# Patient Record
Sex: FEMALE | Race: WHITE | NOT HISPANIC OR LATINO | Employment: OTHER | URBAN - METROPOLITAN AREA
[De-identification: names, ages, dates, MRNs, and addresses within clinical notes are randomized per-mention and may not be internally consistent; named-entity substitution may affect disease eponyms.]

---

## 2017-03-18 ENCOUNTER — OFFICE VISIT (OUTPATIENT)
Dept: URGENT CARE | Facility: PHYSICIAN GROUP | Age: 72
End: 2017-03-18
Payer: MEDICARE

## 2017-03-18 VITALS
DIASTOLIC BLOOD PRESSURE: 84 MMHG | TEMPERATURE: 97.7 F | RESPIRATION RATE: 16 BRPM | HEART RATE: 96 BPM | OXYGEN SATURATION: 94 % | BODY MASS INDEX: 22.89 KG/M2 | SYSTOLIC BLOOD PRESSURE: 122 MMHG | HEIGHT: 72 IN | WEIGHT: 169 LBS

## 2017-03-18 DIAGNOSIS — J01.00 ACUTE MAXILLARY SINUSITIS, RECURRENCE NOT SPECIFIED: ICD-10-CM

## 2017-03-18 PROCEDURE — G8420 CALC BMI NORM PARAMETERS: HCPCS | Performed by: NURSE PRACTITIONER

## 2017-03-18 PROCEDURE — G8432 DEP SCR NOT DOC, RNG: HCPCS | Performed by: NURSE PRACTITIONER

## 2017-03-18 PROCEDURE — 3017F COLORECTAL CA SCREEN DOC REV: CPT | Mod: 8P | Performed by: NURSE PRACTITIONER

## 2017-03-18 PROCEDURE — 1036F TOBACCO NON-USER: CPT | Performed by: NURSE PRACTITIONER

## 2017-03-18 PROCEDURE — 4040F PNEUMOC VAC/ADMIN/RCVD: CPT | Performed by: NURSE PRACTITIONER

## 2017-03-18 PROCEDURE — 3014F SCREEN MAMMO DOC REV: CPT | Performed by: NURSE PRACTITIONER

## 2017-03-18 PROCEDURE — 99203 OFFICE O/P NEW LOW 30 MIN: CPT | Performed by: NURSE PRACTITIONER

## 2017-03-18 PROCEDURE — G8482 FLU IMMUNIZE ORDER/ADMIN: HCPCS | Performed by: NURSE PRACTITIONER

## 2017-03-18 PROCEDURE — 1101F PT FALLS ASSESS-DOCD LE1/YR: CPT | Mod: 8P | Performed by: NURSE PRACTITIONER

## 2017-03-18 RX ORDER — AMOXICILLIN AND CLAVULANATE POTASSIUM 875; 125 MG/1; MG/1
1 TABLET, FILM COATED ORAL 2 TIMES DAILY
Qty: 10 TAB | Refills: 0 | Status: SHIPPED | OUTPATIENT
Start: 2017-03-18 | End: 2017-03-23

## 2017-03-18 NOTE — MR AVS SNAPSHOT
Nohelia Dickerson   3/18/2017 12:45 PM   Office Visit   MRN: [id number]    Department:  [facility]   Dept Phone:  [phone]    Description:  Female : 1945   Provider:  RAFFI Beyer           Reason for Visit     Sinus Problem pain and pressure, chest congestion and tighness X 2 weeks       Allergies as of 3/18/2017     Allergen Noted Reactions    Lactose 2011       Lactose intolerence    Morphine 2011       Hallucinations, altered mentations      You were diagnosed with     Acute maxillary sinusitis, recurrence not specified   [[id number]]         Vital Signs     Blood Pressure Pulse Temperature Respirations Height Weight    122/84 mmHg 96 36.5 °C (97.7 °F) 16 1.829 m (6') 76.658 kg (169 lb)    Body Mass Index Oxygen Saturation                22.92 kg/m2 94%          Basic Information     Date Of Birth Sex Race Ethnicity Preferred Language    1945 Female White Unknown English      Health Maintenance        Date Due Completion Dates    IMM DTaP/Tdap/Td Vaccine (1 - Tdap) 3/11/1965 ---    PAP SMEAR 3/11/1967 ---    COLONOSCOPY 3/11/1996 ---    IMM ZOSTER VACCINE 3/11/2006 ---    BONE DENSITY 3/11/2011 ---    MAMMOGRAM 2017    IMM PNEUMOCOCCAL 65+ (ADULT) LOW/MEDIUM RISK SERIES (2 of 2 - PPSV23) 2017            Current Immunizations     13-VALENT PCV PREVNAR 2016  9:24 AM    Influenza Vaccine Adult HD 2016  9:23 AM      Below and/or attached are the medications your provider expects you to take. Review all of your home medications and newly ordered medications with your provider and/or pharmacist. Follow medication instructions as directed by your provider and/or pharmacist. Please keep your medication list with you and share with your provider. Update the information when medications are discontinued, doses are changed, or new medications (including over-the-counter products) are added; and carry medication information  at all times in the event of emergency situations     Allergies:  LACTOSE - (reactions not documented)     MORPHINE - (reactions not documented)               Medications  Valid as of: March 18, 2017 -  1:52 PM    Generic Name Brand Name Tablet Size Instructions for use    Amoxicillin-Pot Clavulanate (Tab) AUGMENTIN 875-125 MG Take 1 Tab by mouth 2 times a day for 5 days.        B Complex Vitamins   Take  by mouth.        Cholecalciferol (Cap) VITAMIN D 400 UNIT Take 400 Units by mouth every day.        Multiple Vitamins-Minerals   Take  by mouth every day.        Omega-3 Fatty Acids   Take  by mouth.        .                 Medicines prescribed today were sent to:     [id number] - [place] - [address] [place] [address]    Phone: [phone] Fax: [phone]    Open 24 Hours?: No      Medication refill instructions:       If your prescription bottle indicates you have medication refills left, it is not necessary to call your provider’s office. Please contact your pharmacy and they will refill your medication.    If your prescription bottle indicates you do not have any refills left, you may request refills at any time through one of the following ways: The online [facility] system (except Urgent Care), by calling your provider’s office, or by asking your pharmacy to contact your provider’s office with a refill request. Medication refills are processed only during regular business hours and may not be available until the next business day. Your provider may request additional information or to have a follow-up visit with you prior to refilling your medication.   *Please Note: Medication refills are assigned a new Rx number when refilled electronically. Your pharmacy may indicate that no refills were authorized even though a new prescription for the same medication is available at the pharmacy. Please request the medicine by name with the pharmacy before contacting your provider for a  refill.           [employer] Access Code: [email or web address]  Expires: 4/17/2017  1:52 PM    Your email address is not on file at [facility].  Email Addresses are required for you to sign up for [employer], please contact [phone] to verify your personal information and to provide your email address prior to attempting to register for [employer].    Nohelia Dickerson  [address] [place] [address], [place] [address]    Eye[employer]Care  A secure, online tool to manage your health information     [facility]’s EyeTechCare® is a secure, online tool that connects you to your personalized health information from the privacy of your home -- day or night - making it very easy for you to manage your healthcare. Once the activation process is completed, you can even access your medical information using the EyeTechCare manju, which is available for free in the Apple Manju store or Google Play store.     To learn more about EyeTechCare, visit [email or web address]    There are two levels of access available (as shown below):   My Chart Features  [facility] Primary Care Doctor [facility]  Specialists [facility]  Urgent  Care Non-[facility] Primary Care Doctor   Email your healthcare team securely and privately 24/7 X X X    Manage appointments: schedule your next appointment; view details of past/upcoming appointments X      Request prescription refills. X      View recent personal medical records, including lab and immunizations X X X X   View health record, including health history, allergies, medications X X X X   Read reports about your outpatient visits, procedures, consult and ER notes X X X X   See your discharge summary, which is a recap of your hospital and/or ER visit that includes your diagnosis, lab results, and care plan X X  X     How to register for [employer]:  Once your e-mail address has been verified, follow the following steps to sign up for [employer].     1. Go to  [email or web address]  2. Click on the Sign Up Now box, which takes you to the New Member  Sign Up page. You will need to provide the following information:  a. Enter your [employer] Access Code exactly as it appears at the top of this page. (You will not need to use this code after you’ve completed the sign-up process. If you do not sign up before the expiration date, you must request a new code.)   b. Enter your date of birth.   c. Enter your home email address.   d. Click Submit, and follow the next screen’s instructions.  3. Create a [employer]oxid ID. This will be your [employer] login ID and cannot be changed, so think of one that is secure and easy to remember.  4. Create a [employer] password. You can change your password at any time.  5. Enter your Password Reset Question and Answer. This can be used at a later time if you forget your password.   6. Enter your e-mail address. This allows you to receive e-mail notifications when new information is available in [employer].  7. Click Sign Up. You can now view your health information.    For assistance activating your [employer] account, call [phone]

## 2017-03-22 ASSESSMENT — ENCOUNTER SYMPTOMS
DIAPHORESIS: 0
HEADACHES: 1
CHILLS: 0
SORE THROAT: 0
WEAKNESS: 0
FEVER: 0

## 2017-03-22 NOTE — PROGRESS NOTES
Subjective:      Nohelia Dickerson is a 72 y.o. female who presents with Sinus Problem            Sinus Problem  Associated symptoms include congestion and headaches. Pertinent negatives include no chills, diaphoresis, ear pain or sore throat.   Patient comes in today with a 2 week history of sinus pressure and nasal congestion.  She has allergic rhinitis and has been taking oral decongestants and antihistamines with less effectiveness than usual.  Denies any fever, chills, or cough.      Review of Systems   Constitutional: Negative for fever, chills, malaise/fatigue and diaphoresis.   HENT: Positive for congestion. Negative for ear pain and sore throat.    Skin: Negative for rash.   Neurological: Positive for headaches. Negative for weakness.     Medications, Allergies, and current problem list reviewed today in Epic     Objective:     /84 mmHg  Pulse 96  Temp(Src) 36.5 °C (97.7 °F)  Resp 16  Ht 1.829 m (6')  Wt 76.658 kg (169 lb)  BMI 22.92 kg/m2  SpO2 94%     Physical Exam   Constitutional: She is oriented to person, place, and time. She appears well-developed and well-nourished. No distress.   HENT:   Head: Normocephalic.   Right Ear: External ear normal.   Left Ear: External ear normal.   Mouth/Throat: Oropharynx is clear and moist. No oropharyngeal exudate.   Nares patent.  Diffuse maxillary sinus pain, left worse than right.     Eyes: Conjunctivae are normal. Pupils are equal, round, and reactive to light. Right eye exhibits no discharge. Left eye exhibits no discharge. No scleral icterus.   Neck: Neck supple. No JVD present. No tracheal deviation present. No thyromegaly present.   Cardiovascular: Normal rate, regular rhythm and normal heart sounds.  Exam reveals no gallop and no friction rub.    No murmur heard.  Pulmonary/Chest: Effort normal and breath sounds normal. No stridor. No respiratory distress. She has no wheezes. She has no rales. She exhibits no tenderness.   Lymphadenopathy:      She has no cervical adenopathy.   Neurological: She is alert and oriented to person, place, and time.   Skin: Skin is warm and dry. No rash noted. She is not diaphoretic. No erythema.   Vitals reviewed.              Assessment/Plan:     1. Acute maxillary sinusitis, recurrence not specified  - amoxicillin-clavulanate (AUGMENTIN) 875-125 MG Tab; Take 1 Tab by mouth 2 times a day for 5 days.  Dispense: 10 Tab; Refill: 0    Take full course of antibiotics.  Use a cool mist humidifier.  OTC Sudafed for 2-5 days prn symptom management.  Maintain adequate po hydration.  RTC in 5-7 days if symptoms persist, sooner if worse.  Patient verbalized understanding of and agreed with plan of care.

## 2017-08-19 ENCOUNTER — HOSPITAL ENCOUNTER (OUTPATIENT)
Facility: MEDICAL CENTER | Age: 72
End: 2017-08-19
Payer: MEDICARE

## 2017-08-19 LAB
ALBUMIN SERPL BCP-MCNC: 4.2 G/DL (ref 3.2–4.9)
ALBUMIN/GLOB SERPL: 1.5 G/DL
ALP SERPL-CCNC: 68 U/L (ref 30–99)
ALT SERPL-CCNC: 14 U/L (ref 2–50)
ANION GAP SERPL CALC-SCNC: 9 MMOL/L (ref 0–11.9)
AST SERPL-CCNC: 17 U/L (ref 12–45)
BILIRUB SERPL-MCNC: 1.3 MG/DL (ref 0.1–1.5)
BUN SERPL-MCNC: 16 MG/DL (ref 8–22)
CALCIUM SERPL-MCNC: 9.6 MG/DL (ref 8.5–10.5)
CHLORIDE SERPL-SCNC: 106 MMOL/L (ref 96–112)
CHOLEST SERPL-MCNC: 188 MG/DL (ref 100–199)
CO2 SERPL-SCNC: 25 MMOL/L (ref 20–33)
CREAT SERPL-MCNC: 0.8 MG/DL (ref 0.5–1.4)
GFR SERPL CREATININE-BSD FRML MDRD: >60 ML/MIN/1.73 M 2
GLOBULIN SER CALC-MCNC: 2.8 G/DL (ref 1.9–3.5)
GLUCOSE SERPL-MCNC: 92 MG/DL (ref 65–99)
HDLC SERPL-MCNC: 68 MG/DL
LDLC SERPL CALC-MCNC: 103 MG/DL
POTASSIUM SERPL-SCNC: 3.8 MMOL/L (ref 3.6–5.5)
PROT SERPL-MCNC: 7 G/DL (ref 6–8.2)
SODIUM SERPL-SCNC: 140 MMOL/L (ref 135–145)
TRIGL SERPL-MCNC: 86 MG/DL (ref 0–149)

## 2017-08-19 PROCEDURE — 80061 LIPID PANEL: CPT

## 2017-08-19 PROCEDURE — 80053 COMPREHEN METABOLIC PANEL: CPT

## 2018-04-02 ENCOUNTER — OFFICE VISIT (OUTPATIENT)
Dept: URGENT CARE | Facility: PHYSICIAN GROUP | Age: 73
End: 2018-04-02
Payer: MEDICARE

## 2018-04-02 VITALS
OXYGEN SATURATION: 92 % | DIASTOLIC BLOOD PRESSURE: 92 MMHG | HEIGHT: 72 IN | TEMPERATURE: 98.8 F | HEART RATE: 95 BPM | SYSTOLIC BLOOD PRESSURE: 160 MMHG | BODY MASS INDEX: 21.81 KG/M2 | WEIGHT: 161 LBS

## 2018-04-02 DIAGNOSIS — R49.0 HOARSENESS: ICD-10-CM

## 2018-04-02 DIAGNOSIS — J02.9 SORE THROAT: ICD-10-CM

## 2018-04-02 DIAGNOSIS — I10 ESSENTIAL HYPERTENSION: ICD-10-CM

## 2018-04-02 PROCEDURE — 99214 OFFICE O/P EST MOD 30 MIN: CPT | Performed by: NURSE PRACTITIONER

## 2018-04-02 RX ORDER — LISINOPRIL 5 MG/1
5 TABLET ORAL DAILY
Qty: 30 TAB | Refills: 0 | Status: SHIPPED | OUTPATIENT
Start: 2018-04-02 | End: 2018-05-22

## 2018-04-02 ASSESSMENT — ENCOUNTER SYMPTOMS
SHORTNESS OF BREATH: 0
TROUBLE SWALLOWING: 0
PALPITATIONS: 0
SORE THROAT: 0
CHILLS: 0
SWOLLEN GLANDS: 0
FEVER: 0
COUGH: 0

## 2018-04-03 NOTE — PROGRESS NOTES
Subjective:      Nohelia Dcikerson is a 73 y.o. female who presents with Laryngitis (Cough, post nasal drip, runny nose x 3 wks )    Past Medical History:   Diagnosis Date   • Cancer ([facility]) 1978    thyroid   • COPD    • Hernia of unspecified site of abdominal cavity without mention of obstruction or gangrene 2011   • Inguinal hernia    • Kidney stones      Social History     Social History   • Marital status:      Spouse name: N/A   • Number of children: N/A   • Years of education: N/A     Occupational History   • Not on file.     Social History Main Topics   • Smoking status: Former Smoker     Packs/day: 0.50     Types: Cigarettes   • Smokeless tobacco: Never Used      Comment: quit 1 year ago, smoked 1ppd for 50 years   • Alcohol use No   • Drug use: No   • Sexual activity: No     Other Topics Concern   • Not on file     Social History Narrative   • No narrative on file     Family History   Problem Relation Age of Onset   • Cancer Mother    • Heart Disease Father    • Stroke Father        Allergies: Lactose and Morphine    Patient is a 73-year-old female who presents today with complaint of sore throat and cold symptoms about 3 weeks ago. Those symptoms resolved and now patient just has a residual hoarseness lasting more than 14 days that will not resolve. She is currently a smoker.                Pharyngitis    This is a new problem. The current episode started in the past 7 days. Neither side of throat is experiencing more pain than the other. There has been no fever. Pertinent negatives include no congestion, coughing, shortness of breath, swollen glands or trouble swallowing. Associated symptoms comments: Hoarseness. She has tried nothing for the symptoms. The treatment provided no relief.       Review of Systems   Constitutional: Negative for chills, fever and malaise/fatigue.   HENT: Negative for congestion, sore throat and trouble swallowing.         Loss of voice   Respiratory: Negative for  cough and shortness of breath.    Cardiovascular: Negative for chest pain and palpitations.   Skin: Negative.    All other systems reviewed and are negative.         Objective:     /92   Pulse 95   Temp 37.1 °C (98.8 °F)   Ht 1.829 m (6')   Wt 73 kg (161 lb)   SpO2 92%   BMI 21.84 kg/m²      Physical Exam   Constitutional: She is oriented to person, place, and time. She appears well-developed and well-nourished.   HENT:   Head: Normocephalic.   Right Ear: External ear normal.   Left Ear: External ear normal.   Nose: Nose normal.   Mouth/Throat: Oropharynx is clear and moist.   Voice is noted to be raspy and hoarse   Eyes: Conjunctivae and EOM are normal. Pupils are equal, round, and reactive to light.   Neck: Normal range of motion. Neck supple.   Cardiovascular: Normal rate and regular rhythm.    Pulmonary/Chest: Effort normal and breath sounds normal.   Musculoskeletal: Normal range of motion.   Neurological: She is alert and oriented to person, place, and time.   Skin: Skin is warm and dry. Capillary refill takes less than 2 seconds.   Psychiatric: She has a normal mood and affect. Her behavior is normal. Judgment and thought content normal.   Vitals reviewed.    Recheck of patient's blood pressure by me to the left upper extremity: 170/100    Patient admits to a prior history of hypertension. She states she used to take atenolol but stopped taking it because it made her very fatigued. She currently does not have a primary care physician and although Dr. Estrada is listed, she states she has not actually seen him.    I discussed with patient that she has no other upper respiratory symptoms I'm concerned for chronic hoarseness and I do strongly recommend that she see an ear nose and throat specialist. As her blood pressure is elevated, I will also start her on a low-dose of lisinopril with the understanding that I will only give her one month and that she will make an appointment to follow up with  primary care here at [facility]. Patient agreed to this.          Assessment/Plan:     1. Laryngitis, chronic    -Referral given to ENT for follow up    2. Hypertension    -Lisinopril 5 mg daily  -appointment scheduled to establish and follow up with primary care.

## 2018-05-21 ENCOUNTER — TELEPHONE (OUTPATIENT)
Dept: MEDICAL GROUP | Facility: PHYSICIAN GROUP | Age: 73
End: 2018-05-21

## 2018-05-21 NOTE — TELEPHONE ENCOUNTER
Future Appointments       Provider Department Center    5/22/2018 11:20 AM Idalia Cortez M.D. [facility]        NEW PATIENT VISIT PRE-VISIT PLANNING    1.  [facility] Patient is checked in Patient Demographics? YES    2.  Immunizations were updated in [facility] using WebIZ?: Yes       •  Web Iz Recommendations: FLU, PNEUMOVAX (PPSV23), TDAP and ZOSTAVAX (Shingles)    3.  Is this appointment scheduled as a Hospital Follow-Up? No    4.  Patient is due for the following Health Maintenance Topics:   Health Maintenance Due   Topic Date Due   • Annual Wellness Visit  1945   • IMM DTaP/Tdap/Td Vaccine (1 - Tdap) 03/11/1965   • PAP SMEAR  03/11/1967   • COLONOSCOPY  03/11/1996   • BONE DENSITY  03/11/2011   • MAMMOGRAM  12/06/2017   • IMM PNEUMOCOCCAL 65+ (ADULT) LOW/MEDIUM RISK SERIES (2 of 2 - PPSV23) 12/06/2017       5.  Reviewed/Updated the following with patient:   •   Preferred Pharmacy? NO       •   Preferred Lab? NO       •   Preferred Communication? NO       •   Allergies? NO       •   Medications? NO       •   Social History? NO       •   Family History (document living status of immediate family members and if + hx of cancer, diabetes, hypertension, hyperlipidemia, heart attack, stroke) NO    6.  Updated Care Team?       •   DME Company (gait device, O2, CPAP, etc.) NO       •   Other Specialists (eye doctor, derm, GYN, cardiology, endo, etc): NO    7.  MDX printed for Provider? YES    8.  Patient was informed to arrive 15 min prior to their   scheduled appointment and bring in their medication bottles. [facility] was unable to get in contact with patient

## 2018-05-22 ENCOUNTER — OFFICE VISIT (OUTPATIENT)
Dept: MEDICAL GROUP | Facility: PHYSICIAN GROUP | Age: 73
End: 2018-05-22
Payer: MEDICARE

## 2018-05-22 VITALS
SYSTOLIC BLOOD PRESSURE: 142 MMHG | OXYGEN SATURATION: 91 % | BODY MASS INDEX: 21.09 KG/M2 | HEART RATE: 102 BPM | DIASTOLIC BLOOD PRESSURE: 90 MMHG | TEMPERATURE: 99 F | HEIGHT: 72 IN | WEIGHT: 155.7 LBS

## 2018-05-22 DIAGNOSIS — M85.80 AGE-RELATED BONE LOSS: ICD-10-CM

## 2018-05-22 DIAGNOSIS — Z72.0 TOBACCO ABUSE: ICD-10-CM

## 2018-05-22 DIAGNOSIS — Z00.00 HEALTHCARE MAINTENANCE: ICD-10-CM

## 2018-05-22 DIAGNOSIS — Z13.228 ENCOUNTER FOR SCREENING FOR METABOLIC DISORDER: ICD-10-CM

## 2018-05-22 DIAGNOSIS — E78.5 DYSLIPIDEMIA: ICD-10-CM

## 2018-05-22 DIAGNOSIS — Z12.11 COLON CANCER SCREENING: ICD-10-CM

## 2018-05-22 DIAGNOSIS — Z12.2 ENCOUNTER FOR SCREENING FOR LUNG CANCER: ICD-10-CM

## 2018-05-22 DIAGNOSIS — Z71.6 ENCOUNTER FOR SMOKING CESSATION COUNSELING: ICD-10-CM

## 2018-05-22 DIAGNOSIS — Z12.31 ENCOUNTER FOR SCREENING MAMMOGRAM FOR BREAST CANCER: ICD-10-CM

## 2018-05-22 DIAGNOSIS — I10 ESSENTIAL HYPERTENSION: Primary | ICD-10-CM

## 2018-05-22 DIAGNOSIS — Z87.891 PERSONAL HISTORY OF NICOTINE DEPENDENCE: ICD-10-CM

## 2018-05-22 PROCEDURE — 99204 OFFICE O/P NEW MOD 45 MIN: CPT | Performed by: INTERNAL MEDICINE

## 2018-05-22 RX ORDER — LISINOPRIL 10 MG/1
10 TABLET ORAL DAILY
Qty: 90 TAB | Refills: 2 | Status: SHIPPED | OUTPATIENT
Start: 2018-05-22 | End: 2019-02-02 | Stop reason: SDUPTHER

## 2018-05-22 ASSESSMENT — PATIENT HEALTH QUESTIONNAIRE - PHQ9: CLINICAL INTERPRETATION OF PHQ2 SCORE: 0

## 2018-05-22 NOTE — ASSESSMENT & PLAN NOTE
This is a chronic health problem that is uncontrolled with current medications and lifestyle measures. Currently on 0.5PPD , but previously 1PPD since 17 yrs age. Quit on and Off 6 yrs back.

## 2018-05-22 NOTE — ASSESSMENT & PLAN NOTE
This is a chronic health problem that is uncontrolled with current medications and lifestyle measures. Was diagnosed 7 yrs back at [facility] when she got hospitalized for Inguinal hernia strangulation and repair - but patient was noncompliant with the medications and stopped it after 3-4 months. CUrrently in UC visit started on Lisinopril 5 mg but pt not started taking.     Today BP is 142/90 in Office , Denies any Sx of Headache, blurry vision or focal weakness.Not on any medications.

## 2018-05-22 NOTE — ASSESSMENT & PLAN NOTE
Patient is due for colonoscopy, mammogram, DEXA scan and pneumococcal shots which she prefers to have it at [facility].
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
## 2018-05-30 ENCOUNTER — PATIENT OUTREACH (OUTPATIENT)
Dept: HEALTH INFORMATION MANAGEMENT | Facility: OTHER | Age: 73
End: 2018-05-30

## 2018-05-30 NOTE — PROGRESS NOTES
Attempt #:1    WebIZ Checked & Epic Updated: no  HealthConnect Verified: yes  Verify PCP: yes    Communication Preference Obtained: unable to-pt had to hang up     Review Care Team: no    Annual Wellness Visit Scheduling  1. Scheduling Status:Not Scheduled. Patient states they are not interested        Care Gap Scheduling (Attempt to Schedule EACH Overdue Care Gap!)  Health Maintenance Due   Topic Date Due   • Annual Wellness Visit  1945   • IMM DTaP/Tdap/Td Vaccine (1 - Tdap) 03/11/1965   • PAP SMEAR  03/11/1967   • COLONOSCOPY  03/11/1996   • BONE DENSITY  03/11/2011   • MAMMOGRAM  12/06/2017   • IMM PNEUMOCOCCAL 65+ (ADULT) LOW/MEDIUM RISK SERIES (2 of 2 - PPSV23) 12/06/2017       Pt stated she will call back to schedule care gaps    MyChart Activation: declined-unable to offer- pt had to get off the phone  MyChart Manju: no  Virtual Visits: no  Opt In to Text Messages: no

## 2018-06-05 ENCOUNTER — TELEPHONE (OUTPATIENT)
Dept: HEMATOLOGY ONCOLOGY | Facility: MEDICAL CENTER | Age: 73
End: 2018-06-05

## 2018-06-05 NOTE — TELEPHONE ENCOUNTER
Received referral to lung cancer screening program.  Chart review to assess for lung cancer screening program eligibility.   1. Age 55-77 yrs of age? Yes 73 y.o.  2. 30 pack year hx of smoking, or greater? Yes 1 [id number] + on/off for last 6 years= approximately 53 pkyr hx  3. Current smoker or if quit, has pt quit within last 15 yrs?Yes  Current 1/2 pack   4. Any signs or symptoms of lung cancer? None noted/ COPD  5. Previous history of lung cancer? None noted/ Previous thyroid cancer  6. Chest CT within past 12 mos.? None noted  Patient does meet eligibility criteria. [facility] scheduling notified to schedule the shared decision making visit.

## 2018-06-19 ENCOUNTER — APPOINTMENT (OUTPATIENT)
Dept: HEMATOLOGY ONCOLOGY | Facility: MEDICAL CENTER | Age: 73
End: 2018-06-19
Payer: MEDICARE

## 2018-06-22 ENCOUNTER — OFFICE VISIT (OUTPATIENT)
Dept: HEMATOLOGY ONCOLOGY | Facility: MEDICAL CENTER | Age: 73
End: 2018-06-22
Payer: MEDICARE

## 2018-06-22 VITALS
HEIGHT: 72 IN | HEART RATE: 97 BPM | BODY MASS INDEX: 20.71 KG/M2 | OXYGEN SATURATION: 92 % | TEMPERATURE: 98.1 F | SYSTOLIC BLOOD PRESSURE: 142 MMHG | WEIGHT: 152.89 LBS | RESPIRATION RATE: 16 BRPM | DIASTOLIC BLOOD PRESSURE: 92 MMHG

## 2018-06-22 DIAGNOSIS — F17.210 CIGARETTE SMOKER: ICD-10-CM

## 2018-06-22 PROCEDURE — G0296 VISIT TO DETERM LDCT ELIG: HCPCS | Performed by: NURSE PRACTITIONER

## 2018-06-22 ASSESSMENT — ENCOUNTER SYMPTOMS
WEIGHT LOSS: 1
SHORTNESS OF BREATH: 1
SPUTUM PRODUCTION: 1
WHEEZING: 0
HEMOPTYSIS: 0
COUGH: 1

## 2018-06-22 ASSESSMENT — PAIN SCALES - GENERAL: PAINLEVEL: NO PAIN

## 2018-06-22 NOTE — PROGRESS NOTES
"Subjective:      Nohelia Dickerson is a 73 y.o. female who presents for Lung Cancer Screening Program Prescreen (Dx: Nicotine Dependence) for lung cancer screening shared decision making visit.     HPI   Patient seen today for initial lung cancer screening visit. Patient referred by PCP, Dr. Idalia Cortez.     The patient meets eligibility criteria including age, smoking history (30+ pack years), if former smoker, quit in the last 15 years, and absence of signs or symptoms of lung cancer.    - Age - 73  - Smoking history - Patient has smoked for 54 years at an average of 1 ppd = 54 pack year smoking history.  - Current smoking status - current smoker, 0.5ppd  - No symptoms of lung cancer and no previous history of lung cancer      Allergies   Allergen Reactions   • Lactose      Lactose intolerence   • Morphine      Hallucinations, altered mentations       Current Outpatient Prescriptions on File Prior to Visit   Medication Sig Dispense Refill   • CALCIUM PO Take  by mouth.     • MAGNESIUM PO Take  by mouth.     • lisinopril (PRINIVIL) 10 MG Tab Take 1 Tab by mouth every day. 90 Tab 2   • GARLIC PO Take  by mouth.     • Omega-3 Fatty Acids (FISH OIL PO) Take  by mouth.     • B Complex Vitamins (B COMPLEX PO) Take  by mouth.     • Multiple Vitamin (MULTIVITAMIN PO) Take  by mouth every day.     • cholecalciferol (VITAMIN D) 400 UNIT CAPS Take 400 Units by mouth every day.       No current facility-administered medications on file prior to visit.            Review of Systems   Constitutional: Positive for weight loss (recently due to stress). Negative for malaise/fatigue.   Respiratory: Positive for cough (in the morning), sputum production (clear/white) and shortness of breath (with increased mucus - better after cleared). Negative for hemoptysis and wheezing.         Objective:     /92   Pulse 97   Temp 36.7 °C (98.1 °F)   Resp 16   Ht 1.83 m (6' 0.05\")   Wt 69.3 kg (152 lb 14.2 oz)   SpO2 92%   " Breastfeeding? No   BMI 20.71 kg/m²      Physical Exam   Constitutional: She is oriented to person, place, and time. She appears well-developed and well-nourished. No distress.   Cardiovascular: Normal rate, regular rhythm and normal heart sounds.  Exam reveals no gallop and no friction rub.    No murmur heard.  Pulmonary/Chest: Effort normal and breath sounds normal. No respiratory distress. She has no wheezes.   Musculoskeletal: Normal range of motion.   Neurological: She is alert and oriented to person, place, and time.   Skin: Skin is warm and dry. She is not diaphoretic.   Vitals reviewed.       Assessment/Plan:     1. Cigarette smoker  CT-LUNG CANCER-SCREENING       We conducted a shared decision-making process using a decision aid. We reviewed benefits and harms of screening, including false positives and potential need for additional diagnostic testing, the possibility of over diagnosis, and total radiation exposure.    We discussed the importance of adhering to annual LDCT screening. We also discussed the impact of comorbities on the patient's the ability or willingness to undergo diagnostic procedure(s) and treatment.    Counseling on the importance of maintaining cigarette smoking abstinence if former smoker; or the importance of smoking cessation if current smoker and, if appropriate, furnishing of information about tobacco cessation interventions. I provided patient with smoking cessation materials and resources within [facility] and the community. Patient appreciative of the resources.     Based on our discussion, we have decided to begin annual lung cancer screening starting now.

## 2018-08-05 ENCOUNTER — HOSPITAL ENCOUNTER (INPATIENT)
Facility: MEDICAL CENTER | Age: 73
LOS: 1 days | DRG: 694 | End: 2018-08-08
Attending: EMERGENCY MEDICINE | Admitting: HOSPITALIST
Payer: MEDICARE

## 2018-08-05 DIAGNOSIS — K56.609 LARGE BOWEL OBSTRUCTION (HCC): ICD-10-CM

## 2018-08-05 DIAGNOSIS — R11.2 NAUSEA AND VOMITING, INTRACTABILITY OF VOMITING NOT SPECIFIED, UNSPECIFIED VOMITING TYPE: ICD-10-CM

## 2018-08-05 DIAGNOSIS — R10.32 ABDOMINAL PAIN, ACUTE, LEFT LOWER QUADRANT: ICD-10-CM

## 2018-08-05 DIAGNOSIS — J41.0 SIMPLE CHRONIC BRONCHITIS (HCC): ICD-10-CM

## 2018-08-05 DIAGNOSIS — J96.11 CHRONIC RESPIRATORY FAILURE WITH HYPOXIA (HCC): ICD-10-CM

## 2018-08-05 LAB
BASOPHILS # BLD AUTO: 0.6 % (ref 0–1.8)
BASOPHILS # BLD: 0.06 K/UL (ref 0–0.12)
EOSINOPHIL # BLD AUTO: 0.12 K/UL (ref 0–0.51)
EOSINOPHIL NFR BLD: 1.1 % (ref 0–6.9)
ERYTHROCYTE [DISTWIDTH] IN BLOOD BY AUTOMATED COUNT: 44.6 FL (ref 35.9–50)
HCT VFR BLD AUTO: 41.9 % (ref 37–47)
HGB BLD-MCNC: 14.5 G/DL (ref 12–16)
IMM GRANULOCYTES # BLD AUTO: 0.03 K/UL (ref 0–0.11)
IMM GRANULOCYTES NFR BLD AUTO: 0.3 % (ref 0–0.9)
LYMPHOCYTES # BLD AUTO: 1.85 K/UL (ref 1–4.8)
LYMPHOCYTES NFR BLD: 17.1 % (ref 22–41)
MCH RBC QN AUTO: 30.1 PG (ref 27–33)
MCHC RBC AUTO-ENTMCNC: 34.6 G/DL (ref 33.6–35)
MCV RBC AUTO: 86.9 FL (ref 81.4–97.8)
MONOCYTES # BLD AUTO: 0.44 K/UL (ref 0–0.85)
MONOCYTES NFR BLD AUTO: 4.1 % (ref 0–13.4)
NEUTROPHILS # BLD AUTO: 8.29 K/UL (ref 2–7.15)
NEUTROPHILS NFR BLD: 76.8 % (ref 44–72)
NRBC # BLD AUTO: 0 K/UL
NRBC BLD-RTO: 0 /100 WBC
PLATELET # BLD AUTO: 157 K/UL (ref 164–446)
PMV BLD AUTO: 10.1 FL (ref 9–12.9)
RBC # BLD AUTO: 4.82 M/UL (ref 4.2–5.4)
WBC # BLD AUTO: 10.8 K/UL (ref 4.8–10.8)

## 2018-08-05 PROCEDURE — 83690 ASSAY OF LIPASE: CPT

## 2018-08-05 PROCEDURE — 36415 COLL VENOUS BLD VENIPUNCTURE: CPT

## 2018-08-05 PROCEDURE — 96375 TX/PRO/DX INJ NEW DRUG ADDON: CPT

## 2018-08-05 PROCEDURE — 96374 THER/PROPH/DIAG INJ IV PUSH: CPT

## 2018-08-05 PROCEDURE — 700111 HCHG RX REV CODE 636 W/ 250 OVERRIDE (IP): Performed by: EMERGENCY MEDICINE

## 2018-08-05 PROCEDURE — 99285 EMERGENCY DEPT VISIT HI MDM: CPT

## 2018-08-05 PROCEDURE — 85025 COMPLETE CBC W/AUTO DIFF WBC: CPT

## 2018-08-05 PROCEDURE — 80053 COMPREHEN METABOLIC PANEL: CPT

## 2018-08-05 RX ORDER — ONDANSETRON 2 MG/ML
4 INJECTION INTRAMUSCULAR; INTRAVENOUS ONCE
Status: DISCONTINUED | OUTPATIENT
Start: 2018-08-06 | End: 2018-08-06

## 2018-08-05 RX ORDER — ONDANSETRON 2 MG/ML
4 INJECTION INTRAMUSCULAR; INTRAVENOUS ONCE
Status: COMPLETED | OUTPATIENT
Start: 2018-08-06 | End: 2018-08-05

## 2018-08-05 RX ORDER — SODIUM CHLORIDE 9 MG/ML
INJECTION, SOLUTION INTRAVENOUS CONTINUOUS
Status: DISCONTINUED | OUTPATIENT
Start: 2018-08-06 | End: 2018-08-06

## 2018-08-05 RX ADMIN — ONDANSETRON 4 MG: 2 INJECTION INTRAMUSCULAR; INTRAVENOUS at 23:58

## 2018-08-05 RX ADMIN — HYDROMORPHONE HYDROCHLORIDE 0.5 MG: 1 INJECTION, SOLUTION INTRAMUSCULAR; INTRAVENOUS; SUBCUTANEOUS at 23:58

## 2018-08-05 ASSESSMENT — PAIN SCALES - GENERAL: PAINLEVEL_OUTOF10: 10

## 2018-08-06 ENCOUNTER — HOSPITAL ENCOUNTER (OUTPATIENT)
Dept: RADIOLOGY | Facility: MEDICAL CENTER | Age: 73
End: 2018-08-06
Attending: EMERGENCY MEDICINE

## 2018-08-06 ENCOUNTER — APPOINTMENT (OUTPATIENT)
Dept: RADIOLOGY | Facility: MEDICAL CENTER | Age: 73
DRG: 694 | End: 2018-08-06
Attending: SURGERY
Payer: MEDICARE

## 2018-08-06 PROBLEM — K56.7 ILEUS (HCC): Status: ACTIVE | Noted: 2018-08-06

## 2018-08-06 PROBLEM — D69.6 THROMBOCYTOPENIA (HCC): Status: ACTIVE | Noted: 2018-08-06

## 2018-08-06 LAB
ALBUMIN SERPL BCP-MCNC: 3.8 G/DL (ref 3.2–4.9)
ALBUMIN/GLOB SERPL: 1.5 G/DL
ALP SERPL-CCNC: 65 U/L (ref 30–99)
ALT SERPL-CCNC: 20 U/L (ref 2–50)
ANION GAP SERPL CALC-SCNC: 10 MMOL/L (ref 0–11.9)
APPEARANCE UR: CLEAR
AST SERPL-CCNC: 24 U/L (ref 12–45)
BACTERIA #/AREA URNS HPF: ABNORMAL /HPF
BILIRUB SERPL-MCNC: 0.5 MG/DL (ref 0.1–1.5)
BILIRUB UR QL STRIP.AUTO: NEGATIVE
BUN SERPL-MCNC: 27 MG/DL (ref 8–22)
CALCIUM SERPL-MCNC: 9.4 MG/DL (ref 8.4–10.2)
CHLORIDE SERPL-SCNC: 107 MMOL/L (ref 96–112)
CO2 SERPL-SCNC: 22 MMOL/L (ref 20–33)
COLOR UR: YELLOW
CREAT SERPL-MCNC: 1.08 MG/DL (ref 0.5–1.4)
EPI CELLS #/AREA URNS HPF: ABNORMAL /HPF
EST. AVERAGE GLUCOSE BLD GHB EST-MCNC: 123 MG/DL
GLOBULIN SER CALC-MCNC: 2.5 G/DL (ref 1.9–3.5)
GLUCOSE SERPL-MCNC: 176 MG/DL (ref 65–99)
GLUCOSE UR STRIP.AUTO-MCNC: NEGATIVE MG/DL
HBA1C MFR BLD: 5.9 % (ref 0–5.6)
KETONES UR STRIP.AUTO-MCNC: 15 MG/DL
LEUKOCYTE ESTERASE UR QL STRIP.AUTO: NEGATIVE
LIPASE SERPL-CCNC: 35 U/L (ref 7–58)
MICRO URNS: ABNORMAL
MUCOUS THREADS #/AREA URNS HPF: ABNORMAL /HPF
NITRITE UR QL STRIP.AUTO: NEGATIVE
PH UR STRIP.AUTO: 6.5 [PH]
POTASSIUM SERPL-SCNC: 3.6 MMOL/L (ref 3.6–5.5)
PROT SERPL-MCNC: 6.3 G/DL (ref 6–8.2)
PROT UR QL STRIP: NEGATIVE MG/DL
RBC # URNS HPF: ABNORMAL /HPF
RBC UR QL AUTO: ABNORMAL
SODIUM SERPL-SCNC: 139 MMOL/L (ref 135–145)
SP GR UR STRIP.AUTO: 1.01
WBC #/AREA URNS HPF: ABNORMAL /HPF

## 2018-08-06 PROCEDURE — 96361 HYDRATE IV INFUSION ADD-ON: CPT

## 2018-08-06 PROCEDURE — 700117 HCHG RX CONTRAST REV CODE 255: Performed by: EMERGENCY MEDICINE

## 2018-08-06 PROCEDURE — 94760 N-INVAS EAR/PLS OXIMETRY 1: CPT

## 2018-08-06 PROCEDURE — 700105 HCHG RX REV CODE 258: Performed by: HOSPITALIST

## 2018-08-06 PROCEDURE — 99219 PR INITIAL OBSERVATION CARE,LEVL II: CPT | Performed by: HOSPITALIST

## 2018-08-06 PROCEDURE — 700105 HCHG RX REV CODE 258: Performed by: EMERGENCY MEDICINE

## 2018-08-06 PROCEDURE — A9270 NON-COVERED ITEM OR SERVICE: HCPCS | Performed by: HOSPITALIST

## 2018-08-06 PROCEDURE — 96376 TX/PRO/DX INJ SAME DRUG ADON: CPT

## 2018-08-06 PROCEDURE — 81001 URINALYSIS AUTO W/SCOPE: CPT

## 2018-08-06 PROCEDURE — 700111 HCHG RX REV CODE 636 W/ 250 OVERRIDE (IP): Performed by: EMERGENCY MEDICINE

## 2018-08-06 PROCEDURE — G0378 HOSPITAL OBSERVATION PER HR: HCPCS

## 2018-08-06 PROCEDURE — 700102 HCHG RX REV CODE 250 W/ 637 OVERRIDE(OP): Performed by: HOSPITALIST

## 2018-08-06 PROCEDURE — 74250 X-RAY XM SM INT 1CNTRST STD: CPT

## 2018-08-06 PROCEDURE — 74177 CT ABD & PELVIS W/CONTRAST: CPT

## 2018-08-06 PROCEDURE — 83036 HEMOGLOBIN GLYCOSYLATED A1C: CPT

## 2018-08-06 RX ORDER — HYDRALAZINE HYDROCHLORIDE 20 MG/ML
10 INJECTION INTRAMUSCULAR; INTRAVENOUS EVERY 6 HOURS PRN
Status: DISCONTINUED | OUTPATIENT
Start: 2018-08-06 | End: 2018-08-08 | Stop reason: HOSPADM

## 2018-08-06 RX ORDER — ONDANSETRON 2 MG/ML
4 INJECTION INTRAMUSCULAR; INTRAVENOUS EVERY 4 HOURS PRN
Status: DISCONTINUED | OUTPATIENT
Start: 2018-08-06 | End: 2018-08-08 | Stop reason: HOSPADM

## 2018-08-06 RX ORDER — BISACODYL 10 MG
10 SUPPOSITORY, RECTAL RECTAL
Status: DISCONTINUED | OUTPATIENT
Start: 2018-08-06 | End: 2018-08-08 | Stop reason: HOSPADM

## 2018-08-06 RX ORDER — SODIUM CHLORIDE 9 MG/ML
INJECTION, SOLUTION INTRAVENOUS CONTINUOUS
Status: DISCONTINUED | OUTPATIENT
Start: 2018-08-06 | End: 2018-08-08 | Stop reason: HOSPADM

## 2018-08-06 RX ORDER — LISINOPRIL 5 MG/1
10 TABLET ORAL DAILY
Status: DISCONTINUED | OUTPATIENT
Start: 2018-08-06 | End: 2018-08-08 | Stop reason: HOSPADM

## 2018-08-06 RX ORDER — POLYETHYLENE GLYCOL 3350 17 G/17G
1 POWDER, FOR SOLUTION ORAL
Status: DISCONTINUED | OUTPATIENT
Start: 2018-08-06 | End: 2018-08-08 | Stop reason: HOSPADM

## 2018-08-06 RX ORDER — ACETAMINOPHEN 325 MG/1
650 TABLET ORAL EVERY 6 HOURS PRN
Status: DISCONTINUED | OUTPATIENT
Start: 2018-08-06 | End: 2018-08-08 | Stop reason: HOSPADM

## 2018-08-06 RX ORDER — HYDROMORPHONE HYDROCHLORIDE 2 MG/ML
0.5 INJECTION, SOLUTION INTRAMUSCULAR; INTRAVENOUS; SUBCUTANEOUS EVERY 4 HOURS PRN
Status: DISCONTINUED | OUTPATIENT
Start: 2018-08-06 | End: 2018-08-08 | Stop reason: HOSPADM

## 2018-08-06 RX ORDER — AMOXICILLIN 250 MG
2 CAPSULE ORAL 2 TIMES DAILY
Status: DISCONTINUED | OUTPATIENT
Start: 2018-08-06 | End: 2018-08-08 | Stop reason: HOSPADM

## 2018-08-06 RX ORDER — ONDANSETRON 4 MG/1
4 TABLET, ORALLY DISINTEGRATING ORAL EVERY 4 HOURS PRN
Status: DISCONTINUED | OUTPATIENT
Start: 2018-08-06 | End: 2018-08-08 | Stop reason: HOSPADM

## 2018-08-06 RX ADMIN — SENNOSIDES AND DOCUSATE SODIUM 2 TABLET: 8.6; 5 TABLET ORAL at 04:35

## 2018-08-06 RX ADMIN — HYDROMORPHONE HYDROCHLORIDE 0.5 MG: 1 INJECTION, SOLUTION INTRAMUSCULAR; INTRAVENOUS; SUBCUTANEOUS at 02:54

## 2018-08-06 RX ADMIN — SODIUM CHLORIDE: 9 INJECTION, SOLUTION INTRAVENOUS at 07:29

## 2018-08-06 RX ADMIN — SODIUM CHLORIDE: 9 INJECTION, SOLUTION INTRAVENOUS at 02:41

## 2018-08-06 RX ADMIN — ACETAMINOPHEN 650 MG: 325 TABLET, FILM COATED ORAL at 11:09

## 2018-08-06 RX ADMIN — IOHEXOL 100 ML: 350 INJECTION, SOLUTION INTRAVENOUS at 00:36

## 2018-08-06 RX ADMIN — SODIUM CHLORIDE: 9 INJECTION, SOLUTION INTRAVENOUS at 18:27

## 2018-08-06 RX ADMIN — LISINOPRIL 10 MG: 5 TABLET ORAL at 04:35

## 2018-08-06 ASSESSMENT — ENCOUNTER SYMPTOMS
EYES NEGATIVE: 1
CONSTITUTIONAL NEGATIVE: 1
NEUROLOGICAL NEGATIVE: 1
PSYCHIATRIC NEGATIVE: 1
ABDOMINAL PAIN: 1
RESPIRATORY NEGATIVE: 1
VOMITING: 1
NAUSEA: 1
CARDIOVASCULAR NEGATIVE: 1
MUSCULOSKELETAL NEGATIVE: 1

## 2018-08-06 ASSESSMENT — COPD QUESTIONNAIRES
COPD SCREENING SCORE: 9
DO YOU EVER COUGH UP ANY MUCUS OR PHLEGM?: YES, EVERY DAY
DURING THE PAST 4 WEEKS HOW MUCH DID YOU FEEL SHORT OF BREATH: SOME OF THE TIME
IN THE PAST 12 MONTHS DO YOU DO LESS THAN YOU USED TO BECAUSE OF YOUR BREATHING PROBLEMS: STRONGLY AGREE
HAVE YOU SMOKED AT LEAST 100 CIGARETTES IN YOUR ENTIRE LIFE: YES

## 2018-08-06 ASSESSMENT — PATIENT HEALTH QUESTIONNAIRE - PHQ9
SUM OF ALL RESPONSES TO PHQ9 QUESTIONS 1 AND 2: 0
2. FEELING DOWN, DEPRESSED, IRRITABLE, OR HOPELESS: NOT AT ALL
1. LITTLE INTEREST OR PLEASURE IN DOING THINGS: NOT AT ALL

## 2018-08-06 ASSESSMENT — COGNITIVE AND FUNCTIONAL STATUS - GENERAL
MOVING TO AND FROM BED TO CHAIR: A LOT
DRESSING REGULAR LOWER BODY CLOTHING: A LOT
MOBILITY SCORE: 14
SUGGESTED CMS G CODE MODIFIER MOBILITY: CL
TOILETING: A LOT
STANDING UP FROM CHAIR USING ARMS: A LOT
DRESSING REGULAR UPPER BODY CLOTHING: A LOT
WALKING IN HOSPITAL ROOM: A LOT
CLIMB 3 TO 5 STEPS WITH RAILING: A LOT
HELP NEEDED FOR BATHING: A LOT
MOVING FROM LYING ON BACK TO SITTING ON SIDE OF FLAT BED: A LOT
DAILY ACTIVITIY SCORE: 15
PERSONAL GROOMING: A LITTLE
SUGGESTED CMS G CODE MODIFIER DAILY ACTIVITY: CK

## 2018-08-06 ASSESSMENT — PAIN SCALES - GENERAL
PAINLEVEL_OUTOF10: 0
PAINLEVEL_OUTOF10: 0
PAINLEVEL_OUTOF10: 4
PAINLEVEL_OUTOF10: 3
PAINLEVEL_OUTOF10: 0

## 2018-08-06 ASSESSMENT — LIFESTYLE VARIABLES: ALCOHOL_USE: NO

## 2018-08-06 NOTE — PROGRESS NOTES
Spoke to radiology, they are requesting a verification from Dr. Campbell for upper GI test.     Called [facility] requesting to page Dr. Campbell. Extension given, waiting on call back for further orders.

## 2018-08-06 NOTE — ASSESSMENT & PLAN NOTE
No evidence for this in further imaging. Possible partial SBO that is transient though. Discussed with surgery today. No intervention. I obtained and reviewed her colo report to see if there indeed was extrinsic compression. The scope was advanced to 20cm and then was not able to be passed after this 2/2 an angulated colon. She does have a hx of partial colectomy in the past 2/2 a hernia she says. The report from dr rodriguez says fixed adhesions are suspected. The patient is currently stooling and has bowel sounds.

## 2018-08-06 NOTE — ED NOTES
Rounded on pt. Pt denies needs or questions at this time. Updated on POC. Awaiting admit. Call light within reach. Daughter at bedside.

## 2018-08-06 NOTE — PROGRESS NOTES
Spoke to Dr. Campbell to clear upper GI and small bowel follow up order. Dr. Campbell stated that he just ordered a small bowel follow up.    Called radiology and spoke to Jayleen. Confirmed they will come up for patient at 1100.

## 2018-08-06 NOTE — PROGRESS NOTES
"0700 Received bedside report from JOSE Ariza. Patient resting in bed. Safety precautions in place.     0730 Patient resting in bed. New NS bag was hung, see MAR. Assessment completed. Patient stated she has mild left flank pain but denies any medication at this time. \"I would like to rest for now I've been awake since 0430.\" Bed is in lowest position, call light within reach.   "

## 2018-08-06 NOTE — H&P
Hospital Medicine History & Physical Note    Date of Service  8/6/2018    Primary Care Physician  Idalia Cortze M.D.    Consultants  General surgery Dr. Campbell    Code Status  Full code     Chief Complaint  Abdominal pain    History of Presenting Illness  73 y.o. female with history of essential hypertension on medication regimen, prior incarcerated hernia status post mesh repair, and distant thyroid carcinoma status post resection and stable was in her usual state of health until the day prior to admission, when shortly after eating a dish of ice cream, she developed several sharp abdominal pain in the left lower quadrant as well as left flank pain.  She reports associated nausea and multiple episodes of vomiting prior to her arrival.  She reports no exacerbating or alleviating factors.  No other complaints.      Review of Systems  Review of Systems   Constitutional: Negative.    HENT: Negative.    Eyes: Negative.    Respiratory: Negative.    Cardiovascular: Negative.    Gastrointestinal: Positive for abdominal pain, nausea and vomiting.   Genitourinary: Negative.    Musculoskeletal: Negative.    Skin: Negative.    Neurological: Negative.    Endo/Heme/Allergies: Negative.    Psychiatric/Behavioral: Negative.        Past Medical History   has a past medical history of Cancer (HCC) (1978); COPD; Hernia of unspecified site of abdominal cavity without mention of obstruction or gangrene (2011); Inguinal hernia; and Kidney stones.    Surgical History   has a past surgical history that includes hernia repair; thyroidectomy; other (1983); and inguinal hernia repair (3/28/2011).     Family History  family history includes Cancer in her mother; Heart Attack in her father; Heart Disease in her father; Stroke in her father.     Social History   reports that she has been smoking Cigarettes.  She has been smoking about 0.50 packs per day. She has never used smokeless tobacco. She reports that she does not drink alcohol or  use drugs.    Allergies  Allergies   Allergen Reactions   • Lactose      Lactose intolerence   • Morphine      Hallucinations, altered mentations       Medications  Prior to Admission Medications   Prescriptions Last Dose Informant Patient Reported? Taking?   B Complex Vitamins (B COMPLEX PO) 6/22/2018  Yes No   Sig: Take  by mouth.   CALCIUM PO 6/22/2018  Yes No   Sig: Take  by mouth.   GARLIC PO 6/22/2018  Yes No   Sig: Take  by mouth.   MAGNESIUM PO 6/22/2018  Yes No   Sig: Take  by mouth.   Multiple Vitamin (MULTIVITAMIN PO) 6/22/2018  Yes No   Sig: Take  by mouth every day.   Omega-3 Fatty Acids (FISH OIL PO) 6/22/2018  Yes No   Sig: Take  by mouth.   cholecalciferol (VITAMIN D) 400 UNIT CAPS 6/22/2018  Yes No   Sig: Take 400 Units by mouth every day.   lisinopril (PRINIVIL) 10 MG Tab 6/22/2018  No No   Sig: Take 1 Tab by mouth every day.      Facility-Administered Medications: None       Physical Exam  Blood Pressure : (!) 171/89   Temperature: 36.4 °C (97.5 °F)   Pulse: 87   Respiration: 18   Pulse Oximetry: 94 %     Physical Exam   Constitutional: She is oriented to person, place, and time. She appears well-developed and well-nourished. No distress.   HENT:   Head: Normocephalic and atraumatic.   Eyes: Pupils are equal, round, and reactive to light. Conjunctivae are normal.   Neck: Normal range of motion. Neck supple. No tracheal deviation present. No thyromegaly present.   Cardiovascular: Normal rate, regular rhythm and normal heart sounds.  Exam reveals no gallop and no friction rub.    No murmur heard.  Pulmonary/Chest: Effort normal and breath sounds normal. No respiratory distress. She has no wheezes. She has no rales.   Abdominal: Soft. Bowel sounds are normal. She exhibits no distension. There is tenderness. There is no rebound.   Musculoskeletal: Normal range of motion. She exhibits no edema.   Lymphadenopathy:     She has no cervical adenopathy.   Neurological: She is alert and oriented to person,  place, and time. No cranial nerve deficit.   Skin: Skin is warm and dry. She is not diaphoretic.   Psychiatric: She has a normal mood and affect.   Nursing note and vitals reviewed.      Laboratory:  Recent Labs      08/05/18   2350   WBC  10.8   RBC  4.82   HEMOGLOBIN  14.5   HEMATOCRIT  41.9   MCV  86.9   MCH  30.1   MCHC  34.6   RDW  44.6   PLATELETCT  157*   MPV  10.1     Recent Labs      08/05/18   2350   SODIUM  139   POTASSIUM  3.6   CHLORIDE  107   CO2  22   GLUCOSE  176*   BUN  27*   CREATININE  1.08   CALCIUM  9.4     Recent Labs      08/05/18   2350   ALTSGPT  20   ASTSGOT  24   ALKPHOSPHAT  65   TBILIRUBIN  0.5   LIPASE  35   GLUCOSE  176*                 No results found for: TROPONINI    Urinalysis:    Recent Labs      08/06/18   0109   SPECGRAVITY  1.010   GLUCOSEUR  Negative   KETONES  15*   NITRITE  Negative   LEUKESTERAS  Negative   WBCURINE  Rare   RBCURINE  20-50*   BACTERIA  Rare*   EPITHELCELL  Rare        Imaging:  CT-ABDOMEN-PELVIS WITH   Final Result      1.  Focal ileal closed loop obstruction.   2.  Moderate left hydronephrosis. 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   3.  Atherosclerotic calcification in the aorta.   4.  Sigmoid diverticula            Assessment/Plan:  I anticipate this patient is appropriate for observation status at this time.    * Ileus (HCC)   Assessment & Plan    Apparently closed loop, partial small bowel obstruction, without significant nausea or vomiting currently.  Plan for IV hydration, pain management as needed, and surgical consultation with Dr. Campbell.         Thrombocytopenia ([facility])   Assessment & Plan    Without current evidence of bleed.  Monitor.         Essential hypertension- (present on admission)   Assessment & Plan    Controlled with current medication regimen save due to pain.         Tobacco abuse- (present on admission)   Assessment & Plan    Ongoing.  Patient currently attempting cessation             VTE  prophylaxis: SCD

## 2018-08-06 NOTE — PROGRESS NOTES
Patient to bed 312-2, a little drowsy, oriented x 4, able to answer questions and respond appropriately, oriented to room, discussed plan to keep NPO and reviewed need for bed alarm, encouraged to call with needs
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
## 2018-08-06 NOTE — PROGRESS NOTES
Spoke to Dr. Freddy Campbell, informed him patient has had 3 loose bowel movements with no c/o nausea or vomiting and asked him if patient's diet can be changed. MD looked at patient's small bowel series and agreed to advance patient to regular diet. RN asked MD if patient should start with clear liquid and he stated patient can start on a regular diet. Patient was informed and given some ice water.     Diet ordered changed. Kitchen called and they will bring some food up for her.

## 2018-08-06 NOTE — PROGRESS NOTES
1100 Family at bedside, discussed plan of care and any further questions. Transport came and took her down for small bowel series.    1300 Assisted patient to the bathroom, patient stated she has had 2 loose bowel movements. Maya to inform MD during rounds.

## 2018-08-06 NOTE — CARE PLAN
Problem: Safety  Goal: Will remain free from falls  Outcome: PROGRESSING AS EXPECTED  Patient safety precautions are in place; bed alarm is on, bed is in lowest position, educated patient on use of call light for assistance, call light within reach.     Problem: Knowledge Deficit  Goal: Knowledge of disease process/condition, treatment plan, diagnostic tests, and medications will improve  Outcome: PROGRESSING AS EXPECTED  Discussed plan of care with patient and with family including small bowel series, answered all further questions. Safety precautions are in place.

## 2018-08-06 NOTE — PROGRESS NOTES
Discussed w/ Dr Campbell    73-year-old female presented with sudden onset of nausea vomiting and left flank pain.  CT scan shows fluid-filled cluster of small bowel loops.  Pain location is atypical.  Patient seen in the morning and was tender with decreased bowel, later in the day she had bowel movements with improved symptoms.  Diet per surgery

## 2018-08-07 ENCOUNTER — PATIENT OUTREACH (OUTPATIENT)
Dept: HEALTH INFORMATION MANAGEMENT | Facility: OTHER | Age: 73
End: 2018-08-07

## 2018-08-07 ENCOUNTER — APPOINTMENT (OUTPATIENT)
Dept: RADIOLOGY | Facility: MEDICAL CENTER | Age: 73
DRG: 694 | End: 2018-08-07
Attending: HOSPITALIST
Payer: MEDICARE

## 2018-08-07 PROBLEM — N13.30 HYDRONEPHROSIS: Status: ACTIVE | Noted: 2018-08-07

## 2018-08-07 PROBLEM — J96.10 CHRONIC RESPIRATORY FAILURE (HCC): Status: ACTIVE | Noted: 2018-08-07

## 2018-08-07 LAB
ANION GAP SERPL CALC-SCNC: 4 MMOL/L (ref 0–11.9)
BASOPHILS # BLD AUTO: 0.7 % (ref 0–1.8)
BASOPHILS # BLD: 0.05 K/UL (ref 0–0.12)
BUN SERPL-MCNC: 14 MG/DL (ref 8–22)
CALCIUM SERPL-MCNC: 8.2 MG/DL (ref 8.4–10.2)
CANCER AG125 SERPL-ACNC: 11.9 U/ML (ref 0–35)
CEA SERPL-MCNC: 6.1 NG/ML (ref 0–3)
CHLORIDE SERPL-SCNC: 111 MMOL/L (ref 96–112)
CO2 SERPL-SCNC: 23 MMOL/L (ref 20–33)
CREAT SERPL-MCNC: 0.74 MG/DL (ref 0.5–1.4)
EOSINOPHIL # BLD AUTO: 0.17 K/UL (ref 0–0.51)
EOSINOPHIL NFR BLD: 2.2 % (ref 0–6.9)
ERYTHROCYTE [DISTWIDTH] IN BLOOD BY AUTOMATED COUNT: 45.6 FL (ref 35.9–50)
GLUCOSE SERPL-MCNC: 118 MG/DL (ref 65–99)
HCT VFR BLD AUTO: 39.8 % (ref 37–47)
HGB BLD-MCNC: 13.3 G/DL (ref 12–16)
IMM GRANULOCYTES # BLD AUTO: 0.02 K/UL (ref 0–0.11)
IMM GRANULOCYTES NFR BLD AUTO: 0.3 % (ref 0–0.9)
LYMPHOCYTES # BLD AUTO: 1.56 K/UL (ref 1–4.8)
LYMPHOCYTES NFR BLD: 20.4 % (ref 22–41)
MCH RBC QN AUTO: 29.9 PG (ref 27–33)
MCHC RBC AUTO-ENTMCNC: 33.4 G/DL (ref 33.6–35)
MCV RBC AUTO: 89.4 FL (ref 81.4–97.8)
MONOCYTES # BLD AUTO: 0.5 K/UL (ref 0–0.85)
MONOCYTES NFR BLD AUTO: 6.5 % (ref 0–13.4)
NEUTROPHILS # BLD AUTO: 5.36 K/UL (ref 2–7.15)
NEUTROPHILS NFR BLD: 69.9 % (ref 44–72)
NRBC # BLD AUTO: 0 K/UL
NRBC BLD-RTO: 0 /100 WBC
PLATELET # BLD AUTO: 123 K/UL (ref 164–446)
PMV BLD AUTO: 10.2 FL (ref 9–12.9)
POTASSIUM SERPL-SCNC: 3.3 MMOL/L (ref 3.6–5.5)
RBC # BLD AUTO: 4.45 M/UL (ref 4.2–5.4)
SODIUM SERPL-SCNC: 138 MMOL/L (ref 135–145)
WBC # BLD AUTO: 7.7 K/UL (ref 4.8–10.8)

## 2018-08-07 PROCEDURE — 700111 HCHG RX REV CODE 636 W/ 250 OVERRIDE (IP): Performed by: HOSPITALIST

## 2018-08-07 PROCEDURE — 770006 HCHG ROOM/CARE - MED/SURG/GYN SEMI*

## 2018-08-07 PROCEDURE — A9270 NON-COVERED ITEM OR SERVICE: HCPCS | Performed by: HOSPITALIST

## 2018-08-07 PROCEDURE — 700105 HCHG RX REV CODE 258: Performed by: HOSPITALIST

## 2018-08-07 PROCEDURE — 85025 COMPLETE CBC W/AUTO DIFF WBC: CPT

## 2018-08-07 PROCEDURE — 700102 HCHG RX REV CODE 250 W/ 637 OVERRIDE(OP): Performed by: HOSPITALIST

## 2018-08-07 PROCEDURE — 99233 SBSQ HOSP IP/OBS HIGH 50: CPT | Performed by: HOSPITALIST

## 2018-08-07 PROCEDURE — 86304 IMMUNOASSAY TUMOR CA 125: CPT

## 2018-08-07 PROCEDURE — 700111 HCHG RX REV CODE 636 W/ 250 OVERRIDE (IP): Performed by: INTERNAL MEDICINE

## 2018-08-07 PROCEDURE — 82378 CARCINOEMBRYONIC ANTIGEN: CPT

## 2018-08-07 PROCEDURE — 80048 BASIC METABOLIC PNL TOTAL CA: CPT

## 2018-08-07 RX ORDER — BUDESONIDE AND FORMOTEROL FUMARATE DIHYDRATE 80; 4.5 UG/1; UG/1
2 AEROSOL RESPIRATORY (INHALATION) 2 TIMES DAILY
Status: DISCONTINUED | OUTPATIENT
Start: 2018-08-07 | End: 2018-08-08 | Stop reason: HOSPADM

## 2018-08-07 RX ORDER — ALBUTEROL SULFATE 90 UG/1
2 AEROSOL, METERED RESPIRATORY (INHALATION)
Status: DISCONTINUED | OUTPATIENT
Start: 2018-08-07 | End: 2018-08-07

## 2018-08-07 RX ORDER — TAMSULOSIN HYDROCHLORIDE 0.4 MG/1
0.4 CAPSULE ORAL
Status: DISCONTINUED | OUTPATIENT
Start: 2018-08-07 | End: 2018-08-08 | Stop reason: HOSPADM

## 2018-08-07 RX ORDER — KETOROLAC TROMETHAMINE 30 MG/ML
30 INJECTION, SOLUTION INTRAMUSCULAR; INTRAVENOUS EVERY 6 HOURS PRN
Status: DISCONTINUED | OUTPATIENT
Start: 2018-08-07 | End: 2018-08-08 | Stop reason: HOSPADM

## 2018-08-07 RX ORDER — ALBUTEROL SULFATE 90 UG/1
2 AEROSOL, METERED RESPIRATORY (INHALATION) EVERY 4 HOURS PRN
Status: DISCONTINUED | OUTPATIENT
Start: 2018-08-07 | End: 2018-08-08 | Stop reason: HOSPADM

## 2018-08-07 RX ORDER — BUDESONIDE AND FORMOTEROL FUMARATE DIHYDRATE 80; 4.5 UG/1; UG/1
2 AEROSOL RESPIRATORY (INHALATION)
Status: DISCONTINUED | OUTPATIENT
Start: 2018-08-07 | End: 2018-08-07

## 2018-08-07 RX ORDER — POTASSIUM CHLORIDE 20 MEQ/1
40 TABLET, EXTENDED RELEASE ORAL ONCE
Status: COMPLETED | OUTPATIENT
Start: 2018-08-07 | End: 2018-08-07

## 2018-08-07 RX ADMIN — KETOROLAC TROMETHAMINE 30 MG: 30 INJECTION, SOLUTION INTRAMUSCULAR at 15:37

## 2018-08-07 RX ADMIN — ACETAMINOPHEN 650 MG: 325 TABLET, FILM COATED ORAL at 14:03

## 2018-08-07 RX ADMIN — SODIUM CHLORIDE: 9 INJECTION, SOLUTION INTRAVENOUS at 05:11

## 2018-08-07 RX ADMIN — LISINOPRIL 10 MG: 5 TABLET ORAL at 15:43

## 2018-08-07 RX ADMIN — ACETAMINOPHEN 650 MG: 325 TABLET, FILM COATED ORAL at 01:51

## 2018-08-07 RX ADMIN — HYDROMORPHONE HYDROCHLORIDE 0.5 MG: 2 INJECTION INTRAMUSCULAR; INTRAVENOUS; SUBCUTANEOUS at 05:10

## 2018-08-07 RX ADMIN — SODIUM CHLORIDE: 9 INJECTION, SOLUTION INTRAVENOUS at 17:14

## 2018-08-07 RX ADMIN — ONDANSETRON 4 MG: 2 INJECTION, SOLUTION INTRAMUSCULAR; INTRAVENOUS at 02:12

## 2018-08-07 RX ADMIN — TAMSULOSIN HYDROCHLORIDE 0.4 MG: 0.4 CAPSULE ORAL at 15:37

## 2018-08-07 RX ADMIN — HYDROMORPHONE HYDROCHLORIDE 0.5 MG: 2 INJECTION INTRAMUSCULAR; INTRAVENOUS; SUBCUTANEOUS at 17:13

## 2018-08-07 RX ADMIN — POTASSIUM CHLORIDE 40 MEQ: 1500 TABLET, EXTENDED RELEASE ORAL at 09:13

## 2018-08-07 ASSESSMENT — PAIN SCALES - GENERAL
PAINLEVEL_OUTOF10: 8
PAINLEVEL_OUTOF10: 8
PAINLEVEL_OUTOF10: 1
PAINLEVEL_OUTOF10: 0
PAINLEVEL_OUTOF10: 0
PAINLEVEL_OUTOF10: 7
PAINLEVEL_OUTOF10: 1
PAINLEVEL_OUTOF10: 3

## 2018-08-07 ASSESSMENT — ENCOUNTER SYMPTOMS
DEPRESSION: 0
EYE PAIN: 0
FEVER: 0
COUGH: 0
SHORTNESS OF BREATH: 0
VOMITING: 0
NECK PAIN: 0
HEADACHES: 0
INSOMNIA: 0
BLURRED VISION: 0
FLANK PAIN: 1
ABDOMINAL PAIN: 1
CHILLS: 0
SORE THROAT: 0
TINGLING: 0
NAUSEA: 1
DIZZINESS: 0
BACK PAIN: 0
PALPITATIONS: 0

## 2018-08-07 ASSESSMENT — PATIENT HEALTH QUESTIONNAIRE - PHQ9
2. FEELING DOWN, DEPRESSED, IRRITABLE, OR HOPELESS: NOT AT ALL
1. LITTLE INTEREST OR PLEASURE IN DOING THINGS: NOT AT ALL
SUM OF ALL RESPONSES TO PHQ9 QUESTIONS 1 AND 2: 0

## 2018-08-07 NOTE — PROGRESS NOTES
AOx4,  Afebrile. Denies pain at current time, States had pain last night after eating dinner.  Waiting to see how she tolerates breakfast.     Patient on 4L O2, weaned to 2L since patient does not use O2 at home.  Patient up to bathroom without O2 and desaturated to 76.  Back up to 91 on 2L. Patient will remain on 2L for now.    Patient is a smoker has been smoking approx 55 years, currently smokes half pack per day.  States, she would like to quit.    Pt is cooperative. Assessment per doc flowsheet. Due medications administered, no aspiration observed PIV intact & patent. IVF infusing.     States understanding of POC.  No complaints at this time.  Pt educated to call for assist. Non-skid socks. Bed locked & in low position. Bed alarm on.

## 2018-08-07 NOTE — CARE PLAN
Problem: Discharge Barriers/Planning  Goal: Patient's continuum of care needs will be met    Intervention: Assess potential discharge barriers on admission and throughout hospital stay  Pain came back this afternoon and discharge cancelled.  Doing further workup for her kidneys/flank pain.       Problem: Respiratory:  Goal: Respiratory status will improve    Intervention: Administer and titrate oxygen therapy  o2 sats low after walking to the bathroom, home oxygen ordered.

## 2018-08-07 NOTE — RESPIRATORY CARE
COPD EDUCATION by COPD CLINICAL EDUCATOR  8/7/2018 at 8:13 AM by Mayra Schwartz     Patient reviewed by COPD education team. Patient does not qualify for COPD program.

## 2018-08-07 NOTE — PROGRESS NOTES
Received report from day shift RN Cely/Maira. Plan of care discussed at bedside. Patient resting comfortably in bed at this time with no complaints. Safety precautions and hourly rounding in place.
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
## 2018-08-07 NOTE — ASSESSMENT & PLAN NOTE
? Reason for sx rather than intentinal. Not clear. Concerning hx of weight loss and recurrent vomiting. No stone seen on ct. Consult urology for opinion on cystoscopy and will recheck with renal ultrasound.   Check cea and ca125

## 2018-08-07 NOTE — PROGRESS NOTES
Patient c/o pain 8 of 10 in left flank.  Administered 650mg Tylenol.  Dr. Nunez would like to keep her for a little while longer to observe before d/c. Patient is resting quietly, family at bedside.

## 2018-08-07 NOTE — PROGRESS NOTES
Patient is resting in bed with daughter at bedside. Patient refused scheduled stool softness at this time. Patient stated she feels better overall and has no complains of pain at this time. Call light within reach.

## 2018-08-07 NOTE — DISCHARGE PLANNING
Pt is staying another night.  Per RN Vin, she is requesting that the O2 tank not be delivered today.     SHEA updated [facility] Krystin.

## 2018-08-07 NOTE — DISCHARGE PLANNING
Order placed for O2.  Pt has [facility].  Pt is okay with [facility].  SHEA faxed choice to [facility] Krystin.

## 2018-08-07 NOTE — CARE PLAN
Problem: Communication  Goal: The ability to communicate needs accurately and effectively will improve  Outcome: PROGRESSING AS EXPECTED    Intervention: [place] patient and significant other/support system to call light to alert staff of needs  Patient oriented to call light system and all relevant hospital policies. Call light within reach and used appropriately when in need of assistance.        Problem: Infection  Goal: Will remain free from infection  Outcome: PROGRESSING AS EXPECTED    Intervention: Implement standard precautions and perform hand washing before and after patient contact  Standard precautions and hand hygiene practices implemented before and after patient room entry. Gloves worn during times of patient contact.

## 2018-08-07 NOTE — PROGRESS NOTES
Patient has sudden onset of nausea again, PO medications held due to dry heaving. Patient sitting at edge of bed, complaining of left flank pain that has increased in intensity, 8/10, PRN dilaudid given through IV.

## 2018-08-07 NOTE — FACE TO FACE
Face to Face Note  -  Durable Medical Equipment    Manoj Nunez M.D. - NPI: [id number]  I certify that this patient is under my care and that they had a durable medical equipment(DME)face to face encounter by myself that meets the physician DME face-to-face encounter requirements with this patient on:    Date of encounter:   Patient:                    MRN:                       YOB: 2017  Nohelia Dickerson  [id number]  1945     The encounter with the patient was in whole, or in part, for the following medical condition, which is the primary reason for durable medical equipment:  COPD    I certify that, based on my findings, the following durable medical equipment is medically necessary:  Oxygen.    HOME O2 Saturation Measurements:(Values must be present for Home Oxygen orders)  Room air sat at rest: 87  Room air sat with amb: 76  With liters of O2: 2, O2 sat at rest with O2: 92  With Liters of O2: 2, O2 sat with amb with O2 : 91  Is the patient mobile?: Yes    My Clinical findings support the need for the above equipment due to:  Hypoxia, copd    Supporting Symptoms: dyspnea

## 2018-08-07 NOTE — PROGRESS NOTES
Patient ambulated to that bathroom, tolerating well with standby assist. Patient requested BP to be taken; BP of 143/76 with heart rate of 81. Patient denied any other requests at this time. Safety precautions in place.

## 2018-08-07 NOTE — PROGRESS NOTES
PRN tylenol administered for left flank pain.    0210 Patient now nauseous and having small clear liquid emesis, patient to receive IV zofran from RN.

## 2018-08-07 NOTE — DISCHARGE PLANNING
Agency/Facility Name: [facility]  Spoke To: Viviane  Outcome: Order on hold until patient discharges. [facility] will follow up with [facility] once patient is ready to DC.

## 2018-08-07 NOTE — DISCHARGE PLANNING
Received Choice form at 1400  Agency/Facility Name: [facility]  Referral sent per Choice form at 1405.  Choice obtained by WE[facility]

## 2018-08-07 NOTE — ASSESSMENT & PLAN NOTE
Previously undiagnosed. Suspect undiagnosed copd.   Titrate o2 as needed  Ct chest  Add copd inhalers  Arranged for home o2.

## 2018-08-07 NOTE — PROGRESS NOTES
[facility] Hospitalist Progress Note    Date of Service: 2018    73 y.o. female admitted 2018 with LLQ abdominal pain and left sided flank pain.   She initially was thought to have a partial small bowel obstruction but has had a nrgative workup. On further review of her ct scan she has evidence of left sided hydronephrosis. She has been losing weight (15#) and intermittently vomiting for the past 3 months.      Interval Problem Update  Did well this am but then had a return of pain this pm. Left flank. She denies dysuria/hematuria.  She has had no vomiting here but admits to this being intermittent recently. We discussed her recent history. She tried to have a colonoscopy a few days ago for screening but this was unable to be completed 2/ ?extrinsic compression.     Consultants/Specialty  Urology  surgery    Disposition  hospital        Review of Systems   Constitutional: Negative for chills and fever.   HENT: Negative for sore throat.    Eyes: Negative for blurred vision and pain.   Respiratory: Negative for cough and shortness of breath.    Cardiovascular: Negative for chest pain and palpitations.   Gastrointestinal: Positive for abdominal pain and nausea. Negative for vomiting.   Genitourinary: Positive for flank pain. Negative for dysuria, frequency, hematuria and urgency.   Musculoskeletal: Negative for back pain and neck pain.   Skin: Negative for itching and rash.   Neurological: Negative for dizziness, tingling and headaches.   Psychiatric/Behavioral: Negative for depression. The patient does not have insomnia.    All other systems reviewed and are negative.     Physical Exam  Laboratory/Imaging   Hemodynamics  Temp (24hrs), Av.5 °C (97.7 °F), Min:36.4 °C (97.5 °F), Max:36.7 °C (98 °F)   Temperature: 36.4 °C (97.5 °F)  Pulse  Av.4  Min: 73  Max: 102    Blood Pressure : 138/99      Respiratory      Respiration: 18, Pulse Oximetry: 89 %        RUL Breath Sounds: Clear, RML Breath Sounds: Clear,  RLL Breath Sounds: Diminished, NASIR Breath Sounds: Clear, LLL Breath Sounds: Diminished    Fluids    Intake/Output Summary (Last 24 hours) at 08/07/18 0834  Last data filed at 08/07/18 0600   Gross per 24 hour   Intake             2200 ml   Output                0 ml   Net             2200 ml       Nutrition  Orders Placed This Encounter   Procedures   • Diet Order Regular     Standing Status:   Standing     Number of Occurrences:   1     Order Specific Question:   Diet:     Answer:   Regular [1]     Physical Exam   Constitutional: She is oriented to person, place, and time. She appears well-developed and well-nourished. No distress.   Patient seen and examined  Discussed plan with RN   NIRAJT:   Right Ear: External ear normal.   Left Ear: External ear normal.   Nose: Nose normal.   Eyes: Conjunctivae are normal. Right eye exhibits no discharge. Left eye exhibits no discharge.   Neck: No JVD present.   Cardiovascular: Regular rhythm and normal heart sounds.    No murmur heard.  Cap refill 2sec  Pulses 2+ throughout     Pulmonary/Chest: Effort normal and breath sounds normal. No stridor. No respiratory distress. She has no wheezes. She has no rales.   Abdominal: Soft. Bowel sounds are normal. She exhibits no distension. There is no tenderness.   Musculoskeletal: She exhibits no edema or tenderness.   Neurological: She is alert and oriented to person, place, and time.   Skin: Skin is warm and dry. She is not diaphoretic. No erythema.   Normal skin  Color.    Psychiatric: She has a normal mood and affect. Her behavior is normal.   Nursing note and vitals reviewed.      Recent Labs      08/05/18 2350 08/07/18   0324   WBC  10.8  7.7   RBC  4.82  4.45   HEMOGLOBIN  14.5  13.3   HEMATOCRIT  41.9  39.8   MCV  86.9  89.4   MCH  30.1  29.9   MCHC  34.6  33.4*   RDW  44.6  45.6   PLATELETCT  157*  123*   MPV  10.1  10.2     Recent Labs      08/05/18   2350  08/07/18   0324   SODIUM  139  138   POTASSIUM  3.6  3.3*   CHLORIDE   107  111   CO2  22  23   GLUCOSE  176*  118*   BUN  27*  14   CREATININE  1.08  0.74   CALCIUM  9.4  8.2*                      Assessment/Plan     * Ileus (HCC)   Assessment & Plan    No evidence for this in further imaging. Possible partial SBO that is transient though. Discussed with surgery today. No intervention. I obtained and reviewed her colo report to see if there indeed was extrinsic compression. The scope was advanced to 20cm and then was not able to be passed after this 2/2 an angulated colon. She does have a hx of partial colectomy in the past 2/2 a hernia she says. The report from dr rodriguez says fixed adhesions are suspected. The patient is currently stooling and has bowel sounds.         Chronic respiratory failure (HCC)   Assessment & Plan    Previously undiagnosed. Suspect undiagnosed copd.   Titrate o2 as needed  Ct chest  Add copd inhalers  Arranged for home o2.         COPD   Assessment & Plan    Undiagnosed. Add inhalers. Rt protocol. No acute flare.         Hydronephrosis   Assessment & Plan    ? Reason for sx rather than intentinal. Not clear. Concerning hx of weight loss and recurrent vomiting. No stone seen on ct. Consult urology for opinion on cystoscopy and will recheck with renal ultrasound.   Check cea and ca125        Thrombocytopenia (HCC)   Assessment & Plan    Worse. Still mild. Will trend. Unclear etiology        Essential hypertension- (present on admission)   Assessment & Plan    Controlled with current medication regimen         Tobacco abuse- (present on admission)   Assessment & Plan    Ongoing.  Patient currently attempting cessation           Quality-Core Measures   Reviewed items::  EKG reviewed, Labs reviewed, Medications reviewed and Radiology images reviewed  Torrez catheter::  No Torrez  DVT prophylaxis pharmacological::  Enoxaparin (Lovenox)  DVT prophylaxis - mechanical:  SCDs  Ulcer Prophylaxis::  Not indicated  Assessed for rehabilitation services:  Patient was assess  for and/or received rehabilitation services during this hospitalization

## 2018-08-08 ENCOUNTER — APPOINTMENT (OUTPATIENT)
Dept: RADIOLOGY | Facility: MEDICAL CENTER | Age: 73
DRG: 694 | End: 2018-08-08
Attending: HOSPITALIST
Payer: MEDICARE

## 2018-08-08 VITALS
HEART RATE: 74 BPM | SYSTOLIC BLOOD PRESSURE: 157 MMHG | BODY MASS INDEX: 20.93 KG/M2 | WEIGHT: 154.54 LBS | DIASTOLIC BLOOD PRESSURE: 90 MMHG | TEMPERATURE: 98 F | RESPIRATION RATE: 16 BRPM | OXYGEN SATURATION: 95 % | HEIGHT: 72 IN

## 2018-08-08 PROBLEM — J44.9 CHRONIC OBSTRUCTIVE PULMONARY DISEASE (HCC): Status: ACTIVE | Noted: 2018-08-07

## 2018-08-08 LAB
ALBUMIN SERPL BCP-MCNC: 2.8 G/DL (ref 3.2–4.9)
ALBUMIN/GLOB SERPL: 1.3 G/DL
ALP SERPL-CCNC: 49 U/L (ref 30–99)
ALT SERPL-CCNC: 14 U/L (ref 2–50)
ANION GAP SERPL CALC-SCNC: 6 MMOL/L (ref 0–11.9)
AST SERPL-CCNC: 17 U/L (ref 12–45)
BASOPHILS # BLD AUTO: 0.6 % (ref 0–1.8)
BASOPHILS # BLD: 0.04 K/UL (ref 0–0.12)
BILIRUB SERPL-MCNC: 1.1 MG/DL (ref 0.1–1.5)
BUN SERPL-MCNC: 11 MG/DL (ref 8–22)
CALCIUM SERPL-MCNC: 8.3 MG/DL (ref 8.4–10.2)
CHLORIDE SERPL-SCNC: 109 MMOL/L (ref 96–112)
CO2 SERPL-SCNC: 24 MMOL/L (ref 20–33)
CREAT SERPL-MCNC: 0.69 MG/DL (ref 0.5–1.4)
EOSINOPHIL # BLD AUTO: 0.18 K/UL (ref 0–0.51)
EOSINOPHIL NFR BLD: 2.6 % (ref 0–6.9)
ERYTHROCYTE [DISTWIDTH] IN BLOOD BY AUTOMATED COUNT: 44.5 FL (ref 35.9–50)
GLOBULIN SER CALC-MCNC: 2.2 G/DL (ref 1.9–3.5)
GLUCOSE SERPL-MCNC: 86 MG/DL (ref 65–99)
HCT VFR BLD AUTO: 38.2 % (ref 37–47)
HGB BLD-MCNC: 12.8 G/DL (ref 12–16)
IMM GRANULOCYTES # BLD AUTO: 0.01 K/UL (ref 0–0.11)
IMM GRANULOCYTES NFR BLD AUTO: 0.1 % (ref 0–0.9)
LYMPHOCYTES # BLD AUTO: 1.87 K/UL (ref 1–4.8)
LYMPHOCYTES NFR BLD: 27.5 % (ref 22–41)
MCH RBC QN AUTO: 29.9 PG (ref 27–33)
MCHC RBC AUTO-ENTMCNC: 33.5 G/DL (ref 33.6–35)
MCV RBC AUTO: 89.3 FL (ref 81.4–97.8)
MONOCYTES # BLD AUTO: 0.49 K/UL (ref 0–0.85)
MONOCYTES NFR BLD AUTO: 7.2 % (ref 0–13.4)
NEUTROPHILS # BLD AUTO: 4.22 K/UL (ref 2–7.15)
NEUTROPHILS NFR BLD: 62 % (ref 44–72)
NRBC # BLD AUTO: 0 K/UL
NRBC BLD-RTO: 0 /100 WBC
PLATELET # BLD AUTO: 123 K/UL (ref 164–446)
PMV BLD AUTO: 10.2 FL (ref 9–12.9)
POTASSIUM SERPL-SCNC: 3.7 MMOL/L (ref 3.6–5.5)
PROT SERPL-MCNC: 5 G/DL (ref 6–8.2)
RBC # BLD AUTO: 4.28 M/UL (ref 4.2–5.4)
SODIUM SERPL-SCNC: 139 MMOL/L (ref 135–145)
WBC # BLD AUTO: 6.8 K/UL (ref 4.8–10.8)

## 2018-08-08 PROCEDURE — 99239 HOSP IP/OBS DSCHRG MGMT >30: CPT | Performed by: HOSPITALIST

## 2018-08-08 PROCEDURE — A9270 NON-COVERED ITEM OR SERVICE: HCPCS | Performed by: HOSPITALIST

## 2018-08-08 PROCEDURE — 80053 COMPREHEN METABOLIC PANEL: CPT

## 2018-08-08 PROCEDURE — 76775 US EXAM ABDO BACK WALL LIM: CPT

## 2018-08-08 PROCEDURE — 700111 HCHG RX REV CODE 636 W/ 250 OVERRIDE (IP): Performed by: HOSPITALIST

## 2018-08-08 PROCEDURE — 700102 HCHG RX REV CODE 250 W/ 637 OVERRIDE(OP): Performed by: HOSPITALIST

## 2018-08-08 PROCEDURE — 700105 HCHG RX REV CODE 258: Performed by: HOSPITALIST

## 2018-08-08 PROCEDURE — 74176 CT ABD & PELVIS W/O CONTRAST: CPT

## 2018-08-08 PROCEDURE — 85025 COMPLETE CBC W/AUTO DIFF WBC: CPT

## 2018-08-08 RX ORDER — BUDESONIDE AND FORMOTEROL FUMARATE DIHYDRATE 80; 4.5 UG/1; UG/1
2 AEROSOL RESPIRATORY (INHALATION) 2 TIMES DAILY
Qty: 1 INHALER | Refills: 1 | Status: SHIPPED | OUTPATIENT
Start: 2018-08-08 | End: 2019-04-18 | Stop reason: SDUPTHER

## 2018-08-08 RX ORDER — TAMSULOSIN HYDROCHLORIDE 0.4 MG/1
0.4 CAPSULE ORAL
Qty: 15 CAP | Refills: 1 | Status: SHIPPED | OUTPATIENT
Start: 2018-08-08 | End: 2019-09-03

## 2018-08-08 RX ADMIN — BUDESONIDE AND FORMOTEROL FUMARATE DIHYDRATE 2 PUFF: 80; 4.5 AEROSOL RESPIRATORY (INHALATION) at 07:24

## 2018-08-08 RX ADMIN — TAMSULOSIN HYDROCHLORIDE 0.4 MG: 0.4 CAPSULE ORAL at 09:35

## 2018-08-08 RX ADMIN — SODIUM CHLORIDE: 9 INJECTION, SOLUTION INTRAVENOUS at 04:38

## 2018-08-08 RX ADMIN — LISINOPRIL 10 MG: 5 TABLET ORAL at 07:23

## 2018-08-08 RX ADMIN — ACETAMINOPHEN 650 MG: 325 TABLET, FILM COATED ORAL at 11:55

## 2018-08-08 RX ADMIN — HYDRALAZINE HYDROCHLORIDE 10 MG: 20 INJECTION INTRAMUSCULAR; INTRAVENOUS at 08:37

## 2018-08-08 ASSESSMENT — PAIN SCALES - GENERAL
PAINLEVEL_OUTOF10: 0
PAINLEVEL_OUTOF10: 3

## 2018-08-08 ASSESSMENT — PATIENT HEALTH QUESTIONNAIRE - PHQ9
SUM OF ALL RESPONSES TO PHQ9 QUESTIONS 1 AND 2: 0
1. LITTLE INTEREST OR PLEASURE IN DOING THINGS: NOT AT ALL
2. FEELING DOWN, DEPRESSED, IRRITABLE, OR HOPELESS: NOT AT ALL

## 2018-08-08 NOTE — PROGRESS NOTES
Patient c/o extreme pain in left flank, 8 of 10. Tylenol and Toradol not relieving pain.  0.5 mg Dilaudid administered. Patient BP elevated, waiting to see if BP comes down when pain decreases. Will continue to monitor, daughter at bedside.

## 2018-08-08 NOTE — DISCHARGE SUMMARY
"Discharge Summary    CHIEF COMPLAINT ON ADMISSION  Chief Complaint   Patient presents with   • Flank Pain     L side flank pain since 8pm, colonoscopy friday, pain began after \"eating ice cream\"   • N/V       Reason for Admission  Flank Pain     Admission Date  8/5/2018    CODE STATUS  Full Code    HPI & HOSPITAL COURSE  This is a 73 y.o. female here with flank pain. She was initially thought to have a bowel obstruction  On ct (see results below) but was later noted to also have a normal SBFT and moderate hydronephrosis on CT as well. An US and a follow up noncontrast showed a 3mm stone at the left UP junction. She was seen by urology and follow up has been scheduled. She will strain her urine, take nsaids and flomax for her symptoms. In addition she was noted to be hypoxic here without evidence for an acute issue. She is a lifelong smoker and likely has undiagnosed copd. She was set up with a PCP and will need PFTs in the future. She was set up with home o2 and an inhaler.        Therefore, she is discharged in good and stable condition to home with close outpatient follow-up.    The patient met 2-midnight criteria for an inpatient stay at the time of discharge.    Discharge Date  8/8/18    FOLLOW UP ITEMS POST DISCHARGE  Pcp and urology    DISCHARGE DIAGNOSES  Principal Problem:    Ileus (HCC) POA: Unknown  Active Problems:    Tobacco abuse POA: Yes    Essential hypertension POA: Yes    Thrombocytopenia (HCC) POA: Unknown    Hydronephrosis POA: Unknown    COPD POA: Unknown      Overview: IMO Update    Chronic respiratory failure (HCC) POA: Unknown  Resolved Problems:    * No resolved hospital problems. *      FOLLOW UP  Future Appointments  Date Time Provider Department Center   8/24/2018 3:20 PM Idalia Cortez M.D. [facility] AMANDA Otero M.D.  [id number] [id number] [phone]      Call today to schedule a follow-up appointment      MEDICATIONS ON DISCHARGE     Medication " List      START taking these medications      Instructions   budesonide-formoterol 80-4.5 MCG/ACT Aero  Commonly known as:  SYMBICORT   Inhale 2 Puffs by mouth 2 Times a Day.  Dose:  2 Puff     tamsulosin 0.4 MG capsule  Commonly known as:  FLOMAX   Doctor's comments:  When having flank pain  Take 1 Cap by mouth 1 time daily as needed.  Dose:  0.4 mg        CONTINUE taking these medications      Instructions   aspirin EC 81 MG Tbec  Commonly known as:  ECOTRIN   Take 81 mg by mouth every day.  Dose:  81 mg     B COMPLEX PO   Take 1 Tab by mouth every day.  Dose:  1 Tab     CALCIUM PO   Take 1 Tab by mouth every day.  Dose:  1 Tab     FISH OIL PO   Take 1 Cap by mouth every day.  Dose:  1 Cap     GARLIC PO   Take 1 Tab by mouth every day.  Dose:  1 Tab     lisinopril 10 MG Tabs  Commonly known as:  PRINIVIL   Take 1 Tab by mouth every day.  Dose:  10 mg     MAGNESIUM PO   Take 1 Cap by mouth every day.  Dose:  1 Cap     MULTIVITAMIN PO   Take 1 Tab by mouth every day.  Dose:  1 Tab     VITAMIN D PO   Take 1 Cap by mouth every day.  Dose:  1 Cap            Allergies  Allergies   Allergen Reactions   • Lactose Diarrhea     Lactose intolerence   • Morphine      Hallucinations, altered mentations       DIET  Orders Placed This Encounter   Procedures   • DISCONTINUE DIET TRAY     Standing Status:   Standing     Number of Occurrences:   1   • Diet Order Regular     Standing Status:   Standing     Number of Occurrences:   1     Order Specific Question:   Diet:     Answer:   Regular [1]       ACTIVITY  As tolerated.  Weight bearing as tolerated    CONSULTATIONS  urology    PROCEDURES  none    Ct abd with    1.  Focal ileal closed loop obstruction.  2.  Moderate left hydronephrosis. 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  3.  Atherosclerotic calcification in the aorta.  4.  Sigmoid diverticula     Ct abd without:  1.  Possible 3 mm LEFT ureterovesical junction or urinary bladder base  stone without hydronephrosis. Alternately, this could be a phlebolith. The previously described LEFT perinephric fluid has resolved.  2.  New small BILATERAL pleural effusions  3.  Cholelithiasis  4.  Atherosclerosis  5.  Colonic diverticulosis        LABORATORY  Lab Results   Component Value Date    SODIUM 139 08/08/2018    POTASSIUM 3.7 08/08/2018    CHLORIDE 109 08/08/2018    CO2 24 08/08/2018    GLUCOSE 86 08/08/2018    BUN 11 08/08/2018    CREATININE 0.69 08/08/2018        Lab Results   Component Value Date    WBC 6.8 08/08/2018    HEMOGLOBIN 12.8 08/08/2018    HEMATOCRIT 38.2 08/08/2018    PLATELETCT 123 (L) 08/08/2018        Total time of the discharge process exceeds 33 minutes.

## 2018-08-08 NOTE — CARE PLAN
Problem: Knowledge Deficit  Goal: Knowledge of disease process/condition, treatment plan, diagnostic tests, and medications will improve  Outcome: PROGRESSING AS EXPECTED  Educated pt on current plan of care and upcoming procedures, allowed time for questions, all questions answered.     Problem: Pain Management  Goal: Pain level will decrease to patient's comfort goal  Active orders in place to controls pt's pain levels

## 2018-08-08 NOTE — PROGRESS NOTES
O2 arrived in pt's room, education given, pt accepts, son at bedside for ride home, all belongings accounted for, pt pt declined wheelchair, desires to leave by foot, steady on feet, left floor on foot, not on o2 nc, with son handling o2 tank.

## 2018-08-08 NOTE — PROGRESS NOTES
Received bedside report from nightshift JOSE Luna. Plan of care discussed. Safety measures in place. Pt resting in bed, no complaints as of this time.

## 2018-08-08 NOTE — CARE PLAN
Problem: Safety  Goal: Will remain free from injury  Outcome: PROGRESSING AS EXPECTED  Remind patient to use call light and provide assistance. Bed in low position, bed locked, and appropriate alarms set. Patient wearing non-slip socks. Call light and personal belongings are within reach.     Problem: Knowledge Deficit  Goal: Knowledge of the prescribed therapeutic regimen will improve  Outcome: PROGRESSING AS EXPECTED      Problem: Pain Management  Goal: Pain level will decrease to patient's comfort goal  Outcome: PROGRESSING AS EXPECTED  Collaborate with patient and interdisciplinary team to manage pain and keep near comfortable level goal. Educate on and incorporate non-pharmacologic interventions to reduce pain.

## 2018-08-08 NOTE — PROGRESS NOTES
Gave bedside report to JOSE Cervantes. Plan of care discussed. Safety precautions in place. Personal belongings and call light are with in reach. Patient has no additional needs at this time.

## 2018-08-08 NOTE — PROGRESS NOTES
Completed assessment. Bed in low position, locked, and appropriate alarms set. Personal belongings and call light are within reach. Patient has no additional needs at this time.

## 2018-08-08 NOTE — PROGRESS NOTES
Initial assessments done, A&O x 4, no complaints made, Pt's BP elevated at 183/94, PRN meds given, see MARs, education given for up coming procedure, consent for IV contrast done, placed in pt's chart, in bed resting.

## 2018-08-08 NOTE — PROGRESS NOTES
Received bedside report from JOSE Banuelos. Plan of care discussed. Safety precautions in place. Call light and personal belongings within reach. Patient has no needs at this time.

## 2018-08-08 NOTE — PROGRESS NOTES
Patient states pain almost gone, 1 of 10.  BP down to 154/82. Patient resting quietly, family at bedside, safety precautions in place.  Will continue to monitor.

## 2018-08-08 NOTE — CONSULTS
DATE OF CONSULTATION: 8/8/2018    REQUESTING PHYSICIAN:  Manoj Nunez M.D.    CONSULTING PHYSICIAN: Urology Laura Guerrier PA-C and Jordin Otero M.D.    REASON FOR CONSULTATION: Left hydronephrosis with left flank pain      HISTORY OF PRESENT ILLNESS: 73 y.o. Female presented with severe left flank pain with nausea and vomiting.  She has history of prior abdominal surgery and was initially thought to have a SBO, however she underwent a small bowel follow through which demonstrated no evidence of obstruction.  CT scan with contrast does show moderate left hydronephrosis and f/u MILEY shows a possible left UVJ stone.  She denies history of stones or pyelonephritis, however her past medical history in Epic indicates there is history of stones.  She denies hematuria or dysuria.  She states her pain is intermittent and severe when it presents.       PAST MEDICAL HISTORY:   • Cancer (HCC) 1978     thyroid   • COPD     • Hernia of unspecified site of abdominal cavity without mention of obstruction or gangrene 2011   • Inguinal hernia     • Kidney stones          MEDICATIONS:      Prior to Admission Medications   Prescriptions Last Dose Informant Patient Reported? Taking?   B Complex Vitamins (B COMPLEX PO) 6/22/2018   Yes No   Sig: Take  by mouth.   CALCIUM PO 6/22/2018   Yes No   Sig: Take  by mouth.   GARLIC PO 6/22/2018   Yes No   Sig: Take  by mouth.   MAGNESIUM PO 6/22/2018   Yes No   Sig: Take  by mouth.   Multiple Vitamin (MULTIVITAMIN PO) 6/22/2018   Yes No   Sig: Take  by mouth every day.   Omega-3 Fatty Acids (FISH OIL PO) 6/22/2018   Yes No   Sig: Take  by mouth.   cholecalciferol (VITAMIN D) 400 UNIT CAPS 6/22/2018   Yes No   Sig: Take 400 Units by mouth every day.   lisinopril (PRINIVIL) 10 MG Tab 6/22/2018   No No   Sig: Take 1 Tab by mouth every day.            ALLERGIES:   Allergen Reactions   • Lactose         Lactose intolerence   • Morphine         Hallucinations, altered mentations        FAMILY HISTORY: non contributory    SOCIAL HISTORY:   Social History Main Topics   • Smoking status: Current Every Day Smoker       Packs/day: 0.50       Types: Cigarettes   • Smokeless tobacco: Never Used         Comment: [id number]   • Alcohol use No   • Drug use: No       REVIEW OF SYSTEMS:   All other review of systems were negative except under history of present illness.     PHYSICAL EXAMINATION:   GENERAL:  Healthy appearing female in no distress  HEAD: Normocephalic, atraumatic   NECK: Supple  LUNGS: Good air entry, non labored breathing  ABDOMEN: Soft, non tender  : No CVAT  SKIN: Warm and Dry without rash  NEUROLOGIC: A&O x3    LABORATORY DATA: Recent labs were reviewed.     [id number] 6:14 AM    HISTORY/REASON FOR EXAM:  Left-sided flank pain.      TECHNIQUE/EXAM DESCRIPTION:  Renal ultrasound.    COMPARISON:  None    FINDINGS:  The right kidney measures 12.43 cm.  The left kidney measures 12.63 cm.    There is moderate left hydronephrosis.  There is a calcification likely located at the left ureterovesical junction.    The bladder demonstrates no focal wall abnormality.     Impression       Calcification likely located at the left ureterovesical junction with moderate hydronephrosis seen above that level on the left.   Reading Provider Reading Date   Wilmer Ly M.D. Aug 8, 2018         IMPRESSION:   1.  Left flank pain with hydronephrosis  2.  Question of distal left ureteral stone on MILEY.      PLAN:  We will get a non contrast CT scan to r/o distal ureteral stone.    Strain urine

## 2018-08-08 NOTE — DISCHARGE INSTRUCTIONS
Discharge Instructions    Discharged to home by car with relative. Discharged via wheelchair, hospital escort: Yes.  Special equipment needed: Not Applicable    Be sure to schedule a follow-up appointment with your primary care doctor or any specialists as instructed.     Discharge Plan:   Diet Plan: Discussed  Activity Level: Discussed  Confirmed Follow up Appointment: Patient to Call and Schedule Appointment  Confirmed Symptoms Management: Discussed  Medication Reconciliation Updated: Yes    I understand that a diet low in cholesterol, fat, and sodium is recommended for good health. Unless I have been given specific instructions below for another diet, I accept this instruction as my diet prescription.   Other diet: Stay hydrated    Special Instructions: None    · Is patient discharged on Warfarin / Coumadin?   No       Ileus  Introduction  Ileus is a condition in which the intestines, also called the bowels, stop working and moving correctly. If the intestines stop working, food cannot pass through to get digested. The intestines are hollow organs that digest food after the food leaves the stomach. These organs are long, muscular tubes that connect the stomach to the rectum. When ileus occurs, the muscular contractions that cause food to move through the intestines stop happening as they normally would.  Ileus can occur for various reasons. This condition is a serious problem that usually requires hospitalization. It can cause symptoms such as nausea, abdominal pain, and bloating. Ileus can last from a few hours to a few days. If the intestines stop working because of a blockage, that is a different condition that is called a bowel obstruction.  What are the causes?  This condition may be caused by:  · Surgery on the abdomen.  · An infection or inflammation in the abdomen. This includes inflammation of the lining of the abdomen (peritonitis).  · Infection or inflammation in other parts of the body, such as  "pneumonia or pancreatitis.  · Passage of gallstones or kidney stones.  · Damage to the nerves or blood vessels that go to the intestines.  · A collection of blood within the abdominal cavity.  · Imbalance in the salts in the blood (electrolytes).  · Injury to the brain or spinal cord.  · Medicines. Many medicines, including strong pain medicines, can cause ileus or make it worse.  What are the signs or symptoms?  Symptoms of this condition include:  · Bloating of the abdomen.  · Pain or discomfort in the abdomen.  · Poor appetite.  · Nausea and vomiting.  · Lack of normal bowel sounds, such as “growling\" in the stomach.  How is this diagnosed?  This condition may be diagnosed with:  · A physical exam and medical history.  · X-rays or a CT scan of the abdomen.  You may also have other tests to help find the cause of the condition.  How is this treated?  Treatment for this condition may include:  · Resting the intestines until they start to work again. This is often done by:  ¨ Stopping oral intake of food and drink. You will be given fluid through an IV tube to prevent dehydration.  ¨ Placing a small tube (nasogastric tube or NG tube) that is passed through your nose and into your stomach. The tube is attached to a suction device and keeps the stomach emptied out. This allows the bowels to rest and also helps to reduce nausea and vomiting.  · Correcting any electrolyte imbalance by giving supplements in the IV fluid.  · Stopping any medicines that might make ileus worse.  · Treating any condition that may have caused ileus.  Follow these instructions at home:  · Follow instructions from your health care provider about diet and fluid intake. Usually, you will be told to:  ¨ Drink plenty of clear fluids.  ¨ Avoid alcohol.  ¨ Avoid caffeine.  ¨ Eat a bland diet.  · Get plenty of rest. Return to your normal activities as told by your health care provider.  · Take over-the-counter and prescription medicines only as told " by your health care provider.  · Keep all follow-up visits as told by your health care provider. This is important.  Contact a health care provider if:  · You have nausea, vomiting, or abdominal discomfort.  · You have a fever.  Get help right away if:  · You have severe abdominal pain or bloating.  · You cannot eat or drink without vomiting.  This information is not intended to replace advice given to you by your health care provider. Make sure you discuss any questions you have with your health care provider.  Document Released: 12/20/2004 Document Revised: 05/25/2017 Document Reviewed: 02/11/2016  © 2017 Elsevier    Kidney Stones  Kidney stones (urolithiasis) are rock-like masses that form inside of the kidneys. Kidneys are organs that make pee (urine). A kidney stone can cause very bad pain and can block the flow of pee. The stone usually leaves your body (passes) through your pee. You may need to have a doctor take out the stone.  Follow these instructions at home:  Eating and drinking  · Drink enough fluid to keep your pee clear or pale yellow. This will help you pass the stone.  · If told by your doctor, change the foods you eat (your diet). This may include:  ¨ Limiting how much salt (sodium) you eat.  ¨ Eating more fruits and vegetables.  ¨ Limiting how much meat, poultry, fish, and eggs you eat.  · Follow instructions from your doctor about eating or drinking restrictions.  General instructions  · Collect pee samples as told by your doctor. You may need to collect a pee sample:  ¨ 24 hours after a stone comes out.  ¨ 8-12 weeks after a stone comes out, and every 6-12 months after that.  · Strain your pee every time you pee (urinate), for as long as told. Use the strainer that your doctor recommends.  · Do not throw out the stone. Keep it so that it can be tested by your doctor.  · Take over-the-counter and prescription medicines only as told by your doctor.  · Keep all follow-up visits as told by your  doctor. This is important. You may need follow-up tests.  Preventing kidney stones  To prevent another kidney stone:  · Drink enough fluid to keep your pee clear or pale yellow. This is the best way to prevent kidney stones.  · Eat healthy foods.  · Avoid certain foods as told by your doctor. You may be told to eat less protein.  · Stay at a healthy weight.  Contact a doctor if:  · You have pain that gets worse or does not get better with medicine.  Get help right away if:  · You have a fever or chills.  · You get very bad pain.  · You get new pain in your belly (abdomen).  · You pass out (faint).  · You cannot pee.  This information is not intended to replace advice given to you by your health care provider. Make sure you discuss any questions you have with your health care provider.  Document Released: 06/05/2009 Document Revised: 09/05/2017 Document Reviewed: 09/05/2017  [employer] Interactive Patient Education © 2017 [employer] Inc.      Tamsulosin capsules  What should I tell my health care provider before I take this medicine?  They need to know if you have any of the following conditions:  -advanced kidney disease  -advanced liver disease  -low blood pressure  -an unusual or allergic reaction to tamsulosin, sulfa drugs, other medicines, foods, dyes, or preservatives  -pregnant or trying to get pregnant  -breast-feeding  How should I use this medicine?  Take this medicine by mouth about 30 minutes after the same meal every day. Follow the directions on the prescription label. Swallow the capsules whole with a glass of water. Do not crush, chew, or open capsules. Do not take your medicine more often than directed. Do not stop taking your medicine unless your doctor tells you to.  Talk to your pediatrician regarding the use of this medicine in children. Special care may be needed.  Overdosage: If you think you have taken too much of this medicine contact a poison control center or emergency room at once.  NOTE: This  medicine is only for you. Do not share this medicine with others.  What if I miss a dose?  If you miss a dose, take it as soon as you can. If it is almost time for your next dose, take only that dose. Do not take double or extra doses. If you stop taking your medicine for several days or more, ask your doctor or health care professional what dose you should start back on.  What may interact with this medicine?  -cimetidine  -fluoxetine  -ketoconazole  -medicines for erectile disfunction like sildenafil, tadalafil, vardenafil  -medicines for high blood pressure  -other alpha-blockers like alfuzosin, doxazosin, phentolamine, phenoxybenzamine, prazosin, terazosin  -warfarin  This list may not describe all possible interactions. Give your health care provider a list of all the medicines, herbs, non-prescription drugs, or dietary supplements you use. Also tell them if you smoke, drink alcohol, or use illegal drugs. Some items may interact with your medicine.  What should I watch for while using this medicine?  Visit your doctor or health care professional for regular check ups. You will need lab work done before you start this medicine and regularly while you are taking it. Check your blood pressure as directed. Ask your health care professional what your blood pressure should be, and when you should contact him or her.  This medicine may make you feel dizzy or lightheaded. This is more likely to happen after the first dose, after an increase in dose, or during hot weather or exercise. Drinking alcohol and taking some medicines can make this worse. Do not drive, use machinery, or do anything that needs mental alertness until you know how this medicine affects you. Do not sit or stand up quickly. If you begin to feel dizzy, sit down until you feel better. These effects can decrease once your body adjusts to the medicine.  Contact your doctor or health care professional right away if you have an erection that lasts longer  than 4 hours or if it becomes painful. This may be a sign of a serious problem and must be treated right away to prevent permanent damage.  If you are thinking of having cataract surgery, tell your eye surgeon that you have taken this medicine.  What side effects may I notice from receiving this medicine?  Side effects that you should report to your doctor or health care professional as soon as possible:  -allergic reactions like skin rash or itching, hives, swelling of the lips, mouth, tongue, or throat  -breathing problems  -change in vision  -feeling faint or lightheaded  -irregular heartbeat  -prolonged or painful erection  -weakness  Side effects that usually do not require medical attention (report to your doctor or health care professional if they continue or are bothersome):  -back pain  -change in sex drive or performance  -constipation, nausea or vomiting  -cough  -drowsy  -runny or stuffy nose  -trouble sleeping  This list may not describe all possible side effects. Call your doctor for medical advice about side effects. You may report side effects to FDA at 5-916-FDA-5606.  Where should I keep my medicine?  Keep out of the reach of children.  Store at room temperature between 15 and 30 degrees C (59 and 86 degrees F). Throw away any unused medicine after the expiration date.  NOTE: This sheet is a summary. It may not cover all possible information. If you have questions about this medicine, talk to your doctor, pharmacist, or health care provider.  © 2018 Elsevier/Gold Standard (2013-12-18 14:11:34)    Oxygen Use at Home  Oxygen can be prescribed for home use. The prescription will show the flow rate. This is how much oxygen is to be used per minute. This will be listed in liters per minute (LPM or L/M). A liter is a metric measurement of volume.  You will use oxygen therapy as directed. It can be used while exercising, sleeping, or at rest. You may need oxygen continuously. Your health care provider may  order a blood oxygen test (arterial blood gas or pulse oximetry test) that will show what your oxygen level is. Your health care provider will use these measurements to learn about your needs and follow your progress.  Home oxygen therapy is commonly used on patients with various lung (pulmonary) related conditions. Some of these conditions include:  · Asthma.  · Lung cancer.  · Pneumonia.  · Emphysema.  · Chronic bronchitis.  · Cystic fibrosis.  · Other lung diseases.  · Pulmonary fibrosis.  · Occupational lung disease.  · Heart failure.  · Chronic obstructive pulmonary disease (COPD).  3 COMMON WAYS OF PROVIDING OXYGEN THERAPY  · Gas: The gas form of oxygen is put into variously sized cylinders or tanks. The cylinders or oxygen tanks contain compressed oxygen. The cylinder is equipped with a regulator that controls the flow rate. Because the flow of oxygen out of the cylinder is constant, an oxygen conserving device may be attached to the system to avoid waste. This device releases the gas only when you inhale and cuts it off when you exhale. Oxygen can be provided in a small cylinder that can be carried with you. Large tanks are heavy and are only for stationary use. After use, empty tanks must be exchanged for full tanks.  · Liquid: The liquid form of oxygen is put into a container similar to a thermos. When released, the liquid converts to a gas and you breathe it in just like the compressed gas. This storage method takes up less space than the compressed gas cylinder, and you can transfer the liquid to a small, portable vessel at home. Liquid oxygen is more expensive than the compressed gas, and the vessel vents when not in use. An oxygen conserving device may be built into the vessel to conserve the oxygen. Liquid oxygen is very cold, around 297° below zero.  · Oxygen concentrator: This medical device filters oxygen from room air and gives almost 100% oxygen to the patient. Oxygen concentrators are powered by  "electricity. Benefits of this system are:  ¨ It does not need to be resupplied.  ¨ It is not as costly as liquid oxygen.  ¨ Extra tubing permits the user to move around easier.  There are several types of small, portable oxygen systems available which can help you remain active and mobile. You must have a cylinder of oxygen as a backup in the event of a power failure. Advise your electric power company that you are on oxygen therapy in order to get priority service when there is a power failure.  OXYGEN DELIVERY DEVICES  There are 3 common ways to deliver oxygen to your body.  · Nasal cannula. This is a 2-pronged device inserted in the nostrils that is connected to tubing carrying the oxygen. The tubing can rest on the ears or be attached to the frame of eyeglasses.  · Mask. People who need a high flow of oxygen generally use a mask.  · Transtracheal catheter. Transtracheal oxygen therapy requires the insertion of a small, flexible tube (catheter) in the windpipe (trachea). This catheter is held in place by a necklace. Since transtracheal oxygen bypasses the mouth, nose, and throat, a humidifier is absolutely required at flow rates of 1 LPM or greater.  OXYGEN USE SAFETY TIPS  · Never smoke while using oxygen. Oxygen does not burn or explode, but flammable materials will burn faster in the presence of oxygen.  · Keep a fire extinguisher close by. Let your fire department know that you have oxygen in your home.  · Warn visitors not to smoke near you when you are using oxygen. Put up \"no smoking\" signs in your home where you most often use the oxygen.  · When you go to a restaurant with your portable oxygen source, ask to be seated in the nonsmoking section.  · Stay at least 5 feet away from gas stoves, candles, lighted fireplaces, or other heat sources.  · Do not use materials that burn easily (flammable) while using your oxygen.  · If you use an oxygen cylinder, make sure it is secured to some fixed object or in a " stand. If you use liquid oxygen, make sure the vessel is kept upright to keep the oxygen from pouring out. Liquid oxygen is so cold it can hurt your skin.  · If you use an oxygen concentrator, call your electric company so you will be given priority service if your power goes out. Avoid using extension cords, if possible.  · Regularly test your smoke detectors at home to make sure they work. If you receive care in your home from a nurse or other health care provider, he or she may also check to make sure your smoke detectors work.  GUIDELINES FOR CLEANING YOUR EQUIPMENT  · Wash the nasal prongs with a liquid soap. Thoroughly rinse them once or twice a week.  · Replace the prongs every 2 to 4 weeks. If you have an infection (cold, pneumonia) change them when you are well.  · Your health care provider will give you instructions on how to clean your transtracheal catheter.  · The humidifier bottle should be washed with soap and warm water and rinsed thoroughly between each refill. Air-dry the bottle before filling it with sterile or distilled water. The bottle and its top should be disinfected after they are cleaned.  · If you use an oxygen concentrator, unplug the unit. Then wipe down the cabinet with a damp cloth and dry it daily. The air filter should be cleaned at least twice a week.  · Follow your home medical equipment and service company's directions for cleaning the compressor filter.  HOME CARE INSTRUCTIONS   · Do not change the flow of oxygen unless directed by your health care provider.  · Do not use alcohol or other sedating drugs unless instructed. They slow your breathing rate.  · Do not use materials that burn easily (flammable) while using your oxygen.  · Always keep a spare tank of oxygen. Plan ahead for holidays when you may not be able to get a prescription filled.  · Use water-based lubricants on your lips or nostrils. Do not use an oil-based product like petroleum jelly.  · To prevent your cheeks  or the skin behind your ears from becoming irritated, tuck some gauze under the tubing.  · If you have persistent redness under your nose, call your health care provider.  · When you no longer need oxygen, your doctor will have the oxygen discontinued. Oxygen is not addicting or habit forming.  · Use the oxygen as instructed. Too much oxygen can be harmful and too little will not give you the benefit you need.  · Shortness of breath is not always from a lack of oxygen. If your oxygen level is not the cause of your shortness of breath, taking oxygen will not help.  SEEK MEDICAL CARE IF:   · You have frequent headaches.  · You have shortness of breath or a lasting cough.  · You have anxiety.  · You are confused.  · You are drowsy or sleepy all the time.  · You develop an illness which aggravates your breathing.  · You cannot exercise.  · You are restless.  · You have blue lips or fingernails.  · You have difficult or irregular breathing and it is getting worse.  · You have a fever.     This information is not intended to replace advice given to you by your health care provider. Make sure you discuss any questions you have with your health care provider.     Document Released: 03/09/2005 Document Revised: 01/08/2016 Document Reviewed: 07/30/2014  [employer] Interactive Patient Education ©2016 [employer]    Depression / Suicide Risk    As you are discharged from this [facility] facility, it is important to learn how to keep safe from harming yourself.    Recognize the warning signs:  · Abrupt changes in personality, positive or negative- including increase in energy   · Giving away possessions  · Change in eating patterns- significant weight changes-  positive or negative  · Change in sleeping patterns- unable to sleep or sleeping all the time   · Unwillingness or inability to communicate  · Depression  · Unusual sadness, discouragement and loneliness  · Talk of wanting to die  · Neglect of personal  appearance   · Rebelliousness- reckless behavior  · Withdrawal from people/activities they love  · Confusion- inability to concentrate     If you or a loved one observes any of these behaviors or has concerns about self-harm, here's what you can do:  · Talk about it- your feelings and reasons for harming yourself  · Remove any means that you might use to hurt yourself (examples: pills, rope, extension cords, firearm)  · Get professional help from the community (Mental Health, Substance Abuse, psychological counseling)  · Do not be alone:Call your Safe Contact- someone whom you trust who will be there for you.  · Call your local CRISIS HOTLINE 823-6820 or 652-414-8784  · Call your local Children's Mobile Crisis Response Team [place] (254) 193-9225 or www.[employer]  · Call the toll free National Suicide Prevention Hotlines   · National Suicide Prevention Lifeline 001-689-XHJB (3068)  · National Hope Line Network 800-SUICIDE (504-0026)

## 2018-08-08 NOTE — PROGRESS NOTES
Discharge orders, pt A&O x 4, discharge instructions given, pt verbalized understanding, all questions answered, steady on feet, all belongings returned to pt, awaiting relative for ride, IV taken out,

## 2018-08-09 ENCOUNTER — PATIENT OUTREACH (OUTPATIENT)
Dept: HEALTH INFORMATION MANAGEMENT | Facility: OTHER | Age: 73
End: 2018-08-09

## 2018-08-10 ENCOUNTER — PATIENT OUTREACH (OUTPATIENT)
Dept: HEALTH INFORMATION MANAGEMENT | Facility: OTHER | Age: 73
End: 2018-08-10

## 2018-08-23 ENCOUNTER — TELEPHONE (OUTPATIENT)
Dept: MEDICAL GROUP | Facility: PHYSICIAN GROUP | Age: 73
End: 2018-08-23

## 2018-08-23 NOTE — TELEPHONE ENCOUNTER
Future Appointments       Provider Department Center    8/24/2018 3:20 PM Idalia Cortez M.D. [facility]        ESTABLISHED PATIENT PRE-VISIT PLANNING     Note: Patient will not be contacted if there is no indication to call.     1.  Reviewed notes from the last few office visits within the medical group: Yes    2.  If any orders were placed at last visit or intended to be done for this visit (i.e. 6 mos follow-up), do we have Results/Consult Notes?        •  Labs - Labs ordered, completed on 08/05-08/18 and results are in chart.       •  Imaging - Imaging ordered, NOT completed. Patient advised to complete prior to next appointment.       •  Referrals - No referrals were ordered at last office visit.    3. Is this appointment scheduled as a Hospital Follow-Up? Yes, visit was at [facility].     4.  Immunizations were updated in [employer] using Web[employer]?: Yes       •  Web [employer] Recommendations: FLU, PREVNAR (PCV13) , TDAP and ZOSTAVAX (Shingles)    5.  Patient is due for the following Health Maintenance Topics:   Health Maintenance Due   Topic Date Due   • Annual Wellness Visit  1945   • PFT SCREENING 03/11/1964   • IMM DTaP/Tdap/Td Vaccine (1 - Tdap) 03/11/1965   • PAP SMEAR  03/11/1967   • IMM ZOSTER VACCINES (1 of 2) 03/11/1996   • BONE DENSITY  03/11/2011   • MAMMOGRAM  12/06/2017   • IMM PNEUMOCOCCAL (2 of 2 - PPSV23) 12/06/2017   • IMM INFLUENZA (1) 09/01/2018       6.  MDX printed for Provider? NO Completed and compliant on 05/22/18    7.  Patient was informed to arrive 15 min prior to their scheduled appointment and bring in their medication bottles. Confirmed through automated call

## 2018-08-24 ENCOUNTER — OFFICE VISIT (OUTPATIENT)
Dept: MEDICAL GROUP | Facility: PHYSICIAN GROUP | Age: 73
End: 2018-08-24
Payer: MEDICARE

## 2018-08-24 VITALS
DIASTOLIC BLOOD PRESSURE: 82 MMHG | WEIGHT: 149 LBS | SYSTOLIC BLOOD PRESSURE: 128 MMHG | TEMPERATURE: 98.8 F | HEART RATE: 96 BPM | OXYGEN SATURATION: 94 % | BODY MASS INDEX: 20.18 KG/M2 | HEIGHT: 72 IN

## 2018-08-24 DIAGNOSIS — J96.11 CHRONIC RESPIRATORY FAILURE WITH HYPOXIA (HCC): ICD-10-CM

## 2018-08-24 DIAGNOSIS — I10 ESSENTIAL HYPERTENSION: ICD-10-CM

## 2018-08-24 DIAGNOSIS — Z72.0 TOBACCO ABUSE: ICD-10-CM

## 2018-08-24 DIAGNOSIS — Z87.442 HISTORY OF NEPHROLITHIASIS: ICD-10-CM

## 2018-08-24 DIAGNOSIS — J44.9 CHRONIC OBSTRUCTIVE PULMONARY DISEASE, UNSPECIFIED COPD TYPE (HCC): ICD-10-CM

## 2018-08-24 DIAGNOSIS — Z71.6 ENCOUNTER FOR SMOKING CESSATION COUNSELING: ICD-10-CM

## 2018-08-24 PROCEDURE — 99214 OFFICE O/P EST MOD 30 MIN: CPT | Performed by: INTERNAL MEDICINE

## 2018-08-24 RX ORDER — ALBUTEROL SULFATE 90 UG/1
2 AEROSOL, METERED RESPIRATORY (INHALATION) EVERY 6 HOURS PRN
Qty: 8.5 G | Refills: 2 | Status: ON HOLD | OUTPATIENT
Start: 2018-08-24 | End: 2019-09-04 | Stop reason: SDUPTHER

## 2018-08-24 NOTE — ASSESSMENT & PLAN NOTE
Recent hospitalization for flank pain and was treated with Tamsulosin. Denies any symptoms of flank pain, dysuria, hematuria at this time.

## 2018-08-24 NOTE — PROGRESS NOTES
CC: Follow-up visit, hypertension.    HISTORY OF PRESENT ILLNESS: Patient is a 73 y.o. female established patient who presents today to discuss her medical conditions as mentioned in history of present illness below.    Health Maintenance: Completed    Tobacco abuse  This is a chronic health problem that is uncontrolled with current medications and lifestyle measures. Currently 5 cig/day.    History of nephrolithiasis  Recent hospitalization for flank pain and was treated with Tamsulosin. Denies any symptoms of flank pain, dysuria, hematuria at this time.    Essential hypertension  This is a chronic health problem that is well controlled with current medications and lifestyle measures.Blood pressure in office 128/82. On Lisinopril 10 mg daily.    Chronic respiratory failure (HCC)  This is a chronic health problem that is well controlled with current medications and lifestyle measures.Currently on 1.5L NC only at night.    COPD  This is a chronic health problem that is well controlled with current medications and lifestyle measures.Not using Symbicort as she cannot afford.      PHQ score 0, BMI within normal limits, chronic active tobacco, no fall injuries.    Patient Active Problem List    Diagnosis Date Noted   • History of nephrolithiasis 08/24/2018   • Hydronephrosis 08/07/2018   • COPD 08/07/2018   • Chronic respiratory failure (HCC) 08/07/2018   • Thrombocytopenia (HCC) 08/06/2018   • Healthcare maintenance 05/22/2018   • Tobacco abuse 05/22/2018   • Essential hypertension 05/22/2018      Allergies:Lactose and Morphine    Current Outpatient Prescriptions   Medication Sig Dispense Refill   • albuterol 108 (90 Base) MCG/ACT Aero Soln inhalation aerosol Inhale 2 Puffs by mouth every 6 hours as needed for Shortness of Breath. 8.5 g 2   • tamsulosin (FLOMAX) 0.4 MG capsule Take 1 Cap by mouth 1 time daily as needed. 15 Cap 1   • aspirin EC (ECOTRIN) 81 MG Tablet Delayed Response Take 81 mg by mouth every day.      • Cholecalciferol (VITAMIN D PO) Take 1 Cap by mouth every day.     • CALCIUM PO Take 1 Tab by mouth every day.     • MAGNESIUM PO Take 1 Cap by mouth every day.     • lisinopril (PRINIVIL) 10 MG Tab Take 1 Tab by mouth every day. 90 Tab 2   • GARLIC PO Take 1 Tab by mouth every day.     • Omega-3 Fatty Acids (FISH OIL PO) Take 1 Cap by mouth every day.     • B Complex Vitamins (B COMPLEX PO) Take 1 Tab by mouth every day.     • Multiple Vitamin (MULTIVITAMIN PO) Take 1 Tab by mouth every day.     • budesonide-formoterol (SYMBICORT) 80-4.5 MCG/ACT Aerosol Inhale 2 Puffs by mouth 2 Times a Day. 1 Inhaler 1     No current facility-administered medications for this visit.        Social History   Substance Use Topics   • Smoking status: Current Every Day Smoker     Packs/day: 0.50     Types: Cigarettes   • Smokeless tobacco: Never Used      Comment: [id number]   • Alcohol use No     Social History     Social History Narrative   • No narrative on file       Family History   Problem Relation Age of Onset   • Cancer Mother    • Heart Disease Father    • Stroke Father    • Heart Attack Father         ROS:     - Constitutional:  Negative for fever, chills, unexpected weight change, and fatigue/generalized weakness.    - HEENT:  Negative for headaches, vision changes, hearing changes, ear pain, ear discharge, rhinorrhea, sinus congestion, sore throat, and neck pain.      - Respiratory: Negative for cough, sputum production, chest congestion, dyspnea, wheezing, and crackles.      - Cardiovascular: Negative for chest pain, palpitations, orthopnea, and bilateral lower extremity edema.     - Gastrointestinal: Negative for heartburn, nausea, vomiting, abdominal pain, hematochezia, melena, diarrhea, constipation, and greasy/foul-smelling stools.     - Genitourinary: Negative for dysuria, polyuria, hematuria, pyuria, urinary urgency, and urinary incontinence.     - Musculoskeletal: Negative for myalgias, back pain, and joint pain.      - Skin: Negative for rash, itching, cyanotic skin color change.     - Neurological: Negative for dizziness, tingling, tremors, focal sensory deficit, focal weakness and headaches.     - Endo/Heme/Allergies: Does not bruise/bleed easily.     - Psychiatric/Behavioral: Negative for depression, suicidal/homicidal ideation and memory loss.          Exam:    Blood pressure 128/82, pulse 96, temperature 37.1 °C (98.8 °F), height 1.829 m (6'), weight 67.6 kg (149 lb), SpO2 94 %. Body mass index is 20.21 kg/m².    General:  Well nourished, well developed female in NAD  Head is grossly normal.  Neck: Supple without JVD or bruit. Thyroid is not enlarged.  Pulmonary: Clear to ausculation and percussion. Decreased breath sounds bilaterally No rales, ronchi, or wheezing.  Cardiovascular: Regular rate and rhythm without murmur. Carotid and radial pulses are intact and equal bilaterally.  Extremities: no clubbing, cyanosis, or edema.      Please note that this dictation was created using voice recognition software. I have made every reasonable attempt to correct obvious errors, but I expect that there are errors of grammar and possibly content that I did not discover before finalizing the note.    Assessment/Plan:  1. Chronic obstructive pulmonary disease, unspecified COPD type (HCC)  Stable, patient cannot afford for Symbicort. Emphasized on the need of at least a rescue inhaler when necessary which she understood and agreed.  - albuterol 108 (90 Base) MCG/ACT Aero Soln inhalation aerosol; Inhale 2 Puffs by mouth every 6 hours as needed for Shortness of Breath.  Dispense: 8.5 g; Refill: 2    2. Tobacco abuse  3. Encounter for smoking cessation counseling  Patient was counseled on smoking cessation, we discussed the benefits of quitting including decrease risk of MI by 50% after one year of cessation, decreased risk of lung cancer after 12 years, one cigarette is enough to paralyze cilia for 24 hours leading to increase risk of  pulmonary infection, etc.... .Also encouraged to set a stop date.  - Patient refusing further nicotine patches offered to her.    4. History of nephrolithiasis  Resolved, a symptomatic at this time. Patient could not catch the stone.    5. Essential hypertension  Stable, continue current lisinopril 10 mg daily.    6. Chronic respiratory failure with hypoxia (HCC)  Stable, continue current nocturnal oxygen 1.5 L nasal cannula.

## 2018-08-24 NOTE — ASSESSMENT & PLAN NOTE
This is a chronic health problem that is well controlled with current medications and lifestyle measures.Not using Symbicort as she cannot afford.

## 2018-08-24 NOTE — ASSESSMENT & PLAN NOTE
This is a chronic health problem that is uncontrolled with current medications and lifestyle measures. Currently 5 cig/day.

## 2018-08-24 NOTE — ASSESSMENT & PLAN NOTE
This is a chronic health problem that is well controlled with current medications and lifestyle measures.Blood pressure in office 128/82. On Lisinopril 10 mg daily.

## 2018-08-24 NOTE — ASSESSMENT & PLAN NOTE
This is a chronic health problem that is well controlled with current medications and lifestyle measures.Currently on 1.5L NC only at night.

## 2018-09-07 ENCOUNTER — PATIENT OUTREACH (OUTPATIENT)
Dept: HEALTH INFORMATION MANAGEMENT | Facility: OTHER | Age: 73
End: 2018-09-07

## 2018-09-13 NOTE — PROGRESS NOTES
Nohelia Dickerson was admitted on 8/5/18 for lleus (inability to take a bowel movement), once treated she was discharged home on 8/8/18. [facility] patient advocate assisted with multiple discharge needs including 2 appointments, 1 Primary Care Physician appointment, 1 Urology appointment with Dr. Jordin Zafar @ [facility]. Of the 2 appointments the patient kept 1 and cancelled her Urology appointment on 8/28 due to a scheduling conflict from the patient. Patient advocate also assisted by coordinating DME O2 delivery for the patient. PPS Screening was conducted per [facility] notes and conversation with the patient, patient scored at a 90%.

## 2018-10-25 ENCOUNTER — TELEPHONE (OUTPATIENT)
Dept: HEMATOLOGY ONCOLOGY | Facility: MEDICAL CENTER | Age: 73
End: 2018-10-25

## 2018-10-25 NOTE — TELEPHONE ENCOUNTER
Patient had a lung cancer screening appointment with MAGNO Davis on 6/22/2018. She stated was told that she would be receiving a call back to have a Ct lung cancer screening scan and has not yet received a call, but did mention her phone has not been working as well. She prefers a call back to [phone] until her phone is working better. Please advise.

## 2018-11-01 NOTE — TELEPHONE ENCOUNTER
Called and left voicemail for patient to call our office back regarding scheduling a lung cancer screening ct. When patient calls back please give her [phone] or can also transfer her to the imaging department.

## 2018-11-09 ENCOUNTER — HOSPITAL ENCOUNTER (OUTPATIENT)
Dept: RADIOLOGY | Facility: MEDICAL CENTER | Age: 73
End: 2018-11-09
Attending: INTERNAL MEDICINE
Payer: MEDICARE

## 2018-11-09 DIAGNOSIS — M85.80 AGE-RELATED BONE LOSS: ICD-10-CM

## 2018-11-09 DIAGNOSIS — Z12.31 ENCOUNTER FOR SCREENING MAMMOGRAM FOR BREAST CANCER: ICD-10-CM

## 2018-11-09 PROCEDURE — 77067 SCR MAMMO BI INCL CAD: CPT

## 2018-11-09 PROCEDURE — 77080 DXA BONE DENSITY AXIAL: CPT

## 2018-11-12 ENCOUNTER — TELEPHONE (OUTPATIENT)
Dept: MEDICAL GROUP | Facility: PHYSICIAN GROUP | Age: 73
End: 2018-11-12

## 2018-11-12 NOTE — TELEPHONE ENCOUNTER
1. Caller Name: Nohelia                                         Call Back Number: [phone] (home)       Patient approves a detailed voicemail message: yes    Informed pt of message below. She would like to know if lisinopril (PRINIVIL) 10 MG Tab is a diuretic. She states its giving her muscle cramps and weight loss. If so, she mentioned if you can prescribe something else. Please Advise.

## 2018-11-12 NOTE — TELEPHONE ENCOUNTER
----- Message from Idalia Cortez M.D. sent at 11/9/2018  5:59 PM PST -----  Your bone density scan confirms decreased bone density. You have osteopenia - which means your bone density less than normal but not to the degree of osteoporosis. I recommend that we repeat your DEXA test in 2 years. The following strategies will help you maintain your bone density: exercise regularly, keep a healthy body weight, and consume 1000 IU vitamin D3 daily (or as directed if you have Vitamin D deficiency.)  Idalia Cortez M.D.

## 2018-11-12 NOTE — TELEPHONE ENCOUNTER
Phone Number Called: [phone] (home)     Message: Left message for patient to call back @ [phone]    Left Message for patient to call back: no

## 2018-11-13 NOTE — TELEPHONE ENCOUNTER
Lisinopril is not a diuretic and it is uncommon for the side effects mentioned by the patient for Lisinopril.   Idalia Cortez M.D.

## 2018-11-13 NOTE — TELEPHONE ENCOUNTER
Phone Number Called: [phone] (home)     Message: Left vm of message below    Left Message for patient to call back: yes

## 2019-02-02 DIAGNOSIS — I10 ESSENTIAL HYPERTENSION: ICD-10-CM

## 2019-02-04 RX ORDER — LISINOPRIL 10 MG/1
TABLET ORAL
Qty: 90 TAB | Refills: 1 | Status: SHIPPED | OUTPATIENT
Start: 2019-02-04 | End: 2019-08-06 | Stop reason: SDUPTHER

## 2019-02-04 NOTE — TELEPHONE ENCOUNTER
Was the patient seen in the last year in this department? Yes    Does patient have an active prescription for medications requested? No     Received Request Via: Pharmacy      Pt met protocol?: Yes    OV 8/18     BP Readings from Last 1 Encounters:   08/24/18 128/82

## 2019-02-04 NOTE — TELEPHONE ENCOUNTER
Refill X 6 months, sent to pharmacy.Pt. Seen in the last 6 months per protocol.   Lab Results   Component Value Date/Time    SODIUM 139 08/08/2018 04:24 AM    POTASSIUM 3.7 08/08/2018 04:24 AM    CHLORIDE 109 08/08/2018 04:24 AM    CO2 24 08/08/2018 04:24 AM    GLUCOSE 86 08/08/2018 04:24 AM    BUN 11 08/08/2018 04:24 AM    CREATININE 0.69 08/08/2018 04:24 AM

## 2019-02-06 DIAGNOSIS — I10 ESSENTIAL HYPERTENSION: ICD-10-CM

## 2019-02-06 RX ORDER — LISINOPRIL 10 MG/1
TABLET ORAL
Refills: 2 | OUTPATIENT
Start: 2019-02-06

## 2019-02-07 ENCOUNTER — TELEPHONE (OUTPATIENT)
Dept: MEDICAL GROUP | Facility: PHYSICIAN GROUP | Age: 74
End: 2019-02-07

## 2019-02-07 NOTE — TELEPHONE ENCOUNTER
Future Appointments       Provider Department Center    2/8/2019 10:10 AM Linh Issa D.O. [facility]        ESTABLISHED PATIENT PRE-VISIT PLANNING     Patient was NOT contacted to complete PVP.     Note: Patient will not be contacted if there is no indication to call.     1.  Reviewed notes from the last few office visits within the medical group: Yes    2.  If any orders were placed at last visit or intended to be done for this visit (i.e. 6 mos follow-up), do we have Results/Consult Notes?        •  Labs - Labs were not ordered at last office visit.       •  Imaging - Imaging ordered, completed and results are in chart.       •  Referrals - No referrals were ordered at last office visit.    3. Is this appointment scheduled as a Hospital Follow-Up? No    4.  Immunizations were updated in [employer] using Web[employer]?: Yes       •  Web [employer] Recommendations: FLU, PREVNAR (PCV13) , TDAP and SHINGRIX (Shingles)    5.  Patient is due for the following Health Maintenance Topics:   Health Maintenance Due   Topic Date Due   • Annual Wellness Visit  1945   • PFT SCREENING 03/11/1964   • IMM DTaP/Tdap/Td Vaccine (1 - Tdap) 03/11/1965   • PAP SMEAR  03/11/1967   • IMM ZOSTER VACCINES (1 of 2) 03/11/1996   • IMM PNEUMOCOCCAL(2 of 2 - PPSV23) 12/06/2017   • IMM INFLUENZA (1) 09/01/2018       6. Orders for overdue Health Maintenance topics pended in Pre-Charting? YES    7.  AHA (MDX) form printed for Provider? YES    8.  Patient was informed to arrive 15 min prior to their scheduled appointment and bring in their medication bottles.

## 2019-02-08 ENCOUNTER — OFFICE VISIT (OUTPATIENT)
Dept: MEDICAL GROUP | Facility: PHYSICIAN GROUP | Age: 74
End: 2019-02-08
Payer: MEDICARE

## 2019-02-08 VITALS
DIASTOLIC BLOOD PRESSURE: 98 MMHG | BODY MASS INDEX: 21.4 KG/M2 | OXYGEN SATURATION: 95 % | HEART RATE: 86 BPM | HEIGHT: 72 IN | TEMPERATURE: 98.4 F | WEIGHT: 158 LBS | SYSTOLIC BLOOD PRESSURE: 174 MMHG

## 2019-02-08 DIAGNOSIS — I10 ESSENTIAL HYPERTENSION: ICD-10-CM

## 2019-02-08 PROCEDURE — 99213 OFFICE O/P EST LOW 20 MIN: CPT | Performed by: FAMILY MEDICINE

## 2019-02-08 ASSESSMENT — PATIENT HEALTH QUESTIONNAIRE - PHQ9: CLINICAL INTERPRETATION OF PHQ2 SCORE: 0
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
## 2019-04-18 ENCOUNTER — OFFICE VISIT (OUTPATIENT)
Dept: MEDICAL GROUP | Facility: PHYSICIAN GROUP | Age: 74
End: 2019-04-18
Payer: MEDICARE

## 2019-04-18 ENCOUNTER — APPOINTMENT (OUTPATIENT)
Dept: RADIOLOGY | Facility: IMAGING CENTER | Age: 74
End: 2019-04-18
Attending: INTERNAL MEDICINE
Payer: MEDICARE

## 2019-04-18 VITALS
OXYGEN SATURATION: 92 % | SYSTOLIC BLOOD PRESSURE: 122 MMHG | WEIGHT: 155 LBS | DIASTOLIC BLOOD PRESSURE: 64 MMHG | BODY MASS INDEX: 20.99 KG/M2 | HEIGHT: 72 IN | TEMPERATURE: 96.7 F | HEART RATE: 120 BPM

## 2019-04-18 DIAGNOSIS — J06.9 VIRAL URI WITH COUGH: ICD-10-CM

## 2019-04-18 DIAGNOSIS — I10 ESSENTIAL HYPERTENSION: ICD-10-CM

## 2019-04-18 DIAGNOSIS — J96.11 CHRONIC RESPIRATORY FAILURE WITH HYPOXIA (HCC): ICD-10-CM

## 2019-04-18 DIAGNOSIS — Z72.0 TOBACCO ABUSE: ICD-10-CM

## 2019-04-18 DIAGNOSIS — J44.9 CHRONIC OBSTRUCTIVE PULMONARY DISEASE, UNSPECIFIED COPD TYPE (HCC): ICD-10-CM

## 2019-04-18 DIAGNOSIS — Z71.6 ENCOUNTER FOR SMOKING CESSATION COUNSELING: ICD-10-CM

## 2019-04-18 PROCEDURE — 99406 BEHAV CHNG SMOKING 3-10 MIN: CPT | Performed by: INTERNAL MEDICINE

## 2019-04-18 PROCEDURE — 99214 OFFICE O/P EST MOD 30 MIN: CPT | Mod: 25 | Performed by: INTERNAL MEDICINE

## 2019-04-18 PROCEDURE — 71046 X-RAY EXAM CHEST 2 VIEWS: CPT | Mod: 26 | Performed by: INTERNAL MEDICINE

## 2019-04-18 RX ORDER — DOXYCYCLINE HYCLATE 100 MG
100 TABLET ORAL 2 TIMES DAILY
Qty: 14 TAB | Refills: 0 | Status: SHIPPED | OUTPATIENT
Start: 2019-04-18 | End: 2019-09-03

## 2019-04-18 RX ORDER — BUDESONIDE AND FORMOTEROL FUMARATE DIHYDRATE 80; 4.5 UG/1; UG/1
2 AEROSOL RESPIRATORY (INHALATION) 2 TIMES DAILY
Qty: 1 INHALER | Refills: 1 | Status: SHIPPED | OUTPATIENT
Start: 2019-04-18 | End: 2019-09-03

## 2019-04-18 RX ORDER — PREDNISONE 50 MG/1
50 TABLET ORAL DAILY
Qty: 7 TAB | Refills: 0 | Status: SHIPPED | OUTPATIENT
Start: 2019-04-18 | End: 2019-09-03

## 2019-04-18 NOTE — ASSESSMENT & PLAN NOTE
This is a chronic health problem that is well controlled with current medications and lifestyle measures.  Blood pressure in office today is 122/64, currently on medications lisinopril 10 mg daily.

## 2019-04-18 NOTE — PROGRESS NOTES
CC: Follow-up visit, cough.    HISTORY OF PRESENT ILLNESS: Patient is a 74 y.o. female established patient who presents today to discuss on medical conditions as mentioned in HPI below.    Health Maintenance: Due for pneumonia vaccine and tetanus vaccines which she will be getting in the next office visit given her acute condition at this time.    Essential hypertension  This is a chronic health problem that is well controlled with current medications and lifestyle measures.  Blood pressure in office today is 122/64, currently on medications lisinopril 10 mg daily.    Tobacco abuse  This is a chronic health problem that is uncontrolled with current medications and lifestyle measures.  Currently on 10 cigarettes/day.    COPD  This is a chronic health problem that is well controlled with current medications and lifestyle measures.  Supposed to be on Symbicort inhaler which she is not taking due to unavailability, only on as needed albuterol and oxygen.    Chronic respiratory failure (HCC)  This is a chronic health problem that is well controlled with current medications and lifestyle measures.  Currently on home oxygen only in the nights 1.5 L nasal cannula    Viral URI with cough  This is a new problem started 1 week back. Started as generalized weakness, productive Cough with white clear sputum. Pt was travelling from [place] 2 weeks back with sick contacts around her. Denies SOB. Does have fever with breaking it off with night sweats.  Has been using Albuterol PRN twice a day. Does take Mucinex once a day.      PHQ score 0, BMI within normal limits, chronic active tobacco, no fall injuries.    Patient Active Problem List    Diagnosis Date Noted   • Viral URI with cough 04/18/2019   • History of nephrolithiasis 08/24/2018   • Hydronephrosis 08/07/2018   • COPD 08/07/2018   • Chronic respiratory failure (HCC) 08/07/2018   • Thrombocytopenia (HCC) 08/06/2018   • Healthcare maintenance 05/22/2018   • Tobacco abuse  05/22/2018   • Essential hypertension 05/22/2018      Allergies:Lactose and Morphine    Current Outpatient Prescriptions   Medication Sig Dispense Refill   • doxycycline (VIBRAMYCIN) 100 MG Tab Take 1 Tab by mouth 2 times a day. 14 Tab 0   • predniSONE (DELTASONE) 50 MG Tab Take 1 Tab by mouth every day. 7 Tab 0   • lisinopril (PRINIVIL) 10 MG Tab TAKE 1 TABLET BY MOUTH EVERY DAY 90 Tab 1   • tamsulosin (FLOMAX) 0.4 MG capsule Take 1 Cap by mouth 1 time daily as needed. 15 Cap 1   • aspirin EC (ECOTRIN) 81 MG Tablet Delayed Response Take 81 mg by mouth every day.     • Cholecalciferol (VITAMIN D PO) Take 1 Cap by mouth every day.     • MAGNESIUM PO Take 1 Cap by mouth every day.     • GARLIC PO Take 1 Tab by mouth every day.     • Omega-3 Fatty Acids (FISH OIL PO) Take 1 Cap by mouth every day.     • B Complex Vitamins (B COMPLEX PO) Take 1 Tab by mouth every day.     • Multiple Vitamin (MULTIVITAMIN PO) Take 1 Tab by mouth every day.     • albuterol 108 (90 Base) MCG/ACT Aero Soln inhalation aerosol Inhale 2 Puffs by mouth every 6 hours as needed for Shortness of Breath. 8.5 g 2   • budesonide-formoterol (SYMBICORT) 80-4.5 MCG/ACT Aerosol Inhale 2 Puffs by mouth 2 Times a Day. (Patient not taking: Reported on 4/18/2019) 1 Inhaler 1     No current facility-administered medications for this visit.        Social History   Substance Use Topics   • Smoking status: Current Every Day Smoker     Packs/day: 0.50     Types: Cigarettes   • Smokeless tobacco: Never Used      Comment: [id number]   • Alcohol use No     Social History     Social History Narrative   • No narrative on file       Family History   Problem Relation Age of Onset   • Cancer Mother    • Heart Disease Father    • Stroke Father    • Heart Attack Father         ROS:     - Constitutional: Negative for fever, chills, unexpected weight change, and fatigue/generalized weakness.    - HEENT: As mentioned in history above negative for headaches, vision changes,  hearing changes, ear pain, ear discharge, rhinorrhea, sinus congestion, sore throat, and neck pain.      - Respiratory:As mentioned in history above  Negative for wheezing, and crackles.      - Cardiovascular: Negative for chest pain, palpitations, orthopnea, and bilateral lower extremity edema.     - Gastrointestinal: Negative for heartburn, nausea, vomiting, abdominal pain, hematochezia, melena, diarrhea, constipation, and greasy/foul-smelling stools.     - Genitourinary: Negative for dysuria, polyuria, hematuria, pyuria, urinary urgency, and urinary incontinence.     - Musculoskeletal: Negative for myalgias, back pain, and joint pain.     - Skin: Negative for rash, itching, cyanotic skin color change.     - Neurological: Negative for dizziness, tingling, tremors, focal sensory deficit, focal weakness and headaches.     - Endo/Heme/Allergies: Does not bruise/bleed easily.     - Psychiatric/Behavioral: Negative for depression, suicidal/homicidal ideation and memory loss.            Exam:    /64 (BP Location: Right arm, Patient Position: Sitting, BP Cuff Size: Adult)   Pulse (!) 120   Temp 35.9 °C (96.7 °F) (Temporal)   Ht 1.829 m (6')   Wt 70.3 kg (155 lb)   SpO2 92%  Body mass index is 21.02 kg/m².    General:  Well nourished, well developed female in NAD  Head is grossly normal.  Neck: Supple without JVD or bruit. Thyroid is not enlarged.  Pulmonary: Positive for decreased breath sounds bilaterally.  No rales, ronchi, or wheezing.  Cardiovascular: Regular rate and rhythm without murmur. Carotid and radial pulses are intact and equal bilaterally.  Extremities: no clubbing, cyanosis, or edema.    Please note that this dictation was created using voice recognition software. I have made every reasonable attempt to correct obvious errors, but I expect that there are errors of grammar and possibly content that I did not discover before finalizing the note.    Assessment/Plan:  1. Viral URI with cough  New  problem, given the risk factors of COPD we would like to prevent the COPD exacerbation and cover her with antibiotic and steroid course at this time.  To continue her current over-the-counter Mucinex which she already has at home.  - DX-CHEST-2 VIEWS; Future  - doxycycline (VIBRAMYCIN) 100 MG Tab; Take 1 Tab by mouth 2 times a day.  Dispense: 14 Tab; Refill: 0  - predniSONE (DELTASONE) 50 MG Tab; Take 1 Tab by mouth every day.  Dispense: 7 Tab; Refill: 0    2. Chronic obstructive pulmonary disease, unspecified COPD type (HCC)  3. Chronic respiratory failure with hypoxia (HCC)  Uncontrolled , with mild exacerbation symptoms of ongoing cough but no symptoms of desaturation or increased oxygen requirements at this time.  Will do her regular PFTs which she is due at this time, given her doxycycline and prednisone coverage for the next 7 days.  Last chest x-ray done more than 9 years back, will recheck at this time.  - PULMONARY FUNCTION TESTS -Test requested: Complete Pulmonary Function Test; Future  - DX-CHEST-2 VIEWS; Future  - doxycycline (VIBRAMYCIN) 100 MG Tab; Take 1 Tab by mouth 2 times a day.  Dispense: 14 Tab; Refill: 0  - predniSONE (DELTASONE) 50 MG Tab; Take 1 Tab by mouth every day.  Dispense: 7 Tab; Refill: 0    4. Essential hypertension  Well-controlled, continue current lisinopril 10 mg daily.    5. Tobacco abuse  6. Encounter for smoking cessation counseling  Patient was counseled on smoking cessation, we discussed the benefits of quitting including decrease risk of MI by 50% after one year of cessation, decreased risk of lung cancer after 12 years, one cigarette is enough to paralyze cilia for 24 hours leading to increase risk of pulmonary infection, etc.... .Also encouraged to set a stop date. This took around 3 minutes duration.

## 2019-04-18 NOTE — ASSESSMENT & PLAN NOTE
This is a chronic health problem that is uncontrolled with current medications and lifestyle measures.  Currently on 10 cigarettes/day.

## 2019-04-18 NOTE — ASSESSMENT & PLAN NOTE
This is a chronic health problem that is well controlled with current medications and lifestyle measures.  Supposed to be on Symbicort inhaler which she is not taking due to unavailability, only on as needed albuterol and oxygen.

## 2019-04-18 NOTE — ASSESSMENT & PLAN NOTE
This is a chronic health problem that is well controlled with current medications and lifestyle measures.  Currently on home oxygen only in the nights 1.5 L nasal cannula

## 2019-04-18 NOTE — ASSESSMENT & PLAN NOTE
This is a new problem started 1 week back. Started as generalized weakness, productive Cough with white clear sputum. Pt was travelling from [place] 2 weeks back with sick contacts around her. Denies SOB. Does have fever with breaking it off with night sweats.  Has been using Albuterol PRN twice a day. Does take Mucinex once a day.

## 2019-04-19 ENCOUNTER — TELEPHONE (OUTPATIENT)
Dept: MEDICAL GROUP | Facility: PHYSICIAN GROUP | Age: 74
End: 2019-04-19

## 2019-04-19 NOTE — TELEPHONE ENCOUNTER
----- Message from Idalia Cortez M.D. sent at 4/18/2019  8:19 PM PDT -----  Your X ray was normal.   Idalia Cortez M.D.

## 2019-06-25 ENCOUNTER — APPOINTMENT (OUTPATIENT)
Dept: PULMONOLOGY | Facility: MEDICAL CENTER | Age: 74
End: 2019-06-25
Attending: INTERNAL MEDICINE
Payer: MEDICARE

## 2019-06-26 ENCOUNTER — TELEPHONE (OUTPATIENT)
Dept: MEDICAL GROUP | Facility: PHYSICIAN GROUP | Age: 74
End: 2019-06-26

## 2019-06-26 NOTE — TELEPHONE ENCOUNTER
Future Appointments       Provider Department Center    7/2/2019 11:20 AM Idalia Cortez M.D. [facility] [place]    7/12/2019 9:00 AM PULMONARY FUNCTION LAB PULMONARY REHAB [facility]         ESTABLISHED PATIENT PRE-VISIT PLANNING     Patient was NOT contacted to complete PVP.     Note: Patient will not be contacted if there is no indication to call.     1.  Reviewed notes from the last few office visits within the medical group: Yes    2.  If any orders were placed at last visit or intended to be done for this visit (i.e. 6 mos follow-up), do we have Results/Consult Notes?        •  Labs - Labs were not ordered at last office visit.   Note: If patient appointment is for lab review and patient did not complete labs, check with provider if OK to reschedule patient until labs completed.       •  Imaging - Imaging ordered, completed and results are in chart.       •  Referrals - No referrals were ordered at last office visit.    3. Is this appointment scheduled as a Hospital Follow-Up? No    4.  Immunizations were updated in [employer] using WebIZ?: Yes       •  Web [employer] Recommendations: FLU, PNEUMOVAX (PPSV23), TDAP, TRUMENBA (Men B) and VARICELLA (Chicken Pox)     5.  Patient is due for the following Health Maintenance Topics:   Health Maintenance Due   Topic Date Due   • Annual Wellness Visit  1945   • HEPATITIS C SCREENING  1945   • PFT SCREENING-FEV1 AND FEV/FVC RATIO / SPIROMETRY SHOULD BE PERFORMED ANNUALLY  03/11/1964   • IMM DTaP/Tdap/Td Vaccine (1 - Tdap) 03/11/1965   • PAP SMEAR  03/11/1967   • IMM ZOSTER VACCINES (1 of 2) 03/11/1996   • IMM PNEUMOCOCCAL(2 of 2 - PPSV23) 12/06/2017       6. Orders for overdue Health Maintenance topics pended in Pre-Charting? YES    7.  AHA (MDX) form printed for Provider? YES    8.  Patient was NOT informed to arrive 15 min prior to their scheduled appointment and bring in their medication bottles.

## 2019-07-12 ENCOUNTER — HOSPITAL ENCOUNTER (OUTPATIENT)
Dept: PULMONOLOGY | Facility: MEDICAL CENTER | Age: 74
End: 2019-07-12
Attending: INTERNAL MEDICINE
Payer: MEDICARE

## 2019-07-12 DIAGNOSIS — J44.9 CHRONIC OBSTRUCTIVE PULMONARY DISEASE, UNSPECIFIED COPD TYPE (HCC): ICD-10-CM

## 2019-07-12 PROCEDURE — 94060 EVALUATION OF WHEEZING: CPT

## 2019-07-12 PROCEDURE — 94726 PLETHYSMOGRAPHY LUNG VOLUMES: CPT

## 2019-07-12 PROCEDURE — 94729 DIFFUSING CAPACITY: CPT

## 2019-07-12 ASSESSMENT — PULMONARY FUNCTION TESTS
FVC_PREDICTED: 3.58
FEV1/FVC_PERCENT_CHANGE: -18
FEV1/FVC_PERCENT_LLN: 64
FEV1: 1.34
FEV1/FVC_PERCENT_PREDICTED: 82
FEV1_LLN: 2.26
FVC_LLN: 2.99
FEV1_PERCENT_PREDICTED: 49
FVC: 2.15
FEV1/FVC_PERCENT_CHANGE: 12
FEV1/FVC_PERCENT_PREDICTED: 67
FEV1/FVC_PERCENT_PREDICTED: 76
FEV1/FVC_PERCENT_PREDICTED: 80
FEV1/FVC: 51
FEV1/FVC: 62
FEV1: 1.38
FEV1/FVC_PERCENT_LLN: 64
FEV1/FVC_PREDICTED: 77
FEV1_PERCENT_PREDICTED: 51
FEV1_PERCENT_CHANGE: 26
FVC_LLN: 2.99
FEV1_PREDICTED: 2.71
FEV1_PERCENT_CHANGE: 3
FVC: 2.73
FEV1/FVC: 62
FVC_PERCENT_PREDICTED: 76
FEV1/FVC_PERCENT_PREDICTED: 65
FEV1/FVC: 50.55
FEV1_LLN: 2.26
FVC_PERCENT_PREDICTED: 60

## 2019-07-13 PROCEDURE — 94060 EVALUATION OF WHEEZING: CPT | Mod: 26 | Performed by: INTERNAL MEDICINE

## 2019-07-13 PROCEDURE — 94729 DIFFUSING CAPACITY: CPT | Mod: 26 | Performed by: INTERNAL MEDICINE

## 2019-07-13 PROCEDURE — 94726 PLETHYSMOGRAPHY LUNG VOLUMES: CPT | Mod: 26 | Performed by: INTERNAL MEDICINE

## 2019-07-13 NOTE — PROCEDURES
PULMONARY FUNCTION TEST INTERPRETATION    DATE OF STUDY:  07/12/2019    SPIROMETRY AND FLOW VOLUME LOOPS:  Ratio is reduced at 62, FEV1 1.34-49% with   no response to bronchodilators, FVC 2.15-60%.    LUNG VOLUMES:  SVC 66, IC 53, ERV 97, , , .    DIFFUSION:  DLCO 53, VA 70.    CONCLUSION:  1.  Moderate mixed obstructive and restrictive airway disease with no   significant response to bronchodilators, most consistent with moderate degree   of chronic bronchitis.  2.  Lung volumes demonstrate extrathoracic restrictive lung disease.  3.  Diffusion shows moderate reduction in VA and DLCO most consistent with   moderate degree of emphysema given chronic obstructive pulmonary disease   findings on spirometry.  4.  Correlate clinically.       ____________________________________     MD MIKAELA Wilks / LIBERTAD    DD:  07/13/2019 09:52:13  DT:  07/13/2019 11:24:45    D#:  [id number]  Job#:  [id number]  
30.9

## 2019-08-06 DIAGNOSIS — I10 ESSENTIAL HYPERTENSION: ICD-10-CM

## 2019-08-07 RX ORDER — LISINOPRIL 10 MG/1
TABLET ORAL
Qty: 100 TAB | Refills: 1 | Status: SHIPPED | OUTPATIENT
Start: 2019-08-07 | End: 2019-09-03

## 2019-08-07 NOTE — TELEPHONE ENCOUNTER
Was the patient seen in the last year in this department? Yes    Does patient have an active prescription for medications requested? No     Received Request Via: Pharmacy      Pt met protocol?: Yes   Pt last ov 4/19   BP Readings from Last 1 Encounters:   04/18/19 122/64

## 2019-09-03 ENCOUNTER — APPOINTMENT (OUTPATIENT)
Dept: RADIOLOGY | Facility: MEDICAL CENTER | Age: 74
DRG: 065 | End: 2019-09-03
Attending: INTERNAL MEDICINE
Payer: MEDICARE

## 2019-09-03 ENCOUNTER — APPOINTMENT (OUTPATIENT)
Dept: CARDIOLOGY | Facility: MEDICAL CENTER | Age: 74
DRG: 065 | End: 2019-09-03
Attending: INTERNAL MEDICINE
Payer: MEDICARE

## 2019-09-03 ENCOUNTER — APPOINTMENT (OUTPATIENT)
Dept: RADIOLOGY | Facility: MEDICAL CENTER | Age: 74
DRG: 065 | End: 2019-09-03
Attending: EMERGENCY MEDICINE
Payer: MEDICARE

## 2019-09-03 ENCOUNTER — HOSPITAL ENCOUNTER (INPATIENT)
Facility: MEDICAL CENTER | Age: 74
LOS: 3 days | DRG: 065 | End: 2019-09-06
Attending: EMERGENCY MEDICINE | Admitting: INTERNAL MEDICINE
Payer: MEDICARE

## 2019-09-03 DIAGNOSIS — I63.10 CEREBROVASCULAR ACCIDENT (CVA) DUE TO EMBOLISM OF PRECEREBRAL ARTERY (HCC): ICD-10-CM

## 2019-09-03 PROBLEM — I70.209 ARTERIAL OCCLUSION, LOWER EXTREMITY (HCC): Status: ACTIVE | Noted: 2019-09-03

## 2019-09-03 PROBLEM — R20.0 NUMBNESS OF LEFT FOOT: Status: ACTIVE | Noted: 2019-09-03

## 2019-09-03 LAB
ALBUMIN SERPL BCP-MCNC: 4 G/DL (ref 3.2–4.9)
ALBUMIN/GLOB SERPL: 1.6 G/DL
ALP SERPL-CCNC: 76 U/L (ref 30–99)
ALT SERPL-CCNC: 25 U/L (ref 2–50)
ANION GAP SERPL CALC-SCNC: 13 MMOL/L (ref 0–11.9)
APTT PPP: >240 SEC (ref 24.7–36)
AST SERPL-CCNC: 23 U/L (ref 12–45)
BASOPHILS # BLD AUTO: 0.8 % (ref 0–1.8)
BASOPHILS # BLD: 0.05 K/UL (ref 0–0.12)
BILIRUB SERPL-MCNC: 0.7 MG/DL (ref 0.1–1.5)
BUN SERPL-MCNC: 15 MG/DL (ref 8–22)
CALCIUM SERPL-MCNC: 9 MG/DL (ref 8.4–10.2)
CHLORIDE SERPL-SCNC: 104 MMOL/L (ref 96–112)
CO2 SERPL-SCNC: 24 MMOL/L (ref 20–33)
CREAT SERPL-MCNC: 0.78 MG/DL (ref 0.5–1.4)
EKG IMPRESSION: NORMAL
EOSINOPHIL # BLD AUTO: 0.15 K/UL (ref 0–0.51)
EOSINOPHIL NFR BLD: 2.3 % (ref 0–6.9)
ERYTHROCYTE [DISTWIDTH] IN BLOOD BY AUTOMATED COUNT: 43.7 FL (ref 35.9–50)
GLOBULIN SER CALC-MCNC: 2.5 G/DL (ref 1.9–3.5)
GLUCOSE SERPL-MCNC: 163 MG/DL (ref 65–99)
HCT VFR BLD AUTO: 46 % (ref 37–47)
HGB BLD-MCNC: 15.5 G/DL (ref 12–16)
IMM GRANULOCYTES # BLD AUTO: 0.02 K/UL (ref 0–0.11)
IMM GRANULOCYTES NFR BLD AUTO: 0.3 % (ref 0–0.9)
INR PPP: 1.09 (ref 0.87–1.13)
LV EJECT FRACT  99904: 60
LV EJECT FRACT MOD 2C 99903: 63.48
LV EJECT FRACT MOD 4C 99902: 61.41
LV EJECT FRACT MOD BP 99901: 61.19
LYMPHOCYTES # BLD AUTO: 2.03 K/UL (ref 1–4.8)
LYMPHOCYTES NFR BLD: 31.2 % (ref 22–41)
MCH RBC QN AUTO: 29.2 PG (ref 27–33)
MCHC RBC AUTO-ENTMCNC: 33.7 G/DL (ref 33.6–35)
MCV RBC AUTO: 86.6 FL (ref 81.4–97.8)
MONOCYTES # BLD AUTO: 0.32 K/UL (ref 0–0.85)
MONOCYTES NFR BLD AUTO: 4.9 % (ref 0–13.4)
NEUTROPHILS # BLD AUTO: 3.94 K/UL (ref 2–7.15)
NEUTROPHILS NFR BLD: 60.5 % (ref 44–72)
NRBC # BLD AUTO: 0 K/UL
NRBC BLD-RTO: 0 /100 WBC
PLATELET # BLD AUTO: 163 K/UL (ref 164–446)
PMV BLD AUTO: 9.8 FL (ref 9–12.9)
POTASSIUM SERPL-SCNC: 4.1 MMOL/L (ref 3.6–5.5)
PROT SERPL-MCNC: 6.5 G/DL (ref 6–8.2)
PROTHROMBIN TIME: 14.3 SEC (ref 12–14.6)
RBC # BLD AUTO: 5.31 M/UL (ref 4.2–5.4)
SODIUM SERPL-SCNC: 141 MMOL/L (ref 135–145)
TSH SERPL DL<=0.005 MIU/L-ACNC: 1.31 UIU/ML (ref 0.38–5.33)
WBC # BLD AUTO: 6.5 K/UL (ref 4.8–10.8)

## 2019-09-03 PROCEDURE — 94760 N-INVAS EAR/PLS OXIMETRY 1: CPT

## 2019-09-03 PROCEDURE — 770020 HCHG ROOM/CARE - TELE (206)

## 2019-09-03 PROCEDURE — 99407 BEHAV CHNG SMOKING > 10 MIN: CPT | Performed by: INTERNAL MEDICINE

## 2019-09-03 PROCEDURE — 80053 COMPREHEN METABOLIC PANEL: CPT

## 2019-09-03 PROCEDURE — 83036 HEMOGLOBIN GLYCOSYLATED A1C: CPT

## 2019-09-03 PROCEDURE — 70450 CT HEAD/BRAIN W/O DYE: CPT

## 2019-09-03 PROCEDURE — 700102 HCHG RX REV CODE 250 W/ 637 OVERRIDE(OP): Performed by: INTERNAL MEDICINE

## 2019-09-03 PROCEDURE — 99285 EMERGENCY DEPT VISIT HI MDM: CPT

## 2019-09-03 PROCEDURE — 99223 1ST HOSP IP/OBS HIGH 75: CPT | Mod: 25,AI | Performed by: INTERNAL MEDICINE

## 2019-09-03 PROCEDURE — 85610 PROTHROMBIN TIME: CPT

## 2019-09-03 PROCEDURE — 93926 LOWER EXTREMITY STUDY: CPT | Mod: LT

## 2019-09-03 PROCEDURE — 93880 EXTRACRANIAL BILAT STUDY: CPT | Mod: 26 | Performed by: INTERNAL MEDICINE

## 2019-09-03 PROCEDURE — 93306 TTE W/DOPPLER COMPLETE: CPT

## 2019-09-03 PROCEDURE — 700111 HCHG RX REV CODE 636 W/ 250 OVERRIDE (IP): Performed by: INTERNAL MEDICINE

## 2019-09-03 PROCEDURE — 96372 THER/PROPH/DIAG INJ SC/IM: CPT

## 2019-09-03 PROCEDURE — 93880 EXTRACRANIAL BILAT STUDY: CPT

## 2019-09-03 PROCEDURE — A9270 NON-COVERED ITEM OR SERVICE: HCPCS | Performed by: INTERNAL MEDICINE

## 2019-09-03 PROCEDURE — 93926 LOWER EXTREMITY STUDY: CPT | Mod: 26 | Performed by: INTERNAL MEDICINE

## 2019-09-03 PROCEDURE — 93922 UPR/L XTREMITY ART 2 LEVELS: CPT | Mod: 52,LT

## 2019-09-03 PROCEDURE — 85025 COMPLETE CBC W/AUTO DIFF WBC: CPT

## 2019-09-03 PROCEDURE — 36415 COLL VENOUS BLD VENIPUNCTURE: CPT

## 2019-09-03 PROCEDURE — 85730 THROMBOPLASTIN TIME PARTIAL: CPT

## 2019-09-03 PROCEDURE — 93306 TTE W/DOPPLER COMPLETE: CPT | Mod: 26 | Performed by: INTERNAL MEDICINE

## 2019-09-03 PROCEDURE — 84443 ASSAY THYROID STIM HORMONE: CPT

## 2019-09-03 PROCEDURE — 93922 UPR/L XTREMITY ART 2 LEVELS: CPT | Mod: 26 | Performed by: INTERNAL MEDICINE

## 2019-09-03 PROCEDURE — 93005 ELECTROCARDIOGRAM TRACING: CPT | Performed by: EMERGENCY MEDICINE

## 2019-09-03 RX ORDER — ONDANSETRON 2 MG/ML
4 INJECTION INTRAMUSCULAR; INTRAVENOUS EVERY 4 HOURS PRN
Status: DISCONTINUED | OUTPATIENT
Start: 2019-09-03 | End: 2019-09-06 | Stop reason: HOSPADM

## 2019-09-03 RX ORDER — NICOTINE 21 MG/24HR
21 PATCH, TRANSDERMAL 24 HOURS TRANSDERMAL
Status: CANCELLED | OUTPATIENT
Start: 2019-09-04

## 2019-09-03 RX ORDER — BISACODYL 10 MG
10 SUPPOSITORY, RECTAL RECTAL
Status: CANCELLED | OUTPATIENT
Start: 2019-09-03

## 2019-09-03 RX ORDER — POLYETHYLENE GLYCOL 3350 17 G/17G
1 POWDER, FOR SOLUTION ORAL
Status: CANCELLED | OUTPATIENT
Start: 2019-09-03

## 2019-09-03 RX ORDER — ONDANSETRON 4 MG/1
4 TABLET, ORALLY DISINTEGRATING ORAL EVERY 4 HOURS PRN
Status: CANCELLED | OUTPATIENT
Start: 2019-09-03

## 2019-09-03 RX ORDER — ALBUTEROL SULFATE 90 UG/1
2 AEROSOL, METERED RESPIRATORY (INHALATION) EVERY 6 HOURS PRN
Status: DISCONTINUED | OUTPATIENT
Start: 2019-09-03 | End: 2019-09-06 | Stop reason: HOSPADM

## 2019-09-03 RX ORDER — HEPARIN SODIUM 5000 [USP'U]/100ML
INJECTION, SOLUTION INTRAVENOUS CONTINUOUS
Status: DISCONTINUED | OUTPATIENT
Start: 2019-09-03 | End: 2019-09-05

## 2019-09-03 RX ORDER — ALBUTEROL SULFATE 90 UG/1
2 AEROSOL, METERED RESPIRATORY (INHALATION) EVERY 6 HOURS PRN
Status: CANCELLED | OUTPATIENT
Start: 2019-09-03

## 2019-09-03 RX ORDER — ONDANSETRON 4 MG/1
4 TABLET, ORALLY DISINTEGRATING ORAL EVERY 4 HOURS PRN
Status: DISCONTINUED | OUTPATIENT
Start: 2019-09-03 | End: 2019-09-06 | Stop reason: HOSPADM

## 2019-09-03 RX ORDER — ASPIRIN 325 MG
325 TABLET ORAL DAILY
Status: CANCELLED | OUTPATIENT
Start: 2019-09-04

## 2019-09-03 RX ORDER — ACETAMINOPHEN 325 MG/1
650 TABLET ORAL EVERY 6 HOURS PRN
Status: DISCONTINUED | OUTPATIENT
Start: 2019-09-03 | End: 2019-09-06 | Stop reason: HOSPADM

## 2019-09-03 RX ORDER — BISACODYL 10 MG
10 SUPPOSITORY, RECTAL RECTAL
Status: DISCONTINUED | OUTPATIENT
Start: 2019-09-03 | End: 2019-09-06 | Stop reason: HOSPADM

## 2019-09-03 RX ORDER — CHLORAL HYDRATE 500 MG
1000 CAPSULE ORAL DAILY
Status: CANCELLED | OUTPATIENT
Start: 2019-09-04

## 2019-09-03 RX ORDER — POLYETHYLENE GLYCOL 3350 17 G/17G
1 POWDER, FOR SOLUTION ORAL
Status: DISCONTINUED | OUTPATIENT
Start: 2019-09-03 | End: 2019-09-06 | Stop reason: HOSPADM

## 2019-09-03 RX ORDER — ONDANSETRON 2 MG/ML
4 INJECTION INTRAMUSCULAR; INTRAVENOUS EVERY 4 HOURS PRN
Status: CANCELLED | OUTPATIENT
Start: 2019-09-03

## 2019-09-03 RX ORDER — LISINOPRIL 10 MG/1
10 TABLET ORAL DAILY
Status: ON HOLD | COMMUNITY
End: 2019-09-25

## 2019-09-03 RX ORDER — NICOTINE 21 MG/24HR
21 PATCH, TRANSDERMAL 24 HOURS TRANSDERMAL
Status: DISCONTINUED | OUTPATIENT
Start: 2019-09-03 | End: 2019-09-06 | Stop reason: HOSPADM

## 2019-09-03 RX ORDER — LISINOPRIL 5 MG/1
10 TABLET ORAL DAILY
Status: DISCONTINUED | OUTPATIENT
Start: 2019-09-03 | End: 2019-09-05

## 2019-09-03 RX ORDER — ACETAMINOPHEN 325 MG/1
650 TABLET ORAL EVERY 6 HOURS PRN
Status: CANCELLED | OUTPATIENT
Start: 2019-09-03

## 2019-09-03 RX ORDER — AMOXICILLIN 250 MG
2 CAPSULE ORAL 2 TIMES DAILY
Status: CANCELLED | OUTPATIENT
Start: 2019-09-04

## 2019-09-03 RX ORDER — ASPIRIN 325 MG
325 TABLET ORAL DAILY
Status: DISCONTINUED | OUTPATIENT
Start: 2019-09-03 | End: 2019-09-04

## 2019-09-03 RX ORDER — CHLORAL HYDRATE 500 MG
1000 CAPSULE ORAL DAILY
Status: DISCONTINUED | OUTPATIENT
Start: 2019-09-03 | End: 2019-09-04

## 2019-09-03 RX ORDER — ASPIRIN 325 MG
650 TABLET ORAL EVERY 6 HOURS PRN
Status: ON HOLD | COMMUNITY
End: 2019-09-04

## 2019-09-03 RX ORDER — LISINOPRIL 5 MG/1
10 TABLET ORAL DAILY
Status: CANCELLED | OUTPATIENT
Start: 2019-09-04

## 2019-09-03 RX ORDER — HEPARIN SODIUM 1000 [USP'U]/ML
2600 INJECTION, SOLUTION INTRAVENOUS; SUBCUTANEOUS PRN
Status: DISCONTINUED | OUTPATIENT
Start: 2019-09-03 | End: 2019-09-05

## 2019-09-03 RX ORDER — AMOXICILLIN 250 MG
2 CAPSULE ORAL 2 TIMES DAILY
Status: DISCONTINUED | OUTPATIENT
Start: 2019-09-03 | End: 2019-09-06 | Stop reason: HOSPADM

## 2019-09-03 RX ORDER — HEPARIN SODIUM 1000 [USP'U]/ML
5000 INJECTION, SOLUTION INTRAVENOUS; SUBCUTANEOUS ONCE
Status: COMPLETED | OUTPATIENT
Start: 2019-09-03 | End: 2019-09-03

## 2019-09-03 RX ADMIN — OMEGA-3 FATTY ACIDS CAP 1000 MG 1000 MG: 1000 CAP at 14:00

## 2019-09-03 RX ADMIN — HEPARIN SODIUM 1050 UNITS/HR: 5000 INJECTION, SOLUTION INTRAVENOUS at 18:42

## 2019-09-03 RX ADMIN — ENOXAPARIN SODIUM 40 MG: 100 INJECTION SUBCUTANEOUS at 14:00

## 2019-09-03 RX ADMIN — ASPIRIN 325 MG ORAL TABLET 325 MG: 325 PILL ORAL at 14:00

## 2019-09-03 RX ADMIN — HEPARIN SODIUM 5000 UNITS: 1000 INJECTION INTRAVENOUS; SUBCUTANEOUS at 18:40

## 2019-09-03 RX ADMIN — SENNOSIDES AND DOCUSATE SODIUM 2 TABLET: 8.6; 5 TABLET ORAL at 17:17

## 2019-09-03 ASSESSMENT — ENCOUNTER SYMPTOMS
DIARRHEA: 0
VOMITING: 0
SPEECH CHANGE: 0
HEADACHES: 0
WEAKNESS: 0
SINUS PAIN: 0
FOCAL WEAKNESS: 1
FEVER: 0
TREMORS: 0
STRIDOR: 0
COUGH: 0
DEPRESSION: 0
WHEEZING: 0
SHORTNESS OF BREATH: 0
PALPITATIONS: 0
TINGLING: 1
ABDOMINAL PAIN: 0
NERVOUS/ANXIOUS: 0
HEMOPTYSIS: 0
MYALGIAS: 0
INSOMNIA: 0
CHILLS: 0
LOSS OF CONSCIOUSNESS: 0
DIAPHORESIS: 0
CONSTIPATION: 0
ORTHOPNEA: 0
NAUSEA: 0
DIZZINESS: 0
SENSORY CHANGE: 1

## 2019-09-03 ASSESSMENT — COGNITIVE AND FUNCTIONAL STATUS - GENERAL
MOBILITY SCORE: 23
SUGGESTED CMS G CODE MODIFIER MOBILITY: CI
DAILY ACTIVITIY SCORE: 24
MOVING FROM LYING ON BACK TO SITTING ON SIDE OF FLAT BED: A LITTLE
SUGGESTED CMS G CODE MODIFIER DAILY ACTIVITY: CH

## 2019-09-03 ASSESSMENT — LIFESTYLE VARIABLES
ALCOHOL_USE: NO
DOES PATIENT WANT TO STOP DRINKING: NO
TOTAL SCORE: 0
TOTAL SCORE: 0
HAVE PEOPLE ANNOYED YOU BY CRITICIZING YOUR DRINKING: NO
EVER HAD A DRINK FIRST THING IN THE MORNING TO STEADY YOUR NERVES TO GET RID OF A HANGOVER: NO
HAVE YOU EVER FELT YOU SHOULD CUT DOWN ON YOUR DRINKING: NO
ON A TYPICAL DAY WHEN YOU DRINK ALCOHOL HOW MANY DRINKS DO YOU HAVE: 0
EVER FELT BAD OR GUILTY ABOUT YOUR DRINKING: NO
EVER_SMOKED: YES
CONSUMPTION TOTAL: NEGATIVE
HOW MANY TIMES IN THE PAST YEAR HAVE YOU HAD 5 OR MORE DRINKS IN A DAY: 0
AVERAGE NUMBER OF DAYS PER WEEK YOU HAVE A DRINK CONTAINING ALCOHOL: 0
TOTAL SCORE: 0

## 2019-09-03 ASSESSMENT — COPD QUESTIONNAIRES
DURING THE PAST 4 WEEKS HOW MUCH DID YOU FEEL SHORT OF BREATH: SOME OF THE TIME
DO YOU EVER COUGH UP ANY MUCUS OR PHLEGM?: NO/ONLY WITH OCCASIONAL COLDS OR INFECTIONS
IN THE PAST 12 MONTHS DO YOU DO LESS THAN YOU USED TO BECAUSE OF YOUR BREATHING PROBLEMS: DISAGREE/UNSURE
COPD SCREENING SCORE: 5
HAVE YOU SMOKED AT LEAST 100 CIGARETTES IN YOUR ENTIRE LIFE: YES

## 2019-09-03 NOTE — ASSESSMENT & PLAN NOTE
Appears consistent with acute on chroinc vascular disease. Normal echo. cta of aorta is consistent with this. Vascular surgery will see her as an outpatient in follow up. No emergent ischemia noted on exam. Continue anticoagulation

## 2019-09-03 NOTE — ASSESSMENT & PLAN NOTE
Poor control. increase lisinopril, no need for permissive hypertension as symptoms are present for 4 days

## 2019-09-03 NOTE — CARE PLAN
Problem: Communication  Goal: The ability to communicate needs accurately and effectively will improve  Outcome: PROGRESSING AS EXPECTED  Note:   A&Ox4, able to make needs known, clear speech.      Problem: Safety  Goal: Will remain free from injury  Outcome: PROGRESSING AS EXPECTED  Note:   LLE numbness and tingling, able to walk, uses cane at base line. No recent falls per pt. Alarms on for safety, call light with in reach.      Problem: Venous Thromboembolism (VTW)/Deep Vein Thrombosis (DVT) Prevention:  Goal: Patient will participate in Venous Thrombosis (VTE)/Deep Vein Thrombosis (DVT)Prevention Measures  Outcome: PROGRESSING AS EXPECTED  Note:   Lovenox and ASA given.      Problem: Bowel/Gastric:  Goal: Normal bowel function is maintained or improved  Outcome: PROGRESSING AS EXPECTED  Note:   LBM: 9/3, good appetite. Passed dysphagia screening.      Problem: Knowledge Deficit  Goal: Knowledge of disease process/condition, treatment plan, diagnostic tests, and medications will improve  Outcome: PROGRESSING AS EXPECTED  Note:   Pt educated on POC, awaiting ECHO, MRI of head, Carotid Us.

## 2019-09-03 NOTE — ED NOTES
Med rec updated and complete  Allergies reviewed  Interviewed pt with granddaughter at bedside with permission from pt.  Pt reports that she took an antibiotic (Thinks it was AMOXICILLIN) from [place] about 2 weeks ago, but only took it for 4 days, 1 tablet once a day.

## 2019-09-03 NOTE — PROGRESS NOTES
Telemetry Shift Summary     Rhythm NSR  HR Range 80's  Ectopy n/a  Measurements 0.16/0.08/0.38           Normal Values  Rhythm SR  HR Range    Measurements 0.12-0.20 / 0.06-0.10  / 0.30-0.52

## 2019-09-03 NOTE — PROGRESS NOTES
Pt admitted to 305. Supportive granddaughter at bedside. C/o LLE numbness and tingling, LLE cool to touch, denies numbness or tingling in RLE, RLE warm, able to walk into room from stretcher with cane (from home) and SBA. No pain noted. VSS, room air. Good appetite, pt passed dysphagia screening, lunch provided. Admission completed, pt A&Ox4 clear speech. Alarms on for safety, call light with in reach.       2 RN skin check complete with Krystin.  Devices in place PIV in LAC.  Skin assessed under devices WNL.  No pressure injuries found.
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
## 2019-09-03 NOTE — H&P
Hospital Medicine History & Physical Note    Date of Service  9/3/2019    Primary Care Physician  Idalia Cortez M.D.    Consultants  none    Code Status  full    Chief Complaint  Left foot numbness and tingling for 4 days    History of Presenting Illness  74 y.o. female who presented 9/3/2019 with known tobacco dependence who presents with left foot tingling and numbness for 4 days. She did not come to the hospital or seek medical attention due to fear of having to stay at the hospital. She has been ambulating with a cane for the last 4 days due to left leg weakness with walking as well. Last night she had some left arm numbness that resolved after a few minutes and her left arm felt heavy. Her daughter also noted some left mouth drooping that also resolved last night after a few minutes. She denies any headache, cough, rash, diarrhea, speech difficulty or pain in her extremities. She does smoke 10 cigarettes daily and has smoked for over 50 years.    Review of Systems  Review of Systems   Constitutional: Negative for chills, diaphoresis, fever and malaise/fatigue.   HENT: Negative for congestion, hearing loss and sinus pain.    Respiratory: Negative for cough, hemoptysis, shortness of breath, wheezing and stridor.    Cardiovascular: Negative for chest pain, palpitations, orthopnea and leg swelling.   Gastrointestinal: Negative for abdominal pain, constipation, diarrhea, nausea and vomiting.   Genitourinary: Negative for dysuria, frequency, hematuria and urgency.   Musculoskeletal: Negative for myalgias.   Skin: Negative for itching and rash.   Neurological: Positive for tingling, sensory change and focal weakness. Negative for dizziness, tremors, speech change, loss of consciousness, weakness and headaches.   Endo/Heme/Allergies: Negative for environmental allergies.   Psychiatric/Behavioral: Negative for depression. The patient is not nervous/anxious and does not have insomnia.        Past Medical History    has a past medical history of Cancer (HCC) (1978), COPD, Hernia of unspecified site of abdominal cavity without mention of obstruction or gangrene (2011), Inguinal hernia, and Kidney stones.    Surgical History   has a past surgical history that includes hernia repair; thyroidectomy; other (1983); and inguinal hernia repair (3/28/2011).     Family History  family history includes Cancer in her mother; Heart Attack in her father; Heart Disease in her father; Stroke in her father.     Social History   reports that she has been smoking cigarettes. She has been smoking about 0.50 packs per day. She has never used smokeless tobacco. She reports that she does not drink alcohol or use drugs.    Allergies  Allergies   Allergen Reactions   • Lactose Diarrhea     Lactose intolerence   • Morphine Itching     Hallucinations, altered mentations       Medications  Prior to Admission Medications   Prescriptions Last Dose Informant Patient Reported? Taking?   AMOXICILLIN PO 8/23/2019 at [facility] Patient Yes Yes   Sig: Take 1 Tab by mouth every day. Pt received in [place], pt started on 8/20/2019 for 4 days.   B Complex Vitamins (B COMPLEX PO) 9/2/2019 at 30 Patient Yes No   Sig: Take 1 Tab by mouth every day.   Multiple Vitamin (MULTIVITAMIN PO) 9/2/2019 at 30 Patient Yes No   Sig: Take 1 Tab by mouth every day.   Omega-3 Fatty Acids (FISH OIL PO) 9/2/2019 at 30 Patient Yes No   Sig: Take 1 Cap by mouth every day.   albuterol 108 (90 Base) MCG/ACT Aero Soln inhalation aerosol 9/3/2019 at 0730 Patient No No   Sig: Inhale 2 Puffs by mouth every 6 hours as needed for Shortness of Breath.   aspirin (ASA) 325 MG Tab 9/3/2019 at 0730 Patient Yes Yes   Sig: Take 650 mg by mouth every 6 hours as needed for Mild Pain.   aspirin EC (ECOTRIN) 81 MG Tablet Delayed Response 9/2/2019 at 0730 Patient Yes No   Sig: Take 81 mg by mouth every day.   lisinopril (PRINIVIL) 10 MG Tab 9/3/2019 at 0830 Patient Yes Yes   Sig: Take 10 mg by mouth every  day.      Facility-Administered Medications: None       Physical Exam  Temp:  [37 °C (98.6 °F)] 37 °C (98.6 °F)  Pulse:  [88-94] 88  Resp:  [18] 18  BP: (134-152)/(80-98) 134/80  SpO2:  [92 %] 92 %    Physical Exam   Constitutional: She is oriented to person, place, and time. No distress.   HENT:   Mouth/Throat: Oropharynx is clear and moist.   Eyes: Conjunctivae are normal. No scleral icterus.   Neck: Neck supple. No tracheal deviation present.   Cardiovascular: Normal rate and regular rhythm. PMI is displaced.   No murmur heard.  Pulses:       Dorsalis pedis pulses are 2+ on the right side, and 0 on the left side.   Pulmonary/Chest: Effort normal and breath sounds normal. No stridor. No respiratory distress.   Musculoskeletal: She exhibits no edema.   Neurological: She is alert and oriented to person, place, and time. Coordination normal.   Patient ambulates steadily with a cane  Strength is 5/5 in both upper and lower extremities, left foot sensation is diminished   Skin: Skin is warm and dry. No rash noted. She is not diaphoretic. No erythema.   Psychiatric: Her behavior is normal.   Nursing note and vitals reviewed.      Laboratory:  Recent Labs     09/03/19  1044   WBC 6.5   RBC 5.31   HEMOGLOBIN 15.5   HEMATOCRIT 46.0   MCV 86.6   MCH 29.2   MCHC 33.7   RDW 43.7   PLATELETCT 163*   MPV 9.8     Recent Labs     09/03/19  1044   SODIUM 141   POTASSIUM 4.1   CHLORIDE 104   CO2 24   GLUCOSE 163*   BUN 15   CREATININE 0.78   CALCIUM 9.0     Recent Labs     09/03/19  1044   ALTSGPT 25   ASTSGOT 23   ALKPHOSPHAT 76   TBILIRUBIN 0.7   GLUCOSE 163*         No results for input(s): NTPROBNP in the last 72 hours.      No results for input(s): TROPONINT in the last 72 hours.    Urinalysis:    No results found     Imaging:  CT-HEAD W/O   Final Result         1.  No acute intracranial findings. No evidence of acute hemorrhage or mass lesion.      2.  Prominent ventricle size compared to parenchymal volume loss. Correlate  for normal pressure hydrocephalus.      3.  White matter lucencies most consistent with chronic small vessel ischemic change.               EC-ECHOCARDIOGRAM COMPLETE W/O CONT    (Results Pending)   US-CAROTID DOPPLER BILAT    (Results Pending)   MR-BRAIN-W/O    (Results Pending)   US-EXTREMITY ARTERY LOWER UNILAT W/INDERJIT (COMBO) LEFT    (Results Pending)         Assessment/Plan:  I anticipate this patient will require at least two midnights for appropriate medical management, necessitating inpatient admission.    Numbness of left foot  Assessment & Plan  Patient has diminished left leg pulses compared to the right, will check INDERJIT on the left to look for stenosis especially given her tobacco history  Will have PT/OT see the patient as she is currently needing a cane to ambulate  Will check MRI to rule out stroke    COPD- (present on admission)  Assessment & Plan  Respiratory therapy with oxygen as needed    Essential hypertension- (present on admission)  Assessment & Plan  Monitor blood pressure and continue lisinopril, no need for permissive hypertension as symptoms are present for 4 days    Tobacco abuse- (present on admission)  Assessment & Plan  I spent 13 minutes educating  The patient on the importance of tobacco cessation and risk of stroke, heart disease and peripheral vascular disease worse with smoking. I offered a nicoderm patch but she had nightmares previously on this and declines at this time.    Addendum: Dr. Babin called with arterial study results and the patient has an occluded left leg artery, I called Dr. Wren from vascular surgery, will start heparin drip and he will see the patient here  The patient will need close monitoring of her low platelets and aPTT while on heparin drip  She is at increased risk for bleed on heparin due to her low platelets.  She will need more vascular studies as mentioned above with ct angiogram but only after MRI of the brain has been completed and only if stroke is  ruled out.    VTE prophylaxis: lovenox

## 2019-09-04 ENCOUNTER — APPOINTMENT (OUTPATIENT)
Dept: RADIOLOGY | Facility: MEDICAL CENTER | Age: 74
DRG: 065 | End: 2019-09-04
Attending: INTERNAL MEDICINE
Payer: MEDICARE

## 2019-09-04 ENCOUNTER — APPOINTMENT (OUTPATIENT)
Dept: RADIOLOGY | Facility: MEDICAL CENTER | Age: 74
DRG: 065 | End: 2019-09-04
Attending: SURGERY
Payer: MEDICARE

## 2019-09-04 DIAGNOSIS — J44.9 CHRONIC OBSTRUCTIVE PULMONARY DISEASE, UNSPECIFIED COPD TYPE (HCC): ICD-10-CM

## 2019-09-04 PROBLEM — I63.9 CVA (CEREBRAL VASCULAR ACCIDENT) (HCC): Status: ACTIVE | Noted: 2019-09-04

## 2019-09-04 PROBLEM — I73.9 PVD (PERIPHERAL VASCULAR DISEASE) (HCC): Status: ACTIVE | Noted: 2019-09-03

## 2019-09-04 LAB
APTT PPP: 106.4 SEC (ref 24.7–36)
APTT PPP: 55.5 SEC (ref 24.7–36)
APTT PPP: 83.9 SEC (ref 24.7–36)
CHOLEST SERPL-MCNC: 175 MG/DL (ref 100–199)
EST. AVERAGE GLUCOSE BLD GHB EST-MCNC: 126 MG/DL
HBA1C MFR BLD: 6 % (ref 0–5.6)
HDLC SERPL-MCNC: 74 MG/DL
LDLC SERPL CALC-MCNC: 90 MG/DL
TRIGL SERPL-MCNC: 55 MG/DL (ref 0–149)

## 2019-09-04 PROCEDURE — 75635 CT ANGIO ABDOMINAL ARTERIES: CPT

## 2019-09-04 PROCEDURE — 80061 LIPID PANEL: CPT

## 2019-09-04 PROCEDURE — A9270 NON-COVERED ITEM OR SERVICE: HCPCS | Performed by: INTERNAL MEDICINE

## 2019-09-04 PROCEDURE — 97162 PT EVAL MOD COMPLEX 30 MIN: CPT

## 2019-09-04 PROCEDURE — 770020 HCHG ROOM/CARE - TELE (206)

## 2019-09-04 PROCEDURE — 700102 HCHG RX REV CODE 250 W/ 637 OVERRIDE(OP): Performed by: INTERNAL MEDICINE

## 2019-09-04 PROCEDURE — A9270 NON-COVERED ITEM OR SERVICE: HCPCS | Performed by: HOSPITALIST

## 2019-09-04 PROCEDURE — 85730 THROMBOPLASTIN TIME PARTIAL: CPT

## 2019-09-04 PROCEDURE — 97535 SELF CARE MNGMENT TRAINING: CPT

## 2019-09-04 PROCEDURE — 97166 OT EVAL MOD COMPLEX 45 MIN: CPT

## 2019-09-04 PROCEDURE — 700117 HCHG RX CONTRAST REV CODE 255: Performed by: HOSPITALIST

## 2019-09-04 PROCEDURE — 700102 HCHG RX REV CODE 250 W/ 637 OVERRIDE(OP): Performed by: HOSPITALIST

## 2019-09-04 PROCEDURE — 97161 PT EVAL LOW COMPLEX 20 MIN: CPT

## 2019-09-04 PROCEDURE — 99233 SBSQ HOSP IP/OBS HIGH 50: CPT | Performed by: HOSPITALIST

## 2019-09-04 PROCEDURE — 70551 MRI BRAIN STEM W/O DYE: CPT

## 2019-09-04 RX ORDER — ATORVASTATIN CALCIUM 40 MG/1
80 TABLET, FILM COATED ORAL EVERY EVENING
Status: DISCONTINUED | OUTPATIENT
Start: 2019-09-04 | End: 2019-09-06 | Stop reason: HOSPADM

## 2019-09-04 RX ORDER — ATORVASTATIN CALCIUM 10 MG/1
10 TABLET, FILM COATED ORAL EVERY EVENING
Status: DISCONTINUED | OUTPATIENT
Start: 2019-09-04 | End: 2019-09-04

## 2019-09-04 RX ORDER — ATORVASTATIN CALCIUM 80 MG/1
80 TABLET, FILM COATED ORAL EVERY EVENING
Qty: 30 TAB | Refills: 3 | Status: SHIPPED | OUTPATIENT
Start: 2019-09-04 | End: 2019-09-06

## 2019-09-04 RX ADMIN — SENNOSIDES AND DOCUSATE SODIUM 2 TABLET: 8.6; 5 TABLET ORAL at 05:09

## 2019-09-04 RX ADMIN — IOHEXOL 25 ML: 350 INJECTION, SOLUTION INTRAVENOUS at 13:30

## 2019-09-04 RX ADMIN — OMEGA-3 FATTY ACIDS CAP 1000 MG 1000 MG: 1000 CAP at 05:09

## 2019-09-04 RX ADMIN — IOHEXOL 100 ML: 350 INJECTION, SOLUTION INTRAVENOUS at 13:24

## 2019-09-04 RX ADMIN — ASPIRIN 325 MG ORAL TABLET 325 MG: 325 PILL ORAL at 05:09

## 2019-09-04 RX ADMIN — ACETAMINOPHEN 650 MG: 325 TABLET, FILM COATED ORAL at 23:30

## 2019-09-04 RX ADMIN — LISINOPRIL 10 MG: 5 TABLET ORAL at 05:09

## 2019-09-04 RX ADMIN — ATORVASTATIN CALCIUM 80 MG: 40 TABLET, FILM COATED ORAL at 17:49

## 2019-09-04 ASSESSMENT — ENCOUNTER SYMPTOMS
EYE PAIN: 0
HEADACHES: 0
VOMITING: 0
NAUSEA: 0
DIZZINESS: 0
PALPITATIONS: 0
DEPRESSION: 0
SORE THROAT: 0
CHILLS: 0
NECK PAIN: 0
SHORTNESS OF BREATH: 0
FEVER: 0
COUGH: 0
ABDOMINAL PAIN: 0
BACK PAIN: 0
BLURRED VISION: 0
TINGLING: 0
INSOMNIA: 0

## 2019-09-04 ASSESSMENT — COGNITIVE AND FUNCTIONAL STATUS - GENERAL
TOILETING: A LITTLE
DAILY ACTIVITIY SCORE: 16
MOBILITY SCORE: 17
SUGGESTED CMS G CODE MODIFIER MOBILITY: CK
STANDING UP FROM CHAIR USING ARMS: A LITTLE
EATING MEALS: A LITTLE
TURNING FROM BACK TO SIDE WHILE IN FLAT BAD: A LITTLE
SUGGESTED CMS G CODE MODIFIER DAILY ACTIVITY: CK
DRESSING REGULAR LOWER BODY CLOTHING: A LOT
HELP NEEDED FOR BATHING: A LOT
MOVING TO AND FROM BED TO CHAIR: A LITTLE
CLIMB 3 TO 5 STEPS WITH RAILING: A LOT
WALKING IN HOSPITAL ROOM: A LITTLE
PERSONAL GROOMING: A LITTLE
DRESSING REGULAR UPPER BODY CLOTHING: A LITTLE
MOVING FROM LYING ON BACK TO SITTING ON SIDE OF FLAT BED: A LITTLE

## 2019-09-04 ASSESSMENT — GAIT ASSESSMENTS
GAIT LEVEL OF ASSIST: MINIMAL ASSIST
DISTANCE (FEET): 45
ASSISTIVE DEVICE: FRONT WHEEL WALKER

## 2019-09-04 ASSESSMENT — ACTIVITIES OF DAILY LIVING (ADL): TOILETING: INDEPENDENT

## 2019-09-04 NOTE — ASSESSMENT & PLAN NOTE
She evolved some deficits yesterday. Now stable left foot deficits on exam. Continue statin. Continue anticoagulation as Appears embolic. Therapy ongoing. If she develops more symptoms or is worsening i have instructed RN to get a stat CT. Low risk for hemorrhagic transformation though.  She has been referred to inpatient rehab for therapy.   - NOTE: I do not see any evidence for NPH symptoms in this patient. No incontinence. Her gait difficulty is from foot drop. No dementia. At this time I do not see a reason for a workup.

## 2019-09-04 NOTE — PROGRESS NOTES
MD Ceja notified of heparin drip being started before baseline aPTT and PT were drawn. Order received to draw labs now. Will continue to monitor.

## 2019-09-04 NOTE — PROGRESS NOTES
MD Parker notified of high BP. Order received to administer patient's morning scheduled BP medication. Will continue to monitor.

## 2019-09-04 NOTE — PROGRESS NOTES
Vascular Surgery     MRI demonstrated an acute stroke.     CTA - appears to be chronic disease     Recommend -     Defer management of stroke to hospitalist     Will follow-up my office 2-4 weeks to follow PAD     Milad Wren MD

## 2019-09-04 NOTE — PROGRESS NOTES
Critical PTT lab result was 106.4 at 0428. This value is within the defined limits of the Heparin Weight Based Protocol ordered by the physician for this patient. Heparin Weight Based Protocol will be followed for any adjustments, physician was not notified per protocol instructions.

## 2019-09-04 NOTE — PROGRESS NOTES
Hospital Medicine Daily Progress Note    Date of Service  9/4/2019    Chief Complaint  74 y.o. female admitted 9/3/2019 with left lower leg numbness.     Hospital Course    She was found to have evidence for a likely acute on chronic arterial occlusion of the superficial femoral and popliteal arteries. She was seen be vascular surgery and anticoagulated. She was not noted to have cyanosis or resting pain in her leg. A further workup revealed what appear to be embolic right frontal CVAs.        Interval Problem Update  Hypertensive over night  She remains on a heparin gtt.   MRI of the brain is positive for a new CVA. I started her on statin therapy and given she is on a heparin gtt i have stopped asa and fish oil. A CTA of the aorta is still pending.   I reviewed her other imaging.      Vascular Laboratory   CONCLUSIONS   Mild bilateral internal carotid artery stenosis (<50%).     Echocardiography Laboratory    CONCLUSIONS  Normal right and left ventricular size and function.   Mild concentric left ventricular hypertrophy.  Enlarged right atrium.  Mild to moderate tricuspid regurgitation.  Right heart pressures are normal.     No prior study is available for comparison.      Arterial Duplex Report     Vascular Laboratory   CONCLUSIONS   Outflow disease in left lower extremity as depicted below:   1) The left femoral artery is occluded at the mid-distal level with    collateral channels identified.    2) The left popliteal artery is also occluded. Reconstitution of flow    visualized within the proximal tibial arteries.   3) No flow identified within the distal posterior tibial artery, suggesting    more proximal occlusion.     Vascular Laboratory   Conclusions   Ankle-brachial index of left lower extremity is severely reduced.    suggestive of outflow disease.   Left arterial duplex scan was performed in accordance with lower extremity    arterial evaluation protocol - see separate  report.  Consultants/Specialty  Vascular surgery- I discussed with them today.     Code Status  full    Disposition  Home on anticoagulation. Trying to arrange this but nowacs too expensive. Will need lovenox/coumadin.     Review of Systems  Review of Systems   Constitutional: Negative for chills and fever.   HENT: Negative for sore throat.    Eyes: Negative for blurred vision and pain.   Respiratory: Negative for cough and shortness of breath.    Cardiovascular: Negative for chest pain and palpitations.   Gastrointestinal: Negative for abdominal pain, nausea and vomiting.   Genitourinary: Negative for dysuria and urgency.   Musculoskeletal: Negative for back pain and neck pain.   Skin: Negative for itching and rash.   Neurological: Negative for dizziness, tingling and headaches.   Psychiatric/Behavioral: Negative for depression. The patient does not have insomnia.    All other systems reviewed and are negative.       Physical Exam  Temp:  [36.5 °C (97.7 °F)-37 °C (98.6 °F)] 36.5 °C (97.7 °F)  Pulse:  [65-94] 74  Resp:  [14-20] 18  BP: (134-184)/(79-98) 165/88  SpO2:  [90 %-93 %] 90 %    Physical Exam   Constitutional: She is oriented to person, place, and time. She appears well-developed and well-nourished. No distress.   Patient seen and examined  Discussed plan with RN   NIRAJT:   Right Ear: External ear normal.   Left Ear: External ear normal.   Nose: Nose normal.   Eyes: Conjunctivae are normal. Right eye exhibits no discharge. Left eye exhibits no discharge.   Neck: No JVD present.   Cardiovascular: Regular rhythm and normal heart sounds.   No murmur heard.  Cap refill 2sec  Pulses 2+ throughout     Pulmonary/Chest: Effort normal and breath sounds normal. No stridor. No respiratory distress. She has no wheezes. She has no rales.   Abdominal: Soft. Bowel sounds are normal. She exhibits no distension. There is no tenderness.   Musculoskeletal: She exhibits no edema or tenderness.   Neurological: She is alert and  oriented to person, place, and time.   Skin: Skin is warm and dry. She is not diaphoretic. No erythema.   Normal skin  Color.    Psychiatric: She has a normal mood and affect. Her behavior is normal.   Nursing note and vitals reviewed.      Fluids    Intake/Output Summary (Last 24 hours) at 9/4/2019 0951  Last data filed at 9/4/2019 0700  Gross per 24 hour   Intake 1306.98 ml   Output --   Net 1306.98 ml       Laboratory  Recent Labs     09/03/19  1044   WBC 6.5   RBC 5.31   HEMOGLOBIN 15.5   HEMATOCRIT 46.0   MCV 86.6   MCH 29.2   MCHC 33.7   RDW 43.7   PLATELETCT 163*   MPV 9.8     Recent Labs     09/03/19  1044   SODIUM 141   POTASSIUM 4.1   CHLORIDE 104   CO2 24   GLUCOSE 163*   BUN 15   CREATININE 0.78   CALCIUM 9.0     Recent Labs     09/03/19  1940 09/04/19  0333   APTT >240.0* 106.4*   INR 1.09  --          Recent Labs     09/04/19  0333   TRIGLYCERIDE 55   HDL 74   LDL 90       Imaging  MR-BRAIN-W/O   Final Result      1.  Scattered areas of acute ischemia in the high RIGHT frontal and high RIGHT parietal cortex   2.  No hemorrhage   3.  Moderate atrophy with disproportionate enlargement of the third and lateral ventricles relative to the other CSF containing spaces in a pattern which could indicate normal pressure hydrocephalus in the appropriate clinical setting   4.  Advanced white matter changes      EC-ECHOCARDIOGRAM COMPLETE W/O CONT   Final Result      US-CAROTID DOPPLER BILAT   Final Result      US-INDERJIT SINGLE LEVEL UNILAT LEFT   Final Result      US-EXTREMITY ARTERY LOWER UNILAT LEFT   Final Result      CT-HEAD W/O   Final Result         1.  No acute intracranial findings. No evidence of acute hemorrhage or mass lesion.      2.  Prominent ventricle size compared to parenchymal volume loss. Correlate for normal pressure hydrocephalus.      3.  White matter lucencies most consistent with chronic small vessel ischemic change.               CT-CTA AORTA-RO WITH & W/O-POST PROCESS    (Results Pending)         Assessment/Plan  CVA (cerebral vascular accident) ([facility])  Assessment & Plan  No obvious deficits on exam. Add statin. Continue heparin gtt. Appears embolic. cta pending. Therapy pending.     PVD (peripheral vascular disease) ([facility])  Assessment & Plan  Appears consistent with acute on chroinc vascular disease. Normal echo. cta of aorta is pending. Vascular surgery following. Continue heparin gtt.     COPD- (present on admission)  Assessment & Plan  Respiratory therapy with oxygen as needed    Essential hypertension- (present on admission)  Assessment & Plan  Monitor blood pressure and continue lisinopril, no need for permissive hypertension as symptoms are present for 4 days    Tobacco abuse- (present on admission)  Assessment & Plan  I spent 13 minutes educating  The patient on the importance of tobacco cessation and risk of stroke, heart disease and peripheral vascular disease worse with smoking. I offered a nicoderm patch but she had nightmares previously on this and declines at this time.       VTE prophylaxis: heparin

## 2019-09-04 NOTE — PROGRESS NOTES
Telemetry Shift Summary     Rhythm SR/ST  HR Range 60-80  Ectopy n/a  Measurements 0.20/0.08/0.42           Normal Values  Rhythm SR  HR Range    Measurements 0.12-0.20 / 0.06-0.10  / 0.30-0.52

## 2019-09-04 NOTE — THERAPY
"Physical Therapy Evaluation completed.   Bed Mobility:  Supine to Sit: (NT, pt sitting EOB upon entry)  Transfers: Sit to Stand: Minimal Assist  Gait: Level Of Assist: Minimal Assist with Front-Wheel Walker x 45 ft. + L foot drag and inconsistent step length, mild instability.      Plan of Care: Will benefit from Physical Therapy 7 times per week  Discharge Recommendations: Equipment: Will Continue to Assess for Equipment Needs. Patient can tolerate post-acute therapies at a 5x/week frequency and should be able to tolerate 3 hrs therapy a day.     Pt is a 73 yo female with diagnosis of R frontal and parietal CVA presenting with impaired strength and coordination L LE, more prominent strength deficits distally although L trunk musculature weak with scapular winging noted with increaed lean to the left, L UE for the most part is intact otherwise. Pt also with impaired gait with L foot drag and instability noted, requiring FWW and cueing to ambulate. Pt is not at baseline of independence and is a high fall risk, recommend Acute Rehab for further aggressive therapy to maximize function prior to DC home.     See \"Rehab Therapy-Acute\" Patient Summary Report for complete documentation.     "

## 2019-09-04 NOTE — PROGRESS NOTES
Telemetry Shift Summary    Rhythm SR  HR Range 69-90  Ectopy RPVC  Measurements 0.20/0.08/0.36        Normal Values  Rhythm SR  HR Range    Measurements 0.12-0.20 / 0.06-0.10  / 0.30-0.52

## 2019-09-04 NOTE — DISCHARGE PLANNING
LSW placed call to pts pharmacy. Co-pay for 15mg Xarelto is $633. Pt may have not met her out of pocket expenses. LSW informed pt of this and suggested that between hospital stay and initial co-pay she may meet her deductible. Pt stated she would like to look into additional options. LSW attempted to call insurance member services, was on hold for 10 mins.  WEST updated MD      LSW placed call to pts pharmacy to f/u on Eliquis Rx. Pts co-pay is $450.50. Per pt she can not afford co-pay. WEST updated MD. Pt will stay at hospital to start coumadin

## 2019-09-04 NOTE — CARE PLAN
Problem: Communication  Goal: The ability to communicate needs accurately and effectively will improve  Outcome: PROGRESSING AS EXPECTED  Note:   A&Ox4, able to make needs known, clear speech. Neuro check Q4.     Problem: Safety  Goal: Will remain free from injury  Outcome: PROGRESSING AS EXPECTED  Note:   LLE numbness and tingling, able to walk, uses cane at base line. No recent falls per pt. Alarms on for safety, call light with in reach.      Problem: Venous Thromboembolism (VTW)/Deep Vein Thrombosis (DVT) Prevention:  Goal: Patient will participate in Venous Thrombosis (VTE)/Deep Vein Thrombosis (DVT)Prevention Measures  Outcome: PROGRESSING AS EXPECTED  Note:   Pt is on Heparin gtt 750 u/hr, next ptt at 1630.      Problem: Bowel/Gastric:  Goal: Normal bowel function is maintained or improved  Outcome: PROGRESSING AS EXPECTED  Note:   LBM: 9/3, good appetite.      Problem: Knowledge Deficit  Goal: Knowledge of disease process/condition, treatment plan, diagnostic tests, and medications will improve  Outcome: PROGRESSING AS EXPECTED  Note:   Pt educated on POC, Awaiting MRI head and CTA aorta.

## 2019-09-04 NOTE — CARE PLAN
Problem: Safety  Goal: Will remain free from injury  Outcome: PROGRESSING AS EXPECTED  Note:   Keep call light within reach. Ensure environment is clutter free. Have patient wear treaded socks.      Problem: Knowledge Deficit  Goal: Knowledge of disease process/condition, treatment plan, diagnostic tests, and medications will improve  Outcome: PROGRESSING AS EXPECTED  Note:   Allow time for patient to ask questions. Answer questions to best of ability. Update patient on plan of care.

## 2019-09-04 NOTE — THERAPY
"Occupational Therapy Evaluation completed.   Functional Status:  73 yo female admit with LLE weakness x 4 days, LUE transient weakness and possible eye droop per family member.  Pt normally indep with all ADl's, drives, helps care for grandkids per nursing report.  Pt presents seated EOB.  LLE testing reveals decreased strength and coordination.  Overall pt appears with weakened trunk muscles on L side, with winging of L scapula noted when OT testing UE strength. Pt also leaning/slowly falling posteriorly and to the L when arms not supporting self in sitting. Slightly impaired coordination in L hand compared to R, strength in B arms distal to shoulder appears grossly symmetrical.  Pt required modA for sit to stand from bed, and Phani with FWW to walk to BR.  Toileting with Phani.  Grooming at sink Phani for standing balance.  When walking out of room with FWW, Phani required as pt with increasing weakness in LLE with fatigue, L foot drop becoming more pronounced the further she walked.  Pt is a HIGH fall risk, and not safe to perform ADl's on her own at this time.  She presents with poor insight to her limitations, and the consequences of not having them addressed appropriately.  Pt wanting to go home with family, but would require 24 hour continuous care to prevent falls, and her rehab opportunities would be limited.  Pt strongly encouraged to consider further inpt post acute therapy which is what she needs.  Pt would be able to tolerate 3 hours of therapy 5 days a week and is an excellent Rehab candidate.  OT will follow while in house.   Plan of Care: Will benefit from Occupational Therapy 3 times per week  Discharge Recommendations:  Equipment: Front-Wheel Walker and Will Continue to Assess for Equipment Needs. Post-acute therapy Discharge to a transitional care facility for continued skilled therapy services.    See \"Rehab Therapy-Acute\" Patient Summary Report for complete documentation.    "

## 2019-09-04 NOTE — PROGRESS NOTES
Critical PTT lab result was greather than 240 at 2030. This value is within the defined limits of the Heparin Weight Based Protocol ordered by the physician for this patient. Heparin Weight Based Protocol will be followed for any adjustments, physician was not notified per protocol instructions.

## 2019-09-04 NOTE — CONSULTS
DATE OF CONSULTATION: 9/3/2019     REFERRING PHYSICIAN: MD Brenna.     CONSULTING PHYSICIAN: Milad Wren M.D.      REASON FOR CONSULTATION: PAD      HISTORY OF PRESENT ILLNESS: The patient is a 74-year-old white female who presents to the emergency room with a 4-day history of left lower leg numbness.  Patient reports that she was in her usual state of health until she developed acute onset of numbness in the left leg from essentially the knee down to the foot.  The patient denies any pain associated with that numbness.  She reports that over the past 4 days she has been having difficulty walking due to the numbness in her left foot.  Patient has a history of significant tobacco use and has a history of COPD with multiple cardiovascular risk factors.  Noninvasive arterial studies demonstrated ankle-brachial index on the left leg of 0.5.  There is the suggestion of distal superficial femoral and popliteal artery occlusion.  Collateral flow is noted in the region to suggest perhaps chronic disease.  The patient denies any previous history of claudication although she does not ambulate long distances.  Patient denies chest pain shortness of breath.  Patient denies any other focal neurologic deficits other than the weakness and numbness of her left foot.    PAST MEDICAL HISTORY:  has a past medical history of Cancer (HCC) (1978), COPD, Hernia of unspecified site of abdominal cavity without mention of obstruction or gangrene (2011), Inguinal hernia, and Kidney stones.     PAST SURGICAL HISTORY:  has a past surgical history that includes hernia repair; thyroidectomy; other (1983); and inguinal hernia repair (3/28/2011).     ALLERGIES:   Allergies   Allergen Reactions   • Lactose Diarrhea     Lactose intolerence   • Morphine Itching     Hallucinations, altered mentations        CURRENT MEDICATIONS:   Home Medications     Reviewed by Rose Wasserman R.N. (Registered Nurse) on 09/03/19 at 1351  Med List Status:  Complete   Medication Last Dose Status   albuterol 108 (90 Base) MCG/ACT Aero Soln inhalation aerosol 9/3/2019 Active   AMOXICILLIN PO 8/23/2019 Active   aspirin (ASA) 325 MG Tab 9/3/2019 Active   aspirin EC (ECOTRIN) 81 MG Tablet Delayed Response 9/2/2019 Active   B Complex Vitamins (B COMPLEX PO) 9/2/2019 Active   lisinopril (PRINIVIL) 10 MG Tab 9/3/2019 Active   Multiple Vitamin (MULTIVITAMIN PO) 9/2/2019 Active   Omega-3 Fatty Acids (FISH OIL PO) 9/2/2019 Active                FAMILY HISTORY:   Family History   Problem Relation Age of Onset   • Cancer Mother    • Heart Disease Father    • Stroke Father    • Heart Attack Father         SOCIAL HISTORY:   Social History     Tobacco Use   • Smoking status: Current Every Day Smoker     Packs/day: 0.50     Types: Cigarettes   • Smokeless tobacco: Never Used   • Tobacco comment: [id number]   Substance and Sexual Activity   • Alcohol use: No   • Drug use: No   • Sexual activity: Never       Review of Systems:      Constitutional: Denies fevers, Denies weight changes  Eyes: Denies changes in vision, no eye pain  Ears/Nose/Throat/Mouth: Denies nasal congestion or sore throat   Cardiovascular: Denies chest pain or palpitations.  Respiratory: Denies shortness of breath, cough, and wheezing.  Gastrointestinal/Hepatic: Denies abdominal pain, nausea, vomiting, diarrhea, constipation or GI bleeding   Genitourinary: Denies dysuria or frequency  Musculoskeletal/Rheum: Weakness left leg skin: Denies rash  Neurological: Numbness left foot   psychiatric: denies mood disorder   Endocrine: Celeste thyroid problems  Heme/Oncology/Lymph Nodes: Denies enlarged lymph nodes, denies brusing or known bleeding disorder  All other systems were reviewed and are negative (AMA/CMS criteria)                  PHYSICAL EXAMINATION:       Constitutional:   Well developed, Well nourished, No acute distress  HENMT:  Normocephalic, Atraumatic, Oropharynx moist mucous membranes, No oral exudates, Nose  normal.  No thyromegaly.  Eyes:  EOMI, Conjunctiva normal, No discharge.  Neck:  Normal range of motion, No cervical tenderness,  no JVD.  Cardiovascular:  Normal heart rate, Normal rhythm, No murmurs, No rubs, No gallops.     Extremitites -1+ right pedal pulse, absent left pedal pulse , 2+ femoral pulses , bilateral feet are warm , no mottling , no ulceration   lungs:  Normal breath sounds, breath sounds clear to auscultation bilaterally,  no crackles, no wheezing.   Abdomen: Bowel sounds normal, Soft, No tenderness, No guarding, No rebound, No masses, No hepatosplenomegaly.  Skin: Warm, Dry, No erythema, No rash, no induration.  Neurologic: Alert & oriented x 3, No focal deficits noted, cranial nerves II through X are grossly intact.  Psychiatric: Affect normal, Judgment normal, Mood normal.        LABORATORY VALUES:   Recent Labs     09/03/19  1044   WBC 6.5   RBC 5.31   HEMOGLOBIN 15.5   HEMATOCRIT 46.0   MCV 86.6   MCH 29.2   MCHC 33.7   RDW 43.7   PLATELETCT 163*   MPV 9.8     Recent Labs     09/03/19  1044   SODIUM 141   POTASSIUM 4.1   CHLORIDE 104   CO2 24   GLUCOSE 163*   BUN 15   CREATININE 0.78   CALCIUM 9.0     Recent Labs     09/03/19  1044   ASTSGOT 23   ALTSGPT 25   TBILIRUBIN 0.7   ALKPHOSPHAT 76   GLOBULIN 2.5            IMAGING:   EC-ECHOCARDIOGRAM COMPLETE W/O CONT   Final Result      US-CAROTID DOPPLER BILAT   Final Result      US-INDERJIT SINGLE LEVEL UNILAT LEFT   Final Result      US-EXTREMITY ARTERY LOWER UNILAT LEFT   Final Result      CT-HEAD W/O   Final Result         1.  No acute intracranial findings. No evidence of acute hemorrhage or mass lesion.      2.  Prominent ventricle size compared to parenchymal volume loss. Correlate for normal pressure hydrocephalus.      3.  White matter lucencies most consistent with chronic small vessel ischemic change.               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  In the absence of rest pain, mottling, and with the foot warm and apparently well-perfused along with an ankle-brachial index of 0.5 it would be unlikely that arterial insufficiency is responsible for the numbness she is describing.     Recommendations- Discussing the case with Dr. Ceja, we have elected to proceed with an MRI of the brain to evaluate for possible stroke.  The patient's carotid duplex looked good with minimal disease.  Other etiologies to be considered is lumbar radiculopathy.  I will also obtain a CTA of her aorta with runoff to better characterize the patient's distribution of arterial insufficiency.  We have elected to place her on a heparin drip until further imaging can be performed and we can make a diagnosis.  Follow-up MRI and CTA results.      ____________________________________   Milad Wren M.D.          DD: 9/3/2019   DT: 6:30 PM

## 2019-09-05 ENCOUNTER — PATIENT OUTREACH (OUTPATIENT)
Dept: HEALTH INFORMATION MANAGEMENT | Facility: OTHER | Age: 74
End: 2019-09-05

## 2019-09-05 LAB — APTT PPP: 64.6 SEC (ref 24.7–36)

## 2019-09-05 PROCEDURE — 700111 HCHG RX REV CODE 636 W/ 250 OVERRIDE (IP): Performed by: INTERNAL MEDICINE

## 2019-09-05 PROCEDURE — A9270 NON-COVERED ITEM OR SERVICE: HCPCS | Performed by: INTERNAL MEDICINE

## 2019-09-05 PROCEDURE — 97116 GAIT TRAINING THERAPY: CPT

## 2019-09-05 PROCEDURE — 97535 SELF CARE MNGMENT TRAINING: CPT

## 2019-09-05 PROCEDURE — 770020 HCHG ROOM/CARE - TELE (206)

## 2019-09-05 PROCEDURE — 99233 SBSQ HOSP IP/OBS HIGH 50: CPT | Performed by: HOSPITALIST

## 2019-09-05 PROCEDURE — 97110 THERAPEUTIC EXERCISES: CPT

## 2019-09-05 PROCEDURE — 700102 HCHG RX REV CODE 250 W/ 637 OVERRIDE(OP): Performed by: INTERNAL MEDICINE

## 2019-09-05 PROCEDURE — 700102 HCHG RX REV CODE 250 W/ 637 OVERRIDE(OP): Performed by: HOSPITALIST

## 2019-09-05 PROCEDURE — 97530 THERAPEUTIC ACTIVITIES: CPT

## 2019-09-05 PROCEDURE — 85730 THROMBOPLASTIN TIME PARTIAL: CPT

## 2019-09-05 PROCEDURE — A9270 NON-COVERED ITEM OR SERVICE: HCPCS | Performed by: HOSPITALIST

## 2019-09-05 RX ORDER — WARFARIN SODIUM 5 MG/1
5 TABLET ORAL
Status: DISCONTINUED | OUTPATIENT
Start: 2019-09-05 | End: 2019-09-05

## 2019-09-05 RX ORDER — OXYCODONE HYDROCHLORIDE 5 MG/1
5 TABLET ORAL EVERY 4 HOURS PRN
Status: DISCONTINUED | OUTPATIENT
Start: 2019-09-05 | End: 2019-09-06 | Stop reason: HOSPADM

## 2019-09-05 RX ORDER — LISINOPRIL 20 MG/1
20 TABLET ORAL DAILY
Status: DISCONTINUED | OUTPATIENT
Start: 2019-09-05 | End: 2019-09-06 | Stop reason: HOSPADM

## 2019-09-05 RX ORDER — ATORVASTATIN CALCIUM 40 MG/1
80 TABLET, FILM COATED ORAL EVERY EVENING
Status: CANCELLED | OUTPATIENT
Start: 2019-09-05

## 2019-09-05 RX ORDER — LISINOPRIL 20 MG/1
20 TABLET ORAL DAILY
Status: CANCELLED | OUTPATIENT
Start: 2019-09-06

## 2019-09-05 RX ADMIN — OXYCODONE HYDROCHLORIDE 5 MG: 5 TABLET ORAL at 04:13

## 2019-09-05 RX ADMIN — OXYCODONE HYDROCHLORIDE 5 MG: 5 TABLET ORAL at 11:45

## 2019-09-05 RX ADMIN — HEPARIN SODIUM 750 UNITS/HR: 5000 INJECTION, SOLUTION INTRAVENOUS at 04:14

## 2019-09-05 RX ADMIN — ATORVASTATIN CALCIUM 80 MG: 40 TABLET, FILM COATED ORAL at 17:14

## 2019-09-05 RX ADMIN — OXYCODONE HYDROCHLORIDE 5 MG: 5 TABLET ORAL at 17:14

## 2019-09-05 RX ADMIN — APIXABAN 10 MG: 5 TABLET, FILM COATED ORAL at 17:14

## 2019-09-05 RX ADMIN — APIXABAN 10 MG: 5 TABLET, FILM COATED ORAL at 10:15

## 2019-09-05 RX ADMIN — LISINOPRIL 10 MG: 5 TABLET ORAL at 05:28

## 2019-09-05 RX ADMIN — LISINOPRIL 20 MG: 20 TABLET ORAL at 10:15

## 2019-09-05 ASSESSMENT — COGNITIVE AND FUNCTIONAL STATUS - GENERAL
MOBILITY SCORE: 17
DRESSING REGULAR UPPER BODY CLOTHING: A LITTLE
SUGGESTED CMS G CODE MODIFIER MOBILITY: CK
DAILY ACTIVITIY SCORE: 15
MOVING TO AND FROM BED TO CHAIR: A LITTLE
SUGGESTED CMS G CODE MODIFIER DAILY ACTIVITY: CK
PERSONAL GROOMING: A LITTLE
CLIMB 3 TO 5 STEPS WITH RAILING: A LOT
TURNING FROM BACK TO SIDE WHILE IN FLAT BAD: A LITTLE
WALKING IN HOSPITAL ROOM: A LITTLE
MOVING FROM LYING ON BACK TO SITTING ON SIDE OF FLAT BED: A LITTLE
HELP NEEDED FOR BATHING: A LOT
TOILETING: A LOT
STANDING UP FROM CHAIR USING ARMS: A LITTLE
DRESSING REGULAR LOWER BODY CLOTHING: A LOT
EATING MEALS: A LITTLE

## 2019-09-05 ASSESSMENT — ENCOUNTER SYMPTOMS
DIZZINESS: 0
NAUSEA: 0
FEVER: 0
SORE THROAT: 0
BACK PAIN: 0
DEPRESSION: 0
SHORTNESS OF BREATH: 0
CHILLS: 0
COUGH: 0
FOCAL WEAKNESS: 1
NECK PAIN: 0
EYE PAIN: 0
SENSORY CHANGE: 1
ABDOMINAL PAIN: 0
BLURRED VISION: 0
PALPITATIONS: 0
INSOMNIA: 0
TINGLING: 0

## 2019-09-05 ASSESSMENT — GAIT ASSESSMENTS
GAIT LEVEL OF ASSIST: MINIMAL ASSIST
DISTANCE (FEET): 25
ASSISTIVE DEVICE: FRONT WHEEL WALKER

## 2019-09-05 NOTE — DISCHARGE PLANNING
Physiatry Dr. Bundy recommending pt appropriate for IRF level care. Call out to [facility] liaison requesting consideration for IRF. VM update to [facility] [phone]. Will follow for auth determination.

## 2019-09-05 NOTE — DISCHARGE PLANNING
Aware of PMR referral from Dr. Nunez. Left leg numbness and tingling. MRI clay - scattered areas of acute ischemia in the high RIGHT frontal and high RIGHT parietal cortex. Anticipate post acute therapy needs to assist with successful transition to family home and community. Physiatry Dr. Francisco to consult per protocol. Thank you for the referral.

## 2019-09-05 NOTE — DISCHARGE PLANNING
Pt lives in 1 story home with son-in-law, dtr, and two grandchildren. Pt uses a cane for ambulation. Pt discharged with [facility] during previous admission. Pt uses [facility] in [place]. Pt plan is home after bridging to coumadin.     Care Transition Team Assessment    Information Source  Orientation : Oriented x 4  Information Given By: Patient  Who is responsible for making decisions for patient? : Patient    Readmission Evaluation  Is this a readmission?: No    Elopement Risk  Legal Hold: No  Ambulatory or Self Mobile in Wheelchair: No-Not an Elopement Risk  Elopement Risk: Not at Risk for Elopement    Interdisciplinary Discharge Planning  Does Admitting Nurse Feel This Could be a Complex Discharge?: No  Lives with - Patient's Self Care Capacity: Adult Children  Patient or legal guardian wants to designate a caregiver (see row info): No  Support Systems: Children  Housing / Facility: [address] [facility]""  Do You Take your Prescribed Medications Regularly: Yes  Able to Return to Previous ADL's: Yes  Mobility Issues: Yes  Prior Services: None  Assistance Needed: Unknown at this Time  Durable Medical Equipment: Other - Specify(Cane )    Discharge Preparedness  What is your plan after discharge?: Home with help  What are your discharge supports?: Child(son-in-law, grandchildren)  Prior Functional Level: Ambulatory, Independent with Activities of Daily Living, Independent with Medication Management, Uses Cane    Functional Assesment  Prior Functional Level: Ambulatory, Independent with Activities of Daily Living, Independent with Medication Management, Uses Cane    Finances  Financial Barriers to Discharge: No  Prescription Coverage: Yes    Vision / Hearing Impairment  Vision Impairment : Yes  Right Eye Vision: Impaired, Wears Glasses  Left Eye Vision: Impaired, Wears Glasses  Hearing Impairment : No         Advance Directive  Advance Directive?: None    Domestic Abuse  Have you ever been the victim of abuse or violence?:  No  Physical Abuse or Sexual Abuse: No  Verbal Abuse or Emotional Abuse: No  Possible Abuse Reported to:: Not Applicable    Psychological Assessment  History of Substance Abuse: None  History of Psychiatric Problems: No  Non-compliant with Treatment: No    Discharge Risks or Barriers  Discharge risks or barriers?: No  Patient risk factors: Vulnerable adult    Anticipated Discharge Information  Anticipated discharge disposition: Home  Discharge Address: ([address] [place] [id number])  Discharge Contact Phone Number: ([phone])

## 2019-09-05 NOTE — THERAPY
"Physical Therapy Treatment completed.   Bed Mobility:  Supine to Sit: Supervised  Transfers: Sit to Stand: Minimal Assist  Gait: Level Of Assist: Minimal Assist with Front-Wheel Walker 2 x 25 feet      Plan of Care: Will benefit from Physical Therapy 7 times per week  Discharge Recommendations: Equipment: Will Continue to Assess for Equipment Needs. Post-acute therapy Discharge to a transitional care facility for continued skilled therapy services.   Improved standing and dynamic balance today,main problem is advancing L LE when ambulating  See \"Rehab Therapy-Acute\" Patient Summary Report for complete documentation.       "

## 2019-09-05 NOTE — PROGRESS NOTES
Daughter at bedside, she called her brother in law (pts son in law), He is willing to pay for pts Xarelto or Eliquis.     Pt is also dragging L foot this afternoon, +4 strength but pt states she is tired and having trouble picking it up off the floor. 1 assist and walker to bathroom previously, commode brought to bedside for the night.

## 2019-09-05 NOTE — CONSULTS
Consulted by Dr. Nunez to review case and MRI of patient at [facility] with new right sided CVA who was not a candidate for Alteplase or IR because left leg tingling began 4 days prior to her coming to the ER. I reviewed MRI and agree with the plan of AC, atorvastatin, BP control and treatment for PAD. MRI was concerning for potential NPH so evaluation of her gait, mental stauts and urinary incontinence may help determine if further work up is indicated. Pt can follow up in [facility] as an outpatient at [phone].

## 2019-09-05 NOTE — PROGRESS NOTES
Left leg cramping has resolved. Patient now reports chronic back pain and requesting pain medication stronger than tylenol. MD Parker notified, order received for Oxy 5 q 4 hr PRN. Will continue to monitor.

## 2019-09-05 NOTE — CARE PLAN
Problem: Communication  Goal: The ability to communicate needs accurately and effectively will improve  Outcome: PROGRESSING AS EXPECTED  Note:   A&Ox4, clear speech, Neuro checks Q4, numbness and tingling in LLE.      Problem: Safety  Goal: Will remain free from injury  Outcome: PROGRESSING AS EXPECTED  Note:   Alarms on for safety, pt is assist x1 with walker in room, having difficulty lifting LLE off floor when walking. Pivot to commode, purwik in place. PT/OT working with patient. BP >160 systolic this morning, MD aware, 20 mg lisinopril given at 1015.      Problem: Venous Thromboembolism (VTW)/Deep Vein Thrombosis (DVT) Prevention:  Goal: Patient will participate in Venous Thrombosis (VTE)/Deep Vein Thrombosis (DVT)Prevention Measures  Outcome: PROGRESSING AS EXPECTED  Note:   Heparin gtt stopped, Eliquis ordered and given (son in law agreeable to paying for AC).      Problem: Bowel/Gastric:  Goal: Normal bowel function is maintained or improved  Outcome: PROGRESSING AS EXPECTED  Note:   LBM: 9/4, feeding self, no n/v.      Problem: Discharge Barriers/Planning  Goal: Patient's continuum of care needs will be met  Outcome: PROGRESSING AS EXPECTED  Note:   Awaiting placement.      Problem: Pain Management  Goal: Pain level will decrease to patient's comfort goal  Outcome: PROGRESSING AS EXPECTED  Note:   No c/o pain this morning.

## 2019-09-05 NOTE — PROGRESS NOTES
Hospital Medicine Daily Progress Note    Date of Service  9/5/2019    Chief Complaint  74 y.o. female admitted 9/3/2019 with left lower leg numbness.     Hospital Course    She was found to have evidence for a likely acute on chronic arterial occlusion of the superficial femoral and popliteal arteries. She was seen be vascular surgery and anticoagulated. She was not noted to have cyanosis or resting pain in her leg. A further workup revealed what appear to be embolic right frontal CVAs.        Interval Problem Update  9/4- Hypertensive over night  She remains on a heparin gtt.   MRI of the brain is positive for a new CVA. I started her on statin therapy and given she is on a heparin gtt i have stopped asa and fish oil. A CTA of the aorta is still pending.   I reviewed her other imaging.     9/5- I referred her to rehab today after reviewing PT notes. She does have foot drop that was not apparent. I increased her bp meds as permissive htn is not needed now. I changed her to coumadin and lovenox. I contacted neurology for further recommendations and discussed the case with them today.     cta aorta:  There is occlusion of the distal left superficial femoral artery at the adductor canal by thrombus. Opacified collateral vessels and reconstitution of flow within the trifurcation arteries are identified.  There is delayed flow through the right popliteal artery and trifurcation arteries with no abrupt occlusion or focal stenosis identified.  Atherosclerotic changes.  No hydronephrosis.  No evidence of bowel obstruction.  Colon diverticula. No free fluid.  Findings were discussed with LUCI RAMSAY on 9/4/2019 3:49 PM.         Vascular Laboratory   CONCLUSIONS   Mild bilateral internal carotid artery stenosis (<50%).     Echocardiography Laboratory    CONCLUSIONS  Normal right and left ventricular size and function.   Mild concentric left ventricular hypertrophy.  Enlarged right atrium.  Mild to moderate tricuspid  regurgitation.  Right heart pressures are normal.     No prior study is available for comparison.      Arterial Duplex Report     Vascular Laboratory   CONCLUSIONS   Outflow disease in left lower extremity as depicted below:   1) The left femoral artery is occluded at the mid-distal level with    collateral channels identified.    2) The left popliteal artery is also occluded. Reconstitution of flow    visualized within the proximal tibial arteries.   3) No flow identified within the distal posterior tibial artery, suggesting    more proximal occlusion.     Vascular Laboratory   Conclusions   Ankle-brachial index of left lower extremity is severely reduced.    suggestive of outflow disease.   Left arterial duplex scan was performed in accordance with lower extremity    arterial evaluation protocol - see separate report.  Consultants/Specialty  Vascular surgery- I discussed with them today.     Code Status  full    Disposition  Needs rehab eval and placement if possible.     Review of Systems  Review of Systems   Constitutional: Negative for chills and fever.   HENT: Negative for sore throat.    Eyes: Negative for blurred vision and pain.   Respiratory: Negative for cough and shortness of breath.    Cardiovascular: Negative for chest pain and palpitations.   Gastrointestinal: Negative for abdominal pain and nausea.   Genitourinary: Negative for dysuria and urgency.   Musculoskeletal: Negative for back pain and neck pain.   Skin: Negative for itching and rash.   Neurological: Positive for sensory change and focal weakness. Negative for dizziness and tingling.   Psychiatric/Behavioral: Negative for depression. The patient does not have insomnia.    All other systems reviewed and are negative.       Physical Exam  Temp:  [36.3 °C (97.3 °F)-36.6 °C (97.8 °F)] 36.5 °C (97.7 °F)  Pulse:  [66-94] 81  Resp:  [20] 20  BP: (128-185)/() 164/94  SpO2:  [90 %-93 %] 90 %    Physical Exam   Constitutional: She is oriented to  person, place, and time. She appears well-developed and well-nourished. No distress.   Patient seen and examined  Discussed plan with RN   NIRAJT:   Right Ear: External ear normal.   Left Ear: External ear normal.   Mouth/Throat: Oropharynx is clear and moist.   Eyes: Conjunctivae are normal. Right eye exhibits no discharge. Left eye exhibits no discharge.   Neck: No JVD present.   Cardiovascular: Regular rhythm and normal heart sounds.   No murmur heard.       Pulmonary/Chest: Effort normal and breath sounds normal. No stridor. No respiratory distress. She has no rales.   Abdominal: Soft. Bowel sounds are normal. She exhibits no distension. There is no tenderness.   Musculoskeletal: She exhibits no edema or tenderness.   Neurological: She is alert and oriented to person, place, and time.   Left foot drop with abbulation   Skin: Skin is warm and dry. She is not diaphoretic. No erythema.   Normal skin  Color.    Psychiatric: She has a normal mood and affect. Her behavior is normal.   Nursing note and vitals reviewed.      Fluids    Intake/Output Summary (Last 24 hours) at 9/5/2019 0927  Last data filed at 9/5/2019 0832  Gross per 24 hour   Intake 120 ml   Output --   Net 120 ml       Laboratory  Recent Labs     09/03/19  1044   WBC 6.5   RBC 5.31   HEMOGLOBIN 15.5   HEMATOCRIT 46.0   MCV 86.6   MCH 29.2   MCHC 33.7   RDW 43.7   PLATELETCT 163*   MPV 9.8     Recent Labs     09/03/19  1044   SODIUM 141   POTASSIUM 4.1   CHLORIDE 104   CO2 24   GLUCOSE 163*   BUN 15   CREATININE 0.78   CALCIUM 9.0     Recent Labs     09/03/19  1940  09/04/19  0957 09/04/19  1601 09/05/19  0302   APTT >240.0*   < > 83.9* 55.5* 64.6*   INR 1.09  --   --   --   --     < > = values in this interval not displayed.         Recent Labs     09/04/19  0333   TRIGLYCERIDE 55   HDL 74   LDL 90       Imaging  CT-CTA AORTA-RO WITH & W/O-POST PROCESS   Final Result      There is occlusion of the distal left superficial femoral artery at the adductor  canal by thrombus. Opacified collateral vessels and reconstitution of flow within the trifurcation arteries are identified.   There is delayed flow through the right popliteal artery and trifurcation arteries with no abrupt occlusion or focal stenosis identified.   Atherosclerotic changes.   No hydronephrosis.   No evidence of bowel obstruction.   Colon diverticula. No free fluid.   Findings were discussed with LUCI RAMSAY on 9/4/2019 3:49 PM.         MR-BRAIN-W/O   Final Result      1.  Scattered areas of acute ischemia in the high RIGHT frontal and high RIGHT parietal cortex   2.  No hemorrhage   3.  Moderate atrophy with disproportionate enlargement of the third and lateral ventricles relative to the other CSF containing spaces in a pattern which could indicate normal pressure hydrocephalus in the appropriate clinical setting   4.  Advanced white matter changes      EC-ECHOCARDIOGRAM COMPLETE W/O CONT   Final Result      US-CAROTID DOPPLER BILAT   Final Result      US-INDERJIT SINGLE LEVEL UNILAT LEFT   Final Result      US-EXTREMITY ARTERY LOWER UNILAT LEFT   Final Result      CT-HEAD W/O   Final Result         1.  No acute intracranial findings. No evidence of acute hemorrhage or mass lesion.      2.  Prominent ventricle size compared to parenchymal volume loss. Correlate for normal pressure hydrocephalus.      3.  White matter lucencies most consistent with chronic small vessel ischemic change.                    Assessment/Plan  CVA (cerebral vascular accident) (HCC)  Assessment & Plan  She evolved some deficits yesterday. Now stable left foot deficits on exam. Continue statin. Continue anticoagulation as Appears embolic. Therapy ongoing. If she develops more symptoms or is worsening i have instructed RN to get a stat CT. Low risk for hemorrhagic transformation though.  She has been referred to inpatient rehab for therapy.   - NOTE: I do not see any evidence for NPH symptoms in this patient. No incontinence. Her  gait difficulty is from foot drop. No dementia. At this time I do not see a reason for a workup.     Arterial occlusion, lower extremity (HCC)  Assessment & Plan  As per PVD    PVD (peripheral vascular disease) (HCC)  Assessment & Plan  Appears consistent with acute on chroinc vascular disease. Normal echo. cta of aorta is consistent with this. Vascular surgery will see her as an outpatient in follow up. No emergent ischemia noted on exam. Continue anticoagulation    COPD- (present on admission)  Assessment & Plan  Respiratory therapy with oxygen as needed    Essential hypertension- (present on admission)  Assessment & Plan  Poor control. increase lisinopril, no need for permissive hypertension as symptoms are present for 4 days    Tobacco abuse- (present on admission)  Assessment & Plan  Has had education.        VTE prophylaxis: heparin

## 2019-09-05 NOTE — THERAPY
Occupational Therapy Treatment completed with focus on ADLs, ADL transfers, patient education and upper extremity function.  Functional Status:  Pt just finished with PT but agreeable to work with OT.  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  (this info was not provided to OT on initial eval).  Pt motivated to get better and return to work.  Pt able to get up to EOB with supervision with HOB elevated and use of rail.  Heavy Phani for sit to stand.  Min/ModA to walk to sink (10') with FWW- left leg significantly dragging today. Pt stood at sink 5 min to wash face and arms with Phani for balance and therapist blocking L knee.  Pt fatigued, so she sat in chair at sink to complete sponge bath seated with setup.  OT helped with back.  Pt stood again with Phani to wash jin area.  Pt was noted to be hesitant with LUE, but did use it about 60% of time during bathing activity.  LB dressing with modA (undies and PJ pants).  Pt required modA sit to stand at end, and when attempting to walk back to bed, pt's LLE would not advance at all- She required modA to walk with maxA to advance and reverse LLE when backing up to bed.  Phani to return to supine.  Pt attempted to scoot self up in bed, LUE not assisting as much as RUE.  Pt was extremely fatigued at end of session, eyes heavy, noted to have more pronounced droop on L side of face.  Pt appears she may have some mild neglect of LUE although strength remains intact.  Pt overall with more pronounced L sided deficits today, perhaps due to fatigue.  RN aware and will monitor closely.  Pt agreed she cannot go home at this time and is motivated for inpt Rehab.  OT will follow while in house.    Plan of Care: Will benefit from Occupational Therapy 3 times per week  Discharge Recommendations:  Equipment Will Continue to Assess for Equipment Needs. Post-acute therapy Discharge to a transitional care facility for continued skilled therapy  "services.    See \"Rehab Therapy-Acute\" Patient Summary Report for complete documentation.   "

## 2019-09-05 NOTE — CONSULTS
Physical Medicine and Rehabilitation   Chart Review Consult     Date: 9/5/2019  LOS: 2 Day(s)      Chart review completed. Patient is currently at [facility] after having come in for 4 days of left foot tingling and numbess. Patient was assessed and found to have peripheral artery disease (PAD) in the affected limb, however during her stay she also developed left arm paresthesias followed by heaviness in the left arm and some left face drooping that resolved after a few minutes. MRI brain the next day showed an acute infarcts in the right frontal and right parietal lobes with enlarged lateral ventricles, possibly related to NPH. At this time the stroke is taking priority and she will  follow up with Dr. Milad Wren in 2-4 weeks for her PAD    She was seen by PT and OT and does apparently continue to have functional impairment with mobility and activities of daily living. This patient is a good candidate for acute inpatient rehabilitation, both reasonable and necessary, including 24 hr Physician supervision and rehab nursing care, evaluation and treatment by physical therapy and occupational therapy. She appears to be able to tolerate therapy 3 hours per day 5 days per week or a minimum of 15 hours per week.  Her precautions include: Fall/Safety precautions.     Current goals include improvement in mobility, ADL's, cognition, swallowing, balance, strength and conditioning, pain management, and safety with independent living.     Estimated length of stay is approximately 10-14 days with good rehabilitation potential. Her disposition is to discharge to premorbid independent living setting with the supportive care of her family and community resources.     We will proceed with transfer to acute inpatient rehabilitation when appropriate as per her attending physician.    Thank for allowing us to participate in her care.    Fernando Bundy, DO   Physical Medicine and Rehabilitation   9/5/2019

## 2019-09-05 NOTE — DISCHARGE PLANNING
LSW updated Radha with Acute Rehab that pt is agreeable to go. Radha stated they are working on auth and should be able to get pt transferred tomorrow

## 2019-09-05 NOTE — PROGRESS NOTES
Patient's home medications of aspirin, Aleve and ibuprofen found on bedside table. Patient states she has been taking these for her back pain since she has been here. Patient educated that home medications are to be taken away and stored safely in hospital's pharmacy and that the hospital's own supply of these medications are to be used for patient safety. Patient verbalized understanding. Patient's medications were collected and stored in medication collection bag in front of patient and given to JOSIANE Ramos.

## 2019-09-05 NOTE — CARE PLAN
Problem: Communication  Goal: The ability to communicate needs accurately and effectively will improve  Outcome: PROGRESSING AS EXPECTED  Note:   Involve family in plan of care per patient's wishes to provide support. Assess need for emotional support. Update patient and family on plan of care.      Problem: Safety  Goal: Will remain free from falls  Outcome: PROGRESSING AS EXPECTED  Note:   Assist patient on their weaker side. Provide appropriate assistive device. Utilize bed alarm.

## 2019-09-05 NOTE — PROGRESS NOTES
MD Parker notified of high 's systolically, MD okay with BP being under 180s. Will continue to monitor.     Patient reporting cramping in left leg, leg is not swollen or red, heat pack applied, medication given per MAR. MD Parker notified. No new orders received. Will continue to monitor.
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
## 2019-09-05 NOTE — DISCHARGE PLANNING
Dr. Francisco will accept Nohelia to inpatient rehab. Transport is scheduled 11:30a. Nursing to call report to [phone]. VM update to d/c planner [phone]. TCC will follow to assist as needed with transition to [facility].

## 2019-09-05 NOTE — PROGRESS NOTES
Telemetry Shift Summary    Rhythm SR  HR Range 67-85  Ectopy OPVC, OPAC, blocked PAC  Measurements 0.16/0.10/0.40        Normal Values  Rhythm SR  HR Range    Measurements 0.12-0.20 / 0.06-0.10  / 0.30-0.52

## 2019-09-06 ENCOUNTER — HOSPITAL ENCOUNTER (INPATIENT)
Facility: REHABILITATION | Age: 74
LOS: 19 days | DRG: 057 | End: 2019-09-25
Attending: PHYSICAL MEDICINE & REHABILITATION | Admitting: PHYSICAL MEDICINE & REHABILITATION
Payer: MEDICARE

## 2019-09-06 ENCOUNTER — TELEPHONE (OUTPATIENT)
Dept: MEDICAL GROUP | Facility: PHYSICIAN GROUP | Age: 74
End: 2019-09-06

## 2019-09-06 VITALS
SYSTOLIC BLOOD PRESSURE: 121 MMHG | OXYGEN SATURATION: 89 % | HEIGHT: 72 IN | BODY MASS INDEX: 19.32 KG/M2 | HEART RATE: 71 BPM | WEIGHT: 142.64 LBS | TEMPERATURE: 97.4 F | RESPIRATION RATE: 18 BRPM | DIASTOLIC BLOOD PRESSURE: 75 MMHG

## 2019-09-06 PROBLEM — R00.0 TACHYCARDIA: Status: ACTIVE | Noted: 2019-09-06

## 2019-09-06 LAB
EKG IMPRESSION: NORMAL
MAGNESIUM SERPL-MCNC: 1.9 MG/DL (ref 1.5–2.5)

## 2019-09-06 PROCEDURE — 700102 HCHG RX REV CODE 250 W/ 637 OVERRIDE(OP): Performed by: INTERNAL MEDICINE

## 2019-09-06 PROCEDURE — 99406 BEHAV CHNG SMOKING 3-10 MIN: CPT | Mod: 25 | Performed by: PHYSICAL MEDICINE & REHABILITATION

## 2019-09-06 PROCEDURE — 83735 ASSAY OF MAGNESIUM: CPT

## 2019-09-06 PROCEDURE — 93010 ELECTROCARDIOGRAM REPORT: CPT | Performed by: INTERNAL MEDICINE

## 2019-09-06 PROCEDURE — A9270 NON-COVERED ITEM OR SERVICE: HCPCS | Performed by: INTERNAL MEDICINE

## 2019-09-06 PROCEDURE — 700102 HCHG RX REV CODE 250 W/ 637 OVERRIDE(OP): Performed by: PHYSICAL MEDICINE & REHABILITATION

## 2019-09-06 PROCEDURE — 99239 HOSP IP/OBS DSCHRG MGMT >30: CPT | Performed by: HOSPITALIST

## 2019-09-06 PROCEDURE — 770010 HCHG ROOM/CARE - REHAB SEMI PRIVAT*

## 2019-09-06 PROCEDURE — A9270 NON-COVERED ITEM OR SERVICE: HCPCS | Performed by: HOSPITALIST

## 2019-09-06 PROCEDURE — 700102 HCHG RX REV CODE 250 W/ 637 OVERRIDE(OP): Performed by: HOSPITALIST

## 2019-09-06 PROCEDURE — 94760 N-INVAS EAR/PLS OXIMETRY 1: CPT

## 2019-09-06 PROCEDURE — A9270 NON-COVERED ITEM OR SERVICE: HCPCS | Performed by: PHYSICAL MEDICINE & REHABILITATION

## 2019-09-06 PROCEDURE — 99223 1ST HOSP IP/OBS HIGH 75: CPT | Mod: AI,25 | Performed by: PHYSICAL MEDICINE & REHABILITATION

## 2019-09-06 PROCEDURE — 93005 ELECTROCARDIOGRAM TRACING: CPT | Performed by: PHYSICAL MEDICINE & REHABILITATION

## 2019-09-06 RX ORDER — ACETAMINOPHEN 325 MG/1
650 TABLET ORAL EVERY 6 HOURS PRN
Qty: 30 TAB | Refills: 0 | Status: SHIPPED | OUTPATIENT
Start: 2019-09-06 | End: 2019-09-27

## 2019-09-06 RX ORDER — LANOLIN ALCOHOL/MO/W.PET/CERES
3 CREAM (GRAM) TOPICAL NIGHTLY PRN
Status: DISCONTINUED | OUTPATIENT
Start: 2019-09-06 | End: 2019-09-25 | Stop reason: HOSPADM

## 2019-09-06 RX ORDER — ALUMINA, MAGNESIA, AND SIMETHICONE 2400; 2400; 240 MG/30ML; MG/30ML; MG/30ML
20 SUSPENSION ORAL
Status: DISCONTINUED | OUTPATIENT
Start: 2019-09-06 | End: 2019-09-25 | Stop reason: HOSPADM

## 2019-09-06 RX ORDER — ALBUTEROL SULFATE 90 UG/1
2 AEROSOL, METERED RESPIRATORY (INHALATION) EVERY 6 HOURS PRN
Status: DISCONTINUED | OUTPATIENT
Start: 2019-09-06 | End: 2019-09-06

## 2019-09-06 RX ORDER — ONDANSETRON 2 MG/ML
4 INJECTION INTRAMUSCULAR; INTRAVENOUS EVERY 4 HOURS PRN
Status: DISCONTINUED | OUTPATIENT
Start: 2019-09-06 | End: 2019-09-06

## 2019-09-06 RX ORDER — POLYETHYLENE GLYCOL 3350 17 G/17G
1 POWDER, FOR SOLUTION ORAL
Status: DISCONTINUED | OUTPATIENT
Start: 2019-09-06 | End: 2019-09-25 | Stop reason: HOSPADM

## 2019-09-06 RX ORDER — HYDRALAZINE HYDROCHLORIDE 25 MG/1
25 TABLET, FILM COATED ORAL EVERY 8 HOURS PRN
Status: DISCONTINUED | OUTPATIENT
Start: 2019-09-06 | End: 2019-09-25 | Stop reason: HOSPADM

## 2019-09-06 RX ORDER — ECHINACEA PURPUREA EXTRACT 125 MG
2 TABLET ORAL PRN
Status: DISCONTINUED | OUTPATIENT
Start: 2019-09-06 | End: 2019-09-25 | Stop reason: HOSPADM

## 2019-09-06 RX ORDER — ONDANSETRON 4 MG/1
4 TABLET, ORALLY DISINTEGRATING ORAL EVERY 4 HOURS PRN
Status: DISCONTINUED | OUTPATIENT
Start: 2019-09-06 | End: 2019-09-06

## 2019-09-06 RX ORDER — NICOTINE 21 MG/24HR
1 PATCH, TRANSDERMAL 24 HOURS TRANSDERMAL EVERY 24 HOURS
Qty: 30 PATCH | Status: ON HOLD
Start: 2019-09-06 | End: 2019-09-25

## 2019-09-06 RX ORDER — ACETAMINOPHEN 325 MG/1
650 TABLET ORAL EVERY 6 HOURS PRN
Status: DISCONTINUED | OUTPATIENT
Start: 2019-09-06 | End: 2019-09-06

## 2019-09-06 RX ORDER — ATORVASTATIN CALCIUM 40 MG/1
80 TABLET, FILM COATED ORAL EVERY EVENING
Status: DISCONTINUED | OUTPATIENT
Start: 2019-09-06 | End: 2019-09-25 | Stop reason: HOSPADM

## 2019-09-06 RX ORDER — LISINOPRIL 20 MG/1
20 TABLET ORAL DAILY
Status: DISCONTINUED | OUTPATIENT
Start: 2019-09-07 | End: 2019-09-09

## 2019-09-06 RX ORDER — ONDANSETRON 2 MG/ML
4 INJECTION INTRAMUSCULAR; INTRAVENOUS 4 TIMES DAILY PRN
Status: DISCONTINUED | OUTPATIENT
Start: 2019-09-06 | End: 2019-09-25 | Stop reason: HOSPADM

## 2019-09-06 RX ORDER — ACETAMINOPHEN 325 MG/1
650 TABLET ORAL EVERY 4 HOURS PRN
Status: DISCONTINUED | OUTPATIENT
Start: 2019-09-06 | End: 2019-09-25 | Stop reason: HOSPADM

## 2019-09-06 RX ORDER — ALBUTEROL SULFATE 90 UG/1
2 AEROSOL, METERED RESPIRATORY (INHALATION) EVERY 6 HOURS PRN
Qty: 8.5 INHALER | Refills: 1 | Status: SHIPPED | OUTPATIENT
Start: 2019-09-06 | End: 2019-09-06

## 2019-09-06 RX ORDER — POLYVINYL ALCOHOL 14 MG/ML
1 SOLUTION/ DROPS OPHTHALMIC PRN
Status: DISCONTINUED | OUTPATIENT
Start: 2019-09-06 | End: 2019-09-25 | Stop reason: HOSPADM

## 2019-09-06 RX ORDER — BISACODYL 10 MG
10 SUPPOSITORY, RECTAL RECTAL
Status: DISCONTINUED | OUTPATIENT
Start: 2019-09-06 | End: 2019-09-06

## 2019-09-06 RX ORDER — POLYETHYLENE GLYCOL 3350 17 G/17G
1 POWDER, FOR SOLUTION ORAL
Status: DISCONTINUED | OUTPATIENT
Start: 2019-09-06 | End: 2019-09-06

## 2019-09-06 RX ORDER — AMOXICILLIN 250 MG
2 CAPSULE ORAL 2 TIMES DAILY
Status: DISCONTINUED | OUTPATIENT
Start: 2019-09-06 | End: 2019-09-06

## 2019-09-06 RX ORDER — BISACODYL 10 MG
10 SUPPOSITORY, RECTAL RECTAL
Status: DISCONTINUED | OUTPATIENT
Start: 2019-09-06 | End: 2019-09-25 | Stop reason: HOSPADM

## 2019-09-06 RX ORDER — ATORVASTATIN CALCIUM 80 MG/1
80 TABLET, FILM COATED ORAL EVERY EVENING
Qty: 30 TAB | Refills: 3 | Status: ON HOLD
Start: 2019-09-06 | End: 2019-09-25 | Stop reason: SDUPTHER

## 2019-09-06 RX ORDER — AMOXICILLIN 250 MG
2 CAPSULE ORAL 2 TIMES DAILY
Status: DISCONTINUED | OUTPATIENT
Start: 2019-09-06 | End: 2019-09-25 | Stop reason: HOSPADM

## 2019-09-06 RX ORDER — ONDANSETRON 4 MG/1
4 TABLET, ORALLY DISINTEGRATING ORAL 4 TIMES DAILY PRN
Status: DISCONTINUED | OUTPATIENT
Start: 2019-09-06 | End: 2019-09-25 | Stop reason: HOSPADM

## 2019-09-06 RX ORDER — TRAZODONE HYDROCHLORIDE 50 MG/1
50 TABLET ORAL
Status: DISCONTINUED | OUTPATIENT
Start: 2019-09-06 | End: 2019-09-25 | Stop reason: HOSPADM

## 2019-09-06 RX ORDER — LISINOPRIL 20 MG/1
20 TABLET ORAL DAILY
Qty: 30 TAB | Status: ON HOLD
Start: 2019-09-07 | End: 2019-09-25

## 2019-09-06 RX ORDER — ALBUTEROL SULFATE 90 UG/1
2 AEROSOL, METERED RESPIRATORY (INHALATION) EVERY 4 HOURS PRN
Status: DISCONTINUED | OUTPATIENT
Start: 2019-09-06 | End: 2019-09-25 | Stop reason: HOSPADM

## 2019-09-06 RX ADMIN — ACETAMINOPHEN 650 MG: 325 TABLET, FILM COATED ORAL at 02:45

## 2019-09-06 RX ADMIN — APIXABAN 10 MG: 5 TABLET, FILM COATED ORAL at 20:28

## 2019-09-06 RX ADMIN — OXYCODONE HYDROCHLORIDE 5 MG: 5 TABLET ORAL at 10:33

## 2019-09-06 RX ADMIN — OXYCODONE HYDROCHLORIDE 5 MG: 5 TABLET ORAL at 02:45

## 2019-09-06 RX ADMIN — LISINOPRIL 20 MG: 20 TABLET ORAL at 05:37

## 2019-09-06 RX ADMIN — ATORVASTATIN CALCIUM 80 MG: 40 TABLET, FILM COATED ORAL at 20:28

## 2019-09-06 RX ADMIN — APIXABAN 10 MG: 5 TABLET, FILM COATED ORAL at 05:37

## 2019-09-06 RX ADMIN — ACETAMINOPHEN 650 MG: 325 TABLET, FILM COATED ORAL at 20:28

## 2019-09-06 RX ADMIN — SENNOSIDES,DOCUSATE SODIUM 2 TABLET: 8.6; 5 TABLET, FILM COATED ORAL at 20:27

## 2019-09-06 ASSESSMENT — PATIENT HEALTH QUESTIONNAIRE - PHQ9
1. LITTLE INTEREST OR PLEASURE IN DOING THINGS: SEVERAL DAYS
4. FEELING TIRED OR HAVING LITTLE ENERGY: NOT AT ALL
9. THOUGHTS THAT YOU WOULD BE BETTER OFF DEAD, OR OF HURTING YOURSELF: NOT AT ALL
7. TROUBLE CONCENTRATING ON THINGS, SUCH AS READING THE NEWSPAPER OR WATCHING TELEVISION: NOT AT ALL
2. FEELING DOWN, DEPRESSED, IRRITABLE, OR HOPELESS: SEVERAL DAYS
3. TROUBLE FALLING OR STAYING ASLEEP OR SLEEPING TOO MUCH: NOT AT ALL
SUM OF ALL RESPONSES TO PHQ9 QUESTIONS 1 AND 2: 2
SUM OF ALL RESPONSES TO PHQ QUESTIONS 1-9: 5
6. FEELING BAD ABOUT YOURSELF - OR THAT YOU ARE A FAILURE OR HAVE LET YOURSELF OR YOUR FAMILY DOWN: NOT AL ALL
8. MOVING OR SPEAKING SO SLOWLY THAT OTHER PEOPLE COULD HAVE NOTICED. OR THE OPPOSITE, BEING SO FIGETY OR RESTLESS THAT YOU HAVE BEEN MOVING AROUND A LOT MORE THAN USUAL: SEVERAL DAYS
5. POOR APPETITE OR OVEREATING: MORE THAN HALF THE DAYS

## 2019-09-06 ASSESSMENT — COPD QUESTIONNAIRES
DO YOU EVER COUGH UP ANY MUCUS OR PHLEGM?: YES, A FEW DAYS A WEEK OR MONTH
HAVE YOU SMOKED AT LEAST 100 CIGARETTES IN YOUR ENTIRE LIFE: YES
COPD SCREENING SCORE: 5
DURING THE PAST 4 WEEKS HOW MUCH DID YOU FEEL SHORT OF BREATH: NONE/LITTLE OF THE TIME

## 2019-09-06 ASSESSMENT — LIFESTYLE VARIABLES
TOTAL SCORE: 0
CONSUMPTION TOTAL: NEGATIVE
AVERAGE NUMBER OF DAYS PER WEEK YOU HAVE A DRINK CONTAINING ALCOHOL: 0
EVER FELT BAD OR GUILTY ABOUT YOUR DRINKING: NO
EVER HAD A DRINK FIRST THING IN THE MORNING TO STEADY YOUR NERVES TO GET RID OF A HANGOVER: NO
HAVE PEOPLE ANNOYED YOU BY CRITICIZING YOUR DRINKING: NO
TOTAL SCORE: 0
HAVE YOU EVER FELT YOU SHOULD CUT DOWN ON YOUR DRINKING: NO
ALCOHOL_USE: NO
TOTAL SCORE: 0
DOES PATIENT WANT TO STOP DRINKING: NO
HOW MANY TIMES IN THE PAST YEAR HAVE YOU HAD 5 OR MORE DRINKS IN A DAY: 0
ON A TYPICAL DAY WHEN YOU DRINK ALCOHOL HOW MANY DRINKS DO YOU HAVE: 0
EVER_SMOKED: YES

## 2019-09-06 NOTE — PROGRESS NOTES
Telemetry Shift Summary    Rhythm SR  HR Range 64-82  Ectopy Rare PACs  Measurements 0.20/0.08/0.44        Normal Values  Rhythm SR  HR Range    Measurements 0.12-0.20 / 0.06-0.10  / 0.30-0.52

## 2019-09-06 NOTE — PROGRESS NOTES
Received report from Rose GARCIA. Pt assessed, awake, alert, and oriented x4 with NAD. Left shoulder pain is 2/10 now.Offered assist. Offered pt ice. IVs flushed. Both patent and intact. Neurocheck and NIH score complete. Bed locked and in lowest position. Bed alarm on and functioning. Placed call light and belongings within reach. WCTM.

## 2019-09-06 NOTE — PROGRESS NOTES
Telemetry Shift Summary     Rhythm SR  HR Range 60-81  Ectopy Rare-Occasional PAC  Measurements 0.18/0.10/0.40      8-beat run of MD belia aware     Normal Values  Rhythm SR  HR Range    Measurements 0.12-0.20 / 0.06-0.10  / 0.30-0.52

## 2019-09-06 NOTE — PROGRESS NOTES
Pt discharged to [facility]. Discharge instructions provided to pt. Pt verbalizes understanding. Pt states all questions have been answered. Signed copy in chart. Prescriptions sent to [facility]. Pt states that all personal belongings are in possession. Pt off unit via wheelchair, escorted by transport.  Report given to Alicia at rehab facility.

## 2019-09-06 NOTE — FLOWSHEET NOTE
09/06/19 1423   Events/Summary/Plan   Events/Summary/Plan EKG   EKG Group   EKG Completed Yes

## 2019-09-06 NOTE — PROGRESS NOTES
Received report from Gregg JOHNSON Pt. Resting comfortably in bed, no requests at this time. Safety precautions implemented.

## 2019-09-06 NOTE — DISCHARGE INSTRUCTIONS
Discharge Instructions per Manoj Nunez M.D.    Please go to the er immediately with any weakness, numbness that is new.     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     Return to ER if symptoms return.     Discharge Instructions    Discharged to other by medical transportation with escort. Discharged via wheelchair, hospital escort: Yes.  Special equipment needed: Not Applicable    Be sure to schedule a follow-up appointment with your primary care doctor or any specialists as instructed.     Discharge Plan:   Diet Plan: Discussed  Activity Level: Discussed  Smoking Cessation Offered: Patient Refused  Confirmed Follow up Appointment: Appointment Scheduled  Confirmed Symptoms Management: Discussed  Medication Reconciliation Updated: Yes  Influenza Vaccine Indication: Patient Refuses    I understand that a diet low in cholesterol, fat, and sodium is recommended for good health. Unless I have been given specific instructions below for another diet, I accept this instruction as my diet prescription.   Other diet: Regular    Special Instructions: None    · Is patient discharged on Warfarin / Coumadin?   No     Discharge Instructions    Discharged to other by medical transportation with escort. Discharged via wheelchair, hospital escort: Refused.  Special equipment needed: Not Applicable    Be sure to schedule a follow-up appointment with your primary care doctor or any specialists as instructed.     Discharge Plan:   Diet Plan: Discussed  Activity Level: Discussed  Smoking Cessation Offered: Patient Refused  Confirmed Follow up Appointment: Appointment Scheduled  Confirmed Symptoms Management: Discussed  Medication Reconciliation Updated: Yes  Influenza Vaccine Indication: Patient Refuses    I understand that a diet low in cholesterol, fat, and sodium is recommended for good health. Unless I have been given specific instructions below for  another diet, I accept this instruction as my diet prescription.   Other diet: Regular    Special Instructions:     Stroke/CVA/TIA/Hemorrhagic Ischemia Discharge Instructions  You have had a stroke. Your risk factors have been identified as follows:  Age - Over 55  It is important that you reduce your risk factors to avoid another stroke in the future. Here are some general guidelines to follow:  · Eat healthy - avoid food high in fat.  · Get regular exercise.  · Maintain a healthy weight.  · Avoid smoking.  · Avoid alcohol and illegal drug use.  · Take your medications as directed.  For more information regarding risk factors, refer to pages 17-19 in your Stroke Patient Education Guide. Stroke Education Guide was given to patient.    Warning signs of a stroke include (which can also be found on page 3 of your Stroke Patient Education Guide):  · Sudden numbness of weakness of the face, arm or leg (especially on one side of the body).  · Sudden confusion, trouble speaking or understanding.  · Sudden trouble seeing in one or both eyes.  · Sudden trouble walking, dizziness, loss of balance or coordination.  · Sudden severe headache with no known cause.  It is very important to get treatment quickly when a stroke occurs. If you experience any of the above warning signs, call [phone] immediately.     Some patients who have had a stroke will be going home on a blood thinner medication called Warfarin (Coumadin).  This medication requires very close monitoring and follow up.  This follow up can be provided by either your Primary Care Physician or by [facility]'s Outpatient Anticoagulation Service.  The Outpatient Anticoagulation Service is located at the [facility] at [facility]  If you do not know when your follow up appointment is scheduled, call [phone] to verify your appointment time.      · Is patient discharged on Warfarin / Coumadin?   No      Depression / Suicide Risk    As you are discharged from this [facility] facility, it is important to learn how to keep safe from harming yourself.    Recognize the warning signs:  · Abrupt changes in personality, positive or negative- including increase in energy   · Giving away possessions  · Change in eating patterns- significant weight changes-  positive or negative  · Change in sleeping patterns- unable to sleep or sleeping all the time   · Unwillingness or inability to communicate  · Depression  · Unusual sadness, discouragement and loneliness  · Talk of wanting to die  · Neglect of personal appearance   · Rebelliousness- reckless behavior  · Withdrawal from people/activities they love  · Confusion- inability to concentrate     If you or a loved one observes any of these behaviors or has concerns about self-harm, here's what you can do:  · Talk about it- your feelings and reasons for harming yourself  · Remove any means that you might use to hurt yourself (examples: pills, rope, extension cords, firearm)  · Get professional help from the community (Mental Health, Substance Abuse, psychological counseling)  · Do not be alone:Call your Safe Contact- someone whom you trust who will be there for you.  · Call your local CRISIS HOTLINE 869-6162 or 749-951-3475  · Call your local Children's Mobile Crisis Response Team [place] (183) 671-6057 or www.Kites  · Call the toll free National Suicide Prevention Hotlines   · National Suicide Prevention Lifeline 394-858-JYWK (2929)  · National Hope Line Network 800-SUICIDE (107-9062)        Ischemic Stroke  An ischemic stroke is the sudden death of brain tissue. Blood carries oxygen to all areas of the body. This type of stroke happens when your blood does not flow to your brain like normal. Your brain cannot get the oxygen it needs. This is an emergency. It must be treated right away.  Symptoms of a stroke usually happen all of a sudden. You may notice  them when you wake up. They can include:  · Weakness or loss of feeling in your face, arm, or leg. This often happens on one side of the body.  · Trouble walking.  · Trouble moving your arms or legs.  · Loss of balance or coordination.  · Feeling confused.  · Trouble talking or understanding what people are saying.  · Slurred speech.  · Trouble seeing.  · Seeing two of one object (double vision).  · Feeling dizzy.  · Feeling sick to your stomach (nauseous) and throwing up (vomiting).  · A very bad headache for no reason.  Get help as soon as any of these problems start. This is important. Some treatments work better if they are given right away. These include:  · Aspirin.  · Medicines to control blood pressure.  · A shot (injection) of medicine to break up the blood clot.  · Treatments given in the blood vessel (artery) to take out the clot or break it up.  Other treatments may include:  · Oxygen.  · Fluids given through an IV tube.  · Medicines to thin out your blood.  · Procedures to help your blood flow better.  What increases the risk?  Certain things may make you more likely to have a stroke. Some of these are things that you can change, such as:  · Being very overweight (obesity).  · Smoking.  · Taking birth control pills.  · Not being active.  · Drinking too much alcohol.  · Using drugs.  Other risk factors include:  · High blood pressure.  · High cholesterol.  · Diabetes.  · Heart disease.  · Being , , , or .  · Being over age 60.  · Family history of stroke.  · Having had blood clots, stroke, or warning stroke (transient ischemic attack, TIA) in the past.  · Sickle cell disease.  · Being a woman with a history of high blood pressure in pregnancy (preeclampsia).  · Migraine headache.  · Sleep apnea.  · Having an irregular heartbeat (atrial fibrillation).  · Long-term (chronic) diseases that cause soreness and swelling (inflammation).  · Disorders that  affect how your blood clots.  Follow these instructions at home:  Medicines  · Take over-the-counter and prescription medicines only as told by your doctor.  · If you were told to take aspirin or another medicine to thin your blood, take it exactly as told by your doctor.  ¨ Taking too much of the medicine can cause bleeding.  ¨ If you do not take enough, it may not work as well.  · Know the side effects of your medicines. If you are taking a blood thinner, make sure you:  ¨ Hold pressure over any cuts for longer than usual.  ¨ Tell your dentist and other doctors that you take this medicine.  ¨ Avoid activities that may cause damage or injury to your body.  Eating and drinking  · Follow instructions from your doctor about what you cannot eat or drink.  · Eat healthy foods.  · If you have trouble with swallowing, do these things to avoid choking:  ¨ Take small bites when eating.  ¨ Eat foods that are soft or pureed.  Safety  · Follow instructions from your health care team about physical activity.  · Use a walker or cane as told by your doctor.  · Keep your home safe so you do not fall. This may include:  ¨ Having experts look at your home to make sure it is safe.  ¨ Putting grab bars in the bedroom and bathroom.  ¨ Using raised toilets.  ¨ Putting a seat in the shower.  General instructions  · Do not use any tobacco products.  ¨ Examples of these are cigarettes, chewing tobacco, and e-cigarettes.  ¨ If you need help quitting, ask your doctor.  · Limit how much alcohol you drink. This means no more than 1 drink a day for nonpregnant women and 2 drinks a day for men. One drink equals 12 oz of beer, 5 oz of wine, or 1½ oz of hard liquor.  · If you need help to stop using drugs or alcohol, ask your doctor to refer you to a program or specialist.  · Stay active. Exercise as told by your doctor.  · Keep all follow-up visits as told by your doctor. This is important.  Get help right away if:  · You suddenly:  ¨ Have  weakness or loss of feeling in your face, arm, or leg.  ¨ Feel confused.  ¨ Have trouble talking or understanding what people are saying.  ¨ Have trouble seeing.  ¨ Have trouble walking.  ¨ Have trouble moving your arms or legs.  ¨ Feel dizzy.  ¨ Lose your balance or coordination.  ¨ Have a very bad headache and you do not know why.  · You pass out (lose consciousness) or almost pass out.  · You have jerky movements that you cannot control (seizure).  These symptoms may be an emergency. Do not wait to see if the symptoms will go away. Get medical help right away. Call your local emergency services (911 in the [place] Do not drive yourself to the hospital.   This information is not intended to replace advice given to you by your health care provider. Make sure you discuss any questions you have with your health care provider.  Document Released: 12/06/2012 Document Revised: 05/30/2017 Document Reviewed: 03/15/2017  [employer]vier Interactive Patient Education © 2017 [employer]
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
## 2019-09-06 NOTE — TELEPHONE ENCOUNTER
Future Appointments       Provider Department Center    9/10/2019 2:20 PM Idalia Cortez M.D. [facility]        ESTABLISHED PATIENT PRE-VISIT PLANNING     Patient was NOT contacted to complete PVP.     Note: Patient will not be contacted if there is no indication to call.     1.  Reviewed notes from the last few office visits within the medical group: Yes    2.  If any orders were placed at last visit or intended to be done for this visit (i.e. 6 mos follow-up), do we have Results/Consult Notes?        •  Labs - Labs ordered, completed on 09/03-05/2019 and results are in chart.   Note: If patient appointment is for lab review and patient did not complete labs, check with provider if OK to reschedule patient until labs completed.       •  Imaging - Imaging ordered, completed and results are in chart.       •  Referrals - No referrals were ordered at last office visit.    3. Is this appointment scheduled as a Hospital Follow-Up? Yes, visit was at [facility].     4.  Immunizations were updated in [employer] using WebIZ?: Yes       •  Web [employer] Recommendations: FLU, PNEUMOVAX (PPSV23), TDAP, VARICELLA (Chicken Pox)  and SHINGRIX (Shingles)    5.  Patient is due for the following Health Maintenance Topics:   Health Maintenance Due   Topic Date Due   • Annual Wellness Visit  1945   • HEPATITIS C SCREENING  1945   • PFT SCREENING-FEV1 AND FEV/FVC RATIO / SPIROMETRY SHOULD BE PERFORMED ANNUALLY  03/11/1964   • IMM DTaP/Tdap/Td Vaccine (1 - Tdap) 03/11/1965   • PAP SMEAR  03/11/1967   • IMM ZOSTER VACCINES (1 of 2) 03/11/1996   • IMM PNEUMOCOCCAL VACCINE: 65+ Years (2 of 2 - PPSV23) 12/06/2017   • IMM INFLUENZA (1) 09/01/2019       6. Orders for overdue Health Maintenance topics pended in Pre-Charting? YES    7.  AHA (MDX) form printed for Provider? YES    8.  Patient was informed to arrive 15 min prior to their scheduled appointment and bring in their medication bottles.

## 2019-09-06 NOTE — FLOWSHEET NOTE
09/06/19 1339   Type of Assessment   Assessment Yes   Patient History   Pulmonary Diagnosis COPD   Surgical Procedures None   Home O2 Yes   Oxygen liter flow 1.5   Oxygen at night only Yes   Nocturnal CPAP No   Home Treatments/Frequency Yes   MDI 1/Frequency Albuterol MDI/PRN   COPD Risk Screening   Do you have a history of COPD? Yes   Do you have a Pulmonologist? No   COPD Population Screener   During the past 4 weeks, how much did you feel short of breath? 0   Do you ever cough up any mucus or phlegm? 1   In the past 12 months, you do less than you used to because of your breathing problems 0   Have you smoked at least 100 cigarettes in your entire life? 2   How old are you? 3   COPD Screening Score 5   Smoking History   Have you ever smoked Yes   Have you smoked in the last 12 months Yes   Have you quit smoking No   Smoking Cessation Offered Patient Counseled   Level Of Consciousness   Level of Consciousness Alert   Chest Exam   Work Of Breathing / Effort Mild   Respiration 16   Pulse (!) 115   Breath Sounds   Pre/Post Intervention Pre Intervention Assessment   RUL Breath Sounds Clear   RML Breath Sounds Clear   RLL Breath Sounds Coarse Crackles;Diminished   NASIR Breath Sounds Clear   LLL Breath Sounds Coarse Crackles;Diminished   Secretions   Cough Moist   Oximetry   #Pulse Oximetry (Single Determination) Yes   Oxygen   Home O2 Use Prior To Admission? Yes   Home O2 LPM Flow 1.5 LPM   Home O2 Delivery Method Nasal Cannula   Home O2 Frequency of Use At Sleep   Pulse Oximetry 98 %   O2 Daily Delivery Respiratory  Room Air with O2 Available   Protocol Pathways   Protocol Pathways Yes   O2 Protocol   O2 Protocol Indications Acute Care situation where Hypoxemia is suspected or possible   PRN oxygen initiated for: PRN Oxygen at Home   O2 Protocol Goals/Outcome Return to home Oxygen therapy baseline   Smoking History   Do you have any pipes/cigarettes/lighter/matches/etc in your possesion No

## 2019-09-06 NOTE — DISCHARGE SUMMARY
Discharge Summary    CHIEF COMPLAINT ON ADMISSION  Chief Complaint   Patient presents with   • Numbness     onset 4 days ago left leg       Reason for Admission  Leg Pain, Foot Pain     CODE STATUS  Full Code    HPI & HOSPITAL COURSE  This is a 74 y.o. female here with 4-5 days of left leg numbness and weakness. She was found to have evidence for multiple acute embolic CVAs to the right frontal lobe and chronic appearing arterial occlusion of the superficial femoral and popliteal arteries. She has left foot drop as a result of the stroke. She was seen be vascular surgery and anticoagulated. She was not noted to have cyanosis or resting pain in her leg. She has been optimized on statin therapy and anticoagulation. A CTA of the aorta and echo are detailed below.  She will be transferred to acute inpatient rehab for ongoing therapy. Her lisinopril has been increased on this admission. She may need further titration of her BP meds in the near future.     Therefore, she is discharged in good and stable condition to an inpatient rehabilitation hospital.    The patient met 2-midnight criteria for an inpatient stay at the time of discharge.      FOLLOW UP ITEMS POST DISCHARGE  Pcp and stroke clinic and vascular surgery.     DISCHARGE DIAGNOSES  Active Problems:    Tobacco abuse POA: Yes    Essential hypertension POA: Yes    COPD POA: Yes      Overview: IMO Update    PVD (peripheral vascular disease) (HCC) POA: Unknown    Arterial occlusion, lower extremity (HCC) POA: Unknown    CVA (cerebral vascular accident) (HCC) POA: Unknown  Resolved Problems:    * No resolved hospital problems. *      FOLLOW UP  Future Appointments   Date Time Provider Department Center   9/10/2019  2:20 PM Idalia Cortez M.D. [facility]     Idalia Cortez M.D.  [address]  [place] [address]  [phone]          Milad Wren M.D.  [address]  [place] [address] [phone]            MEDICATIONS ON  DISCHARGE     Medication List      START taking these medications      Instructions   acetaminophen 325 MG Tabs  Commonly known as:  TYLENOL   Take 2 Tabs by mouth every 6 hours as needed (Mild Pain; (Pain scale 1-3); Temp greater than 100.5 F).  Dose:  650 mg     * apixaban 5mg Tabs  Commonly known as:  ELIQUIS   Take 2 Tabs by mouth 2 Times a Day for 7 days.  Dose:  10 mg     * apixaban 5mg Tabs  Commonly known as:  ELIQUIS   Take 2 Tabs by mouth 2 Times a Day.  Dose:  10 mg     * apixaban 5mg Tabs  Start taking on:  9/12/2019  Commonly known as:  ELIQUIS   Take 1 Tab by mouth 2 Times a Day.  Dose:  5 mg     * apixaban 5mg Tabs  Start taking on:  9/12/2019  Commonly known as:  ELIQUIS   Take 1 Tab by mouth 2 Times a Day.  Dose:  5 mg     atorvastatin 80 MG tablet  Commonly known as:  LIPITOR   Take 1 Tab by mouth every evening.  Dose:  80 mg     nicotine 21 MG/24HR Pt24  Commonly known as:  NICODERM   Apply 1 Patch to skin as directed every 24 hours.  Dose:  1 Patch         * This list has 4 medication(s) that are the same as other medications prescribed for you. Read the directions carefully, and ask your doctor or other care provider to review them with you.            CHANGE how you take these medications      Instructions   * lisinopril 10 MG Tabs  What changed:  Another medication with the same name was added. Make sure you understand how and when to take each.  Commonly known as:  PRINIVIL   Take 10 mg by mouth every day.  Dose:  10 mg     * lisinopril 20 MG Tabs  Start taking on:  9/7/2019  What changed:  You were already taking a medication with the same name, and this prescription was added. Make sure you understand how and when to take each.  Commonly known as:  PRINIVIL   Take 1 Tab by mouth every day.  Dose:  20 mg         * This list has 2 medication(s) that are the same as other medications prescribed for you. Read the directions carefully, and ask your doctor or other care provider to review them  with you.            CONTINUE taking these medications      Instructions   albuterol 108 (90 Base) MCG/ACT Aers inhalation aerosol   Inhale 2 Puffs by mouth every 6 hours as needed for Shortness of Breath.  Dose:  2 Puff     B COMPLEX PO   Take 1 Tab by mouth every day.  Dose:  1 Tab     MULTIVITAMIN PO   Take 1 Tab by mouth every day.  Dose:  1 Tab        STOP taking these medications    AMOXICILLIN PO     aspirin 325 MG Tabs  Commonly known as:  ASA     aspirin EC 81 MG Tbec  Commonly known as:  ECOTRIN     FISH OIL PO            Allergies  Allergies   Allergen Reactions   • Lactose Diarrhea     Lactose intolerence   • Morphine Itching     Hallucinations, altered mentations       DIET  Orders Placed This Encounter   Procedures   • Diet Order Regular     Standing Status:   Standing     Number of Occurrences:   1     Order Specific Question:   Diet:     Answer:   Regular [1]       ACTIVITY  As tolerated.  Weight bearing as tolerated    LINES, DRAINS, AND WOUNDS  This is an automated list. Peripheral IVs will be removed prior to discharge.  Peripheral IV 09/03/19 20 G Left Antecubital (Active)   Site Assessment Clean;Dry;Intact 9/5/2019  7:15 PM   Dressing Type Transparent;Occlusive 9/5/2019  7:15 PM   Line Status Scrubbed the hub prior to access;Saline locked;Flushed;Blood return noted 9/5/2019  7:15 PM   Dressing Status Clean;Dry;Intact 9/5/2019  7:15 PM   Dressing Intervention N/A 9/4/2019  7:00 PM   Date Primary Tubing Changed 09/03/19 9/5/2019  8:53 AM   NEXT Primary Tubing Change  09/07/19 9/4/2019  7:00 PM   Infiltration Grading ([facility]) 0 9/5/2019  7:15 PM   Phlebitis Scale ([facility] Only) 0 9/5/2019  7:15 PM       Peripheral IV 09/04/19 20 G Right Wrist (Active)   Site Assessment Clean;Dry;Intact 9/5/2019  7:15 PM   Dressing Type Transparent;Occlusive 9/5/2019  7:15 PM   Line Status Scrubbed the hub prior to access;Saline locked;Flushed;Blood return noted 9/5/2019  7:15 PM   Dressing Status  Clean;Dry;Intact 9/5/2019  7:15 PM   Dressing Intervention N/A 9/4/2019  7:00 PM   Infiltration Grading ([facility], [facility]) 0 9/5/2019  7:15 PM   Phlebitis Scale ([facility] Only) 0 9/5/2019  7:15 PM          Peripheral IV 09/03/19 20 G Left Antecubital (Active)   Site Assessment Clean;Dry;Intact 9/5/2019  7:15 PM   Dressing Type Transparent;Occlusive 9/5/2019  7:15 PM   Line Status Scrubbed the hub prior to access;Saline locked;Flushed;Blood return noted 9/5/2019  7:15 PM   Dressing Status Clean;Dry;Intact 9/5/2019  7:15 PM   Dressing Intervention N/A 9/4/2019  7:00 PM   Date Primary Tubing Changed 09/03/19 9/5/2019  8:53 AM   NEXT Primary Tubing Change  09/07/19 9/4/2019  7:00 PM   Infiltration Grading ([facility], [facility]) 0 9/5/2019  7:15 PM   Phlebitis Scale ([facility] Only) 0 9/5/2019  7:15 PM       Peripheral IV 09/04/19 20 G Right Wrist (Active)   Site Assessment Clean;Dry;Intact 9/5/2019  7:15 PM   Dressing Type Transparent;Occlusive 9/5/2019  7:15 PM   Line Status Scrubbed the hub prior to access;Saline locked;Flushed;Blood return noted 9/5/2019  7:15 PM   Dressing Status Clean;Dry;Intact 9/5/2019  7:15 PM   Dressing Intervention N/A 9/4/2019  7:00 PM   Infiltration Grading ([facility]) 0 9/5/2019  7:15 PM   Phlebitis Scale ([facility] Only) 0 9/5/2019  7:15 PM               MENTAL STATUS ON TRANSFER  Level of Consciousness: Alert  Orientation : Oriented x 4  Speech: Speech Clear    CONSULTATIONS  neurology- Block  vascular- Wren     PROCEDURES  None    MRI Brain:  1.  Scattered areas of acute ischemia in the high RIGHT frontal and high RIGHT parietal cortex  2.  No hemorrhage  3.  Moderate atrophy with disproportionate enlargement of the third and lateral ventricles relative to the other CSF containing spaces in a pattern which could indicate normal pressure hydrocephalus in the appropriate clinical setting  4.  Advanced white matter changes    CTA aorta with runoff  There is occlusion of the distal left superficial femoral  artery at the adductor canal by thrombus. Opacified collateral vessels and reconstitution of flow within the trifurcation arteries are identified.  There is delayed flow through the right popliteal artery and trifurcation arteries with no abrupt occlusion or focal stenosis identified.  Atherosclerotic changes.  No hydronephrosis.  No evidence of bowel obstruction.  Colon diverticula. No free fluid.      Echocardiography:  Normal right and left ventricular size and function.   Mild concentric left ventricular hypertrophy.  Enlarged right atrium.  Mild to moderate tricuspid regurgitation.  Right heart pressures are normal.       Carotid Duplex   Report     Vascular Laboratory   CONCLUSIONS   Mild bilateral internal carotid artery stenosis (<50%).     Lower Extremity   Arterial Duplex Report     Vascular Laboratory   CONCLUSIONS   Outflow disease in left lower extremity as depicted below:   1) The left femoral artery is occluded at the mid-distal level with    collateral channels identified.    2) The left popliteal artery is also occluded. Reconstitution of flow    visualized within the proximal tibial arteries.   3) No flow identified within the distal posterior tibial artery, suggesting    more proximal occlusion.       Vascular Laboratory   Conclusions   Ankle-brachial index of left lower extremity is severely reduced.    suggestive of outflow disease.   Left arterial duplex scan was performed in accordance with lower extremity    arterial evaluation protocol - see separate report.  LABORATORY  Lab Results   Component Value Date    SODIUM 141 09/03/2019    POTASSIUM 4.1 09/03/2019    CHLORIDE 104 09/03/2019    CO2 24 09/03/2019    GLUCOSE 163 (H) 09/03/2019    BUN 15 09/03/2019    CREATININE 0.78 09/03/2019        Lab Results   Component Value Date    WBC 6.5 09/03/2019    HEMOGLOBIN 15.5 09/03/2019    HEMATOCRIT 46.0 09/03/2019    PLATELETCT 163 (L) 09/03/2019        Total time of the discharge process exceeds 34  minutes.

## 2019-09-06 NOTE — PROGRESS NOTES
Telemetry Shift Summary     Rhythm NSR  HR Range 80-90  Ectopy n/a  Measurements 0.20/0.10/0.40           Normal Values  Rhythm SR  HR Range    Measurements 0.12-0.20 / 0.06-0.10  / 0.30-0.52

## 2019-09-06 NOTE — PROGRESS NOTES
Patient admitted to facility at 1220 via WC; accompanied by hospital transport.  Patient assisted to room and positioned in bed for comfort and safety; call light within reach.  Patient assisted with stowing belongings and oriented to room and facility. Pt with elevated HR at 115 and feeling light headed. Dr. Francisco at bedside. HR did reduce and pt states she's feeling better. Admission assessment performed and documented in computer.  Admission paperwork completed; signed copies placed in chart.

## 2019-09-06 NOTE — CARE PLAN
Problem: Knowledge Deficit  Goal: Knowledge of the prescribed therapeutic regimen will improve  Outcome: PROGRESSING AS EXPECTED  Note:   Explained POC with pt. Pt scheduled to go to rehab today. Pt verbalized understanding.

## 2019-09-06 NOTE — DISCHARGE PLANNING
GURMEETW received message that [facility] can get pt today at 11:30. Report can be called to ext: [phone]

## 2019-09-07 PROBLEM — R79.89 AZOTEMIA: Status: ACTIVE | Noted: 2019-09-07

## 2019-09-07 LAB
25(OH)D3 SERPL-MCNC: 44 NG/ML (ref 30–100)
ALBUMIN SERPL BCP-MCNC: 3.7 G/DL (ref 3.2–4.9)
ALBUMIN/GLOB SERPL: 1.5 G/DL
ALP SERPL-CCNC: 60 U/L (ref 30–99)
ALT SERPL-CCNC: 18 U/L (ref 2–50)
ANION GAP SERPL CALC-SCNC: 8 MMOL/L (ref 0–11.9)
AST SERPL-CCNC: 23 U/L (ref 12–45)
BASOPHILS # BLD AUTO: 0.8 % (ref 0–1.8)
BASOPHILS # BLD: 0.05 K/UL (ref 0–0.12)
BILIRUB SERPL-MCNC: 0.9 MG/DL (ref 0.1–1.5)
BUN SERPL-MCNC: 22 MG/DL (ref 8–22)
CALCIUM SERPL-MCNC: 9 MG/DL (ref 8.5–10.5)
CHLORIDE SERPL-SCNC: 106 MMOL/L (ref 96–112)
CO2 SERPL-SCNC: 25 MMOL/L (ref 20–33)
CREAT SERPL-MCNC: 0.82 MG/DL (ref 0.5–1.4)
EOSINOPHIL # BLD AUTO: 0.28 K/UL (ref 0–0.51)
EOSINOPHIL NFR BLD: 4.8 % (ref 0–6.9)
ERYTHROCYTE [DISTWIDTH] IN BLOOD BY AUTOMATED COUNT: 46.1 FL (ref 35.9–50)
GLOBULIN SER CALC-MCNC: 2.4 G/DL (ref 1.9–3.5)
GLUCOSE SERPL-MCNC: 88 MG/DL (ref 65–99)
HCT VFR BLD AUTO: 48 % (ref 37–47)
HGB BLD-MCNC: 15.2 G/DL (ref 12–16)
IMM GRANULOCYTES # BLD AUTO: 0.01 K/UL (ref 0–0.11)
IMM GRANULOCYTES NFR BLD AUTO: 0.2 % (ref 0–0.9)
LYMPHOCYTES # BLD AUTO: 1.83 K/UL (ref 1–4.8)
LYMPHOCYTES NFR BLD: 31.1 % (ref 22–41)
MAGNESIUM SERPL-MCNC: 1.8 MG/DL (ref 1.5–2.5)
MCH RBC QN AUTO: 28.7 PG (ref 27–33)
MCHC RBC AUTO-ENTMCNC: 31.7 G/DL (ref 33.6–35)
MCV RBC AUTO: 90.6 FL (ref 81.4–97.8)
MONOCYTES # BLD AUTO: 0.46 K/UL (ref 0–0.85)
MONOCYTES NFR BLD AUTO: 7.8 % (ref 0–13.4)
NEUTROPHILS # BLD AUTO: 3.26 K/UL (ref 2–7.15)
NEUTROPHILS NFR BLD: 55.3 % (ref 44–72)
NRBC # BLD AUTO: 0 K/UL
NRBC BLD-RTO: 0 /100 WBC
PLATELET # BLD AUTO: 152 K/UL (ref 164–446)
PMV BLD AUTO: 10.4 FL (ref 9–12.9)
POTASSIUM SERPL-SCNC: 4.2 MMOL/L (ref 3.6–5.5)
PROT SERPL-MCNC: 6.1 G/DL (ref 6–8.2)
RBC # BLD AUTO: 5.3 M/UL (ref 4.2–5.4)
SODIUM SERPL-SCNC: 139 MMOL/L (ref 135–145)
WBC # BLD AUTO: 5.9 K/UL (ref 4.8–10.8)

## 2019-09-07 PROCEDURE — 82306 VITAMIN D 25 HYDROXY: CPT

## 2019-09-07 PROCEDURE — 85025 COMPLETE CBC W/AUTO DIFF WBC: CPT

## 2019-09-07 PROCEDURE — 97162 PT EVAL MOD COMPLEX 30 MIN: CPT

## 2019-09-07 PROCEDURE — A9270 NON-COVERED ITEM OR SERVICE: HCPCS | Performed by: PHYSICAL MEDICINE & REHABILITATION

## 2019-09-07 PROCEDURE — 97167 OT EVAL HIGH COMPLEX 60 MIN: CPT

## 2019-09-07 PROCEDURE — 99222 1ST HOSP IP/OBS MODERATE 55: CPT | Performed by: HOSPITALIST

## 2019-09-07 PROCEDURE — 92523 SPEECH SOUND LANG COMPREHEN: CPT

## 2019-09-07 PROCEDURE — 36415 COLL VENOUS BLD VENIPUNCTURE: CPT

## 2019-09-07 PROCEDURE — 94760 N-INVAS EAR/PLS OXIMETRY 1: CPT

## 2019-09-07 PROCEDURE — 83735 ASSAY OF MAGNESIUM: CPT

## 2019-09-07 PROCEDURE — 97110 THERAPEUTIC EXERCISES: CPT

## 2019-09-07 PROCEDURE — 700102 HCHG RX REV CODE 250 W/ 637 OVERRIDE(OP): Performed by: PHYSICAL MEDICINE & REHABILITATION

## 2019-09-07 PROCEDURE — 770010 HCHG ROOM/CARE - REHAB SEMI PRIVAT*

## 2019-09-07 PROCEDURE — 99231 SBSQ HOSP IP/OBS SF/LOW 25: CPT | Performed by: PHYSICAL MEDICINE & REHABILITATION

## 2019-09-07 PROCEDURE — 80053 COMPREHEN METABOLIC PANEL: CPT

## 2019-09-07 RX ADMIN — ATORVASTATIN CALCIUM 80 MG: 40 TABLET, FILM COATED ORAL at 20:23

## 2019-09-07 RX ADMIN — ACETAMINOPHEN 650 MG: 325 TABLET, FILM COATED ORAL at 00:28

## 2019-09-07 RX ADMIN — SENNOSIDES,DOCUSATE SODIUM 2 TABLET: 8.6; 5 TABLET, FILM COATED ORAL at 20:24

## 2019-09-07 RX ADMIN — SENNOSIDES,DOCUSATE SODIUM 2 TABLET: 8.6; 5 TABLET, FILM COATED ORAL at 08:35

## 2019-09-07 RX ADMIN — APIXABAN 10 MG: 5 TABLET, FILM COATED ORAL at 08:35

## 2019-09-07 RX ADMIN — LISINOPRIL 20 MG: 20 TABLET ORAL at 08:35

## 2019-09-07 RX ADMIN — APIXABAN 10 MG: 5 TABLET, FILM COATED ORAL at 20:24

## 2019-09-07 ASSESSMENT — ENCOUNTER SYMPTOMS
ABDOMINAL PAIN: 0
PALPITATIONS: 0
VOMITING: 0
EYES NEGATIVE: 1
CHILLS: 0
NAUSEA: 0
FOCAL WEAKNESS: 1
FEVER: 0
SHORTNESS OF BREATH: 0
COUGH: 0

## 2019-09-07 ASSESSMENT — BRIEF INTERVIEW FOR MENTAL STATUS (BIMS)
WHAT YEAR IS IT: CORRECT
ASKED TO RECALL BLUE: YES, NO CUE REQUIRED
BIMS SUMMARY SCORE: 15
ASKED TO RECALL SOCK: YES, AFTER CUEING (SOMETHING TO WEAR")"
ASKED TO RECALL SOCK: YES, NO CUE REQUIRED
BIMS SUMMARY SCORE: 12
ASKED TO RECALL BED: NO, COULD NOT RECALL
INITIAL REPETITION OF BED BLUE SOCK - FIRST ATTEMPT: 3
WHAT DAY OF THE WEEK IS IT: CORRECT
WHAT MONTH IS IT: ACCURATE WITHIN 5 DAYS
WHAT YEAR IS IT: CORRECT
WHAT DAY OF THE WEEK IS IT: CORRECT
ASKED TO RECALL BED: YES, NO CUE REQUIRED
ASKED TO RECALL BLUE: YES, NO CUE REQUIRED
INITIAL REPETITION OF BED BLUE SOCK - FIRST ATTEMPT: 3
WHAT MONTH IS IT: ACCURATE WITHIN 5 DAYS

## 2019-09-07 ASSESSMENT — ACTIVITIES OF DAILY LIVING (ADL): TOILETING: INDEPENDENT

## 2019-09-07 NOTE — ASSESSMENT & PLAN NOTE
BP better recently (9/25)  BP recently rising up in the early am  Off Lisinopril (2nd to ? causing prior dizziness)  On Norvasc: 5 mg daily (9/23)  COnt to monitor

## 2019-09-07 NOTE — CARE PLAN
Problem: Safety  Goal: Will remain free from injury  Outcome: PROGRESSING AS EXPECTED  Note:   Call light with in reach. Redirection to use call light for assistance. Non skid socks. Upper siderails up x2 while in bed.      Problem: Pain Management  Goal: Pain level will decrease to patient's comfort goal  Outcome: PROGRESSING AS EXPECTED  Note:   Educate patient of non-pharmacological comfort measures: repositioning, relaxation/breathing technique, cold compress and activities. Complains of back pain. Repositioned self in bed and as needed medication given with relief. Able to make needs known. Assisted as needed. O2 n/c in place at 2 lpm. Will continue to monitor.

## 2019-09-07 NOTE — FLOWSHEET NOTE
09/07/19 0946   Events/Summary/Plan   Events/Summary/Plan O2 spot check   Education   Education Yes - Pt. / Family has been Instructed in use of Respiratory Equipment   Chest Exam   Respiration 16   Pulse 92   Oximetry   #Pulse Oximetry (Single Determination) Yes   Oxygen   Home O2 Use Prior To Admission? Yes   Home O2 LPM Flow 2 LPM   Home O2 Delivery Method Nasal Cannula   Home O2 Frequency of Use At Sleep   Pulse Oximetry 92 %   O2 (LPM) 2   O2 Daily Delivery Respiratory  Silicone Nasal Cannula   Room Air Challenge Fail  (Room air SpO2=86% While awake and sitting in chair.)
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
## 2019-09-07 NOTE — THERAPY
Occupational Therapy   Initial Evaluation     Patient Name: Nohelia Dickerson  Age:  74 y.o., Sex:  female  Medical Record #: [id number]  Today's Date: 9/7/2019     Subjective    Pt seated in w/c when undersigned therapist arrived.Pleasant and agreeable to participate in OT initial eval. Became emotional when mentioned her daughter recently passed away (June 2019).     Objective       09/07/19 1001   Prior Living Situation   Prior Services None   Housing / Facility 1 Story House  (In-law unit)   Steps Into Home 0   Steps In Home 0   Rail None   Elevator No   Bathroom Set up Bathtub / Shower Combination;Shower Curtain   Equipment Owned Single Point Cane  (recently received in hospital)   Lives with - Patient's Self Care Capacity Adult Children  (daughter recently moved in with pt)   Comments Pt's son-in-law and granddaughters live in main house, she lives in 1-story in-law unit her son built    Prior Level of ADL Function   Self Feeding Independent   Grooming / Hygiene Independent   Bathing Independent   Dressing Independent   Toileting Independent   Prior Level of IADL Function   Medication Management Independent   Laundry Independent   Kitchen Mobility Independent   Finances Independent   Home Management Independent   Shopping Independent   Prior Level Of Mobility Independent Without Device in Community   Driving / Transportation Driving Independent   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)   Leisure Interests Other (Comments)  (Enjoys spending time with family)   IRF-LINDA:  Prior Functioning: Everyday Activities   Self Care Independent   Indoor Mobility (Ambulation) Independent   Stairs Independent   Functional Cognition Independent   Prior Device Use None of the given options   Vitals   Pulse 100   Patient BP Position Sitting   Blood Pressure  (!) 91/56  (RN notified)   Pulse Oximetry 93 %   O2 (LPM) 3   Pain 0 - 10 Group   Location Back   Location Orientation Mid   Pain  "Rating Scale (NPRS) 6  (Pt reports pain is tolerable)   Description Burning   Cognition    Cognition / Consciousness X   Speech/ Communication Delayed Responses   Orientation Level Oriented x 4   Level of Consciousness Alert   IRF-LINDA:  Cognitive Pattern Assessment   Cognitive Pattern Assessment Used BIMS   IRF-LINDA:  Brief Interview for Mental Status (BIMS)   Repetition of Three Words (First Attempt) 3   Temporal Orientation: Able to Report the Correct Year Correct   Temporal Orientation: Able to Report the Correct Month Accurate within 5 days   Temporal Orientation: Able to Report the Correct Day of the Week Correct   Able to Recall \"Sock\" Yes, after cueing (\"something to wear\")   Able to Recall \"Blue\" Yes, no cue required   Able to Recall \"Bed\" No, could not recall   BIMS Summary Score 12   Vision Screen   Vision Tested   Tracking Decreased smoothness of horizontal tracking;Decreased smoothness of vertical tracking   Passive ROM Upper Body   Passive ROM Upper Body WDL   Active ROM Upper Body   Active ROM Upper Body  WDL   Dominant Hand Right   Strength Upper Body   Upper Body Strength  WDL  (Appears grossly WFL based on function)   Sensation Upper Body   Upper Extremity Sensation  WDL  (no significant sensation impairments noted)   Upper Body Muscle Tone   Upper Body Muscle Tone  WDL   Balance Assessment   Sitting Balance (Static) Fair   Sitting Balance (Dynamic) Fair -   Standing Balance (Static) Trace +   Standing Balance (Dynamic) Trace +   Weight Shift Sitting Fair   Weight Shift Standing Poor   Coordination Upper Body   Coordination WDL   IRF-LINDA:  Eating   Assistance Needed Set-up / clean-up   CARE Score 5   Discharge Goal:  Assistance Needed Independent   Discharge Goal:  Score 6   IRF-LINDA:  Oral Hygiene   Assistance Needed Set-up / clean-up   CARE Score 5   Discharge Goal:  Assistance Needed Independent   Discharge Goal:  Score 6   IRF-LINDA:  Shower/Bathe Self   Assistance Needed Physical assistance "   Physical Assistance Level Less than 25%   CARE Score 3   Discharge Goal:  Assistance Needed Supervision   Discharge Goal Score 4   IRF-LINDA:  Upper Body Dressing   Assistance Needed Set-up / clean-up   CARE Score 5   Discharge Goal:  Assistance Needed Independent   Dischage Goal:  Score 6   IRF-LINDA:  Lower Body Dressing   Assistance Needed Physical assistance   Physical Assistance Level 50%-74%   CARE Score 2   Discharge Goal:  Assistance Needed Supervision   Discharge Goal:  Score 4   IRF LINDA:  Putting On/Taking Off Footwear   Assistance Needed Physical assistance   Physical Assistance Level 25%-49%   CARE Score 3   Discharge Goal:  Assistance Needed Independent   Discharge Goal:  Score 6   IRF-LINDA:  Toileting Hygiene   Assistance Needed Physical assistance   Physical Assistance Level 50%-74%   CARE Score 2   Discharge Goal:  Assistance Needed Supervision   Discharge Goal:  Score 4   IRF-LINDA:  Toilet Transfer   Assistance Needed Physical assistance   Physical Assistance Level 25%-49%   CARE Score 3   Discharge Goal:  Assistance Needed Supervision   Discahrge Goal:  Score 4   IRF-LINDA:  Hearing, Speech, and Vision   Expression of Ideas and Wants 3   Understanding Verbal and Non-Verbal Content 4   Problem List   Problem List Decreased Functional Mobility;Decreased Activity Tolerance;Safety Awareness Deficits / Cognition;Impaired Cognitive Function;Impaired Vision;Impaired Postural Control / Balance   Precautions   Precautions Fall Risk   Current Discharge Plan   Current Discharge Plan Return to Prior Living Situation   Benefit    Therapy Benefit Patient Would Benefit from Inpatient Rehab Occupational Therapy to Maximize [place] with ADLs, IADLs and Functional Mobility.   Interdisciplinary Plan of Care Collaboration   IDT Collaboration with  Physical Therapist   Patient Position at End of Therapy Seated;Self Releasing Lap Belt Applied;Call Light within Reach   Collaboration Comments CLOF   Equipment Needs    Assistive Device / DME Wheelchair;Shower Chair;Grab Bars In Shower / Tub;Grab Bars By Toilet   Adaptive Equipment Sock Aide   OT Total Time Spent   OT Individual Total Time Spent (Mins) 60   OT Charge Group   OT Evaluation OT Evaluation High       FIM Eating Score:  5 - Standby Prompting/Supervision or Set-up  Eating Description:  Increased time, Set-up of equipment or meal/tube feeding    FIM Grooming Score:  5 - Standby Prompting/Supervision or Set-up  Grooming Description:  Increased time, Set-up of equipment, Supervision for safety    FIM Bathing Score:  4 - Minimal Assistance  Bathing Description:  Grab bar, Tub bench, Hand held shower, Increased time, Supervision for safety, Verbal cueing, Set-up of equipment(Patient washed, rinsed and dried all body parts while seated on bath bench with min assist (for sitting balance and safety).  )    FIM Upper Body Dressin - Standby Prompting/Supervision or Set-up  Upper Body Dressing Description:  Set-up of equipment, Verbal cueing(Patient donned t-shirt with set-up while seated)    FIM Lower Body Dressing Score:  2 - Max Assistance  Lower Body Dressing Description:  Increased time, Supervision for safety, Verbal cueing, Set-up of equipment, Assist device/equipment(Patient donned elastic waist pants with max assist for LLE, standing balance and pants over hips. Used grab bar for safety and balance. )    FIM Toileting Body Dressin - Max Assistance  Toileting Description:  Grab bar, Increased time, Supervision for safety, Verbal cueing, Set-up of equipment(Patient performs toileting tasks with max assist (for balance and clothing management before and after voiding,)    FIM Bed/Chair/Wheelchair Transfers Score:    Bed/Chair/Wheelchair Transfers Description:       FIM Toilet Transfer Score:  3 - Moderate Assistance  Toilet Transfer Description:  Supervision for safety, Verbal cueing, Set-up of equipment, Assist with one limb, Increased time, Grab bar(Patient  transferred wc<>toilet with mod assist for standing balance, lowering assist  and managment of LLE. )    FIM Tub/Shower Transfers Score:  3 - Moderate Assistance  Tub/Shower Transfers Description:  Grab bar, Increased time, Supervision for safety, Verbal cueing, Set-up of equipment, Assist with one limb((Patient transferred wc<>shower bench with mod assist for standing balance, lowering assist  and managment of LLE. ) Used grab bar for balance and safety. )    Assessment  Patient is 74 y.o. female with a diagnosis of CVA.  Additional factors influencing patient status / progress (ie: cognitive factors, co-morbidities, social support, etc): Per H&P, pt found to have peripheral artery disease (PAD) in the affected limb, however during her stay she also developed left arm paresthesias followed by heaviness in the left arm and some left face drooping that resolved after a few minutes. MRI brain showed an acute infarcts in the right frontal and right parietal lobes with enlarged lateral ventricles.    Patient presents motivated to participate in therapy and improve overall function and independence with ADLs/IADLs. Resides in single-story in-home unit her son-in-law built. Daughter recently moved in with patient to assist, as her other daughter passed away in June of this year. Patient previously independent with all ADLs and IADLs, owned beauty shop/worked full-time.     Patient required increased time to perform ADL tasks due to slowed processing/motor planning. Additional limiting factors include decreased LLE motor control, decreased ADL/ IADL function, decreased balance, decreased vision (impaired tracking to L noted), and decreased functional mobility.     Plan  Recommend Occupational Therapy  minutes per day 5-7 days per week for 2 weeks for the following treatments:  OT Group Therapy, OT Self Care/ADL, OT Cognitive Skill Dev, OT Community Reintegration, OT Manual Ther Technique, OT Neuro Re-Ed/Balance, OT  Sensory Int Techniques, OT Therapeutic Activity, OT Evaluation and OT Therapeutic Exercise.    Goals:  Long term and short term goals have been discussed with patient and they are in agreement.    Occupational Therapy Goals     Problem: Dressing     Dates: Start: 09/07/19       Description:     Goal: STG-Within one week, patient will dress LB     Dates: Start: 09/07/19       Description: 1) Individualized Goal:  Mod assist   2) Interventions:  OT Group Therapy, OT Self Care/ADL, OT Cognitive Skill Dev, OT Community Reintegration, OT Manual Ther Technique, OT Neuro Re-Ed/Balance, OT Sensory Int Techniques, OT Therapeutic Activity, OT Evaluation and OT Therapeutic Exercise                   Problem: Functional Transfers     Dates: Start: 09/07/19       Description:     Goal: STG-Within one week, patient will transfer to toilet     Dates: Start: 09/07/19       Description: 1) Individualized Goal:  Min assist   2) Interventions:  OT Group Therapy, OT Self Care/ADL, OT Cognitive Skill Dev, OT Community Reintegration, OT Manual Ther Technique, OT Neuro Re-Ed/Balance, OT Sensory Int Techniques, OT Therapeutic Activity, OT Evaluation and OT Therapeutic Exercise                   Problem: OT Long Term Goals     Dates: Start: 09/07/19       Description:     Goal: LTG-By discharge, patient will complete basic self care tasks     Dates: Start: 09/07/19       Description: 1) Individualized Goal:  Mod I for UB ADLs, Set-up and SBA for LB ADLs  2) Interventions:  OT Group Therapy, OT Self Care/ADL, OT Cognitive Skill Dev, OT Community Reintegration, OT Manual Ther Technique, OT Neuro Re-Ed/Balance, OT Sensory Int Techniques, OT Therapeutic Activity, OT Evaluation and OT Therapeutic Exercise             Goal: LTG-By discharge, patient will perform bathroom transfers     Dates: Start: 09/07/19       Description: 1) Individualized Goal:  SBA  2) Interventions:  OT Group Therapy, OT Self Care/ADL, OT Cognitive Skill Dev, OT Community  Reintegration, OT Manual Ther Technique, OT Neuro Re-Ed/Balance, OT Sensory Int Techniques, OT Therapeutic Activity, OT Evaluation and OT Therapeutic Exercise             Goal: LTG-By discharge, patient will complete basic home management     Dates: Start: 09/07/19       Description: 1) Individualized Goal: Supervision  2) Interventions:  OT Group Therapy, OT Self Care/ADL, OT Cognitive Skill Dev, OT Community Reintegration, OT Manual Ther Technique, OT Neuro Re-Ed/Balance, OT Sensory Int Techniques, OT Therapeutic Activity, OT Evaluation and OT Therapeutic Exercise                   Problem: Toileting     Dates: Start: 09/07/19       Description:     Goal: STG-Within one week, patient will complete toileting tasks     Dates: Start: 09/07/19       Description: 1) Individualized Goal:  Mod assist   2) Interventions:  OT Group Therapy, OT Self Care/ADL, OT Cognitive Skill Dev, OT Community Reintegration, OT Manual Ther Technique, OT Neuro Re-Ed/Balance, OT Sensory Int Techniques, OT Therapeutic Activity, OT Evaluation and OT Therapeutic Exercise

## 2019-09-07 NOTE — THERAPY
Physical Therapy   Initial Evaluation     Patient Name: Nohelia Dickerson  Age:  74 y.o., Sex:  female  Medical Record #: [id number]  Today's Date: 9/7/2019     Subjective    Pt resting in bed, willing to participate.      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 / [employer] and independent without device prior to onset of symptoms   IRF-LINDA:  Prior Functioning: Everyday Activities   Self Care Independent   Indoor Mobility (Ambulation) Independent   Stairs Independent   Functional Cognition Independent   Prior Device Use None of the given options   Cognition    Cognition / Consciousness X   Speech/ Communication Delayed Responses   Level of Consciousness Alert   Attention Impaired   Passive ROM Lower Body   Passive ROM Lower Body WDL   Active ROM Lower Body    Active ROM Lower Body  X   Comments LLE limited by signifincant weakness - see MMT   Strength Lower Body   Lower Body Strength  X   Lt Hip Flexion Strength 2 (P)   Lt Hip Abduction Strength 2 (P)   Lt Hip Adduction Strength 2 (P)   Lt Knee Flexion Strength 1 (T)   Lt Knee Extension Strength 1 (T)   Lt Ankle Dorsiflexion Strength 0 (Zero)   Lt Ankle Plantar Flexion Strength 0 (Zero)   Comments RLE grossly 4/5   Sensation Lower Body   Lower Extremity Sensation   WDL   Lower Body Muscle Tone   Lower Body Muscle Tone  WDL   Balance Assessment   Sitting Balance (Static) Fair   Sitting Balance (Dynamic) Fair -   Standing Balance (Static) Trace +   Standing Balance (Dynamic) Trace +   Weight Shift  Sitting Fair   Weight Shift Standing Poor   Bed Mobility    Supine to Sit Minimal Assist   Sit to Supine Minimal Assist   Sit to Stand Maximal Assist   Scooting Minimal Assist   Neurological Concerns   Neurological Concerns Yes   Sitting Posture During ADL's Lateral Lean Left   Standing Posture During ADL's Lateral Lean Left  (impaired terminal hip/ knee ext LLE)   Footdrop Present   Comments L rafael-weakness   Coordination Lower Body    Coordination Lower Body  Not Tested   IRF-LINDA:  Roll Left and Right   Assistance Needed Physical assistance   Physical Assistance Level 25%-49%   CARE Score 3   Discharge Goal:  Assistance Needed Independent;Adaptive equipment   Discharge Goal:  Score 6   IRF-LINDA:  Sit to Lying   Assistance Needed Physical assistance   Physical Assistance Level Less than 25%   CARE Score 3   Discharge Goal:  Assistance Needed Independent;Adaptive equipment   Discharge Goal:  Score 6   IRF-LINDA:  Lying to Sitting on Side of Bed   Assistance Needed Physical assistance   Physical Assistance Level Less than 25%   CARE Score 3   Discharge Goal:  Assistance Needed Independent;Adaptive equipment   Discharge Goal:  Score 6   IRF-LINDA:  Sit to Stand   Assistance Needed Adaptive equipment;Physical assistance   Physical Assistance Level 50%-74%   CARE Score 2   Discharge Goal:  Assistance Needed Independent;Adaptive equipment   Discahrge Goal:  Score 6   IRF-LINDA:  Chair/Bed-to-Chair Transfer   Assistance Needed Adaptive equipment;Physical assistance   Physical Assistance Level 50%-74%   CARE Score 2   Discharge Goal:  Assistance Needed Independent;Adaptive equipment   Discharge Goal:  Score 6   IRF-LINDA:  Toilet Transfer   Assistance Needed Adaptive equipment;Physical assistance   Physical Assistance Level 50%-74%   CARE Score 2   IRF-LINDA:  Car Transfer   Reason if not Attempted Safety concerns   CARE Score 88   Discharge Goal:  Assistance Needed Adaptive equipment;Supervision   Discharge Goal:  Score 4   IRF LINDA:   Walking   Does the Patient Walk? Yes   IRF LINDA:  Walk 10 Feet   Assistance Needed Adaptive equipment;Physical assistance   Physical Assistance Level Total assistance   CARE Score 1   Discharge Goal:  Assistance Needed Independent;Adaptive equipment   Discharge Goal:  Score 6   IRF-LINDA:  Walk 50 Feet with Two Turns   Reason if not Attempted Safety concerns   CARE Score 88   Discharge Goal:  Assistance Needed Independent;Adaptive equipment   Discharge Goal:  Score 6   IRF-LINDA:  Walk 150 Feet   Reason if not Attempted Safety concerns   CARE Score 88   Discharge Goal:  Assistance Needed Adaptive equipment;Incidental touching   Discharge Goal:  Score 4   IRF LINDA:  Walking 10 Feet on Uneven Surfaces   Reason if not Attempted Safety concerns   CARE Score 88   Discharge Goal:  Assistance Needed Adaptive equipment;Incidental touching   Discharge Goal:  Score 4   IRF LINDA:  1 Step (Curb)   Reason if not Attempted Safety concerns   CARE Score 88   Discharge Goal:  Assistance Needed Adaptive equipment;Incidental touching   Discharge Goal:  Score 4   IRF-LINDA:  4 Steps   Reason if not Attempted Safety concerns   CARE Score 88   Discharge Goal:  Assistance Needed Adaptive equipment;Incidental touching   Discharge Goal:  Score 4   IRF LINDA:  12 Steps   Reason if not Attempted Safety concerns   CARE Score 88   Discharge Goal:  Assistance Needed Adaptive equipment;Incidental touching   Discharge Goal:  Score 4   IRF LINDA:  Picking Up Object   Reason if not Attempted Safety concerns   CARE Score 88   Discharge Goal:  Assistance Needed Adaptive equipment;Incidental touching   Discharge Goal:  Score 4   IRF-LINDA:  Wheel 50 Feet with Two Turns   Indicate the Type of Wheelchair or Scooter Used Manual   Assistance Needed Physical assistance   Physical Assistance Level Total assistance   CARE Score 1   Discharge Goal:  Assistance Needed Supervision   Discharge Goal:  Score 4   IRF-LINDA:  Wheel 150 Feet   Indicate the Type of Wheelchair or  Danielleter Used Manual   Assistance Needed Physical assistance   Physical Assistance Level Total assistance   CARE Score 1   Discharge Goal:  Assistance Needed Supervision   Discharge Goal:  Score 4   Problem List    Problems Impaired Bed Mobility;Impaired Transfers;Impaired Ambulation;Functional ROM Deficit;Functional Strength Deficit;Impaired Balance;Impaired Coordination;Decreased Activity Tolerance;Motor Planning / Sequencing   Precautions   Precautions Fall Risk   Current Discharge Plan   Current Discharge Plan Return to Prior Living Situation   Interdisciplinary Plan of Care Collaboration   IDT Collaboration with  Occupational Therapist   Patient Position at End of Therapy In Bed;Bed Alarm On   Collaboration Comments current level of function   Benefit   Therapy Benefit Patient Would Benefit from Inpatient Rehabilitation Physical Therapy to Maximize Functional Cotton with ADLs, IADLs and Mobility.   PT Total Time Spent   PT Individual Total Time Spent (Mins) 60   PT Charge Group   Charges Yes   PT Therapeutic Exercise 1   PT Evaluation PT Evaluation Mod       FIM Bed/Chair/Wheelchair Transfers Score: 2 - Max Assistance  Bed/Chair/Wheelchair Transfers Description:  (pt requires lifting AND lowering assist for sit<>stand / mod-max A for LLE positioning and management with stand-pivot transfer. Min A sit<>supine for LLE )    FIM Walking Score:  1 - Total Assistance  Walking Description:  (mod A for LLE advancement and WB support with df A ace wrap and // bars for BUE support 10 ft x 3. Wc follow for safety)    FIM Wheelchair Score:  1 - Total Assistance  Wheelchair Description:  (min A x 10 ft / pt struggles with LUE  / coordination)    FIM Stairs Score:  0 - Not tested,unsafe activity  Stairs Description:       Pt completed Nustep endurance / reciprocal patterning exercise x 4 extrmities with LLE ankle and thigh positioning strap x 10 minutes level 1    Assessment  Patient is 74 y.o. female with a  diagnosis of R frontal/ parietal CVA.  Additional factors influencing patient status / progress (ie: cognitive factors, co-morbidities, social support, etc): include PAD, COPD, HTN, and delayed motor planning.      Plan  Recommend Physical Therapy 30-60 minutes per day 5-6 days per week for 3 weeks for the following treatments:  PT Group Therapy, PT E Stim Attended, PT Orthotics Training, PT Gait Training, PT Self Care/Home Eval, PT Therapeutic Exercises, PT Neuro Re-Ed/Balance, PT Aquatic Therapy, PT Therapeutic Activity, PT Manual Therapy and PT Evaluation.    Goals:  Long term and short term goals have been discussed with patient and they are in agreement.    Physical Therapy Problems     Problem: Balance     Dates: Start: 09/07/19       Description:     Goal: STG-Within one week, patient will maintain static standing     Dates: Start: 09/07/19       Description: 1) Individualized goal:  Min A for L terminal hip/ knee ext x 5 minutes with BUE support  2) Interventions: PT Group Therapy, PT E Stim Attended, PT Orthotics Training, PT Gait Training, PT Self Care/Home Eval, PT Therapeutic Exercises, PT Neuro Re-Ed/Balance, PT Aquatic Therapy, PT Therapeutic Activity, PT Manual Therapy and PT Evaluation                   Problem: Mobility     Dates: Start: 09/07/19       Description:     Goal: STG-Within one week, patient will propel wheelchair household distances     Dates: Start: 09/07/19       Description: 1) Individualized goal:  Min A with BUE support 50 ft x 2  2) Interventions: PT Group Therapy, PT E Stim Attended, PT Orthotics Training, PT Gait Training, PT Self Care/Home Eval, PT Therapeutic Exercises, PT Neuro Re-Ed/Balance, PT Aquatic Therapy, PT Therapeutic Activity, PT Manual Therapy and PT Evaluation             Goal: STG-Within one week, patient will ambulate household distance     Dates: Start: 09/07/19       Description: 1) Individualized goal:  Min A with FWW and L AFO as needed 25 ft x 2  2)  Interventions: PT Group Therapy, PT E Stim Attended, PT Orthotics Training, PT Gait Training, PT Self Care/Home Eval, PT Therapeutic Exercises, PT Neuro Re-Ed/Balance, PT Aquatic Therapy, PT Therapeutic Activity, PT Manual Therapy and PT Evaluation                   Problem: Mobility Transfers     Dates: Start: 09/07/19       Description:     Goal: STG-Within one week, patient will perform bed mobility     Dates: Start: 09/07/19       Description: 1) Individualized goal:  SPV with bed rail  2) Interventions: PT Group Therapy, PT E Stim Attended, PT Orthotics Training, PT Gait Training, PT Self Care/Home Eval, PT Therapeutic Exercises, PT Neuro Re-Ed/Balance, PT Aquatic Therapy, PT Therapeutic Activity, PT Manual Therapy and PT Evaluation             Goal: STG-Within one week, patient will sit to stand     Dates: Start: 09/07/19       Description: 1) Individualized goal:  Min A with FWW  2) Interventions: PT Group Therapy, PT E Stim Attended, PT Orthotics Training, PT Gait Training, PT Self Care/Home Eval, PT Therapeutic Exercises, PT Neuro Re-Ed/Balance, PT Aquatic Therapy, PT Therapeutic Activity, PT Manual Therapy and PT Evaluation             Goal: STG-Within one week, patient will transfer bed to chair     Dates: Start: 09/07/19       Description: 1) Individualized goal:  Mod A with FWW  2) Interventions: PT Group Therapy, PT E Stim Attended, PT Orthotics Training, PT Gait Training, PT Self Care/Home Eval, PT Therapeutic Exercises, PT Neuro Re-Ed/Balance, PT Aquatic Therapy, PT Therapeutic Activity, PT Manual Therapy and PT Evaluation                   Problem: PT-Long Term Goals     Dates: Start: 09/07/19       Description:     Goal: LTG-By discharge, patient will tolerate standing     Dates: Start: 09/07/19       Description: 1) Individualized goal:  With BUE and SPV support 5 minutes x 2 for LE strength / balance training  2) Interventions: PT Group Therapy, PT E Stim Attended, PT Orthotics Training, PT Gait  Training, PT Self Care/Home Eval, PT Therapeutic Exercises, PT Neuro Re-Ed/Balance, PT Aquatic Therapy, PT Therapeutic Activity, PT Manual Therapy and PT Evaluation             Goal: LTG-By discharge, patient will propel wheelchair     Dates: Start: 09/07/19       Description: 1) Individualized goal:  SPV with BUE support x 150 ft to/from therapies/ meals  2) Interventions: PT Group Therapy, PT E Stim Attended, PT Orthotics Training, PT Gait Training, PT Self Care/Home Eval, PT Therapeutic Exercises, PT Neuro Re-Ed/Balance, PT Aquatic Therapy, PT Therapeutic Activity, PT Manual Therapy and PT Evaluation             Goal: LTG-By discharge, patient will ambulate     Dates: Start: 09/07/19       Description: 1) Individualized goal:  SBA with FWW x 150 ft  2) Interventions: PT Group Therapy, PT E Stim Attended, PT Orthotics Training, PT Gait Training, PT Self Care/Home Eval, PT Therapeutic Exercises, PT Neuro Re-Ed/Balance, PT Aquatic Therapy, PT Therapeutic Activity, PT Manual Therapy and PT Evaluation             Goal: LTG-By discharge, patient will transfer one surface to another     Dates: Start: 09/07/19       Description: 1) Individualized goal:  Modified independent with FWW  2) Interventions: PT Group Therapy, PT E Stim Attended, PT Orthotics Training, PT Gait Training, PT Self Care/Home Eval, PT Therapeutic Exercises, PT Neuro Re-Ed/Balance, PT Aquatic Therapy, PT Therapeutic Activity, PT Manual Therapy and PT Evaluation             Goal: LTG-By discharge, patient will ambulate up/down 4-6 stairs     Dates: Start: 09/07/19       Description: 1) Individualized goal:  CGA with hand rails  2) Interventions:  PT Group Therapy, PT E Stim Attended, PT Orthotics Training, PT Gait Training, PT Self Care/Home Eval, PT Therapeutic Exercises, PT Neuro Re-Ed/Balance, PT Aquatic Therapy, PT Therapeutic Activity, PT Manual Therapy and PT Evaluation             Goal: LTG-By discharge, patient will transfer in/out of a car      Dates: Start: 09/07/19       Description: 1) Individualized goal:  SBA with FWW  2) Interventions:  PT Group Therapy, PT E Stim Attended, PT Orthotics Training, PT Gait Training, PT Self Care/Home Eval, PT Therapeutic Exercises, PT Neuro Re-Ed/Balance, PT Aquatic Therapy, PT Therapeutic Activity, PT Manual Therapy and PT Evaluation

## 2019-09-07 NOTE — THERAPY
"Speech Language Pathology   Initial Assessment     Patient Name: Nohelia Dickerson  AGE:  74 y.o., SEX:  female  Medical Record #: [id number]  Today's Date: 9/7/2019     Subjective    Pt pleasant and cooperative during evaluation.      Objective       09/07/19 0901   Prior Living Situation   Prior Services Home-Independent   Housing / Facility 1 Story House   Lives with - Patient's Self Care Capacity Adult Children   Prior Level Of Function   Communication Within Functional Limits   Swallow Within Functional Limits   Dentition Edentulous   Dentures Uppers;Lowers   Hearing Within Functional Limits for Evaluation   Hearing Aid None   Vision Wears Corrective Lenses;Distance   Patient's Primary Language English   Education Some College or Trade School   Occupation (Pre-Hospital Vocational) Employed Full Time   Comments Owns a beauty shop    Receptive Language / Auditory Comprehension   Receptive Language / Auditory Comprehension WDL   Expressive Language   Expressive Language (WDL) WDL   Reading Comprehension    Reading Sentences Within Functional Limits (6-7)   Reading Short Paragraphs  Minimal (4)   Written Language Expression   Written Language Expression (WDL) WDL   Cognition   Moderate Attention Minimal (4)   Visual Scanning / Cancellation Skills Within Functional Limits (6-7)   Verbal Short Term Memory 5 Minutes   Visual Short Term Memory Supervision (5)   Written Sequencing Minimal (4)   Clock Drawing Within Functional Limits   IRF-LINDA:  Cognitive Pattern Assessment   Cognitive Pattern Assessment Used BIMS   IRF-LINDA:  Brief Interview for Mental Status (BIMS)   Repetition of Three Words (First Attempt) 3   Temporal Orientation: Able to Report the Correct Year Correct   Temporal Orientation: Able to Report the Correct Month Accurate within 5 days   Temporal Orientation: Able to Report the Correct Day of the Week Correct   Able to Recall \"Sock\" Yes, no cue required   Able to Recall \"Blue\" Yes, no cue required " "  Able to Recall \"Bed\" Yes, no cue required   BIMS Summary Score 15   Social / Pragmatic Communication   Social / Pragmatic Communication WDL   Tracheostomy   Tracheostomy No   Speech Mechanisms / Voice Production   Speech Mechanisms / Voice Production (WDL) WDL   Labial Function   Labial Function (WDL) WDL   Lingual Function   Lingual Function (WDL) WDL   Outcome Measures   Outcome Measures Utilized SCCAN   SCCAN (Scales of Cognitive and Communicative Ability for Neurorehabilitation)   Oral Expression - Raw Score 19   Oral Expression - Scale Performance Score 100   Orientation - Raw Score 12   Orientation - Scale Performance Score 100   Memory - Raw Score 18   Memory - Scale Performance Score 95   Speech Comprehension - Raw Score 12   Speech Comprehension - Scale Performance Score 92   Reading Comprehension - Raw Score 11   Reading Comprehension - Scale Performance Score 92   Writing - Raw Score 7   Writing - Scale Performance Score 100   Attention - Raw Score 14   Attention - Scale Performance Score 88   Problem Solving - Raw Score 20   Problem Solving - Scale Performance Score 87   SCCAN Total Raw Score 88   SCCAN Degree of Severity Typical Functioning   Problem List   Problem List Cognitive-Linguistic Deficits   Current Discharge Plan   Current Discharge Plan Return to Prior Living Situation   Benefit   Therapy Benefit Patient would benefit from Inpatient Rehab Speech-Language Pathology to address above identified deficits.   Interdisciplinary Plan of Care Collaboration   IDT Collaboration with  Certified Nursing Assistant;Occupational Therapist   Collaboration Comments regarding CLOF and transfer status   Speech Language Pathologist Assigned   Assigned SLP / Pager # [phone]/MP [phone], cog   SLP Total Time Spent   SLP Individual Total Time Spent (Mins) 60   SLP Charge Group   SLP Speech Language Evaluation Speech Sound Language Comprehension       FIM Eating Score:     Eating Description:       FIM Comprehension Score:  " 6 - Modified Independent  Comprehension Description:  Glasses(for distance)    FIM Expression Score:  6 - Modified Independent  Expression Description:  Verbal cueing(soft voice/baseline per patient)    FIM Social Interaction Score:  7 - Independent  Social Interaction Description:       FIM Problem Solving Score:  5 - Standby Prompting/Supervision or Set-up  Problem Solving Description:  Verbal cueing, Therapy schedule, Increased time    FIM Memory Score:  5 - Standby Prompting/Supervision or Set-up  Memory Description:  Verbal cueing, Therapy schedule    Assessment    Patient is 74 y.o. female with a diagnosis of CVA; acute infarct in R frontal and R parietal lobes.  Additional factors influencing patient status/progress (ie: cognitive factors, co-morbidities, social support, etc): Minimal cognitive deficits in attn to detail, and executive function.  Recommend skilled speech therapy to target high level executive function, and reasoning skills necessary to return to prior level of function.      Plan  Recommend Speech Therapy 30-60 minutes per day 5-6 days per week for 4 weeks for the following treatments:  SLP Aphasia Evaluation, SLP Cognitive Skill Development and SLP Group Treatment.    Goals:  Long term and short term goals have been discussed with patient and they are in agreement.    Speech Therapy Problems     Problem: Problem Solving STGs     Dates: Start: 09/07/19       Description:     Goal: STG-Within one week, patient will     Dates: Start: 09/07/19       Description: 1) Individualized goal:  Complete medication, and financial management tasks with 90% accuracy given min verbal cues.    2) Interventions:  SLP Cognitive Skill Development and SLP Group Treatment             Goal: STG-Within one week, patient will     Dates: Start: 09/07/19       Description: 1) Individualized goal:  Complete executive function tasks related to scheduling and reasoning with 90% accuracy given min verbal cues.   2)  Interventions:  SLP Cognitive Skill Development and SLP Group Treatment                   Problem: Speech/Swallowing LTGs     Dates: Start: 09/07/19       Description:     Goal: LTG-By discharge, patient will solve complex problem     Dates: Start: 09/07/19       Description: 1) Individualized goal:  Mod I for safe discharge home.   2) Interventions:  SLP Aphasia Evaluation, SLP Cognitive Skill Development and SLP Group Treatment

## 2019-09-07 NOTE — CARE PLAN
Problem: Pain Management  Goal: Pain level will decrease to patient's comfort goal  Note:   Patient able to verbalize needs.  Denies pain or discomfort this shift and no s/s same noted.  Will continue to monitor.      Problem: Safety  Goal: Will remain free from injury  Note:   Patient uses call light consistently and appropriately this shift.  Waits for assistance when needed and does not attempt self transfer.  Able to verbalize needs.  Will continue to monitor.

## 2019-09-08 PROCEDURE — A9270 NON-COVERED ITEM OR SERVICE: HCPCS | Performed by: PHYSICAL MEDICINE & REHABILITATION

## 2019-09-08 PROCEDURE — 700102 HCHG RX REV CODE 250 W/ 637 OVERRIDE(OP): Performed by: PHYSICAL MEDICINE & REHABILITATION

## 2019-09-08 PROCEDURE — 94760 N-INVAS EAR/PLS OXIMETRY 1: CPT

## 2019-09-08 PROCEDURE — G0515 COGNITIVE SKILLS DEVELOPMENT: HCPCS

## 2019-09-08 PROCEDURE — 770010 HCHG ROOM/CARE - REHAB SEMI PRIVAT*

## 2019-09-08 PROCEDURE — 99232 SBSQ HOSP IP/OBS MODERATE 35: CPT | Performed by: HOSPITALIST

## 2019-09-08 RX ORDER — OXYCODONE HYDROCHLORIDE 5 MG/1
5 TABLET ORAL EVERY 4 HOURS PRN
Status: DISCONTINUED | OUTPATIENT
Start: 2019-09-08 | End: 2019-09-25 | Stop reason: HOSPADM

## 2019-09-08 RX ADMIN — APIXABAN 10 MG: 5 TABLET, FILM COATED ORAL at 08:11

## 2019-09-08 RX ADMIN — OXYCODONE HYDROCHLORIDE 5 MG: 5 TABLET ORAL at 16:15

## 2019-09-08 RX ADMIN — APIXABAN 10 MG: 5 TABLET, FILM COATED ORAL at 19:52

## 2019-09-08 RX ADMIN — ATORVASTATIN CALCIUM 80 MG: 40 TABLET, FILM COATED ORAL at 19:52

## 2019-09-08 RX ADMIN — LISINOPRIL 20 MG: 20 TABLET ORAL at 08:11

## 2019-09-08 RX ADMIN — SENNOSIDES,DOCUSATE SODIUM 2 TABLET: 8.6; 5 TABLET, FILM COATED ORAL at 08:11

## 2019-09-08 RX ADMIN — SENNOSIDES,DOCUSATE SODIUM 2 TABLET: 8.6; 5 TABLET, FILM COATED ORAL at 19:52

## 2019-09-08 ASSESSMENT — PATIENT HEALTH QUESTIONNAIRE - PHQ9
8. MOVING OR SPEAKING SO SLOWLY THAT OTHER PEOPLE COULD HAVE NOTICED. OR THE OPPOSITE, BEING SO FIGETY OR RESTLESS THAT YOU HAVE BEEN MOVING AROUND A LOT MORE THAN USUAL: SEVERAL DAYS
3. TROUBLE FALLING OR STAYING ASLEEP OR SLEEPING TOO MUCH: NOT AT ALL
2. FEELING DOWN, DEPRESSED, IRRITABLE, OR HOPELESS: SEVERAL DAYS
5. POOR APPETITE OR OVEREATING: MORE THAN HALF THE DAYS
SUM OF ALL RESPONSES TO PHQ9 QUESTIONS 1 AND 2: 2
7. TROUBLE CONCENTRATING ON THINGS, SUCH AS READING THE NEWSPAPER OR WATCHING TELEVISION: NOT AT ALL
9. THOUGHTS THAT YOU WOULD BE BETTER OFF DEAD, OR OF HURTING YOURSELF: NOT AT ALL
1. LITTLE INTEREST OR PLEASURE IN DOING THINGS: SEVERAL DAYS
1. LITTLE INTEREST OR PLEASURE IN DOING THINGS: SEVERAL DAYS
7. TROUBLE CONCENTRATING ON THINGS, SUCH AS READING THE NEWSPAPER OR WATCHING TELEVISION: NOT AT ALL
9. THOUGHTS THAT YOU WOULD BE BETTER OFF DEAD, OR OF HURTING YOURSELF: NOT AT ALL
3. TROUBLE FALLING OR STAYING ASLEEP OR SLEEPING TOO MUCH: NOT AT ALL
6. FEELING BAD ABOUT YOURSELF - OR THAT YOU ARE A FAILURE OR HAVE LET YOURSELF OR YOUR FAMILY DOWN: NOT AL ALL
SUM OF ALL RESPONSES TO PHQ QUESTIONS 1-9: 5
4. FEELING TIRED OR HAVING LITTLE ENERGY: NOT AT ALL
2. FEELING DOWN, DEPRESSED, IRRITABLE, OR HOPELESS: SEVERAL DAYS
6. FEELING BAD ABOUT YOURSELF - OR THAT YOU ARE A FAILURE OR HAVE LET YOURSELF OR YOUR FAMILY DOWN: NOT AL ALL
SUM OF ALL RESPONSES TO PHQ9 QUESTIONS 1 AND 2: 2
4. FEELING TIRED OR HAVING LITTLE ENERGY: NOT AT ALL
5. POOR APPETITE OR OVEREATING: MORE THAN HALF THE DAYS
8. MOVING OR SPEAKING SO SLOWLY THAT OTHER PEOPLE COULD HAVE NOTICED. OR THE OPPOSITE, BEING SO FIGETY OR RESTLESS THAT YOU HAVE BEEN MOVING AROUND A LOT MORE THAN USUAL: SEVERAL DAYS
SUM OF ALL RESPONSES TO PHQ QUESTIONS 1-9: 5

## 2019-09-08 ASSESSMENT — ENCOUNTER SYMPTOMS
CHILLS: 0
ABDOMINAL PAIN: 0
DIARRHEA: 0
NERVOUS/ANXIOUS: 0
SHORTNESS OF BREATH: 0
FEVER: 0
NAUSEA: 0
VOMITING: 0

## 2019-09-08 NOTE — CARE PLAN
Problem: Safety  Goal: Will remain free from injury  Outcome: PROGRESSING AS EXPECTED  Note:   Pt uses call light consistently and appropriately. Waits for assistance does not attempt self transfer this shift. Able to verbalize needs.      Problem: Bowel/Gastric:  Goal: Normal bowel function is maintained or improved  Outcome: PROGRESSING SLOWER THAN EXPECTED  Note:   Last BM 9/5. PRN laxative to be administered in morning.      Problem: Pain Management  Goal: Pain level will decrease to patient's comfort goal  Outcome: PROGRESSING SLOWER THAN EXPECTED  Note:   Pt has back pain unrelieved with tylenol and heat pack.

## 2019-09-08 NOTE — PROGRESS NOTES
Shift received, patient alert and oriented in no acute distress, stated a pain level of 4 at this time. Will continue to monitor.

## 2019-09-08 NOTE — FLOWSHEET NOTE
09/08/19 1457   Events/Summary/Plan   Events/Summary/Plan 02 spot check, pt currently on RA   Education   Education Yes - Pt. / Family has been Instructed in use of Respiratory Equipment   Respiratory WDL   Respiratory (WDL) X   Chest Exam   Respiration 18   Pulse 78   Oximetry   #Pulse Oximetry (Single Determination) Yes   Oxygen   Home O2 Use Prior To Admission? Yes   Home O2 LPM Flow 2 LPM   Home O2 Delivery Method Nasal Cannula   Home O2 Frequency of Use At Sleep   Pulse Oximetry 97 %   O2 Daily Delivery Respiratory  Room Air with O2 Available   Room Air Challenge Pass

## 2019-09-08 NOTE — PROGRESS NOTES
[facility] Medicine Daily Progress Note    Date of Service  9/8/2019    Chief Complaint:  Hypertension  PVD    Interval History:  No significant events or changes since last visit    Review of Systems  Review of Systems   Constitutional: Negative for chills and fever.   Respiratory: Negative for shortness of breath.    Cardiovascular: Negative for chest pain.   Gastrointestinal: Negative for abdominal pain, diarrhea, nausea and vomiting.   Psychiatric/Behavioral: The patient is not nervous/anxious.         Physical Exam  Temp:  [36.6 °C (97.9 °F)-37 °C (98.6 °F)] 36.6 °C (97.9 °F)  Pulse:  [] 76  Resp:  [16-18] 16  BP: ()/(56-89) 141/89  SpO2:  [92 %-94 %] 94 %    Physical Exam   Constitutional: She is oriented to person, place, and time. She appears well-nourished.   HENT:   Head: Atraumatic.   Eyes: Pupils are equal, round, and reactive to light. Conjunctivae are normal.   Neck: Normal range of motion. Neck supple.   Cardiovascular: Normal rate, regular rhythm, S1 normal and S2 normal.   No murmur heard.  Pulmonary/Chest: Effort normal. She has no wheezes. She has no rales.   Abdominal: Soft. She exhibits no distension. There is no tenderness.   Musculoskeletal: She exhibits no edema.   Neurological: She is alert and oriented to person, place, and time. No sensory deficit.   Left leg weakness > left arm weakness    Skin: Skin is warm and dry. No rash noted. No cyanosis.   Psychiatric: She has a normal mood and affect. Her behavior is normal.   Nursing note and vitals reviewed.      Fluids    Intake/Output Summary (Last 24 hours) at 9/8/2019 0856  Last data filed at 9/7/2019 2024  Gross per 24 hour   Intake 120 ml   Output --   Net 120 ml       Laboratory  Recent Labs     09/07/19  0520   WBC 5.9   RBC 5.30   HEMOGLOBIN 15.2   HEMATOCRIT 48.0*   MCV 90.6   MCH 28.7   MCHC 31.7*   RDW 46.1   PLATELETCT 152*   MPV 10.4     Recent Labs     09/07/19  0520   SODIUM 139   POTASSIUM 4.2   CHLORIDE 106   CO2 25    GLUCOSE 88   BUN 22   CREATININE 0.82   CALCIUM 9.0                   Imaging    Assessment/Plan  * CVA (cerebral vascular accident) (HCC)- (present on admission)  Assessment & Plan  Has left sided weakness (LLE > LUE)  On Eliquis  On Lipitor  MRI concerning for NPH, needs F/U    Tachycardia- (present on admission)  Assessment & Plan  Currently resolved (since adm to Rehab)  Monitor    PVD (peripheral vascular disease) ([facility])- (present on admission)  Assessment & Plan  On Eliquis  On Lipitor  Note: for out patient f/u with Dr. Wren    Essential hypertension- (present on admission)  Assessment & Plan  BP a little labile  BP seems to rise up a little in the early am  On Lisinopril: 20 mg daily  Cont to monitor

## 2019-09-08 NOTE — CARE PLAN
Problem: Safety  Goal: Will remain free from injury  Outcome: PROGRESSING AS EXPECTED   Pt uses call light consistently and appropriately. Waits for assistance does not attempt self transfer this shift. Able to verbalize needs.   Problem: Pain Management  Goal: Pain level will decrease to patient's comfort goal  Outcome: PROGRESSING AS EXPECTED   Patient able to verbalize pain level and verbalize an acceptable level of pain.   Problem: Skin Integrity  Goal: Risk for impaired skin integrity will decrease  Outcome: PROGRESSING AS EXPECTED   Patient's skin remains intact and free from new or accidental injury this shift.  Will continue to monitor.

## 2019-09-09 PROBLEM — R42 DIZZINESS: Status: ACTIVE | Noted: 2019-09-09

## 2019-09-09 PROCEDURE — 770010 HCHG ROOM/CARE - REHAB SEMI PRIVAT*

## 2019-09-09 PROCEDURE — G0515 COGNITIVE SKILLS DEVELOPMENT: HCPCS

## 2019-09-09 PROCEDURE — A9270 NON-COVERED ITEM OR SERVICE: HCPCS | Performed by: PHYSICAL MEDICINE & REHABILITATION

## 2019-09-09 PROCEDURE — 99232 SBSQ HOSP IP/OBS MODERATE 35: CPT | Performed by: PHYSICAL MEDICINE & REHABILITATION

## 2019-09-09 PROCEDURE — 700102 HCHG RX REV CODE 250 W/ 637 OVERRIDE(OP): Performed by: PHYSICAL MEDICINE & REHABILITATION

## 2019-09-09 PROCEDURE — 97116 GAIT TRAINING THERAPY: CPT

## 2019-09-09 PROCEDURE — 99232 SBSQ HOSP IP/OBS MODERATE 35: CPT | Performed by: HOSPITALIST

## 2019-09-09 PROCEDURE — 94667 MNPJ CHEST WALL 1ST: CPT

## 2019-09-09 PROCEDURE — 94760 N-INVAS EAR/PLS OXIMETRY 1: CPT

## 2019-09-09 RX ADMIN — SENNOSIDES,DOCUSATE SODIUM 2 TABLET: 8.6; 5 TABLET, FILM COATED ORAL at 08:19

## 2019-09-09 RX ADMIN — APIXABAN 10 MG: 5 TABLET, FILM COATED ORAL at 08:19

## 2019-09-09 RX ADMIN — OXYCODONE HYDROCHLORIDE 5 MG: 5 TABLET ORAL at 10:11

## 2019-09-09 RX ADMIN — LISINOPRIL 20 MG: 20 TABLET ORAL at 08:19

## 2019-09-09 RX ADMIN — APIXABAN 10 MG: 5 TABLET, FILM COATED ORAL at 20:53

## 2019-09-09 RX ADMIN — SENNOSIDES,DOCUSATE SODIUM 2 TABLET: 8.6; 5 TABLET, FILM COATED ORAL at 20:52

## 2019-09-09 RX ADMIN — OXYCODONE HYDROCHLORIDE 5 MG: 5 TABLET ORAL at 14:37

## 2019-09-09 RX ADMIN — ATORVASTATIN CALCIUM 80 MG: 40 TABLET, FILM COATED ORAL at 20:52

## 2019-09-09 RX ADMIN — OXYCODONE HYDROCHLORIDE 5 MG: 5 TABLET ORAL at 20:55

## 2019-09-09 RX ADMIN — OXYCODONE HYDROCHLORIDE 5 MG: 5 TABLET ORAL at 01:05

## 2019-09-09 ASSESSMENT — PATIENT HEALTH QUESTIONNAIRE - PHQ9
6. FEELING BAD ABOUT YOURSELF - OR THAT YOU ARE A FAILURE OR HAVE LET YOURSELF OR YOUR FAMILY DOWN: NOT AL ALL
1. LITTLE INTEREST OR PLEASURE IN DOING THINGS: SEVERAL DAYS
7. TROUBLE CONCENTRATING ON THINGS, SUCH AS READING THE NEWSPAPER OR WATCHING TELEVISION: NOT AT ALL
SUM OF ALL RESPONSES TO PHQ QUESTIONS 1-9: 5
SUM OF ALL RESPONSES TO PHQ9 QUESTIONS 1 AND 2: 2
9. THOUGHTS THAT YOU WOULD BE BETTER OFF DEAD, OR OF HURTING YOURSELF: NOT AT ALL
8. MOVING OR SPEAKING SO SLOWLY THAT OTHER PEOPLE COULD HAVE NOTICED. OR THE OPPOSITE, BEING SO FIGETY OR RESTLESS THAT YOU HAVE BEEN MOVING AROUND A LOT MORE THAN USUAL: SEVERAL DAYS
4. FEELING TIRED OR HAVING LITTLE ENERGY: NOT AT ALL
2. FEELING DOWN, DEPRESSED, IRRITABLE, OR HOPELESS: SEVERAL DAYS
3. TROUBLE FALLING OR STAYING ASLEEP OR SLEEPING TOO MUCH: NOT AT ALL
5. POOR APPETITE OR OVEREATING: MORE THAN HALF THE DAYS

## 2019-09-09 ASSESSMENT — ENCOUNTER SYMPTOMS
DIZZINESS: 0
COUGH: 0
BLURRED VISION: 0
NERVOUS/ANXIOUS: 0
FEVER: 0
DIARRHEA: 0

## 2019-09-09 NOTE — PROGRESS NOTES
Rehab Progress Note     Encounter Date: 2019    CC: Left sided weakness, R CVA    Interval Events (Subjective)  Patient reports she is doing well. She had some low BP but she was told to drink more fluid and discussed orthostatic hypotension.  Hospitalist has been following. She reports she continues to make improvement in her left sided weakness. Mostly now just at the ankle. Patient with questions about doing exercises in bed. Reviewed the muscle groups which control her ankle. Denies NVD.     Objective:  VITAL SIGNS: BP (!) 90/62   Pulse 88   Temp 37 °C (98.6 °F) (Temporal)   Resp 18   Ht 1.829 m (6')   Wt 63 kg (139 lb)   SpO2 90%   BMI 18.85 kg/m²   Gen: NAD  Psych: Mood and affect appropriate  CV: RRR, no edema  Resp: CTAB, no upper airway sounds  Abd: NTND  Neuro: AOx4, LUE 4+/5, LLE 4+/5 proximally down to APF, 1/5 ADF, 0/5 EHL    Recent Results (from the past 72 hour(s))   EKG    Collection Time: 19  2:23 PM   Result Value Ref Range    Report       [facility]    Test Date:  2019  Pt Name:    CARIDAD PARK            Department: [facility]  MRN:        [id number]                      Room:       [facility]  Gender:     Female                       Technician: WT  :        1945                   Requested By:EMILIE FERRELL  Order #:    [id number]                    Reading MD: Rashad Winkler MD    Measurements  Intervals                                Axis  Rate:       78                           P:          55  ID:         160                          QRS:        12  QRSD:       94                           T:          -63  QT:         400  QTc:        456    Interpretive Statements  SINUS RHYTHM  NONSPECIFIC ST-T WAVE ABNORMALITIES, INFERIOR LATERAL LEADS  Compared to ECG 2019 10:54:44  No significant changes    Electronically Signed On 2019 15:36:54 PDT by Rashad Winkler MD     CBC with Differential    Collection Time: 19  5:20 AM   Result Value Ref  Range    WBC 5.9 4.8 - 10.8 K/uL    RBC 5.30 4.20 - 5.40 M/uL    Hemoglobin 15.2 12.0 - 16.0 g/dL    Hematocrit 48.0 (H) 37.0 - 47.0 %    MCV 90.6 81.4 - 97.8 fL    MCH 28.7 27.0 - 33.0 pg    MCHC 31.7 (L) 33.6 - 35.0 g/dL    RDW 46.1 35.9 - 50.0 fL    Platelet Count 152 (L) 164 - 446 K/uL    MPV 10.4 9.0 - 12.9 fL    Neutrophils-Polys 55.30 44.00 - 72.00 %    Lymphocytes 31.10 22.00 - 41.00 %    Monocytes 7.80 0.00 - 13.40 %    Eosinophils 4.80 0.00 - 6.90 %    Basophils 0.80 0.00 - 1.80 %    Immature Granulocytes 0.20 0.00 - 0.90 %    Nucleated RBC 0.00 /100 WBC    Neutrophils (Absolute) 3.26 2.00 - 7.15 K/uL    Lymphs (Absolute) 1.83 1.00 - 4.80 K/uL    Monos (Absolute) 0.46 0.00 - 0.85 K/uL    Eos (Absolute) 0.28 0.00 - 0.51 K/uL    Baso (Absolute) 0.05 0.00 - 0.12 K/uL    Immature Granulocytes (abs) 0.01 0.00 - 0.11 K/uL    NRBC (Absolute) 0.00 K/uL   Comp Metabolic Panel (CMP)    Collection Time: 09/07/19  5:20 AM   Result Value Ref Range    Sodium 139 135 - 145 mmol/L    Potassium 4.2 3.6 - 5.5 mmol/L    Chloride 106 96 - 112 mmol/L    Co2 25 20 - 33 mmol/L    Anion Gap 8.0 0.0 - 11.9    Glucose 88 65 - 99 mg/dL    Bun 22 8 - 22 mg/dL    Creatinine 0.82 0.50 - 1.40 mg/dL    Calcium 9.0 8.5 - 10.5 mg/dL    AST(SGOT) 23 12 - 45 U/L    ALT(SGPT) 18 2 - 50 U/L    Alkaline Phosphatase 60 30 - 99 U/L    Total Bilirubin 0.9 0.1 - 1.5 mg/dL    Albumin 3.7 3.2 - 4.9 g/dL    Total Protein 6.1 6.0 - 8.2 g/dL    Globulin 2.4 1.9 - 3.5 g/dL    A-G Ratio 1.5 g/dL   Magnesium    Collection Time: 09/07/19  5:20 AM   Result Value Ref Range    Magnesium 1.8 1.5 - 2.5 mg/dL   Vitamin D, 25-hydroxy (blood)    Collection Time: 09/07/19  5:20 AM   Result Value Ref Range    25-Hydroxy   Vitamin D 25 44 30 - 100 ng/mL   ESTIMATED GFR    Collection Time: 09/07/19  5:20 AM   Result Value Ref Range    GFR If African American >60 >60 mL/min/1.73 m 2    GFR If Non African American >60 >60 mL/min/1.73 m 2       Current  Facility-Administered Medications   Medication Frequency   • oxyCODONE immediate-release (ROXICODONE) tablet 5 mg Q4HRS PRN   • Respiratory Care per Protocol Continuous RT   • Pharmacy Consult Request ...Pain Management Review 1 Each PHARMACY TO DOSE   • acetaminophen (TYLENOL) tablet 650 mg Q4HRS PRN   • hydrALAZINE (APRESOLINE) tablet 25 mg Q8HRS PRN   • senna-docusate (PERICOLACE or SENOKOT S) 8.6-50 MG per tablet 2 Tab BID    And   • polyethylene glycol/lytes (MIRALAX) PACKET 1 Packet QDAY PRN    And   • magnesium hydroxide (MILK OF MAGNESIA) suspension 30 mL QDAY PRN    And   • bisacodyl (DULCOLAX) suppository 10 mg QDAY PRN   • artificial tears ophthalmic solution 1 Drop PRN   • benzocaine-menthol (CEPACOL) lozenge 1 Lozenge Q2HRS PRN   • mag hydrox-al hydrox-simeth (MAALOX PLUS ES or MYLANTA DS) suspension 20 mL Q2HRS PRN   • ondansetron (ZOFRAN ODT) dispertab 4 mg 4X/DAY PRN    Or   • ondansetron (ZOFRAN) syringe/vial injection 4 mg 4X/DAY PRN   • traZODone (DESYREL) tablet 50 mg QHS PRN   • sodium chloride (OCEAN) 0.65 % nasal spray 2 Spray PRN   • melatonin tablet 3 mg HS PRN   • apixaban (ELIQUIS) tablet 10 mg BID   • atorvastatin (LIPITOR) tablet 80 mg Q EVENING   • lisinopril (PRINIVIL) tablet 20 mg DAILY   • [START ON 9/13/2019] apixaban (ELIQUIS) tablet 5 mg BID   • albuterol inhaler 2 Puff Q4HRS PRN       Orders Placed This Encounter   Procedures   • Diet Order Regular     Standing Status:   Standing     Number of Occurrences:   1     Order Specific Question:   Diet:     Answer:   Regular [1]       Assessment:  Active Hospital Problems    Diagnosis   • *CVA (cerebral vascular accident) (HCC)   • Azotemia   • Tachycardia   • PVD (peripheral vascular disease) (HCC)   • Arterial occlusion, lower extremity (HCC)   • COPD   • Chronic respiratory failure (HCC)   • Thrombocytopenia (HCC)   • Tobacco abuse   • Essential hypertension       Medical Decision Making and Plan:  Right KIM and MCA ischemic  stroke  Likely cardio-embolic  Continue full rehab program  PT/OT/SLP, 1 hr each discipline, 5 days per week     Pain/Neuropathic pain  Patient had been taking oxycodone on acute care.  Trial prn use, monitor need.  Consider low dose gabapentin at bedtime.     Continue Eliquis and statin for secondary stroke prophylaxis  Follow up with stroke bridge clinic, needs monitor  Medications unchanged     Possible NPH  Follow up stroke bridge clinic     Appreciate assistance of hospitalist with her medical co-morbidities:     Hypertension  Tachycardia, check EKG  On lisinopril 20 mg     Orthostatic Hypotension? on 9/9/19  Previously on 9/7/19 as well. Discussed with hospitalist, unlikely if sitting up for 30 minutes  Continue to monitor  Encourage PO fluid intake      Peripheral vascular disease  Outpatient follow-up with Eliquis and lipitor     Thrombocytopenia  Appreciate hospitalist input     Tobacco use  COPD  Will need smoking cessation prior to discharge    DVT Ppx  On Apixaban     Total time:  25 minutes.  I spent greater than 50% of the time for patient care and coordination on this date, including unit/floor time, and face-to-face time with the patient as per assessment and plan above.  Patient with orthostatic hypotension this AM, discussed fluid intake, discussed with hospitalist and continue to monitor.     Kristi Aguilar M.D.

## 2019-09-09 NOTE — ASSESSMENT & PLAN NOTE
(9/9): had dizziness while sitting in the chair and was noticed that the BP dropped lower around that time  (9/12) had dizziness again today when sitting up  No dizziness since 9/12  Orthostatics (9/9): SBP dropped 18 points from supine to sitting  Orthostatics (9/11): positive  Off Lisinopril (last dose 9/9)  On Midodrine: 2.5 mg bid (9/12)  Monitor

## 2019-09-09 NOTE — CARE PLAN
Problem: Safety  Goal: Will remain free from injury  Outcome: PROGRESSING AS EXPECTED   Pt uses call light consistently and appropriately. Waits for assistance does not attempt self transfer this shift. Able to verbalize needs.   Problem: Infection  Goal: Will remain free from infection  Outcome: PROGRESSING AS EXPECTED   Patient remains free of infection as evidenced by normal vital signs and breath sounds. Will continue monitoring.   Problem: Pain Management  Goal: Pain level will decrease to patient's comfort goal  Outcome: PROGRESSING AS EXPECTED   Patient reports satisfactory pain control and decrease intensity after pharmacological pain management.

## 2019-09-09 NOTE — THERAPY
"Speech Language Pathology  Daily Treatment     Patient Name: Nohelia Dickerson  Age:  74 y.o., Sex:  female  Medical Record #: [id number]  Today's Date: 9/9/2019     Subjective    Pt willing to participate in this ST session; however was very fatigued throughout requiring multiple cues to remain awake.  Pt stated \"I'm sorry, I've been so tired all day\". (Pt had only completed 30 minutes of ST before her second ST session, fatigue was not from physical activity).       Objective       09/09/19 1301   SLP Total Time Spent   SLP Individual Total Time Spent (Mins) 60   Charge Group   SLP Cognitive Skill Development 4       FIM Comprehension Score:  4 - Minimal Assistance  Comprehension Description:  Verbal cues    FIM Expression Score:  5 - Stand-by Prompting/Supervision or Set-up  Expression Description:  Verbal cueing    FIM Social Interaction Score:  5 - Stand-by Prompting/Supervision or Set-up  Social Interaction Description:  Increased time, Verbal cues    FIM Problem Solving Score:  4 - Minimal Assistance  Problem Solving Description:  Verbal cueing, Therapy schedule, Increased time    FIM Memory Score:  5 - Standby Prompting/Supervision or Set-up  Memory Description:  Verbal cueing, Therapy schedule      Assessment    Pt continued a checkbook management task.  Pt was able to write information into the register correctly; however required mod-max A to organize the numbers in the the register after she was done calculating them and required min A to accurately calculate the balance.  Pt required directions repeated multiple times and fell asleep x3 this session with cues required to wake up and continue focusing.  Pt reported that she has felt tired all day, pt also reported pain in her right back.      Plan    Continue targeting financial management tasks     "

## 2019-09-09 NOTE — PROGRESS NOTES
[facility] Medicine Daily Progress Note    Date of Service  9/9/2019    Chief Complaint:  Hypertension  PVD    Interval History:  No significant events or changes since last visit    Review of Systems  Review of Systems   Constitutional: Negative for fever.   Eyes: Negative for blurred vision.   Respiratory: Negative for cough.    Cardiovascular: Negative for chest pain.   Gastrointestinal: Negative for diarrhea.   Musculoskeletal: Negative for joint pain.   Neurological: Negative for dizziness.   Psychiatric/Behavioral: The patient is not nervous/anxious.         Physical Exam  Temp:  [37 °C (98.6 °F)-37.2 °C (98.9 °F)] 37 °C (98.6 °F)  Pulse:  [] 84  Resp:  [18] 18  BP: (126-129)/(77-85) 128/85  SpO2:  [91 %-97 %] 97 %    Physical Exam   Constitutional: She is oriented to person, place, and time. No distress.   HENT:   Mouth/Throat: No oropharyngeal exudate.   Eyes: EOM are normal. No scleral icterus.   Neck: No JVD present. No tracheal deviation present.   Cardiovascular: Normal rate, regular rhythm, S1 normal and S2 normal.   No murmur heard.  Pulmonary/Chest: Effort normal and breath sounds normal.   Abdominal: Soft. Bowel sounds are normal.   Musculoskeletal: She exhibits no edema.   Neurological: She is alert and oriented to person, place, and time. No sensory deficit.   Left leg weakness > left arm weakness    Skin: Skin is warm and dry. No rash noted. No cyanosis.   Psychiatric: She has a normal mood and affect. Her behavior is normal.   Nursing note and vitals reviewed.      Fluids    Intake/Output Summary (Last 24 hours) at 9/9/2019 0841  Last data filed at 9/9/2019 0500  Gross per 24 hour   Intake 580 ml   Output 1 ml   Net 579 ml       Laboratory  Recent Labs     09/07/19  0520   WBC 5.9   RBC 5.30   HEMOGLOBIN 15.2   HEMATOCRIT 48.0*   MCV 90.6   MCH 28.7   MCHC 31.7*   RDW 46.1   PLATELETCT 152*   MPV 10.4     Recent Labs     09/07/19  0520   SODIUM 139   POTASSIUM 4.2   CHLORIDE 106   CO2 25    GLUCOSE 88   BUN 22   CREATININE 0.82   CALCIUM 9.0                   Imaging    Assessment/Plan  * CVA (cerebral vascular accident) ([facility])- (present on admission)  Assessment & Plan  On Eliquis  On Lipitor  MRI concerning for NPH, needs F/U    Dizziness  Assessment & Plan  Pt became dizzy today x 2  Occurred while sitting in chair  BP dropped lower x 2 (at the time around the dizziness)  Will check orthostatics  Will d/c Lisinopril  Monitor    Tachycardia- (present on admission)  Assessment & Plan  Currently resolved  Monitor    PVD (peripheral vascular disease) ([facility])- (present on admission)  Assessment & Plan  On Eliquis  On Lipitor  Note: for out patient f/u with Dr. Wren    Essential hypertension- (present on admission)  Assessment & Plan  BP has been ok but dropped lower today x 2 to SBP 90 & 88  On Lisinopril: 20 mg daily --> will d/c and observe (last dose 9/8)  Cont to monitor

## 2019-09-09 NOTE — FLOWSHEET NOTE
09/09/19 1014   Events/Summary/Plan   Events/Summary/Plan 02 spot check.  Pt has only stopped smoking since she went into the hospital.  She has noticed she has more congestion which I explained is normal.  She is open to education as she has had PFTs and a formal diagnosis of COPD.  Aerobika given for use with assistance in clearing secretions.   Education   Education Yes - Pt. / Family has been Instructed in use of Respiratory Equipment   PEP/CPT Group   PEP/CPT/Airway Clearance Therapy Yes   #PEP/CPT (Manual) Initial Initial   PEP/CPT Method Flutter Valve   Date Disposable Equipment Last Changed 09/09/19   Date Disposable Equipment Next Change Due (Q 30 Days) 10/09/19   Respiratory WDL   Respiratory (WDL) X   Chest Exam   Respiration 18   Pulse 88   Secretions   Cough Congested;Non Productive   Oximetry   #Pulse Oximetry (Single Determination) Yes   Oxygen   Home O2 Use Prior To Admission? Yes   Home O2 LPM Flow 2 LPM   Home O2 Delivery Method Nasal Cannula   Home O2 Frequency of Use At Sleep   Pulse Oximetry 90 %   O2 Daily Delivery Respiratory  Room Air with O2 Available

## 2019-09-09 NOTE — THERAPY
Physical Therapy   Daily Treatment     Patient Name: Nohelia Dickerson  Age:  74 y.o., Sex:  female  Medical Record #: [id number]  Today's Date: 9/9/2019     Precautions  Precautions: Fall Risk  Comments: posterior lean, L rafael    Subjective    Patient received from nursing after toileting; agreeable to PT.     Objective       09/09/19 1601   Precautions   Precautions Fall Risk   Comments posterior lean, L rafael   Vitals   Pulse 93   Patient BP Position Sitting   Blood Pressure  112/70   O2 (LPM) 0   O2 Delivery None (Room Air)   Vitals Comments 2L at NOC   Bed Mobility    Sit to Stand Minimal Assist  (// bars, gait belt, setup, cueing, increased time)   PT Total Time Spent   PT Individual Total Time Spent (Mins) 30   PT Charge Group   PT Gait Training 2     Discussed rehab expectations.    FIM Walking Score:  1 - Total Assistance  Walking Description:  (parallel bars, 2x 8-9 feet, Mod A, second person for w/c vollow, 1-step cues for sequencing, posterior lean present, Max-Total A at LLE, very bradykinetic.)    FIM Wheelchair Score:  1 - Total Assistance  Wheelchair Description:         Assessment    Patient has improving BP response today; good participation; posterior lean present along with mild anxiety; LLE is more impaired than LUE facilitation.    Plan    Monitor BP prn, Standing balance to decrease posterior lean, LLE and LUE neuro re-ed, Increase ambulation, Seated ther ex.

## 2019-09-09 NOTE — THERAPY
Missed Therapy     Patient Name: Nohelia Dickerson  Age:  74 y.o., Sex:  female  Medical Record #: [id number]  Today's Date: 9/9/2019    Discussed missed therapy with Dr. Aguilar, Dr Madison, speech therapy, physical therapy, RN, CNA.    Pt with low BP, c/o dizziness and fatigue.        09/09/19 1401   Precautions   Precautions Fall Risk   Vitals   Pulse 94   Patient BP Position Sitting   Blood Pressure  (!) 88/60   Vitals Comments RN notified; manual BP taken   Therapy Missed   Missed Therapy (Minutes) 60   Reason For Missed Therapy Medical - Patient on Hold from Therapy;Medical - Other (Please Comment)  (pt with low BP, dizziness, difficulty to stay awake)

## 2019-09-09 NOTE — THERAPY
Speech Language Pathology  Daily Treatment     Patient Name: Nohelia Dickerson  Age:  74 y.o., Sex:  female  Medical Record #: [id number]  Today's Date: 2019     Subjective    Pt pleasant and cooperative during tx.      Objective       19 1331   Cognition   Moderate Attention Minimal (4)   Complex Reasoning  / Problem Solving Moderate (3)   SLP Total Time Spent   SLP Individual Total Time Spent (Mins) 60   Charge Group   SLP Cognitive Skill Development 4       FIM Comprehension Score:  6 - Modified Independent  Comprehension Description:  Glasses    FIM Expression Score:  6 - Modified Independent  Expression Description:  Verbal cueing    FIM Social Interaction Score:  7 - Independent  Social Interaction Description:       FIM Problem Solving Score:  5 - Standby Prompting/Supervision or Set-up  Problem Solving Description:  Therapy schedule, Increased time, Verbal cueing    FIM Memory Score:  5 - Standby Prompting/Supervision or Set-up  Memory Description:  Therapy schedule, Verbal cueing      Assessment    Deductive reasoning (3x4): 95% independent; 100% given min verbal cues.  Logical schedulin% independent; 100% given mod verbal cues.      Plan    Target financial management, med management, and high level reasoning.

## 2019-09-09 NOTE — THERAPY
Speech Language Pathology  Daily Treatment     Patient Name: Nohelia Dickerson  Age:  74 y.o., Sex:  female  Medical Record #: [id number]  Today's Date: 9/9/2019     Subjective    Pt was pleasant and cooperative during this ST session; however 30 minutes into this session began complaining of dizziness.  Pt was brought back to her room and found to have low blood pressure.  Pt was put back in bed, 30 minutes of therapy to be made up this afternoon.       Objective       09/09/19 0831   Therapy Missed   Missed Therapy (Minutes) 30   Reason For Missed Therapy Medical - Other (Please Comment)  (Low blood pressure)   SLP Total Time Spent   SLP Individual Total Time Spent (Mins) 30   Charge Group   SLP Cognitive Skill Development 2         Assessment    Pt initiated a checkbook management task; however d/t dizziness and low blood pressure pt was unable to complete this task and was transferred back into bed.      Plan    30 minutes of missed ST to be made up this afternoon, continue checkbook management task

## 2019-09-09 NOTE — CARE PLAN
Problem: Communication  Goal: The ability to communicate needs accurately and effectively will improve  Outcome: PROGRESSING AS EXPECTED  Note:   Patient able to verbalize needs.  Will continue to monitor.      Problem: Safety  Goal: Will remain free from injury  Outcome: PROGRESSING AS EXPECTED  Note:   Pt uses call light consistently and appropriately. Waits for assistance does not attempt self transfer this shift. Able to verbalize needs.      Problem: Bowel/Gastric:  Goal: Normal bowel function is maintained or improved  Outcome: PROGRESSING AS EXPECTED  Note:    Will continue to monitor Patient having regular bowel movements; last BM 9/8/2019.  Denies s/s constipation; bowel meds available if needed.  Will continue to monitor.      Problem: Pain Management  Goal: Pain level will decrease to patient's comfort goal  Outcome: PROGRESSING AS EXPECTED  Note:   Patient able to verbalize pain level and verbalize an acceptable level of pain. Change in pain medication effective.

## 2019-09-09 NOTE — PROGRESS NOTES
Shift received, patient alert and oriented in no acute distress, stated a pain level of 3 at this time. Will continue to monitor.

## 2019-09-10 PROCEDURE — 99232 SBSQ HOSP IP/OBS MODERATE 35: CPT | Mod: 25 | Performed by: PHYSICAL MEDICINE & REHABILITATION

## 2019-09-10 PROCEDURE — G0515 COGNITIVE SKILLS DEVELOPMENT: HCPCS

## 2019-09-10 PROCEDURE — 97112 NEUROMUSCULAR REEDUCATION: CPT

## 2019-09-10 PROCEDURE — 700102 HCHG RX REV CODE 250 W/ 637 OVERRIDE(OP): Performed by: PHYSICAL MEDICINE & REHABILITATION

## 2019-09-10 PROCEDURE — 99232 SBSQ HOSP IP/OBS MODERATE 35: CPT | Performed by: HOSPITALIST

## 2019-09-10 PROCEDURE — 99406 BEHAV CHNG SMOKING 3-10 MIN: CPT | Performed by: PHYSICAL MEDICINE & REHABILITATION

## 2019-09-10 PROCEDURE — A9270 NON-COVERED ITEM OR SERVICE: HCPCS | Performed by: PHYSICAL MEDICINE & REHABILITATION

## 2019-09-10 PROCEDURE — 94760 N-INVAS EAR/PLS OXIMETRY 1: CPT

## 2019-09-10 PROCEDURE — 97535 SELF CARE MNGMENT TRAINING: CPT

## 2019-09-10 PROCEDURE — 97530 THERAPEUTIC ACTIVITIES: CPT

## 2019-09-10 PROCEDURE — 770010 HCHG ROOM/CARE - REHAB SEMI PRIVAT*

## 2019-09-10 RX ADMIN — SENNOSIDES,DOCUSATE SODIUM 2 TABLET: 8.6; 5 TABLET, FILM COATED ORAL at 08:09

## 2019-09-10 RX ADMIN — OXYCODONE HYDROCHLORIDE 5 MG: 5 TABLET ORAL at 19:22

## 2019-09-10 RX ADMIN — APIXABAN 10 MG: 5 TABLET, FILM COATED ORAL at 19:18

## 2019-09-10 RX ADMIN — ATORVASTATIN CALCIUM 80 MG: 40 TABLET, FILM COATED ORAL at 19:18

## 2019-09-10 RX ADMIN — APIXABAN 10 MG: 5 TABLET, FILM COATED ORAL at 08:09

## 2019-09-10 ASSESSMENT — PATIENT HEALTH QUESTIONNAIRE - PHQ9
8. MOVING OR SPEAKING SO SLOWLY THAT OTHER PEOPLE COULD HAVE NOTICED. OR THE OPPOSITE, BEING SO FIGETY OR RESTLESS THAT YOU HAVE BEEN MOVING AROUND A LOT MORE THAN USUAL: SEVERAL DAYS
9. THOUGHTS THAT YOU WOULD BE BETTER OFF DEAD, OR OF HURTING YOURSELF: NOT AT ALL
SUM OF ALL RESPONSES TO PHQ QUESTIONS 1-9: 5
2. FEELING DOWN, DEPRESSED, IRRITABLE, OR HOPELESS: SEVERAL DAYS
4. FEELING TIRED OR HAVING LITTLE ENERGY: NOT AT ALL
7. TROUBLE CONCENTRATING ON THINGS, SUCH AS READING THE NEWSPAPER OR WATCHING TELEVISION: NOT AT ALL
SUM OF ALL RESPONSES TO PHQ9 QUESTIONS 1 AND 2: 2
3. TROUBLE FALLING OR STAYING ASLEEP OR SLEEPING TOO MUCH: NOT AT ALL
1. LITTLE INTEREST OR PLEASURE IN DOING THINGS: SEVERAL DAYS
5. POOR APPETITE OR OVEREATING: MORE THAN HALF THE DAYS
6. FEELING BAD ABOUT YOURSELF - OR THAT YOU ARE A FAILURE OR HAVE LET YOURSELF OR YOUR FAMILY DOWN: NOT AL ALL

## 2019-09-10 ASSESSMENT — ENCOUNTER SYMPTOMS
VOMITING: 0
ABDOMINAL PAIN: 0
FEVER: 0
DIARRHEA: 0
NERVOUS/ANXIOUS: 0
NAUSEA: 0
CHILLS: 0
SHORTNESS OF BREATH: 0

## 2019-09-10 NOTE — REHAB-PHARMACY IDT TEAM NOTE
Pharmacy   Pharmacy  Antibiotics/Antifungals/Antivirals:  Medication:      Active Orders (From admission, onward)    None        Route:         n/a  Stop Date:  n/a  Reason:   Antihypertensives/Cardiac:  Medication:    Orders (72h ago, onward)     Start     Ordered    09/07/19 0900  lisinopril (PRINIVIL) tablet 20 mg  DAILY,   Status:  Discontinued      09/06/19 1251    09/06/19 2100  atorvastatin (LIPITOR) tablet 80 mg  EVERY EVENING      09/06/19 1251    09/06/19 1251  hydrALAZINE (APRESOLINE) tablet 25 mg  EVERY 8 HOURS PRN      09/06/19 1251              Patient Vitals for the past 24 hrs:   BP Pulse   09/10/19 0700 148/87 85   09/09/19 1832 104/65 94   09/09/19 1601 112/70 93   09/09/19 1451 118/70 82   09/09/19 1447 (!) 80/65 78   09/09/19 1443 100/70 94   09/09/19 1441 118/70 90   09/09/19 1401 (!) 88/60 94     Anticoagulation:  Medication:  Eliquis  INR:      Other key medications: n/a   Section completed by:  Ofe Morris [facility]

## 2019-09-10 NOTE — THERAPY
"Occupational Therapy  Daily Treatment     Patient Name: Nohelia Dickerson  Age:  74 y.o., Sex:  female  Medical Record #: [id number]  Today's Date: 9/10/2019     Precautions  Precautions: (P) Fall Risk  Comments: (P) posterior/L lateral lean, L rafael    Safety   ADL Safety : (P) Requires Physical Assist for Safety, Requires Cueing for Safety  Bathroom Safety: (P) Requires Physical Assist for Safety, Requires Cuing for Safety  Comments: (P) see FIMs for ADL performance details.    Subjective    \"I know I'm leaning a lot to my left\"     Objective       09/10/19 1301   Precautions   Precautions Fall Risk   Comments posterior/L lateral lean, L rafael   Safety    ADL Safety  Requires Physical Assist for Safety;Requires Cueing for Safety   Bathroom Safety Requires Physical Assist for Safety;Requires Cuing for Safety   Comments see FIMs for ADL performance details.   Vitals   O2 (LPM) 1   O2 Delivery Nasal Cannula   Cognition    Speech/ Communication Delayed Responses   Level of Consciousness Alert   Attention Impaired   Balance   Sitting Balance (Static) Poor +   Sitting Balance (Dynamic) Poor   Standing Balance (Static) Trace +   Standing Balance (Dynamic) Trace +   Weight Shift Sitting Poor   Weight Shift Standing Poor   Skilled Intervention Postural Facilitation;Sequencing;Tactile Cuing;Verbal Cuing;Compensatory Strategies   Comments during ADLs and bathroom transfers. Pt with increased L lateral lean during bathing.    Interdisciplinary Plan of Care Collaboration   Patient Position at End of Therapy In Bed;Bed Alarm On;Call Light within Reach;Tray Table within Reach;Phone within Reach   OT Total Time Spent   OT Individual Total Time Spent (Mins) 60   OT Charge Group   OT Self Care / ADL 4       FIM Grooming Score:  5 - Standby Prompting/Supervision or Set-up  Grooming Description:  Increased time, Supervision for safety, Set-up of equipment, Verbal cueing(SBA/set up for oral care and brushing hair seated at " sink.)    FIM Bathing Score:  3 - Moderate Assistance  Bathing Description:   min-mod A required for sitting balance d/t L lateral lean, assist to wash jin area/buttocks while standing at GB for balance.)    FIM Upper Body Dressin - Standby Prompting/Supervision or Set-up  Upper Body Dressing Description:  Increased time, Supervision for safety, Verbal cueing, Set-up of equipment(SBA/set up to don/doff pullover shirt.)    FIM Lower Body Dressing Score:  2 - Max Assistance  Lower Body Dressing Description:  Increased time, Supervision for safety, Verbal cueing, Set-up of equipment(assist to thread LLE into brief and pants; assist for standing balance and to pull pants up over B hips while standing at GB for balance support; total A to don tread socks d/t fatigue.)    FIM Toiletin - Max Assistance  Toileting Description:  Grab bar, Increased time, Supervision for safety, Verbal cueing, Set-up of equipment    FIM Toilet Transfer Score:  3 - Moderate Assistance  Toilet Transfer Description:  Grab bar, Increased time, Supervision for safety, Verbal cueing, Set-up of equipment, Assist with one limb(w/c<>toilet SPT with GB, assist for lift, assist to manage LLE.)    FIM Tub/Shower Transfers Score:  3 - Moderate Assistance  Tub/Shower Transfers Description:  Grab bar, Increased time, Supervision for safety, Verbal cueing, Set-up of equipment(w/c<>shower bench SPT with GB, assist for lift, assist to manage LLE.)      Assessment    Pt required increased assist with shower this session d/t worse L lateral lean on tub bench. Pt required postural support and verbal/tactile cues for midline orientation. Pt with no c/o dizziness throughout session, but reported fatigue following ADLs.     Plan    Con't OT for sitting/standing balance, midline orientation, safety with ADLs and related functional mobility.

## 2019-09-10 NOTE — CARE PLAN
Problem: Communication  Goal: The ability to communicate needs accurately and effectively will improve  Outcome: PROGRESSING AS EXPECTED  Note:   Pt is able to communicate her needs effectively to staff.      Problem: Safety  Goal: Will remain free from injury  Outcome: PROGRESSING AS EXPECTED  Note:   Pt uses call light consistently for staff assistance. Pt has good safety awareness, no impulsivity observed.      Problem: Infection  Goal: Will remain free from infection  Outcome: PROGRESSING AS EXPECTED  Note:   No s/s of infection present      Problem: Venous Thromboembolism (VTW)/Deep Vein Thrombosis (DVT) Prevention:  Goal: Patient will participate in Venous Thrombosis (VTE)/Deep Vein Thrombosis (DVT)Prevention Measures  Outcome: PROGRESSING AS EXPECTED  Note:   Pt receives Apixaban for DVT prophylaxis.      Problem: Urinary Elimination:  Goal: Ability to reestablish a normal urinary elimination pattern will improve  Outcome: PROGRESSING SLOWER THAN EXPECTED  Note:   Pt was incontinent of bladder x 1 this evening and finished the rest of her void on the toilet. Brief changed before pt returned to bed.

## 2019-09-10 NOTE — CARE PLAN
Problem: Safety  Goal: Will remain free from injury  Outcome: PROGRESSING AS EXPECTED   Pt uses call light consistently and appropriately. Waits for assistance does not attempt self transfer this shift. Able to verbalize needs.   Problem: Pain Management  Goal: Pain level will decrease to patient's comfort goal  Outcome: PROGRESSING AS EXPECTED   Patient reports satisfactory pain control and decrease intensity after pharmacological pain management.   Problem: Urinary Elimination:  Goal: Ability to reestablish a normal urinary elimination pattern will improve  Outcome: PROGRESSING AS EXPECTED   Patient voiding adequate amounts of clear, yellow urine. Denies dysuria and flank pain: afebrile. Will continue to monitor.

## 2019-09-10 NOTE — PROGRESS NOTES
[facility] Medicine Daily Progress Note    Date of Service  9/10/2019    Chief Complaint:  Hypertension  PVD    Interval History:  No significant events or changes since last visit    Review of Systems  Review of Systems   Constitutional: Negative for chills and fever.   Respiratory: Negative for shortness of breath.    Cardiovascular: Negative for chest pain.   Gastrointestinal: Negative for abdominal pain, diarrhea, nausea and vomiting.   Psychiatric/Behavioral: The patient is not nervous/anxious.         Physical Exam  Temp:  [36.7 °C (98 °F)-37 °C (98.6 °F)] 37 °C (98.6 °F)  Pulse:  [78-94] 85  Resp:  [18] 18  BP: ()/(60-87) 148/87  SpO2:  [90 %-96 %] 96 %    Physical Exam   Constitutional: She is oriented to person, place, and time. She appears well-nourished.   HENT:   Head: Atraumatic.   Eyes: Pupils are equal, round, and reactive to light. Conjunctivae are normal.   Neck: Normal range of motion. Neck supple.   Cardiovascular: Normal rate, regular rhythm, S1 normal and S2 normal.   No murmur heard.  Pulmonary/Chest: Effort normal. She has no wheezes. She has no rales.   Abdominal: Soft. She exhibits no distension. There is no tenderness.   Musculoskeletal: She exhibits no edema.   Neurological: She is alert and oriented to person, place, and time. No sensory deficit.   Skin: Skin is warm and dry. No rash noted. No cyanosis.   Psychiatric: She has a normal mood and affect. Her behavior is normal.   Nursing note and vitals reviewed.      Fluids    Intake/Output Summary (Last 24 hours) at 9/10/2019 0937  Last data filed at 9/9/2019 2052  Gross per 24 hour   Intake 240 ml   Output --   Net 240 ml       Laboratory                        Imaging    Assessment/Plan  * CVA (cerebral vascular accident) (HCC)- (present on admission)  Assessment & Plan  On Eliquis  On Lipitor  MRI concerning for NPH, needs F/U    Dizziness  Assessment & Plan  Pt became dizzy today x 2  Occurred while sitting in chair  BP dropped  lower x 2 (at the time around the dizziness)  Orthostatics: SBP dropped 18 from supine to sitting  Off Lisinopril (last dose 9/9)  Will recheck orthos (9/10)  Monitor    Tachycardia- (present on admission)  Assessment & Plan  Currently resolved  Monitor    PVD (peripheral vascular disease) (HCC)- (present on admission)  Assessment & Plan  On Eliquis  On Lipitor  Note: for out patient f/u with Dr. Wren    Essential hypertension- (present on admission)  Assessment & Plan  BP began dipping lower on 9/9  Off Lisinopril (last dose 9/9)  Cont to monitor

## 2019-09-10 NOTE — DISCHARGE PLANNING
CASE MANAGEMENT INITIAL ASSESSMENT    Admit Date:  9/6/2019     I met with patient to discuss role of case management / discharge planning / team conference.   Patient is a  74 y.o. female transferred from [facility]; was inpatient 9/3 to 9/6/2019.   Patient was admitted to [facility] on 9/6/2019 with CVA. Secondary diagnoses include   Hypertension  Tachycardia  Peripheral vascular disease  Tobacco use  COPD.       Diagnosis: 0.1. (l0 body involvement (r) brain  Left leg numbness  Arterial occlusion, lower extremity (HCC)  Stroke (cerebrum) (Formerly [facility])    Co-morbidities:   Patient Active Problem List    Diagnosis Date Noted   • Dizziness 09/09/2019   • Azotemia 09/07/2019   • Tachycardia 09/06/2019   • CVA (cerebral vascular accident) (Former[facility]) 09/04/2019   • PVD (peripheral vascular disease) ([facility]) 09/03/2019   • Arterial occlusion, lower extremity ([facility]) 09/03/2019   • Viral URI with cough 04/18/2019   • History of nephrolithiasis 08/24/2018   • Hydronephrosis 08/07/2018   • COPD 08/07/2018   • Chronic respiratory failure ([facility]) 08/07/2018   • Thrombocytopenia ([facility]) 08/06/2018   • Healthcare maintenance 05/22/2018   • Tobacco abuse 05/22/2018   • Essential hypertension 05/22/2018     Prior Living Situation:  Housing / Facility: 1 Story House  Lives with - Patient's Self Care Capacity: Adult Children    Prior Level of Function:  Medication Management: Independent  Finances: Independent  Home Management: Independent  Shopping: Independent  Prior Level Of Mobility: Independent Without Device in Community  Driving / Transportation: Driving Independent    Support Systems:  Primary : Roseanne Johnathan [phone]  Other support systems: Benjie Murphy son-in-law [phone]       Previous Services Utilized:   Equipment Owned: Single Point Cane  Prior Services: Home-Independent    Other Information:  Occupation (Pre-Hospital Vocational): 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 Cortez  Other MDs: Dr. Wren, Vascular Surgery, Dr. Bloch, Neurology    Patient / Family Goal:  Patient / Family Goal: To be able to walk and go back to work.  Discharge Disposition - to single level unit attached to patient's daughter's and son-in-law's home. There are 2 grandchildren living there too, ages 11 and 17. 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   DC Needs: Anticipate HH. DME TBD. Has SPC. MD f/u appointments - PCP, Dr. Wren for PAD.   Strengths: Motivated. PLOF - independent. . Supportive family.     Additional Case Management Questions:  Have you ever received case management services for yourself or a family member? no    Do you feel you have and an understanding of what services  provide? yes    Do you have any additional questions regarding case management?   no       CASE MANAGEMENT PLAN OF CARE   Individualized Goals:   1. Improve functional status to return home with intermittent support of family  2. Patient to have time planned for return to work  3. Patient to have MD F/U appointments and needed DME    Barriers:   1. Functional deficits  2. Recent loss of 38 year old daughter - ruptured aneurysm  3. PAD    Plan:  1. Continue to follow patient through hospitalization and provide discharge planning in collaboration with patient, family, physicians and ancillary services.     2. Utilize community resources to ensure a safe discharge.

## 2019-09-10 NOTE — THERAPY
Speech Language Pathology  Daily Treatment     Patient Name: Nohelia Dickerson  Age:  74 y.o., Sex:  female  Medical Record #: [id number]  Today's Date: 9/10/2019     Subjective    Patient was in dining room at time of ST. Was willing to participate     Objective       09/10/19 0832   Cognition   Moderate Attention Moderate (3)   Functional Math / Financial Management Moderate (3)   SLP Total Time Spent   SLP Individual Total Time Spent (Mins) 60   Charge Group   SLP Cognitive Skill Development 4       FIM Eating Score:     Eating Description:       FIM Comprehension Score:  4 - Minimal Assistance  Comprehension Description:       FIM Expression Score:  5 - Stand-by Prompting/Supervision or Set-up  Expression Description:       FIM Social Interaction Score:  5 - Stand-by Prompting/Supervision or Set-up  Social Interaction Description:       FIM Problem Solving Score:  3 - Moderate Assistance  Problem Solving Description:       FIM Memory Score:  5 - Standby Prompting/Supervision or Set-up  Memory Description:         Assessment    Completed check balancing task from previous session. Required max cues to locate where she left off. Moderate cues needed for organization and using consistent process for balancing, despite cues and education.  Math was completed was 77% accuracy, again, due to poor organization and carry-over of strategies.     Plan    Continue to target attention and functional problem solving.

## 2019-09-10 NOTE — FLOWSHEET NOTE
09/10/19 1122   Events/Summary/Plan   Events/Summary/Plan Pt has been on RA for 24 hours but desatted with therapy doing light activity.  Checked her SATS and she was 87%.  Placed NC at 1 lpm   Education   Education Yes - Pt. / Family has been Instructed in use of Respiratory Equipment   Respiratory WDL   Respiratory (WDL) X   Chest Exam   Respiration 18   Pulse 100   Secretions   Cough Congested;Non Productive   Oximetry   #Pulse Oximetry (Single Determination) Yes   Oxygen   Home O2 Use Prior To Admission? Yes   Home O2 LPM Flow 2 LPM   Home O2 Delivery Method Nasal Cannula   Home O2 Frequency of Use At Sleep   Pulse Oximetry 92 %  (was 87 on RA)   O2 (LPM) 1   O2 Daily Delivery Respiratory  Silicone Nasal Cannula

## 2019-09-10 NOTE — PROGRESS NOTES
Rehab Progress Note     Encounter Date: 9/10/2019    CC: Left sided weakness, R CVA    Interval Events (Subjective)  Patient sitting up in room. She reports she is doing well besides the low BP yesterday.  She reports that they stopped her blood pressure medications and it has improved. She reports she feels like she is getting stronger so she is wondering when she will go home. Discussed that we will have IDT tomorrow and discuss discharge planning. Otherwise denies NVD. Denies pain.     Objective:  VITAL SIGNS: /67   Pulse 100   Temp 37 °C (98.6 °F) (Temporal)   Resp 18   Ht 1.829 m (6')   Wt 63 kg (139 lb)   SpO2 92% Comment: was 87 on RA  BMI 18.85 kg/m²   Gen: NAD  Psych: Mood and affect appropriate  CV: RRR, no edema  Resp: CTAB, no upper airway sounds  Abd: NTND  Neuro: AOx4, LUE 4+/5, LLE 4+/5 proximally down to APF, 1/5 ADF, 0/5 EHL  Unchanged from 9/9/19    No results found for this or any previous visit (from the past 72 hour(s)).    Current Facility-Administered Medications   Medication Frequency   • oxyCODONE immediate-release (ROXICODONE) tablet 5 mg Q4HRS PRN   • Respiratory Care per Protocol Continuous RT   • Pharmacy Consult Request ...Pain Management Review 1 Each PHARMACY TO DOSE   • acetaminophen (TYLENOL) tablet 650 mg Q4HRS PRN   • hydrALAZINE (APRESOLINE) tablet 25 mg Q8HRS PRN   • senna-docusate (PERICOLACE or SENOKOT S) 8.6-50 MG per tablet 2 Tab BID    And   • polyethylene glycol/lytes (MIRALAX) PACKET 1 Packet QDAY PRN    And   • magnesium hydroxide (MILK OF MAGNESIA) suspension 30 mL QDAY PRN    And   • bisacodyl (DULCOLAX) suppository 10 mg QDAY PRN   • artificial tears ophthalmic solution 1 Drop PRN   • benzocaine-menthol (CEPACOL) lozenge 1 Lozenge Q2HRS PRN   • mag hydrox-al hydrox-simeth (MAALOX PLUS ES or MYLANTA DS) suspension 20 mL Q2HRS PRN   • ondansetron (ZOFRAN ODT) dispertab 4 mg 4X/DAY PRN    Or   • ondansetron (ZOFRAN) syringe/vial injection 4 mg 4X/DAY PRN   •  traZODone (DESYREL) tablet 50 mg QHS PRN   • sodium chloride (OCEAN) 0.65 % nasal spray 2 Spray PRN   • melatonin tablet 3 mg HS PRN   • apixaban (ELIQUIS) tablet 10 mg BID   • atorvastatin (LIPITOR) tablet 80 mg Q EVENING   • [START ON 9/13/2019] apixaban (ELIQUIS) tablet 5 mg BID   • albuterol inhaler 2 Puff Q4HRS PRN       Orders Placed This Encounter   Procedures   • Diet Order Regular     Standing Status:   Standing     Number of Occurrences:   1     Order Specific Question:   Diet:     Answer:   Regular [1]       Assessment:  Active Hospital Problems    Diagnosis   • *CVA (cerebral vascular accident) (HCC)   • Azotemia   • Tachycardia   • PVD (peripheral vascular disease) (HCC)   • Arterial occlusion, lower extremity (HCC)   • COPD   • Chronic respiratory failure (HCC)   • Thrombocytopenia (HCC)   • Tobacco abuse   • Essential hypertension       Medical Decision Making and Plan:  Right KIM and MCA ischemic stroke  Likely cardio-embolic  Continue full rehab program  PT/OT/SLP, 1 hr each discipline, 5 days per week     Pain/Neuropathic pain  Patient had been taking oxycodone on acute care.  Trial prn use, monitor need.  Consider low dose gabapentin at bedtime. Controlled without      Continue Eliquis and statin for secondary stroke prophylaxis  Follow up with stroke bridge clinic, needs monitor  Medications unchanged     Possible NPH  Follow up stroke bridge clinic     Appreciate assistance of hospitalist with her medical co-morbidities:     Hypertension  Tachycardia, check EKG  On lisinopril 20 mg -Discontinue for hypotension. Continue to monitor as into 140s this AM    Orthostatic Hypotension? on 9/9/19  Previously on 9/7/19 as well. Discussed with hospitalist, unlikely if sitting up for 30 minutes  Continue to monitor  Encourage PO fluid intake   Lisinopril discontinued.      Peripheral vascular disease  Outpatient follow-up with Eliquis and lipitor     Thrombocytopenia  Appreciate hospitalist  input     Tobacco use  COPD  Will need smoking cessation prior to discharge  Performed on 9/10/19    DVT Ppx  On Apixaban     Total time:  25 minutes.  I spent greater than 50% of the time for patient care, counseling, and coordination on this date, including unit/floor time, and face-to-face time with the patient as per interval events and assessment and plan above. Topics discussed included labile SBP off of ACEi, discussed case with hospitalist and continued to monitor.     Kristi Aguilar M.D.    I had a 4 minute discussion with patient on 9/10/2019 about smoking cessation.  Discussed that smoking is associated with respiratory, cardiovascular, oncologic, and neurologic illnesses.  Patient was provided advice and counseling on different methods of smoking cessation as well as provided supportive listening for psychologic aspect of addiction.

## 2019-09-10 NOTE — THERAPY
Physical Therapy   Daily Treatment     Patient Name: Nohelia Dickerson  Age:  74 y.o., Sex:  female  Medical Record #: [id number]  Today's Date: 9/10/2019     Precautions  Precautions: Fall Risk  Comments: posterior/L lateral lean, L rafael    Subjective    Pt reports she still fees a little woozy after having low BP yesterday     Objective       09/10/19 1031   Vitals   Pulse (!) 112   Patient BP Position Sitting   Blood Pressure  106/67   Pulse Oximetry (!) 86 %   O2 Delivery None (Room Air)   Bed Mobility    Supine to Sit Minimal Assist   Sit to Stand Moderate Assist   Scooting Minimal Assist   Rolling Supervised   Lower Extremity Outcome Measures   Lower Extremity Outcome Measures Utilized 10 Meter Walk Test;5x STS   5x STS (Five Times Sit to Stand Test)   Sit to Stand Comments Unable to stand without assist   Interdisciplinary Plan of Care Collaboration   IDT Collaboration with  Nursing;Respiratory Therapist   Patient Position at End of Therapy Seated;Chair Alarm On;Self Releasing Lap Belt Applied  (cafeteria)   Collaboration Comments Reported pt vitals end of sesssion, RT and RN to assess further   PT Total Time Spent   PT Individual Total Time Spent (Mins) 60   PT Charge Group   PT Neuromuscular Re-Education / Balance 2   PT Therapeutic Activities 2       Pt edu on e-stim for goals and benefits. Pt assisted in setting intensity values for NMES focused on achieving stim-elicited contraction to tolerance in L tib anterior. 8 min with cues for active contraction during 10 sec on cycle and relaxation during 10 sec off cycle.    Assessment    Pt demos abnormal vitals at the end of this session - reported to RT and RN who will assess further. Pt continues to demo very little volitional control of LLE. Pt responds well to estim with appropriate stim elicited contraction.     Plan    Monitor vitals prn, lite gait - monitor vitals throughout,  setup, progress to overground gait training in solostep, LLE and LUE  neuro re-ed, pool therapy,

## 2019-09-11 PROCEDURE — 97110 THERAPEUTIC EXERCISES: CPT

## 2019-09-11 PROCEDURE — 700102 HCHG RX REV CODE 250 W/ 637 OVERRIDE(OP): Performed by: PHYSICAL MEDICINE & REHABILITATION

## 2019-09-11 PROCEDURE — 97112 NEUROMUSCULAR REEDUCATION: CPT

## 2019-09-11 PROCEDURE — A9270 NON-COVERED ITEM OR SERVICE: HCPCS | Performed by: PHYSICAL MEDICINE & REHABILITATION

## 2019-09-11 PROCEDURE — 94760 N-INVAS EAR/PLS OXIMETRY 1: CPT

## 2019-09-11 PROCEDURE — 97535 SELF CARE MNGMENT TRAINING: CPT

## 2019-09-11 PROCEDURE — 99233 SBSQ HOSP IP/OBS HIGH 50: CPT | Performed by: PHYSICAL MEDICINE & REHABILITATION

## 2019-09-11 PROCEDURE — 770010 HCHG ROOM/CARE - REHAB SEMI PRIVAT*

## 2019-09-11 PROCEDURE — G0515 COGNITIVE SKILLS DEVELOPMENT: HCPCS

## 2019-09-11 PROCEDURE — 97116 GAIT TRAINING THERAPY: CPT

## 2019-09-11 PROCEDURE — 97530 THERAPEUTIC ACTIVITIES: CPT

## 2019-09-11 PROCEDURE — 99232 SBSQ HOSP IP/OBS MODERATE 35: CPT | Performed by: HOSPITALIST

## 2019-09-11 RX ORDER — BUDESONIDE AND FORMOTEROL FUMARATE DIHYDRATE 160; 4.5 UG/1; UG/1
2 AEROSOL RESPIRATORY (INHALATION)
Status: DISCONTINUED | OUTPATIENT
Start: 2019-09-12 | End: 2019-09-16

## 2019-09-11 RX ADMIN — APIXABAN 10 MG: 5 TABLET, FILM COATED ORAL at 08:05

## 2019-09-11 RX ADMIN — APIXABAN 10 MG: 5 TABLET, FILM COATED ORAL at 19:40

## 2019-09-11 RX ADMIN — SENNOSIDES,DOCUSATE SODIUM 2 TABLET: 8.6; 5 TABLET, FILM COATED ORAL at 19:40

## 2019-09-11 RX ADMIN — OXYCODONE HYDROCHLORIDE 5 MG: 5 TABLET ORAL at 01:44

## 2019-09-11 RX ADMIN — ATORVASTATIN CALCIUM 80 MG: 40 TABLET, FILM COATED ORAL at 19:39

## 2019-09-11 ASSESSMENT — ENCOUNTER SYMPTOMS
NAUSEA: 0
HALLUCINATIONS: 0
HEADACHES: 0
PALPITATIONS: 0
SHORTNESS OF BREATH: 0
DIZZINESS: 0
VOMITING: 0
BLURRED VISION: 0
FEVER: 0

## 2019-09-11 NOTE — REHAB-PT IDT TEAM NOTE
Physical Therapy   Mobility  Bed mobility:   SPV - min A  Bed /Chair/Wheelchair Transfer Initial:  2 - Max Assistance  Bed /Chair/Wheelchair Transfer Current:  2 - Max Assistance   Bed/Chair/Wheelchair Transfer Description:  (pt requires lifting AND lowering assist for sit<>stand / mod-max A for LLE positioning and management with stand-pivot transfer. Min A sit<>supine for LLE )  Walk Initial:  1 - Total Assistance  Walk Current:  1 - Total Assistance   Walk Description:  (parallel bars, 2x 8-9 feet, Mod A, second person for w/c vollow, 1-step cues for sequencing, posterior lean present, Max-Total A at LLE, very bradykinetic.)  Wheelchair Initial:  1 - Total Assistance  Wheelchair Current:  1 - Total Assistance   Wheelchair Description:  (min A x 10 ft / pt struggles with LUE  / coordination)  Stairs Initial:  0 - Not tested,unsafe activity  Stairs Current: 0 - Not tested,unsafe activity   Stairs Description:    Patient/Family Training/Education:  Ongoing, safety with transfers   DME/DC Recommendations:  TBD  Strengths:  Able to follow instructions, Independent PLOF, Motivated for self care and independence, Pleasant and cooperative and Willingly participates in therapeutic activities  Barriers:   Hemiplegia, Limited mobility and Poor balance  # of short term goals set= 6  # of short term goals met=0 (6 partially met)  Physical Therapy Problems     Problem: Balance     Dates: Start: 09/07/19       Description:     Goal: STG-Within one week, patient will maintain static standing     Dates: Start: 09/07/19       Description: 1) Individualized goal:  Min A for L terminal hip/ knee ext x 5 minutes with BUE support  2) Interventions: PT Group Therapy, PT E Stim Attended, PT Orthotics Training, PT Gait Training, PT Self Care/Home Eval, PT Therapeutic Exercises, PT Neuro Re-Ed/Balance, PT Aquatic Therapy, PT Therapeutic Activity, PT Manual Therapy and PT Evaluation       Note:     Goal Note filed on 09/11/19 1330 by  John Loving, PT    < 5 min                        Problem: Mobility     Dates: Start: 09/07/19       Description:     Goal: STG-Within one week, patient will propel wheelchair household distances     Dates: Start: 09/07/19       Description: 1) Individualized goal:  Min A with BUE support 50 ft x 2  2) Interventions: PT Group Therapy, PT E Stim Attended, PT Orthotics Training, PT Gait Training, PT Self Care/Home Eval, PT Therapeutic Exercises, PT Neuro Re-Ed/Balance, PT Aquatic Therapy, PT Therapeutic Activity, PT Manual Therapy and PT Evaluation       Note:     Goal Note filed on 09/11/19 1330 by John Loving, PT    < 50 ft                    Goal: STG-Within one week, patient will ambulate household distance     Dates: Start: 09/07/19       Description: 1) Individualized goal:  Min A with FWW and L AFO as needed 25 ft x 2  2) Interventions: PT Group Therapy, PT E Stim Attended, PT Orthotics Training, PT Gait Training, PT Self Care/Home Eval, PT Therapeutic Exercises, PT Neuro Re-Ed/Balance, PT Aquatic Therapy, PT Therapeutic Activity, PT Manual Therapy and PT Evaluation       Note:     Goal Note filed on 09/11/19 1330 by John Loving, PT    <10 ft                        Problem: Mobility Transfers     Dates: Start: 09/07/19       Description:     Goal: STG-Within one week, patient will perform bed mobility     Dates: Start: 09/07/19       Description: 1) Individualized goal:  SPV with bed rail  2) Interventions: PT Group Therapy, PT E Stim Attended, PT Orthotics Training, PT Gait Training, PT Self Care/Home Eval, PT Therapeutic Exercises, PT Neuro Re-Ed/Balance, PT Aquatic Therapy, PT Therapeutic Activity, PT Manual Therapy and PT Evaluation       Note:     Goal Note filed on 09/11/19 1330 by John Loving, PT    Min A                  Goal: STG-Within one week, patient will sit to stand     Dates: Start: 09/07/19       Description: 1) Individualized goal:  Min A with FWW  2)  Interventions: PT Group Therapy, PT E Stim Attended, PT Orthotics Training, PT Gait Training, PT Self Care/Home Eval, PT Therapeutic Exercises, PT Neuro Re-Ed/Balance, PT Aquatic Therapy, PT Therapeutic Activity, PT Manual Therapy and PT Evaluation       Note:     Goal Note filed on 09/11/19 1330 by John Loving, PT    Mod A                  Goal: STG-Within one week, patient will transfer bed to chair     Dates: Start: 09/07/19       Description: 1) Individualized goal:  Mod A with FWW  2) Interventions: PT Group Therapy, PT E Stim Attended, PT Orthotics Training, PT Gait Training, PT Self Care/Home Eval, PT Therapeutic Exercises, PT Neuro Re-Ed/Balance, PT Aquatic Therapy, PT Therapeutic Activity, PT Manual Therapy and PT Evaluation       Note:     Goal Note filed on 09/11/19 1330 by John Loving, PT    Max A                        Problem: PT-Long Term Goals     Dates: Start: 09/07/19       Description:     Goal: LTG-By discharge, patient will tolerate standing     Dates: Start: 09/07/19       Description: 1) Individualized goal:  With BUE and SPV support 5 minutes x 2 for LE strength / balance training  2) Interventions: PT Group Therapy, PT E Stim Attended, PT Orthotics Training, PT Gait Training, PT Self Care/Home Eval, PT Therapeutic Exercises, PT Neuro Re-Ed/Balance, PT Aquatic Therapy, PT Therapeutic Activity, PT Manual Therapy and PT Evaluation             Goal: LTG-By discharge, patient will propel wheelchair     Dates: Start: 09/07/19       Description: 1) Individualized goal:  SPV with BUE support x 150 ft to/from therapies/ meals  2) Interventions: PT Group Therapy, PT E Stim Attended, PT Orthotics Training, PT Gait Training, PT Self Care/Home Eval, PT Therapeutic Exercises, PT Neuro Re-Ed/Balance, PT Aquatic Therapy, PT Therapeutic Activity, PT Manual Therapy and PT Evaluation             Goal: LTG-By discharge, patient will ambulate     Dates: Start: 09/07/19       Description: 1)  Individualized goal:  SBA with FWW x 150 ft  2) Interventions: PT Group Therapy, PT E Stim Attended, PT Orthotics Training, PT Gait Training, PT Self Care/Home Eval, PT Therapeutic Exercises, PT Neuro Re-Ed/Balance, PT Aquatic Therapy, PT Therapeutic Activity, PT Manual Therapy and PT Evaluation             Goal: LTG-By discharge, patient will transfer one surface to another     Dates: Start: 09/07/19       Description: 1) Individualized goal:  Modified independent with FWW  2) Interventions: PT Group Therapy, PT E Stim Attended, PT Orthotics Training, PT Gait Training, PT Self Care/Home Eval, PT Therapeutic Exercises, PT Neuro Re-Ed/Balance, PT Aquatic Therapy, PT Therapeutic Activity, PT Manual Therapy and PT Evaluation             Goal: LTG-By discharge, patient will ambulate up/down 4-6 stairs     Dates: Start: 09/07/19       Description: 1) Individualized goal:  CGA with hand rails  2) Interventions:  PT Group Therapy, PT E Stim Attended, PT Orthotics Training, PT Gait Training, PT Self Care/Home Eval, PT Therapeutic Exercises, PT Neuro Re-Ed/Balance, PT Aquatic Therapy, PT Therapeutic Activity, PT Manual Therapy and PT Evaluation             Goal: LTG-By discharge, patient will transfer in/out of a car     Dates: Start: 09/07/19       Description: 1) Individualized goal:  SBA with FWW  2) Interventions:  PT Group Therapy, PT E Stim Attended, PT Orthotics Training, PT Gait Training, PT Self Care/Home Eval, PT Therapeutic Exercises, PT Neuro Re-Ed/Balance, PT Aquatic Therapy, PT Therapeutic Activity, PT Manual Therapy and PT Evaluation                           Section completed by:  John Loving, PT

## 2019-09-11 NOTE — REHAB-OT IDT TEAM NOTE
Occupational Therapy   Activities of Daily Living  Eating Initial:  5 - Standby Prompting/Supervision or Set-up  Eating Current:  6 - Modified Independent   Eating Description:  Increased time  Grooming Initial:  5 - Standby Prompting/Supervision or Set-up  Grooming Current:  6 - Modified Independent   Grooming Description:  Increased time(seated at sink for oral care and combing hair.)  Bathing Initial:  4 - Minimal Assistance  Bathing Current:  3 - Moderate Assistance   Bathing Description:  Grab bar, Tub bench, Hand held shower, Increased time, Supervision for safety, Verbal cueing, Set-up of equipment(min-mod A required for sitting balance d/t L lateral lean, assist to wash jin area/buttocks while standing at GB for balance.)  Upper Body Dressing Initial:  5 - Standby Prompting/Supervision or Set-up  Upper Body Dressing Current:  5 - Standby Prompting/Supervision or Set-up   Upper Body Dressing Description:  Increased time, Supervision for safety, Set-up of equipment(SBA/set up to don/doff pullover shirt.)  Lower Body Dressing Initial:  2 - Max Assistance  Lower Body Dressing Current:  3 - Moderate Assistance   Lower Body Dressing Description:  3 - Moderate Assistance  Toileting Initial:  2 - Max Assistance  Toileting Current:  2 - Max Assistance   Toileting Description:  Grab bar, Supervision for safety, Increased time, Verbal cueing  Toilet Transfer Initial:  3 - Moderate Assistance  Toilet Transfer Current:  3 - Moderate Assistance   Toilet Transfer Description:  3 - Moderate Assistance  Tub / Shower Transfer Initial:  3 - Moderate Assistance  Tub / Shower Transfer Current:  3 - Moderate Assistance   Tub / Shower Transfer Description:  Grab bar, Increased time, Supervision for safety, Verbal cueing, Set-up of equipment(w/c<>shower bench SPT with GB, assist for lift, assist to manage LLE.)  IADL:  TBD   Family Training/Education:  Ongoing with pt; no family present for OT session thus far; safety with ADLs  and bathroom transfers, AE/DME.    DME/DC Recommendations:  TBD; HHOT vs outpt OT; will verify bathroom set up/equipment needs with pt and family.     Strengths:  Able to follow instructions, Independent PLOF, Motivated for self care and independence, Pleasant and cooperative, Supportive family and Willingly participates in therapeutic activities  Barriers:  Decreased endurance, Hemiplegia, Limited mobility, Pain poorly managed, Poor activity tolerance, Poor balance and Other: LLE weakness, L back pain, L lateral/posterior lean, low BP     # of short term goals set= 3    # of short term goals met= 1     Occupational Therapy Goals     Problem: Functional Transfers     Dates: Start: 09/07/19       Description:     Goal: STG-Within one week, patient will transfer to toilet     Dates: Start: 09/07/19       Description: 1) Individualized Goal:  Min assist   2) Interventions:  OT Group Therapy, OT Self Care/ADL, OT Cognitive Skill Dev, OT Community Reintegration, OT Manual Ther Technique, OT Neuro Re-Ed/Balance, OT Sensory Int Techniques, OT Therapeutic Activity, OT Evaluation and OT Therapeutic Exercise       Note:     Goal Note filed on 09/11/19 1110 by Rosalba Luna, OT    Mod A for lift and mgt of LLE.                        Problem: OT Long Term Goals     Dates: Start: 09/07/19       Description:     Goal: LTG-By discharge, patient will complete basic self care tasks     Dates: Start: 09/07/19       Description: 1) Individualized Goal:  Mod I for UB ADLs, Set-up and SBA for LB ADLs  2) Interventions:  OT Group Therapy, OT Self Care/ADL, OT Cognitive Skill Dev, OT Community Reintegration, OT Manual Ther Technique, OT Neuro Re-Ed/Balance, OT Sensory Int Techniques, OT Therapeutic Activity, OT Evaluation and OT Therapeutic Exercise             Goal: LTG-By discharge, patient will perform bathroom transfers     Dates: Start: 09/07/19       Description: 1) Individualized Goal:  SBA  2) Interventions:  OT Group Therapy,  OT Self Care/ADL, OT Cognitive Skill Dev, OT Community Reintegration, OT Manual Ther Technique, OT Neuro Re-Ed/Balance, OT Sensory Int Techniques, OT Therapeutic Activity, OT Evaluation and OT Therapeutic Exercise             Goal: LTG-By discharge, patient will complete basic home management     Dates: Start: 09/07/19       Description: 1) Individualized Goal: Supervision  2) Interventions:  OT Group Therapy, OT Self Care/ADL, OT Cognitive Skill Dev, OT Community Reintegration, OT Manual Ther Technique, OT Neuro Re-Ed/Balance, OT Sensory Int Techniques, OT Therapeutic Activity, OT Evaluation and OT Therapeutic Exercise                   Problem: Toileting     Dates: Start: 09/07/19       Description:     Goal: STG-Within one week, patient will complete toileting tasks     Dates: Start: 09/07/19       Description: 1) Individualized Goal:  Mod assist   2) Interventions:  OT Group Therapy, OT Self Care/ADL, OT Cognitive Skill Dev, OT Community Reintegration, OT Manual Ther Technique, OT Neuro Re-Ed/Balance, OT Sensory Int Techniques, OT Therapeutic Activity, OT Evaluation and OT Therapeutic Exercise       Note:     Goal Note filed on 09/11/19 1110 by Rosalba Luna OT    Max A for clothing mgt d/t L lateral/posterior lean.                                Section completed by:  Rosalba Luna OT

## 2019-09-11 NOTE — PROGRESS NOTES
[facility] Medicine Daily Progress Note    Date of Service  9/11/2019    Chief Complaint:  Hypertension  PVD    Interval History:  No significant events or changes since last visit    Review of Systems  Review of Systems   Constitutional: Negative for fever.   Eyes: Negative for blurred vision.   Respiratory: Negative for shortness of breath.    Cardiovascular: Negative for palpitations.   Gastrointestinal: Negative for nausea and vomiting.   Neurological: Negative for dizziness and headaches.   Psychiatric/Behavioral: Negative for hallucinations.        Physical Exam  Temp:  [36.2 °C (97.1 °F)-36.4 °C (97.5 °F)] 36.2 °C (97.1 °F)  Pulse:  [] 77  Resp:  [17-18] 17  BP: (106-129)/(57-82) 129/82  SpO2:  [86 %-94 %] 88 %    Physical Exam   HENT:   Mouth/Throat: Oropharynx is clear and moist.   Eyes: No scleral icterus.   Cardiovascular: Normal rate and regular rhythm.   No murmur heard.  Pulmonary/Chest: Effort normal and breath sounds normal. No stridor.   Abdominal: Soft. She exhibits no distension. There is no tenderness.   Musculoskeletal: She exhibits no edema.   Skin: Skin is warm. No rash noted. She is not diaphoretic.   Psychiatric: Her behavior is normal.   Nursing note and vitals reviewed.      Fluids    Intake/Output Summary (Last 24 hours) at 9/11/2019 0925  Last data filed at 9/11/2019 0145  Gross per 24 hour   Intake 470 ml   Output --   Net 470 ml       Laboratory                        Imaging    Assessment/Plan  * CVA (cerebral vascular accident) ([facility])- (present on admission)  Assessment & Plan  On Eliquis  On Lipitor  MRI concerning for NPH, needs F/U    Dizziness  Assessment & Plan  Currently resolved  Had dizziness while sitting in the chair on 9/9 and was noticed that the BP dropped lower around that time  Orthostatics 9/9): SBP dropped 18 points from supine to sitting  F/U repeat orthostatics (9/11)  Off Lisinopril (last dose 9/9)  Monitor    PVD (peripheral vascular disease) ([facility])- (present  on admission)  Assessment & Plan  On Eliquis  On Lipitor  Note: for out patient f/u with Dr. Wren    Essential hypertension- (present on admission)  Assessment & Plan  BP began dipping lower on 9/9  BP ok since 9/9  Off Lisinopril (last dose 9/9)  Currently not on any hypertensive meds  Cont to monitor

## 2019-09-11 NOTE — CARE PLAN
Problem: Safety  Goal: Will remain free from injury  Note:   Patient calling appropriately for assistance, does not attempt to transfer self without staff present. Call light within reach. Non skid footwear in place. Bed locked and in lowest position. Hourly rounding in place.       Problem: Urinary Elimination:  Goal: Ability to reestablish a normal urinary elimination pattern will improve  Note:   Pt. Is incontinent of bladder. Pt. feels the urge to urinate but with no enough time to let her get to the bathroom (urgency).  Pt. Is being assisted by staff with cleaning and changing soiled clothes.

## 2019-09-11 NOTE — REHAB-SLP IDT TEAM NOTE
Speech Therapy   Cognitive Linquistic Functions  Comprehension Initial:  6 - Modified Independent  Comprehension Current:  5 - Stand-by Prompting/Supervision or Set-up   Comprehension Description:  Verbal cues  Expression Initial:  6 - Modified Independent  Expression Current:  5 - Stand-by Prompting/Supervision or Set-up   Expression Description:  Verbal cueing  Social Interaction Initial:  7 - Independent  Social Interaction Current:  5 - Stand-by Prompting/Supervision or Set-up   Social Interaction Description:  Increased time, Verbal cues  Problem Solving Initial:  5 - Standby Prompting/Supervision or Set-up  Problem Solving Current:  4 - Minimal Assistance   Problem Solving Description:  Verbal cueing, Therapy schedule, Increased time  Memory Initial:  5 - Standby Prompting/Supervision or Set-up  Memory Current:  5 - Standby Prompting/Supervision or Set-up   Memory Description:  Verbal cueing, Therapy schedule  Executive Functioning / Organization Initial:     Executive Functioning / Organization Current:      Executive Functioning Desciption:  SPV for medication management. Mod cues for financial management tasks  Swallowing  Swallowing Status:  WFL  Orders Placed This Encounter   Procedures   • Diet Order Regular     Standing Status:   Standing     Number of Occurrences:   1     Order Specific Question:   Diet:     Answer:   Regular [1]     Behavior Modification Plan  Keep the environment simple to avoid over stimulatiom/agitation, Allow for rest time, Keep instructions simple/concrete and Analyze tasks (break down into smaller steps)  Assistive Technology  Low tech: Calendar, planner, schedule, alarms/timers, pill organizer, post-it notes, lists  Family Training/Education:  Not yet completed  DC Recommendations:   HH vs. OP speech therapy  Strengths:  Able to follow instructions, Independent PLOF, Making steady progress towards goals, Pleasant and cooperative and Willingly participates in therapeutic  activities  Barriers:  Decreased endurance for cognitive tasks (inconsistent).   # of short term goals set=2  # of short term goals met=0  Speech Therapy Problems     Problem: Problem Solving STGs     Dates: Start: 09/07/19       Description:     Goal: STG-Within one week, patient will     Dates: Start: 09/07/19       Description: 1) Individualized goal:  Complete medication, and financial management tasks with 90% accuracy given min verbal cues.    2) Interventions:  SLP Cognitive Skill Development and SLP Group Treatment             Goal: STG-Within one week, patient will     Dates: Start: 09/07/19       Description: 1) Individualized goal:  Complete executive function tasks related to scheduling and reasoning with 90% accuracy given min verbal cues.   2) Interventions:  SLP Cognitive Skill Development and SLP Group Treatment                   Problem: Speech/Swallowing LTGs     Dates: Start: 09/07/19       Description:     Goal: LTG-By discharge, patient will solve complex problem     Dates: Start: 09/07/19       Description: 1) Individualized goal:  Mod I for safe discharge home.   2) Interventions:  SLP Aphasia Evaluation, SLP Cognitive Skill Development and SLP Group Treatment                          Section completed by:  Belinda Tovar MS,CCC-SLP

## 2019-09-11 NOTE — REHAB-NURSING IDT TEAM NOTE
Nursing   Nursing  Diet/Nutrition:  Regular and Thin Liquids  Medication Administration:  Whole with Liquid Wash  % consumed at meals in last 24 hours:  Consumed % of meals per documentation.  Meal/Snack Consumption for the past 24 hrs:   Oral Nutrition   09/10/19 1800 Dinner;Between % Consumed     Snack schedule:  None  Appetite:  Good  Admit Weight:  Weight: 62.6 kg (138 lb)  Weight Last 7 Days   [62.6 kg (138 lb)-63 kg (139 lb)] 63 kg (139 lb) (09/08 1000)    Pain Issues:    Location:  Back;Shoulder (09/10 2140)  Left;Posterior (09/10 2140)         Severity:  Moderate   Scheduled pain medications:  None     PRN pain medications used in last 24 hours:  oxycodone immediate release (ROXICODONE)    Non Pharmacologic Interventions:  distraction, emotional support, heat, relaxation, repositioned and rest  Effectiveness of pain management plan:  fair=improved comfort with interventions but does not always meet goal    Bowel:    Bowel Assist Initial Score:  4 - Minimal Assistance  Bowel Assist Current Score:  4 - Minimal Assistance  Bowl Accidents in last 7 days:  0  Last bowel movement: 09/10/19  Stool Description: Medium, Soft     Usual bowel pattern:  daily  Scheduled bowel medications:  senna-docusate (PERICOLACE or SENOKOT S)   PRN bowel medications used in last 24 hours:  None  Nursing Interventions:  Increased time, PRN medication, Supervision, Verbal cueing, Positioning on commode/toilet  Effectiveness of bowel program:   good=regular, predictable, controlled emptying of bowel     Bladder:    Bladder Assist Initial Score:  1 - Total Assistance  Bladder Assist Current Score:  5 - Standby Prompting/Supervision or Set-up  Bladder Accidents in last 7 days:  0  Medications affecting bladder:  None    Time void schedule/voiding pattern:  Voiding every 2-4 hours  Interventions:  Increased time, Supervision, Verbal cueing, Time void patient initiated  Effectiveness of bladder training:  Fair=occasional  unpredictable, incontinent emptying of bladder (< 1 time/day)    Sleep/wake cycle:   Average hours slept:  Sleeps 3-4 hours without waking  Sleep medication usage:  None    Patient/Family Training/Education:  Bladder Management/Training, Fall Prevention, General Self Care, Medication Management, Pain Management, Respiratory Hygiene, Safe Transfers, Safety and Skin Care  Discharge Supply Recommendations:  Blood Pressure Monitor  Strengths: Alert and oriented, Willingly participates in therapeutic activities, Able to follow instructions, Pleasant and cooperative, Effective communication skills and Good carryover of learning   Barriers:   Bladder incontinence, Fatigue, Generalized weakness, Limited mobility and Pain poorly managed       Nursing Problems     Problem: Bowel/Gastric:     Description:     Goal: Normal bowel function is maintained or improved     Description:           Goal: Will not experience complications related to bowel motility     Description:                 Problem: Communication     Description:     Goal: The ability to communicate needs accurately and effectively will improve     Description:                 Problem: Discharge Barriers/Planning     Description:     Goal: Patient's continuum of care needs will be met     Description:                 Problem: Infection     Description:     Goal: Will remain free from infection     Description:                 Problem: Knowledge Deficit     Description:     Goal: Knowledge of disease process/condition, treatment plan, diagnostic tests, and medications will improve     Description:           Goal: Knowledge of the prescribed therapeutic regimen will improve     Description:                 Problem: Pain Management     Description:     Goal: Pain level will decrease to patient's comfort goal     Description:                 Problem: Respiratory:     Description:     Goal: Respiratory status will improve     Description:                 Problem: Safety      Description:     Goal: Will remain free from injury     Description:           Goal: Will remain free from falls     Description:                 Problem: Skin Integrity     Description:     Goal: Risk for impaired skin integrity will decrease     Description:                 Problem: Urinary Elimination:     Description:     Goal: Ability to reestablish a normal urinary elimination pattern will improve     Description:                 Problem: Venous Thromboembolism (VTW)/Deep Vein Thrombosis (DVT) Prevention:     Description:     Goal: Patient will participate in Venous Thrombosis (VTE)/Deep Vein Thrombosis (DVT)Prevention Measures     Description:                        Long Term Goals:   At discharge patient will be able to function safely at home and in the community with support.    Section completed by:  Jordin Dotson R.N.

## 2019-09-11 NOTE — REHAB-DIETARY IDT TEAM NOTE
"Dietary   Nutrition  Dietary Problems (Active)      There are no active problems.            Nutrition services: Day 5 of admit.  Nohelia Dickerson is a 74 y.o. female with admitting DX of (l body involvement (r) brain, Left leg numbness, Arterial occlusion, lower extremity, Stroke (cerebrum). Hx includes thyroid cancer, COPD, HTN.    Consult received for MST score of 4 due to report of weight loss x 6 months and poor PO PTA.      Assessment:  Height: 182.9 cm (6')  Weight: 63 kg (139 lb)  Body mass index is 18.85 kg/m²., BMI classification: low end of the normal range. Based on pt's age, BMI 23-27 would be beneficial  Diet/Intake: regular/avg intake of 65%.     Evaluation:   1. Pt reports that her UBW is 67.3 kg (148#). Pt thinks she has lost weight during her recent acute hospitalization due to eating healthier foods rather than \"junk foods\". Of note, pt's weight at acute was 62.9 kg on 9/5/19 and 64.7 kg on 9/6/19. Pt's weight ~13 months ago on 8/7/18 was 69.8 kg. Weight loss noted of 6.8 kg (9.7%) x 13 months. Weight loss is not significant. Recorded PO intake has been good here and was also good at acute with intake % of most recorded meals (5 out of 6). Pt said that dietary is working with her on her menu choices.     Malnutrition Risk: Criteria not met.     Recommendations/Plan:  1. Continue with regular diet as ordered.   2. Encourage continued intake of >50%  3. Document intake of all meals as % taken in ADL's to provide interdisciplinary communication across all shifts.   4. Monitor weight.  5. Nutrition rep will continue to see patient for ongoing meal and snack preferences.   6. RD will monitor weekly.              Section completed by:  Anne Marie West R.D.     "

## 2019-09-11 NOTE — REHAB-CM IDT TEAM NOTE
Case Management    DC Planning  DC destination/dispostion:   To single level unit attached to patient's daughter's and son-in-law's home. There are 2 grandchildren living there too, ages 11 and 17. 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      DC Needs: Anticipate HH. [facility] TBD. Has SPC. MD f/u appointments - PCP, Dr. Wren for PAD.     Barriers to discharge:   Functional deficits. Recent loss of 39 y/o daughter - sudden loss, ruptured aneurysm. PAD.     Strengths: Motivated. PLOF - independent. . Supportive family.     Section completed by:  Zee Gomes R.N.

## 2019-09-11 NOTE — THERAPY
"Occupational Therapy  Daily Treatment     Patient Name: Nohelia Dickerson  Age:  74 y.o., Sex:  female  Medical Record #: [id number]  Today's Date: 9/11/2019     Precautions  Precautions: (P) Fall Risk  Comments: (P) posterior/L lateral lean, L rafael    Safety   ADL Safety : (P) Requires Supervision for Safety, Requires Physical Assist for Safety, Requires Cueing for Safety  Bathroom Safety: (P) Requires Supervision for Safety, Requires Physical Assist for Safety, Requires Cuing for Safety  Comments: (P) see FIMs for ADL performance details.    Subjective    \"I usually do better in the morning\"     Objective       09/11/19 0701   Precautions   Precautions Fall Risk   Comments posterior/L lateral lean, L rafael   Safety    ADL Safety  Requires Supervision for Safety;Requires Physical Assist for Safety;Requires Cueing for Safety   Bathroom Safety Requires Supervision for Safety;Requires Physical Assist for Safety;Requires Cuing for Safety   Comments see FIMs for ADL performance details.   Vitals   Patient BP Position Supine   Blood Pressure  129/82   Pulse Oximetry 88 %   O2 (LPM) 1   O2 Delivery Nasal Cannula   Vitals Comments O2 increased to 2 L with activity.   Pain   Intervention Warm Moist Pack   Pain 0 - 10 Group   Location Back   Location Orientation Posterior;Mid;Left   Pain Rating Scale (NPRS) 5   Description Aching   Therapist Pain Assessment Prior to Activity;During Activity;Post Activity   Cognition    Speech/ Communication Delayed Responses   Level of Consciousness Alert   Sitting Lower Body Exercises   Other Exercises BLE MotoMed x10 min, level 5 resistance, .52 miles, 31L/65R, facilitation to prevent excessive LLE adduction   Balance   Sitting Balance (Static) Fair   Sitting Balance (Dynamic) Fair -   Standing Balance (Static) Poor +   Standing Balance (Dynamic) Poor   Weight Shift Sitting Fair   Weight Shift Standing Poor   Skilled Intervention Verbal Cuing   Comments Sit<> // bars with focus " on anterior weightshifting and sequencing 1x10 reps with unilateral UB support and mirror for visual feedback to promote midline orientation.    Bed Mobility    Supine to Sit Stand by Assist   Scooting Minimal Assist   Rolling Supervised   Skilled Intervention Verbal Cuing   Interdisciplinary Plan of Care Collaboration   Patient Position at End of Therapy Seated;Self Releasing Lap Belt Applied;Other (Comments)  (cafeteria for breakfast)   OT Total Time Spent   OT Individual Total Time Spent (Mins) 60   OT Charge Group   OT Self Care / ADL 2   OT Neuromuscular Re-education / Balance 1   OT Therapeutic Exercise  1       FIM Grooming Score:  6 - Modified Independent  Grooming Description:  Increased time(seated at sink for oral care and combing hair.)    FIM Upper Body Dressin - Standby Prompting/Supervision or Set-up  Upper Body Dressing Description:  Increased time, Supervision for safety, Set-up of equipment(SBA/set up to don/doff pullover shirt.)    FIM Lower Body Dressing Score:  3 - Moderate Assistance  Lower Body Dressing Description:  Increased time, Supervision for safety, Verbal cueing, Set-up of equipment(assist to thread LLE into brief and pants with cues for rafael technique, min A to pull up pants over L hip while standing at GB for balance support.)    FIM Toiletin - Max Assistance  Toileting Description:  Grab bar, Supervision for safety, Increased time, Verbal cueing    FIM Toilet Transfer Score:  3 - Moderate Assistance  Toilet Transfer Description:  Grab bar, Increased time, Supervision for safety, Verbal cueing, Set-up of equipment(w/c<>toilet SPT with GB, min A for lift and assist to manage LLE.)      Assessment    Pt tolerated session well with focus on ADLs, BLE strengthening and balance. Pt demo improved sitting and standing balance in comparison to yesterday's session, with much less L lateral lean. Pt with c/o back pain under L scapula, and reported pain relief after MHP applied. Pt  con't to demo difficulty with advancing LLE during bathroom transfers.     Plan    Con't OT for sitting/standing balance, midline orientation, safety with ADLs and related functional mobility.

## 2019-09-11 NOTE — CARE PLAN
Problem: Communication  Goal: The ability to communicate needs accurately and effectively will improve  Outcome: PROGRESSING AS EXPECTED  Note:   Pt is able to communicate her needs effectively to staff.      Problem: Safety  Goal: Will remain free from injury  Outcome: PROGRESSING AS EXPECTED  Note:   Pt uses call light consistently for staff assistance. Pt has good safety awareness, no impulsivity observed.      Problem: Infection  Goal: Will remain free from infection  Outcome: PROGRESSING AS EXPECTED  Note:   No s/s of infection present      Problem: Venous Thromboembolism (VTW)/Deep Vein Thrombosis (DVT) Prevention:  Goal: Patient will participate in Venous Thrombosis (VTE)/Deep Vein Thrombosis (DVT)Prevention Measures  Outcome: PROGRESSING AS EXPECTED  Note:   Pt receives Apixaban for DVT prophylaxis.      Problem: Bowel/Gastric:  Goal: Normal bowel function is maintained or improved  Outcome: PROGRESSING SLOWER THAN EXPECTED  Note:   Pt reports having loose BMs and refused scheduled bowel medication

## 2019-09-11 NOTE — PROGRESS NOTES
Rehab Progress Note     Encounter Date: 9/11/2019    CC: Left sided weakness, R CVA    Interval Events (Subjective)  Patient sitting up in bed. She reports she is doing well. Denies pain at this time. Discussed with RN and patient positive on orthostatic provacative maneuvers from 112 to 77. She is currently not on any BP medications, SBP max 148 yesterday.  Patient otherwise reports she is doing well. Discussed we will have IDT later today to discuss her case.     IDT Team Meeting 9/11/2019    IKristi M.D., was present and led the interdisciplinary team conference on 9/11/2019.  I led the IDT conference and agree with the IDT conference documentation and plan of care as noted below.     RN:  Diet    % Meal     Pain    Sleep    Bowel Continent   Bladder Incontinent   In's & Out's    Orthostatic +     PT: Missed some therapy due to orthostatic  Bed Mobility    Transfers totA   Mobility totA   Stairs    Very motivated but limited by hemiplegia in LLE  Limited by core balance     OT: 1 of 3 goals  Eating    Grooming    Bathing modA   UB Dressing    LB Dressing modA   Toileting maxA   Shower & Transfer modA   Limited by fatigue and fluctuating endurance  Improvement in LUE, left neglect    SLP:  Medication management  Fluctuates due to fatigue  Phani for problem solving     Resp:  Back on oxygen due to variability  Very congested, smoker COPD  Symbicort?    CM:  Continues to work on disposition and DME needs.      DC/Disposition:  9/24/19    Objective:  VITAL SIGNS: BP (!) 90/51   Pulse 98   Temp 36.4 °C (97.6 °F) (Temporal)   Resp 18   Ht 1.829 m (6')   Wt 63 kg (139 lb)   SpO2 93%   BMI 18.85 kg/m²   Gen: NAD  Psych: Mood and affect appropriate  CV: RRR, no edema  Resp: CTAB, upper airway congestion  Abd: NTND  Neuro: AOx4, following simple commands    No results found for this or any previous visit (from the past 72 hour(s)).    Current Facility-Administered Medications   Medication  Frequency   • oxyCODONE immediate-release (ROXICODONE) tablet 5 mg Q4HRS PRN   • Respiratory Care per Protocol Continuous RT   • Pharmacy Consult Request ...Pain Management Review 1 Each PHARMACY TO DOSE   • acetaminophen (TYLENOL) tablet 650 mg Q4HRS PRN   • hydrALAZINE (APRESOLINE) tablet 25 mg Q8HRS PRN   • senna-docusate (PERICOLACE or SENOKOT S) 8.6-50 MG per tablet 2 Tab BID    And   • polyethylene glycol/lytes (MIRALAX) PACKET 1 Packet QDAY PRN    And   • magnesium hydroxide (MILK OF MAGNESIA) suspension 30 mL QDAY PRN    And   • bisacodyl (DULCOLAX) suppository 10 mg QDAY PRN   • artificial tears ophthalmic solution 1 Drop PRN   • benzocaine-menthol (CEPACOL) lozenge 1 Lozenge Q2HRS PRN   • mag hydrox-al hydrox-simeth (MAALOX PLUS ES or MYLANTA DS) suspension 20 mL Q2HRS PRN   • ondansetron (ZOFRAN ODT) dispertab 4 mg 4X/DAY PRN    Or   • ondansetron (ZOFRAN) syringe/vial injection 4 mg 4X/DAY PRN   • traZODone (DESYREL) tablet 50 mg QHS PRN   • sodium chloride (OCEAN) 0.65 % nasal spray 2 Spray PRN   • melatonin tablet 3 mg HS PRN   • apixaban (ELIQUIS) tablet 10 mg BID   • atorvastatin (LIPITOR) tablet 80 mg Q EVENING   • [START ON 9/13/2019] apixaban (ELIQUIS) tablet 5 mg BID   • albuterol inhaler 2 Puff Q4HRS PRN       Orders Placed This Encounter   Procedures   • Diet Order Regular     Standing Status:   Standing     Number of Occurrences:   1     Order Specific Question:   Diet:     Answer:   Regular [1]       Assessment:  Active Hospital Problems    Diagnosis   • *CVA (cerebral vascular accident) (HCC)   • Azotemia   • Tachycardia   • PVD (peripheral vascular disease) (HCC)   • Arterial occlusion, lower extremity (HCC)   • COPD   • Chronic respiratory failure (HCC)   • Thrombocytopenia (HCC)   • Tobacco abuse   • Essential hypertension       Medical Decision Making and Plan:  Right KIM and MCA ischemic stroke  Likely cardio-embolic  Continue full rehab program  PT/OT/SLP, 1 hr each discipline, 5 days  per week     Pain/Neuropathic pain  Patient had been taking oxycodone on acute care.  Trial prn use, monitor need.  Consider low dose gabapentin at bedtime. Controlled without      Continue Eliquis and statin for secondary stroke prophylaxis  Follow up with stroke bridge clinic, needs monitor  Medications unchanged     Possible NPH  Follow up stroke bridge clinic     Appreciate assistance of hospitalist with her medical co-morbidities:     Hypertension  Tachycardia, check EKG  On lisinopril 20 mg -Discontinue for hypotension. Continue to monitor as into 140s     Orthostatic Hypotension? on 9/9/19  Previously on 9/7/19 as well. Discussed with hospitalist, unlikely if sitting up for 30 minutes  Continue to monitor  Encourage PO fluid intake   Lisinopril discontinued. Provacative testing + on 9/11. Encourage fluid intake      Peripheral vascular disease  Outpatient follow-up with Eliquis and lipitor     Thrombocytopenia  Appreciate hospitalist input     Tobacco use  COPD  Will need smoking cessation prior to discharge  Performed on 9/10/19  Start Symbicort     DVT Ppx  On Apixaban     Total time:  35 minutes.  I spent greater than 50% of the time for patient care, counseling, and coordination on this date, including unit/floor time, and face-to-face time with the patient as per interval events and assessment and plan above. Topics discussed included discharge planning, start Symbicort for COPD and ongoing orthostatic hypotension. Patient was discussed separately in IDT today; please see details above.    Kristi Aguilar M.D.

## 2019-09-11 NOTE — CARE PLAN
Problem: Balance  Goal: STG-Within one week, patient will maintain static standing  Description  1) Individualized goal:  Min A for L terminal hip/ knee ext x 5 minutes with BUE support  2) Interventions: PT Group Therapy, PT E Stim Attended, PT Orthotics Training, PT Gait Training, PT Self Care/Home Eval, PT Therapeutic Exercises, PT Neuro Re-Ed/Balance, PT Aquatic Therapy, PT Therapeutic Activity, PT Manual Therapy and PT Evaluation     Outcome: PROGRESSING AS EXPECTED  Note:   < 5 min     Problem: Mobility  Goal: STG-Within one week, patient will propel wheelchair household distances  Description  1) Individualized goal:  Min A with BUE support 50 ft x 2  2) Interventions: PT Group Therapy, PT E Stim Attended, PT Orthotics Training, PT Gait Training, PT Self Care/Home Eval, PT Therapeutic Exercises, PT Neuro Re-Ed/Balance, PT Aquatic Therapy, PT Therapeutic Activity, PT Manual Therapy and PT Evaluation     Outcome: PROGRESSING AS EXPECTED  Note:   < 50 ft    Goal: STG-Within one week, patient will ambulate household distance  Description  1) Individualized goal:  Min A with FWW and L AFO as needed 25 ft x 2  2) Interventions: PT Group Therapy, PT E Stim Attended, PT Orthotics Training, PT Gait Training, PT Self Care/Home Eval, PT Therapeutic Exercises, PT Neuro Re-Ed/Balance, PT Aquatic Therapy, PT Therapeutic Activity, PT Manual Therapy and PT Evaluation     Outcome: PROGRESSING AS EXPECTED  Note:   <10 ft     Problem: Mobility Transfers  Goal: STG-Within one week, patient will perform bed mobility  Description  1) Individualized goal:  SPV with bed rail  2) Interventions: PT Group Therapy, PT E Stim Attended, PT Orthotics Training, PT Gait Training, PT Self Care/Home Eval, PT Therapeutic Exercises, PT Neuro Re-Ed/Balance, PT Aquatic Therapy, PT Therapeutic Activity, PT Manual Therapy and PT Evaluation     Outcome: PROGRESSING AS EXPECTED  Note:   Min A  Goal: STG-Within one week, patient will sit to  stand  Description  1) Individualized goal:  Min A with FWW  2) Interventions: PT Group Therapy, PT E Stim Attended, PT Orthotics Training, PT Gait Training, PT Self Care/Home Eval, PT Therapeutic Exercises, PT Neuro Re-Ed/Balance, PT Aquatic Therapy, PT Therapeutic Activity, PT Manual Therapy and PT Evaluation     Outcome: PROGRESSING AS EXPECTED  Note:   Mod A  Goal: STG-Within one week, patient will transfer bed to chair  Description  1) Individualized goal:  Mod A with FWW  2) Interventions: PT Group Therapy, PT E Stim Attended, PT Orthotics Training, PT Gait Training, PT Self Care/Home Eval, PT Therapeutic Exercises, PT Neuro Re-Ed/Balance, PT Aquatic Therapy, PT Therapeutic Activity, PT Manual Therapy and PT Evaluation     Outcome: PROGRESSING AS EXPECTED  Note:   Max A

## 2019-09-11 NOTE — REHAB-COLLABORATIVE ONGOING IDT TEAM NOTE
Weekly Interdisciplinary Team Conference Note    Weekly Interdisciplinary Team Conference # 1  Date:  9/11/2019    Clinicians present and reporting at team conference include the following:   MD: LOVE Aguilar MD    RN:  Kelsea Overton RN    PT:   Jayro Loving, PT, DPT  OT:  Rosalba Luna MS OTR/L   ST:  Belinda Tovar MS, CCC-SLP  CM:  Zee Gomes RN  REC:  Not Applicable  RT:  Not Applicable  RPh:  Jayesh Hylton, [facility]  Other:   Derek White, Medical Student; Megan Howe, Fellow  All reporting clinicians have a working knowledge of this patient's plan of care.    Targeted DC Date:  9/24/2019     Medical    Patient Active Problem List    Diagnosis Date Noted   • Dizziness 09/09/2019   • Azotemia 09/07/2019   • Tachycardia 09/06/2019   • CVA (cerebral vascular accident) ([facility]) 09/04/2019   • PVD (peripheral vascular disease) ([facility]) 09/03/2019   • Arterial occlusion, lower extremity ([facility]) 09/03/2019   • Viral URI with cough 04/18/2019   • History of nephrolithiasis 08/24/2018   • Hydronephrosis 08/07/2018   • COPD 08/07/2018   • Chronic respiratory failure ([facility]) 08/07/2018   • Thrombocytopenia ([facility]) 08/06/2018   • Healthcare maintenance 05/22/2018   • Tobacco abuse 05/22/2018   • Essential hypertension 05/22/2018     Results     ** No results found for the last 24 hours. **        Nursing  Diet/Nutrition:  Regular and Thin Liquids  Medication Administration:  Whole with Liquid Wash  % consumed at meals in last 24 hours:  Consumed % of meals per documentation.  Meal/Snack Consumption for the past 24 hrs:   Oral Nutrition   09/10/19 1800 Dinner;Between % Consumed     Snack schedule:  None  Appetite:  Good  Admit Weight:  Weight: 62.6 kg (138 lb)  Weight Last 7 Days   [62.6 kg (138 lb)-63 kg (139 lb)] 63 kg (139 lb) (09/08 1000)    Pain Issues:    Location:  Back;Shoulder (09/10 2140)  Left;Posterior (09/10 2140)         Severity:  Moderate   Scheduled pain medications:  None     PRN pain medications  used in last 24 hours:  oxycodone immediate release (ROXICODONE)    Non Pharmacologic Interventions:  distraction, emotional support, heat, relaxation, repositioned and rest  Effectiveness of pain management plan:  fair=improved comfort with interventions but does not always meet goal    Bowel:    Bowel Assist Initial Score:  4 - Minimal Assistance  Bowel Assist Current Score:  4 - Minimal Assistance  Bowl Accidents in last 7 days:  0  Last bowel movement: 09/10/19  Stool Description: Medium, Soft     Usual bowel pattern:  daily  Scheduled bowel medications:  senna-docusate (PERICOLACE or SENOKOT S)   PRN bowel medications used in last 24 hours:  None  Nursing Interventions:  Increased time, PRN medication, Supervision, Verbal cueing, Positioning on commode/toilet  Effectiveness of bowel program:   good=regular, predictable, controlled emptying of bowel     Bladder:    Bladder Assist Initial Score:  1 - Total Assistance  Bladder Assist Current Score:  5 - Standby Prompting/Supervision or Set-up  Bladder Accidents in last 7 days:  0  Medications affecting bladder:  None    Time void schedule/voiding pattern:  Voiding every 2-4 hours  Interventions:  Increased time, Supervision, Verbal cueing, Time void patient initiated  Effectiveness of bladder training:  Fair=occasional unpredictable, incontinent emptying of bladder (< 1 time/day)    Sleep/wake cycle:   Average hours slept:  Sleeps 3-4 hours without waking  Sleep medication usage:  None    Patient/Family Training/Education:  Bladder Management/Training, Fall Prevention, General Self Care, Medication Management, Pain Management, Respiratory Hygiene, Safe Transfers, Safety and Skin Care  Discharge Supply Recommendations:  Blood Pressure Monitor  Strengths: Alert and oriented, Willingly participates in therapeutic activities, Able to follow instructions, Pleasant and cooperative, Effective communication skills and Good carryover of learning   Barriers:   Bladder  incontinence, Fatigue, Generalized weakness, Limited mobility and Pain poorly managed       Nursing Problems     Problem: Bowel/Gastric:     Description:     Goal: Normal bowel function is maintained or improved     Description:           Goal: Will not experience complications related to bowel motility     Description:                 Problem: Communication     Description:     Goal: The ability to communicate needs accurately and effectively will improve     Description:                 Problem: Discharge Barriers/Planning     Description:     Goal: Patient's continuum of care needs will be met     Description:                 Problem: Infection     Description:     Goal: Will remain free from infection     Description:                 Problem: Knowledge Deficit     Description:     Goal: Knowledge of disease process/condition, treatment plan, diagnostic tests, and medications will improve     Description:           Goal: Knowledge of the prescribed therapeutic regimen will improve     Description:                 Problem: Pain Management     Description:     Goal: Pain level will decrease to patient's comfort goal     Description:                 Problem: Respiratory:     Description:     Goal: Respiratory status will improve     Description:                 Problem: Safety     Description:     Goal: Will remain free from injury     Description:           Goal: Will remain free from falls     Description:                 Problem: Skin Integrity     Description:     Goal: Risk for impaired skin integrity will decrease     Description:                 Problem: Urinary Elimination:     Description:     Goal: Ability to reestablish a normal urinary elimination pattern will improve     Description:                 Problem: Venous Thromboembolism (VTW)/Deep Vein Thrombosis (DVT) Prevention:     Description:     Goal: Patient will participate in Venous Thrombosis (VTE)/Deep Vein Thrombosis (DVT)Prevention Measures      Description:                        Long Term Goals:   At discharge patient will be able to function safely at home and in the community with support.    Section completed by:  Jordin Dotson R.N.           Mobility  Bed mobility:   SPV - min A  Bed /Chair/Wheelchair Transfer Initial:  2 - Max Assistance  Bed /Chair/Wheelchair Transfer Current:  2 - Max Assistance   Bed/Chair/Wheelchair Transfer Description:  (pt requires lifting AND lowering assist for sit<>stand / mod-max A for LLE positioning and management with stand-pivot transfer. Min A sit<>supine for LLE )  Walk Initial:  1 - Total Assistance  Walk Current:  1 - Total Assistance   Walk Description:  (parallel bars, 2x 8-9 feet, Mod A, second person for w/c vollow, 1-step cues for sequencing, posterior lean present, Max-Total A at LLE, very bradykinetic.)  Wheelchair Initial:  1 - Total Assistance  Wheelchair Current:  1 - Total Assistance   Wheelchair Description:  (min A x 10 ft / pt struggles with LUE  / coordination)  Stairs Initial:  0 - Not tested,unsafe activity  Stairs Current: 0 - Not tested,unsafe activity   Stairs Description:    Patient/Family Training/Education:  Ongoing, safety with transfers   DME/DC Recommendations:  TBD  Strengths:  Able to follow instructions, Independent PLOF, Motivated for self care and independence, Pleasant and cooperative and Willingly participates in therapeutic activities  Barriers:   Hemiplegia, Limited mobility and Poor balance  # of short term goals set= 6  # of short term goals met=0 (6 partially met)  Physical Therapy Problems     Problem: Balance     Dates: Start: 09/07/19       Description:     Goal: STG-Within one week, patient will maintain static standing     Dates: Start: 09/07/19       Description: 1) Individualized goal:  Min A for L terminal hip/ knee ext x 5 minutes with BUE support  2) Interventions: PT Group Therapy, PT E Stim Attended, PT Orthotics Training, PT Gait Training, PT Self  Care/Home Eval, PT Therapeutic Exercises, PT Neuro Re-Ed/Balance, PT Aquatic Therapy, PT Therapeutic Activity, PT Manual Therapy and PT Evaluation       Note:     Goal Note filed on 09/11/19 1330 by John Loving, PT    < 5 min                        Problem: Mobility     Dates: Start: 09/07/19       Description:     Goal: STG-Within one week, patient will propel wheelchair household distances     Dates: Start: 09/07/19       Description: 1) Individualized goal:  Min A with BUE support 50 ft x 2  2) Interventions: PT Group Therapy, PT E Stim Attended, PT Orthotics Training, PT Gait Training, PT Self Care/Home Eval, PT Therapeutic Exercises, PT Neuro Re-Ed/Balance, PT Aquatic Therapy, PT Therapeutic Activity, PT Manual Therapy and PT Evaluation       Note:     Goal Note filed on 09/11/19 1330 by John Loving, PT    < 50 ft                    Goal: STG-Within one week, patient will ambulate household distance     Dates: Start: 09/07/19       Description: 1) Individualized goal:  Min A with FWW and L AFO as needed 25 ft x 2  2) Interventions: PT Group Therapy, PT E Stim Attended, PT Orthotics Training, PT Gait Training, PT Self Care/Home Eval, PT Therapeutic Exercises, PT Neuro Re-Ed/Balance, PT Aquatic Therapy, PT Therapeutic Activity, PT Manual Therapy and PT Evaluation       Note:     Goal Note filed on 09/11/19 1330 by John Loving, PT    <10 ft                        Problem: Mobility Transfers     Dates: Start: 09/07/19       Description:     Goal: STG-Within one week, patient will perform bed mobility     Dates: Start: 09/07/19       Description: 1) Individualized goal:  SPV with bed rail  2) Interventions: PT Group Therapy, PT E Stim Attended, PT Orthotics Training, PT Gait Training, PT Self Care/Home Eval, PT Therapeutic Exercises, PT Neuro Re-Ed/Balance, PT Aquatic Therapy, PT Therapeutic Activity, PT Manual Therapy and PT Evaluation       Note:     Goal Note filed on 09/11/19 1330 by  John Loving, PT    Min A                  Goal: STG-Within one week, patient will sit to stand     Dates: Start: 09/07/19       Description: 1) Individualized goal:  Min A with FWW  2) Interventions: PT Group Therapy, PT E Stim Attended, PT Orthotics Training, PT Gait Training, PT Self Care/Home Eval, PT Therapeutic Exercises, PT Neuro Re-Ed/Balance, PT Aquatic Therapy, PT Therapeutic Activity, PT Manual Therapy and PT Evaluation       Note:     Goal Note filed on 09/11/19 1330 by John Loving, PT    Mod A                  Goal: STG-Within one week, patient will transfer bed to chair     Dates: Start: 09/07/19       Description: 1) Individualized goal:  Mod A with FWW  2) Interventions: PT Group Therapy, PT E Stim Attended, PT Orthotics Training, PT Gait Training, PT Self Care/Home Eval, PT Therapeutic Exercises, PT Neuro Re-Ed/Balance, PT Aquatic Therapy, PT Therapeutic Activity, PT Manual Therapy and PT Evaluation       Note:     Goal Note filed on 09/11/19 1330 by John Loving, PT    Max A                        Problem: PT-Long Term Goals     Dates: Start: 09/07/19       Description:     Goal: LTG-By discharge, patient will tolerate standing     Dates: Start: 09/07/19       Description: 1) Individualized goal:  With BUE and SPV support 5 minutes x 2 for LE strength / balance training  2) Interventions: PT Group Therapy, PT E Stim Attended, PT Orthotics Training, PT Gait Training, PT Self Care/Home Eval, PT Therapeutic Exercises, PT Neuro Re-Ed/Balance, PT Aquatic Therapy, PT Therapeutic Activity, PT Manual Therapy and PT Evaluation             Goal: LTG-By discharge, patient will propel wheelchair     Dates: Start: 09/07/19       Description: 1) Individualized goal:  SPV with BUE support x 150 ft to/from therapies/ meals  2) Interventions: PT Group Therapy, PT E Stim Attended, PT Orthotics Training, PT Gait Training, PT Self Care/Home Eval, PT Therapeutic Exercises, PT Neuro Re-Ed/Balance,  PT Aquatic Therapy, PT Therapeutic Activity, PT Manual Therapy and PT Evaluation             Goal: LTG-By discharge, patient will ambulate     Dates: Start: 09/07/19       Description: 1) Individualized goal:  SBA with FWW x 150 ft  2) Interventions: PT Group Therapy, PT E Stim Attended, PT Orthotics Training, PT Gait Training, PT Self Care/Home Eval, PT Therapeutic Exercises, PT Neuro Re-Ed/Balance, PT Aquatic Therapy, PT Therapeutic Activity, PT Manual Therapy and PT Evaluation             Goal: LTG-By discharge, patient will transfer one surface to another     Dates: Start: 09/07/19       Description: 1) Individualized goal:  Modified independent with FWW  2) Interventions: PT Group Therapy, PT E Stim Attended, PT Orthotics Training, PT Gait Training, PT Self Care/Home Eval, PT Therapeutic Exercises, PT Neuro Re-Ed/Balance, PT Aquatic Therapy, PT Therapeutic Activity, PT Manual Therapy and PT Evaluation             Goal: LTG-By discharge, patient will ambulate up/down 4-6 stairs     Dates: Start: 09/07/19       Description: 1) Individualized goal:  CGA with hand rails  2) Interventions:  PT Group Therapy, PT E Stim Attended, PT Orthotics Training, PT Gait Training, PT Self Care/Home Eval, PT Therapeutic Exercises, PT Neuro Re-Ed/Balance, PT Aquatic Therapy, PT Therapeutic Activity, PT Manual Therapy and PT Evaluation             Goal: LTG-By discharge, patient will transfer in/out of a car     Dates: Start: 09/07/19       Description: 1) Individualized goal:  SBA with FWW  2) Interventions:  PT Group Therapy, PT E Stim Attended, PT Orthotics Training, PT Gait Training, PT Self Care/Home Eval, PT Therapeutic Exercises, PT Neuro Re-Ed/Balance, PT Aquatic Therapy, PT Therapeutic Activity, PT Manual Therapy and PT Evaluation                           Section completed by:  John Loving, PT    Activities of Daily Living  Eating Initial:  5 - Standby Prompting/Supervision or Set-up  Eating Current:  6 - Modified  Independent   Eating Description:  Increased time  Grooming Initial:  5 - Standby Prompting/Supervision or Set-up  Grooming Current:  6 - Modified Independent   Grooming Description:  Increased time(seated at sink for oral care and combing hair.)  Bathing Initial:  4 - Minimal Assistance  Bathing Current:  3 - Moderate Assistance   Bathing Description:  Grab bar, Tub bench, Hand held shower, Increased time, Supervision for safety, Verbal cueing, Set-up of equipment(min-mod A required for sitting balance d/t L lateral lean, assist to wash jin area/buttocks while standing at GB for balance.)  Upper Body Dressing Initial:  5 - Standby Prompting/Supervision or Set-up  Upper Body Dressing Current:  5 - Standby Prompting/Supervision or Set-up   Upper Body Dressing Description:  Increased time, Supervision for safety, Set-up of equipment(SBA/set up to don/doff pullover shirt.)  Lower Body Dressing Initial:  2 - Max Assistance  Lower Body Dressing Current:  3 - Moderate Assistance   Lower Body Dressing Description:  3 - Moderate Assistance  Toileting Initial:  2 - Max Assistance  Toileting Current:  2 - Max Assistance   Toileting Description:  Grab bar, Supervision for safety, Increased time, Verbal cueing  Toilet Transfer Initial:  3 - Moderate Assistance  Toilet Transfer Current:  3 - Moderate Assistance   Toilet Transfer Description:  3 - Moderate Assistance  Tub / Shower Transfer Initial:  3 - Moderate Assistance  Tub / Shower Transfer Current:  3 - Moderate Assistance   Tub / Shower Transfer Description:  Grab bar, Increased time, Supervision for safety, Verbal cueing, Set-up of equipment(w/c<>shower bench SPT with GB, assist for lift, assist to manage LLE.)  IADL:  TBD   Family Training/Education:  Ongoing with pt; no family present for OT session thus far; safety with ADLs and bathroom transfers, AE/DME.    DME/DC Recommendations:  TBD; HHOT vs outpt OT; will verify bathroom set up/equipment needs with pt and  family.     Strengths:  Able to follow instructions, Independent PLOF, Motivated for self care and independence, Pleasant and cooperative, Supportive family and Willingly participates in therapeutic activities  Barriers:  Decreased endurance, Hemiplegia, Limited mobility, Pain poorly managed, Poor activity tolerance, Poor balance and Other: LLE weakness, L back pain, L lateral/posterior lean, low BP     # of short term goals set= 3    # of short term goals met= 1     Occupational Therapy Goals     Problem: Functional Transfers     Dates: Start: 09/07/19       Description:     Goal: STG-Within one week, patient will transfer to toilet     Dates: Start: 09/07/19       Description: 1) Individualized Goal:  Min assist   2) Interventions:  OT Group Therapy, OT Self Care/ADL, OT Cognitive Skill Dev, OT Community Reintegration, OT Manual Ther Technique, OT Neuro Re-Ed/Balance, OT Sensory Int Techniques, OT Therapeutic Activity, OT Evaluation and OT Therapeutic Exercise       Note:     Goal Note filed on 09/11/19 1110 by Rosalba Luna, OT    Mod A for lift and mgt of LLE.                        Problem: OT Long Term Goals     Dates: Start: 09/07/19       Description:     Goal: LTG-By discharge, patient will complete basic self care tasks     Dates: Start: 09/07/19       Description: 1) Individualized Goal:  Mod I for UB ADLs, Set-up and SBA for LB ADLs  2) Interventions:  OT Group Therapy, OT Self Care/ADL, OT Cognitive Skill Dev, OT Community Reintegration, OT Manual Ther Technique, OT Neuro Re-Ed/Balance, OT Sensory Int Techniques, OT Therapeutic Activity, OT Evaluation and OT Therapeutic Exercise             Goal: LTG-By discharge, patient will perform bathroom transfers     Dates: Start: 09/07/19       Description: 1) Individualized Goal:  SBA  2) Interventions:  OT Group Therapy, OT Self Care/ADL, OT Cognitive Skill Dev, OT Community Reintegration, OT Manual Ther Technique, OT Neuro Re-Ed/Balance, OT Sensory Int  Techniques, OT Therapeutic Activity, OT Evaluation and OT Therapeutic Exercise             Goal: LTG-By discharge, patient will complete basic home management     Dates: Start: 09/07/19       Description: 1) Individualized Goal: Supervision  2) Interventions:  OT Group Therapy, OT Self Care/ADL, OT Cognitive Skill Dev, OT Community Reintegration, OT Manual Ther Technique, OT Neuro Re-Ed/Balance, OT Sensory Int Techniques, OT Therapeutic Activity, OT Evaluation and OT Therapeutic Exercise                   Problem: Toileting     Dates: Start: 09/07/19       Description:     Goal: STG-Within one week, patient will complete toileting tasks     Dates: Start: 09/07/19       Description: 1) Individualized Goal:  Mod assist   2) Interventions:  OT Group Therapy, OT Self Care/ADL, OT Cognitive Skill Dev, OT Community Reintegration, OT Manual Ther Technique, OT Neuro Re-Ed/Balance, OT Sensory Int Techniques, OT Therapeutic Activity, OT Evaluation and OT Therapeutic Exercise       Note:     Goal Note filed on 09/11/19 1110 by Rosalba Luna, OT    Max A for clothing mgt d/t L lateral/posterior lean.                                Section completed by:  Rosalba Luna, OT    Cognitive Linquistic Functions  Comprehension Initial:  6 - Modified Independent  Comprehension Current:  5 - Stand-by Prompting/Supervision or Set-up   Comprehension Description:  Verbal cues  Expression Initial:  6 - Modified Independent  Expression Current:  5 - Stand-by Prompting/Supervision or Set-up   Expression Description:  Verbal cueing  Social Interaction Initial:  7 - Independent  Social Interaction Current:  5 - Stand-by Prompting/Supervision or Set-up   Social Interaction Description:  Increased time, Verbal cues  Problem Solving Initial:  5 - Standby Prompting/Supervision or Set-up  Problem Solving Current:  4 - Minimal Assistance   Problem Solving Description:  Verbal cueing, Therapy schedule, Increased time  Memory Initial:  5 - Standby  Prompting/Supervision or Set-up  Memory Current:  5 - Standby Prompting/Supervision or Set-up   Memory Description:  Verbal cueing, Therapy schedule  Executive Functioning / Organization Initial:     Executive Functioning / Organization Current:      Executive Functioning Desciption:  SPV for medication management. Mod cues for financial management tasks  Swallowing  Swallowing Status:  WFL  Orders Placed This Encounter   Procedures   • Diet Order Regular     Standing Status:   Standing     Number of Occurrences:   1     Order Specific Question:   Diet:     Answer:   Regular [1]     Behavior Modification Plan  Keep the environment simple to avoid over stimulatiom/agitation, Allow for rest time, Keep instructions simple/concrete and Analyze tasks (break down into smaller steps)  Assistive Technology  Low tech: Calendar, planner, schedule, alarms/timers, pill organizer, post-it notes, lists  Family Training/Education:  Not yet completed  DC Recommendations:   HH vs. OP speech therapy  Strengths:  Able to follow instructions, Independent PLOF, Making steady progress towards goals, Pleasant and cooperative and Willingly participates in therapeutic activities  Barriers:  Decreased endurance for cognitive tasks (inconsistent).   # of short term goals set=2  # of short term goals met=0  Speech Therapy Problems     Problem: Problem Solving STGs     Dates: Start: 09/07/19       Description:     Goal: STG-Within one week, patient will     Dates: Start: 09/07/19       Description: 1) Individualized goal:  Complete medication, and financial management tasks with 90% accuracy given min verbal cues.    2) Interventions:  SLP Cognitive Skill Development and SLP Group Treatment             Goal: STG-Within one week, patient will     Dates: Start: 09/07/19       Description: 1) Individualized goal:  Complete executive function tasks related to scheduling and reasoning with 90% accuracy given min verbal cues.   2) Interventions:  SLP  Cognitive Skill Development and SLP Group Treatment                   Problem: Speech/Swallowing LTGs     Dates: Start: 09/07/19       Description:     Goal: LTG-By discharge, patient will solve complex problem     Dates: Start: 09/07/19       Description: 1) Individualized goal:  Mod I for safe discharge home.   2) Interventions:  SLP Aphasia Evaluation, SLP Cognitive Skill Development and SLP Group Treatment                          Section completed by:  Belinda Tovar, MS,CCC-SLP          REHAB-Pharmacy IDT Team Note by Ofe Morris RPH at 9/10/2019 11:59 AM  Version 1 of 1    Author:  Ofe Morris RPH Service:  -- Author Type:  Pharmacist    Filed:  9/10/2019 11:59 AM Date of Service:  9/10/2019 11:59 AM Status:  Signed    :  Ofe Morris RPH (Pharmacist)         Pharmacy   Pharmacy  Antibiotics/Antifungals/Antivirals:  Medication:      Active Orders (From admission, onward)    None        Route:         n/a  Stop Date:  n/a  Reason:   Antihypertensives/Cardiac:  Medication:    Orders (72h ago, onward)     Start     Ordered    09/07/19 0900  lisinopril (PRINIVIL) tablet 20 mg  DAILY,   Status:  Discontinued      09/06/19 1251    09/06/19 2100  atorvastatin (LIPITOR) tablet 80 mg  EVERY EVENING      09/06/19 1251    09/06/19 1251  hydrALAZINE (APRESOLINE) tablet 25 mg  EVERY 8 HOURS PRN      09/06/19 1251              Patient Vitals for the past 24 hrs:   BP Pulse   09/10/19 0700 148/87 85   09/09/19 1832 104/65 94   09/09/19 1601 112/70 93   09/09/19 1451 118/70 82   09/09/19 1447 (!) 80/65 78   09/09/19 1443 100/70 94   09/09/19 1441 118/70 90   09/09/19 1401 (!) 88/60 94     Anticoagulation:  Medication:  Eliquis  INR:      Other key medications: n/a   Section completed by:  Ofe Morris RPH[TK.1]        Attribution Key     TK.1 - Ofe Morris RPH on 9/10/2019 11:59 AM                  DC Planning  DC destination/dispostion:    To single level unit attached to patient's  daughter's and son-in-law's home. There are 2 grandchildren living there too, ages 11 and 17. 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      DC Needs: Anticipate HH. DME TBD. Has SPC. MD f/u appointments - PCP, Dr. Wren for PAD.     Barriers to discharge:   Functional deficits. Recent loss of 39 y/o daughter - sudden loss, ruptured aneurysm. PAD.     Strengths: Motivated. PLOF - independent. . Supportive family.     Section completed by:  Zee Gomes R.N.      Physician Summary  LOVE Aguilar MD  participated and led team conference discussion.

## 2019-09-11 NOTE — THERAPY
Speech Language Pathology  Daily Treatment     Patient Name: Nohelia Dickerson  Age:  74 y.o., Sex:  female  Medical Record #: [id number]  Today's Date: 9/11/2019     Subjective    Patient was in room at time of ST. Was willing to participate.      Objective       09/11/19 1032   Cognition   Moderate Attention Minimal (4)   Verbal Short Term Memory 45 Minutes   Medication Management  Within Functional Limits (6-7)   SLP Total Time Spent   SLP Individual Total Time Spent (Mins) 60   Charge Group   SLP Cognitive Skill Development 4       FIM Eating Score:     Eating Description:       FIM Comprehension Score:  5 - Stand-by Prompting/Supervision or Set-up  Comprehension Description:       FIM Expression Score:  5 - Stand-by Prompting/Supervision or Set-up  Expression Description:       FIM Social Interaction Score:  5 - Stand-by Prompting/Supervision or Set-up  Social Interaction Description:       FIM Problem Solving Score:  4 - Minimal Assistance  Problem Solving Description:       FIM Memory Score:  5 - Standby Prompting/Supervision or Set-up  Memory Description:         Assessment    Created medication list. Patient was able to state purpose of all medications. Previously took one medication at home and did not use pill box.   Improved recall and attention to task compared to previous session.     Plan    Continue to target financial management and mod-complex depending on patient's level of alertness.

## 2019-09-11 NOTE — THERAPY
Physical Therapy   Daily Treatment     Patient Name: Nohelia Dickerson  Age:  74 y.o., Sex:  female  Medical Record #: [id number]  Today's Date: 9/11/2019     Precautions  Precautions: Fall Risk  Comments: posterior/L lateral lean, L rafael    Subjective    Pt was supine in bed upon arrival and agreeable to treatment.       Objective       09/11/19 1401   Precautions   Precautions Fall Risk   Bed Mobility    Supine to Sit Stand by Assist   Sit to Supine Minimal Assist   Sit to Stand Contact Guard Assist   Scooting Minimal Assist   Interdisciplinary Plan of Care Collaboration   Patient Position at End of Therapy Seated;Call Light within Reach;Tray Table within Reach;Phone within Reach   PT Total Time Spent   PT Individual Total Time Spent (Mins) 30   PT Charge Group   PT Gait Training 1   PT Therapeutic Activities 1       FIM Bed/Chair/Wheelchair Transfers Score: 4 - Minimal Assistance  Bed/Chair/Wheelchair Transfers Description:  Adaptive equipment, Increased time, Supervision for safety, Verbal cueing, Set-up of equipment, Assist with one limb(Bed mobility and SPT with min A)    FIM Walking Score:  1 - Total Assistance  Walking Description:  Extra time, Safety concerns, Verbal cueing, Supervision for safety, Hand rail(AMB x 10 feet x 4 in // bars with CGA)    Discussion of POC/goals    Assessment    Pt demonstrated improvement in LLE advancement in swing this session with no assisted required.  Pt also with increased forced use of LLE with STS with verbal cueing.      Plan    Monitor vitals prn, lite gait - monitor vitals throughout,  setup, progress to overground gait training in solostep, LLE and LUE neuro re-ed, pool therapy,

## 2019-09-11 NOTE — CARE PLAN
Problem: Toileting  Goal: STG-Within one week, patient will complete toileting tasks  Description  1) Individualized Goal:  Mod assist   2) Interventions:  OT Group Therapy, OT Self Care/ADL, OT Cognitive Skill Dev, OT Community Reintegration, OT Manual Ther Technique, OT Neuro Re-Ed/Balance, OT Sensory Int Techniques, OT Therapeutic Activity, OT Evaluation and OT Therapeutic Exercise     Outcome: NOT MET  Note:   Max A for clothing mgt d/t L lateral/posterior lean.       Problem: Functional Transfers  Goal: STG-Within one week, patient will transfer to toilet  Description  1) Individualized Goal:  Min assist   2) Interventions:  OT Group Therapy, OT Self Care/ADL, OT Cognitive Skill Dev, OT Community Reintegration, OT Manual Ther Technique, OT Neuro Re-Ed/Balance, OT Sensory Int Techniques, OT Therapeutic Activity, OT Evaluation and OT Therapeutic Exercise     Outcome: NOT MET  Note:   Mod A for lift and mgt of LLE.     Problem: Dressing  Goal: STG-Within one week, patient will dress LB  Description  1) Individualized Goal:  Mod assist   2) Interventions:  OT Group Therapy, OT Self Care/ADL, OT Cognitive Skill Dev, OT Community Reintegration, OT Manual Ther Technique, OT Neuro Re-Ed/Balance, OT Sensory Int Techniques, OT Therapeutic Activity, OT Evaluation and OT Therapeutic Exercise     Outcome: MET

## 2019-09-11 NOTE — THERAPY
Physical Therapy   Daily Treatment     Patient Name: Nohelia Dickerson  Age:  74 y.o., Sex:  female  Medical Record #: [id number]  Today's Date: 9/11/2019     Precautions  Precautions: Fall Risk  Comments: posterior/L lateral lean, L rafael    Subjective    Pt agreeable to  setup     Objective       09/11/19 0831   Interdisciplinary Plan of Care Collaboration   Patient Position at End of Therapy Seated;Chair Alarm On;Self Releasing Lap Belt Applied;Call Light within Reach;Tray Table within Reach   PT Total Time Spent   PT Individual Total Time Spent (Mins) 60   PT Charge Group   PT Neuromuscular Re-Education / Balance 2   PT Therapeutic Activities 2     Pt set up on  this session with edu on e-stim and goals and benefits. Pt assisted in setting e-stim values focused on achieving stim-elicited contraction to tolerance in each muscle group. Pt participated in FES session on  for 16 minutes (13 active, 2 minute warmup, and 1 minute cooldown), 1.59 miles, 0.6 kcal/hour energy expended, 0.7 W power, 4 % L Asymmetry, 0.5 NM resistance, 35 RPM spd.    Assessment    Pt demos verbal understanding of goals and benefits of estim. Pt able to demo active peddling throughout session but often not with LLE. Able to tolerate intensity of stimulation to elicit muscle contraction in all major muscle groups of RLE.      Plan    Monitor vitals prn, lite gait - monitor vitals throughout,  setup, progress to overground gait training in migel, SARAE and NEGIN neuro re-ed, pool therapy,

## 2019-09-12 PROCEDURE — 700102 HCHG RX REV CODE 250 W/ 637 OVERRIDE(OP): Performed by: PHYSICAL MEDICINE & REHABILITATION

## 2019-09-12 PROCEDURE — 770010 HCHG ROOM/CARE - REHAB SEMI PRIVAT*

## 2019-09-12 PROCEDURE — A9270 NON-COVERED ITEM OR SERVICE: HCPCS | Performed by: PHYSICAL MEDICINE & REHABILITATION

## 2019-09-12 PROCEDURE — 99232 SBSQ HOSP IP/OBS MODERATE 35: CPT | Performed by: HOSPITALIST

## 2019-09-12 PROCEDURE — 99232 SBSQ HOSP IP/OBS MODERATE 35: CPT | Performed by: PHYSICAL MEDICINE & REHABILITATION

## 2019-09-12 PROCEDURE — 97112 NEUROMUSCULAR REEDUCATION: CPT

## 2019-09-12 PROCEDURE — 700102 HCHG RX REV CODE 250 W/ 637 OVERRIDE(OP): Performed by: HOSPITALIST

## 2019-09-12 PROCEDURE — A9270 NON-COVERED ITEM OR SERVICE: HCPCS | Performed by: HOSPITALIST

## 2019-09-12 PROCEDURE — 97530 THERAPEUTIC ACTIVITIES: CPT

## 2019-09-12 PROCEDURE — 94760 N-INVAS EAR/PLS OXIMETRY 1: CPT

## 2019-09-12 PROCEDURE — G0515 COGNITIVE SKILLS DEVELOPMENT: HCPCS

## 2019-09-12 PROCEDURE — 97110 THERAPEUTIC EXERCISES: CPT

## 2019-09-12 PROCEDURE — 97116 GAIT TRAINING THERAPY: CPT

## 2019-09-12 RX ORDER — MIDODRINE HYDROCHLORIDE 2.5 MG/1
2.5 TABLET ORAL 2 TIMES DAILY WITH MEALS
Status: DISCONTINUED | OUTPATIENT
Start: 2019-09-12 | End: 2019-09-12

## 2019-09-12 RX ORDER — MIDODRINE HYDROCHLORIDE 2.5 MG/1
2.5 TABLET ORAL 2 TIMES DAILY
Status: DISCONTINUED | OUTPATIENT
Start: 2019-09-12 | End: 2019-09-18

## 2019-09-12 RX ADMIN — SENNOSIDES,DOCUSATE SODIUM 2 TABLET: 8.6; 5 TABLET, FILM COATED ORAL at 07:40

## 2019-09-12 RX ADMIN — SENNOSIDES,DOCUSATE SODIUM 2 TABLET: 8.6; 5 TABLET, FILM COATED ORAL at 21:44

## 2019-09-12 RX ADMIN — APIXABAN 10 MG: 5 TABLET, FILM COATED ORAL at 07:40

## 2019-09-12 RX ADMIN — ATORVASTATIN CALCIUM 80 MG: 40 TABLET, FILM COATED ORAL at 21:44

## 2019-09-12 RX ADMIN — BUDESONIDE AND FORMOTEROL FUMARATE DIHYDRATE 2 PUFF: 160; 4.5 AEROSOL RESPIRATORY (INHALATION) at 10:00

## 2019-09-12 RX ADMIN — MIDODRINE HYDROCHLORIDE 2.5 MG: 2.5 TABLET ORAL at 13:06

## 2019-09-12 RX ADMIN — BUDESONIDE AND FORMOTEROL FUMARATE DIHYDRATE 2 PUFF: 160; 4.5 AEROSOL RESPIRATORY (INHALATION) at 21:44

## 2019-09-12 RX ADMIN — APIXABAN 10 MG: 5 TABLET, FILM COATED ORAL at 21:44

## 2019-09-12 ASSESSMENT — ENCOUNTER SYMPTOMS
DIZZINESS: 0
BLURRED VISION: 0
NERVOUS/ANXIOUS: 0
FEVER: 0
COUGH: 0
DIARRHEA: 0

## 2019-09-12 NOTE — THERAPY
"Occupational Therapy  Daily Treatment     Patient Name: Nohelia Dickerson  Age:  74 y.o., Sex:  female  Medical Record #: [id number]  Today's Date: 9/12/2019     Precautions  Precautions: Fall Risk  Comments: posterior/L lateral lean, L rafael      Subjective    \"I'm a little dizzy\"     Objective       09/12/19 1301   Precautions   Precautions Fall Risk   Comments posterior/L lateral lean, L rafael   Vitals   Pulse (!) 102   Patient BP Position Standing 3 minutes   Blood Pressure  127/93  (118/71)   Pulse Oximetry 92 %  (90-94% on room air with activity)   O2 Delivery None (Room Air)   Cognition    Level of Consciousness Alert   Neuro-Muscular Treatments   Neuro-Muscular Treatments Weight Shift Right;Weight Shift Left;Facilitation;Postural Changes;Sequencing;Tactile Cuing;Verbal Cuing   Comments Sit<>stand transfers in // bars with focus on anterior weightshifting and midline posture, use of mirror for visual feedback; 2x10. Lateral weightshifting in standing 2x10 reps each. Vitals monitored throughout.     Sitting Lower Body Exercises   Nustep Resistance Level 5  (x15 min for BLE strength/endurance, assist with LLE mgt)   Bed Mobility    Supine to Sit Stand by Assist   Scooting Minimal Assist   Rolling Supervised   Skilled Intervention Verbal Cuing   Interdisciplinary Plan of Care Collaboration   IDT Collaboration with  Certified Nursing Assistant   Patient Position at End of Therapy Seated;Self Releasing Lap Belt Applied;Chair Alarm On;Call Light within Reach;Tray Table within Reach;Phone within Reach   Collaboration Comments CNA for vitals   OT Total Time Spent   OT Individual Total Time Spent (Mins) 60   OT Charge Group   OT Neuromuscular Re-education / Balance 2   OT Therapeutic Exercise  2         Assessment    Pt tolerated session fair with intermittent c/o dizziness with standing. Pt's vitals monitored throughout and BP and O2 remained stable, as noted above. Pt with improved midline posture and less L " lateral lean observed this session. Con't with LLE weakness, poor LLE coordination and limited task attention.     Plan    Con't OT for sitting/standing balance, midline orientation, safety with ADLs and related functional mobility.

## 2019-09-12 NOTE — THERAPY
"Physical Therapy   Daily Treatment     Patient Name: Nohelia Dickerson  Age:  74 y.o., Sex:  female  Medical Record #: [id number]  Today's Date: 2019     Precautions  Precautions: Fall Risk  Comments: posterior/L lateral lean, L rafael    Subjective    Pt reports she needs to go to the bathroom - stool softener working \"too well\"     Objective         19 0831   Vitals   Pulse 75   Patient BP Position Sitting   Blood Pressure  106/74   Pulse Oximetry 92 %   O2 Delivery None (Room Air)   Interdisciplinary Plan of Care Collaboration   Patient Position at End of Therapy Seated  (handoff PT)   Therapy Missed   Missed Therapy (Minutes) 15   Reason For Missed Therapy Medical - Patient has Bowel Issues  (7th treat)   PT Total Time Spent   PT Individual Total Time Spent (Mins) 45   PT Charge Group   PT Gait Training 1   PT Therapeutic Activities 2       FIM Toiletin - Max Assistance  Toileting Description:  Grab bar, Increased time, Verbal cueing    FIM Toilet Transfer Score:  4 - Minimal Assistance  Toilet Transfer Description:  Grab bar, Increased time, Verbal cueing, Requires lift    FIM Stairs Score:  1 - Total Assistance  Stairs Description:  Ascends/descends less than 4 steps, Extra time, Safety concerns, Requires incidental assist, Verbal cueing, Hand rails(STep up/down from treadmill, BUE support, harness on for safety, cues for sequencing )    Began session on lite gait - walked for 2.5 minutes after toileting, greatly increased L lateral lean and poor command following as well as poor delayed stepping. Pt sat back down and vitals taken. Pt reports no symptoms but seems confused - difficulty describing what is wrong. Next PT informed and will check vitals with lite gait and collab with nursing if necessary.    Assessment    Pt begins session well with good standing balance during donning of harness, interrupted by toileting needs. After toileting, pt seems much more fatigued and slightly confused, " vitals stable but unable to participate fully in walking task. Next PT to try again after rest and see if pt responds better.     Plan    Monitor vitals prn, lite gait - monitor vitals throughout,  setup, progress to overground gait training in IAN lainez and NEGIN neuro re-ed, pool therapy,

## 2019-09-12 NOTE — PROGRESS NOTES
Rehab Progress Note     Encounter Date: 9/12/2019    CC: Left sided weakness, R CVA    Interval Events (Subjective)  Patient sitting up in room. She reports she is doing well. Denies NVD. Denies SOB. Reports her blood pressure dropped this morning again and her HR went up.  Discussed with hospitalist and plan to start midodrine and monitor. Patient has questions about midodrine discussed side effects of increase BP and small decrease in HR.      IDT Team Meeting 9/11/2019  DC/Disposition:  9/24/19    Objective:  VITAL SIGNS: /72   Pulse 82   Temp 36.6 °C (97.9 °F) (Temporal)   Resp 18   Ht 1.829 m (6')   Wt 63 kg (139 lb)   SpO2 92% Comment: 90-94% on room air with activity  BMI 18.85 kg/m²   Gen: NAD  Psych: Mood and affect appropriate  CV: RRR, no edema  Resp: CTAB, upper airway congestion  Abd: NTND  Neuro: AOx4, following simple commands  Unchanged from 9/11/19    No results found for this or any previous visit (from the past 72 hour(s)).    Current Facility-Administered Medications   Medication Frequency   • midodrine (PROAMATINE) tablet 2.5 mg BID   • budesonide-formoterol (SYMBICORT) 160-4.5 MCG/ACT inhaler 2 Puff BID (RT)   • oxyCODONE immediate-release (ROXICODONE) tablet 5 mg Q4HRS PRN   • Respiratory Care per Protocol Continuous RT   • acetaminophen (TYLENOL) tablet 650 mg Q4HRS PRN   • hydrALAZINE (APRESOLINE) tablet 25 mg Q8HRS PRN   • senna-docusate (PERICOLACE or SENOKOT S) 8.6-50 MG per tablet 2 Tab BID    And   • polyethylene glycol/lytes (MIRALAX) PACKET 1 Packet QDAY PRN    And   • magnesium hydroxide (MILK OF MAGNESIA) suspension 30 mL QDAY PRN    And   • bisacodyl (DULCOLAX) suppository 10 mg QDAY PRN   • artificial tears ophthalmic solution 1 Drop PRN   • benzocaine-menthol (CEPACOL) lozenge 1 Lozenge Q2HRS PRN   • mag hydrox-al hydrox-simeth (MAALOX PLUS ES or MYLANTA DS) suspension 20 mL Q2HRS PRN   • ondansetron (ZOFRAN ODT) dispertab 4 mg 4X/DAY PRN    Or   • ondansetron  (ZOFRAN) syringe/vial injection 4 mg 4X/DAY PRN   • traZODone (DESYREL) tablet 50 mg QHS PRN   • sodium chloride (OCEAN) 0.65 % nasal spray 2 Spray PRN   • melatonin tablet 3 mg HS PRN   • apixaban (ELIQUIS) tablet 10 mg BID   • atorvastatin (LIPITOR) tablet 80 mg Q EVENING   • [START ON 9/13/2019] apixaban (ELIQUIS) tablet 5 mg BID   • albuterol inhaler 2 Puff Q4HRS PRN       Orders Placed This Encounter   Procedures   • Diet Order Regular     Standing Status:   Standing     Number of Occurrences:   1     Order Specific Question:   Diet:     Answer:   Regular [1]       Assessment:  Active Hospital Problems    Diagnosis   • *CVA (cerebral vascular accident) ([facility])   • Azotemia   • Tachycardia   • PVD (peripheral vascular disease) ([facility])   • Arterial occlusion, lower extremity ([facility])   • COPD   • Chronic respiratory failure ([facility])   • Thrombocytopenia ([facility])   • Tobacco abuse   • Essential hypertension       Medical Decision Making and Plan:  Right KIM and MCA ischemic stroke  Likely cardio-embolic  Continue full rehab program  PT/OT/SLP, 1 hr each discipline, 5 days per week     Pain/Neuropathic pain  Patient had been taking oxycodone on acute care.  Trial prn use, monitor need.  Consider low dose gabapentin at bedtime. Controlled without      Continue Eliquis and statin for secondary stroke prophylaxis  Follow up with stroke bridge clinic, needs monitor  Medications unchanged     Possible NPH  Follow up stroke bridge clinic     Appreciate assistance of hospitalist with her medical co-morbidities:     Hypertension  Tachycardia, check EKG  On lisinopril 20 mg -Discontinue for hypotension. Continue to monitor as into 140s     Orthostatic Hypotension? on 9/9/19  Previously on 9/7/19 as well. Discussed with hospitalist, unlikely if sitting up for 30 minutes  Continue to monitor  Encourage PO fluid intake   Lisinopril discontinued. Provacative testing + on 9/11. Discussed with hopsitalist  Start low dose midodrine.       Peripheral vascular disease  Outpatient follow-up with Eliquis and lipitor     Thrombocytopenia  Appreciate hospitalist input     Tobacco use  COPD  Will need smoking cessation prior to discharge  Performed on 9/10/19  Start Symbicort     DVT Ppx  On Apixaban     Total time:  25 minutes.  I spent greater than 50% of the time for patient care, counseling, and coordination on this date, including unit/floor time, and face-to-face time with the patient as per interval events and assessment and plan above. Topics discussed included ongoing orthostatics with therapy, increase fluid intake and discussed with patient and hospitalist start amy.     Kristi Aguilar M.D.

## 2019-09-12 NOTE — FLOWSHEET NOTE
09/12/19 1000   Events/Summary/Plan   Events/Summary/Plan O2 spot check, MDI instruction with spacer   Interdisciplinary Plan of Care-Goals (Indications)   Bronchodilator Indications History / Diagnosis   Interdisciplinary Plan of Care-Outcomes    Bronchodilator Outcome Diminished Wheezing and Volume of Air Movement Increased   Education   Education Yes - Pt. / Family has been Instructed in use of Respiratory Equipment;Yes - Pt. / Family has been Instructed in use of Respiratory Medications and Adverse Reactions   RT Assessment of Delivered Medications   Evaluation of Medication Delivery Daily Yes-- Pt /Family has been Instructed in use of Respiratory Medications and Adverse Reactions  (Instruction on technique, freq, and use of spacer)   MDI/DPI Group   #MDI/DPI Given MDI/DPI x 1  (Symbicort)   Chest Exam   Respiration 18   Pulse 80   Oximetry   #Pulse Oximetry (Single Determination) Yes   Oxygen   Home O2 LPM Flow 2 LPM   Home O2 Delivery Method Nasal Cannula   Home O2 Frequency of Use At Sleep   Pulse Oximetry 93 %   O2 Daily Delivery Respiratory  Room Air with O2 Available

## 2019-09-12 NOTE — PROGRESS NOTES
[facility] Medicine Daily Progress Note    Date of Service  9/12/2019    Chief Complaint:  Hypertension  PVD    Interval History:  No significant events or changes since last visit    Review of Systems  Review of Systems   Constitutional: Negative for fever.   Eyes: Negative for blurred vision.   Respiratory: Negative for cough.    Cardiovascular: Negative for chest pain.   Gastrointestinal: Negative for diarrhea.   Musculoskeletal: Negative for joint pain.   Neurological: Negative for dizziness.   Psychiatric/Behavioral: The patient is not nervous/anxious.         Physical Exam  Temp:  [36.4 °C (97.6 °F)-36.7 °C (98 °F)] 36.4 °C (97.6 °F)  Pulse:  [] 82  Resp:  [17-18] 18  BP: ()/(51-87) 135/87  SpO2:  [93 %-97 %] 94 %    Physical Exam   Constitutional: No distress.   HENT:   Mouth/Throat: No oropharyngeal exudate.   Eyes: EOM are normal.   Neck: No JVD present.   Cardiovascular: Normal rate and regular rhythm.   No murmur heard.  Pulmonary/Chest: Effort normal and breath sounds normal.   Abdominal: Soft. Bowel sounds are normal.   Musculoskeletal: She exhibits no edema.   Skin: Skin is warm. No rash noted.   Psychiatric: Her behavior is normal.   Nursing note and vitals reviewed.      Fluids    Intake/Output Summary (Last 24 hours) at 9/12/2019 0906  Last data filed at 9/11/2019 1939  Gross per 24 hour   Intake 240 ml   Output --   Net 240 ml       Laboratory                        Imaging    Assessment/Plan  * CVA (cerebral vascular accident) ([facility])- (present on admission)  Assessment & Plan  On Eliquis  On Lipitor  MRI concerning for NPH, needs F/U    Dizziness  Assessment & Plan  Currently resolved  Had dizziness while sitting in the chair on 9/9 and was noticed that the BP dropped lower around that time  Had dizziness again today when sitting up (9/12)  Orthostatics (9/9): SBP dropped 18 points from supine to sitting  Orthostatics (9/11): positive  Off Lisinopril (last dose 9/9)  Will start Midodrine:  2.5 mg bid (9/11)  Monitor    PVD (peripheral vascular disease) ([facility])- (present on admission)  Assessment & Plan  On Eliquis  On Lipitor  Note: for out patient f/u with Dr. Wren    Essential hypertension- (present on admission)  Assessment & Plan  BP ok but dips lower with orthostatics  Off Lisinopril (last dose 9/9)  Currently not on any hypertensive meds  Note: starting Midodrine bid (9/12)  Cont to monitor

## 2019-09-12 NOTE — THERAPY
Physical Therapy   Daily Treatment     Patient Name: Nohelia Dickerson  Age:  74 y.o., Sex:  female  Medical Record #: [id number]  Today's Date: 9/12/2019     Precautions  Precautions: Fall Risk  Comments: posterior/L lateral lean, L rafael    Subjective    Hand off from PT.  Pt was seated in w/c upon arrival and agreeable to treatment.  Pt's daughter was present during session.      Objective       09/12/19 0931   Precautions   Precautions Fall Risk   Vitals   Pulse (!) 119   Blood Pressure  (!) 97/56   Bed Mobility    Sit to Stand Contact Guard Assist   Interdisciplinary Plan of Care Collaboration   Patient Position at End of Therapy In Bed;Call Light within Reach;Tray Table within Reach;Phone within Reach   PT Total Time Spent   PT Individual Total Time Spent (Mins) 30   PT Charge Group   PT Therapeutic Activities 2       FIM Bed/Chair/Wheelchair Transfers Score: 4 - Minimal Assistance  Bed/Chair/Wheelchair Transfers Description:  Adaptive equipment, Increased time, Supervision for safety, Verbal cueing, Set-up of equipment, Assist with one limb(Bed mobility with min A for LLE lifting assist; SPT with CGA using bed rail)    Attempted Litegait training, but pt with increased dizziness and L lateral lean with standing.  Vitals taken and listed below.  Attempted to take standing BP x 2 attempts, but pt with significant increase in dizziness. Trialed W/C mobility with BLE to increase BP and for LLE neuro re ed, but pt with difficulty sequencing and was only able to go x 5 feet with min A.  Pt returned to supine and RN, PT, OT and RT notified.      BP reading: seated 107/63, 99/59, 97/56.  HR: 109-119 bpm.  Unable to get standing BP.     Assessment    Pt limited this session secondary to low BP and report of dizziness.  Pt with poor coordination with LE w/c mobility with redirection for attention to task.     Plan    Monitor vitals prn, lite gait - monitor vitals throughout,  setup, progress to overground gait  training in solostep, LLE and LUE neuro re-ed, pool therapy,

## 2019-09-12 NOTE — DISCHARGE PLANNING
Met with patient following Team Conference to relay progress and anticipated discharge date of 9/24/2019. Opportunity for questions/discussion provided.

## 2019-09-12 NOTE — CARE PLAN
Problem: Communication  Goal: The ability to communicate needs accurately and effectively will improve  Outcome: PROGRESSING AS EXPECTED  Note:   Pt is able to communicate her needs effectively to staff.      Problem: Safety  Goal: Will remain free from injury  Outcome: PROGRESSING AS EXPECTED  Note:   Pt uses call light consistently for staff assistance. Pt has good safety awareness, no impulsivity observed.      Problem: Venous Thromboembolism (VTW)/Deep Vein Thrombosis (DVT) Prevention:  Goal: Patient will participate in Venous Thrombosis (VTE)/Deep Vein Thrombosis (DVT)Prevention Measures  Outcome: PROGRESSING AS EXPECTED  Note:   Pt receives Apiaban for DVT prophylaxis.

## 2019-09-13 PROCEDURE — 97112 NEUROMUSCULAR REEDUCATION: CPT

## 2019-09-13 PROCEDURE — A9270 NON-COVERED ITEM OR SERVICE: HCPCS | Performed by: PHYSICAL MEDICINE & REHABILITATION

## 2019-09-13 PROCEDURE — 700102 HCHG RX REV CODE 250 W/ 637 OVERRIDE(OP): Performed by: PHYSICAL MEDICINE & REHABILITATION

## 2019-09-13 PROCEDURE — 97530 THERAPEUTIC ACTIVITIES: CPT

## 2019-09-13 PROCEDURE — 99231 SBSQ HOSP IP/OBS SF/LOW 25: CPT | Performed by: PHYSICAL MEDICINE & REHABILITATION

## 2019-09-13 PROCEDURE — G0515 COGNITIVE SKILLS DEVELOPMENT: HCPCS

## 2019-09-13 PROCEDURE — 700102 HCHG RX REV CODE 250 W/ 637 OVERRIDE(OP): Performed by: HOSPITALIST

## 2019-09-13 PROCEDURE — 770010 HCHG ROOM/CARE - REHAB SEMI PRIVAT*

## 2019-09-13 PROCEDURE — A9270 NON-COVERED ITEM OR SERVICE: HCPCS | Performed by: HOSPITALIST

## 2019-09-13 PROCEDURE — 97535 SELF CARE MNGMENT TRAINING: CPT

## 2019-09-13 PROCEDURE — 99232 SBSQ HOSP IP/OBS MODERATE 35: CPT | Performed by: HOSPITALIST

## 2019-09-13 PROCEDURE — 94760 N-INVAS EAR/PLS OXIMETRY 1: CPT

## 2019-09-13 RX ADMIN — BUDESONIDE AND FORMOTEROL FUMARATE DIHYDRATE 2 PUFF: 160; 4.5 AEROSOL RESPIRATORY (INHALATION) at 20:25

## 2019-09-13 RX ADMIN — ATORVASTATIN CALCIUM 80 MG: 40 TABLET, FILM COATED ORAL at 20:18

## 2019-09-13 RX ADMIN — SENNOSIDES,DOCUSATE SODIUM 2 TABLET: 8.6; 5 TABLET, FILM COATED ORAL at 08:10

## 2019-09-13 RX ADMIN — MIDODRINE HYDROCHLORIDE 2.5 MG: 2.5 TABLET ORAL at 06:00

## 2019-09-13 RX ADMIN — MELATONIN 3 MG: at 20:18

## 2019-09-13 RX ADMIN — APIXABAN 5 MG: 5 TABLET, FILM COATED ORAL at 20:18

## 2019-09-13 RX ADMIN — APIXABAN 5 MG: 5 TABLET, FILM COATED ORAL at 08:12

## 2019-09-13 RX ADMIN — MIDODRINE HYDROCHLORIDE 2.5 MG: 2.5 TABLET ORAL at 13:02

## 2019-09-13 RX ADMIN — BUDESONIDE AND FORMOTEROL FUMARATE DIHYDRATE 2 PUFF: 160; 4.5 AEROSOL RESPIRATORY (INHALATION) at 08:56

## 2019-09-13 RX ADMIN — OXYCODONE HYDROCHLORIDE 5 MG: 5 TABLET ORAL at 20:22

## 2019-09-13 ASSESSMENT — ENCOUNTER SYMPTOMS
CHILLS: 0
SHORTNESS OF BREATH: 0
VOMITING: 0
DIARRHEA: 0
NERVOUS/ANXIOUS: 0
FEVER: 0
ABDOMINAL PAIN: 0
NAUSEA: 0

## 2019-09-13 NOTE — CARE PLAN
Problem: Pain Management  Goal: Pain level will decrease to patient's comfort goal  Outcome: PROGRESSING AS EXPECTED  Note:   Patient able to verbalize needs.  Denies pain or discomfort this shift and no s/s same noted.       Problem: Safety  Goal: Will remain free from injury  Note:   Patient demonstrates good safety technique this shift.  Asks for assistance when needed and does not attempt self transfer.  Able to verbalize needs.

## 2019-09-13 NOTE — FLOWSHEET NOTE
09/13/19 0858   Events/Summary/Plan   Events/Summary/Plan O2 spot check, MDI   Interdisciplinary Plan of Care-Goals (Indications)   Bronchodilator Indications History / Diagnosis   Interdisciplinary Plan of Care-Outcomes    Bronchodilator Outcome Diminished Wheezing and Volume of Air Movement Increased   Education   Education Yes - Pt. / Family has been Instructed in use of Respiratory Equipment   RT Assessment of Delivered Medications   Evaluation of Medication Delivery Daily Yes-- Pt /Family has been Instructed in use of Respiratory Medications and Adverse Reactions   MDI/DPI Group   #MDI/DPI Given MDI/DPI x 1  (Symbicort)   Chest Exam   Respiration 18   Pulse 98   Oximetry   #Pulse Oximetry (Single Determination) Yes   Oxygen   Home O2 Use Prior To Admission? Yes   Home O2 LPM Flow 2 LPM   Home O2 Delivery Method Nasal Cannula   Home O2 Frequency of Use At Sleep   Pulse Oximetry 91 %   O2 Daily Delivery Respiratory  Room Air with O2 Available   Room Air Challenge Pass

## 2019-09-13 NOTE — CARE PLAN
Problem: Safety  Goal: Will remain free from injury  Outcome: PROGRESSING AS EXPECTED  Note:   Call light with in reach. Redirection to use call light for assistance. Non skid socks. Upper siderails up x2 while in bed. No complains of pain, O2 n/c in place at 2 lpm. Able to make needs known. Assisted as needed. Will continue to monitor.

## 2019-09-13 NOTE — PROGRESS NOTES
[facility] Medicine Daily Progress Note    Date of Service  9/13/2019    Chief Complaint:  Hypertension  PVD    Interval History:  No significant events or changes since last visit    Review of Systems  Review of Systems   Constitutional: Negative for chills and fever.   Respiratory: Negative for shortness of breath.    Cardiovascular: Negative for chest pain.   Gastrointestinal: Negative for abdominal pain, diarrhea, nausea and vomiting.   Psychiatric/Behavioral: The patient is not nervous/anxious.         Physical Exam  Temp:  [36.5 °C (97.7 °F)-36.6 °C (97.9 °F)] 36.5 °C (97.7 °F)  Pulse:  [] 98  Resp:  [18] 18  BP: ()/(56-93) 149/78  SpO2:  [91 %-95 %] 91 %    Physical Exam   Constitutional: She appears well-nourished.   HENT:   Head: Atraumatic.   Eyes: Pupils are equal, round, and reactive to light. Conjunctivae are normal.   Neck: Normal range of motion.   Cardiovascular: Normal rate and regular rhythm.   Pulmonary/Chest: Effort normal and breath sounds normal.   Abdominal: Soft. Bowel sounds are normal.   Skin: Skin is warm.   Psychiatric: Her behavior is normal.   Nursing note and vitals reviewed.      Fluids    Intake/Output Summary (Last 24 hours) at 9/13/2019 0926  Last data filed at 9/12/2019 2144  Gross per 24 hour   Intake 120 ml   Output --   Net 120 ml       Laboratory                        Imaging    Assessment/Plan  * CVA (cerebral vascular accident) (HCC)- (present on admission)  Assessment & Plan  On Eliquis  On Lipitor  MRI concerning for NPH, needs F/U    Dizziness  Assessment & Plan  Had dizziness while sitting in the chair on 9/9 and was noticed that the BP dropped lower around that time  Had dizziness again today when sitting up (9/12)  Orthostatics (9/9): SBP dropped 18 points from supine to sitting  Orthostatics (9/11): positive  Off Lisinopril (last dose 9/9)  On Midodrine: 2.5 mg bid (9/11)  Monitor    PVD (peripheral vascular disease) ([facility])- (present on  admission)  Assessment & Plan  On Eliquis  On Lipitor  Note: for out patient f/u with Dr. Wren    Essential hypertension- (present on admission)  Assessment & Plan  BP better recently and hit  this am -- ? 2nd to Midodrine  Off Lisinopril (last dose 9/9)  Currently not on any hypertensive meds  Consider starting a low dose hypertensive med if BP remains elevated  Note: now on Midodrine bid (9/11)  Cont to monitor

## 2019-09-13 NOTE — THERAPY
Physical Therapy   Daily Treatment     Patient Name: Nohelia Dickerson  Age:  74 y.o., Sex:  female  Medical Record #: [id number]  Today's Date: 2019     Precautions  Precautions: Fall Risk  Comments: posterior/L lateral lean, L rafael    Subjective    Pt reports she does not like estim bike but recognizes that it is helping with LLE motor control     Objective       19 0831   Interdisciplinary Plan of Care Collaboration   Patient Position at End of Therapy Seated  (handoff SLP)   PT Total Time Spent   PT Individual Total Time Spent (Mins) 60   PT Charge Group   PT Neuromuscular Re-Education / Balance 3   PT Therapeutic Activities 1       FIM Toiletin - Max Assistance  Toileting Description:  Grab bar, Increased time, Verbal cueing    FIM Toilet Transfer Score:  4 - Minimal Assistance  Toilet Transfer Description:  Grab bar, Increased time, Verbal cueing      this session with edu on e-stim and goals and benefits. Pt assisted in setting e-stim values focused on achieving stim-elicited contraction to tolerance in each muscle group. Pt participated in FES session on  for 23 minutes (20 active, 2 minute warmup, and 1 minute cooldown), 2.42 miles, 1.0 kcal/hour energy expended, 1.2 W power, L 11 % symmetry, 2.12 NM resistance, 35 RPM spd.    Assessment    Pt demos verbal understanding of goals and benefits of estim. Pt able to demo active peddling and able to tolerate increased intensity of stimulation to elicit muscle contraction in all major muscle groups of RLE. Pt demos improved time, distance, power output and LLE usage during active biking this session.       Plan    Monitor vitals prn, lite gait - monitor vitals throughout,  with techs, progress to overground gait training in [facility] with/without AD, LLE and LUE neuro re-ed, pool therapy,

## 2019-09-13 NOTE — CARE PLAN
Problem: Safety  Goal: Will remain free from injury  Note:   Patient uses call light appropriately. Bed locked and in the lowest position. Non skid socks in place. Hourly rounding in place.       Problem: Pain Management  Goal: Pain level will decrease to patient's comfort goal  Note:   Pt. denies pain during this shift. No discomfort or distress noted.

## 2019-09-13 NOTE — THERAPY
Speech Language Pathology  Daily Treatment     Patient Name: Nohelia Dickerson  Age:  74 y.o., Sex:  female  Medical Record #: [id number]  Today's Date: 9/12/2019     Subjective    Pt pleasant and cooperative during tx.  No pain.      Objective       09/12/19 1431   Cognition   Functional Memory Activities Supervision (5)   Complex Reasoning  / Problem Solving Moderate (3)   SLP Total Time Spent   SLP Individual Total Time Spent (Mins) 60   Charge Group   SLP Cognitive Skill Development 4       FIM Comprehension Score:  5 - Stand-by Prompting/Supervision or Set-up  Comprehension Description:  Verbal cues    FIM Expression Score:  6 - Modified Independent  Expression Description:  Verbal cueing    FIM Social Interaction Score:  7 - Independent  Social Interaction Description:  Increased time    FIM Problem Solving Score:  4 - Minimal Assistance  Problem Solving Description:  Verbal cueing, Therapy schedule, Increased time    FIM Memory Score:  5 - Standby Prompting/Supervision or Set-up  Memory Description:  Verbal cueing, Therapy schedule      Assessment    Pt completed 3x4 deductive reasoning puzzle with 25% accuracy independently; 100% given min-mod verbal and written cues.  Pt completed logical scheduling task with 80% accuracy given min verbal cues; 100% given mod verbal cues.      Plan    Continue to target executive function.

## 2019-09-13 NOTE — THERAPY
Speech Language Pathology  Daily Treatment     Patient Name: Nohelia Dickerson  Age:  74 y.o., Sex:  female  Medical Record #: [id number]  Today's Date: 9/13/2019     Subjective    Pt pleasant and cooperative during tx.       Objective       09/13/19 0931   Cognition   Complex Reasoning  / Problem Solving Moderate (3)   Medication Management  Minimal (4)   SLP Total Time Spent   SLP Individual Total Time Spent (Mins) 60   Charge Group   SLP Cognitive Skill Development 4       FIM Comprehension Score:  5 - Stand-by Prompting/Supervision or Set-up  Comprehension Description:  Verbal cues    FIM Expression Score:  6 - Modified Independent  Expression Description:  Verbal cueing    FIM Social Interaction Score:  7 - Independent  Social Interaction Description:       FIM Problem Solving Score:  4 - Minimal Assistance  Problem Solving Description:  Verbal cueing, Therapy schedule, Increased time    FIM Memory Score:  5 - Standby Prompting/Supervision or Set-up  Memory Description:  Verbal cueing, Therapy schedule      Assessment    Deductive reasoning (3x6 puzzle): 77% independent; 100% given min verbal cues.  Med management:  Pt was required to sort 3 medications into a weekly pill box.  Pt required min verbal cues to accurately sort medication.  Pt demonstrated errors, including too many pills in one box.  Pt benefit from min verbal cues to double check her work for accuracy.   Single step word problems involving money: 90% independent; 100% Phani to attend to written details.     Plan    Target high level attn, and med management.

## 2019-09-13 NOTE — THERAPY
"Occupational Therapy  Daily Treatment     Patient Name: Nohelia Dickerson  Age:  74 y.o., Sex:  female  Medical Record #: [id number]  Today's Date: 9/13/2019     Precautions  Precautions: (P) Fall Risk  Comments: posterior/L lateral lean, L rafael    Safety   ADL Safety : (P) Requires Supervision for Safety, Requires Physical Assist for Safety, Requires Cueing for Safety  Bathroom Safety: (P) Requires Supervision for Safety, Requires Physical Assist for Safety, Requires Cuing for Safety  Comments: see FIMs for ADL performance details.    Subjective    Pt was seated in w/c and requested a shower for therapy session stating \"I need a lot of help to take a shower.\"      Objective       09/13/19 1401   Precautions   Precautions Fall Risk   Safety    ADL Safety  Requires Supervision for Safety;Requires Physical Assist for Safety;Requires Cueing for Safety   Bathroom Safety Requires Supervision for Safety;Requires Physical Assist for Safety;Requires Cuing for Safety   Cognition    Speech/ Communication Delayed Responses   Orientation Level Oriented x 4   Level of Consciousness Alert   Attention Impaired   Sequencing Impaired   Neuro-Muscular Treatments   Neuro-Muscular Treatments Weight Shift Right;Weight Shift Left;Sequencing;Biofeedback   Comments Weightshifting seated EOM and returning to midline with mirror for biofeedback. Pt crossed midline to reach across chest with alternating arms maintaining midline in front of a mirror with Mod-Min verbal cues to attend and alternate arms.    Balance   Sitting Balance (Static) Fair   Sitting Balance (Dynamic) Fair -   Standing Balance (Static) Poor +   Standing Balance (Dynamic) Poor +   Weight Shift Sitting Fair   Weight Shift Standing Poor   Skilled Intervention Verbal Cuing;Tactile Cuing;Sequencing   Bed Mobility    Supine to Sit Stand by Assist   Sit to Supine Moderate Assist   Rolling Moderate Assist to Lt.   Skilled Intervention Verbal Cuing;Sequencing;Facilitation "   Interdisciplinary Plan of Care Collaboration   Patient Position at End of Therapy In Bed;Bed Alarm On;Tray Table within Reach;Call Light within Reach   OT Total Time Spent   OT Individual Total Time Spent (Mins) 60   OT Charge Group   OT Self Care / ADL 3   OT Neuromuscular Re-education / Balance 1       FIM Eating Score:  6 - Modified Independent  Eating Description:  Increased time    FIM Grooming Score:  6 - Modified Independent  Grooming Description:  Increased time(seated in w/c)    FIM Bathing Score:  4 - Minimal Assistance  Bathing Description:       FIM Upper Body Dressin - Modified Independent  Upper Body Dressing Description:  Increased time(Seated in w/c)    FIM Lower Body Dressing Score:  4 - Minimal Assistance  Lower Body Dressing Description:  Requires minimal contact(CGA for standing tasks )    FIM Bed/Chair/Wheelchair Transfers Score: 3 - Moderate Assistance  Bed/Chair/Wheelchair Transfers Description:  Supervision for safety, Verbal cueing, Assist with two limbs(Mod A for assist with 2 limbs for sit to supine)    FIM Tub/Shower Transfers Score:  4 - Minimal Assistance  Tub/Shower Transfers Description:  Grab bar, Verbal cueing, Assist with one limb(Min A/CGA for balance and blocking LLE)    Pt self-propelled w/c 30 ft with VC to use BUE  SPT with CGA w/c<>mat table with verbal cues for sequencing and safety  SPT with CGA w/c >bed with verbal cues for sequencing  Assessment    Pt was alert and participated during the entire session without rest breaks. Pt demonstrated increased independence with functional transfers utilizing grab bars safely. Pt demonstrated increased independence with LB dressing, requiring only set-up and CGA for standing tasks. Pt was able to maintain midline alignment during midline crossing activities with mirror feedback. Pt required multiple cues to attend to tasks when others were present.     Plan    Con't OT for sitting/standing balance, midline orientation, safety  with ADLs and related functional mobility.

## 2019-09-14 PROBLEM — I95.1 ORTHOSTATIC HYPOTENSION: Status: ACTIVE | Noted: 2019-09-09

## 2019-09-14 PROCEDURE — 700102 HCHG RX REV CODE 250 W/ 637 OVERRIDE(OP): Performed by: HOSPITALIST

## 2019-09-14 PROCEDURE — 700102 HCHG RX REV CODE 250 W/ 637 OVERRIDE(OP): Performed by: PHYSICAL MEDICINE & REHABILITATION

## 2019-09-14 PROCEDURE — 99232 SBSQ HOSP IP/OBS MODERATE 35: CPT | Performed by: HOSPITALIST

## 2019-09-14 PROCEDURE — G0515 COGNITIVE SKILLS DEVELOPMENT: HCPCS

## 2019-09-14 PROCEDURE — A9270 NON-COVERED ITEM OR SERVICE: HCPCS | Performed by: HOSPITALIST

## 2019-09-14 PROCEDURE — A9270 NON-COVERED ITEM OR SERVICE: HCPCS | Performed by: PHYSICAL MEDICINE & REHABILITATION

## 2019-09-14 PROCEDURE — 770010 HCHG ROOM/CARE - REHAB SEMI PRIVAT*

## 2019-09-14 PROCEDURE — 94760 N-INVAS EAR/PLS OXIMETRY 1: CPT

## 2019-09-14 RX ADMIN — ATORVASTATIN CALCIUM 80 MG: 40 TABLET, FILM COATED ORAL at 20:00

## 2019-09-14 RX ADMIN — MIDODRINE HYDROCHLORIDE 2.5 MG: 2.5 TABLET ORAL at 12:20

## 2019-09-14 RX ADMIN — APIXABAN 5 MG: 5 TABLET, FILM COATED ORAL at 20:00

## 2019-09-14 RX ADMIN — APIXABAN 5 MG: 5 TABLET, FILM COATED ORAL at 08:14

## 2019-09-14 RX ADMIN — BUDESONIDE AND FORMOTEROL FUMARATE DIHYDRATE 2 PUFF: 160; 4.5 AEROSOL RESPIRATORY (INHALATION) at 08:27

## 2019-09-14 RX ADMIN — OXYCODONE HYDROCHLORIDE 5 MG: 5 TABLET ORAL at 08:15

## 2019-09-14 RX ADMIN — BUDESONIDE AND FORMOTEROL FUMARATE DIHYDRATE 2 PUFF: 160; 4.5 AEROSOL RESPIRATORY (INHALATION) at 20:00

## 2019-09-14 RX ADMIN — MIDODRINE HYDROCHLORIDE 2.5 MG: 2.5 TABLET ORAL at 05:32

## 2019-09-14 ASSESSMENT — ENCOUNTER SYMPTOMS
PALPITATIONS: 0
DIZZINESS: 0
SHORTNESS OF BREATH: 0
VOMITING: 0
FEVER: 0
NAUSEA: 0
HEADACHES: 0
HALLUCINATIONS: 0
BLURRED VISION: 0

## 2019-09-14 NOTE — FLOWSHEET NOTE
09/14/19 1052   Events/Summary/Plan   Events/Summary/Plan O2 spot check   Chest Exam   Respiration 16   Pulse 60   Oximetry   #Pulse Oximetry (Single Determination) Yes   Oxygen   Home O2 Use Prior To Admission? Yes   Home O2 LPM Flow 2 LPM   Home O2 Delivery Method Nasal Cannula   Home O2 Frequency of Use At Sleep   Pulse Oximetry 90 %   O2 Daily Delivery Respiratory  Room Air with O2 Available

## 2019-09-14 NOTE — THERAPY
"Speech Language Pathology  Daily Treatment     Patient Name: Nohelia Dickerson  Age:  74 y.o., Sex:  female  Medical Record #: [id number]  Today's Date: 9/14/2019     Subjective  Patient resting in bed at time of SLP session.  Reports feeling tired following \"a long day\" but agreeable to therapy.  Did not endorse dizziness during transfers.     Objective   09/14/19 1402   Cognition   Functional Memory Activities Minimal (4)   Medication Management  Minimal (4)   Interdisciplinary Plan of Care Collaboration   IDT Collaboration with  Nursing   Patient Position at End of Therapy In Bed;Bed Alarm On;Call Light within Reach;Tray Table within Reach;Phone within Reach   Collaboration Comments Reported return to bed to RN following SLP session   SLP Total Time Spent   SLP Individual Total Time Spent (Mins) 60   Charge Group   SLP Cognitive Skill Development 4     FIM Comprehension Score:  5 - Stand-by Prompting/Supervision or Set-up  Comprehension Description:  Verbal cues    FIM Expression Score:  7 - Independent  Expression Description:       FIM Social Interaction Score:  5 - Stand-by Prompting/Supervision or Set-up  Social Interaction Description:  Increased time    FIM Problem Solving Score:  5 - Standby Prompting/Supervision or Set-up  Problem Solving Description:  Bed/chair alarm, Increased time, Seat belt, Therapy schedule    FIM Memory Score:  5 - Standby Prompting/Supervision or Set-up  Memory Description:  Verbal cueing, Supervision, Seat belt, Bed/chair alarm, Therapy schedule    Assessment  Patient participated in SLP session targeting functional recall and medication management.  Patient demonstrated ability to perform functional medication sorting task with 80% accuracy independently with a single error (double dose  X1 day), increased time to perform task.  When performing functional recall task relating to own medications, patient demonstrated the ability to free recall current hospital medications, " either by name or purpose, also with 80% accuracy independently.  Accuracy increased to 100% when provided with spv assistance (coaxing/cues).      Plan  Continue executive function targeting scheduling, reasoning, high level attention, financial management, medication management per plan of care.

## 2019-09-14 NOTE — PROGRESS NOTES
[facility] Medicine Daily Progress Note    Date of Service  9/14/2019    Chief Complaint:  Hypertension  PVD    Interval History:  No significant events or changes since last visit    Review of Systems  Review of Systems   Constitutional: Negative for fever.   Eyes: Negative for blurred vision.   Respiratory: Negative for shortness of breath.    Cardiovascular: Negative for palpitations.   Gastrointestinal: Negative for nausea and vomiting.   Neurological: Negative for dizziness and headaches.   Psychiatric/Behavioral: Negative for hallucinations.        Physical Exam  Temp:  [36.2 °C (97.1 °F)-36.6 °C (97.8 °F)] 36.2 °C (97.1 °F)  Pulse:  [60-86] 60  Resp:  [17-18] 18  BP: (107-137)/(68-81) 137/81    Physical Exam   HENT:   Mouth/Throat: Oropharynx is clear and moist.   Eyes: No scleral icterus.   Cardiovascular: Normal rate and regular rhythm.   Pulmonary/Chest: Effort normal. No stridor. She has no wheezes. She has no rales.   Abdominal: Soft. Bowel sounds are normal.   Skin: Skin is warm. She is not diaphoretic.   Psychiatric: Her behavior is normal.   Nursing note and vitals reviewed.      Fluids    Intake/Output Summary (Last 24 hours) at 9/14/2019 0943  Last data filed at 9/13/2019 1230  Gross per 24 hour   Intake 180 ml   Output --   Net 180 ml       Laboratory                        Imaging    Assessment/Plan  * CVA (cerebral vascular accident) (HCC)- (present on admission)  Assessment & Plan  On Eliquis  On Lipitor  MRI concerning for NPH, needs F/U    Orthostatic hypotension  Assessment & Plan  (9/9): had dizziness while sitting in the chair and was noticed that the BP dropped lower around that time  (9/12) had dizziness again today when sitting up  No dizziness since 9/12  Orthostatics (9/9): SBP dropped 18 points from supine to sitting  Orthostatics (9/11): positive  Off Lisinopril (last dose 9/9)  On Midodrine: 2.5 mg bid (9/12)  Monitor    PVD (peripheral vascular disease) ([facility])- (present on  admission)  Assessment & Plan  On Eliquis  On Lipitor  Note: for out patient f/u with Dr. Wren    Essential hypertension- (present on admission)  Assessment & Plan  BP a little labile but ok  Off Lisinopril (9/10)  Currently not on any hypertensive meds  Consider starting a low dose hypertensive med if BP remains elevated  Note: now on Midodrine bid (9/11)  Cont to monitor

## 2019-09-14 NOTE — PROGRESS NOTES
Rehab Progress Note     Encounter Date: 9/13/2019    CC: Left sided weakness, R CVA    Interval Events (Subjective)  Patient sitting up in room. She reports she is doing well, she now has movement in all muscle groups in lower extremity. Denies orthostatics today. SBP running a little elevated now.  Discussed would continue to monitor over weekend. Denies pain. Denies SOB.      IDT Team Meeting 9/11/2019  DC/Disposition:  9/24/19    Objective:  VITAL SIGNS: /69   Pulse 86   Temp 36.6 °C (97.8 °F) (Temporal)   Resp 17   Ht 1.829 m (6')   Wt 63 kg (139 lb)   SpO2 91%   BMI 18.85 kg/m²   Gen: NAD  Psych: Mood and affect appropriate  CV: RRR, no edema  Resp: CTAB, no upper airway sounds  Abd: NTND  Neuro: AOx4, 4/5 L HF KE, 3/5 APF, 2/5 ADF    No results found for this or any previous visit (from the past 72 hour(s)).    Current Facility-Administered Medications   Medication Frequency   • midodrine (PROAMATINE) tablet 2.5 mg BID   • budesonide-formoterol (SYMBICORT) 160-4.5 MCG/ACT inhaler 2 Puff BID (RT)   • oxyCODONE immediate-release (ROXICODONE) tablet 5 mg Q4HRS PRN   • Respiratory Care per Protocol Continuous RT   • acetaminophen (TYLENOL) tablet 650 mg Q4HRS PRN   • hydrALAZINE (APRESOLINE) tablet 25 mg Q8HRS PRN   • senna-docusate (PERICOLACE or SENOKOT S) 8.6-50 MG per tablet 2 Tab BID    And   • polyethylene glycol/lytes (MIRALAX) PACKET 1 Packet QDAY PRN    And   • magnesium hydroxide (MILK OF MAGNESIA) suspension 30 mL QDAY PRN    And   • bisacodyl (DULCOLAX) suppository 10 mg QDAY PRN   • artificial tears ophthalmic solution 1 Drop PRN   • benzocaine-menthol (CEPACOL) lozenge 1 Lozenge Q2HRS PRN   • mag hydrox-al hydrox-simeth (MAALOX PLUS ES or MYLANTA DS) suspension 20 mL Q2HRS PRN   • ondansetron (ZOFRAN ODT) dispertab 4 mg 4X/DAY PRN    Or   • ondansetron (ZOFRAN) syringe/vial injection 4 mg 4X/DAY PRN   • traZODone (DESYREL) tablet 50 mg QHS PRN   • sodium chloride (OCEAN) 0.65 % nasal  spray 2 Spray PRN   • melatonin tablet 3 mg HS PRN   • atorvastatin (LIPITOR) tablet 80 mg Q EVENING   • apixaban (ELIQUIS) tablet 5 mg BID   • albuterol inhaler 2 Puff Q4HRS PRN       Orders Placed This Encounter   Procedures   • Diet Order Regular     Standing Status:   Standing     Number of Occurrences:   1     Order Specific Question:   Diet:     Answer:   Regular [1]       Assessment:  Active Hospital Problems    Diagnosis   • *CVA (cerebral vascular accident) ([facility])   • Azotemia   • Tachycardia   • PVD (peripheral vascular disease) ([facility])   • Arterial occlusion, lower extremity (HCC)   • COPD   • Chronic respiratory failure (HCC)   • Thrombocytopenia (HCC)   • Tobacco abuse   • Essential hypertension       Medical Decision Making and Plan:  Right KIM and MCA ischemic stroke  Likely cardio-embolic  Continue full rehab program  PT/OT/SLP, 1 hr each discipline, 5 days per week     Pain/Neuropathic pain  Patient had been taking oxycodone on acute care.  Trial prn use, monitor need.  Consider low dose gabapentin at bedtime. Controlled without      Continue Eliquis and statin for secondary stroke prophylaxis  Follow up with stroke bridge clinic, needs monitor  Medications unchanged     Possible NPH  Follow up stroke bridge clinic     Appreciate assistance of hospitalist with her medical co-morbidities:     Hypertension  Tachycardia, check EKG  On lisinopril 20 mg -Discontinue for hypotension. Continue to monitor as into 140s     Orthostatic Hypotension? on 9/9/19  Previously on 9/7/19 as well. Discussed with hospitalist, unlikely if sitting up for 30 minutes  Continue to monitor  Encourage PO fluid intake   Lisinopril discontinued. Provacative testing + on 9/11. Discussed with hopsitalist  Start low dose midodrine. Improved on midodrine      Peripheral vascular disease  Outpatient follow-up with Eliquis and lipitor     Thrombocytopenia  Appreciate hospitalist input     Tobacco use  COPD  Will need smoking cessation  prior to discharge  Performed on 9/10/19  Start Symbicort     DVT Ppx  On Apixaban     Total time:  17 minutes.  I spent greater than 50% of the time for patient care, counseling, and coordination on this date, including unit/floor time, and face-to-face time with the patient as per interval events and assessment and plan above. Topics discussed included improved orthostatics on midodrine, improving strength in left LE.     Kristi Aguilar M.D.

## 2019-09-14 NOTE — PROGRESS NOTES
Received shift report and assumed care of patient.  Patient awake, calm and stable, currently positioned in bed for comfort and safety; call light within reach. 5/10 back pain at this time, see mar for medication.  Will continue to monitor.

## 2019-09-15 PROCEDURE — 700102 HCHG RX REV CODE 250 W/ 637 OVERRIDE(OP): Performed by: HOSPITALIST

## 2019-09-15 PROCEDURE — 94760 N-INVAS EAR/PLS OXIMETRY 1: CPT

## 2019-09-15 PROCEDURE — A9270 NON-COVERED ITEM OR SERVICE: HCPCS | Performed by: HOSPITALIST

## 2019-09-15 PROCEDURE — A9270 NON-COVERED ITEM OR SERVICE: HCPCS | Performed by: PHYSICAL MEDICINE & REHABILITATION

## 2019-09-15 PROCEDURE — 770010 HCHG ROOM/CARE - REHAB SEMI PRIVAT*

## 2019-09-15 PROCEDURE — 99232 SBSQ HOSP IP/OBS MODERATE 35: CPT | Performed by: HOSPITALIST

## 2019-09-15 PROCEDURE — 700102 HCHG RX REV CODE 250 W/ 637 OVERRIDE(OP): Performed by: PHYSICAL MEDICINE & REHABILITATION

## 2019-09-15 RX ADMIN — MIDODRINE HYDROCHLORIDE 2.5 MG: 2.5 TABLET ORAL at 12:04

## 2019-09-15 RX ADMIN — MIDODRINE HYDROCHLORIDE 2.5 MG: 2.5 TABLET ORAL at 09:38

## 2019-09-15 RX ADMIN — APIXABAN 5 MG: 5 TABLET, FILM COATED ORAL at 21:12

## 2019-09-15 RX ADMIN — SENNOSIDES,DOCUSATE SODIUM 2 TABLET: 8.6; 5 TABLET, FILM COATED ORAL at 21:12

## 2019-09-15 RX ADMIN — BUDESONIDE AND FORMOTEROL FUMARATE DIHYDRATE 2 PUFF: 160; 4.5 AEROSOL RESPIRATORY (INHALATION) at 08:52

## 2019-09-15 RX ADMIN — APIXABAN 5 MG: 5 TABLET, FILM COATED ORAL at 10:05

## 2019-09-15 RX ADMIN — ATORVASTATIN CALCIUM 80 MG: 40 TABLET, FILM COATED ORAL at 21:12

## 2019-09-15 RX ADMIN — OXYCODONE HYDROCHLORIDE 5 MG: 5 TABLET ORAL at 09:58

## 2019-09-15 RX ADMIN — BUDESONIDE AND FORMOTEROL FUMARATE DIHYDRATE 2 PUFF: 160; 4.5 AEROSOL RESPIRATORY (INHALATION) at 21:12

## 2019-09-15 RX ADMIN — OXYCODONE HYDROCHLORIDE 5 MG: 5 TABLET ORAL at 10:09

## 2019-09-15 RX ADMIN — SENNOSIDES,DOCUSATE SODIUM 2 TABLET: 8.6; 5 TABLET, FILM COATED ORAL at 09:59

## 2019-09-15 ASSESSMENT — ENCOUNTER SYMPTOMS
DIZZINESS: 0
DIARRHEA: 0
COUGH: 0
NERVOUS/ANXIOUS: 0
FEVER: 0
BLURRED VISION: 0

## 2019-09-15 NOTE — CARE PLAN
Problem: Safety  Goal: Will remain free from injury  Outcome: PROGRESSING AS EXPECTED  Note:   Call light with in reach. Redirection to use call light for assistance. Non skid socks. Upper siderails up x2 while in bed. No complains of pain at this time. O2 n/c in place at 2 lpm. Able to make needs known. Assisted as needed. Will continue to monitor.

## 2019-09-15 NOTE — FLOWSHEET NOTE
09/15/19 0858   Events/Summary/Plan   Events/Summary/Plan MDI given, 02 spot check   Interdisciplinary Plan of Care-Goals (Indications)   Bronchodilator Indications History / Diagnosis   Interdisciplinary Plan of Care-Outcomes    Bronchodilator Outcome Diminished Wheezing and Volume of Air Movement Increased   Education   Education Yes - Pt. / Family has been Instructed in use of Respiratory Equipment   MDI/DPI Group   #MDI/DPI Given MDI/DPI x 1   Respiratory WDL   Respiratory (WDL) X   Chest Exam   Respiration 18   Pulse 78   Secretions   Cough Non Productive   Oximetry   #Pulse Oximetry (Single Determination) Yes   Oxygen   Home O2 Use Prior To Admission? Yes   Home O2 LPM Flow 2 LPM   Home O2 Delivery Method Nasal Cannula   Home O2 Frequency of Use At Sleep   Pulse Oximetry 92 %   O2 Daily Delivery Respiratory  Room Air with O2 Available

## 2019-09-16 ENCOUNTER — TELEPHONE (OUTPATIENT)
Dept: MEDICAL GROUP | Facility: MEDICAL CENTER | Age: 74
End: 2019-09-16

## 2019-09-16 PROCEDURE — A9270 NON-COVERED ITEM OR SERVICE: HCPCS | Performed by: PHYSICAL MEDICINE & REHABILITATION

## 2019-09-16 PROCEDURE — 99232 SBSQ HOSP IP/OBS MODERATE 35: CPT | Performed by: HOSPITALIST

## 2019-09-16 PROCEDURE — G0515 COGNITIVE SKILLS DEVELOPMENT: HCPCS

## 2019-09-16 PROCEDURE — 770010 HCHG ROOM/CARE - REHAB SEMI PRIVAT*

## 2019-09-16 PROCEDURE — 97116 GAIT TRAINING THERAPY: CPT

## 2019-09-16 PROCEDURE — A9270 NON-COVERED ITEM OR SERVICE: HCPCS | Performed by: HOSPITALIST

## 2019-09-16 PROCEDURE — 700102 HCHG RX REV CODE 250 W/ 637 OVERRIDE(OP): Performed by: HOSPITALIST

## 2019-09-16 PROCEDURE — 97535 SELF CARE MNGMENT TRAINING: CPT

## 2019-09-16 PROCEDURE — 99233 SBSQ HOSP IP/OBS HIGH 50: CPT | Performed by: PHYSICAL MEDICINE & REHABILITATION

## 2019-09-16 PROCEDURE — 700102 HCHG RX REV CODE 250 W/ 637 OVERRIDE(OP): Performed by: PHYSICAL MEDICINE & REHABILITATION

## 2019-09-16 PROCEDURE — 97530 THERAPEUTIC ACTIVITIES: CPT

## 2019-09-16 RX ORDER — BUDESONIDE AND FORMOTEROL FUMARATE DIHYDRATE 160; 4.5 UG/1; UG/1
2 AEROSOL RESPIRATORY (INHALATION) 2 TIMES DAILY
Status: DISCONTINUED | OUTPATIENT
Start: 2019-09-16 | End: 2019-09-25 | Stop reason: HOSPADM

## 2019-09-16 RX ADMIN — MIDODRINE HYDROCHLORIDE 2.5 MG: 2.5 TABLET ORAL at 05:13

## 2019-09-16 RX ADMIN — BUDESONIDE AND FORMOTEROL FUMARATE DIHYDRATE 2 PUFF: 160; 4.5 AEROSOL RESPIRATORY (INHALATION) at 20:52

## 2019-09-16 RX ADMIN — ACETAMINOPHEN 650 MG: 325 TABLET, FILM COATED ORAL at 05:13

## 2019-09-16 RX ADMIN — APIXABAN 5 MG: 5 TABLET, FILM COATED ORAL at 20:50

## 2019-09-16 RX ADMIN — ATORVASTATIN CALCIUM 80 MG: 40 TABLET, FILM COATED ORAL at 20:51

## 2019-09-16 RX ADMIN — BUDESONIDE AND FORMOTEROL FUMARATE DIHYDRATE 2 PUFF: 160; 4.5 AEROSOL RESPIRATORY (INHALATION) at 08:00

## 2019-09-16 RX ADMIN — MIDODRINE HYDROCHLORIDE 2.5 MG: 2.5 TABLET ORAL at 13:49

## 2019-09-16 RX ADMIN — SENNOSIDES,DOCUSATE SODIUM 2 TABLET: 8.6; 5 TABLET, FILM COATED ORAL at 20:51

## 2019-09-16 RX ADMIN — APIXABAN 5 MG: 5 TABLET, FILM COATED ORAL at 08:08

## 2019-09-16 ASSESSMENT — ENCOUNTER SYMPTOMS
FEVER: 0
VOMITING: 0
CHILLS: 0
DIARRHEA: 0
SHORTNESS OF BREATH: 0
NERVOUS/ANXIOUS: 0
NAUSEA: 0
ABDOMINAL PAIN: 0

## 2019-09-16 NOTE — THERAPY
Physical Therapy   Daily Treatment     Patient Name: Nohelia Dickerson  Age:  74 y.o., Sex:  female  Medical Record #: [id number]  Today's Date: 9/16/2019     Precautions  Precautions: Fall Risk  Comments: posterior/L lateral lean, L rafael    Subjective    Pt reports she is agreeable to lite gait this session     Objective       09/16/19 0831   Interdisciplinary Plan of Care Collaboration   Patient Position at End of Therapy Seated   PT Total Time Spent   PT Individual Total Time Spent (Mins) 60   PT Charge Group   PT Gait Training 3   PT Therapeutic Activities 1       FIM Stairs Score:  1 - Total Assistance  Stairs Description:  Ascends/descends less than 4 steps, Extra time, Safety concerns, Hand rails, Verbal cueing, Requires incidental assist(STep up/down from treadmill, BUE support, harness on for safety, cues for sequencing)    Lite gait - 16:45 min total walk time, fwds, ranging from 0.8 to 1.2 mph. 1000 ft total distance. 1 rest break half way through. Incline varied from 0 to 4. BUE support from rails on side throughout. Dual tasking with carrying conversation occasionally through walking. Min A for limb advancement and stance stability on LLE with fatigue. VCs for posture and using BLEs to support BW instead of UEs or relying on harness. BWS varying from 10-20% throughout.     /70 to 124/75 with walking    Assessment    Pt demos much improved activity tolerance with lite gait this session, able to maintain walking for 8 min at a time twice with normal exercise response in vitals. Requiring min A at this time and BWS for successful stepping at this time.,     Plan    Monitor vitals prn, lite gait - monitor vitals throughout,  with techs, progress to overground gait training in [facility] with/without AD, LLE and LUE neuro re-ed, pool therapy,

## 2019-09-16 NOTE — DISCHARGE PLANNING
Recommendation:  Update IPOC to address therapy service delivery:  PT____90_ min/day, OT ___30__ min/day, ST __60__ min/day on 5/7 days per week for at least 15 hours per week.

## 2019-09-16 NOTE — PROGRESS NOTES
Rehab Progress Note     Date of Service: 9/16/2019  Chief Complaint: Follow-up stroke    Interval Events (Subjective)    Patient seen and examined in her room today.  Since I last saw her a week ago she reports that her left leg strength has greatly improved though still weak.  Plan for pool therapy tomorrow.  Patient denies any pain.  She reports she is eating well and sleeping well.  Denies any issues with her bowel or bladder.    Objective:  VITAL SIGNS: /90   Pulse 94   Temp 36.6 °C (97.8 °F) (Oral)   Resp 16   Ht 1.829 m (6')   Wt 65 kg (143 lb 4.8 oz)   SpO2 90%   BMI 19.43 kg/m²   Gen: alert, no apparent distress  CV: regular rate and rhythm, no murmurs, no peripheral edema  Resp: clear to ascultation bilaterally, normal respiratory effort  GI: soft, non-tender abdomen, bowel sounds present  Neuro: notable for 4/5 left distal leg    No results found for this or any previous visit (from the past 72 hour(s)).    Current Facility-Administered Medications   Medication Frequency   • budesonide-formoterol (SYMBICORT) 160-4.5 MCG/ACT inhaler 2 Puff BID   • midodrine (PROAMATINE) tablet 2.5 mg BID   • oxyCODONE immediate-release (ROXICODONE) tablet 5 mg Q4HRS PRN   • Respiratory Care per Protocol Continuous RT   • acetaminophen (TYLENOL) tablet 650 mg Q4HRS PRN   • hydrALAZINE (APRESOLINE) tablet 25 mg Q8HRS PRN   • senna-docusate (PERICOLACE or SENOKOT S) 8.6-50 MG per tablet 2 Tab BID    And   • polyethylene glycol/lytes (MIRALAX) PACKET 1 Packet QDAY PRN    And   • magnesium hydroxide (MILK OF MAGNESIA) suspension 30 mL QDAY PRN    And   • bisacodyl (DULCOLAX) suppository 10 mg QDAY PRN   • artificial tears ophthalmic solution 1 Drop PRN   • benzocaine-menthol (CEPACOL) lozenge 1 Lozenge Q2HRS PRN   • mag hydrox-al hydrox-simeth (MAALOX PLUS ES or MYLANTA DS) suspension 20 mL Q2HRS PRN   • ondansetron (ZOFRAN ODT) dispertab 4 mg 4X/DAY PRN    Or   • ondansetron (ZOFRAN) syringe/vial injection 4 mg  4X/DAY PRN   • traZODone (DESYREL) tablet 50 mg QHS PRN   • sodium chloride (OCEAN) 0.65 % nasal spray 2 Spray PRN   • melatonin tablet 3 mg HS PRN   • atorvastatin (LIPITOR) tablet 80 mg Q EVENING   • apixaban (ELIQUIS) tablet 5 mg BID   • albuterol inhaler 2 Puff Q4HRS PRN       Orders Placed This Encounter   Procedures   • Diet Order Regular     Standing Status:   Standing     Number of Occurrences:   1     Order Specific Question:   Diet:     Answer:   Regular [1]       Assessment:  Active Hospital Problems    Diagnosis   • *CVA (cerebral vascular accident) ([facility])   • Orthostatic hypotension   • Azotemia   • Tachycardia   • PVD (peripheral vascular disease) ([facility])   • Arterial occlusion, lower extremity ([facility])   • COPD   • Chronic respiratory failure ([facility])   • Thrombocytopenia ([facility])   • Tobacco abuse   • Essential hypertension     This patient is a 74 y.o. female admitted for acute inpatient rehabilitation with CVA (cerebral vascular accident) ([facility]).    Therapy update 9/16/2019  Modified Independent eating  Modified Independent grooming  Supervision bathing  Modified Independent upper body dressing  Supervision lower body dressing  Supervision toileting  Supervisionbladder  Min assist bowel  Mod assist bed/chair transfer  Supervision toilet transfer  Supervision tub/shower transfer  Total assist ambulation  Total assist wheelchair propulsion  Total assist stairs  Supervision comprehension  Independent expression  Supervision social interaction  Supervision problem solving  Supervision memory    Estimated discharge date: 9/24    [place] therapy tomorrow    Re-assess discharge date at next conference    Medical Decision Making and Plan:    Right KIM and MCA ischemic stroke  Likely cardio-embolic  Continue full rehab program  PT/OT/SLP, 1 hr each discipline, 5 days per week  9/16: decrease OT 30 min, increase PT 90 min  Continue Eliquis and statin for secondary stroke prophylaxis  Follow up with stroke bridge clinic,  needs monitor?     Possible NPH  Follow up stroke bridge clinic     Appreciate assistance of hospitalist with her medical co-morbidities:     Hypertension, resolved  Orthostatic hypotension  Tachycardia, resolved  Peripheral vascular disease  Tobacco use, did tobacco counseling with patient  COPD    DVT prophylaxis  Eliquis     Total time:  40 minutes.  I spent greater than 50% of the time for patient care, counseling, and coordination on this date, including patient face-to face time, unit/floor time with review of records/pertinent lab data and studies, as well as discussing diagnostic evaluation/work up, planned therapeutic interventions, and future disposition of care, as per the interval events/subjective and the assessment and plan as noted above.    Kayla Francisco M.D.   Physical Medicine and Rehabilitation

## 2019-09-16 NOTE — CARE PLAN
Problem: Infection  Goal: Will remain free from infection  Note:   No acute signs and symptoms of infection noted to pt.      Problem: Bowel/Gastric:  Goal: Normal bowel function is maintained or improved  Note:   Pt's last BM was 9/13/19. Pt denies abdominal pain/discomfort, denies N/V. Pt wanted to see if she can have bowel movement sometime today without any bowel medication. Will follow up this afternoon.

## 2019-09-16 NOTE — THERAPY
"Occupational Therapy  Daily Treatment     Patient Name: Nohelia Dickerson  Age:  74 y.o., Sex:  female  Medical Record #: [id number]  Today's Date: 2019     Precautions  Precautions: Fall Risk  Comments: posterior/L lateral lean, L rafael    Safety   ADL Safety : Requires Supervision for Safety, Requires Cueing for Safety, Requires Physical Assist for Safety  Bathroom Safety: Requires Supervision for Safety, Requires Cuing for Safety, Requires Physical Assist for Safety  Comments: see FIMs for ADL performance details.    Subjective    \"My leg is actually starting to do what I want it to do\"     Objective       19 1031   Precautions   Precautions Fall Risk   Comments posterior/L lateral lean, L rafael   Safety    ADL Safety  Requires Supervision for Safety;Requires Cueing for Safety;Requires Physical Assist for Safety   Bathroom Safety Requires Supervision for Safety;Requires Cuing for Safety;Requires Physical Assist for Safety   Comments see FIMs for ADL performance details.   Interdisciplinary Plan of Care Collaboration   Patient Position at End of Therapy Seated;Self Releasing Lap Belt Applied;Other (Comments)  (cafeteria for lunch)   OT Total Time Spent   OT Individual Total Time Spent (Mins) 60   OT Charge Group   OT Self Care / ADL 4       FIM Eating Score:  6 - Modified Independent  Eating Description:  Increased time    FIM Grooming Score:  6 - Modified Independent  Grooming Description:  Increased time    FIM Bathing Score:  5 - Standby Prompting/Supervision or Set-up  Bathing Description:       FIM Upper Body Dressin - Modified Independent  Upper Body Dressing Description:  Increased time(increased time to don/doff pullover shirt and buttondown sweater.)    FIM Lower Body Dressing Score:  5 - Standby Prompting/Supervsion or Set-up  Lower Body Dressing Description:  Increased time, Supervision for safety, Verbal cueing(SBA to don brief, pants and tread socks with increased time and cues for " rafael technique.)    FIM Toiletin - Standby Prompting/Supervision or Set-up  Toileting Description:  Grab bar, Increased time, Supervision for safety    FIM Toilet Transfer Score:  5 - Standby Prompting/Supervision or Set-up  Toilet Transfer Description:  Grab bar, Increased time, Supervision for safety, Verbal cueing, Set-up of equipment(w/c<>toilet SPT with GB and SBA.)    FIM Tub/Shower Transfers Score:  5 - Standby Prompting/Supervision or Set-up  Tub/Shower Transfers Description:  Grab bar, Increased time, Supervision for safety, Verbal cueing, Set-up of equipment(w/c<>tub bench SPT with GB and SBA.)      Assessment    Pt tolerated session well with focus on progression of ADL routine. Pt with significant improvement with safety with tasks completed in standing, and with advancing LLE during bathroom transfers. Pt con't to require increased time for ADLs d/t fatigue and mild impairments with LUE coordination. Pt with no c/o dizziness throughout session.      Plan    Con't OT for balance, LUE neuro re-ed, safety with ADLs/IADLs and related functional mobility.

## 2019-09-16 NOTE — CARE PLAN
Problem: Communication  Goal: The ability to communicate needs accurately and effectively will improve  Note:   Makes needs known to staff.  Encouraged to use call light for staff assist.      Problem: Safety  Goal: Will remain free from falls  Note:   Call light kept within reach and encouraged to use for assistance and with toileting needs. Encouraged to call staff and to wait for staff before transfers.   Bed alarm is in place.  Hourly rounding in effect.

## 2019-09-16 NOTE — PROGRESS NOTES
[facility] Medicine Daily Progress Note    Date of Service  9/16/2019    Chief Complaint:  Hypertension  PVD  Dizziness    Interval History:  No significant events or changes since last visit    Review of Systems  Review of Systems   Constitutional: Negative for chills and fever.   Respiratory: Negative for shortness of breath.    Cardiovascular: Negative for chest pain.   Gastrointestinal: Negative for abdominal pain, diarrhea, nausea and vomiting.   Psychiatric/Behavioral: The patient is not nervous/anxious.         Physical Exam  Temp:  [36.1 °C (97 °F)-36.8 °C (98.3 °F)] 36.6 °C (97.8 °F)  Pulse:  [74-94] 94  Resp:  [16-18] 16  BP: (114-158)/(75-98) 146/90  SpO2:  [90 %-96 %] 90 %    Physical Exam   Constitutional: She is oriented to person, place, and time. She appears well-nourished.   HENT:   Head: Atraumatic.   Eyes: Pupils are equal, round, and reactive to light. Conjunctivae are normal.   Neck: Normal range of motion. Neck supple.   Cardiovascular: Normal rate, regular rhythm, S1 normal and S2 normal.   No murmur heard.  Pulmonary/Chest: Effort normal. She has no wheezes. She has no rales.   Abdominal: Soft. She exhibits no distension. There is no tenderness.   Musculoskeletal: She exhibits no edema.   Neurological: She is alert and oriented to person, place, and time. No sensory deficit.   Skin: Skin is warm and dry. No rash noted. No cyanosis.   Psychiatric: She has a normal mood and affect. Her behavior is normal.   Nursing note and vitals reviewed.      Fluids    Intake/Output Summary (Last 24 hours) at 9/16/2019 0942  Last data filed at 9/16/2019 0800  Gross per 24 hour   Intake 950 ml   Output --   Net 950 ml       Laboratory                        Imaging    Assessment/Plan  * CVA (cerebral vascular accident) ([facility])- (present on admission)  Assessment & Plan  On Eliquis  On Lipitor  MRI concerning for NPH, needs F/U    Orthostatic hypotension  Assessment & Plan  (9/9): had dizziness while sitting in  the chair and was noticed that the BP dropped lower around that time  (9/12) had dizziness again today when sitting up  No dizziness since 9/12  Orthostatics (9/9): SBP dropped 18 points from supine to sitting  Orthostatics (9/11): positive  Off Lisinopril (last dose 9/9)  On Midodrine: 2.5 mg bid (9/12)  Monitor    PVD (peripheral vascular disease) ([facility]CC)- (present on admission)  Assessment & Plan  On Eliquis  On Lipitor  Note: for out patient f/u with Dr. Wren    Essential hypertension- (present on admission)  Assessment & Plan  BP a little labile with -158  Off Lisinopril (9/10)  Currently not on any hypertensive meds  Consider starting a low dose hypertensive if BP remains elevated  Note: now on Midodrine bid (9/11)  Cont to monitor

## 2019-09-16 NOTE — THERAPY
Speech Language Pathology  Daily Treatment     Patient Name: Nohelia Dickerson  Age:  74 y.o., Sex:  female  Medical Record #: [id number]  Today's Date: 9/16/2019     Subjective    Pt pleasant and cooperative during tx.       Objective       09/16/19 1431   Cognition   Complex Reasoning  / Problem Solving Moderate (3)   SLP Total Time Spent   SLP Individual Total Time Spent (Mins) 60   Charge Group   SLP Cognitive Skill Development 4       FIM Comprehension Score:  5 - Stand-by Prompting/Supervision or Set-up  Comprehension Description:  Verbal cues    FIM Expression Score:  7 - Independent  Expression Description:       FIM Social Interaction Score:  5 - Stand-by Prompting/Supervision or Set-up  Social Interaction Description:  Increased time    FIM Problem Solving Score:  5 - Standby Prompting/Supervision or Set-up  Problem Solving Description:  Verbal cueing, Therapy schedule, Bed/chair alarm, Increased time    FIM Memory Score:  5 - Standby Prompting/Supervision or Set-up  Memory Description:  Verbal cueing, Therapy schedule, Seat belt, Bed/chair alarm      Assessment    Deductive reasoning (4x5 puzzle): 70% independent; 100% given min-mod verbal cues to attend to written details.  Mental sorting (4 attributes): 80% independent; 100% given min verbal cues.  Single step word problems involving money: 100% independent.     Plan    Target medication management; executive function.

## 2019-09-16 NOTE — PROGRESS NOTES
Received bedside shift report from Cristin FELICIANO RN regarding patient and assumed care. Patient awake, calm and stable, currently positioned in bed for comfort and safety; call light within reach. Denies pain or discomfort at this time. Will continue to monitor.

## 2019-09-16 NOTE — TELEPHONE ENCOUNTER
Future Appointments       Provider Department Center    9/23/2019 1:20 PM Idalia Cortez M.D. [facility]        ESTABLISHED PATIENT PRE-VISIT PLANNING     Patient was NOT contacted to complete PVP.     Note: Patient will not be contacted if there is no indication to call.     1.  Reviewed notes from the last few office visits within the medical group: Yes    2.  If any orders were placed at last visit or intended to be done for this visit (i.e. 6 mos follow-up), do we have Results/Consult Notes?        •  Labs - Labs ordered, completed on 09/03-07/2019 and results are in chart.   Note: If patient appointment is for lab review and patient did not complete labs, check with provider if OK to reschedule patient until labs completed.       •  Imaging - Imaging ordered, completed and results are in chart.       •  Referrals - No referrals were ordered at last office visit.    3. Is this appointment scheduled as a Hospital Follow-Up? Yes, visit was at [facility].     4.  Immunizations were updated in [employer] using WebIZ?: Yes       •  Web Iz Recommendations: FLU, PNEUMOVAX (PPSV23), TDAP, VARICELLA (Chicken Pox)  and SHINGRIX (Shingles)    5.  Patient is due for the following Health Maintenance Topics:   Health Maintenance Due   Topic Date Due   • Annual Wellness Visit  1945   • HEPATITIS C SCREENING  1945   • PFT SCREENING-FEV1 AND FEV/FVC RATIO / SPIROMETRY SHOULD BE PERFORMED ANNUALLY  03/11/1964   • IMM DTaP/Tdap/Td Vaccine (1 - Tdap) 03/11/1965   • PAP SMEAR  03/11/1967   • IMM ZOSTER VACCINES (1 of 2) 03/11/1996   • IMM PNEUMOCOCCAL VACCINE: 65+ Years (2 of 2 - PPSV23) 12/06/2017   • IMM INFLUENZA (1) 09/01/2019       6. Orders for overdue Health Maintenance topics pended in Pre-Charting? YES    7.  AHA (MDX) form printed for Provider? YES    8.  Patient was informed to arrive 15 min prior to their scheduled appointment and bring in their medication bottles.

## 2019-09-17 PROCEDURE — 97112 NEUROMUSCULAR REEDUCATION: CPT

## 2019-09-17 PROCEDURE — 99232 SBSQ HOSP IP/OBS MODERATE 35: CPT | Performed by: PHYSICAL MEDICINE & REHABILITATION

## 2019-09-17 PROCEDURE — 700102 HCHG RX REV CODE 250 W/ 637 OVERRIDE(OP): Performed by: PHYSICAL MEDICINE & REHABILITATION

## 2019-09-17 PROCEDURE — 97110 THERAPEUTIC EXERCISES: CPT

## 2019-09-17 PROCEDURE — 770010 HCHG ROOM/CARE - REHAB SEMI PRIVAT*

## 2019-09-17 PROCEDURE — 700102 HCHG RX REV CODE 250 W/ 637 OVERRIDE(OP): Performed by: HOSPITALIST

## 2019-09-17 PROCEDURE — 97116 GAIT TRAINING THERAPY: CPT

## 2019-09-17 PROCEDURE — A9270 NON-COVERED ITEM OR SERVICE: HCPCS | Performed by: PHYSICAL MEDICINE & REHABILITATION

## 2019-09-17 PROCEDURE — 97530 THERAPEUTIC ACTIVITIES: CPT

## 2019-09-17 PROCEDURE — 97113 AQUATIC THERAPY/EXERCISES: CPT

## 2019-09-17 PROCEDURE — 94760 N-INVAS EAR/PLS OXIMETRY 1: CPT

## 2019-09-17 PROCEDURE — A9270 NON-COVERED ITEM OR SERVICE: HCPCS | Performed by: HOSPITALIST

## 2019-09-17 PROCEDURE — G0515 COGNITIVE SKILLS DEVELOPMENT: HCPCS

## 2019-09-17 PROCEDURE — 99232 SBSQ HOSP IP/OBS MODERATE 35: CPT | Performed by: HOSPITALIST

## 2019-09-17 RX ADMIN — APIXABAN 5 MG: 5 TABLET, FILM COATED ORAL at 20:26

## 2019-09-17 RX ADMIN — BUDESONIDE AND FORMOTEROL FUMARATE DIHYDRATE 2 PUFF: 160; 4.5 AEROSOL RESPIRATORY (INHALATION) at 20:27

## 2019-09-17 RX ADMIN — MAGNESIUM HYDROXIDE 30 ML: 400 SUSPENSION ORAL at 05:48

## 2019-09-17 RX ADMIN — APIXABAN 5 MG: 5 TABLET, FILM COATED ORAL at 08:33

## 2019-09-17 RX ADMIN — MIDODRINE HYDROCHLORIDE 2.5 MG: 2.5 TABLET ORAL at 12:54

## 2019-09-17 RX ADMIN — ATORVASTATIN CALCIUM 80 MG: 40 TABLET, FILM COATED ORAL at 20:26

## 2019-09-17 RX ADMIN — MIDODRINE HYDROCHLORIDE 2.5 MG: 2.5 TABLET ORAL at 05:52

## 2019-09-17 RX ADMIN — BUDESONIDE AND FORMOTEROL FUMARATE DIHYDRATE 2 PUFF: 160; 4.5 AEROSOL RESPIRATORY (INHALATION) at 08:35

## 2019-09-17 RX ADMIN — SENNOSIDES,DOCUSATE SODIUM 2 TABLET: 8.6; 5 TABLET, FILM COATED ORAL at 20:26

## 2019-09-17 RX ADMIN — SENNOSIDES,DOCUSATE SODIUM 2 TABLET: 8.6; 5 TABLET, FILM COATED ORAL at 08:33

## 2019-09-17 ASSESSMENT — ENCOUNTER SYMPTOMS
SHORTNESS OF BREATH: 0
FEVER: 0
NAUSEA: 0
ABDOMINAL PAIN: 0
VOMITING: 0
CHILLS: 0

## 2019-09-17 NOTE — THERAPY
Speech Language Pathology  Daily Treatment     Patient Name: Nohelia Dickerson  Age:  74 y.o., Sex:  female  Medical Record #: [id number]  Today's Date: 9/17/2019     Subjective    Patient was in dining room at time of ST. Was willing to participate.      Objective       09/17/19 0902   Cognition   Moderate Attention Supervision (5)   Visual Scanning / Cancellation Skills Within Functional Limits (6-7)   Verbal Short Term Memory 45 Minutes   Complex Reasoning  / Problem Solving Minimal (4)   Medication Management  Minimal (4)   SLP Total Time Spent   SLP Individual Total Time Spent (Mins) 60   Charge Group   SLP Cognitive Skill Development 4       FIM Eating Score:     Eating Description:       FIM Comprehension Score:  5 - Stand-by Prompting/Supervision or Set-up  Comprehension Description:       FIM Expression Score:  7 - Independent  Expression Description:       FIM Social Interaction Score:  7 - Independent  Social Interaction Description:       FIM Problem Solving Score:  5 - Standby Prompting/Supervision or Set-up  Problem Solving Description:       FIM Memory Score:  5 - Standby Prompting/Supervision or Set-up  Memory Description:         Assessment    Patient completed functional med sort with 80% accuracy. Again Missed medication with dosage of 2 tabs 2 times a day. Patient was able to self correct after cues.   Completed deductive scheduling tasks with 90% accuracy. Min cues needed to reach 100%.      Plan    continue to target medication management, executive function tasks.

## 2019-09-17 NOTE — CARE PLAN
Problem: Bowel/Gastric:  Goal: Normal bowel function is maintained or improved  Outcome: PROGRESSING AS EXPECTED  Intervention: Educate patient and significant other/support system about diet, fluid intake, medications and activity to promote bowel function  Note:   Pt last bm 9/13, offered stool softener , pt stated to give it in the morning. Increase fluids encouraged . Assisted OOB to bathroom via wheelchair with one person assist.

## 2019-09-17 NOTE — THERAPY
Physical Therapy   Daily Treatment     Patient Name: Nohelia Dickerson  Age:  74 y.o., Sex:  female  Medical Record #: [id number]  Today's Date: 9/17/2019     Precautions  Precautions: Fall Risk  Comments: posterior/L lateral lean, L rafael    Subjective    Ready for therapy     Objective     09/17/19 1431   Interdisciplinary Plan of Care Collaboration   IDT Collaboration with  Therapy Tech;Certified Nursing Assistant   Patient Position at End of Therapy Seated  (with tech)   Collaboration Comments transport to/from pool, CNA re: shower   PT Total Time Spent   PT Individual Total Time Spent (Mins) 60   PT Charge Group   PT Aquatic Therapy  4       The patient participated in 60minute aquatic therapy session with emphasis on gait training, balance and proprioceptive awareness. Pt completed a warm up of B LE seated ROM exercises hip flexion and knee extension. Pt completed standing there ex at hand rail including marching, hip abd, hs curls, heel/toe raises, bilat 2 x 10. Pt completed buoyancy assisted ambulation both with R UE support on hand rail and L HHA x 40' x 1 and with B UE support on flotation barbell and CGA. B LE with #7.5lb weights. Side stepping and high knee stepping in both directions with B UE support 40' x 2     Assessment    The patient requires verbal cues for attention to task throughout session. Pt will benefit from aquatic therapy as an adjunct to her PT/OT/ST.    Plan    Monitor vitals prn, lite gait - monitor vitals throughout,  with techs, progress to overground gait training in [facility] with/without AD, LLE and LUE neuro re-ed, pool therapy, consider AFO? Outing and family training towards [place]

## 2019-09-17 NOTE — FLOWSHEET NOTE
09/17/19 1009   Events/Summary/Plan   Events/Summary/Plan o2 spot check.  Pt is tolerating RA during the day without difficulty   Interdisciplinary Plan of Care-Goals (Indications)   Bronchodilator Indications History / Diagnosis   Interdisciplinary Plan of Care-Outcomes    Bronchodilator Outcome Patient at Stable Baseline   Education   Education Yes - Pt. / Family has been Instructed in use of Respiratory Equipment   Respiratory WDL   Respiratory (WDL) X   Chest Exam   Respiration 18   Pulse 98   Oximetry   #Pulse Oximetry (Single Determination) Yes   Oxygen   Home O2 Use Prior To Admission? Yes   Home O2 LPM Flow 2 LPM   Home O2 Delivery Method Nasal Cannula   Home O2 Frequency of Use At Sleep   Pulse Oximetry 94 %   O2 Daily Delivery Respiratory  Room Air with O2 Available

## 2019-09-17 NOTE — PROGRESS NOTES
Rehab Progress Note     Date of Service: 9/17/2019  Chief Complaint: Follow-up stroke    Interval Events (Subjective)    Patient seen and examined in the therapy gym today working with PT. She still has ankle weakness, but may not need an AFO. She would like to add Boost supplements as she has lost weight. Her BIM is low at 19. She denies any pain. She has no other complaints.     Objective:  VITAL SIGNS: /73   Pulse 98   Temp 36.9 °C (98.5 °F) (Temporal)   Resp 18   Ht 1.829 m (6')   Wt 65 kg (143 lb 4.8 oz)   SpO2 94%   BMI 19.43 kg/m²   Gen: alert, no apparent distress  CV: regular rate and rhythm, no murmurs, no peripheral edema  Resp: clear to ascultation bilaterally, normal respiratory effort  GI: soft, non-tender abdomen, bowel sounds present  Neuro: notable for mild left leg weakness, gait compensated, mild toe draggage    No results found for this or any previous visit (from the past 72 hour(s)).    Current Facility-Administered Medications   Medication Frequency   • budesonide-formoterol (SYMBICORT) 160-4.5 MCG/ACT inhaler 2 Puff BID   • midodrine (PROAMATINE) tablet 2.5 mg BID   • oxyCODONE immediate-release (ROXICODONE) tablet 5 mg Q4HRS PRN   • Respiratory Care per Protocol Continuous RT   • acetaminophen (TYLENOL) tablet 650 mg Q4HRS PRN   • hydrALAZINE (APRESOLINE) tablet 25 mg Q8HRS PRN   • senna-docusate (PERICOLACE or SENOKOT S) 8.6-50 MG per tablet 2 Tab BID    And   • polyethylene glycol/lytes (MIRALAX) PACKET 1 Packet QDAY PRN    And   • magnesium hydroxide (MILK OF MAGNESIA) suspension 30 mL QDAY PRN    And   • bisacodyl (DULCOLAX) suppository 10 mg QDAY PRN   • artificial tears ophthalmic solution 1 Drop PRN   • benzocaine-menthol (CEPACOL) lozenge 1 Lozenge Q2HRS PRN   • mag hydrox-al hydrox-simeth (MAALOX PLUS ES or MYLANTA DS) suspension 20 mL Q2HRS PRN   • ondansetron (ZOFRAN ODT) dispertab 4 mg 4X/DAY PRN    Or   • ondansetron (ZOFRAN) syringe/vial injection 4 mg 4X/DAY PRN    • traZODone (DESYREL) tablet 50 mg QHS PRN   • sodium chloride (OCEAN) 0.65 % nasal spray 2 Spray PRN   • melatonin tablet 3 mg HS PRN   • atorvastatin (LIPITOR) tablet 80 mg Q EVENING   • apixaban (ELIQUIS) tablet 5 mg BID   • albuterol inhaler 2 Puff Q4HRS PRN       Orders Placed This Encounter   Procedures   • Diet Order Regular     Standing Status:   Standing     Number of Occurrences:   1     Order Specific Question:   Diet:     Answer:   Regular [1]       Assessment:  Active Hospital Problems    Diagnosis   • *CVA (cerebral vascular accident) ([facility])   • Orthostatic hypotension   • Azotemia   • Tachycardia   • PVD (peripheral vascular disease) ([facility])   • Arterial occlusion, lower extremity ([facility])   • COPD   • Chronic respiratory failure ([facility])   • Thrombocytopenia ([facility])   • Tobacco abuse   • Essential hypertension     This patient is a 74 y.o. female admitted for acute inpatient rehabilitation with CVA (cerebral vascular accident) ([facility]).    Therapy update 9/16/2019  Modified Independent eating  Modified Independent grooming  Supervision bathing  Modified Independent upper body dressing  Supervision lower body dressing  Supervision toileting  Supervisionbladder  Min assist bowel  Mod assist bed/chair transfer  Supervision toilet transfer  Supervision tub/shower transfer  Total assist ambulation  Total assist wheelchair propulsion  Total assist stairs  Supervision comprehension  Independent expression  Supervision social interaction  Supervision problem solving  Supervision memory    Estimated discharge date: 9/24    Pool therapy today    Re-assess discharge date at next conference on Weds    Medical Decision Making and Plan:    Right KIM and MCA ischemic stroke  Likely cardio-embolic  Continue full rehab program  PT/OT/SLP, 1 hr each discipline, 5 days per week  9/16: decrease OT 30 min, increase PT 90 min  Continue Eliquis and statin for secondary stroke prophylaxis  Follow up with stroke bridge clinic, needs  monitor?     Possible NPH  Follow up stroke bridge clinic     Appreciate assistance of hospitalist with her medical co-morbidities:     Hypertension, resolved  Orthostatic hypotension, improved  Tachycardia, resolved  Peripheral vascular disease  Tobacco use, did tobacco counseling with patient  COPD    DVT prophylaxis  Eliquis     Total time:  26 minutes.  I spent greater than 50% of the time for patient care, counseling, and coordination on this date, including patient face-to face time, unit/floor time with review of records/pertinent lab data and studies, as well as discussing diagnostic evaluation/work up, planned therapeutic interventions, and future disposition of care, as per the interval events/subjective and the assessment and plan as noted above.    I have performed a physical exam, reviewed and updated ROS, as well as the assessment and plan today 9/17/2019. In review of note from 9/16/2019 there are no new changes except as documented above.        Kayla Francisco M.D.   Physical Medicine and Rehabilitation

## 2019-09-17 NOTE — REHAB-PHARMACY IDT TEAM NOTE
Pharmacy   Pharmacy  Antibiotics/Antifungals/Antivirals:  Medication:      Active Orders (From admission, onward)    None        Route:        NA  Stop Date:  NA  Reason:      NA  Antihypertensives/Cardiac:  Medication:    Orders (72h ago, onward)     Start     Ordered    09/12/19 1300  midodrine (PROAMATINE) tablet 2.5 mg  2 TIMES DAILY      09/12/19 0910    09/06/19 2100  atorvastatin (LIPITOR) tablet 80 mg  EVERY EVENING      09/06/19 1251    09/06/19 1251  hydrALAZINE (APRESOLINE) tablet 25 mg  EVERY 8 HOURS PRN      09/06/19 1251              Patient Vitals for the past 24 hrs:   BP Pulse   09/17/19 1009 -- 98   09/17/19 0840 114/73 100   09/16/19 1820 119/67 87   09/16/19 1600 132/86 66     Anticoagulation:  Medication: Eliquis    Other key medications: A review of the medication list reveals no issues at this time.    Section completed by: Darren Sanchez [facility]

## 2019-09-17 NOTE — THERAPY
Physical Therapy   Daily Treatment     Patient Name: Nohelia Dickerson  Age:  74 y.o., Sex:  female  Medical Record #: [id number]  Today's Date: 9/17/2019     Precautions  Precautions: Fall Risk  Comments: posterior/L lateral lean, L rafael    Subjective    Pt reports she is looking forward to pool therapy later today     Objective       09/17/19 1031   Interdisciplinary Plan of Care Collaboration   Patient Position at End of Therapy Seated  (cafeteria)   PT Total Time Spent   PT Individual Total Time Spent (Mins) 60   PT Charge Group   PT Gait Training 3   PT Therapeutic Activities 1       FIM Walking Score:  4 - Minimal Assistance  Walking Description:  Verbal cueing, Safety concerns, Requires incidental assist, Extra time, Walker(3 x 250 ft, CGA - min A for steadying, FWW, more assist with fatigue)    FIM Stairs Score:  4 - Minimal Assistance  Stairs Description:  Ascends/descends 12 to 14 steps, Verbal cueing, Extra time, Safety concerns, Requires incidental assist, Hand rails    /75 to 128/84 with exercise, mild symptoms of dizziness with sustained activity but no orthostasis    Pt edu on sequencing safety with FWW and stair navigation with demo and edu on form and technique.     Assessment    Pt demos much improving functional mobility with stairs and walking this session with decreased level of assist. Vitals remain stable throughout session today.      Plan    Monitor vitals prn, lite gait - monitor vitals throughout,  with techs, progress to overground gait training in [facility] with/without AD, LLE and LUE neuro re-ed, pool therapy, consider AFO? Outing and family training towards DC

## 2019-09-17 NOTE — THERAPY
Occupational Therapy  Daily Treatment     Patient Name: Nohelia Dickerson  Age:  74 y.o., Sex:  female  Medical Record #: [id number]  Today's Date: 9/17/2019     Precautions  Precautions: (P) Fall Risk  Comments: (P) posterior/L lateral lean, L rafael        Subjective    Pt received in w/c and agreeable to OT session.      Objective       09/17/19 0701   Precautions   Precautions Fall Risk   Comments posterior/L lateral lean, L rafael   Cognition    Level of Consciousness Alert   Neuro-Muscular Treatments   Neuro-Muscular Treatments Biofeedback;Sensory Stimuli;Sequencing;Tactile Cuing;Verbal Cuing;Tapping;Weight Shift Right;Weight Shift Left;Other (See Comments)   Comments Blocked practice of sit<>stand transfers x25 reps with unilateral to no UB support and SBA-CGA. In // bars, fwd/bwd and lateral stepping x4 laps each with gait belt, UB support and CGA. Targeted cone taps for BLE coordination, weightshifting and SL balance x50 reps.    Sitting Lower Body Exercises   Nustep Resistance Level 4  (x20 min for BLE strengthening and endurance, 1 RB)   Balance   Standing Balance (Static) Fair -   Standing Balance (Dynamic) Poor +   Weight Shift Standing Poor   Skilled Intervention Sequencing;Tactile Cuing;Verbal Cuing   Comments see neuro re-ed section for details.   Interdisciplinary Plan of Care Collaboration   Patient Position at End of Therapy Seated;Self Releasing Lap Belt Applied;Other (Comments)  (cafeteria for breakfast)   OT Total Time Spent   OT Individual Total Time Spent (Mins) 60   OT Charge Group   OT Neuromuscular Re-education / Balance 2   OT Therapeutic Exercise  2       Assessment    Pt tolerated session well with focus on endurance, balance and LLE neuro re-ed. Pt making gains in all of these areas, though con't to be primary barriers. Pt with no c/o dizziness with positional changes and asymptomatic for orthostatic hypotension throughout session.     Plan:   Con't OT for IADLs, balance, LLE  strength/neuro re-ed, safety with ADLs and related functional mobility.

## 2019-09-17 NOTE — CARE PLAN
Problem: Infection  Goal: Will remain free from infection  Intervention: Assess signs and symptoms of infection  Note:   Pt afebrile, vital signs stable.Reinforced  proper handwashing technique. Deep breathing and coughing exercise encouraged.

## 2019-09-18 DIAGNOSIS — I63.9 CEREBROVASCULAR ACCIDENT (CVA), UNSPECIFIED MECHANISM (HCC): ICD-10-CM

## 2019-09-18 LAB
ANION GAP SERPL CALC-SCNC: 9 MMOL/L (ref 0–11.9)
BUN SERPL-MCNC: 18 MG/DL (ref 8–22)
CALCIUM SERPL-MCNC: 9 MG/DL (ref 8.5–10.5)
CHLORIDE SERPL-SCNC: 105 MMOL/L (ref 96–112)
CO2 SERPL-SCNC: 25 MMOL/L (ref 20–33)
CREAT SERPL-MCNC: 0.66 MG/DL (ref 0.5–1.4)
ERYTHROCYTE [DISTWIDTH] IN BLOOD BY AUTOMATED COUNT: 43.7 FL (ref 35.9–50)
GLUCOSE SERPL-MCNC: 98 MG/DL (ref 65–99)
HCT VFR BLD AUTO: 41.8 % (ref 37–47)
HGB BLD-MCNC: 13.4 G/DL (ref 12–16)
MCH RBC QN AUTO: 28.6 PG (ref 27–33)
MCHC RBC AUTO-ENTMCNC: 32.1 G/DL (ref 33.6–35)
MCV RBC AUTO: 89.3 FL (ref 81.4–97.8)
PLATELET # BLD AUTO: 187 K/UL (ref 164–446)
PMV BLD AUTO: 10.4 FL (ref 9–12.9)
POTASSIUM SERPL-SCNC: 4.1 MMOL/L (ref 3.6–5.5)
RBC # BLD AUTO: 4.68 M/UL (ref 4.2–5.4)
SODIUM SERPL-SCNC: 139 MMOL/L (ref 135–145)
WBC # BLD AUTO: 8.3 K/UL (ref 4.8–10.8)

## 2019-09-18 PROCEDURE — 99233 SBSQ HOSP IP/OBS HIGH 50: CPT | Performed by: PHYSICAL MEDICINE & REHABILITATION

## 2019-09-18 PROCEDURE — 85027 COMPLETE CBC AUTOMATED: CPT

## 2019-09-18 PROCEDURE — A9270 NON-COVERED ITEM OR SERVICE: HCPCS | Performed by: PHYSICAL MEDICINE & REHABILITATION

## 2019-09-18 PROCEDURE — 97110 THERAPEUTIC EXERCISES: CPT

## 2019-09-18 PROCEDURE — 99232 SBSQ HOSP IP/OBS MODERATE 35: CPT | Performed by: HOSPITALIST

## 2019-09-18 PROCEDURE — G0515 COGNITIVE SKILLS DEVELOPMENT: HCPCS

## 2019-09-18 PROCEDURE — 770010 HCHG ROOM/CARE - REHAB SEMI PRIVAT*

## 2019-09-18 PROCEDURE — 80048 BASIC METABOLIC PNL TOTAL CA: CPT

## 2019-09-18 PROCEDURE — 97535 SELF CARE MNGMENT TRAINING: CPT

## 2019-09-18 PROCEDURE — 36415 COLL VENOUS BLD VENIPUNCTURE: CPT

## 2019-09-18 PROCEDURE — 700102 HCHG RX REV CODE 250 W/ 637 OVERRIDE(OP): Performed by: PHYSICAL MEDICINE & REHABILITATION

## 2019-09-18 PROCEDURE — 94760 N-INVAS EAR/PLS OXIMETRY 1: CPT

## 2019-09-18 PROCEDURE — 700102 HCHG RX REV CODE 250 W/ 637 OVERRIDE(OP): Performed by: HOSPITALIST

## 2019-09-18 PROCEDURE — A9270 NON-COVERED ITEM OR SERVICE: HCPCS | Performed by: HOSPITALIST

## 2019-09-18 PROCEDURE — 97112 NEUROMUSCULAR REEDUCATION: CPT

## 2019-09-18 PROCEDURE — 97530 THERAPEUTIC ACTIVITIES: CPT

## 2019-09-18 RX ORDER — MIDODRINE HYDROCHLORIDE 2.5 MG/1
2.5 TABLET ORAL
Status: DISCONTINUED | OUTPATIENT
Start: 2019-09-18 | End: 2019-09-20

## 2019-09-18 RX ORDER — MIDODRINE HYDROCHLORIDE 2.5 MG/1
2.5 TABLET ORAL 3 TIMES DAILY
Status: DISCONTINUED | OUTPATIENT
Start: 2019-09-18 | End: 2019-09-18

## 2019-09-18 RX ADMIN — BUDESONIDE AND FORMOTEROL FUMARATE DIHYDRATE 2 PUFF: 160; 4.5 AEROSOL RESPIRATORY (INHALATION) at 08:58

## 2019-09-18 RX ADMIN — MIDODRINE HYDROCHLORIDE 2.5 MG: 2.5 TABLET ORAL at 11:16

## 2019-09-18 RX ADMIN — ATORVASTATIN CALCIUM 80 MG: 40 TABLET, FILM COATED ORAL at 21:20

## 2019-09-18 RX ADMIN — SENNOSIDES,DOCUSATE SODIUM 2 TABLET: 8.6; 5 TABLET, FILM COATED ORAL at 08:22

## 2019-09-18 RX ADMIN — BUDESONIDE AND FORMOTEROL FUMARATE DIHYDRATE 2 PUFF: 160; 4.5 AEROSOL RESPIRATORY (INHALATION) at 21:20

## 2019-09-18 RX ADMIN — APIXABAN 5 MG: 5 TABLET, FILM COATED ORAL at 08:22

## 2019-09-18 RX ADMIN — MIDODRINE HYDROCHLORIDE 2.5 MG: 2.5 TABLET ORAL at 17:25

## 2019-09-18 RX ADMIN — MIDODRINE HYDROCHLORIDE 2.5 MG: 2.5 TABLET ORAL at 05:26

## 2019-09-18 RX ADMIN — APIXABAN 5 MG: 5 TABLET, FILM COATED ORAL at 21:20

## 2019-09-18 NOTE — CARE PLAN
Problem: Functional Transfers  Goal: STG-Within one week, patient will transfer to tub/shower  Description  1) Individualized Goal: to simulate home set up with min A-supv and DME prn.  2) Interventions: OT Group Therapy, OT Self Care/ADL, OT Cognitive Skill Dev, OT Community Reintegration, OT Manual Ther Technique, OT Neuro Re-Ed/Balance, OT Sensory Int Techniques, OT Therapeutic Activity, OT Evaluation and OT Therapeutic Exercise     Outcome: PROGRESSING AS EXPECTED  Note:   To be addressed in upcoming session.      Problem: Toileting  Goal: STG-Within one week, patient will complete toileting tasks  Description  1) Individualized Goal:  Mod assist   2) Interventions:  OT Group Therapy, OT Self Care/ADL, OT Cognitive Skill Dev, OT Community Reintegration, OT Manual Ther Technique, OT Neuro Re-Ed/Balance, OT Sensory Int Techniques, OT Therapeutic Activity, OT Evaluation and OT Therapeutic Exercise     Outcome: MET  Note:   Close SBA-min A.     Problem: Functional Transfers  Goal: STG-Within one week, patient will transfer to toilet  Description  1) Individualized Goal:  Min assist   2) Interventions:  OT Group Therapy, OT Self Care/ADL, OT Cognitive Skill Dev, OT Community Reintegration, OT Manual Ther Technique, OT Neuro Re-Ed/Balance, OT Sensory Int Techniques, OT Therapeutic Activity, OT Evaluation and OT Therapeutic Exercise     Outcome: MET  Note:   Supervision to min A from w/c level.      Problem: Bathing  Goal: STG-Within one week, patient will bathe  Description  1) Individualized Goal: supervision with AE/DME prn.  2) Interventions: OT Group Therapy, OT Self Care/ADL, OT Cognitive Skill Dev, OT Community Reintegration, OT Manual Ther Technique, OT Neuro Re-Ed/Balance, OT Sensory Int Techniques, OT Therapeutic Activity, OT Evaluation and OT Therapeutic Exercise     Outcome: MET

## 2019-09-18 NOTE — THERAPY
"Occupational Therapy  Daily Treatment     Patient Name: Nohelia Dickerson  Age:  74 y.o., Sex:  female  Medical Record #: [id number]  Today's Date: 9/18/2019     Precautions  Precautions: Fall Risk  Comments: posterior/L lateral lean, L rafael, monitor BP    Safety   ADL Safety : (P) Requires Supervision for Safety, Requires Physical Assist for Safety, Requires Cueing for Safety  Bathroom Safety: (P) Requires Supervision for Safety, Requires Physical Assist for Safety, Requires Cuing for Safety  Comments: (P) see FIMs for toileting and toilet transfer.    Subjective    \"I'm a little dizzy but I want to try to exercise\"     Objective       09/18/19 1031   Precautions   Precautions Fall Risk   Comments posterior/L lateral lean, L rafael, monitor BP   Safety    ADL Safety  Requires Supervision for Safety;Requires Physical Assist for Safety;Requires Cueing for Safety   Bathroom Safety Requires Supervision for Safety;Requires Physical Assist for Safety;Requires Cuing for Safety   Comments see FIMs for toileting and toilet transfer.   Vitals   Pulse 95   Patient BP Position Supine;Sitting   Blood Pressure  112/73  (100/64 (sitting), 107/70 (sitting))   Pulse Oximetry 94 %   O2 Delivery None (Room Air)   Vitals Comments BP monitored throughout session d/t orthostaic hypotension during PT session earlier this AM.   Neuro-Muscular Treatments   Neuro-Muscular Treatments Biofeedback;Vibration;Tapping;Tactile Cuing;Verbal Cuing   Comments use of vibration and muscle belly tapping during BLE therex for improved muscle contraction. Pt completed therex seated in front of mirror for visual feedback.    Sitting Lower Body Exercises   Ankle Pumps 3 sets of 15;Left   Long Arc Quad 3 sets of 15;Left   Marching 3 sets of 15   Hamstring Curl 3 sets of 15;Left   Other Exercises BLE MotoMed x5 min fwd, x5 min bwd, level 5 resistance, .53miles   Bed Mobility    Supine to Sit Supervised   Scooting Supervised   Rolling Supervised   Skilled " Intervention Verbal Cuing   Interdisciplinary Plan of Care Collaboration   IDT Collaboration with  Nursing;Physical Therapist;Physician   Patient Position at End of Therapy Seated;Self Releasing Lap Belt Applied;Other (Comments)  (cafeteria for lunch)   Collaboration Comments CLOF, POC, BP   OT Total Time Spent   OT Individual Total Time Spent (Mins) 60   OT Charge Group   OT Self Care / ADL 1   OT Neuromuscular Re-education / Balance 1   OT Therapeutic Exercise  2       FIM Toiletin - Minimal Assistance  Toileting Description:  Grab bar, Increased time, Supervision for safety, Verbal cueing, Set-up of equipment    FIM Toilet Transfer Score:  4 - Minimal Assistance  Toilet Transfer Description:  Grab bar, Increased time, Supervision for safety, Verbal cueing, Set-up of equipment(w/c<>toilet SPT with GB and CGA.)      Assessment    Pt limited by c/o intermittent dizziness and symptomatic for orthostatic hypotension during earlier session with PT, but motivated to participate in seated therex. Pt's vitals monitored throughout session, as noted above. Pt completed seated BLE therex and LLE neuro re-ed with verbal/tactile/visual cues to improve quality of movement.     Plan    Tub/shower transfer training to simulate home set up (verify equipment needs with pt and family),  IADLs, balance, LLE strength/neuro re-ed, safety with ADLs and related functional mobility.

## 2019-09-18 NOTE — THERAPY
Physical Therapy   Daily Treatment     Patient Name: Nohelia Dickerson  Age:  74 y.o., Sex:  female  Medical Record #: [id number]  Today's Date: 9/18/2019     Precautions  Precautions: Fall Risk  Comments: posterior/L lateral lean, L rafael    Subjective    Pt reports she feels tired and a little dizzy this morning     Objective       09/18/19 0901   Vitals   Patient BP Position Sitting   Blood Pressure  (!) 85/55  (manual)   Bed Mobility    Sit to Supine Supervised   Sit to Stand Contact Guard Assist   Scooting Supervised   Rolling Supervised   Interdisciplinary Plan of Care Collaboration   IDT Collaboration with  Nursing;Physician   Patient Position at End of Therapy In Bed;Call Light within Reach;Tray Table within Reach   Therapy Missed   Missed Therapy (Minutes) 30   Reason For Missed Therapy Medical - Patient on Hold from Therapy   PT Total Time Spent   PT Individual Total Time Spent (Mins) 30   PT Charge Group   PT Therapeutic Activities 2     Tested BP - low - retested manually - still low see above. Discussed with RN and MD - pt to be put on hold for PT. Pt edu on pushing fluids and resting till OT session to see if BP will increase.     Assessment    Pt limited by low BP this session    Plan    Monitor vitals prn, lite gait - monitor vitals throughout,  with techs, progress to overground gait training in [facility] with/without AD, LLE and LUE neuro re-ed, pool therapy, consider AFO? Outing and family training towards DC, DC early? - discuss with pt and family

## 2019-09-18 NOTE — FLOWSHEET NOTE
09/18/19 0735   Events/Summary/Plan   Events/Summary/Plan 02 spot check.  Pt cough and congestion has improved with Symbicort   Interdisciplinary Plan of Care-Goals (Indications)   Bronchodilator Indications History / Diagnosis;Strong Subjective / Objective Improvement   Interdisciplinary Plan of Care-Outcomes    Bronchodilator Outcome Patient at Stable Baseline   Education   Education Yes - Pt. / Family has been Instructed in use of Respiratory Equipment;Yes - Pt. / Family has been Instructed in use of Respiratory Medications and Adverse Reactions   Respiratory WDL   Respiratory (WDL) X   Chest Exam   Respiration 18   Pulse 98   Oximetry   #Pulse Oximetry (Single Determination) Yes   Oxygen   Home O2 Use Prior To Admission? Yes   Home O2 LPM Flow 2 LPM   Home O2 Delivery Method Nasal Cannula   Home O2 Frequency of Use At Sleep   Pulse Oximetry 94 %   O2 Daily Delivery Respiratory  Room Air with O2 Available

## 2019-09-18 NOTE — REHAB-NURSING IDT TEAM NOTE
Nursing   Nursing  Diet/Nutrition:  Regular  Medication Administration:  Whole with Liquid Wash  % consumed at meals in last 24 hours:  Consumed % of meals per documentation.  Meal/Snack Consumption for the past 24 hrs:   Oral Nutrition   09/17/19 1900 Between % Consumed     Snack schedule:  None  Supplement:  Boost Plus  Appetite:  Fair  Fluid Intake/Output in past 24 hours: In: 220 [P.O.:220]  Out: -   Admit Weight:  Weight: 62.6 kg (138 lb)  Weight Last 7 Days   [65 kg (143 lb 4.8 oz)] 65 kg (143 lb 4.8 oz) (09/15 0633)    Pain Issues:    Location:  --  --         Severity:  Denies   Scheduled pain medications:  None     PRN pain medications used in last 24 hours:  None   Non Pharmacologic Interventions:  repositioned, rest and other:warm pack  Effectiveness of pain management plan:  good=patient states acceptable comfort after interventions    Bowel:    Bowel Assist Initial Score:  1 - Total Assistance  Bowel Assist Current Score:  5 - Standby Prompting/Supervision or Set-up  Bowl Accidents in last 7 days:  0  Last bowel movement: 09/17/19  Stool Description: Large, Brown, Formed     Usual bowel pattern:  every 2-3 days  Scheduled bowel medications:  senna-docusate (PERICOLACE or SENOKOT S)   PRN bowel medications used in last 24 hours:  None  Nursing Interventions:  Increased time, Scheduled medication, Supervision, Verbal cueing, Positioning on commode/toilet  Effectiveness of bowel program:   poor=greater than 2 days with no bowel movement  Bladder:    Bladder Assist Initial Score:  1 - Total Assistance  Bladder Assist Current Score:  5 - Standby Prompting/Supervision or Set-up  Bladder Accidents in last 7 days:  0  PVR range for past 24-48 hours: -- ()  Intermittent Catheterization:  n/a  Medications affecting bladder:  None    Time void schedule/voiding pattern:  Voiding every 2-4 hours  Interventions:  Increased time, Supervision, Verbal cueing, Brief  Effectiveness of bladder training:   Good=regular, predictable, emptying of bladder, patient initiates time voiding    Torrez:    Type:     Patient Lines/Drains/Airways Status    Active Catheter     None              Date placed:          Justification:    Diabetes:  Blood Sugar Frequency:  None    Range of BS for last 48 hours:       Scheduled diabetic medications:  None  Sliding scale usage in past 24 hours:  No  Total Short acting insulin in the past 24 hours:  None  Total Long acting insulin in the past 24 hours:  None    Wound:         Patient Lines/Drains/Airways Status    Active Current Wounds     None                   Interventions:  n/a  Wound Vac Location:  Not applicable  Dressing last changed:  Not applicable  Pump Mode Pressure Setting       Description of drainage:  Not applicable    Sleep/wake cycle:   Average hours slept:  Sleeps 4-6 hours without waking  Sleep medication usage:  None    Patient/Family Training/Education:  Diet/Nutrition, Fall Prevention, General Self Care, Safe Transfers and Safety  Discharge Supply Recommendations:  Blood Pressure Monitor  Strengths: Alert and oriented, Motivated for self care and independence and Manages pain appropriately   Barriers:   Generalized weakness and Hypotension       Nursing Problems     Problem: Bowel/Gastric:     Description:     Goal: Normal bowel function is maintained or improved     Description:           Goal: Will not experience complications related to bowel motility     Description:                 Problem: Communication     Description:     Goal: The ability to communicate needs accurately and effectively will improve     Description:                 Problem: Discharge Barriers/Planning     Description:     Goal: Patient's continuum of care needs will be met     Description:                 Problem: Infection     Description:     Goal: Will remain free from infection     Description:                 Problem: Knowledge Deficit     Description:     Goal: Knowledge of disease  process/condition, treatment plan, diagnostic tests, and medications will improve     Description:           Goal: Knowledge of the prescribed therapeutic regimen will improve     Description:                 Problem: Pain Management     Description:     Goal: Pain level will decrease to patient's comfort goal     Description:                 Problem: Respiratory:     Description:     Goal: Respiratory status will improve     Description:                 Problem: Safety     Description:     Goal: Will remain free from injury     Description:           Goal: Will remain free from falls     Description:                 Problem: Skin Integrity     Description:     Goal: Risk for impaired skin integrity will decrease     Description:                 Problem: Urinary Elimination:     Description:     Goal: Ability to reestablish a normal urinary elimination pattern will improve     Description:                 Problem: Venous Thromboembolism (VTW)/Deep Vein Thrombosis (DVT) Prevention:     Description:     Goal: Patient will participate in Venous Thrombosis (VTE)/Deep Vein Thrombosis (DVT)Prevention Measures     Description:                        Long Term Goals:   At discharge patient will be able to function safely at home and in the community with support.    Section completed by:  Maia Marie R.N.

## 2019-09-18 NOTE — REHAB-RESPIRATORY IDT TEAM NOTE
Respiratory Therapy   Respiratory Therapy    Pt is back to her home regimen of 2 lpm NOCs with oxygen for the last few days.  The symbicort has helped with her cough and congestion.  Section completed by:  Samreen Gardner, RRT

## 2019-09-18 NOTE — PROGRESS NOTES
Rehab Progress Note     Date of Service: 9/18/2019  Chief Complaint: Follow-up stroke    Interval Events (Subjective)    Patient seen and examined in her room today. She has no new complaints. She would like to move up her discharge date. She is eating well, sleeping well, denies any pain, no issues with her bowel or bladder.    Later patient seen again after her was hypotensive with PT, 85 SBP sitting and feeling dizzy. Therapy on hold. Dr. Webster managing her orthostasis.    Objective:  VITAL SIGNS: /76   Pulse 98   Temp 36.6 °C (97.8 °F) (Oral)   Resp 18   Ht 1.829 m (6')   Wt 65 kg (143 lb 4.8 oz)   SpO2 94%   BMI 19.43 kg/m²   Gen: alert, no apparent distress  CV: regular rate and rhythm, no murmurs, no peripheral edema  Resp: clear to ascultation bilaterally, normal respiratory effort  GI: soft, non-tender abdomen, bowel sounds present  Neuro: notable for left ankle weakness    Recent Results (from the past 72 hour(s))   CBC WITHOUT DIFFERENTIAL    Collection Time: 09/18/19  5:43 AM   Result Value Ref Range    WBC 8.3 4.8 - 10.8 K/uL    RBC 4.68 4.20 - 5.40 M/uL    Hemoglobin 13.4 12.0 - 16.0 g/dL    Hematocrit 41.8 37.0 - 47.0 %    MCV 89.3 81.4 - 97.8 fL    MCH 28.6 27.0 - 33.0 pg    MCHC 32.1 (L) 33.6 - 35.0 g/dL    RDW 43.7 35.9 - 50.0 fL    Platelet Count 187 164 - 446 K/uL    MPV 10.4 9.0 - 12.9 fL   Basic Metabolic Panel    Collection Time: 09/18/19  5:43 AM   Result Value Ref Range    Sodium 139 135 - 145 mmol/L    Potassium 4.1 3.6 - 5.5 mmol/L    Chloride 105 96 - 112 mmol/L    Co2 25 20 - 33 mmol/L    Glucose 98 65 - 99 mg/dL    Bun 18 8 - 22 mg/dL    Creatinine 0.66 0.50 - 1.40 mg/dL    Calcium 9.0 8.5 - 10.5 mg/dL    Anion Gap 9.0 0.0 - 11.9   ESTIMATED GFR    Collection Time: 09/18/19  5:43 AM   Result Value Ref Range    GFR If African American >60 >60 mL/min/1.73 m 2    GFR If Non African American >60 >60 mL/min/1.73 m 2       Current Facility-Administered Medications   Medication  Frequency   • budesonide-formoterol (SYMBICORT) 160-4.5 MCG/ACT inhaler 2 Puff BID   • midodrine (PROAMATINE) tablet 2.5 mg BID   • oxyCODONE immediate-release (ROXICODONE) tablet 5 mg Q4HRS PRN   • Respiratory Care per Protocol Continuous RT   • acetaminophen (TYLENOL) tablet 650 mg Q4HRS PRN   • hydrALAZINE (APRESOLINE) tablet 25 mg Q8HRS PRN   • senna-docusate (PERICOLACE or SENOKOT S) 8.6-50 MG per tablet 2 Tab BID    And   • polyethylene glycol/lytes (MIRALAX) PACKET 1 Packet QDAY PRN    And   • magnesium hydroxide (MILK OF MAGNESIA) suspension 30 mL QDAY PRN    And   • bisacodyl (DULCOLAX) suppository 10 mg QDAY PRN   • artificial tears ophthalmic solution 1 Drop PRN   • benzocaine-menthol (CEPACOL) lozenge 1 Lozenge Q2HRS PRN   • mag hydrox-al hydrox-simeth (MAALOX PLUS ES or MYLANTA DS) suspension 20 mL Q2HRS PRN   • ondansetron (ZOFRAN ODT) dispertab 4 mg 4X/DAY PRN    Or   • ondansetron (ZOFRAN) syringe/vial injection 4 mg 4X/DAY PRN   • traZODone (DESYREL) tablet 50 mg QHS PRN   • sodium chloride (OCEAN) 0.65 % nasal spray 2 Spray PRN   • melatonin tablet 3 mg HS PRN   • atorvastatin (LIPITOR) tablet 80 mg Q EVENING   • apixaban (ELIQUIS) tablet 5 mg BID   • albuterol inhaler 2 Puff Q4HRS PRN       Orders Placed This Encounter   Procedures   • Diet Order Regular     Standing Status:   Standing     Number of Occurrences:   1     Order Specific Question:   Diet:     Answer:   Regular [1]       Assessment:  Active Hospital Problems    Diagnosis   • *CVA (cerebral vascular accident) (HCC)   • Orthostatic hypotension   • Azotemia   • Tachycardia   • PVD (peripheral vascular disease) (HCC)   • Arterial occlusion, lower extremity (HCC)   • COPD   • Chronic respiratory failure (HCC)   • Thrombocytopenia (HCC)   • Tobacco abuse   • Essential hypertension     This patient is a 74 y.o. female admitted for acute inpatient rehabilitation with CVA (cerebral vascular accident) (HCC).    I led and attended the weekly  conference today, and agree with the IDT conference documentation and plan of care as noted below.  Date of conference: 9/18/2019    Goals and barriers: See IDT note.    Biggest barriers: intermittent orthostasis, left ankle weakness, left inattention    Goals in next week: improved blood pressure, improved left leg strength, community outing    Therapy update 9/18/2019  Modified Independent eating  Modified Independent grooming  Supervision bathing  Modified Independent upper body dressing  Supervision lower body dressing  Supervision toileting  Supervisionbladder  Supervision bowel  Mod assist bed/chair transfer  Supervision toilet transfer  Supervision tub/shower transfer  Min assist ambulation  Total assist wheelchair propulsion  Min assist stairs  Supervision comprehension  Independent expression  Independent social interaction  Supervision problem solving  Supervision memory    Admission FIM 64 --> 79 (9/18)    CM/social support: lives with family, who works    Anticipated DC date: 9/24/2019, re-conference on Monday to determine if she's ready and how much home support she has    Home health: PT/OT/SLP/RN    Equip: FWW, possible AFO    Follow up: PCP, stroke bridge clinic      Medical Decision Making and Plan:    Right KIM and MCA ischemic stroke  Likely cardio-embolic  Continue full rehab program  PT/OT/SLP, 1 hr each discipline, 5 days per week  9/16: decrease OT 30 min, increase PT 90 min  Continue Eliquis and statin for secondary stroke prophylaxis  Follow up with stroke bridge clinic, needs monitor?     Possible NPH  Follow up stroke bridge clinic    Bowel  Meds as needed  Last BM 9/17    Bladder  Check PVRs - 43  ICP for over 400 cc  Scheduled toileting    Appreciate assistance of hospitalist with her medical co-morbidities:     Hypertension, resolved  Orthostatic hypotension, continues, dose of Midodrine increased today  Tachycardia, resolved  Peripheral vascular disease  Tobacco use, did tobacco  counseling with patient  COPD, on oxygen at night - home level    DVT prophylaxis  Eliquis     Total time:  40 minutes.  I spent greater than 50% of the time for patient care, counseling, and coordination on this date, including patient face-to face time, unit/floor time with review of records/pertinent lab data and studies, as well as discussing diagnostic evaluation/work up, planned therapeutic interventions, and future disposition of care, as per the interval events/subjective and the assessment and plan as noted above.    Kayla Francisco M.D.   Physical Medicine and Rehabilitation

## 2019-09-18 NOTE — CARE PLAN
Problem: Safety  Goal: Will remain free from injury  Outcome: PROGRESSING AS EXPECTED  Note:   Call light with in reach. Redirection to use call light for assistance. Non skid socks. Upper siderails up x2 while in bed. No complains of pain. O2 n/c in place at 2lpm. Able to make needs known. Assisted as needed. Will continue to monitor.

## 2019-09-18 NOTE — REHAB-COLLABORATIVE ONGOING IDT TEAM NOTE
Weekly Interdisciplinary Team Conference Note    Weekly Interdisciplinary Team Conference # 2  Date:  9/18/2019    Clinicians present and reporting at team conference include the following:   MD: Kayla Francisco MD   RN:  Harvinder Huerta RN    PT:   Jayro Loving, PT, DPT  OT:  Rosalba Luna MS OTR/L   ST:  Belinda Tovar MS, CCC-SLP  CM:  Zee Gomes RN [facility]  REC:  Not Applicable  RT:  Samreen Gardner, RRT  RPh:  Jayesh Hylton [facility]  Other:   None  All reporting clinicians have a working knowledge of this patient's plan of care.    Targeted DC Date:  9/24/2019     Medical    Patient Active Problem List    Diagnosis Date Noted   • Orthostatic hypotension 09/09/2019   • Azotemia 09/07/2019   • Tachycardia 09/06/2019   • CVA (cerebral vascular accident) (Formerly [facility]) 09/04/2019   • PVD (peripheral vascular disease) (Formerly [facility]) 09/03/2019   • Arterial occlusion, lower extremity (Formerly [facility]) 09/03/2019   • Viral URI with cough 04/18/2019   • History of nephrolithiasis 08/24/2018   • Hydronephrosis 08/07/2018   • COPD 08/07/2018   • Chronic respiratory failure (Formerly [facility]) 08/07/2018   • Thrombocytopenia (Formerly [facility]) 08/06/2018   • Healthcare maintenance 05/22/2018   • Tobacco abuse 05/22/2018   • Essential hypertension 05/22/2018     Results     Procedure Component Value Ref Range Date/Time    ESTIMATED GFR [[id number]] Collected:  09/18/19 0543    Order Status:  Completed Lab Status:  Final result Updated:  09/18/19 0701     GFR If African American >60 >60 mL/min/1.73 m 2      GFR If Non African American >60 >60 mL/min/1.73 m 2     Basic Metabolic Panel [[id number]] Collected:  09/18/19 0543    Order Status:  Completed Lab Status:  Final result Updated:  09/18/19 0700    Specimen:  Blood      Sodium 139 135 - 145 mmol/L      Potassium 4.1 3.6 - 5.5 mmol/L      Chloride 105 96 - 112 mmol/L      Co2 25 20 - 33 mmol/L      Glucose 98 65 - 99 mg/dL      Bun 18 8 - 22 mg/dL      Creatinine 0.66 0.50 - 1.40 mg/dL      Calcium 9.0 8.5 - 10.5 mg/dL      Anion Gap  9.0 0.0 - 11.9     CBC WITHOUT DIFFERENTIAL [[id number]]  (Abnormal) Collected:  09/18/19 0543    Order Status:  Completed Lab Status:  Final result Updated:  09/18/19 0633    Specimen:  Blood      WBC 8.3 4.8 - 10.8 K/uL      RBC 4.68 4.20 - 5.40 M/uL      Hemoglobin 13.4 12.0 - 16.0 g/dL      Hematocrit 41.8 37.0 - 47.0 %      MCV 89.3 81.4 - 97.8 fL      MCH 28.6 27.0 - 33.0 pg      MCHC 32.1 33.6 - 35.0 g/dL      RDW 43.7 35.9 - 50.0 fL      Platelet Count 187 164 - 446 K/uL      MPV 10.4 9.0 - 12.9 fL         Nursing  Diet/Nutrition:  Regular  Medication Administration:  Whole with Liquid Wash  % consumed at meals in last 24 hours:  Consumed % of meals per documentation.  Meal/Snack Consumption for the past 24 hrs:   Oral Nutrition   09/17/19 1900 Between % Consumed     Snack schedule:  None  Supplement:  Boost Plus  Appetite:  Fair  Fluid Intake/Output in past 24 hours: In: 220 [P.O.:220]  Out: -   Admit Weight:  Weight: 62.6 kg (138 lb)  Weight Last 7 Days   [65 kg (143 lb 4.8 oz)] 65 kg (143 lb 4.8 oz) (09/15 0633)    Pain Issues:    Location:  --  --         Severity:  Denies   Scheduled pain medications:  None     PRN pain medications used in last 24 hours:  None   Non Pharmacologic Interventions:  repositioned, rest and other:warm pack  Effectiveness of pain management plan:  good=patient states acceptable comfort after interventions    Bowel:    Bowel Assist Initial Score:  1 - Total Assistance  Bowel Assist Current Score:  5 - Standby Prompting/Supervision or Set-up  Bowl Accidents in last 7 days:  0  Last bowel movement: 09/17/19  Stool Description: Large, Brown, Formed     Usual bowel pattern:  every 2-3 days  Scheduled bowel medications:  senna-docusate (PERICOLACE or SENOKOT S)   PRN bowel medications used in last 24 hours:  None  Nursing Interventions:  Increased time, Scheduled medication, Supervision, Verbal cueing, Positioning on commode/toilet  Effectiveness of bowel program:    poor=greater than 2 days with no bowel movement  Bladder:    Bladder Assist Initial Score:  1 - Total Assistance  Bladder Assist Current Score:  5 - Standby Prompting/Supervision or Set-up  Bladder Accidents in last 7 days:  0  PVR range for past 24-48 hours: -- ()  Intermittent Catheterization:  n/a  Medications affecting bladder:  None    Time void schedule/voiding pattern:  Voiding every 2-4 hours  Interventions:  Increased time, Supervision, Verbal cueing, Brief  Effectiveness of bladder training:  Good=regular, predictable, emptying of bladder, patient initiates time voiding    Torrez:    Type:     Patient Lines/Drains/Airways Status    Active Catheter     None              Date placed:          Justification:    Diabetes:  Blood Sugar Frequency:  None    Range of BS for last 48 hours:       Scheduled diabetic medications:  None  Sliding scale usage in past 24 hours:  No  Total Short acting insulin in the past 24 hours:  None  Total Long acting insulin in the past 24 hours:  None    Wound:         Patient Lines/Drains/Airways Status    Active Current Wounds     None                   Interventions:  n/a  Wound Vac Location:  Not applicable  Dressing last changed:  Not applicable  Pump Mode Pressure Setting       Description of drainage:  Not applicable    Sleep/wake cycle:   Average hours slept:  Sleeps 4-6 hours without waking  Sleep medication usage:  None    Patient/Family Training/Education:  Diet/Nutrition, Fall Prevention, General Self Care, Safe Transfers and Safety  Discharge Supply Recommendations:  Blood Pressure Monitor  Strengths: Alert and oriented, Motivated for self care and independence and Manages pain appropriately   Barriers:   Generalized weakness and Hypotension       Nursing Problems     Problem: Bowel/Gastric:     Description:     Goal: Normal bowel function is maintained or improved     Description:           Goal: Will not experience complications related to bowel motility      Description:                 Problem: Communication     Description:     Goal: The ability to communicate needs accurately and effectively will improve     Description:                 Problem: Discharge Barriers/Planning     Description:     Goal: Patient's continuum of care needs will be met     Description:                 Problem: Infection     Description:     Goal: Will remain free from infection     Description:                 Problem: Knowledge Deficit     Description:     Goal: Knowledge of disease process/condition, treatment plan, diagnostic tests, and medications will improve     Description:           Goal: Knowledge of the prescribed therapeutic regimen will improve     Description:                 Problem: Pain Management     Description:     Goal: Pain level will decrease to patient's comfort goal     Description:                 Problem: Respiratory:     Description:     Goal: Respiratory status will improve     Description:                 Problem: Safety     Description:     Goal: Will remain free from injury     Description:           Goal: Will remain free from falls     Description:                 Problem: Skin Integrity     Description:     Goal: Risk for impaired skin integrity will decrease     Description:                 Problem: Urinary Elimination:     Description:     Goal: Ability to reestablish a normal urinary elimination pattern will improve     Description:                 Problem: Venous Thromboembolism (VTW)/Deep Vein Thrombosis (DVT) Prevention:     Description:     Goal: Patient will participate in Venous Thrombosis (VTE)/Deep Vein Thrombosis (DVT)Prevention Measures     Description:                        Long Term Goals:   At discharge patient will be able to function safely at home and in the community with support.    Section completed by:  Maia Marie R.N.        Respiratory Therapy    Pt is back to her home regimen of 2 lpm NOCs with oxygen for the last few days.   The symbicort has helped with her cough and congestion.  Section completed by:  Samreen Gardner, RRT    Mobility  Bed mobility:   SPV  Bed /Chair/Wheelchair Transfer Initial:  2 - Max Assistance  Bed /Chair/Wheelchair Transfer Current:  3 - Moderate Assistance   Bed/Chair/Wheelchair Transfer Description:  Supervision for safety, Verbal cueing, Assist with two limbs(Mod A for assist with 2 limbs for sit to supine)  Walk Initial:  1 - Total Assistance  Walk Current:  4 - Minimal Assistance   Walk Description:  Verbal cueing, Safety concerns, Requires incidental assist, Extra time, Walker(3 x 250 ft, CGA - min A for steadying, FWW, more assist with fatigue)  Wheelchair Initial:  1 - Total Assistance  Wheelchair Current:  1 - Total Assistance   Wheelchair Description:  (min A x 10 ft / pt struggles with LUE  / coordination)  Stairs Initial:  0 - Not tested,unsafe activity  Stairs Current: 4 - Minimal Assistance   Stairs Description: Ascends/descends 12 to 14 steps, Verbal cueing, Extra time, Safety concerns, Requires incidental assist, Hand rails  Patient/Family Training/Education:  Ongoing, family training scheduled  DME/DC Recommendations:  FWW, AFO? Home health vs OP PT,   Strengths:  Able to follow instructions, Good carryover of learning, Independent PLOF, Making steady progress towards goals, Motivated for self care and independence, Pleasant and cooperative and Supportive family  Barriers:   Hemiplegia, Limited mobility, Poor balance and Other: orthostatic hypotension, L inattention  # of short term goals set= 6  # of short term goals met=5  Physical Therapy Problems     Problem: Mobility     Dates: Start: 09/07/19       Description:     Goal: STG-Within one week, patient will propel wheelchair household distances     Dates: Start: 09/07/19       Description: 1) Individualized goal:  Min A with BUE support 50 ft x 2  2) Interventions: PT Group Therapy, PT E Stim Attended, PT Orthotics Training, PT Gait  Training, PT Self Care/Home Eval, PT Therapeutic Exercises, PT Neuro Re-Ed/Balance, PT Aquatic Therapy, PT Therapeutic Activity, PT Manual Therapy and PT Evaluation       Note:     Goal Note filed on 09/18/19 1537 by John Loving, PT    NT dt focus on other goals                        Problem: PT-Long Term Goals     Dates: Start: 09/07/19       Description:     Goal: LTG-By discharge, patient will tolerate standing     Dates: Start: 09/07/19       Description: 1) Individualized goal:  With BUE and SPV support 5 minutes x 2 for LE strength / balance training  2) Interventions: PT Group Therapy, PT E Stim Attended, PT Orthotics Training, PT Gait Training, PT Self Care/Home Eval, PT Therapeutic Exercises, PT Neuro Re-Ed/Balance, PT Aquatic Therapy, PT Therapeutic Activity, PT Manual Therapy and PT Evaluation             Goal: LTG-By discharge, patient will propel wheelchair     Dates: Start: 09/07/19       Description: 1) Individualized goal:  SPV with BUE support x 150 ft to/from therapies/ meals  2) Interventions: PT Group Therapy, PT E Stim Attended, PT Orthotics Training, PT Gait Training, PT Self Care/Home Eval, PT Therapeutic Exercises, PT Neuro Re-Ed/Balance, PT Aquatic Therapy, PT Therapeutic Activity, PT Manual Therapy and PT Evaluation             Goal: LTG-By discharge, patient will ambulate     Dates: Start: 09/07/19       Description: 1) Individualized goal:  SBA with FWW x 150 ft  2) Interventions: PT Group Therapy, PT E Stim Attended, PT Orthotics Training, PT Gait Training, PT Self Care/Home Eval, PT Therapeutic Exercises, PT Neuro Re-Ed/Balance, PT Aquatic Therapy, PT Therapeutic Activity, PT Manual Therapy and PT Evaluation             Goal: LTG-By discharge, patient will transfer one surface to another     Dates: Start: 09/07/19       Description: 1) Individualized goal:  Modified independent with FWW  2) Interventions: PT Group Therapy, PT E Stim Attended, PT Orthotics Training, PT Gait  Training, PT Self Care/Home Eval, PT Therapeutic Exercises, PT Neuro Re-Ed/Balance, PT Aquatic Therapy, PT Therapeutic Activity, PT Manual Therapy and PT Evaluation             Goal: LTG-By discharge, patient will ambulate up/down 4-6 stairs     Dates: Start: 09/07/19       Description: 1) Individualized goal:  CGA with hand rails  2) Interventions:  PT Group Therapy, PT E Stim Attended, PT Orthotics Training, PT Gait Training, PT Self Care/Home Eval, PT Therapeutic Exercises, PT Neuro Re-Ed/Balance, PT Aquatic Therapy, PT Therapeutic Activity, PT Manual Therapy and PT Evaluation             Goal: LTG-By discharge, patient will transfer in/out of a car     Dates: Start: 09/07/19       Description: 1) Individualized goal:  SBA with FWW  2) Interventions:  PT Group Therapy, PT E Stim Attended, PT Orthotics Training, PT Gait Training, PT Self Care/Home Eval, PT Therapeutic Exercises, PT Neuro Re-Ed/Balance, PT Aquatic Therapy, PT Therapeutic Activity, PT Manual Therapy and PT Evaluation                           Section completed by:  John Loving, PT    Activities of Daily Living  Eating Initial:  5 - Standby Prompting/Supervision or Set-up  Eating Current:  6 - Modified Independent   Eating Description:  Increased time  Grooming Initial:  5 - Standby Prompting/Supervision or Set-up  Grooming Current:  6 - Modified Independent   Grooming Description:  Increased time  Bathing Initial:  4 - Minimal Assistance  Bathing Current:  5 - Standby Prompting/Supervision or Set-up   Bathing Description:  Grab bar, Tub bench, Hand held shower, Increased time, Supervision for safety, Verbal cueing, Set-up of equipment(close SBA/GB while standing to wash jin area/buttocks; distant supv for all steps completed seated on tub bench.)  Upper Body Dressing Initial:  5 - Standby Prompting/Supervision or Set-up  Upper Body Dressing Current:  6 - Modified Independent   Upper Body Dressing Description:  Increased time(increased time  to don/doff pullover shirt and buttondown sweater.)  Lower Body Dressing Initial:  2 - Max Assistance  Lower Body Dressing Current:  5 - Standby Prompting/Supervsion or Set-up   Lower Body Dressing Description:  5 - Standby Prompting/Supervsion or Set-up  Toileting Initial:  2 - Max Assistance  Toileting Current:  4 - Minimal Assistance   Toileting Description:  Grab bar, Increased time, Supervision for safety, Verbal cueing, Set-up of equipment  Toilet Transfer Initial:  3 - Moderate Assistance  Toilet Transfer Current:  4 - Minimal Assistance   Toilet Transfer Description:  4 - Minimal Assistance  Tub / Shower Transfer Initial:  3 - Moderate Assistance  Tub / Shower Transfer Current:  5 - Standby Prompting/Supervision or Set-up   Tub / Shower Transfer Description:  Grab bar, Increased time, Supervision for safety, Verbal cueing, Set-up of equipment(w/c<>tub bench SPT with GB and SBA.)  IADL:  TBD.   Family Training/Education:  Ongoing with pt; family not present for session thus far; education re: safety with ADLs and bathroom transfers, LLE neuro re-ed,   DME/DC Recommendations:  Home health vs outpatient OT; need to follow up with family re: bathroom DME/set up.     Strengths:  Able to follow instructions, Independent PLOF, Making steady progress towards goals, Motivated for self care and independence, Pleasant and cooperative, Supportive family and Willingly participates in therapeutic activities  Barriers:  Decreased endurance, Hemiplegia, Limited mobility, Poor activity tolerance, Poor balance, Poor carryover of learning and Other: LLE weakness, low BP     # of short term goals set= 4    # of short term goals met= 3 met, 1 not yet addressed.     Occupational Therapy Goals     Problem: Functional Transfers     Dates: Start: 09/07/19       Description:     Goal: STG-Within one week, patient will transfer to tub/shower     Dates: Start: 09/11/19       Description: 1) Individualized Goal: to simulate home set up  with min A-supv and DME prn.  2) Interventions: OT Group Therapy, OT Self Care/ADL, OT Cognitive Skill Dev, OT Community Reintegration, OT Manual Ther Technique, OT Neuro Re-Ed/Balance, OT Sensory Int Techniques, OT Therapeutic Activity, OT Evaluation and OT Therapeutic Exercise       Note:     Goal Note filed on 09/18/19 1140 by Rosalba Luna, OT    To be addressed in upcoming session.                         Problem: OT Long Term Goals     Dates: Start: 09/07/19       Description:     Goal: LTG-By discharge, patient will complete basic self care tasks     Dates: Start: 09/07/19       Description: 1) Individualized Goal:  Mod I for UB ADLs, Set-up and SBA for LB ADLs  2) Interventions:  OT Group Therapy, OT Self Care/ADL, OT Cognitive Skill Dev, OT Community Reintegration, OT Manual Ther Technique, OT Neuro Re-Ed/Balance, OT Sensory Int Techniques, OT Therapeutic Activity, OT Evaluation and OT Therapeutic Exercise             Goal: LTG-By discharge, patient will perform bathroom transfers     Dates: Start: 09/07/19       Description: 1) Individualized Goal:  SBA  2) Interventions:  OT Group Therapy, OT Self Care/ADL, OT Cognitive Skill Dev, OT Community Reintegration, OT Manual Ther Technique, OT Neuro Re-Ed/Balance, OT Sensory Int Techniques, OT Therapeutic Activity, OT Evaluation and OT Therapeutic Exercise             Goal: LTG-By discharge, patient will complete basic home management     Dates: Start: 09/07/19       Description: 1) Individualized Goal: Supervision  2) Interventions:  OT Group Therapy, OT Self Care/ADL, OT Cognitive Skill Dev, OT Community Reintegration, OT Manual Ther Technique, OT Neuro Re-Ed/Balance, OT Sensory Int Techniques, OT Therapeutic Activity, OT Evaluation and OT Therapeutic Exercise                         Section completed by:  Rosalba Luna, OT    Cognitive Linquistic Functions  Comprehension Initial:  6 - Modified Independent  Comprehension Current:  5 - Stand-by  Prompting/Supervision or Set-up   Comprehension Description:  Verbal cues  Expression Initial:  6 - Modified Independent  Expression Current:  7 - Independent   Expression Description:  Verbal cueing  Social Interaction Initial:  7 - Independent  Social Interaction Current:  7 - Independent   Social Interaction Description:  Increased time  Problem Solving Initial:  5 - Standby Prompting/Supervision or Set-up  Problem Solving Current:  5 - Standby Prompting/Supervision or Set-up   Problem Solving Description:  Verbal cueing, Therapy schedule, Bed/chair alarm, Increased time  Memory Initial:  5 - Standby Prompting/Supervision or Set-up  Memory Current:  5 - Standby Prompting/Supervision or Set-up   Memory Description:  Verbal cueing, Therapy schedule, Seat belt, Bed/chair alarm  Executive Functioning / Organization Initial:     Executive Functioning / Organization Current:      Executive Functioning Desciption:  SPV for med management  Swallowing  Swallowing Status:  WFL  Orders Placed This Encounter   Procedures   • Diet Order Regular     Standing Status:   Standing     Number of Occurrences:   1     Order Specific Question:   Diet:     Answer:   Regular [1]     Behavior Modification Plan  Keep the environment simple to avoid over stimulatiom/agitation, Keep instructions simple/concrete, Give clear feedback, Set clear goals and Reinforce participation in desired tasks  Assistive Technology  Low tech: Calendar, planner, schedule, alarms/timers, pill organizer, post-it notes, lists  Family Training/Education:  Not yet completed  DC Recommendations:   HH vs OP speech therapy  Strengths:  Able to follow instructions, Good carryover of learning, Good insight into deficits/needs, Independent PLOF, Making steady progress towards goals, Pleasant and cooperative and Willingly participates in therapeutic activities  Barriers:  Other: high level attention and executive function  # of short term goals set=2  # of short term  goals met=0  Speech Therapy Problems     Problem: Problem Solving STGs     Dates: Start: 09/07/19       Description:     Goal: STG-Within one week, patient will     Dates: Start: 09/07/19       Description: 1) Individualized goal:  Complete medication, and financial management tasks with 90% accuracy given min verbal cues.    2) Interventions:  SLP Cognitive Skill Development and SLP Group Treatment       Note:     Goal Note filed on 09/18/19 0939 by Belinda Tovar MS,CCC-SLP    80% for med management tasks                  Goal: STG-Within one week, patient will     Dates: Start: 09/07/19       Description: 1) Individualized goal:  Complete executive function tasks related to scheduling and reasoning with 90% accuracy given min verbal cues.   2) Interventions:  SLP Cognitive Skill Development and SLP Group Treatment                   Problem: Speech/Swallowing LTGs     Dates: Start: 09/07/19       Description:     Goal: LTG-By discharge, patient will solve complex problem     Dates: Start: 09/07/19       Description: 1) Individualized goal:  Mod I for safe discharge home.   2) Interventions:  SLP Aphasia Evaluation, SLP Cognitive Skill Development and SLP Group Treatment                          Section completed by:  Belinda Tovar MS,CCC-SLP       Nutrition  Dietary Problems (Active)      There are no active problems.            Nutrition services: Day 5 of admit.  Nohelia Dickerson is a 74 y.o. female with admitting DX of (l body involvement (r) brain, Left leg numbness, Arterial occlusion, lower extremity, Stroke (cerebrum). Hx includes thyroid cancer, COPD, HTN.    Consult received for MST score of 4 due to report of weight loss x 6 months and poor PO PTA.      Assessment:  Height: 182.9 cm (6')  Weight: 63 kg (139 lb)  Body mass index is 18.85 kg/m²., BMI classification: low end of the normal range. Based on pt's age, BMI 23-27 would be beneficial  Diet/Intake: regular/avg intake of 65%.  "    Evaluation:   1. Pt reports that her UBW is 67.3 kg (148#). Pt thinks she has lost weight during her recent acute hospitalization due to eating healthier foods rather than \"junk foods\". Of note, pt's weight at acute was 62.9 kg on 9/5/19 and 64.7 kg on 9/6/19. Pt's weight ~13 months ago on 8/7/18 was 69.8 kg. Weight loss noted of 6.8 kg (9.7%) x 13 months. Weight loss is not significant. Recorded PO intake has been good here and was also good at acute with intake % of most recorded meals (5 out of 6). Pt said that dietary is working with her on her menu choices.     Malnutrition Risk: Criteria not met.     Recommendations/Plan:  1. Continue with regular diet as ordered.   2. Encourage continued intake of >50%  3. Document intake of all meals as % taken in ADL's to provide interdisciplinary communication across all shifts.   4. Monitor weight.  5. Nutrition rep will continue to see patient for ongoing meal and snack preferences.   6. RD will monitor weekly.              Section completed by:  Anne Marie West R.D.    REHAB-Pharmacy IDT Team Note by Darren Sanchez RPH at 9/17/2019 11:44 AM  Version 1 of 1    Author:  Darren Sanchez RPH Service:  -- Author Type:  Pharmacist    Filed:  9/17/2019 11:46 AM Date of Service:  9/17/2019 11:44 AM Status:  Signed    :  Darren Sanchez RPH (Pharmacist)         Pharmacy   Pharmacy  Antibiotics/Antifungals/Antivirals:  Medication:      Active Orders (From admission, onward)    None        Route:        NA  Stop Date:  NA  Reason:      NA  Antihypertensives/Cardiac:  Medication:    Orders (72h ago, onward)     Start     Ordered    09/12/19 1300  midodrine (PROAMATINE) tablet 2.5 mg  2 TIMES DAILY      09/12/19 0910    09/06/19 2100  atorvastatin (LIPITOR) tablet 80 mg  EVERY EVENING      09/06/19 1251    09/06/19 1251  hydrALAZINE (APRESOLINE) tablet 25 mg  EVERY 8 HOURS PRN      09/06/19 1251              Patient Vitals for the past 24 hrs:   BP Pulse "   09/17/19 1009 -- 98   09/17/19 0840 114/73 100   09/16/19 1820 119/67 87   09/16/19 1600 132/86 66     Anticoagulation:  Medication: Eliquis    Other key medications: A review of the medication list reveals no issues at this time.    Section completed by: Darren Sanchez [facility][[email or web address]]     Attribution Key     AW.1 - Darren Sanchez [facility] on 9/17/2019 11:44 AM                  DC Planning  DC destination/dispostion:  To single level unit attached to patient's daughter's and son-in-law's home. There are 2 grandchildren living there too, ages 11 and 17. 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     Referrals:  None at this time.    DC Needs: Anticipate HH. DME TBD. Has SPC. MD f/u appointments - PCP, Dr. Wren for PAD.      Barriers to discharge:  Functional deficits. PAD.      Strengths: Motivated. PLOF - independent. . Supportive family.     Section completed by:  Zee Gomes R.N.    Summary from Meeting: To determine level of support patient will have at home. Son-in-law to come in for family training Friday at 0900. Needs Life Alert.   Needs tub bench. Community outing to be planned by therapies.    Physician Summary  Kayla Francisco MD participated and led team conference discussion.

## 2019-09-18 NOTE — THERAPY
Speech Language Pathology  Daily Treatment     Patient Name: Nohelia Dickerson  Age:  74 y.o., Sex:  female  Medical Record #: [id number]  Today's Date: 9/18/2019     Subjective    Patient was in room at time of ST. C/o dizziness and low blood pressure earlier today. B/P taken 92/64, RN aware.      Objective       09/18/19 1232   Cognition   Medication Management  Minimal (4)   SLP Total Time Spent   SLP Individual Total Time Spent (Mins) 60   Charge Group   SLP Cognitive Skill Development 4       FIM Eating Score:     Eating Description:       FIM Comprehension Score:  5 - Stand-by Prompting/Supervision or Set-up  Comprehension Description:       FIM Expression Score:  7 - Independent  Expression Description:       FIM Social Interaction Score:  7 - Independent  Social Interaction Description:       FIM Problem Solving Score:  5 - Standby Prompting/Supervision or Set-up  Problem Solving Description:       FIM Memory Score:  5 - Standby Prompting/Supervision or Set-up  Memory Description:         Assessment    Patient completed functional medication sorting task with 75% accuracy. Patient reports dizziness as barrier to completing task. Was able to answer problem solving questions related to medication management given SPV. May benefit from supervision for med management at discharge.     Plan    continue to target functional problem solving.

## 2019-09-18 NOTE — PROGRESS NOTES
Hospital Medicine Daily Progress Note      Chief Complaint:  HTN, PVD    Interval History:  Pt reports left leg getting stronger.    Review of Systems  Review of Systems   Constitutional: Negative for chills and fever.   HENT: Negative.    Eyes: Negative.    Respiratory: Negative for cough and shortness of breath.    Cardiovascular: Negative for chest pain and palpitations.   Gastrointestinal: Negative for abdominal pain, nausea and vomiting.   Genitourinary: Negative.    Musculoskeletal: Negative.    Skin: Negative for itching and rash.   Neurological: Positive for focal weakness.        Physical Exam  Temp:  [36.4 °C (97.5 °F)-36.7 °C (98.1 °F)] 36.4 °C (97.6 °F)  Pulse:  [71-84] 71  Resp:  [18] 18  BP: (122-173)/(78-91) 122/80  SpO2:  [94 %-96 %] 94 %    Physical Exam   Constitutional: She is oriented to person, place, and time. No distress.   HENT:   Head: Normocephalic and atraumatic.   Right Ear: External ear normal.   Left Ear: External ear normal.   Eyes: Conjunctivae and EOM are normal. Right eye exhibits no discharge. Left eye exhibits no discharge.   Neck: Normal range of motion. Neck supple. No tracheal deviation present.   Cardiovascular: Normal rate, regular rhythm, S1 normal and S2 normal.   Pulmonary/Chest: No stridor. No respiratory distress. She has no wheezes.   Decreased BS   Abdominal: Soft. Bowel sounds are normal. She exhibits no distension. There is no tenderness.   Musculoskeletal: She exhibits no edema.   Neurological: She is alert and oriented to person, place, and time. No sensory deficit.   Skin: Skin is warm and dry. She is not diaphoretic. No cyanosis.   Vitals reviewed.      Fluids    Intake/Output Summary (Last 24 hours) at 9/21/2019 1621  Last data filed at 9/21/2019 1208  Gross per 24 hour   Intake 1020 ml   Output --   Net 1020 ml       Laboratory                        Assessment/Plan  * CVA (cerebral vascular accident) (HCC)- (present on admission)  Assessment & Plan  Has left  sided weakness, LLE worse than LUE  On Eliquis and Lipitor  MRI concerning for NPH, needs F/U    Azotemia  Assessment & Plan  Encourage PO fluids  Check F/U labs in am    PVD (peripheral vascular disease) ([facility])- (present on admission)  Assessment & Plan  On Eliquis and Lipitor  Outpt F/U w/ Dr. Wren    Thrombocytopenia ([facility])- (present on admission)  Assessment & Plan  Follow Platelet counts on anticoagulation  Check F/U labs in am    Essential hypertension- (present on admission)  Assessment & Plan  Now off Lisinopril  On Midodrine for low blood pressures    Full Code

## 2019-09-18 NOTE — REHAB-PT IDT TEAM NOTE
Physical Therapy   Mobility  Bed mobility:   SPV  Bed /Chair/Wheelchair Transfer Initial:  2 - Max Assistance  Bed /Chair/Wheelchair Transfer Current:  3 - Moderate Assistance   Bed/Chair/Wheelchair Transfer Description:  Supervision for safety, Verbal cueing, Assist with two limbs(Mod A for assist with 2 limbs for sit to supine)  Walk Initial:  1 - Total Assistance  Walk Current:  4 - Minimal Assistance   Walk Description:  Verbal cueing, Safety concerns, Requires incidental assist, Extra time, Walker(3 x 250 ft, CGA - min A for steadying, FWW, more assist with fatigue)  Wheelchair Initial:  1 - Total Assistance  Wheelchair Current:  1 - Total Assistance   Wheelchair Description:  (min A x 10 ft / pt struggles with LUE  / coordination)  Stairs Initial:  0 - Not tested,unsafe activity  Stairs Current: 4 - Minimal Assistance   Stairs Description: Ascends/descends 12 to 14 steps, Verbal cueing, Extra time, Safety concerns, Requires incidental assist, Hand rails  Patient/Family Training/Education:  Ongoing, family training scheduled  DME/DC Recommendations:  FWW, AFO? Home health vs OP PT,   Strengths:  Able to follow instructions, Good carryover of learning, Independent PLOF, Making steady progress towards goals, Motivated for self care and independence, Pleasant and cooperative and Supportive family  Barriers:   Hemiplegia, Limited mobility, Poor balance and Other: orthostatic hypotension, L inattention  # of short term goals set= 6  # of short term goals met=5  Physical Therapy Problems     Problem: Mobility     Dates: Start: 09/07/19       Description:     Goal: STG-Within one week, patient will propel wheelchair household distances     Dates: Start: 09/07/19       Description: 1) Individualized goal:  Min A with BUE support 50 ft x 2  2) Interventions: PT Group Therapy, PT E Stim Attended, PT Orthotics Training, PT Gait Training, PT Self Care/Home Eval, PT Therapeutic Exercises, PT Neuro Re-Ed/Balance, PT  Aquatic Therapy, PT Therapeutic Activity, PT Manual Therapy and PT Evaluation       Note:     Goal Note filed on 09/18/19 1253 by John Loving, PT    NT dt focus on other goals                        Problem: PT-Long Term Goals     Dates: Start: 09/07/19       Description:     Goal: LTG-By discharge, patient will tolerate standing     Dates: Start: 09/07/19       Description: 1) Individualized goal:  With BUE and SPV support 5 minutes x 2 for LE strength / balance training  2) Interventions: PT Group Therapy, PT E Stim Attended, PT Orthotics Training, PT Gait Training, PT Self Care/Home Eval, PT Therapeutic Exercises, PT Neuro Re-Ed/Balance, PT Aquatic Therapy, PT Therapeutic Activity, PT Manual Therapy and PT Evaluation             Goal: LTG-By discharge, patient will propel wheelchair     Dates: Start: 09/07/19       Description: 1) Individualized goal:  SPV with BUE support x 150 ft to/from therapies/ meals  2) Interventions: PT Group Therapy, PT E Stim Attended, PT Orthotics Training, PT Gait Training, PT Self Care/Home Eval, PT Therapeutic Exercises, PT Neuro Re-Ed/Balance, PT Aquatic Therapy, PT Therapeutic Activity, PT Manual Therapy and PT Evaluation             Goal: LTG-By discharge, patient will ambulate     Dates: Start: 09/07/19       Description: 1) Individualized goal:  SBA with FWW x 150 ft  2) Interventions: PT Group Therapy, PT E Stim Attended, PT Orthotics Training, PT Gait Training, PT Self Care/Home Eval, PT Therapeutic Exercises, PT Neuro Re-Ed/Balance, PT Aquatic Therapy, PT Therapeutic Activity, PT Manual Therapy and PT Evaluation             Goal: LTG-By discharge, patient will transfer one surface to another     Dates: Start: 09/07/19       Description: 1) Individualized goal:  Modified independent with FWW  2) Interventions: PT Group Therapy, PT E Stim Attended, PT Orthotics Training, PT Gait Training, PT Self Care/Home Eval, PT Therapeutic Exercises, PT Neuro Re-Ed/Balance, PT  Aquatic Therapy, PT Therapeutic Activity, PT Manual Therapy and PT Evaluation             Goal: LTG-By discharge, patient will ambulate up/down 4-6 stairs     Dates: Start: 09/07/19       Description: 1) Individualized goal:  CGA with hand rails  2) Interventions:  PT Group Therapy, PT E Stim Attended, PT Orthotics Training, PT Gait Training, PT Self Care/Home Eval, PT Therapeutic Exercises, PT Neuro Re-Ed/Balance, PT Aquatic Therapy, PT Therapeutic Activity, PT Manual Therapy and PT Evaluation             Goal: LTG-By discharge, patient will transfer in/out of a car     Dates: Start: 09/07/19       Description: 1) Individualized goal:  SBA with FWW  2) Interventions:  PT Group Therapy, PT E Stim Attended, PT Orthotics Training, PT Gait Training, PT Self Care/Home Eval, PT Therapeutic Exercises, PT Neuro Re-Ed/Balance, PT Aquatic Therapy, PT Therapeutic Activity, PT Manual Therapy and PT Evaluation                           Section completed by:  John Loving, PT

## 2019-09-18 NOTE — REHAB-OT IDT TEAM NOTE
Occupational Therapy   Activities of Daily Living  Eating Initial:  5 - Standby Prompting/Supervision or Set-up  Eating Current:  6 - Modified Independent   Eating Description:  Increased time  Grooming Initial:  5 - Standby Prompting/Supervision or Set-up  Grooming Current:  6 - Modified Independent   Grooming Description:  Increased time  Bathing Initial:  4 - Minimal Assistance  Bathing Current:  5 - Standby Prompting/Supervision or Set-up   Bathing Description:  Grab bar, Tub bench, Hand held shower, Increased time, Supervision for safety, Verbal cueing, Set-up of equipment(close SBA/GB while standing to wash jin area/buttocks; distant supv for all steps completed seated on tub bench.)  Upper Body Dressing Initial:  5 - Standby Prompting/Supervision or Set-up  Upper Body Dressing Current:  6 - Modified Independent   Upper Body Dressing Description:  Increased time(increased time to don/doff pullover shirt and buttondown sweater.)  Lower Body Dressing Initial:  2 - Max Assistance  Lower Body Dressing Current:  5 - Standby Prompting/Supervsion or Set-up   Lower Body Dressing Description:  5 - Standby Prompting/Supervsion or Set-up  Toileting Initial:  2 - Max Assistance  Toileting Current:  4 - Minimal Assistance   Toileting Description:  Grab bar, Increased time, Supervision for safety, Verbal cueing, Set-up of equipment  Toilet Transfer Initial:  3 - Moderate Assistance  Toilet Transfer Current:  4 - Minimal Assistance   Toilet Transfer Description:  4 - Minimal Assistance  Tub / Shower Transfer Initial:  3 - Moderate Assistance  Tub / Shower Transfer Current:  5 - Standby Prompting/Supervision or Set-up   Tub / Shower Transfer Description:  Grab bar, Increased time, Supervision for safety, Verbal cueing, Set-up of equipment(w/c<>tub bench SPT with GB and SBA.)  IADL:  TBD.   Family Training/Education:  Ongoing with pt; family not present for session thus far; education re: safety with ADLs and bathroom  transfers, LLE neuro re-ed,   DME/DC Recommendations:  Home health vs outpatient OT; need to follow up with family re: bathroom DME/set up.     Strengths:  Able to follow instructions, Independent PLOF, Making steady progress towards goals, Motivated for self care and independence, Pleasant and cooperative, Supportive family and Willingly participates in therapeutic activities  Barriers:  Decreased endurance, Hemiplegia, Limited mobility, Poor activity tolerance, Poor balance, Poor carryover of learning and Other: LLE weakness, low BP     # of short term goals set= 4    # of short term goals met= 3 met, 1 not yet addressed.     Occupational Therapy Goals     Problem: Functional Transfers     Dates: Start: 09/07/19       Description:     Goal: STG-Within one week, patient will transfer to tub/shower     Dates: Start: 09/11/19       Description: 1) Individualized Goal: to simulate home set up with min A-supv and DME prn.  2) Interventions: OT Group Therapy, OT Self Care/ADL, OT Cognitive Skill Dev, OT Community Reintegration, OT Manual Ther Technique, OT Neuro Re-Ed/Balance, OT Sensory Int Techniques, OT Therapeutic Activity, OT Evaluation and OT Therapeutic Exercise       Note:     Goal Note filed on 09/18/19 1140 by Rosalba Luna, OT    To be addressed in upcoming session.                         Problem: OT Long Term Goals     Dates: Start: 09/07/19       Description:     Goal: LTG-By discharge, patient will complete basic self care tasks     Dates: Start: 09/07/19       Description: 1) Individualized Goal:  Mod I for UB ADLs, Set-up and SBA for LB ADLs  2) Interventions:  OT Group Therapy, OT Self Care/ADL, OT Cognitive Skill Dev, OT Community Reintegration, OT Manual Ther Technique, OT Neuro Re-Ed/Balance, OT Sensory Int Techniques, OT Therapeutic Activity, OT Evaluation and OT Therapeutic Exercise             Goal: LTG-By discharge, patient will perform bathroom transfers     Dates: Start: 09/07/19        Description: 1) Individualized Goal:  SBA  2) Interventions:  OT Group Therapy, OT Self Care/ADL, OT Cognitive Skill Dev, OT Community Reintegration, OT Manual Ther Technique, OT Neuro Re-Ed/Balance, OT Sensory Int Techniques, OT Therapeutic Activity, OT Evaluation and OT Therapeutic Exercise             Goal: LTG-By discharge, patient will complete basic home management     Dates: Start: 09/07/19       Description: 1) Individualized Goal: Supervision  2) Interventions:  OT Group Therapy, OT Self Care/ADL, OT Cognitive Skill Dev, OT Community Reintegration, OT Manual Ther Technique, OT Neuro Re-Ed/Balance, OT Sensory Int Techniques, OT Therapeutic Activity, OT Evaluation and OT Therapeutic Exercise                         Section completed by:  Rosalba Luna, OT

## 2019-09-18 NOTE — REHAB-CM IDT TEAM NOTE
Case Management    DC Planning  DC destination/dispostion:  To single level unit attached to patient's daughter's and son-in-law's home. There are 2 grandchildren living there too, ages 11 and 17. 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     Referrals:  None at this time.    DC Needs: Anticipate HH. DME TBD. Has SPC. MD f/u appointments - PCP, Dr. Wren for PAD.      Barriers to discharge:  Functional deficits. PAD.      Strengths: Motivated. PLOF - independent. . Supportive family.     Section completed by:  Zee Gomes R.N.

## 2019-09-18 NOTE — REHAB-SLP IDT TEAM NOTE
Speech Therapy   Cognitive Linquistic Functions  Comprehension Initial:  6 - Modified Independent  Comprehension Current:  5 - Stand-by Prompting/Supervision or Set-up   Comprehension Description:  Verbal cues  Expression Initial:  6 - Modified Independent  Expression Current:  7 - Independent   Expression Description:  Verbal cueing  Social Interaction Initial:  7 - Independent  Social Interaction Current:  7 - Independent   Social Interaction Description:  Increased time  Problem Solving Initial:  5 - Standby Prompting/Supervision or Set-up  Problem Solving Current:  5 - Standby Prompting/Supervision or Set-up   Problem Solving Description:  Verbal cueing, Therapy schedule, Bed/chair alarm, Increased time  Memory Initial:  5 - Standby Prompting/Supervision or Set-up  Memory Current:  5 - Standby Prompting/Supervision or Set-up   Memory Description:  Verbal cueing, Therapy schedule, Seat belt, Bed/chair alarm  Executive Functioning / Organization Initial:     Executive Functioning / Organization Current:      Executive Functioning Desciption:  SPV for med management  Swallowing  Swallowing Status:  WFL  Orders Placed This Encounter   Procedures   • Diet Order Regular     Standing Status:   Standing     Number of Occurrences:   1     Order Specific Question:   Diet:     Answer:   Regular [1]     Behavior Modification Plan  Keep the environment simple to avoid over stimulatiom/agitation, Keep instructions simple/concrete, Give clear feedback, Set clear goals and Reinforce participation in desired tasks  Assistive Technology  Low tech: Calendar, planner, schedule, alarms/timers, pill organizer, post-it notes, lists  Family Training/Education:  Not yet completed  DC Recommendations:   HH vs OP speech therapy  Strengths:  Able to follow instructions, Good carryover of learning, Good insight into deficits/needs, Independent PLOF, Making steady progress towards goals, Pleasant and cooperative and Willingly participates in  therapeutic activities  Barriers:  Other: high level attention and executive function  # of short term goals set=2  # of short term goals met=0  Speech Therapy Problems     Problem: Problem Solving STGs     Dates: Start: 09/07/19       Description:     Goal: STG-Within one week, patient will     Dates: Start: 09/07/19       Description: 1) Individualized goal:  Complete medication, and financial management tasks with 90% accuracy given min verbal cues.    2) Interventions:  SLP Cognitive Skill Development and SLP Group Treatment       Note:     Goal Note filed on 09/18/19 0939 by Belinda Tovar MS,CCC-SLP    80% for med management tasks                  Goal: STG-Within one week, patient will     Dates: Start: 09/07/19       Description: 1) Individualized goal:  Complete executive function tasks related to scheduling and reasoning with 90% accuracy given min verbal cues.   2) Interventions:  SLP Cognitive Skill Development and SLP Group Treatment                   Problem: Speech/Swallowing LTGs     Dates: Start: 09/07/19       Description:     Goal: LTG-By discharge, patient will solve complex problem     Dates: Start: 09/07/19       Description: 1) Individualized goal:  Mod I for safe discharge home.   2) Interventions:  SLP Aphasia Evaluation, SLP Cognitive Skill Development and SLP Group Treatment                          Section completed by:  Belinda Tovar MS,CCC-SLP

## 2019-09-18 NOTE — CARE PLAN
Problem: Balance  Goal: STG-Within one week, patient will maintain static standing  Description  1) Individualized goal:  Min A for L terminal hip/ knee ext x 5 minutes with BUE support  2) Interventions: PT Group Therapy, PT E Stim Attended, PT Orthotics Training, PT Gait Training, PT Self Care/Home Eval, PT Therapeutic Exercises, PT Neuro Re-Ed/Balance, PT Aquatic Therapy, PT Therapeutic Activity, PT Manual Therapy and PT Evaluation     Outcome: MET     Problem: Mobility  Goal: STG-Within one week, patient will ambulate household distance  Description  1) Individualized goal:  Min A with FWW and L AFO as needed 25 ft x 2  2) Interventions: PT Group Therapy, PT E Stim Attended, PT Orthotics Training, PT Gait Training, PT Self Care/Home Eval, PT Therapeutic Exercises, PT Neuro Re-Ed/Balance, PT Aquatic Therapy, PT Therapeutic Activity, PT Manual Therapy and PT Evaluation     Outcome: MET     Problem: Mobility Transfers  Goal: STG-Within one week, patient will perform bed mobility  Description  1) Individualized goal:  SPV with bed rail  2) Interventions: PT Group Therapy, PT E Stim Attended, PT Orthotics Training, PT Gait Training, PT Self Care/Home Eval, PT Therapeutic Exercises, PT Neuro Re-Ed/Balance, PT Aquatic Therapy, PT Therapeutic Activity, PT Manual Therapy and PT Evaluation     Outcome: MET  Goal: STG-Within one week, patient will sit to stand  Description  1) Individualized goal:  Min A with FWW  2) Interventions: PT Group Therapy, PT E Stim Attended, PT Orthotics Training, PT Gait Training, PT Self Care/Home Eval, PT Therapeutic Exercises, PT Neuro Re-Ed/Balance, PT Aquatic Therapy, PT Therapeutic Activity, PT Manual Therapy and PT Evaluation     Outcome: MET  Goal: STG-Within one week, patient will transfer bed to chair  Description  1) Individualized goal:  Mod A with FWW  2) Interventions: PT Group Therapy, PT E Stim Attended, PT Orthotics Training, PT Gait Training, PT Self Care/Home Eval, PT  Therapeutic Exercises, PT Neuro Re-Ed/Balance, PT Aquatic Therapy, PT Therapeutic Activity, PT Manual Therapy and PT Evaluation     Outcome: MET     Problem: Mobility  Goal: STG-Within one week, patient will propel wheelchair household distances  Description  1) Individualized goal:  Min A with BUE support 50 ft x 2  2) Interventions: PT Group Therapy, PT E Stim Attended, PT Orthotics Training, PT Gait Training, PT Self Care/Home Eval, PT Therapeutic Exercises, PT Neuro Re-Ed/Balance, PT Aquatic Therapy, PT Therapeutic Activity, PT Manual Therapy and PT Evaluation     Outcome: NOT MET  Note:   NT dt focus on other goals

## 2019-09-19 ENCOUNTER — HOME HEALTH ADMISSION (OUTPATIENT)
Dept: HOME HEALTH SERVICES | Facility: HOME HEALTHCARE | Age: 74
End: 2019-09-19
Payer: MEDICARE

## 2019-09-19 PROCEDURE — 700102 HCHG RX REV CODE 250 W/ 637 OVERRIDE(OP): Performed by: PHYSICAL MEDICINE & REHABILITATION

## 2019-09-19 PROCEDURE — 97530 THERAPEUTIC ACTIVITIES: CPT

## 2019-09-19 PROCEDURE — 97110 THERAPEUTIC EXERCISES: CPT

## 2019-09-19 PROCEDURE — 97535 SELF CARE MNGMENT TRAINING: CPT

## 2019-09-19 PROCEDURE — A9270 NON-COVERED ITEM OR SERVICE: HCPCS | Performed by: PHYSICAL MEDICINE & REHABILITATION

## 2019-09-19 PROCEDURE — 770010 HCHG ROOM/CARE - REHAB SEMI PRIVAT*

## 2019-09-19 PROCEDURE — G0515 COGNITIVE SKILLS DEVELOPMENT: HCPCS

## 2019-09-19 PROCEDURE — 97116 GAIT TRAINING THERAPY: CPT

## 2019-09-19 PROCEDURE — 99232 SBSQ HOSP IP/OBS MODERATE 35: CPT | Performed by: HOSPITALIST

## 2019-09-19 PROCEDURE — 99232 SBSQ HOSP IP/OBS MODERATE 35: CPT | Performed by: PHYSICAL MEDICINE & REHABILITATION

## 2019-09-19 PROCEDURE — 94760 N-INVAS EAR/PLS OXIMETRY 1: CPT

## 2019-09-19 RX ADMIN — MIDODRINE HYDROCHLORIDE 2.5 MG: 2.5 TABLET ORAL at 05:27

## 2019-09-19 RX ADMIN — BUDESONIDE AND FORMOTEROL FUMARATE DIHYDRATE 2 PUFF: 160; 4.5 AEROSOL RESPIRATORY (INHALATION) at 20:25

## 2019-09-19 RX ADMIN — ATORVASTATIN CALCIUM 80 MG: 40 TABLET, FILM COATED ORAL at 20:25

## 2019-09-19 RX ADMIN — BUDESONIDE AND FORMOTEROL FUMARATE DIHYDRATE 2 PUFF: 160; 4.5 AEROSOL RESPIRATORY (INHALATION) at 09:00

## 2019-09-19 RX ADMIN — MIDODRINE HYDROCHLORIDE 2.5 MG: 2.5 TABLET ORAL at 17:33

## 2019-09-19 RX ADMIN — SENNOSIDES,DOCUSATE SODIUM 2 TABLET: 8.6; 5 TABLET, FILM COATED ORAL at 20:25

## 2019-09-19 RX ADMIN — APIXABAN 5 MG: 5 TABLET, FILM COATED ORAL at 20:25

## 2019-09-19 RX ADMIN — MIDODRINE HYDROCHLORIDE 2.5 MG: 2.5 TABLET ORAL at 12:22

## 2019-09-19 RX ADMIN — APIXABAN 5 MG: 5 TABLET, FILM COATED ORAL at 09:10

## 2019-09-19 NOTE — THERAPY
Speech Language Pathology  Daily Treatment     Patient Name: Nohelia Dickerson  Age:  74 y.o., Sex:  female  Medical Record #: [id number]  Today's Date: 9/19/2019     Subjective    Patient was in room at time of St. Was willing to participate.      Objective       09/19/19 1032   Cognition   Moderate Attention Supervision (5)   Verbal Short Term Memory 45 Minutes   Complex Reasoning  / Problem Solving Minimal (4)   SLP Total Time Spent   SLP Individual Total Time Spent (Mins) 60   Charge Group   SLP Cognitive Skill Development 4       FIM Eating Score:     Eating Description:       FIM Comprehension Score:  6 - Modified Independent  Comprehension Description:       FIM Expression Score:  7 - Independent  Expression Description:       FIM Social Interaction Score:  7 - Independent  Social Interaction Description:       FIM Problem Solving Score:  5 - Standby Prompting/Supervision or Set-up  Problem Solving Description:       FIM Memory Score:  5 - Standby Prompting/Supervision or Set-up  Memory Description:         Assessment    Patient completed calendar task with spv for organization and min cues for delayed recall related to scheduled appts.   Completed kitchen shelves task independently. Required mod cues for garden plots task.     Plan    Continue to target functional problem solving.

## 2019-09-19 NOTE — THERAPY
"Physical Therapy   Daily Treatment     Patient Name: Nohelia Dickerson  Age:  74 y.o., Sex:  female  Medical Record #: [id number]  Today's Date: 9/19/2019     Precautions  Precautions: Fall Risk  Comments: posterior/L lateral lean, L rafael, monitor BP    Subjective    Patient reports willing to practice but \"take it easy on me\"     Objective       09/19/19 0901   Precautions   Precautions Fall Risk   Comments posterior/L lateral lean, L rafael, monitor BP   Vitals   Blood Pressure  127/69  (after ambulation activity)   Sitting Lower Body Exercises   Sitting Lower Body Exercises   (used as warm-up, cool-down)   Ankle Pumps 2 sets of 15;Bilateral   Long Arc Quad 2 sets of 15;Bilateral   Marching 2 sets of 15;Reciprocal   Interdisciplinary Plan of Care Collaboration   IDT Collaboration with  Physical Therapist;Nursing   Collaboration Comments primary PT plan   PT Total Time Spent   PT Individual Total Time Spent (Mins) 30   PT Charge Group   PT Gait Training 1   PT Therapeutic Exercise 1       FIM Walking Score:  2 - Max Assistance  Walking Description:  (100ft FWW CGA, cues for left heel strike/toe-off)      Assessment    Patient had no BP symptoms (NQ=291/69) during ambulation     Plan    Progress ambulation distance with FWW; stair training, transfer sequencing to promote independence    "

## 2019-09-19 NOTE — PROGRESS NOTES
Rehab Progress Note     Date of Service: 9/19/2019  Chief Complaint: Follow-up stroke    Interval Events (Subjective)    Patient seen and examined in the therapy gym today. Her BP has improved today. She denies any dizziness.     She denies any pain. She reports occasional left leg extensor spasms in the morning when she's in bed. Denies any spasms at night.     She understands the plan to re-assess her readiness for discharge on Monday, as we are concerned about her being home alone and her fall risk.      Objective:  VITAL SIGNS: /69 Comment: after ambulation activity  Pulse 73   Temp 36.4 °C (97.5 °F) (Temporal)   Resp 18   Ht 1.829 m (6')   Wt 65 kg (143 lb 4.8 oz)   SpO2 91%   BMI 19.43 kg/m²   Gen: alert, no apparent distress  CV: regular rate and rhythm, no murmurs, no peripheral edema  Resp: clear to ascultation bilaterally, normal respiratory effort  GI: soft, non-tender abdomen, bowel sounds present  Neuro: notable for 4/5 left leg, 2-3/5 left ankle dorsiflexion, no increased tone noted    Recent Results (from the past 72 hour(s))   CBC WITHOUT DIFFERENTIAL    Collection Time: 09/18/19  5:43 AM   Result Value Ref Range    WBC 8.3 4.8 - 10.8 K/uL    RBC 4.68 4.20 - 5.40 M/uL    Hemoglobin 13.4 12.0 - 16.0 g/dL    Hematocrit 41.8 37.0 - 47.0 %    MCV 89.3 81.4 - 97.8 fL    MCH 28.6 27.0 - 33.0 pg    MCHC 32.1 (L) 33.6 - 35.0 g/dL    RDW 43.7 35.9 - 50.0 fL    Platelet Count 187 164 - 446 K/uL    MPV 10.4 9.0 - 12.9 fL   Basic Metabolic Panel    Collection Time: 09/18/19  5:43 AM   Result Value Ref Range    Sodium 139 135 - 145 mmol/L    Potassium 4.1 3.6 - 5.5 mmol/L    Chloride 105 96 - 112 mmol/L    Co2 25 20 - 33 mmol/L    Glucose 98 65 - 99 mg/dL    Bun 18 8 - 22 mg/dL    Creatinine 0.66 0.50 - 1.40 mg/dL    Calcium 9.0 8.5 - 10.5 mg/dL    Anion Gap 9.0 0.0 - 11.9   ESTIMATED GFR    Collection Time: 09/18/19  5:43 AM   Result Value Ref Range    GFR If African American >60 >60 mL/min/1.73 m 2     GFR If Non African American >60 >60 mL/min/1.73 m 2       Current Facility-Administered Medications   Medication Frequency   • midodrine (PROAMATINE) tablet 2.5 mg TID DIURETIC   • budesonide-formoterol (SYMBICORT) 160-4.5 MCG/ACT inhaler 2 Puff BID   • oxyCODONE immediate-release (ROXICODONE) tablet 5 mg Q4HRS PRN   • Respiratory Care per Protocol Continuous RT   • acetaminophen (TYLENOL) tablet 650 mg Q4HRS PRN   • hydrALAZINE (APRESOLINE) tablet 25 mg Q8HRS PRN   • senna-docusate (PERICOLACE or SENOKOT S) 8.6-50 MG per tablet 2 Tab BID    And   • polyethylene glycol/lytes (MIRALAX) PACKET 1 Packet QDAY PRN    And   • magnesium hydroxide (MILK OF MAGNESIA) suspension 30 mL QDAY PRN    And   • bisacodyl (DULCOLAX) suppository 10 mg QDAY PRN   • artificial tears ophthalmic solution 1 Drop PRN   • benzocaine-menthol (CEPACOL) lozenge 1 Lozenge Q2HRS PRN   • mag hydrox-al hydrox-simeth (MAALOX PLUS ES or MYLANTA DS) suspension 20 mL Q2HRS PRN   • ondansetron (ZOFRAN ODT) dispertab 4 mg 4X/DAY PRN    Or   • ondansetron (ZOFRAN) syringe/vial injection 4 mg 4X/DAY PRN   • traZODone (DESYREL) tablet 50 mg QHS PRN   • sodium chloride (OCEAN) 0.65 % nasal spray 2 Spray PRN   • melatonin tablet 3 mg HS PRN   • atorvastatin (LIPITOR) tablet 80 mg Q EVENING   • apixaban (ELIQUIS) tablet 5 mg BID   • albuterol inhaler 2 Puff Q4HRS PRN       Orders Placed This Encounter   Procedures   • Diet Order Regular     Standing Status:   Standing     Number of Occurrences:   1     Order Specific Question:   Diet:     Answer:   Regular [1]       Assessment:  Active Hospital Problems    Diagnosis   • *CVA (cerebral vascular accident) (HCC)   • Orthostatic hypotension   • Azotemia   • Tachycardia   • PVD (peripheral vascular disease) (HCC)   • Arterial occlusion, lower extremity (HCC)   • COPD   • Chronic respiratory failure (HCC)   • Thrombocytopenia (HCC)   • Tobacco abuse   • Essential hypertension     This patient is a 74 y.o. female  admitted for acute inpatient rehabilitation with CVA (cerebral vascular accident) (HCC).    I led and attended the weekly conference, and agree with the IDT conference documentation and plan of care as noted below.  Date of conference: 9/18/2019    Goals and barriers: See IDT note.    Biggest barriers: intermittent orthostasis, left ankle weakness, left inattention    Goals in next week: improved blood pressure, improved left leg strength, community outing    Admission FIM 64 --> 79 (9/18)    CM/social support: lives with family, who works    Anticipated DC date: 9/24/2019, re-conference on Monday to determine if she's ready and how much home support she has    Home health: PT/OT/SLP/RN    Equip: FWW, possible AFO    Follow up: PCP, stroke bridge clinic      Medical Decision Making and Plan:    Right KIM and MCA ischemic stroke  Likely cardio-embolic  Continue full rehab program  PT/OT/SLP, 1 hr each discipline, 5 days per week  9/16: decrease OT 30 min, increase PT 90 min  Continue Eliquis and statin for secondary stroke prophylaxis  Follow up with stroke bridge clinic, needs monitor?  May need AFO, PT to assess  Needs to be able to get up a few steps, may need handrails at home     Possible NPH  Follow up stroke bridge clinic    Bowel  Meds as needed  Last BM 9/18    Bladder  Check PVRs - 43  ICP for over 400 cc  Scheduled toileting    Appreciate assistance of hospitalist with her medical co-morbidities:     Hypertension, resolved  Orthostatic hypotension, continues, dose of Midodrine increased 9/19  Tachycardia, resolved  Peripheral vascular disease  Tobacco use, did tobacco counseling with patient  COPD, on oxygen at night - home level    DVT prophylaxis  Eliquis     Total time:  27 minutes.  I spent greater than 50% of the time for patient care, counseling, and coordination on this date, including patient face-to face time, unit/floor time with review of records/pertinent lab data and studies, as well as  discussing diagnostic evaluation/work up, planned therapeutic interventions, and future disposition of care, as per the interval events/subjective and the assessment and plan as noted above.    I have performed a physical exam, reviewed and updated ROS, as well as the assessment and plan today 9/19/2019. In review of note from 9/18/2019 there are no new changes except as documented above.      Kayla Francisco M.D.   Physical Medicine and Rehabilitation

## 2019-09-19 NOTE — CARE PLAN
Problem: Safety  Goal: Will remain free from injury  Outcome: PROGRESSING AS EXPECTED  Note:   Call light with in reach. Redirection to use call light for assistance. Non skid socks. Upper siderails up x2 while in bed. No complains of pain. O2 n/c in place at 2 lpm. Able to make needs known. Assisted as needed. Will continue to monitor.

## 2019-09-19 NOTE — DISCHARGE PLANNING
DME referral sent to [facility] per choice form.  Home health referral sent to [facility] per choice form.  Awaiting responses.

## 2019-09-19 NOTE — THERAPY
Physical Therapy   Daily Treatment     Patient Name: Nohelia Dickerson  Age:  74 y.o., Sex:  female  Medical Record #: [id number]  Today's Date: 9/19/2019     Precautions  Precautions: Fall Risk  Comments: posterior/L lateral lean, L rafael, monitor BP    Subjective    Pt reports her son is still able to come in tomorrow for FT     Objective       09/19/19 0931   Interdisciplinary Plan of Care Collaboration   Patient Position at End of Therapy Seated;Call Light within Reach;Tray Table within Reach;Chair Alarm On;Self Releasing Lap Belt Applied   PT Total Time Spent   PT Individual Total Time Spent (Mins) 30   PT Charge Group   PT Gait Training 1   PT Therapeutic Activities 1       FIM Walking Score:  4 - Minimal Assistance  Walking Description:  Safety concerns, Verbal cueing, Extra time, Requires incidental assist, Walker(200 ft FWW, CGA for steadying, cues for attetnion to L and increased L stride)    Pt edu on possible need for AFO, DC planning, possible getting friend to sit with her while family is at work, possible community outing for treatment before DC, FT for tomorrow.     Assessment    Pt demos continued need for CGA dt L foot inattention during gait. Pt agreeable to all edu this session.    Plan    Monitor vitals prn, lite gait - monitor vitals,  with techs, overground gait training in soloste without AD, gait training FWW decrease assist, LLE and LUE neuro re-ed, pool therapy, consider AFO? Outing and family training towards DC, find out if pts friend can be with her during the day while family is at work, practice steps with no rail - with assist,

## 2019-09-19 NOTE — DISCHARGE PLANNING
Called patient's daughter to inquire about support at home. Patient's son-in-law lives in a home right in front of daughter, son-in-law, patient's home. He works 5AM - 3PM, is the boss and has some flexibility to get patient to medical appointments.  Daughter she lives with works M- W until 5 or 6PM, then weekend 5AM to 1:30PM. Granddaughters (1 is 18 y/o), get home at 2:30PM and would be available.   I informed daughter of providing brochure for [employer] to patient.   Son-in-law that lives in front of patient's home will be here in the AM for family training. I will try to make contact with him at that time.

## 2019-09-19 NOTE — DISCHARGE PLANNING
ATTN: Case Management  RE: Referral for Home Health    As of 09/19/2019, we have accepted the Home Health referral for the patient listed above.    A [facility] clinician will be out to see the patient within 48 hours. If you have any questions or concerns regarding the patient’s transition to Home Health, please do not hesitate to contact us at [phone].      We look forward to collaborating with you,  [facility] Home Health Team

## 2019-09-20 PROCEDURE — 97110 THERAPEUTIC EXERCISES: CPT

## 2019-09-20 PROCEDURE — 97112 NEUROMUSCULAR REEDUCATION: CPT

## 2019-09-20 PROCEDURE — 94760 N-INVAS EAR/PLS OXIMETRY 1: CPT

## 2019-09-20 PROCEDURE — 97535 SELF CARE MNGMENT TRAINING: CPT

## 2019-09-20 PROCEDURE — A9270 NON-COVERED ITEM OR SERVICE: HCPCS | Performed by: PHYSICAL MEDICINE & REHABILITATION

## 2019-09-20 PROCEDURE — 99232 SBSQ HOSP IP/OBS MODERATE 35: CPT | Performed by: HOSPITALIST

## 2019-09-20 PROCEDURE — 97530 THERAPEUTIC ACTIVITIES: CPT

## 2019-09-20 PROCEDURE — 770010 HCHG ROOM/CARE - REHAB SEMI PRIVAT*

## 2019-09-20 PROCEDURE — 99231 SBSQ HOSP IP/OBS SF/LOW 25: CPT | Performed by: PHYSICAL MEDICINE & REHABILITATION

## 2019-09-20 PROCEDURE — G0515 COGNITIVE SKILLS DEVELOPMENT: HCPCS

## 2019-09-20 PROCEDURE — 700102 HCHG RX REV CODE 250 W/ 637 OVERRIDE(OP): Performed by: PHYSICAL MEDICINE & REHABILITATION

## 2019-09-20 PROCEDURE — 97116 GAIT TRAINING THERAPY: CPT

## 2019-09-20 RX ORDER — MIDODRINE HYDROCHLORIDE 2.5 MG/1
2.5 TABLET ORAL 2 TIMES DAILY
Status: DISCONTINUED | OUTPATIENT
Start: 2019-09-21 | End: 2019-09-22

## 2019-09-20 RX ADMIN — MIDODRINE HYDROCHLORIDE 2.5 MG: 2.5 TABLET ORAL at 11:31

## 2019-09-20 RX ADMIN — ACETAMINOPHEN 650 MG: 325 TABLET, FILM COATED ORAL at 05:45

## 2019-09-20 RX ADMIN — SENNOSIDES,DOCUSATE SODIUM 2 TABLET: 8.6; 5 TABLET, FILM COATED ORAL at 20:30

## 2019-09-20 RX ADMIN — APIXABAN 5 MG: 5 TABLET, FILM COATED ORAL at 08:26

## 2019-09-20 RX ADMIN — BUDESONIDE AND FORMOTEROL FUMARATE DIHYDRATE 2 PUFF: 160; 4.5 AEROSOL RESPIRATORY (INHALATION) at 20:31

## 2019-09-20 RX ADMIN — APIXABAN 5 MG: 5 TABLET, FILM COATED ORAL at 20:31

## 2019-09-20 RX ADMIN — MIDODRINE HYDROCHLORIDE 2.5 MG: 2.5 TABLET ORAL at 05:45

## 2019-09-20 RX ADMIN — BUDESONIDE AND FORMOTEROL FUMARATE DIHYDRATE 2 PUFF: 160; 4.5 AEROSOL RESPIRATORY (INHALATION) at 11:29

## 2019-09-20 RX ADMIN — SENNOSIDES,DOCUSATE SODIUM 2 TABLET: 8.6; 5 TABLET, FILM COATED ORAL at 08:26

## 2019-09-20 RX ADMIN — ATORVASTATIN CALCIUM 80 MG: 40 TABLET, FILM COATED ORAL at 20:30

## 2019-09-20 RX ADMIN — ACETAMINOPHEN 650 MG: 325 TABLET, FILM COATED ORAL at 00:57

## 2019-09-20 NOTE — THERAPY
Speech Language Pathology  Daily Treatment     Patient Name: Nohelia Dickerson  Age:  74 y.o., Sex:  female  Medical Record #: [id number]  Today's Date: 9/20/2019     Subjective    Pt pleasant and cooperative during tx.  Family training completed with daughter Aminata via phone.      Objective       09/20/19 1001   Cognition   Functional Memory Activities Minimal (4)   Complex Reasoning  / Problem Solving Minimal (4)   Safety Awareness Supervision (5)   Interdisciplinary Plan of Care Collaboration   IDT Collaboration with  Family / Caregiver   Collaboration Comments Family training completed via phone with Pt and dtr.     SLP Total Time Spent   SLP Individual Total Time Spent (Mins) 60   Charge Group   SLP Cognitive Skill Development 4       FIM Comprehension Score:  6 - Modified Independent  Comprehension Description:  Verbal cues    FIM Expression Score:  7 - Independent  Expression Description:       FIM Social Interaction Score:  7 - Independent  Social Interaction Description:       FIM Problem Solving Score:  5 - Standby Prompting/Supervision or Set-up  Problem Solving Description:  Verbal cueing, Therapy schedule, Increased time    FIM Memory Score:  5 - Standby Prompting/Supervision or Set-up  Memory Description:  Verbal cueing, Therapy schedule      Assessment    Single step word problems involving time: 100% independent.  Logical scheduling puzzle: 33% independent; 100% given min-mod verbal cues.  Per dtr; she is agreeable to assist with medications when pt discharges home.      Plan    Target executive function, med management, and d/c planning.

## 2019-09-20 NOTE — THERAPY
Physical Therapy   Daily Treatment     Patient Name: Nohelia Dickerson  Age:  74 y.o., Sex:  female  Medical Record #: [id number]  Today's Date: 9/20/2019     Precautions  Precautions: Fall Risk  Comments: posterior/L lateral lean, L rafael, monitor BP    Subjective    Pt and son in law ready for FT session     Objective       09/20/19 0901   Bed Mobility    Sit to Supine Supervised   Sit to Stand Supervised   Scooting Supervised   Rolling Supervised   Interdisciplinary Plan of Care Collaboration   Patient Position at End of Therapy In Bed;Call Light within Reach;Tray Table within Reach   PT Total Time Spent   PT Individual Total Time Spent (Mins) 60   PT Charge Group   PT Gait Training 2   PT Therapeutic Activities 2       FIM Walking Score:  5 - Standby Prompting/Supervision or Set-up  Walking Description:  Walker, Extra time, Safety concerns, Verbal cueing, Supervision for safety(300 ft x 2 only one foot catch on walker, cues for direction)    FT with son in law for providing SPV for walking. Edu on GB placement in bathroom to decrease risk for falls. Edu on having intermittent SPV throughout the day and definite need for SPV during walking at home to decrease fall risk - pt and son in law say they plan to have friends stop by and help and provide SPV as needed. Edu on different options for navigating stairs to enter home, HR vs providing min A vs using FWW up the 2 steps. Son in law agreeable to putting in HR for safety.     Assessment    Pt continues to demo improvements with walking - walking at SPV today with FWW with only one instance of foot drag. Pt and son in law agreeable to all edu and FT about home safety.     Plan    Monitor vitals prn, lite gait - monitor vitals,  with techs, overground gait training in solostep without AD, gait training FWW decrease assist, LLE and LUE neuro re-ed, pool therapy, consider AFO? Outing Monday, find out if pts friend can be with her during the day while family is  at work,

## 2019-09-20 NOTE — REHAB-PHARMACY IDT TEAM NOTE
Pharmacy   Pharmacy  Antibiotics/Antifungals/Antivirals:  Medication:      Active Orders (From admission, onward)    None        Route:        NA  Stop Date:  NA  Reason:      NA  Antihypertensives/Cardiac:  Medication:    Orders (72h ago, onward)     Start     Ordered    09/21/19 0600  midodrine (PROAMATINE) tablet 2.5 mg  2 TIMES DAILY      09/20/19 1518    09/18/19 1800  midodrine (PROAMATINE) tablet 2.5 mg  THREE TIMES DAILY DIURETIC,   Status:  Discontinued      09/18/19 1123    09/18/19 1031  midodrine (PROAMATINE) tablet 2.5 mg  3 TIMES DAILY,   Status:  Discontinued      09/18/19 1031    09/12/19 1300  midodrine (PROAMATINE) tablet 2.5 mg  2 TIMES DAILY,   Status:  Discontinued      09/12/19 0910    09/06/19 2100  atorvastatin (LIPITOR) tablet 80 mg  EVERY EVENING      09/06/19 1251    09/06/19 1251  hydrALAZINE (APRESOLINE) tablet 25 mg  EVERY 8 HOURS PRN      09/06/19 1251              Patient Vitals for the past 24 hrs:   BP Pulse   09/20/19 1612 -- 78   09/20/19 1301 (P) 120/80 (P) 98   09/20/19 0700 155/88 67   09/19/19 1907 -- 82   09/19/19 1854 144/82 86     Anticoagulation:  Medication: Eliquis    Other key medications: A review of the medication list reveals no issues at this time.    Section completed by: Darren Sanchez [facility]

## 2019-09-20 NOTE — CARE PLAN
Problem: Communication  Goal: The ability to communicate needs accurately and effectively will improve  Outcome: PROGRESSING AS EXPECTED  Note:   Patient uses call light consistently and appropriately this shift.  Waits for assistance when needed and does not attempt self transfer.  Able to verbalize needs.      Problem: Infection  Goal: Will remain free from infection  Outcome: PROGRESSING AS EXPECTED  Note:   Patient does not present signs and symptoms of infection such as fever, inflammation, purulent drainage, change in LOC, turbid urine or loose stools.

## 2019-09-20 NOTE — PROGRESS NOTES
Rehab Progress Note     Date of Service: 9/20/2019  Chief Complaint: Follow-up stroke    Interval Events (Subjective)    Patient seen and examined during his PT session today. They are problem solving how she can get more support at home, at least on the first few days after discharge, due to her high fall risk. She reports her left leg continues to get stronger, less foot drag. PT does not think she will need an AFO, but will need a FWW. She denies any pain. She has no new complaints today.     Objective:  VITAL SIGNS: /88   Pulse 67   Temp 36.4 °C (97.5 °F) (Temporal)   Resp 18   Ht 1.829 m (6')   Wt 65 kg (143 lb 4.8 oz)   SpO2 96%   BMI 19.43 kg/m²   Gen: alert, no apparent distress  CV: regular rate and rhythm, no murmurs, no peripheral edema  Resp: clear to ascultation bilaterally, normal respiratory effort  GI: soft, non-tender abdomen, bowel sounds present  Neuro: notable for left ankle weakness      Recent Results (from the past 72 hour(s))   CBC WITHOUT DIFFERENTIAL    Collection Time: 09/18/19  5:43 AM   Result Value Ref Range    WBC 8.3 4.8 - 10.8 K/uL    RBC 4.68 4.20 - 5.40 M/uL    Hemoglobin 13.4 12.0 - 16.0 g/dL    Hematocrit 41.8 37.0 - 47.0 %    MCV 89.3 81.4 - 97.8 fL    MCH 28.6 27.0 - 33.0 pg    MCHC 32.1 (L) 33.6 - 35.0 g/dL    RDW 43.7 35.9 - 50.0 fL    Platelet Count 187 164 - 446 K/uL    MPV 10.4 9.0 - 12.9 fL   Basic Metabolic Panel    Collection Time: 09/18/19  5:43 AM   Result Value Ref Range    Sodium 139 135 - 145 mmol/L    Potassium 4.1 3.6 - 5.5 mmol/L    Chloride 105 96 - 112 mmol/L    Co2 25 20 - 33 mmol/L    Glucose 98 65 - 99 mg/dL    Bun 18 8 - 22 mg/dL    Creatinine 0.66 0.50 - 1.40 mg/dL    Calcium 9.0 8.5 - 10.5 mg/dL    Anion Gap 9.0 0.0 - 11.9   ESTIMATED GFR    Collection Time: 09/18/19  5:43 AM   Result Value Ref Range    GFR If African American >60 >60 mL/min/1.73 m 2    GFR If Non African American >60 >60 mL/min/1.73 m 2       Current Facility-Administered  Medications   Medication Frequency   • midodrine (PROAMATINE) tablet 2.5 mg TID DIURETIC   • budesonide-formoterol (SYMBICORT) 160-4.5 MCG/ACT inhaler 2 Puff BID   • oxyCODONE immediate-release (ROXICODONE) tablet 5 mg Q4HRS PRN   • Respiratory Care per Protocol Continuous RT   • acetaminophen (TYLENOL) tablet 650 mg Q4HRS PRN   • hydrALAZINE (APRESOLINE) tablet 25 mg Q8HRS PRN   • senna-docusate (PERICOLACE or SENOKOT S) 8.6-50 MG per tablet 2 Tab BID    And   • polyethylene glycol/lytes (MIRALAX) PACKET 1 Packet QDAY PRN    And   • magnesium hydroxide (MILK OF MAGNESIA) suspension 30 mL QDAY PRN    And   • bisacodyl (DULCOLAX) suppository 10 mg QDAY PRN   • artificial tears ophthalmic solution 1 Drop PRN   • benzocaine-menthol (CEPACOL) lozenge 1 Lozenge Q2HRS PRN   • mag hydrox-al hydrox-simeth (MAALOX PLUS ES or MYLANTA DS) suspension 20 mL Q2HRS PRN   • ondansetron (ZOFRAN ODT) dispertab 4 mg 4X/DAY PRN    Or   • ondansetron (ZOFRAN) syringe/vial injection 4 mg 4X/DAY PRN   • traZODone (DESYREL) tablet 50 mg QHS PRN   • sodium chloride (OCEAN) 0.65 % nasal spray 2 Spray PRN   • melatonin tablet 3 mg HS PRN   • atorvastatin (LIPITOR) tablet 80 mg Q EVENING   • apixaban (ELIQUIS) tablet 5 mg BID   • albuterol inhaler 2 Puff Q4HRS PRN       Orders Placed This Encounter   Procedures   • Diet Order Regular     Standing Status:   Standing     Number of Occurrences:   1     Order Specific Question:   Diet:     Answer:   Regular [1]       Assessment:  Active Hospital Problems    Diagnosis   • *CVA (cerebral vascular accident) (HCC)   • Orthostatic hypotension   • Azotemia   • Tachycardia   • PVD (peripheral vascular disease) (HCC)   • Arterial occlusion, lower extremity (HCC)   • COPD   • Chronic respiratory failure (HCC)   • Thrombocytopenia (HCC)   • Tobacco abuse   • Essential hypertension     This patient is a 74 y.o. female admitted for acute inpatient rehabilitation with CVA (cerebral vascular accident)  ([facility]).    I led and attended the weekly conference, and agree with the IDT conference documentation and plan of care as noted below.  Date of conference: 9/18/2019    Goals and barriers: See IDT note.    Biggest barriers: intermittent orthostasis, left ankle weakness, left inattention    Goals in next week: improved blood pressure, improved left leg strength, community outing    Admission FIM 64 --> 79 (9/18)    CM/social support: lives with family, who works    Anticipated DC date: 9/24/2019, re-conference on Monday to determine if she's ready and how much home support she has    Home health: PT/OT/SLP/RN    Equip: FWW, possible AFO    Follow up: PCP, stroke bridge clinic      Medical Decision Making and Plan:    Right KIM and MCA ischemic stroke  Likely cardio-embolic  Continue full rehab program  PT/OT/SLP, 1 hr each discipline, 5 days per week  9/16: decrease OT 30 min, increase PT 90 min  Continue Eliquis and statin for secondary stroke prophylaxis  Follow up with stroke bridge clinic, needs monitor?  May need AFO, PT to assess  Needs to be able to get up a few steps, may need handrails at home     Possible NPH  Follow up stroke bridge clinic    Bowel  Meds as needed  Last BM 9/18    Bladder  Check PVRs - 43  ICP for over 400 cc  Scheduled toileting    Appreciate assistance of hospitalist with her medical co-morbidities:     Hypertension, resolved  Orthostatic hypotension, improved, dose of Midodrine increased 9/19  Tachycardia, resolved  Peripheral vascular disease  Tobacco use, did tobacco counseling with patient  COPD, on oxygen at night - home level    DVT prophylaxis  Eliquis     Total time:  15 minutes.  I spent greater than 50% of the time for patient care, counseling, and coordination on this date, including patient face-to face time, unit/floor time with review of records/pertinent lab data and studies, as well as discussing diagnostic evaluation/work up, planned therapeutic interventions, and future  disposition of care, as per the interval events/subjective and the assessment and plan as noted above.    I have performed a physical exam, reviewed and updated ROS, as well as the assessment and plan today 9/20/2019. In review of note from 9/19/2019 there are no new changes except as documented above.        Kayla Francisco M.D.   Physical Medicine and Rehabilitation

## 2019-09-20 NOTE — FLOWSHEET NOTE
09/20/19 1612   Events/Summary/Plan   Events/Summary/Plan O2 check done pt doing well    Chest Exam   Respiration 16   Pulse 78   Oximetry   #Pulse Oximetry (Single Determination) Yes   Oxygen   Home O2 Use Prior To Admission? Yes   Home O2 LPM Flow 2 LPM   Home O2 Delivery Method Nasal Cannula   Home O2 Frequency of Use At Sleep   Pulse Oximetry 93 %   O2 (LPM) 0   FiO2% 21 %   O2 Daily Delivery Respiratory  Room Air with O2 Available

## 2019-09-20 NOTE — REHAB-CM IDT TEAM NOTE
Case Management    DC Planning  DC destination/dispostion: To single level unit attached to patient's daughter's and son-in-law's home. Adults work. There are 2 grandchildren living there too, ages 11 and 17. Patient will be alone approximately 7 hours per day. Family arranging for neighbors to check in on patient at lunch. [employer] brochure given to patient and recommendation made to son-in-law and patient's daughter.     Referrals: [facility] SN/PT/ST/OT; [facility] for FWW, tub transfer bench.    DC Needs: MD f/u appointments - Dr. Cortez-PCP appointment in place, f/u with Dr. Wren.    Barriers to discharge:  Functional deficits     Strengths: Motivated. PLOF - independent. Supportive family.    Section completed by:  Zee Gomes R.N.

## 2019-09-20 NOTE — FLOWSHEET NOTE
09/19/19 1907   Events/Summary/Plan   Events/Summary/Plan O2 spot check   Respiratory WDL   Respiratory (WDL) X   Chest Exam   Respiration 16   Pulse 82   Oximetry   #Pulse Oximetry (Single Determination) Yes   Oxygen   Home O2 Use Prior To Admission? Yes   Home O2 LPM Flow 2 LPM   Home O2 Delivery Method Nasal Cannula   Home O2 Frequency of Use At Sleep   Pulse Oximetry 90 %   O2 Daily Delivery Respiratory  Room Air with O2 Available

## 2019-09-20 NOTE — THERAPY
"Occupational Therapy  Daily Treatment     Patient Name: Nohelia Dickerson  Age:  74 y.o., Sex:  female  Medical Record #: [id number]  Today's Date: 9/20/2019     Precautions  Precautions: (P) Fall Risk  Comments: (P) posterior/L lateral lean, L rafael, monitor BP    Safety   ADL Safety : Requires Supervision for Safety, Requires Physical Assist for Safety, Requires Cueing for Safety  Bathroom Safety: (P) Requires Supervision for Safety, Requires Physical Assist for Safety, Requires Cuing for Safety  Comments: Home setup of bathroom clarified with pt; pt has tub shower with toilet directly next to it limiting space for TTB, she does have a shower seat at home. Pt able to step in/out of bathtub x2 with Min A using grab bars with verbal cues for foot placement. Discussed recommendation of grab bar placement to increase safety which pt beleived could be installed by son in law. Per pt she also has a commode she can place over toilet    Subjective    \"I feel a little dizzy\"      Objective       09/20/19 1301   Precautions   Precautions Fall Risk   Comments posterior/L lateral lean, L rafael, monitor BP   Safety    Bathroom Safety Requires Supervision for Safety;Requires Physical Assist for Safety;Requires Cuing for Safety   Vitals   Pulse 98   Patient BP Position Sitting   Blood Pressure  120/80  (99/73 with 119 pulse transitioning from sit to stand)   Pulse Oximetry 92 %   O2 (LPM) 0   Vitals Comments BP monitored throughout with 14-20 point systolic drop transitioning from sitting to standing position   Sitting Upper Body Exercises   Sitting Upper Body Exercises Yes   Chest Press 3 sets of 10;Weight (See Comments for lbs)  (10#)   Bilateral Row 3 sets of 10;Weight (See Comments for lbs)  (10#)   Tricep Press 3 sets of 10;Weight (See Comments for lbs)  (10#)   Other Exercise Rickshaw  (25# forward/20# backward)   Standing Lower Body Exercises   Standing Lower Body Exercises Yes   Mini Squat 1 set of " 10;Partial  (Discontinued due to BP drop)   Balance   Standing Balance (Static) Fair -   Standing Balance (Dynamic) Poor +   Weight Shift Standing Poor   Skilled Intervention Sequencing;Postural Facilitation;Verbal Cuing;Other (See Comments)   Comments Functional mobility on unit with FWW with intermittent cues to correct L lateral lean/L migration within FWW.     Interdisciplinary Plan of Care Collaboration   Patient Position at End of Therapy In Bed;Bed Alarm On   OT Total Time Spent   OT Individual Total Time Spent (Mins) 60   OT Charge Group   Charges Yes   OT Self Care / ADL 2   OT Neuromuscular Re-education / Balance 1   OT Therapeutic Exercise  1       FIM Eating Score:     Eating Description:       FIM Grooming Score:  6 - Modified Independent  Grooming Description:  Increased time(Hand care seated at sinkside)    FIM Bathing Score:     Bathing Description:       FIM Upper Body Dressing:     Upper Body Dressing Description:       FIM Lower Body Dressing Score:     Lower Body Dressing Description:       FIM Toileting Body Dressin - Standby Prompting/Supervision or Set-up  Toileting Description:  Increased time, Verbal cueing, Supervision for safety, Grab bar(Single vq to engage left brake prior to tx)    FIM Bed/Chair/Wheelchair Transfers Score: 4 - Minimal Assistance  Bed/Chair/Wheelchair Transfers Description:  Supervision for safety, Verbal cueing, Increased time(CGA SPT  EOB <> W/C; cues to engage w/c brakes)    FIM Toilet Transfer Score:  4 - Minimal Assistance  Toilet Transfer Description:  Grab bar, Supervision for safety, Verbal cueing    FIM Tub/Shower Transfers Score:     Tub/Shower Transfers Description:         Assessment    Pt participated to best of ability with standing and seated pursuits.  Frequent drops in BP transitioning from seated to standing impacted sustained standing tasks.  Pt required min to mod multi-modal cues to correct L lateral movement during in-facility functional  mobility with FWW.  Nursing notified of BP variations.     Plan    Cont' dynamic standing, functional mobility pursuits, strengthening and endurance

## 2019-09-21 PROCEDURE — 700102 HCHG RX REV CODE 250 W/ 637 OVERRIDE(OP): Performed by: PHYSICAL MEDICINE & REHABILITATION

## 2019-09-21 PROCEDURE — 94760 N-INVAS EAR/PLS OXIMETRY 1: CPT

## 2019-09-21 PROCEDURE — 700102 HCHG RX REV CODE 250 W/ 637 OVERRIDE(OP): Performed by: HOSPITALIST

## 2019-09-21 PROCEDURE — A9270 NON-COVERED ITEM OR SERVICE: HCPCS | Performed by: PHYSICAL MEDICINE & REHABILITATION

## 2019-09-21 PROCEDURE — 770010 HCHG ROOM/CARE - REHAB SEMI PRIVAT*

## 2019-09-21 PROCEDURE — G0515 COGNITIVE SKILLS DEVELOPMENT: HCPCS

## 2019-09-21 PROCEDURE — 99231 SBSQ HOSP IP/OBS SF/LOW 25: CPT | Performed by: HOSPITALIST

## 2019-09-21 PROCEDURE — 97112 NEUROMUSCULAR REEDUCATION: CPT

## 2019-09-21 PROCEDURE — A9270 NON-COVERED ITEM OR SERVICE: HCPCS | Performed by: HOSPITALIST

## 2019-09-21 PROCEDURE — 97116 GAIT TRAINING THERAPY: CPT

## 2019-09-21 PROCEDURE — 97530 THERAPEUTIC ACTIVITIES: CPT

## 2019-09-21 PROCEDURE — 97535 SELF CARE MNGMENT TRAINING: CPT

## 2019-09-21 RX ADMIN — APIXABAN 5 MG: 5 TABLET, FILM COATED ORAL at 21:35

## 2019-09-21 RX ADMIN — ATORVASTATIN CALCIUM 80 MG: 40 TABLET, FILM COATED ORAL at 21:35

## 2019-09-21 RX ADMIN — SENNOSIDES,DOCUSATE SODIUM 2 TABLET: 8.6; 5 TABLET, FILM COATED ORAL at 08:32

## 2019-09-21 RX ADMIN — MIDODRINE HYDROCHLORIDE 2.5 MG: 2.5 TABLET ORAL at 05:11

## 2019-09-21 RX ADMIN — HYDRALAZINE HYDROCHLORIDE 25 MG: 25 TABLET, FILM COATED ORAL at 06:39

## 2019-09-21 RX ADMIN — BUDESONIDE AND FORMOTEROL FUMARATE DIHYDRATE 2 PUFF: 160; 4.5 AEROSOL RESPIRATORY (INHALATION) at 08:34

## 2019-09-21 RX ADMIN — APIXABAN 5 MG: 5 TABLET, FILM COATED ORAL at 08:32

## 2019-09-21 RX ADMIN — BUDESONIDE AND FORMOTEROL FUMARATE DIHYDRATE 2 PUFF: 160; 4.5 AEROSOL RESPIRATORY (INHALATION) at 21:36

## 2019-09-21 ASSESSMENT — ENCOUNTER SYMPTOMS
VOMITING: 0
EYES NEGATIVE: 1
COUGH: 0
ABDOMINAL PAIN: 0
SHORTNESS OF BREATH: 0
CHILLS: 0
NAUSEA: 0
FEVER: 0
PALPITATIONS: 0
FOCAL WEAKNESS: 1
MUSCULOSKELETAL NEGATIVE: 1

## 2019-09-21 NOTE — THERAPY
Physical Therapy   Daily Treatment     Patient Name: Nohelia Dickerson  Age:  74 y.o., Sex:  female  Medical Record #: [id number]  Today's Date: 9/21/2019     Precautions  Precautions: Fall Risk, Other (See Comments)  Comments: Posterior/L lateral lean, L rafael, monitor BP    Subjective    Pt found laying and watching television. Agreeable to PT session with some minor complaints of dizziness.      Objective       09/21/19 1001   Precautions   Precautions Fall Risk;Other (See Comments)   Comments Posterior/L lateral lean, L rafael, monitor BP   Vitals   Patient BP Position Supine   Blood Pressure  139/78   Vitals Comments PT monitored for signs and symptoms of dizziness, dyspnea, and nausea throughout therapy session. Some dizziness noted, rest breaks taken and hemodynamics taken upon arrival. See below in note for full details.   Pain 0 - 10 Group   Therapist Pain Assessment 0;Post Activity Pain Same as Prior to Activity   Non Verbal Descriptors   Non Verbal Scale  Calm   Cognition    Cognition / Consciousness X   Speech/ Communication Delayed Responses   Orientation Level Oriented x 4   Level of Consciousness Alert   Attention Impaired   Sequencing Impaired   Bed Mobility    Supine to Sit Supervised   Sit to Supine Supervised   Sit to Stand Supervised   Scooting Supervised   Rolling Supervised   Interdisciplinary Plan of Care Collaboration   Patient Position at End of Therapy In Bed;Phone within Reach;Family / Friend in Room;Tray Table within Reach   PT Total Time Spent   PT Individual Total Time Spent (Mins) 60   PT Charge Group   PT Gait Training 2   PT Neuromuscular Re-Education / Balance 1   PT Therapeutic Activities 1     Hemodynamics upon arrival:  -supine 139/78 mmhg  -seat at EOB no back support 102/61 mm hg  -Standing in FWW 92/62 mm hg    Education on orthostatic hypotension:  -safety with transitional movements  -Seated at EOB ~ 1 min when rising from supine to stand  -utilizing isometrics on  extremities to raise BP before standing  -static standing for ~30 sec before walking    FIM Bed/Chair/Wheelchair Transfers Score: 5 - Standby Prompting/Supervision or Set-up  Bed/Chair/Wheelchair Transfers Description:  (Pt completed bed<>WC SPT within FWW close SBA for BP and LLE.)    FIM Walking Score:  4 - Minimal Assistance  Walking Description:  (Pt completed 144' in FWW close SBA indoors with moderate verbal cues for L hip flexion and heel strike. Pt completed 2x 80' outdoors on uneven surfaces CGA in FWW with max verbal cues for L hip flexion and heel strike.)    FIM Wheelchair Score:  4 - Minimal Assistance  Wheelchair Description:  (Pt propelled WC 2x 150' indoors with BUEs. Max verbal cues for utilizing LUE and sequencing.)    FIM Stairs Score:  2 - Max Assistance  Stairs Description:  (Pt ascended/descended 4 standard steps with BHR. Step through gait pattern during ascent and step to for descent. Limited by dizziness.)      Assessment    Pt demonstrated LLE foot drop during gait training that progressed with fatigued by the end of therapy session. She was very happy to perform uneven walking outside. She was also appropriate for isometric muscle technique to rise blood pressure for transitional movements. Hemodynamics showed significant orthostatic hypotension due to >10 mm hg drop from supine to standing in systolic.    Plan    Gait training endurance for LLE, consider AFO fabrication, LE strength training, stair training, lite gait or solostep for gait speed, Outing Monday preparation

## 2019-09-21 NOTE — CARE PLAN
Problem: Safety  Goal: Will remain free from injury  Note:   Patient uses call light consistently and appropriately this shift.  Waits for assistance when needed and does not attempt self transfer.  Able to verbalize needs.  Will continue to monitor.      Problem: Infection  Goal: Will remain free from infection  Note:   Patient remains free from s/s infection; afebrile.  Will continue to monitor.

## 2019-09-21 NOTE — PROGRESS NOTES
Bp 173/91 , P- 72. Pt asymptomatic, denies chest discomfort. Medicated with Hydralazine 25 mg. [see Mar].

## 2019-09-21 NOTE — PROGRESS NOTES
Hospital Medicine Daily Progress Note      Chief Complaint:  HTN, PVD    Interval History:  Pt seen and examined in dining room.  Denies complaints.    Review of Systems  Review of Systems   Constitutional: Negative for chills and fever.   HENT: Negative.    Eyes: Negative.    Respiratory: Negative for cough and shortness of breath.    Cardiovascular: Negative for chest pain and palpitations.   Gastrointestinal: Negative for abdominal pain, nausea and vomiting.   Genitourinary: Negative.    Musculoskeletal: Negative.    Skin: Negative for itching and rash.   Neurological: Positive for focal weakness.        Physical Exam  Temp:  [36.4 °C (97.5 °F)-36.7 °C (98.1 °F)] 36.4 °C (97.6 °F)  Pulse:  [71-84] 71  Resp:  [18] 18  BP: (122-173)/(78-91) 122/80  SpO2:  [94 %-96 %] 94 %    Physical Exam   Constitutional: She is oriented to person, place, and time. No distress.   HENT:   Head: Normocephalic and atraumatic.   Right Ear: External ear normal.   Left Ear: External ear normal.   Eyes: Conjunctivae and EOM are normal. Right eye exhibits no discharge. Left eye exhibits no discharge.   Neck: Normal range of motion. Neck supple. No tracheal deviation present.   Cardiovascular: Normal rate, regular rhythm, S1 normal and S2 normal.   Pulmonary/Chest: No stridor. No respiratory distress. She has no wheezes.   Decreased BS   Abdominal: Soft. Bowel sounds are normal. She exhibits no distension. There is no tenderness.   Musculoskeletal: She exhibits no edema.   Neurological: She is alert and oriented to person, place, and time. No sensory deficit.   Skin: Skin is warm and dry. She is not diaphoretic. No cyanosis.   Vitals reviewed.      Fluids    Intake/Output Summary (Last 24 hours) at 9/21/2019 1636  Last data filed at 9/21/2019 1208  Gross per 24 hour   Intake 1020 ml   Output --   Net 1020 ml       Laboratory                        Assessment/Plan  * CVA (cerebral vascular accident) (HCC)- (present on admission)  Assessment  & Plan  Has left sided weakness, LLE worse than LUE  On Eliquis and Lipitor  MRI concerning for NPH, needs F/U    Azotemia  Assessment & Plan  Renal function resolved w/ PO fluids    PVD (peripheral vascular disease) (HCC)- (present on admission)  Assessment & Plan  On Eliquis and Lipitor  Outpt F/U w/ Dr. Wren    Thrombocytopenia (HCC)- (present on admission)  Assessment & Plan  Low Platelet counts resolved    Essential hypertension- (present on admission)  Assessment & Plan  Off Lisinopril for hypotension  Continue current dose Midodrine    Full Code

## 2019-09-21 NOTE — PROGRESS NOTES
Hospital Medicine Daily Progress Note      Chief Complaint:  HTN, PVD    Interval History:  Low blood pressures better w/ increased Midodrine.    Review of Systems  Review of Systems   Constitutional: Negative for chills and fever.   HENT: Negative.    Eyes: Negative.    Respiratory: Negative for cough and shortness of breath.    Cardiovascular: Negative for chest pain and palpitations.   Gastrointestinal: Negative for abdominal pain, nausea and vomiting.   Genitourinary: Negative.    Musculoskeletal: Negative.    Skin: Negative for itching and rash.   Neurological: Positive for focal weakness.        Physical Exam  Temp:  [36.4 °C (97.5 °F)-36.7 °C (98.1 °F)] 36.4 °C (97.6 °F)  Pulse:  [71-84] 71  Resp:  [18] 18  BP: (122-173)/(78-91) 122/80  SpO2:  [94 %-96 %] 94 %    Physical Exam   Constitutional: She is oriented to person, place, and time. No distress.   HENT:   Head: Normocephalic and atraumatic.   Right Ear: External ear normal.   Left Ear: External ear normal.   Eyes: Conjunctivae and EOM are normal. Right eye exhibits no discharge. Left eye exhibits no discharge.   Neck: Normal range of motion. Neck supple. No tracheal deviation present.   Cardiovascular: Normal rate, regular rhythm, S1 normal and S2 normal.   Pulmonary/Chest: No stridor. No respiratory distress. She has no wheezes.   Decreased BS   Abdominal: Soft. Bowel sounds are normal. She exhibits no distension. There is no tenderness.   Musculoskeletal: She exhibits no edema.   Neurological: She is alert and oriented to person, place, and time. No sensory deficit.   Skin: Skin is warm and dry. She is not diaphoretic. No cyanosis.   Vitals reviewed.      Fluids    Intake/Output Summary (Last 24 hours) at 9/21/2019 1630  Last data filed at 9/21/2019 1208  Gross per 24 hour   Intake 1020 ml   Output --   Net 1020 ml       Laboratory                        Assessment/Plan  * CVA (cerebral vascular accident) (HCC)- (present on admission)  Assessment &  Plan  Has left sided weakness, LLE worse than LUE  On Eliquis and Lipitor  MRI concerning for NPH, needs F/U    Azotemia  Assessment & Plan  Renal function resolved w/ PO fluids    PVD (peripheral vascular disease) ([facility])- (present on admission)  Assessment & Plan  On Eliquis and Lipitor  Outpt F/U w/ Dr. Wren    Thrombocytopenia ([facility])- (present on admission)  Assessment & Plan  Low Platelet counts resolved    Essential hypertension- (present on admission)  Assessment & Plan  Now off Lisinopril for hypotension  Continue Midodrine    Full Code

## 2019-09-21 NOTE — PROGRESS NOTES
Hospital Medicine Daily Progress Note      Chief Complaint:  HTN, PVD    Interval History:  Blood pressures trending low, especially during therapies.    Review of Systems  Review of Systems   Constitutional: Negative for chills and fever.   HENT: Negative.    Eyes: Negative.    Respiratory: Negative for cough and shortness of breath.    Cardiovascular: Negative for chest pain and palpitations.   Gastrointestinal: Negative for abdominal pain, nausea and vomiting.   Genitourinary: Negative.    Musculoskeletal: Negative.    Skin: Negative for itching and rash.   Neurological: Positive for focal weakness.        Physical Exam  Temp:  [36.4 °C (97.5 °F)-36.7 °C (98.1 °F)] 36.4 °C (97.6 °F)  Pulse:  [71-84] 71  Resp:  [18] 18  BP: (122-173)/(78-91) 122/80  SpO2:  [94 %-96 %] 94 %    Physical Exam   Constitutional: She is oriented to person, place, and time. No distress.   HENT:   Head: Normocephalic and atraumatic.   Right Ear: External ear normal.   Left Ear: External ear normal.   Eyes: Conjunctivae and EOM are normal. Right eye exhibits no discharge. Left eye exhibits no discharge.   Neck: Normal range of motion. Neck supple. No tracheal deviation present.   Cardiovascular: Normal rate, regular rhythm, S1 normal and S2 normal.   Pulmonary/Chest: No stridor. No respiratory distress. She has no wheezes.   Decreased BS   Abdominal: Soft. Bowel sounds are normal. She exhibits no distension. There is no tenderness.   Musculoskeletal: She exhibits no edema.   Neurological: She is alert and oriented to person, place, and time. No sensory deficit.   Skin: Skin is warm and dry. She is not diaphoretic. No cyanosis.   Vitals reviewed.      Fluids    Intake/Output Summary (Last 24 hours) at 9/21/2019 1628  Last data filed at 9/21/2019 1208  Gross per 24 hour   Intake 1020 ml   Output --   Net 1020 ml       Laboratory                        Assessment/Plan  * CVA (cerebral vascular accident) (HCC)- (present on  admission)  Assessment & Plan  Has left sided weakness, LLE worse than LUE  On Eliquis and Lipitor  MRI concerning for NPH, needs F/U    Azotemia  Assessment & Plan  Renal function resolved w/ PO fluids    PVD (peripheral vascular disease) ([facility])- (present on admission)  Assessment & Plan  On Eliquis and Lipitor  Outpt F/U w/ Dr. Wren    Thrombocytopenia ([facility])- (present on admission)  Assessment & Plan  Low Platelet counts resolved    Essential hypertension- (present on admission)  Assessment & Plan  Now off Lisinopril for hypotension  Increase Midodrine from bid to tid    Full Code    Reviewed w/ Staff and Dr. Francisco

## 2019-09-21 NOTE — PROGRESS NOTES
Hospital Medicine Daily Progress Note      Chief Complaint:  HTN, PVD    Interval History:  Blood pressures now trending high.  Pt asymptomatic.    Review of Systems  Review of Systems   Constitutional: Negative for chills and fever.   HENT: Negative.    Eyes: Negative.    Respiratory: Negative for cough and shortness of breath.    Cardiovascular: Negative for chest pain and palpitations.   Gastrointestinal: Negative for abdominal pain, nausea and vomiting.   Genitourinary: Negative.    Musculoskeletal: Negative.    Skin: Negative for itching and rash.   Neurological: Positive for focal weakness.        Physical Exam  Temp:  [36.4 °C (97.5 °F)-36.7 °C (98.1 °F)] 36.4 °C (97.6 °F)  Pulse:  [71-84] 71  Resp:  [18] 18  BP: (122-173)/(78-91) 122/80  SpO2:  [94 %-96 %] 94 %    Physical Exam   Constitutional: She is oriented to person, place, and time. No distress.   HENT:   Head: Normocephalic and atraumatic.   Right Ear: External ear normal.   Left Ear: External ear normal.   Eyes: Conjunctivae and EOM are normal. Right eye exhibits no discharge. Left eye exhibits no discharge.   Neck: Normal range of motion. Neck supple. No tracheal deviation present.   Cardiovascular: Normal rate, regular rhythm, S1 normal and S2 normal.   Pulmonary/Chest: No stridor. No respiratory distress. She has no wheezes.   Decreased BS   Abdominal: Soft. Bowel sounds are normal. She exhibits no distension. There is no tenderness.   Musculoskeletal: She exhibits no edema.   Neurological: She is alert and oriented to person, place, and time. No sensory deficit.   Skin: Skin is warm and dry. She is not diaphoretic. No cyanosis.   Vitals reviewed.      Fluids    Intake/Output Summary (Last 24 hours) at 9/21/2019 1633  Last data filed at 9/21/2019 1208  Gross per 24 hour   Intake 1020 ml   Output --   Net 1020 ml       Laboratory                        Assessment/Plan  * CVA (cerebral vascular accident) (HCC)- (present on admission)  Assessment &  Plan  Has left sided weakness, LLE worse than LUE  On Eliquis and Lipitor  MRI concerning for NPH, needs F/U    Azotemia  Assessment & Plan  Renal function resolved w/ PO fluids    PVD (peripheral vascular disease) ([facility])- (present on admission)  Assessment & Plan  On Eliquis and Lipitor  Outpt F/U w/ Dr. Wren    Thrombocytopenia ([facility])- (present on admission)  Assessment & Plan  Low Platelet counts resolved    Essential hypertension- (present on admission)  Assessment & Plan  Now off Lisinopril for hypotension  Decrease Midodrine back to bid dosing for high trending BP    Full Code

## 2019-09-21 NOTE — FLOWSHEET NOTE
09/21/19 0805   Events/Summary/Plan   Events/Summary/Plan O2 check done pt on room air at this time    Chest Exam   Respiration 18   Pulse 72   Oximetry   #Pulse Oximetry (Single Determination) Yes   Oxygen   Home O2 Use Prior To Admission? Yes   Home O2 LPM Flow 2 LPM   Home O2 Delivery Method Nasal Cannula   Home O2 Frequency of Use At Sleep   Pulse Oximetry 94 %   O2 (LPM) 0   FiO2% 21 %   O2 Daily Delivery Respiratory  Room Air with O2 Available

## 2019-09-21 NOTE — THERAPY
"Occupational Therapy  Daily Treatment     Patient Name: Nohelia Dickerson  Age:  74 y.o., Sex:  female  Medical Record #: [id number]  Today's Date: 2019     Precautions  Precautions: Fall Risk, Other (See Comments)  Comments: Posterior/L lateral lean, L rafael, monitor BP    Safety   ADL Safety : Requires Supervision for Safety  Bathroom Safety: Requires Supervision for Safety  Comments: Home setup of bathroom clarified with pt; pt has tub shower with toilet directly next to it limiting space for TTB, she does have a shower seat at home. Pt able to step in/out of bathtub x2 with Min A using grab bars with verbal cues for foot placement. Discussed recommendation of grab bar placement to increase safety which pt beleived could be installed by son in law. Per pt she also has a commode she can place over toilet    Subjective    \"I'm so happy I can move my foot left to right now. That makes the world of difference.\"    \"I feel bad for my granddaughters. They lost their mom (my daughter). My son in law is a wonderful man.\"     Objective     19 1301   Safety    ADL Safety  Requires Supervision for Safety   Bathroom Safety Requires Supervision for Safety   Pain 0 - 10 Group   Therapist Pain Assessment 0   Cognition    Level of Consciousness Alert   IADL Treatments   Home Management loading laundry in standing with no difficulties. SBA    Interdisciplinary Plan of Care Collaboration   Patient Position at End of Therapy In Bed;Bed Alarm On;Call Light within Reach   OT Total Time Spent   OT Individual Total Time Spent (Mins) 60   OT Charge Group   OT Self Care / ADL 3   OT Therapy Activity 1       FIM Grooming Score:  5 - Standby Prompting/Supervision or Set-up  Grooming Description:  Supervision for safety    FIM Bathing Score:  5 - Standby Prompting/Supervision or Set-up  Bathing Description:       FIM Upper Body Dressin - Modified Independent  Upper Body Dressing Description:  Increased time    FIM Lower " Body Dressing Score:  5 - Standby Prompting/Supervsion or Set-up  Lower Body Dressing Description:  Increased time, Supervision for safety    FIM Toileting Body Dressin - Standby Prompting/Supervision or Set-up  Toileting Description:  Supervision for safety, Grab bar    FIM Bed/Chair/Wheelchair Transfers Score: 5 - Standby Prompting/Supervision or Set-up  Bed/Chair/Wheelchair Transfers Description:  Supervision for safety    FIM Toilet Transfer Score:  5 - Standby Prompting/Supervision or Set-up  Toilet Transfer Description:  Grab bar, Increased time, Supervision for safety    FIM Tub/Shower Transfers Score:  5 - Standby Prompting/Supervision or Set-up  Tub/Shower Transfers Description:  Increased time, Supervision for safety      Assessment    Pt had bright affect. Pt had no occurrences of dizziness. Pt requested taking a shower to practice those skills. Pt is progressing well towards her goals. She is hoping to go home this week.     Plan    Cont' dynamic standing, functional mobility pursuits, strengthening and endurance

## 2019-09-21 NOTE — CARE PLAN
Problem: Safety  Goal: Will remain free from falls  9/20/2019 2246 by Belen Cook R.N.  Note:   Safety measures enforced, call light and phone within easy reach,pt calls for assistance . Needs attended.   9/20/2019 2241 by Belen Cook R.N.  Outcome: PROGRESSING AS EXPECTED

## 2019-09-21 NOTE — THERAPY
Speech Language Pathology  Daily Treatment     Patient Name: Nohelia Dickerson  Age:  74 y.o., Sex:  female  Medical Record #: [id number]  Today's Date: 9/21/2019     Subjective    Pt pleasant and cooperative during tx.       Objective       09/21/19 0901   Cognition   Complex Reasoning  / Problem Solving Minimal (4)   Medication Management  Supervision (5)   SLP Total Time Spent   SLP Individual Total Time Spent (Mins) 60   Charge Group   SLP Cognitive Skill Development 4       FIM Comprehension Score:  6 - Modified Independent  Comprehension Description:  Verbal cues    FIM Expression Score:  7 - Independent  Expression Description:       FIM Social Interaction Score:  7 - Independent  Social Interaction Description:       FIM Problem Solving Score:  5 - Standby Prompting/Supervision or Set-up  Problem Solving Description:  Therapy schedule, Increased time, Verbal cueing    FIM Memory Score:  5 - Standby Prompting/Supervision or Set-up  Memory Description:  Therapy schedule, Verbal cueing      Assessment    Pt sorted medications with 100% accuracy independently.  Pt completed (3x6) deductive reasoning puzzle with 88% accuracy independently; 100% given min verbal cues to attend to written details.     Plan    Target high level reasoning/attn.

## 2019-09-22 PROCEDURE — 99232 SBSQ HOSP IP/OBS MODERATE 35: CPT | Performed by: HOSPITALIST

## 2019-09-22 PROCEDURE — 770010 HCHG ROOM/CARE - REHAB SEMI PRIVAT*

## 2019-09-22 PROCEDURE — 94760 N-INVAS EAR/PLS OXIMETRY 1: CPT

## 2019-09-22 PROCEDURE — 700102 HCHG RX REV CODE 250 W/ 637 OVERRIDE(OP): Performed by: PHYSICAL MEDICINE & REHABILITATION

## 2019-09-22 PROCEDURE — A9270 NON-COVERED ITEM OR SERVICE: HCPCS | Performed by: PHYSICAL MEDICINE & REHABILITATION

## 2019-09-22 RX ADMIN — APIXABAN 5 MG: 5 TABLET, FILM COATED ORAL at 08:53

## 2019-09-22 RX ADMIN — BUDESONIDE AND FORMOTEROL FUMARATE DIHYDRATE 2 PUFF: 160; 4.5 AEROSOL RESPIRATORY (INHALATION) at 08:54

## 2019-09-22 RX ADMIN — BUDESONIDE AND FORMOTEROL FUMARATE DIHYDRATE 2 PUFF: 160; 4.5 AEROSOL RESPIRATORY (INHALATION) at 21:36

## 2019-09-22 RX ADMIN — APIXABAN 5 MG: 5 TABLET, FILM COATED ORAL at 21:37

## 2019-09-22 RX ADMIN — ATORVASTATIN CALCIUM 80 MG: 40 TABLET, FILM COATED ORAL at 21:37

## 2019-09-22 ASSESSMENT — ENCOUNTER SYMPTOMS
VOMITING: 0
PALPITATIONS: 0
COUGH: 0
MUSCULOSKELETAL NEGATIVE: 1
FOCAL WEAKNESS: 1
CHILLS: 0
ABDOMINAL PAIN: 0
SHORTNESS OF BREATH: 0
FEVER: 0
NAUSEA: 0
EYES NEGATIVE: 1

## 2019-09-22 NOTE — CARE PLAN
Problem: Communication  Goal: The ability to communicate needs accurately and effectively will improve  Outcome: PROGRESSING AS EXPECTED  Able to make needs known ask questions and follow directions continue with therapies emotional support answer questions as asked     Problem: Safety  Goal: Will remain free from injury  Outcome: PROGRESSING AS EXPECTED  No falls no injury. Continue Fall precautions Safety measures and Hourly rounding      Problem: Infection  Goal: Will remain free from infection  Outcome: PROGRESSING AS EXPECTED  MEWs is 0 today. No other s/s of infection noted or reported. Continue to monitor for same     Problem: Bowel/Gastric:  Goal: Normal bowel function is maintained or improved  Outcome: PROGRESSING AS EXPECTED  LBM yesterday. Taking food and fluids well continue to encourage oob activities and participation therapies     Problem: Pain Management  Goal: Pain level will decrease to patient's comfort goal  Outcome: PROGRESSING AS EXPECTED  No complaint of pain verbalized medicated with tylenol twice night of the 20th. Continue to monitor for pain and act accordingly

## 2019-09-22 NOTE — CARE PLAN
Problem: Safety  Goal: Will remain free from injury  Outcome: PROGRESSING AS EXPECTED  Note:   Patient has remained free of injury during this shift. Uses call light appropriately to get assistance and does not test physical limitations. Demonstrates safe transfer techniques. Asks questions about medications and procedures appropriately.       Problem: Bowel/Gastric:  Goal: Normal bowel function is maintained or improved  Outcome: PROGRESSING AS EXPECTED  Flowsheets  Taken 9/21/2019 1900 by Carmen Benítez RIVETH  Last BM: 09/21/19  Taken 9/19/2019 2025 by Susan Anderson RIVETH  Number of Times Stooled: 0  Note:   Patient able to have a bowel movement within the past 72 hours without the use of scheduled bowel medications (she refuses them).

## 2019-09-22 NOTE — PROGRESS NOTES
Hospital Medicine Daily Progress Note      Chief Complaint:  HTN, PVD    Interval History:  Blood pressures have been high and Midodrine has been held.    Review of Systems  Review of Systems   Constitutional: Negative for chills and fever.   HENT: Negative.    Eyes: Negative.    Respiratory: Negative for cough and shortness of breath.    Cardiovascular: Negative for chest pain and palpitations.   Gastrointestinal: Negative for abdominal pain, nausea and vomiting.   Genitourinary: Negative.    Musculoskeletal: Negative.    Skin: Negative for itching and rash.   Neurological: Positive for focal weakness.        Physical Exam  Temp:  [36.3 °C (97.3 °F)-36.4 °C (97.6 °F)] 36.4 °C (97.5 °F)  Pulse:  [70-80] 72  Resp:  [18] 18  BP: (122-158)/(79-86) 158/86  SpO2:  [91 %-93 %] 91 %    Physical Exam   Constitutional: She is oriented to person, place, and time. No distress.   HENT:   Head: Normocephalic and atraumatic.   Right Ear: External ear normal.   Left Ear: External ear normal.   Eyes: Conjunctivae and EOM are normal. Right eye exhibits no discharge. Left eye exhibits no discharge.   Neck: Normal range of motion. Neck supple. No tracheal deviation present.   Cardiovascular: Normal rate, regular rhythm, S1 normal and S2 normal.   Pulmonary/Chest: No stridor. No respiratory distress. She has no wheezes.   Decreased BS   Abdominal: Soft. Bowel sounds are normal. She exhibits no distension. There is no tenderness.   Musculoskeletal: She exhibits no edema.   Neurological: She is alert and oriented to person, place, and time. No sensory deficit.   Skin: Skin is warm and dry. She is not diaphoretic. No cyanosis.   Vitals reviewed.      Fluids    Intake/Output Summary (Last 24 hours) at 9/22/2019 1329  Last data filed at 9/22/2019 1258  Gross per 24 hour   Intake 1190 ml   Output --   Net 1190 ml       Laboratory                        Assessment/Plan  * CVA (cerebral vascular accident) (HCC)- (present on  admission)  Assessment & Plan  Has left sided weakness, LLE worse than LUE  On Eliquis and Lipitor  MRI concerning for NPH, needs F/U    Azotemia  Assessment & Plan  Renal function resolved w/ PO fluids    PVD (peripheral vascular disease) ([facility])- (present on admission)  Assessment & Plan  On Eliquis and Lipitor  Outpt F/U w/ Dr. Wren    Thrombocytopenia ([facility])- (present on admission)  Assessment & Plan  Low Platelet counts resolved    Essential hypertension- (present on admission)  Assessment & Plan  Taken off Lisinopril previously for hypotension  Blood pressure now trending up, will discontinue Midodrine    Full Code    Reviewed w/ pt and RN

## 2019-09-22 NOTE — FLOWSHEET NOTE
09/22/19 1011   Events/Summary/Plan   Events/Summary/Plan 02 spot check   Interdisciplinary Plan of Care-Goals (Indications)   Bronchodilator Indications History / Diagnosis   Interdisciplinary Plan of Care-Outcomes    Bronchodilator Outcome Patient at Stable Baseline   Education   Education Yes - Pt. / Family has been Instructed in use of Respiratory Equipment   Respiratory WDL   Respiratory (WDL) X   Chest Exam   Respiration 18   Pulse 72   Secretions   Cough Non Productive   Oximetry   #Pulse Oximetry (Single Determination) Yes   Oxygen   Home O2 Use Prior To Admission? Yes   Home O2 LPM Flow 2 LPM   Home O2 Delivery Method Nasal Cannula   Home O2 Frequency of Use At Sleep   Pulse Oximetry 91 %   O2 Daily Delivery Respiratory  Room Air with O2 Available

## 2019-09-23 PROCEDURE — 97112 NEUROMUSCULAR REEDUCATION: CPT

## 2019-09-23 PROCEDURE — A9270 NON-COVERED ITEM OR SERVICE: HCPCS | Performed by: PHYSICAL MEDICINE & REHABILITATION

## 2019-09-23 PROCEDURE — 700102 HCHG RX REV CODE 250 W/ 637 OVERRIDE(OP): Performed by: PHYSICAL MEDICINE & REHABILITATION

## 2019-09-23 PROCEDURE — G0515 COGNITIVE SKILLS DEVELOPMENT: HCPCS

## 2019-09-23 PROCEDURE — 770010 HCHG ROOM/CARE - REHAB SEMI PRIVAT*

## 2019-09-23 PROCEDURE — 99233 SBSQ HOSP IP/OBS HIGH 50: CPT | Performed by: PHYSICAL MEDICINE & REHABILITATION

## 2019-09-23 PROCEDURE — 99232 SBSQ HOSP IP/OBS MODERATE 35: CPT | Performed by: HOSPITALIST

## 2019-09-23 PROCEDURE — 97530 THERAPEUTIC ACTIVITIES: CPT

## 2019-09-23 PROCEDURE — 94760 N-INVAS EAR/PLS OXIMETRY 1: CPT

## 2019-09-23 PROCEDURE — 97110 THERAPEUTIC EXERCISES: CPT

## 2019-09-23 PROCEDURE — 97116 GAIT TRAINING THERAPY: CPT

## 2019-09-23 RX ORDER — AMLODIPINE BESYLATE 5 MG/1
5 TABLET ORAL
Status: DISCONTINUED | OUTPATIENT
Start: 2019-09-23 | End: 2019-09-25 | Stop reason: HOSPADM

## 2019-09-23 RX ADMIN — APIXABAN 5 MG: 5 TABLET, FILM COATED ORAL at 08:11

## 2019-09-23 RX ADMIN — ATORVASTATIN CALCIUM 80 MG: 40 TABLET, FILM COATED ORAL at 19:54

## 2019-09-23 RX ADMIN — HYDRALAZINE HYDROCHLORIDE 25 MG: 25 TABLET, FILM COATED ORAL at 08:23

## 2019-09-23 RX ADMIN — BUDESONIDE AND FORMOTEROL FUMARATE DIHYDRATE 2 PUFF: 160; 4.5 AEROSOL RESPIRATORY (INHALATION) at 08:13

## 2019-09-23 RX ADMIN — BUDESONIDE AND FORMOTEROL FUMARATE DIHYDRATE 2 PUFF: 160; 4.5 AEROSOL RESPIRATORY (INHALATION) at 20:00

## 2019-09-23 RX ADMIN — APIXABAN 5 MG: 5 TABLET, FILM COATED ORAL at 19:54

## 2019-09-23 ASSESSMENT — ENCOUNTER SYMPTOMS
NAUSEA: 0
FEVER: 0
VOMITING: 0
EYES NEGATIVE: 1
CHILLS: 0
FOCAL WEAKNESS: 1
SHORTNESS OF BREATH: 0
MUSCULOSKELETAL NEGATIVE: 1
PALPITATIONS: 0
ABDOMINAL PAIN: 0
COUGH: 0

## 2019-09-23 NOTE — REHAB-PT IDT TEAM NOTE
Physical Therapy   Mobility  Bed mobility:   SPV  Bed /Chair/Wheelchair Transfer Initial:  2 - Max Assistance  Bed /Chair/Wheelchair Transfer Current:  5 - Standby Prompting/Supervision or Set-up   Bed/Chair/Wheelchair Transfer Description:  Supervision for safety  Walk Initial:  1 - Total Assistance  Walk Current:  4 - Minimal Assistance   Walk Description:  (Pt completed 144' in FWW close SBA indoors with moderate verbal cues for L hip flexion and heel strike. Pt completed 2x 80' outdoors on uneven surfaces CGA in FWW with max verbal cues for L hip flexion and heel strike.)  Wheelchair Initial:  1 - Total Assistance  Wheelchair Current:  4 - Minimal Assistance   Wheelchair Description:  (Pt propelled WC 2x 150' indoors with BUEs. Max verbal cues for utilizing LUE and sequencing.)  Stairs Initial:  0 - Not tested,unsafe activity  Stairs Current: 2 - Max Assistance   Stairs Description: (Pt ascended/descended 4 standard steps with BHR. Step through gait pattern during ascent and step to for descent. Limited by dizziness.)  Patient/Family Training/Education:  Ongoing, safety with FWW in home, FT for providing SPV  DME/DC Recommendations:  FWW, intermittent SPV - SPV for when walking  Strengths:  Able to follow instructions, Independent PLOF, Motivated for self care and independence, Pleasant and cooperative, Supportive family and Willingly participates in therapeutic activities  Barriers:   Hemiplegia, Poor balance and Other: L inattention, ocasional L footdrop with attention  # of short term goals set= 3  # of short term goals met=1 (2 partially met)  Physical Therapy Problems     Problem: Mobility     Dates: Start: 09/07/19       Description:     Goal: STG-Within one week, patient will ambulate household distance     Dates: Start: 09/18/19   Expected End: 09/25/19       Description: 1) Individualized goal: 150 ft With FWW at SPV in order to progress towards PLOF  2) Interventions: PT Group Therapy, PT E Stim  Attended, PT Orthotics Training, PT Gait Training, PT Self Care/Home Eval, PT Therapeutic Exercises, PT Neuro Re-Ed/Balance, PT Aquatic Therapy, PT Therapeutic Activity, PT Manual Therapy and PT Evaluation       Note:     Goal Note filed on 09/23/19 0855 by John Loving, PT    variable                        Problem: Mobility Transfers     Dates: Start: 09/18/19       Description:     Goal: STG-Within one week, patient will transfer bed to chair     Dates: Start: 09/18/19   Expected End: 09/25/19       Description: 1) Individualized goal: With FWW at mod I in order to progress towards PLOF  2) Interventions: PT Group Therapy, PT E Stim Attended, PT Orthotics Training, PT Gait Training, PT Self Care/Home Eval, PT Therapeutic Exercises, PT Neuro Re-Ed/Balance, PT Aquatic Therapy, PT Therapeutic Activity, PT Manual Therapy and PT Evaluation       Note:     Goal Note filed on 09/23/19 0855 by John Loving, PT    SPV                        Problem: PT-Long Term Goals     Dates: Start: 09/07/19       Description:     Goal: LTG-By discharge, patient will tolerate standing     Dates: Start: 09/07/19       Description: 1) Individualized goal:  With BUE and SPV support 5 minutes x 2 for LE strength / balance training  2) Interventions: PT Group Therapy, PT E Stim Attended, PT Orthotics Training, PT Gait Training, PT Self Care/Home Eval, PT Therapeutic Exercises, PT Neuro Re-Ed/Balance, PT Aquatic Therapy, PT Therapeutic Activity, PT Manual Therapy and PT Evaluation             Goal: LTG-By discharge, patient will propel wheelchair     Dates: Start: 09/07/19       Description: 1) Individualized goal:  SPV with BUE support x 150 ft to/from therapies/ meals  2) Interventions: PT Group Therapy, PT E Stim Attended, PT Orthotics Training, PT Gait Training, PT Self Care/Home Eval, PT Therapeutic Exercises, PT Neuro Re-Ed/Balance, PT Aquatic Therapy, PT Therapeutic Activity, PT Manual Therapy and PT Evaluation              Goal: LTG-By discharge, patient will ambulate     Dates: Start: 09/07/19       Description: 1) Individualized goal:  SBA with FWW x 150 ft  2) Interventions: PT Group Therapy, PT E Stim Attended, PT Orthotics Training, PT Gait Training, PT Self Care/Home Eval, PT Therapeutic Exercises, PT Neuro Re-Ed/Balance, PT Aquatic Therapy, PT Therapeutic Activity, PT Manual Therapy and PT Evaluation             Goal: LTG-By discharge, patient will transfer one surface to another     Dates: Start: 09/07/19       Description: 1) Individualized goal:  Modified independent with FWW  2) Interventions: PT Group Therapy, PT E Stim Attended, PT Orthotics Training, PT Gait Training, PT Self Care/Home Eval, PT Therapeutic Exercises, PT Neuro Re-Ed/Balance, PT Aquatic Therapy, PT Therapeutic Activity, PT Manual Therapy and PT Evaluation             Goal: LTG-By discharge, patient will ambulate up/down 4-6 stairs     Dates: Start: 09/07/19       Description: 1) Individualized goal:  CGA with hand rails  2) Interventions:  PT Group Therapy, PT E Stim Attended, PT Orthotics Training, PT Gait Training, PT Self Care/Home Eval, PT Therapeutic Exercises, PT Neuro Re-Ed/Balance, PT Aquatic Therapy, PT Therapeutic Activity, PT Manual Therapy and PT Evaluation             Goal: LTG-By discharge, patient will transfer in/out of a car     Dates: Start: 09/07/19       Description: 1) Individualized goal:  SBA with FWW  2) Interventions:  PT Group Therapy, PT E Stim Attended, PT Orthotics Training, PT Gait Training, PT Self Care/Home Eval, PT Therapeutic Exercises, PT Neuro Re-Ed/Balance, PT Aquatic Therapy, PT Therapeutic Activity, PT Manual Therapy and PT Evaluation                           Section completed by:  John Loving, PT

## 2019-09-23 NOTE — PROGRESS NOTES
Hospital Medicine Daily Progress Note      Chief Complaint:  HTN, PVD    Interval History:  Blood pressures in the 160's/90's range this morning.  Pt asymptomatic.    Review of Systems  Review of Systems   Constitutional: Negative for chills and fever.   HENT: Negative.    Eyes: Negative.    Respiratory: Negative for cough and shortness of breath.    Cardiovascular: Negative for chest pain and palpitations.   Gastrointestinal: Negative for abdominal pain, nausea and vomiting.   Genitourinary: Negative.    Musculoskeletal: Negative.    Skin: Negative for itching and rash.   Neurological: Positive for focal weakness.        Physical Exam  Temp:  [36.6 °C (97.8 °F)-36.7 °C (98.1 °F)] 36.7 °C (98.1 °F)  Pulse:  [67-86] 81  Resp:  [18] 18  BP: (138-164)/(79-92) 164/92  SpO2:  [92 %] 92 %    Physical Exam   Constitutional: She is oriented to person, place, and time. No distress.   HENT:   Head: Normocephalic and atraumatic.   Right Ear: External ear normal.   Left Ear: External ear normal.   Eyes: Conjunctivae and EOM are normal. Right eye exhibits no discharge. Left eye exhibits no discharge.   Neck: Normal range of motion. Neck supple. No tracheal deviation present.   Cardiovascular: Normal rate, regular rhythm, S1 normal and S2 normal.   Pulmonary/Chest: No stridor. No respiratory distress. She has no wheezes.   Decreased BS   Abdominal: Soft. Bowel sounds are normal. She exhibits no distension. There is no tenderness.   Musculoskeletal: She exhibits no edema.   Neurological: She is alert and oriented to person, place, and time. No sensory deficit.   Skin: Skin is warm and dry. She is not diaphoretic. No cyanosis.   Vitals reviewed.      Fluids    Intake/Output Summary (Last 24 hours) at 9/23/2019 1031  Last data filed at 9/23/2019 0831  Gross per 24 hour   Intake 1000 ml   Output --   Net 1000 ml       Laboratory                        Assessment/Plan  * CVA (cerebral vascular accident) (HCC)- (present on  admission)  Assessment & Plan  Has left sided weakness, LLE worse than LUE  On Eliquis and Lipitor  MRI concerning for NPH, needs F/U    Azotemia  Assessment & Plan  Renal function resolved w/ PO fluids    PVD (peripheral vascular disease) (HCC)- (present on admission)  Assessment & Plan  On Eliquis and Lipitor  Outpt F/U w/ Dr. Wren    Thrombocytopenia ([facility])- (present on admission)  Assessment & Plan  Low Platelet counts resolved    Essential hypertension- (present on admission)  Assessment & Plan  Taken off Lisinopril previously for hypotension  Blood pressure trending up despite off Midodrine  Will start low dose Norvasc    Full Code    Reviewed w/ pt and RN

## 2019-09-23 NOTE — THERAPY
Physical Therapy   Daily Treatment     Patient Name: Nohelia Dickerson  Age:  74 y.o., Sex:  female  Medical Record #: [id number]  Today's Date: 9/23/2019     Precautions  Precautions: Fall Risk, Other (See Comments)  Comments: Posterior/L lateral lean, L rafael, monitor BP    Subjective    Pt reports she is agreeable to community outing     Objective       09/23/19 1431   Interdisciplinary Plan of Care Collaboration   Patient Position at End of Therapy In Bed;Call Light within Reach;Tray Table within Reach   PT Total Time Spent   PT Individual Total Time Spent (Mins) 105   PT Charge Group   PT Gait Training 2   PT Therapeutic Activities 5     Pt participated in community outing to continue to work on community reintegration skills . Pt ambulates over 1000 ft - community distance with FWW and SBA -CGA over even and uneven surfaces. 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 Pt able to find items around store with no cues from therapist and independently asking for directions. Toilet transfer in public bathroom at mod I.          Assessment    Pt demos SPV - CGA level mobility throughout outing today over uneven surfaces but SPV - mod I over even surfaces with walker and demos independent community reintegration skills today in store environment with no cues needed for direction or safety .    Plan    DC, fall edu

## 2019-09-23 NOTE — PROGRESS NOTES
Received bedside shift report from Karoline YU RN regarding patient and assumed care. Patient awake, calm and stable, currently positioned in bed for comfort and safety; call light within reach. Denies pain or discomfort at this time. Will continue to monitor.

## 2019-09-23 NOTE — REHAB-RESPIRATORY IDT TEAM NOTE
Respiratory Therapy   Respiratory Therapy    There are no barriers from a respiratory standpoint for this patient.  She is at her baseline oxygen of 2 lpm at night.  Her SATS on RA during the day are in the low 90's  Section completed by:  Samreen Gardner, RRT

## 2019-09-23 NOTE — CARE PLAN
Problem: Communication  Goal: The ability to communicate needs accurately and effectively will improve  Intervention: Educate patient and significant other/support system about the plan of care, procedures, treatments, medications and allow for questions  Note:   Patient SBP this am was greater than 160, given prn hydralazine with good effect. Patient has new scheduled norvasc daily.      Problem: Skin Integrity  Goal: Risk for impaired skin integrity will decrease  Intervention: Assess risk factors for impaired skin integrity and/or pressure ulcers  Note:   Patient's skin remains intact and free from new or accidental injury this shift.  Will continue to monitor.

## 2019-09-23 NOTE — REHAB-OT IDT TEAM NOTE
Occupational Therapy   Activities of Daily Living  Eating Initial:  5 - Standby Prompting/Supervision or Set-up  Eating Current:  6 - Modified Independent   Eating Description:  Increased time  Grooming Initial:  5 - Standby Prompting/Supervision or Set-up  Grooming Current:  5 - Standby Prompting/Supervision or Set-up   Grooming Description:  Supervision for safety  Bathing Initial:  4 - Minimal Assistance  Bathing Current:  5 - Standby Prompting/Supervision or Set-up   Bathing Description:  Grab bar, Tub bench, Hand held shower, Increased time, Supervision for safety, Verbal cueing(verbal cues to not bend over due to pt commonly getting dizzy.)  Upper Body Dressing Initial:  5 - Standby Prompting/Supervision or Set-up  Upper Body Dressing Current:  6 - Modified Independent   Upper Body Dressing Description:  Increased time  Lower Body Dressing Initial:  2 - Max Assistance  Lower Body Dressing Current:  5 - Standby Prompting/Supervsion or Set-up   Lower Body Dressing Description:  5 - Standby Prompting/Supervsion or Set-up  Toileting Initial:  2 - Max Assistance  Toileting Current:  5 - Standby Prompting/Supervision or Set-up   Toileting Description:  Supervision for safety, Grab bar  Toilet Transfer Initial:  3 - Moderate Assistance  Toilet Transfer Current:  5 - Standby Prompting/Supervision or Set-up   Toilet Transfer Description:  5 - Standby Prompting/Supervision or Set-up  Tub / Shower Transfer Initial:  3 - Moderate Assistance  Tub / Shower Transfer Current:  5 - Standby Prompting/Supervision or Set-up   Tub / Shower Transfer Description:  Increased time, Supervision for safety  IADL:  laundry, kitchen mobility - SBA using FWW  Family Training/Education:  TBD  DME/DC Recommendations:  Home health OT; pt reports that home set-up is improving - family moved furniture for improve FWW mobility pathways, shower chair is in place, commode is over toilet, but grab bars are not yet installed.     Strengths:  Able to  follow instructions, Independent PLOF, Making steady progress towards goals, Motivated for self care and independence, Pleasant and cooperative, Supportive family and Willingly participates in therapeutic activities  Barriers:  Decreased endurance, Poor activity tolerance, Poor balance, Impaired carryover of learning and Other: LLE weakness, complaints of dizziness - low BP, L inattention     Occupational Therapy Goals     Problem: OT Long Term Goals     Dates: Start: 09/07/19       Description:     Goal: LTG-By discharge, patient will complete basic self care tasks     Dates: Start: 09/07/19       Description: 1) Individualized Goal:  Mod I for UB ADLs, Set-up and SBA for LB ADLs  2) Interventions:  OT Group Therapy, OT Self Care/ADL, OT Cognitive Skill Dev, OT Community Reintegration, OT Manual Ther Technique, OT Neuro Re-Ed/Balance, OT Sensory Int Techniques, OT Therapeutic Activity, OT Evaluation and OT Therapeutic Exercise             Goal: LTG-By discharge, patient will perform bathroom transfers     Dates: Start: 09/07/19       Description: 1) Individualized Goal:  SBA  2) Interventions:  OT Group Therapy, OT Self Care/ADL, OT Cognitive Skill Dev, OT Community Reintegration, OT Manual Ther Technique, OT Neuro Re-Ed/Balance, OT Sensory Int Techniques, OT Therapeutic Activity, OT Evaluation and OT Therapeutic Exercise             Goal: LTG-By discharge, patient will complete basic home management     Dates: Start: 09/07/19       Description: 1) Individualized Goal: Supervision  2) Interventions:  OT Group Therapy, OT Self Care/ADL, OT Cognitive Skill Dev, OT Community Reintegration, OT Manual Ther Technique, OT Neuro Re-Ed/Balance, OT Sensory Int Techniques, OT Therapeutic Activity, OT Evaluation and OT Therapeutic Exercise                         Section completed by:  Marjorie Hurtado MS,OTR/L

## 2019-09-23 NOTE — PROGRESS NOTES
Patient care assumed. Report received from [facility] CARLITO Roy. Patient is alert and calm, resting in bed. Call light and bedside table within reach. Will continue to monitor.

## 2019-09-23 NOTE — REHAB-NURSING IDT TEAM NOTE
Nursing   Nursing  Diet/Nutrition:  Regular and Thin Liquids  Medication Administration:  Whole with Liquid Wash  % consumed at meals in last 24 hours:  Consumed % per documentation.  Meal/Snack Consumption for the past 24 hrs:   Oral Nutrition   09/23/19 0831 Breakfast;Between % Consumed     Snack schedule:  Mid-morning  Supplement:    Appetite:  Good  Fluid Intake/Output in past 24 hours: In: 1190 [P.O.:1190]  Out: -   Admit Weight:  Weight: 62.6 kg (138 lb)  Weight Last 7 Days   [63.7 kg (140 lb 6.9 oz)] 63.7 kg (140 lb 6.9 oz) (09/22 1000)    Pain Issues:    Location:  --  --         Severity:  Denies   Scheduled pain medications:  None     PRN pain medications used in last 24 hours:  None   Non Pharmacologic Interventions:  distraction, repositioned and rest  Effectiveness of pain management plan:  good=patient states acceptable comfort after interventions    Bowel:    Bowel Assist Initial Score:  1 - Total Assistance  Bowel Assist Current Score:  5 - Standby Prompting/Supervision or Set-up  Bowl Accidents in last 7 days:  0  Last bowel movement: 09/21/19  Stool Description: Large, Loose     Usual bowel pattern:  every other day  Scheduled bowel medications:  senna-docusate (PERICOLACE or SENOKOT S)   PRN bowel medications used in last 24 hours:  None  Nursing Interventions:  Increased time, Scheduled medication, Positioning on commode/toilet, Supervision, Verbal cueing, Set-up  Effectiveness of bowel program:   good=regular, predictable, controlled emptying of bowel  Bladder:    Bladder Assist Initial Score:  1 - Total Assistance  Bladder Assist Current Score:  5 - Standby Prompting/Supervision or Set-up  Bladder Accidents in last 7 days:  0  PVR range for past 24-48 hours: -- ()  Intermittent Catheterization:    Medications affecting bladder:  None    Time void schedule/voiding pattern:  Voiding every 2-4 hours  Interventions:  Increased time, Supervision, Brief, Time void patient  initiated  Effectiveness of bladder training:  Good=regular, predictable, emptying of bladder, patient initiates time voiding    Torrez:    Type:     Patient Lines/Drains/Airways Status    Active Catheter     None              Date placed:          Justification:    Diabetes:  Blood Sugar Frequency:  None    Range of BS for last 48 hours:       Scheduled diabetic medications:  None  Sliding scale usage in past 24 hours:  No  Total Short acting insulin in the past 24 hours:  None  Total Long acting insulin in the past 24 hours:  None    Wound:         Patient Lines/Drains/Airways Status    Active Current Wounds     None                   Interventions:  none  Effectiveness of intervention:       Wound Vac Location:  Not applicable  Dressing last changed:  Not applicable  Pump Mode Pressure Setting       Description of drainage:  Not applicable    Sleep/wake cycle:   Average hours slept:  Sleeps 4-6 hours without waking  Sleep medication usage:  Declines    Patient/Family Training/Education:  Safety  Discharge Supply Recommendations:    Strengths: Alert and oriented, Willingly participates in therapeutic activities, Supportive family, Pleasant and cooperative, Effective communication skills and Manages pain appropriately   Barriers: should have supervision, weakness, dizziness    Nursing Problems     Problem: Bowel/Gastric:     Description:     Goal: Normal bowel function is maintained or improved     Description:     Flowsheet:     Taken at 09/21/19 1900    Last BM 09/21/19 by  Carmen Benítez [email or web address]    Taken at 09/19/19 2025    Number of Times Stooled 0 by  Susan Anderson, R.N.                Goal: Will not experience complications related to bowel motility     Description:                 Problem: Communication     Description:     Goal: The ability to communicate needs accurately and effectively will improve     Description:                 Problem: Discharge Barriers/Planning     Description:      Goal: Patient's continuum of care needs will be met     Description:                 Problem: Infection     Description:     Goal: Will remain free from infection     Description:                 Problem: Knowledge Deficit     Description:     Goal: Knowledge of disease process/condition, treatment plan, diagnostic tests, and medications will improve     Description:           Goal: Knowledge of the prescribed therapeutic regimen will improve     Description:                 Problem: Pain Management     Description:     Goal: Pain level will decrease to patient's comfort goal     Description:                 Problem: Respiratory:     Description:     Goal: Respiratory status will improve     Description:                 Problem: Safety     Description:     Goal: Will remain free from injury     Description:           Goal: Will remain free from falls     Description:                 Problem: Skin Integrity     Description:     Goal: Risk for impaired skin integrity will decrease     Description:                 Problem: Urinary Elimination:     Description:     Goal: Ability to reestablish a normal urinary elimination pattern will improve     Description:                 Problem: Venous Thromboembolism (VTW)/Deep Vein Thrombosis (DVT) Prevention:     Description:     Goal: Patient will participate in Venous Thrombosis (VTE)/Deep Vein Thrombosis (DVT)Prevention Measures     Description:                        Long Term Goals:   Other:    Section completed by:  Rose Fontenot R.N.

## 2019-09-23 NOTE — FLOWSHEET NOTE
09/23/19 1040   Events/Summary/Plan   Events/Summary/Plan 02 spot check   Interdisciplinary Plan of Care-Goals (Indications)   Bronchodilator Indications History / Diagnosis   Interdisciplinary Plan of Care-Outcomes    Bronchodilator Outcome Patient at Stable Baseline   Education   Education Yes - Pt. / Family has been Instructed in use of Home Oxygen   Respiratory WDL   Respiratory (WDL) X   Chest Exam   Respiration 18   Pulse 88   Oximetry   #Pulse Oximetry (Single Determination) Yes   Oxygen   Home O2 Use Prior To Admission? Yes   Home O2 LPM Flow 2 LPM   Home O2 Delivery Method Nasal Cannula   Home O2 Frequency of Use At Sleep   Pulse Oximetry 93 %   O2 Daily Delivery Respiratory  Room Air with O2 Available

## 2019-09-23 NOTE — REHAB-SLP IDT TEAM NOTE
Speech Therapy   Cognitive Linquistic Functions  Comprehension Initial:  6 - Modified Independent  Comprehension Current:  6 - Modified Independent   Comprehension Description:  Verbal cues  Expression Initial:  6 - Modified Independent  Expression Current:  7 - Independent   Expression Description:  Verbal cueing  Social Interaction Initial:  7 - Independent  Social Interaction Current:  7 - Independent   Social Interaction Description:  Increased time  Problem Solving Initial:  5 - Standby Prompting/Supervision or Set-up  Problem Solving Current:  5 - Standby Prompting/Supervision or Set-up   Problem Solving Description:  Verbal cueing, Therapy schedule, Increased time  Memory Initial:  5 - Standby Prompting/Supervision or Set-up  Memory Current:  5 - Standby Prompting/Supervision or Set-up   Memory Description:  Verbal cueing, Therapy schedule  Executive Functioning / Organization Initial:     Executive Functioning / Organization Current:      Executive Functioning Desciption:  Min-mod verbal cues for higher level reasoning.  Pt reports that she is at her cognitive baseline.    Swallowing  Swallowing Status:  WFL, not being followed for dysphagia tx.   Orders Placed This Encounter   Procedures   • Diet Order Regular     Standing Status:   Standing     Number of Occurrences:   1     Order Specific Question:   Diet:     Answer:   Regular [1]     Behavior Modification Plan  Give clear feedback and Set clear goals  Assistive Technology  Low tech: Calendar, planner, schedule, alarms/timers, pill organizer, post-it notes, lists  Family Training/Education:  ongoing  DC Recommendations:   No further ST therapy recommended at this time.  Pt at cognitive baseline.   Strengths:  Able to follow instructions, Alert and oriented, Good carryover of learning, Independent PLOF, Making steady progress towards goals, Pleasant and cooperative, Supportive family and Willingly participates in therapeutic activities  Barriers:  Other:  benefits from additional time to attend to details.   # of short term goals set=2  # of short term goals met=1  Speech Therapy Problems     Problem: Problem Solving STGs     Dates: Start: 09/07/19       Description:     Goal: STG-Within one week, patient will     Dates: Start: 09/07/19       Description: 1) Individualized goal:  Complete executive function tasks related to scheduling and reasoning with 90% accuracy given min verbal cues.   2) Interventions:  SLP Cognitive Skill Development and SLP Group Treatment                   Problem: Speech/Swallowing LTGs     Dates: Start: 09/07/19       Description:     Goal: LTG-By discharge, patient will solve complex problem     Dates: Start: 09/07/19       Description: 1) Individualized goal:  Mod I for safe discharge home.   2) Interventions:  SLP Aphasia Evaluation, SLP Cognitive Skill Development and SLP Group Treatment                          Section completed by:  Jordin Cabrera MS,CCC-SLP

## 2019-09-23 NOTE — PROGRESS NOTES
Rehab Progress Note     Date of Service: 9/23/2019  Chief Complaint: Follow-up stroke    Interval Events (Subjective)    Patient seen and examined in her room today. The weekend went well and she feels like she's ready for discharge home tomorrow. Her 17 yr old grand daughter can pick her up. She reports her left ankle strength continues to improve. She denies any pain. She's sleeping OK. No issues with her bowel or bladder.    We discussed the need for follow up with stroke bridge clinic, as she may need a cardiac monitor.     Objective:  VITAL SIGNS: BP (!) 164/92 Comment: manual  Pulse 81   Temp 36.7 °C (98.1 °F) (Temporal)   Resp 18   Ht 1.829 m (6')   Wt 63.7 kg (140 lb 6.9 oz)   SpO2 92%   BMI 19.05 kg/m²   Gen: alert, no apparent distress  CV: regular rate and rhythm, no murmurs, no peripheral edema  Resp: clear to ascultation bilaterally, normal respiratory effort  GI: soft, non-tender abdomen, bowel sounds present  Neuro: notable for left ankle weakness, improved    No results found for this or any previous visit (from the past 72 hour(s)).    Current Facility-Administered Medications   Medication Frequency   • amLODIPine (NORVASC) tablet 5 mg Q DAY   • budesonide-formoterol (SYMBICORT) 160-4.5 MCG/ACT inhaler 2 Puff BID   • oxyCODONE immediate-release (ROXICODONE) tablet 5 mg Q4HRS PRN   • Respiratory Care per Protocol Continuous RT   • acetaminophen (TYLENOL) tablet 650 mg Q4HRS PRN   • hydrALAZINE (APRESOLINE) tablet 25 mg Q8HRS PRN   • senna-docusate (PERICOLACE or SENOKOT S) 8.6-50 MG per tablet 2 Tab BID    And   • polyethylene glycol/lytes (MIRALAX) PACKET 1 Packet QDAY PRN    And   • magnesium hydroxide (MILK OF MAGNESIA) suspension 30 mL QDAY PRN    And   • bisacodyl (DULCOLAX) suppository 10 mg QDAY PRN   • artificial tears ophthalmic solution 1 Drop PRN   • benzocaine-menthol (CEPACOL) lozenge 1 Lozenge Q2HRS PRN   • mag hydrox-al hydrox-simeth (MAALOX PLUS ES or MYLANTA DS) suspension 20  mL Q2HRS PRN   • ondansetron (ZOFRAN ODT) dispertab 4 mg 4X/DAY PRN    Or   • ondansetron (ZOFRAN) syringe/vial injection 4 mg 4X/DAY PRN   • traZODone (DESYREL) tablet 50 mg QHS PRN   • sodium chloride (OCEAN) 0.65 % nasal spray 2 Spray PRN   • melatonin tablet 3 mg HS PRN   • atorvastatin (LIPITOR) tablet 80 mg Q EVENING   • apixaban (ELIQUIS) tablet 5 mg BID   • albuterol inhaler 2 Puff Q4HRS PRN       Orders Placed This Encounter   Procedures   • Diet Order Regular     Standing Status:   Standing     Number of Occurrences:   1     Order Specific Question:   Diet:     Answer:   Regular [1]       Assessment:  Active Hospital Problems    Diagnosis   • *CVA (cerebral vascular accident) (HCC)   • Orthostatic hypotension   • Azotemia   • Tachycardia   • PVD (peripheral vascular disease) (HCC)   • Arterial occlusion, lower extremity (HCC)   • COPD   • Chronic respiratory failure (HCC)   • Thrombocytopenia (HCC)   • Tobacco abuse   • Essential hypertension     This patient is a 74 y.o. female admitted for acute inpatient rehabilitation with CVA (cerebral vascular accident) (HCC).    I led and attended the weekly conference, and agree with the IDT conference documentation and plan of care as noted below.    Date of conference: 9/23/2019    Goals and barriers: See IDT note.    Biggest barriers:  Is alone during the day    Therapy update 9/23/2019  Modified Independent eating  Supervision grooming  Supervision bathing  Modified Independent upper body dressing  Supervision lower body dressing  Supervision toileting  Supervisionbladder  Supervision bowel  Supervision bed/chair transfer  Supervision toilet transfer  Supervision tub/shower transfer  Min assist ambulation  Min assist wheelchair propulsion  Max assist stairs  Modified Independent comprehension  Independent expression  Independent social interaction  Supervision problem solving  Supervision memory    Admission FIM 64 --> 79 (9/18)    CM/social support: lives with  family, who works    Anticipated DC date: 9/25/2019, will have intermittent supervision during the day while her family works, but daughter is off starting Thursday    Home health: PT/OT/SLP/RN    Equip: FWW    Follow up: PCP, stroke bridge clinic, rehab clinic 4-6 weeks      Medical Decision Making and Plan:    Right KIM and MCA ischemic stroke  Likely cardio-embolic  Continue full rehab program  PT/OT/SLP, 1 hr each discipline, 5 days per week  9/16: decrease OT 30 min, increase PT 90 min  Continue Eliquis and statin for secondary stroke prophylaxis  Follow up with stroke bridge clinic, needs monitor?     Possible NPH  Follow up stroke bridge clinic    Bowel  Meds as needed  Last BM 9/21    Bladder  Check PVRs - 43  ICP for over 400 cc  Scheduled toileting    Appreciate assistance of hospitalist with her medical co-morbidities:     Hypertension, stable  Orthostatic hypotension, resolved, off midodrine  Tachycardia, resolved  Peripheral vascular disease  Tobacco use, did tobacco counseling with patient  COPD, on oxygen at night - home level    DVT prophylaxis  Eliquis     Total time:  40 minutes.  I spent greater than 50% of the time for patient care, counseling, and coordination on this date, including patient face-to face time, unit/floor time with review of records/pertinent lab data and studies, as well as discussing diagnostic evaluation/work up, planned therapeutic interventions, and future disposition of care, as per the interval events/subjective and the assessment and plan as noted above.    Kayla Francisco M.D.   Physical Medicine and Rehabilitation

## 2019-09-23 NOTE — THERAPY
"Speech Language Pathology  Daily Treatment     Patient Name: Nohelia Dickerson  Age:  74 y.o., Sex:  female  Medical Record #: [id number]  Today's Date: 9/23/2019     Subjective    Pt pleasant and cooperative during tx.  Pt excited about discharge home tomorrow.  Pt reported improvement in her thinking, and that she is at her cognitive baseline.       Objective       09/23/19 0931   Cognition   Complex Reasoning  / Problem Solving Minimal (4)   Functional Math / Financial Management Supervision (5)   SLP Total Time Spent   SLP Individual Total Time Spent (Mins) 60   Charge Group   SLP Cognitive Skill Development 4       FIM Comprehension Score:  6 - Modified Independent  Comprehension Description:  Verbal cues    FIM Expression Score:  7 - Independent  Expression Description:       FIM Social Interaction Score:  7 - Independent  Social Interaction Description:       FIM Problem Solving Score:  5 - Standby Prompting/Supervision or Set-up  Problem Solving Description:  Verbal cueing, Therapy schedule, Increased time    FIM Memory Score:  5 - Standby Prompting/Supervision or Set-up  Memory Description:  Verbal cueing, Therapy schedule      Assessment    Pt completed \"Eating out on a Budget\" cognitive task.  Pt met the objective, and demonstrated 1 addition error.  Task completed with supervision for attn to detail.     Plan    Discharge home 9/24/19.     "

## 2019-09-23 NOTE — PROGRESS NOTES
"Recheck pt BP manual, still elevated 164/92. Pt states she feels \"normal\" no symptoms of elevated BP. Pt to get PRN hydralazine. Primary RN notified.   "

## 2019-09-23 NOTE — THERAPY
"Occupational Therapy  Daily Treatment     Patient Name: Nohelia Dickerson  Age:  74 y.o., Sex:  female  Medical Record #: [id number]  Today's Date: 9/23/2019     Precautions  Precautions: Fall Risk, Other (See Comments)  Comments: Posterior/L lateral lean, L rafael, monitor BP    Safety   ADL Safety : Requires Supervision for Safety  Bathroom Safety: Requires Supervision for Safety  Comments: Home setup of bathroom clarified with pt; pt has tub shower with toilet directly next to it limiting space for TTB, she does have a shower seat at home. Pt able to step in/out of bathtub x2 with Min A using grab bars with verbal cues for foot placement. Discussed recommendation of grab bar placement to increase safety which pt beleived could be installed by son in law. Per pt she also has a commode she can place over toilet    Subjective    \"I'm dizzy, I should have some water\"     Objective    Seated Dynavision - Mode A 1 min: score 16, 3.75 sec      09/23/19 0901   Standing Upper Body Exercises   Comments standing at high mat with uniliateral support - shoulder flexion - with full reach to ceiling (no weight) 2 sets x 10   Standing Lower Body Exercises   Heel Rise 3 sets of 10;Bilateral  (at high mat)   Outcome Measures   Outcome Measures Utilized Dynavision   Dynavision   Patient Position Standing   Application Attention regulation;Bilateral integration;Visual-motor integration;Visual-spatial integration   Mode A   Time per trial (sec) 60   Number of Hits 10   Average Reaction Time 6 sec   Clinical Observations Standing balance intact;Coordination intact  (however occ used wall for UE support)   Interdisciplinary Plan of Care Collaboration   Patient Position at End of Therapy Seated  (handoff to ST)   OT Total Time Spent   OT Individual Total Time Spent (Mins) 30   OT Charge Group   OT Neuromuscular Re-education / Balance 1   OT Therapeutic Exercise  1       Assessment    Pt demo L side inattention, poor reaction time as " evidence during Dynavision, LLE weakness when standing, and pt with complaint of dizziness when standing x 1 only but resolved following water intake    Plan    Cont overall strength/endurance and standing balance to improve ADL's and functional mobility

## 2019-09-23 NOTE — CARE PLAN
Problem: Mobility  Goal: STG-Within one week, patient will ambulate household distance  Description  1) Individualized goal: 150 ft With FWW at SPV in order to progress towards PLOF  2) Interventions: PT Group Therapy, PT E Stim Attended, PT Orthotics Training, PT Gait Training, PT Self Care/Home Eval, PT Therapeutic Exercises, PT Neuro Re-Ed/Balance, PT Aquatic Therapy, PT Therapeutic Activity, PT Manual Therapy and PT Evaluation     Outcome: PROGRESSING AS EXPECTED  Note:   variable     Problem: Mobility Transfers  Goal: STG-Within one week, patient will transfer bed to chair  Description  1) Individualized goal: With FWW at mod I in order to progress towards PLOF  2) Interventions: PT Group Therapy, PT E Stim Attended, PT Orthotics Training, PT Gait Training, PT Self Care/Home Eval, PT Therapeutic Exercises, PT Neuro Re-Ed/Balance, PT Aquatic Therapy, PT Therapeutic Activity, PT Manual Therapy and PT Evaluation     Outcome: PROGRESSING AS EXPECTED  Note:   SPV     Problem: Mobility  Goal: STG-Within one week, patient will propel wheelchair household distances  Description  1) Individualized goal:  Min A with BUE support 50 ft x 2  2) Interventions: PT Group Therapy, PT E Stim Attended, PT Orthotics Training, PT Gait Training, PT Self Care/Home Eval, PT Therapeutic Exercises, PT Neuro Re-Ed/Balance, PT Aquatic Therapy, PT Therapeutic Activity, PT Manual Therapy and PT Evaluation     Outcome: MET

## 2019-09-23 NOTE — CARE PLAN
Problem: Communication  Goal: The ability to communicate needs accurately and effectively will improve  Note:   Makes needs known to staff.  Encouraged to use call light for staff assist.      Problem: Safety  Goal: Will remain free from falls  Note:   Call light kept within reach and encouraged to use for assistance and with toileting needs. Encouraged to call staff and to wait for staff before transfers.

## 2019-09-23 NOTE — REHAB-COLLABORATIVE ONGOING IDT TEAM NOTE
Weekly Interdisciplinary Team Conference Note    Weekly Interdisciplinary Team Conference # 3  Date:  9/23/2019    Clinicians present and reporting at team conference include the following:   MD: Kayla Francisco MD   RN:  Rose Fontenot, JOSE   PT:   Jayro Loving PT, DPT  OT:  Marjorie Hurtado MS, OTR/L   ST:  Alyce Leal, MS, CCC-SLP  CM: [place]  REC:  None  RT:  Not Applicable  RPh:  Darren Sanchez [facility]  Other:   None  All reporting clinicians have a working knowledge of this patient's plan of care.    Targeted DC Date:  9/25/19     Medical    Patient Active Problem List    Diagnosis Date Noted   • Orthostatic hypotension 09/09/2019   • Azotemia 09/07/2019   • Tachycardia 09/06/2019   • CVA (cerebral vascular accident) ([facility]) 09/04/2019   • PVD (peripheral vascular disease) ([facility]) 09/03/2019   • Arterial occlusion, lower extremity ([facility]) 09/03/2019   • Viral URI with cough 04/18/2019   • History of nephrolithiasis 08/24/2018   • Hydronephrosis 08/07/2018   • COPD 08/07/2018   • Chronic respiratory failure ([facility]) 08/07/2018   • Thrombocytopenia ([facility]) 08/06/2018   • Healthcare maintenance 05/22/2018   • Tobacco abuse 05/22/2018   • Essential hypertension 05/22/2018     Results     ** No results found for the last 24 hours. **        Nursing  Diet/Nutrition:  Regular and Thin Liquids  Medication Administration:  Whole with Liquid Wash  % consumed at meals in last 24 hours:  Consumed % per documentation.  Meal/Snack Consumption for the past 24 hrs:   Oral Nutrition   09/23/19 0831 Breakfast;Between % Consumed     Snack schedule:  Mid-morning  Supplement:    Appetite:  Good  Fluid Intake/Output in past 24 hours: In: 1190 [P.O.:1190]  Out: -   Admit Weight:  Weight: 62.6 kg (138 lb)  Weight Last 7 Days   [63.7 kg (140 lb 6.9 oz)] 63.7 kg (140 lb 6.9 oz) (09/22 1000)    Pain Issues:    Location:  --  --         Severity:  Denies   Scheduled pain medications:  None     PRN pain medications used in  last 24 hours:  None   Non Pharmacologic Interventions:  distraction, repositioned and rest  Effectiveness of pain management plan:  good=patient states acceptable comfort after interventions    Bowel:    Bowel Assist Initial Score:  1 - Total Assistance  Bowel Assist Current Score:  5 - Standby Prompting/Supervision or Set-up  Bowl Accidents in last 7 days:  0  Last bowel movement: 09/21/19  Stool Description: Large, Loose     Usual bowel pattern:  every other day  Scheduled bowel medications:  senna-docusate (PERICOLACE or SENOKOT S)   PRN bowel medications used in last 24 hours:  None  Nursing Interventions:  Increased time, Scheduled medication, Positioning on commode/toilet, Supervision, Verbal cueing, Set-up  Effectiveness of bowel program:   good=regular, predictable, controlled emptying of bowel  Bladder:    Bladder Assist Initial Score:  1 - Total Assistance  Bladder Assist Current Score:  5 - Standby Prompting/Supervision or Set-up  Bladder Accidents in last 7 days:  0  PVR range for past 24-48 hours: -- ()  Intermittent Catheterization:    Medications affecting bladder:  None    Time void schedule/voiding pattern:  Voiding every 2-4 hours  Interventions:  Increased time, Supervision, Brief, Time void patient initiated  Effectiveness of bladder training:  Good=regular, predictable, emptying of bladder, patient initiates time voiding    Torrez:    Type:     Patient Lines/Drains/Airways Status    Active Catheter     None              Date placed:          Justification:    Diabetes:  Blood Sugar Frequency:  None    Range of BS for last 48 hours:       Scheduled diabetic medications:  None  Sliding scale usage in past 24 hours:  No  Total Short acting insulin in the past 24 hours:  None  Total Long acting insulin in the past 24 hours:  None    Wound:         Patient Lines/Drains/Airways Status    Active Current Wounds     None                   Interventions:  none  Effectiveness of intervention:       Wound  Vac Location:  Not applicable  Dressing last changed:  Not applicable  Pump Mode Pressure Setting       Description of drainage:  Not applicable    Sleep/wake cycle:   Average hours slept:  Sleeps 4-6 hours without waking  Sleep medication usage:  Declines    Patient/Family Training/Education:  Safety  Discharge Supply Recommendations:    Strengths: Alert and oriented, Willingly participates in therapeutic activities, Supportive family, Pleasant and cooperative, Effective communication skills and Manages pain appropriately   Barriers: should have supervision, weakness, dizziness    Nursing Problems     Problem: Bowel/Gastric:     Description:     Goal: Normal bowel function is maintained or improved     Description:     Flowsheet:     Taken at 09/21/19 1900    Last BM 09/21/19 by  Carmen Benítez [email or web address]    Taken at 09/19/19 2025    Number of Times Stooled 0 by  Susan Anderson, R.N.                Goal: Will not experience complications related to bowel motility     Description:                 Problem: Communication     Description:     Goal: The ability to communicate needs accurately and effectively will improve     Description:                 Problem: Discharge Barriers/Planning     Description:     Goal: Patient's continuum of care needs will be met     Description:                 Problem: Infection     Description:     Goal: Will remain free from infection     Description:                 Problem: Knowledge Deficit     Description:     Goal: Knowledge of disease process/condition, treatment plan, diagnostic tests, and medications will improve     Description:           Goal: Knowledge of the prescribed therapeutic regimen will improve     Description:                 Problem: Pain Management     Description:     Goal: Pain level will decrease to patient's comfort goal     Description:                 Problem: Respiratory:     Description:     Goal: Respiratory status will improve     Description:                  Problem: Safety     Description:     Goal: Will remain free from injury     Description:           Goal: Will remain free from falls     Description:                 Problem: Skin Integrity     Description:     Goal: Risk for impaired skin integrity will decrease     Description:                 Problem: Urinary Elimination:     Description:     Goal: Ability to reestablish a normal urinary elimination pattern will improve     Description:                 Problem: Venous Thromboembolism (VTW)/Deep Vein Thrombosis (DVT) Prevention:     Description:     Goal: Patient will participate in Venous Thrombosis (VTE)/Deep Vein Thrombosis (DVT)Prevention Measures     Description:                        Long Term Goals:   Other:    Section completed by:  Rose Fontenot R.N.        Respiratory Therapy    There are no barriers from a respiratory standpoint for this patient.  She is at her baseline oxygen of 2 lpm at night.  Her SATS on RA during the day are in the low 90's  Section completed by:  Samreen Gardner, RRT    Mobility  Bed mobility:   SPV  Bed /Chair/Wheelchair Transfer Initial:  2 - Max Assistance  Bed /Chair/Wheelchair Transfer Current:  5 - Standby Prompting/Supervision or Set-up   Bed/Chair/Wheelchair Transfer Description:  Supervision for safety  Walk Initial:  1 - Total Assistance  Walk Current:  4 - Minimal Assistance   Walk Description:  (Pt completed 144' in FWW close SBA indoors with moderate verbal cues for L hip flexion and heel strike. Pt completed 2x 80' outdoors on uneven surfaces CGA in FWW with max verbal cues for L hip flexion and heel strike.)  Wheelchair Initial:  1 - Total Assistance  Wheelchair Current:  4 - Minimal Assistance   Wheelchair Description:  (Pt propelled WC 2x 150' indoors with BUEs. Max verbal cues for utilizing LUE and sequencing.)  Stairs Initial:  0 - Not tested,unsafe activity  Stairs Current: 2 - Max Assistance   Stairs Description: (Pt  ascended/descended 4 standard steps with BHR. Step through gait pattern during ascent and step to for descent. Limited by dizziness.)  Patient/Family Training/Education:  Ongoing, safety with FWW in home, FT for providing SPV  DME/DC Recommendations:  FWW, intermittent SPV - SPV for when walking  Strengths:  Able to follow instructions, Independent PLOF, Motivated for self care and independence, Pleasant and cooperative, Supportive family and Willingly participates in therapeutic activities  Barriers:   Hemiplegia, Poor balance and Other: L inattention, ocasional L footdrop with attention  # of short term goals set= 3  # of short term goals met=1 (2 partially met)  Physical Therapy Problems     Problem: Mobility     Dates: Start: 09/07/19       Description:     Goal: STG-Within one week, patient will ambulate household distance     Dates: Start: 09/18/19   Expected End: 09/25/19       Description: 1) Individualized goal: 150 ft With FWW at SPV in order to progress towards PLOF  2) Interventions: PT Group Therapy, PT E Stim Attended, PT Orthotics Training, PT Gait Training, PT Self Care/Home Eval, PT Therapeutic Exercises, PT Neuro Re-Ed/Balance, PT Aquatic Therapy, PT Therapeutic Activity, PT Manual Therapy and PT Evaluation       Note:     Goal Note filed on 09/23/19 0855 by John Loving, PT    variable                        Problem: Mobility Transfers     Dates: Start: 09/18/19       Description:     Goal: STG-Within one week, patient will transfer bed to chair     Dates: Start: 09/18/19   Expected End: 09/25/19       Description: 1) Individualized goal: With FWW at mod I in order to progress towards PLOF  2) Interventions: PT Group Therapy, PT E Stim Attended, PT Orthotics Training, PT Gait Training, PT Self Care/Home Eval, PT Therapeutic Exercises, PT Neuro Re-Ed/Balance, PT Aquatic Therapy, PT Therapeutic Activity, PT Manual Therapy and PT Evaluation       Note:     Goal Note filed on 09/23/19 0855 by  John Loving, PT    SPV                        Problem: PT-Long Term Goals     Dates: Start: 09/07/19       Description:     Goal: LTG-By discharge, patient will tolerate standing     Dates: Start: 09/07/19       Description: 1) Individualized goal:  With BUE and SPV support 5 minutes x 2 for LE strength / balance training  2) Interventions: PT Group Therapy, PT E Stim Attended, PT Orthotics Training, PT Gait Training, PT Self Care/Home Eval, PT Therapeutic Exercises, PT Neuro Re-Ed/Balance, PT Aquatic Therapy, PT Therapeutic Activity, PT Manual Therapy and PT Evaluation             Goal: LTG-By discharge, patient will propel wheelchair     Dates: Start: 09/07/19       Description: 1) Individualized goal:  SPV with BUE support x 150 ft to/from therapies/ meals  2) Interventions: PT Group Therapy, PT E Stim Attended, PT Orthotics Training, PT Gait Training, PT Self Care/Home Eval, PT Therapeutic Exercises, PT Neuro Re-Ed/Balance, PT Aquatic Therapy, PT Therapeutic Activity, PT Manual Therapy and PT Evaluation             Goal: LTG-By discharge, patient will ambulate     Dates: Start: 09/07/19       Description: 1) Individualized goal:  SBA with FWW x 150 ft  2) Interventions: PT Group Therapy, PT E Stim Attended, PT Orthotics Training, PT Gait Training, PT Self Care/Home Eval, PT Therapeutic Exercises, PT Neuro Re-Ed/Balance, PT Aquatic Therapy, PT Therapeutic Activity, PT Manual Therapy and PT Evaluation             Goal: LTG-By discharge, patient will transfer one surface to another     Dates: Start: 09/07/19       Description: 1) Individualized goal:  Modified independent with FWW  2) Interventions: PT Group Therapy, PT E Stim Attended, PT Orthotics Training, PT Gait Training, PT Self Care/Home Eval, PT Therapeutic Exercises, PT Neuro Re-Ed/Balance, PT Aquatic Therapy, PT Therapeutic Activity, PT Manual Therapy and PT Evaluation             Goal: LTG-By discharge, patient will ambulate up/down 4-6 stairs      Dates: Start: 09/07/19       Description: 1) Individualized goal:  CGA with hand rails  2) Interventions:  PT Group Therapy, PT E Stim Attended, PT Orthotics Training, PT Gait Training, PT Self Care/Home Eval, PT Therapeutic Exercises, PT Neuro Re-Ed/Balance, PT Aquatic Therapy, PT Therapeutic Activity, PT Manual Therapy and PT Evaluation             Goal: LTG-By discharge, patient will transfer in/out of a car     Dates: Start: 09/07/19       Description: 1) Individualized goal:  SBA with FWW  2) Interventions:  PT Group Therapy, PT E Stim Attended, PT Orthotics Training, PT Gait Training, PT Self Care/Home Eval, PT Therapeutic Exercises, PT Neuro Re-Ed/Balance, PT Aquatic Therapy, PT Therapeutic Activity, PT Manual Therapy and PT Evaluation                           Section completed by:  John Loving, PT    Activities of Daily Living  Eating Initial:  5 - Standby Prompting/Supervision or Set-up  Eating Current:  6 - Modified Independent   Eating Description:  Increased time  Grooming Initial:  5 - Standby Prompting/Supervision or Set-up  Grooming Current:  5 - Standby Prompting/Supervision or Set-up   Grooming Description:  Supervision for safety  Bathing Initial:  4 - Minimal Assistance  Bathing Current:  5 - Standby Prompting/Supervision or Set-up   Bathing Description:  Grab bar, Tub bench, Hand held shower, Increased time, Supervision for safety, Verbal cueing(verbal cues to not bend over due to pt commonly getting dizzy.)  Upper Body Dressing Initial:  5 - Standby Prompting/Supervision or Set-up  Upper Body Dressing Current:  6 - Modified Independent   Upper Body Dressing Description:  Increased time  Lower Body Dressing Initial:  2 - Max Assistance  Lower Body Dressing Current:  5 - Standby Prompting/Supervsion or Set-up   Lower Body Dressing Description:  5 - Standby Prompting/Supervsion or Set-up  Toileting Initial:  2 - Max Assistance  Toileting Current:  5 - Standby Prompting/Supervision or  Set-up   Toileting Description:  Supervision for safety, Grab bar  Toilet Transfer Initial:  3 - Moderate Assistance  Toilet Transfer Current:  5 - Standby Prompting/Supervision or Set-up   Toilet Transfer Description:  5 - Standby Prompting/Supervision or Set-up  Tub / Shower Transfer Initial:  3 - Moderate Assistance  Tub / Shower Transfer Current:  5 - Standby Prompting/Supervision or Set-up   Tub / Shower Transfer Description:  Increased time, Supervision for safety  IADL:   laundry, kitchen mobility - SBA using FWW  Family Training/Education:   TBD  DME/DC Recommendations:  Home health OT; pt reports that home set-up is improving - family moved furniture for improve FWW mobility pathways, shower chair is in place, commode is over toilet, but grab bars are not yet installed.     Strengths:  Able to follow instructions, Independent PLOF, Making steady progress towards goals, Motivated for self care and independence, Pleasant and cooperative, Supportive family and Willingly participates in therapeutic activities  Barriers:  Decreased endurance, Poor activity tolerance, Poor balance, Impaired carryover of learning and Other: LLE weakness, complaints of dizziness - low BP, L inattention     Occupational Therapy Goals     Problem: OT Long Term Goals     Dates: Start: 09/07/19       Description:     Goal: LTG-By discharge, patient will complete basic self care tasks     Dates: Start: 09/07/19       Description: 1) Individualized Goal:  Mod I for UB ADLs, Set-up and SBA for LB ADLs  2) Interventions:  OT Group Therapy, OT Self Care/ADL, OT Cognitive Skill Dev, OT Community Reintegration, OT Manual Ther Technique, OT Neuro Re-Ed/Balance, OT Sensory Int Techniques, OT Therapeutic Activity, OT Evaluation and OT Therapeutic Exercise             Goal: LTG-By discharge, patient will perform bathroom transfers     Dates: Start: 09/07/19       Description: 1) Individualized Goal:  SBA  2) Interventions:  OT Group Therapy, OT  Self Care/ADL, OT Cognitive Skill Dev, OT Community Reintegration, OT Manual Ther Technique, OT Neuro Re-Ed/Balance, OT Sensory Int Techniques, OT Therapeutic Activity, OT Evaluation and OT Therapeutic Exercise             Goal: LTG-By discharge, patient will complete basic home management     Dates: Start: 09/07/19       Description: 1) Individualized Goal: Supervision  2) Interventions:  OT Group Therapy, OT Self Care/ADL, OT Cognitive Skill Dev, OT Community Reintegration, OT Manual Ther Technique, OT Neuro Re-Ed/Balance, OT Sensory Int Techniques, OT Therapeutic Activity, OT Evaluation and OT Therapeutic Exercise                         Section completed by:  Marjorie Hurtado, MS,OTR/L    Cognitive Linquistic Functions  Comprehension Initial:  6 - Modified Independent  Comprehension Current:  6 - Modified Independent   Comprehension Description:  Verbal cues  Expression Initial:  6 - Modified Independent  Expression Current:  7 - Independent   Expression Description:  Verbal cueing  Social Interaction Initial:  7 - Independent  Social Interaction Current:  7 - Independent   Social Interaction Description:  Increased time  Problem Solving Initial:  5 - Standby Prompting/Supervision or Set-up  Problem Solving Current:  5 - Standby Prompting/Supervision or Set-up   Problem Solving Description:  Verbal cueing, Therapy schedule, Increased time  Memory Initial:  5 - Standby Prompting/Supervision or Set-up  Memory Current:  5 - Standby Prompting/Supervision or Set-up   Memory Description:  Verbal cueing, Therapy schedule  Executive Functioning / Organization Initial:     Executive Functioning / Organization Current:      Executive Functioning Desciption:  Min-mod verbal cues for higher level reasoning.  Pt reports that she is at her cognitive baseline.    Swallowing  Swallowing Status:  WFL, not being followed for dysphagia tx.   Orders Placed This Encounter   Procedures   • Diet Order Regular     Standing Status:    Standing     Number of Occurrences:   1     Order Specific Question:   Diet:     Answer:   Regular [1]     Behavior Modification Plan  Give clear feedback and Set clear goals  Assistive Technology  Low tech: Calendar, planner, schedule, alarms/timers, pill organizer, post-it notes, lists  Family Training/Education:  ongoing  DC Recommendations:   No further ST therapy recommended at this time.  Pt at cognitive baseline.   Strengths:  Able to follow instructions, Alert and oriented, Good carryover of learning, Independent PLOF, Making steady progress towards goals, Pleasant and cooperative, Supportive family and Willingly participates in therapeutic activities  Barriers:  Other: benefits from additional time to attend to details.   # of short term goals set=2  # of short term goals met=1  Speech Therapy Problems     Problem: Problem Solving STGs     Dates: Start: 09/07/19       Description:     Goal: STG-Within one week, patient will     Dates: Start: 09/07/19       Description: 1) Individualized goal:  Complete executive function tasks related to scheduling and reasoning with 90% accuracy given min verbal cues.   2) Interventions:  SLP Cognitive Skill Development and SLP Group Treatment                   Problem: Speech/Swallowing LTGs     Dates: Start: 09/07/19       Description:     Goal: LTG-By discharge, patient will solve complex problem     Dates: Start: 09/07/19       Description: 1) Individualized goal:  Mod I for safe discharge home.   2) Interventions:  SLP Aphasia Evaluation, SLP Cognitive Skill Development and SLP Group Treatment                          Section completed by:  Jordin Cabrera MS,[facility]-SLP       Nutrition  Dietary Problems (Active)      There are no active problems.            Nutrition services: Day 5 of admit.  Nohelia Dickerson is a 74 y.o. female with admitting DX of (l body involvement (r) brain, Left leg numbness, Arterial occlusion, lower extremity, Stroke (cerebrum). Hx  "includes thyroid cancer, COPD, HTN.    Consult received for MST score of 4 due to report of weight loss x 6 months and poor PO PTA.      Assessment:  Height: 182.9 cm (6')  Weight: 63 kg (139 lb)  Body mass index is 18.85 kg/m²., BMI classification: low end of the normal range. Based on pt's age, BMI 23-27 would be beneficial  Diet/Intake: regular/avg intake of 65%.     Evaluation:   1. Pt reports that her UBW is 67.3 kg (148#). Pt thinks she has lost weight during her recent acute hospitalization due to eating healthier foods rather than \"junk foods\". Of note, pt's weight at acute was 62.9 kg on 9/5/19 and 64.7 kg on 9/6/19. Pt's weight ~13 months ago on 8/7/18 was 69.8 kg. Weight loss noted of 6.8 kg (9.7%) x 13 months. Weight loss is not significant. Recorded PO intake has been good here and was also good at acute with intake % of most recorded meals (5 out of 6). Pt said that dietary is working with her on her menu choices.     Malnutrition Risk: Criteria not met.     Recommendations/Plan:  1. Continue with regular diet as ordered.   2. Encourage continued intake of >50%  3. Document intake of all meals as % taken in ADL's to provide interdisciplinary communication across all shifts.   4. Monitor weight.  5. Nutrition rep will continue to see patient for ongoing meal and snack preferences.   6. RD will monitor weekly.              Section completed by:  Anne Marie West R.D.    REHAB-Pharmacy IDT Team Note by Darren Sanchez RPH at 9/20/2019  4:26 PM  Version 1 of 1    Author:  Darren Sanchez RPH Service:  -- Author Type:  Pharmacist    Filed:  9/20/2019  4:27 PM Date of Service:  9/20/2019  4:26 PM Status:  Signed    :  Darren Sanchez RPH (Pharmacist)         Pharmacy   Pharmacy  Antibiotics/Antifungals/Antivirals:  Medication:      Active Orders (From admission, onward)    None        Route:        NA  Stop Date:  NA  Reason:      NA  Antihypertensives/Cardiac:  Medication:    Orders (72h " ago, onward)     Start     Ordered    09/21/19 0600  midodrine (PROAMATINE) tablet 2.5 mg  2 TIMES DAILY      09/20/19 1518    09/18/19 1800  midodrine (PROAMATINE) tablet 2.5 mg  THREE TIMES DAILY DIURETIC,   Status:  Discontinued      09/18/19 1123    09/18/19 1031  midodrine (PROAMATINE) tablet 2.5 mg  3 TIMES DAILY,   Status:  Discontinued      09/18/19 1031    09/12/19 1300  midodrine (PROAMATINE) tablet 2.5 mg  2 TIMES DAILY,   Status:  Discontinued      09/12/19 0910    09/06/19 2100  atorvastatin (LIPITOR) tablet 80 mg  EVERY EVENING      09/06/19 1251    09/06/19 1251  hydrALAZINE (APRESOLINE) tablet 25 mg  EVERY 8 HOURS PRN      09/06/19 1251              Patient Vitals for the past 24 hrs:   BP Pulse   09/20/19 1612 -- 78   09/20/19 1301 (P) 120/80 (P) 98   09/20/19 0700 155/88 67   09/19/19 1907 -- 82   09/19/19 1854 144/82 86     Anticoagulation:  Medication: Eliquis    Other key medications: A review of the medication list reveals no issues at this time.    Section completed by: Darren Sanchez [facility][AW.1]     Attribution Key     AW.1 - [facility] on 9/20/2019  4:26 PM                  DC Planning  DC destination/dispostion: To single level unit attached to patient's daughter's and son-in-law's home. Adults work. There are 2 grandchildren living there too, ages 11 and 17. Patient will be alone approximately 7 hours per day. Family arranging for neighbors to check in on patient at lunch. [employer] brochure given to patient and recommendation made to son-in-law and patient's daughter.     Referrals: [facility] SN/PT/ST/OT; Preferred DME for FWW, tub transfer bench.    DC Needs: MD f/u appointments - Dr. Cortez-PCP appointment in place, f/u with Dr. Wren.    Barriers to discharge:  Functional deficits     Strengths: Motivated. PLOF - independent. Supportive family.    Section completed by:  Zee Gomes RMERA.      Physician Summary  Kayla Francisco MD participated and led team conference  discussion.

## 2019-09-24 PROCEDURE — A9270 NON-COVERED ITEM OR SERVICE: HCPCS | Performed by: HOSPITALIST

## 2019-09-24 PROCEDURE — A9270 NON-COVERED ITEM OR SERVICE: HCPCS | Performed by: PHYSICAL MEDICINE & REHABILITATION

## 2019-09-24 PROCEDURE — 99231 SBSQ HOSP IP/OBS SF/LOW 25: CPT | Performed by: PHYSICAL MEDICINE & REHABILITATION

## 2019-09-24 PROCEDURE — 700102 HCHG RX REV CODE 250 W/ 637 OVERRIDE(OP): Performed by: HOSPITALIST

## 2019-09-24 PROCEDURE — 97535 SELF CARE MNGMENT TRAINING: CPT

## 2019-09-24 PROCEDURE — 97530 THERAPEUTIC ACTIVITIES: CPT

## 2019-09-24 PROCEDURE — G0515 COGNITIVE SKILLS DEVELOPMENT: HCPCS

## 2019-09-24 PROCEDURE — 700102 HCHG RX REV CODE 250 W/ 637 OVERRIDE(OP): Performed by: PHYSICAL MEDICINE & REHABILITATION

## 2019-09-24 PROCEDURE — 94760 N-INVAS EAR/PLS OXIMETRY 1: CPT

## 2019-09-24 PROCEDURE — 97116 GAIT TRAINING THERAPY: CPT

## 2019-09-24 PROCEDURE — 99232 SBSQ HOSP IP/OBS MODERATE 35: CPT | Performed by: HOSPITALIST

## 2019-09-24 PROCEDURE — 770010 HCHG ROOM/CARE - REHAB SEMI PRIVAT*

## 2019-09-24 RX ADMIN — ATORVASTATIN CALCIUM 80 MG: 40 TABLET, FILM COATED ORAL at 19:07

## 2019-09-24 RX ADMIN — BUDESONIDE AND FORMOTEROL FUMARATE DIHYDRATE 2 PUFF: 160; 4.5 AEROSOL RESPIRATORY (INHALATION) at 19:08

## 2019-09-24 RX ADMIN — APIXABAN 5 MG: 5 TABLET, FILM COATED ORAL at 19:07

## 2019-09-24 RX ADMIN — APIXABAN 5 MG: 5 TABLET, FILM COATED ORAL at 07:59

## 2019-09-24 RX ADMIN — BUDESONIDE AND FORMOTEROL FUMARATE DIHYDRATE 2 PUFF: 160; 4.5 AEROSOL RESPIRATORY (INHALATION) at 07:59

## 2019-09-24 RX ADMIN — AMLODIPINE BESYLATE 5 MG: 5 TABLET ORAL at 05:19

## 2019-09-24 ASSESSMENT — ENCOUNTER SYMPTOMS
CHILLS: 0
FEVER: 0
NERVOUS/ANXIOUS: 0
VOMITING: 0
ABDOMINAL PAIN: 0
SHORTNESS OF BREATH: 0
NAUSEA: 0
DIARRHEA: 0

## 2019-09-24 ASSESSMENT — ACTIVITIES OF DAILY LIVING (ADL)
TOILETING_LEVEL_OF_ASSIST: ABLE TO COMPLETE TOILETING WITHOUT ASSIST
SHOWER_TRANSFER_LEVEL_OF_ASSIST: REQUIRES SUPERVISION WITH SHOWER TRANSFER
TOILET_TRANSFER_LEVEL_OF_ASSIST: REQUIRES SUPERVISION WITH TOILET TRANSFER

## 2019-09-24 NOTE — PROGRESS NOTES
[facility] Medicine Daily Progress Note    Date of Service  9/24/2019    Chief Complaint:  Hypertension  PVD  Dizziness    Interval History:  No significant events or changes since last visit    Review of Systems  Review of Systems   Constitutional: Negative for chills and fever.   Respiratory: Negative for shortness of breath.    Cardiovascular: Negative for chest pain.   Gastrointestinal: Negative for abdominal pain, diarrhea, nausea and vomiting.   Psychiatric/Behavioral: The patient is not nervous/anxious.         Physical Exam  Temp:  [36.4 °C (97.5 °F)-37 °C (98.6 °F)] 36.6 °C (97.8 °F)  Pulse:  [71-90] 71  Resp:  [18] 18  BP: (108-162)/(60-94) 162/91  SpO2:  [92 %-96 %] 96 %    Physical Exam   Constitutional: She is oriented to person, place, and time. She appears well-nourished.   HENT:   Head: Atraumatic.   Eyes: Pupils are equal, round, and reactive to light. Conjunctivae are normal.   Neck: Normal range of motion. Neck supple.   Cardiovascular: Normal rate, regular rhythm, S1 normal and S2 normal.   No murmur heard.  Pulmonary/Chest: Effort normal. She has no wheezes. She has no rales.   Abdominal: Soft. She exhibits no distension. There is no tenderness.   Musculoskeletal: She exhibits no edema.   Neurological: She is alert and oriented to person, place, and time. No sensory deficit.   Skin: Skin is warm and dry. No rash noted. No cyanosis.   Psychiatric: She has a normal mood and affect. Her behavior is normal.   Nursing note and vitals reviewed.      Fluids    Intake/Output Summary (Last 24 hours) at 9/24/2019 0845  Last data filed at 9/24/2019 0519  Gross per 24 hour   Intake 350 ml   Output --   Net 350 ml       Laboratory                        Imaging    Assessment/Plan  * CVA (cerebral vascular accident) (HCC)- (present on admission)  Assessment & Plan  On Eliquis  On Lipitor  MRI concerning for NPH, needs F/U    Orthostatic hypotension  Assessment & Plan  Resolved  Had dizziness on 9/9 &  9/12  Orthostatics (9/9): SBP dropped 18 points from supine to sitting  Orthostatics (9/11): positive  Off Lisinopril (last dose 9/9)  Off Midodrine  Monitor    PVD (peripheral vascular disease) (HCC)- (present on admission)  Assessment & Plan  On Eliquis  On Lipitor  Note: for out patient f/u with Dr. Wren    Essential hypertension- (present on admission)  Assessment & Plan  BP a little labile and occ rises up  BP recently rising up in the early am  Off Lisinopril 2nd to ? causing prior diziness  On Norvasc: 5 mg daily (9/23)  Monitor another day since meds were recently adjusted

## 2019-09-24 NOTE — PROGRESS NOTES
Rehab Progress Note     Date of Service: 9/24/2019  Chief Complaint: Follow-up stroke    Interval Events (Subjective)    Patient seen and examined in her room this morning. She is agreeable to waiting until tomorrow for discharge, so she can have supervision at home, due to her fall risk. She denies any pain. She has no new complaints. She is very happy with the care she has had here.    Objective:  VITAL SIGNS: /74   Pulse 94   Temp 36.6 °C (97.8 °F) (Temporal)   Resp 18   Ht 1.829 m (6')   Wt 63.7 kg (140 lb 6.9 oz)   SpO2 92%   BMI 19.05 kg/m²   Gen: alert, no apparent distress  CV: regular rate and rhythm, no murmurs, no peripheral edema  Resp: clear to ascultation bilaterally, normal respiratory effort  GI: soft, non-tender abdomen, bowel sounds present  Neuro: notable for left ankle/EHL 4+/5    No results found for this or any previous visit (from the past 72 hour(s)).    Current Facility-Administered Medications   Medication Frequency   • amLODIPine (NORVASC) tablet 5 mg Q DAY   • budesonide-formoterol (SYMBICORT) 160-4.5 MCG/ACT inhaler 2 Puff BID   • oxyCODONE immediate-release (ROXICODONE) tablet 5 mg Q4HRS PRN   • Respiratory Care per Protocol Continuous RT   • acetaminophen (TYLENOL) tablet 650 mg Q4HRS PRN   • hydrALAZINE (APRESOLINE) tablet 25 mg Q8HRS PRN   • senna-docusate (PERICOLACE or SENOKOT S) 8.6-50 MG per tablet 2 Tab BID    And   • polyethylene glycol/lytes (MIRALAX) PACKET 1 Packet QDAY PRN    And   • magnesium hydroxide (MILK OF MAGNESIA) suspension 30 mL QDAY PRN    And   • bisacodyl (DULCOLAX) suppository 10 mg QDAY PRN   • artificial tears ophthalmic solution 1 Drop PRN   • benzocaine-menthol (CEPACOL) lozenge 1 Lozenge Q2HRS PRN   • mag hydrox-al hydrox-simeth (MAALOX PLUS ES or MYLANTA DS) suspension 20 mL Q2HRS PRN   • ondansetron (ZOFRAN ODT) dispertab 4 mg 4X/DAY PRN    Or   • ondansetron (ZOFRAN) syringe/vial injection 4 mg 4X/DAY PRN   • traZODone (DESYREL) tablet 50  mg QHS PRN   • sodium chloride (OCEAN) 0.65 % nasal spray 2 Spray PRN   • melatonin tablet 3 mg HS PRN   • atorvastatin (LIPITOR) tablet 80 mg Q EVENING   • apixaban (ELIQUIS) tablet 5 mg BID   • albuterol inhaler 2 Puff Q4HRS PRN       Orders Placed This Encounter   Procedures   • Diet Order Regular     Standing Status:   Standing     Number of Occurrences:   1     Order Specific Question:   Diet:     Answer:   Regular [1]       Assessment:  Active Hospital Problems    Diagnosis   • *CVA (cerebral vascular accident) (HCC)   • Orthostatic hypotension   • Azotemia   • Tachycardia   • PVD (peripheral vascular disease) (HCC)   • Arterial occlusion, lower extremity (HCC)   • COPD   • Chronic respiratory failure (HCC)   • Thrombocytopenia (HCC)   • Tobacco abuse   • Essential hypertension     This patient is a 74 y.o. female admitted for acute inpatient rehabilitation with CVA (cerebral vascular accident) (HCC).    I led and attended the weekly conference, and agree with the IDT conference documentation and plan of care as noted below.    Date of conference: 9/23/2019    Goals and barriers: See IDT note.    Biggest barriers:  Is alone during the day    Admission FIM 64 --> 79 (9/18)    CM/social support: lives with family, who works    Anticipated DC date: 9/25/2019, will have intermittent supervision during the day while her family works, but daughter is off starting Thursday    Home health: PT/OT/SLP/RN    Equip: FWW    Follow up: PCP, stroke bridge clinic, rehab clinic 4-6 weeks      Medical Decision Making and Plan:    Right KIM and MCA ischemic stroke  Likely cardio-embolic  Continue full rehab program  PT/OT/SLP, 1 hr each discipline, 5 days per week  9/16: decrease OT 30 min, increase PT 90 min  Continue Eliquis and statin for secondary stroke prophylaxis  Follow up with stroke bridge clinic, needs monitor?     Possible NPH  Follow up stroke bridge clinic    Bowel  Meds as needed  Last BM 9/21    Bladder  Check  PVRs - 43  ICP for over 400 cc  Scheduled toileting    Appreciate assistance of hospitalist with her medical co-morbidities:     Hypertension, stable  Orthostatic hypotension, resolved, off midodrine  Tachycardia, resolved  Peripheral vascular disease  Tobacco use, did tobacco counseling with patient  COPD, on oxygen at night - home level    DVT prophylaxis  Eliquis     Total time:  16 minutes.  I spent greater than 50% of the time for patient care, counseling, and coordination on this date, including patient face-to face time, unit/floor time with review of records/pertinent lab data and studies, as well as discussing diagnostic evaluation/work up, planned therapeutic interventions, and future disposition of care, as per the interval events/subjective and the assessment and plan as noted above.    I have performed a physical exam, reviewed and updated ROS, as well as the assessment and plan today 9/24/2019. In review of note from 9/23/2019 there are no new changes except as documented above.        Kayla Francisco M.D.   Physical Medicine and Rehabilitation

## 2019-09-24 NOTE — FLOWSHEET NOTE
09/24/19 0957   Events/Summary/Plan   Events/Summary/Plan 02 spot check   Interdisciplinary Plan of Care-Goals (Indications)   Bronchodilator Indications History / Diagnosis   Interdisciplinary Plan of Care-Outcomes    Bronchodilator Outcome Patient at Stable Baseline   Education   Education Yes - Pt. / Family has been Instructed in use of Respiratory Medications and Adverse Reactions   Respiratory WDL   Respiratory (WDL) X   Chest Exam   Respiration 18   Pulse 94   Oximetry   #Pulse Oximetry (Single Determination) Yes   Oxygen   Home O2 Use Prior To Admission? Yes   Home O2 LPM Flow 2 LPM   Home O2 Delivery Method Nasal Cannula   Home O2 Frequency of Use At Sleep   Pulse Oximetry 92 %   O2 Daily Delivery Respiratory  Room Air with O2 Available

## 2019-09-24 NOTE — ASSESSMENT & PLAN NOTE
Resolved  Had dizziness on 9/9 & 9/12  Orthostatics (9/9): SBP dropped 18 points from supine to sitting  Orthostatics (9/11): positive  Off Lisinopril (last dose 9/9)  Off Midodrine  Monitor

## 2019-09-24 NOTE — CARE PLAN
Problem: OT Long Term Goals  Goal: LTG-By discharge, patient will complete basic self care tasks  Description  1) Individualized Goal:  Mod I for UB ADLs, Set-up and SBA for LB ADLs  2) Interventions:  OT Group Therapy, OT Self Care/ADL, OT Cognitive Skill Dev, OT Community Reintegration, OT Manual Ther Technique, OT Neuro Re-Ed/Balance, OT Sensory Int Techniques, OT Therapeutic Activity, OT Evaluation and OT Therapeutic Exercise     Outcome: MET  Goal: LTG-By discharge, patient will perform bathroom transfers  Description  1) Individualized Goal:  SBA  2) Interventions:  OT Group Therapy, OT Self Care/ADL, OT Cognitive Skill Dev, OT Community Reintegration, OT Manual Ther Technique, OT Neuro Re-Ed/Balance, OT Sensory Int Techniques, OT Therapeutic Activity, OT Evaluation and OT Therapeutic Exercise     Outcome: MET  Goal: LTG-By discharge, patient will complete basic home management  Description  1) Individualized Goal: Supervision  2) Interventions:  OT Group Therapy, OT Self Care/ADL, OT Cognitive Skill Dev, OT Community Reintegration, OT Manual Ther Technique, OT Neuro Re-Ed/Balance, OT Sensory Int Techniques, OT Therapeutic Activity, OT Evaluation and OT Therapeutic Exercise     Outcome: MET

## 2019-09-24 NOTE — THERAPY
Speech Language Pathology  Daily Treatment     Patient Name: Nohelia Dickerson  Age:  74 y.o., Sex:  female  Medical Record #: [id number]  Today's Date: 9/24/2019     Subjective    Patient was in room at time of ST. Was willing to participate.      Objective       09/24/19 0902   SCCAN (Scales of Cognitive and Communicative Ability for Neurorehabilitation)   Oral Expression - Raw Score 19   Oral Expression - Scale Performance Score 100   Orientation - Raw Score 12   Orientation - Scale Performance Score 100   Memory - Raw Score 17   Memory - Scale Performance Score 89   Speech Comprehension - Raw Score 13   Speech Comprehension - Scale Performance Score 100   Reading Comprehension - Raw Score 12   Reading Comprehension - Scale Performance Score 100   Writing - Raw Score 7   Writing - Scale Performance Score 100   Attention - Raw Score 15   Attention - Scale Performance Score 94   Problem Solving - Raw Score 2   Problem Solving - Scale Performance Score 9   SCCAN Total Raw Score 93   SCCAN Degree of Severity Typical Functioning   SLP Total Time Spent   SLP Individual Total Time Spent (Mins) 60   Charge Group   SLP Cognitive Skill Development 4       FIM Eating Score:  6 - Modified Independent  Eating Description:       FIM Comprehension Score:  6 - Modified Independent  Comprehension Description:       FIM Expression Score:  7 - Independent  Expression Description:       FIM Social Interaction Score:  7 - Independent  Social Interaction Description:       FIM Problem Solving Score:  6 - Modified Independent  Problem Solving Description:       FIM Memory Score:  6 - Modified Independent  Memory Description:       FIM Eating Score:  6 - Modified Independent  Eating Description:         Assessment    Patient completed final outcome assessment. Participated in SCCAN. Initial raw score of 88 increased to final raw score of 93 both WFL, but with improvements noted in overall score as well as in attention and problem  mahi tran.     Plan    Prepare for d/c home.

## 2019-09-24 NOTE — THERAPY
Physical Therapy   Daily Treatment     Patient Name: Nohelia Dickerson  Age:  74 y.o., Sex:  female  Medical Record #: [id number]  Today's Date: 9/24/2019     Precautions  Precautions: Fall Risk, Other (See Comments)  Comments: Posterior/L lateral lean, L rafael, monitor BP    Subjective    Pt excited to DC tomorrow     Objective       09/24/19 1231   Interdisciplinary Plan of Care Collaboration   Patient Position at End of Therapy Seated;Call Light within Reach;Tray Table within Reach   PT Total Time Spent   PT Individual Total Time Spent (Mins) 60   PT Charge Group   PT Gait Training 2   PT Therapeutic Activities 2       FIM Bed/Chair/Wheelchair Transfers Score: 6 - Modified Independent  Bed/Chair/Wheelchair Transfers Description:       FIM Walking Score:  5 - Standby Prompting/Supervision or Set-up  Walking Description:  Safety concerns, Verbal cueing, Supervision for safety, Extra time    FIM Wheelchair Score:     Wheelchair Description:       FIM Stairs Score:  5 - Standby Prompting/Supervision or Set-up  Stairs Description:  Extra time, Safety concerns, Hand rails, Supervision for safety    See IRFPAI for full DC assessment    Pt education on avoiding falls in the home with recommendations of home modifications and safety considerations to reduce risk for falls at home and in the community. Also education on fall recovery process with suggestions on when and how to get up from floor in case of a fall. Written information provided for remembering details at home.       Assessment    Pt demos SPV - mod I level mobility for DC assessment. Pt safe to DC to home with FWW and SPV when walking . Ongoing skilled PT in home health setting recommended for continue improvement of strength, balance, activity tolerance and decreasing risk for falls and rehospitilization at home and in the community.    Plan    DC

## 2019-09-24 NOTE — DISCHARGE PLANNING
SW met with patient at bedside following team conference. Pt states she is going home tomorrow and is very happy. Provided pt DMV Handicap Placard for signature. Discussed HH services would be in place. Pt confirms FWW and tub transfer bench have been delivered. Pt agreeable with d/c plan.  SW placed call to pt's daughter Roseanne to review d/c plan. Agreeable with HH services. Discussed Adult Day Health programs as additional support so patient will not be home alone all day- Roseanne receptive and requested pamphlet. Roseanne states her niece will be picking pt up at d/c and Roseanne will assist with obtaining pt's prescriptions in the evening.

## 2019-09-24 NOTE — PROGRESS NOTES
Received bedside shift report from Rose GUPTA RN regarding patient and assumed care. Patient awake, calm and stable, currently positioned in bed for comfort and safety; call light within reach. Denies pain or discomfort at this time. Will continue to monitor.

## 2019-09-24 NOTE — PROGRESS NOTES
Patient care assumed. Report received from [facility] CARLITO Roy. Patient is alert and calm, resting in bed. Call light and bedside table within reach. Will continue to monitor.

## 2019-09-24 NOTE — CONSULTS
DATE OF SERVICE:  2019    BEHAVIORAL MEDICINE EVALUATION    BRIEF HISTORY OF PRESENTING COMPLAINTS:  The patient is a 74-year-old white    female who is referred for behavioral medicine evaluation by Dr. Francisco.  The patient was transferred to rehab from acute where she was treated   for a CVA.  An MRI showed acute infarcts in the right frontal and right   parietal lobes.  The patient was stabilized acutely and then sent to rehab to   address her general debility.    PAST MEDICAL HISTORY:  Significant for thyroid cancer, COPD, hypertension,   inguinal hernia, kidney stones, tobacco use.    PSYCHOLOGICAL STATUS:  MENTAL STATUS EXAMINATION:  The patient is a well-nourished, thinly built   female of tall stature, who appeared her stated age of 74.  At presentation,   the patient was alert.  She was resting supine in bed when approached.  The   patient oriented well to my presence.  The patient was kempt in appearance.    She was dressed casually in street attire that was appropriate to her age and   setting.  The patient's manner of presentation was cooperative and friendly.    The patient was grossly oriented to time, place, and person.  Her language was   logical and goal oriented, speech was normal for rate and rhythm.  The   patient's concentration and memory functioning appeared slightly diminished.    The patient's affect was constricted, stable, and mildly intense.  She related   well.  Mood appeared depressed, but appropriate to the context.    There is no evidence of delusional or perceptual disturbance.  Also, the   patient showed no unusual pain or motor behavior during the interview.    SPECIFIC BEHAVIORAL COMPLAINTS:  The patient admitted to symptoms of   depression.  She reported in the past several months she has felt sad, down in   the dumps and somewhat discouraged about the future.  The patient reported   her depression mostly relates to grief over the loss of her daughter who     from an aneurysm at age 38 last June.    Other problems mentioned by the patient included mild back pain.  She also   reported tobacco use.  She denied any interpersonal discord or discomfort,   family disharmony, sleep disturbance, significant decline in her energy or   appetite.    The patient also reported no ETOH or other drug abuse.    PSYCHIATRIC HISTORY:  The patient denied any history significant for   psychiatric disturbance or treatment including in or outpatient care.    PSYCHOMETRIC TESTING:  The patient was administered 2 psychometric tests and 2   screening instruments.  PS/PC-R revealed mild-to-moderate symptoms of   depression.  Her CDR survey showed no problems with level of consciousness,   attention, thinking, perception, speech or memory.  She did reveal some   problems with behavioral activation and basic self-care.  She also reported   loss of vigor, but she said her energy level is returning.  She denied   significant sleep disturbance.  She said her appetite is somewhat diminished,   but returning.  She reported some mild low back pain.    The patient was screened for any elder abuse or risk of suicide.  There is no   strong evidence for either problem.    SOCIAL HISTORY:  The patient is .  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  She lives with her daughter in [place], [place].    IMPRESSIONS:  Uncomplicated bereavement; adjustment disorder with dysphoric   mood; tobacco dependence.    RECOMMENDATIONS:  The patient will be followed for status and supportive care.       ____________________________________     LUCY MAURO, PHD    DEBORAH / LIBERTAD    DD:  09/24/2019 14:09:16  DT:  09/24/2019 16:58:15    D#:  [id number]  Job#:  [id number]

## 2019-09-24 NOTE — CARE PLAN
Problem: PT-Long Term Goals  Goal: LTG-By discharge, patient will propel wheelchair  Description  1) Individualized goal:  SPV with BUE support x 150 ft to/from therapies/ meals  2) Interventions: PT Group Therapy, PT E Stim Attended, PT Orthotics Training, PT Gait Training, PT Self Care/Home Eval, PT Therapeutic Exercises, PT Neuro Re-Ed/Balance, PT Aquatic Therapy, PT Therapeutic Activity, PT Manual Therapy and PT Evaluation     Outcome: NOT MET  Note:   NA     Problem: Mobility  Goal: STG-Within one week, patient will ambulate household distance  Description  1) Individualized goal: 150 ft With FWW at SPV in order to progress towards PLOF  2) Interventions: PT Group Therapy, PT E Stim Attended, PT Orthotics Training, PT Gait Training, PT Self Care/Home Eval, PT Therapeutic Exercises, PT Neuro Re-Ed/Balance, PT Aquatic Therapy, PT Therapeutic Activity, PT Manual Therapy and PT Evaluation     Outcome: MET     Problem: PT-Long Term Goals  Goal: LTG-By discharge, patient will tolerate standing  Description  1) Individualized goal:  With BUE and SPV support 5 minutes x 2 for LE strength / balance training  2) Interventions: PT Group Therapy, PT E Stim Attended, PT Orthotics Training, PT Gait Training, PT Self Care/Home Eval, PT Therapeutic Exercises, PT Neuro Re-Ed/Balance, PT Aquatic Therapy, PT Therapeutic Activity, PT Manual Therapy and PT Evaluation     Outcome: MET  Goal: LTG-By discharge, patient will ambulate  Description  1) Individualized goal:  SBA with FWW x 150 ft  2) Interventions: PT Group Therapy, PT E Stim Attended, PT Orthotics Training, PT Gait Training, PT Self Care/Home Eval, PT Therapeutic Exercises, PT Neuro Re-Ed/Balance, PT Aquatic Therapy, PT Therapeutic Activity, PT Manual Therapy and PT Evaluation     Outcome: MET  Goal: LTG-By discharge, patient will transfer one surface to another  Description  1) Individualized goal:  Modified independent with FWW  2) Interventions: PT Group Therapy, PT  E Stim Attended, PT Orthotics Training, PT Gait Training, PT Self Care/Home Eval, PT Therapeutic Exercises, PT Neuro Re-Ed/Balance, PT Aquatic Therapy, PT Therapeutic Activity, PT Manual Therapy and PT Evaluation     Outcome: MET  Goal: LTG-By discharge, patient will ambulate up/down 4-6 stairs  Description  1) Individualized goal:  CGA with hand rails  2) Interventions:  PT Group Therapy, PT E Stim Attended, PT Orthotics Training, PT Gait Training, PT Self Care/Home Eval, PT Therapeutic Exercises, PT Neuro Re-Ed/Balance, PT Aquatic Therapy, PT Therapeutic Activity, PT Manual Therapy and PT Evaluation     Outcome: MET  Goal: LTG-By discharge, patient will transfer in/out of a car  Description  1) Individualized goal:  SBA with FWW  2) Interventions:  PT Group Therapy, PT E Stim Attended, PT Orthotics Training, PT Gait Training, PT Self Care/Home Eval, PT Therapeutic Exercises, PT Neuro Re-Ed/Balance, PT Aquatic Therapy, PT Therapeutic Activity, PT Manual Therapy and PT Evaluation     Outcome: MET     Problem: Mobility Transfers  Goal: STG-Within one week, patient will transfer bed to chair  Description  1) Individualized goal: With FWW at mod I in order to progress towards PLOF  2) Interventions: PT Group Therapy, PT E Stim Attended, PT Orthotics Training, PT Gait Training, PT Self Care/Home Eval, PT Therapeutic Exercises, PT Neuro Re-Ed/Balance, PT Aquatic Therapy, PT Therapeutic Activity, PT Manual Therapy and PT Evaluation     Outcome: MET

## 2019-09-24 NOTE — CARE PLAN
Problem: Safety  Goal: Will remain free from injury  Intervention: Provide assistance with mobility  Note:   Pt uses call light consistently and appropriately. Waits for assistance does not attempt self transfer this shift. Able to verbalize needs.      Problem: Pain Management  Goal: Pain level will decrease to patient's comfort goal  Intervention: Follow pain managment plan developed in collaboration with patient and Interdisciplinary Team  Note:   Patient able to verbalize pain level and verbalize an acceptable level of pain.

## 2019-09-24 NOTE — THERAPY
"Occupational Therapy  Daily Treatment     Patient Name: Nohelia Dickerson  Age:  74 y.o., Sex:  female  Medical Record #: [id number]  Today's Date: 2019     Precautions  Precautions: (P) Fall Risk, Other (See Comments)  Comments: (P) Posterior/L lateral lean, L rafael, monitor BP    Safety   ADL Safety : (P) Modified Independent, Requires Supervision for Safety  Bathroom Safety: (P) Modified Independent, Requires Supervision for Safety  Comments: (P) see FIMs for ADL performance details.    Subjective    \"I'm really excited to go home tomorrow\"     Objective       19 1031   Precautions   Precautions Fall Risk;Other (See Comments)   Comments Posterior/L lateral lean, L rafael, monitor BP   Safety    ADL Safety  Modified Independent;Requires Supervision for Safety   Bathroom Safety Modified Independent;Requires Supervision for Safety   Comments see FIMs for ADL performance details.   Cognition    Level of Consciousness Alert   Interdisciplinary Plan of Care Collaboration   Patient Position at End of Therapy Seated;Self Releasing Lap Belt Applied;Other (Comments)  (cafeteria for lunch)   OT Total Time Spent   OT Individual Total Time Spent (Mins) 60   OT Charge Group   OT Self Care / ADL 3   OT Therapy Activity 1       FIM Eating Score:  7 - Independent  Eating Description:       FIM Grooming Score:  6 - Modified Independent  Grooming Description:  Increased time    FIM Bathing Score:  6 - Modified Independent  Bathing Description:       FIM Upper Body Dressin - Independent  Upper Body Dressing Description:       FIM Lower Body Dressing Score:  6 - Modified Independent  Lower Body Dressing Description:  Increased time    FIM Toileting Body Dressin - Modified Independent  Toileting Description:  Grab bar, Increased time    FIM Bed/Chair/Wheelchair Transfers Score: 6 - Modified Independent  Bed/Chair/Wheelchair Transfers Description:       FIM Toilet Transfer Score:  5 - Standby Prompting/Supervision " or Set-up  Toilet Transfer Description:  Grab bar, Increased time, Supervision for safety(supervision from FWW level with GB)    FIM Tub/Shower Transfers Score:  5 - Standby Prompting/Supervision or Set-up  Tub/Shower Transfers Description:  Grab bar, Increased time, Supervision for safety, Verbal cueing, Set-up of equipment(supervision from FWW level with GB and tub bench)    Pt education regarding home safety, fall prevention and energy conservation strategies in prep for d/c tomorrow. Also discussed DME needs, and Pt has tub transfer bench and son in law is installing GB in shower.       Assessment    Pt tolerated session well and completed ADLs at mod I to indep level, and bathroom transfers with supervision from FWW level. Pt receptive to all education provided re: home safety, fall prevention, energy conservation strategies and DME, and verbalizes no concerns with d/c home tomorrow.     Plan    Pt to d/c home tomorrow with family support.

## 2019-09-25 VITALS
TEMPERATURE: 97.2 F | HEIGHT: 72 IN | WEIGHT: 140.43 LBS | DIASTOLIC BLOOD PRESSURE: 89 MMHG | RESPIRATION RATE: 17 BRPM | BODY MASS INDEX: 19.02 KG/M2 | OXYGEN SATURATION: 92 % | SYSTOLIC BLOOD PRESSURE: 143 MMHG | HEART RATE: 73 BPM

## 2019-09-25 PROBLEM — D69.6 THROMBOCYTOPENIA (HCC): Status: RESOLVED | Noted: 2018-08-06 | Resolved: 2019-09-25

## 2019-09-25 PROBLEM — R79.89 AZOTEMIA: Status: RESOLVED | Noted: 2019-09-07 | Resolved: 2019-09-25

## 2019-09-25 PROBLEM — R00.0 TACHYCARDIA: Status: RESOLVED | Noted: 2019-09-06 | Resolved: 2019-09-25

## 2019-09-25 PROBLEM — I95.1 ORTHOSTATIC HYPOTENSION: Status: RESOLVED | Noted: 2019-09-09 | Resolved: 2019-09-25

## 2019-09-25 PROCEDURE — A9270 NON-COVERED ITEM OR SERVICE: HCPCS | Performed by: PHYSICAL MEDICINE & REHABILITATION

## 2019-09-25 PROCEDURE — 99239 HOSP IP/OBS DSCHRG MGMT >30: CPT | Performed by: PHYSICAL MEDICINE & REHABILITATION

## 2019-09-25 PROCEDURE — 700102 HCHG RX REV CODE 250 W/ 637 OVERRIDE(OP): Performed by: HOSPITALIST

## 2019-09-25 PROCEDURE — A9270 NON-COVERED ITEM OR SERVICE: HCPCS | Performed by: HOSPITALIST

## 2019-09-25 PROCEDURE — 700102 HCHG RX REV CODE 250 W/ 637 OVERRIDE(OP): Performed by: PHYSICAL MEDICINE & REHABILITATION

## 2019-09-25 PROCEDURE — 99232 SBSQ HOSP IP/OBS MODERATE 35: CPT | Performed by: HOSPITALIST

## 2019-09-25 RX ORDER — ALBUTEROL SULFATE 90 UG/1
2 AEROSOL, METERED RESPIRATORY (INHALATION) EVERY 4 HOURS PRN
Qty: 8.5 G | Refills: 0 | Status: SHIPPED | OUTPATIENT
Start: 2019-09-25 | End: 2020-05-10

## 2019-09-25 RX ORDER — ATORVASTATIN CALCIUM 80 MG/1
80 TABLET, FILM COATED ORAL EVERY EVENING
Qty: 30 TAB | Refills: 0 | Status: SHIPPED | OUTPATIENT
Start: 2019-09-25 | End: 2020-01-22

## 2019-09-25 RX ORDER — AMLODIPINE BESYLATE 5 MG/1
5 TABLET ORAL DAILY
Qty: 30 TAB | Refills: 0 | Status: SHIPPED | OUTPATIENT
Start: 2019-09-26 | End: 2019-10-18 | Stop reason: SDUPTHER

## 2019-09-25 RX ORDER — BUDESONIDE AND FORMOTEROL FUMARATE DIHYDRATE 160; 4.5 UG/1; UG/1
2 AEROSOL RESPIRATORY (INHALATION) 2 TIMES DAILY
Qty: 1 INHALER | Refills: 0 | Status: SHIPPED | OUTPATIENT
Start: 2019-09-25 | End: 2019-09-27 | Stop reason: SDUPTHER

## 2019-09-25 RX ORDER — AMOXICILLIN 250 MG
2 CAPSULE ORAL 2 TIMES DAILY
Qty: 30 TAB | Refills: 0 | COMMUNITY
Start: 2019-09-25 | End: 2019-10-04

## 2019-09-25 RX ADMIN — AMLODIPINE BESYLATE 5 MG: 5 TABLET ORAL at 05:24

## 2019-09-25 RX ADMIN — BUDESONIDE AND FORMOTEROL FUMARATE DIHYDRATE 2 PUFF: 160; 4.5 AEROSOL RESPIRATORY (INHALATION) at 08:06

## 2019-09-25 RX ADMIN — APIXABAN 5 MG: 5 TABLET, FILM COATED ORAL at 08:05

## 2019-09-25 ASSESSMENT — ENCOUNTER SYMPTOMS
HALLUCINATIONS: 0
SHORTNESS OF BREATH: 0
BLURRED VISION: 0
HEADACHES: 0
FEVER: 0
DIZZINESS: 0
PALPITATIONS: 0
NAUSEA: 0
VOMITING: 0

## 2019-09-25 NOTE — DISCHARGE INSTRUCTIONS
[facility] NURSING DISCHARGE INSTRUCTIONS    Blood Pressure : 130/75  Weight: 63.7 kg (140 lb 6.9 oz)  Nursing recommendations for Nohelia Dickerson at time of discharge are as follows:  Client verbalized understanding of all discharge instructions and prescriptions.     Review all your home medications and newly ordered medications with your doctor and/or pharmacist. Follow medication instructions as directed by your doctor and/or pharmacist.    Pain Management:   Discharge Pain Medication Instructions:  Comfort Goal: Comfort at Rest  Notify your primary care provider if pain is unrelieved with these measures, if the pain is new, or increased in intensity.    Discharge Skin Characteristics: Warm, Dry  Discharge Skin Exam: Clear     Skin / Wound Care Instructions: Please contact your primary care physician for any change in skin integrity.     If You Have Surgical Incisions / Wounds:  Monitor surgical site(s) for signs of increased swelling, redness or symptoms of drainage from the site or fever as this could indicate signs and symptoms of infection. If these symptoms are noted, notifiy your primary care provider.      Discharge Safety Instructions: No Supervision Needed     Discharge Safety Concerns: Weakness  The interdisciplinary team has made recommendation that you do not require supervision in the house due to demonstration of safety with ADL's and IADLS and problem solving skills  Anti-embolic stockings are not required to increase circulation to the lower extremities.    Discharge Diet: Regular     Discharge Liquids: Thin  Discharge Bowel Function: Continent  Please contact your primary care physician for any changes in bowel habits.  Discharge Bowel Program:    Discharge Bladder Function: Continent  Discharge Urinary Devices: None      Nursing Discharge Plan:   Smoking Cessation Offered: Patient Counseled  Influenza Vaccine Indication: Not indicated: Previously  immunized this influenza season and > 8 years of age    Case Management Discharge Instructions:   Discharge Location:    Agency Name/Address/Phone:    Home Health:    Outpatient Services:    DME Provider/Phone:    Medical Equipment Ordered:    Prescription Faxed to:        Discharge Medication Instructions:  Below are the medications your physician expects you to take upon discharge:    Discharge Instructions for Stroke  You have been diagnosed with or have a high risk for a stroke, or a TIA (transient ischemic attack). During a stroke, blood stops flowing to part of your brain. This can damage areas in the brain that control other parts of the body. Symptoms after a stroke depend on which part of the brain has been affected.  Stroke risk factors  Once you’ve had a stroke, you’re at greater risk for another one. Listed below are some other factors that can increase your risk for a stroke:  · High blood pressure  · High cholesterol  · Cigarette or cigar smoking  · Diabetes  · Carotid or other artery disease  · Atrial fibrillation, atrial flutter, or other heart disease  · Not being physically active  · Obesity  · Certain blood disorders  such as sickle cell anemia  · Drinking too much alcohol  · Abusing street drugs  · Race  · Gender  · Family history of stroke  · Diet high in salty, fried, or greasy foods  Changes in daily living  Doing your regular tasks may be difficult after you’ve had a stroke, but you can learn new ways to manage your daily activities. In fact, doing daily activities may help you to regain muscle strength. This can also help your affected arm or leg work more normally. Be patient, give yourself time to adjust, and appreciate the progress you make.  Daily activities  You may be at risk of falling. Make changes to your home to help you walk more easily. A therapist will decide if you need an assistive device to walk safely.  You may need to see an occupational therapist or physical therapist to  learn new ways of doing things. For example, you may need to make adjustments when bathing or dressing:  Tips for showering or bathing  · Test the water temperature with a hand or foot that was not affected by the stroke.  · Use grab bars, a shower seat, a hand-held showerhead, and a long-handled brush.  Tips for getting dressed  · Dress while sitting, starting with the affected side or limb.  · Wear shirts that pull easily over your head. Wear pants or skirts with elastic waistbands.  · Use zippers with loops attached to the pull tabs.  Lifestyle changes  · Take your medicines exactly as directed. Don’t skip doses.  · Begin an exercise program. Ask your provider how to get started. Also ask how much activity you should try to get on a daily or weekly basis. You can benefit from simple activities such as walking or gardening.  · Limit how much alcohol you drink. Men should have no more than 2 alcoholic drinks a day. Women should limit themselves to 1 alcoholic drink per day.  · Know your cholesterol level. Follow your provider’s recommendations about how to keep cholesterol under control.  · If you are a smoker, quit now. Join a stop-smoking program to improve your chances of success. Ask your provider about medicines or other methods to help you quit.  · Learn stress management techniques to help you deal with stress in your home and work life.  Diet  Your healthcare provider will give you information on changes you may need to make to your diet, based on your situation. Your provider may recommend that you see a registered dietitian for help with diet changes. Changes may include:  · Reducing the amount of fat and cholesterol you eat  · Reducing the amount of salt (sodium) in your diet, especially if you have high blood pressure  · Eating more fresh vegetables and fruits  · Eating more lean proteins, such as fish, poultry, and beans and peas (legumes)  · Eating less red meat and processed meats  · Using low-fat  dairy products  · Limiting vegetable oils and nut oils  · Limiting sweets and processed foods such as chips, cookies, and baked goods  · Not eating trans fats. These are often found in processed foods. Don't eat any food that has hydrogenated listed in its ingredients.  Follow-up care  · Keep your medical appointments. Close follow-up is important to stroke rehabilitation and recovery.  · Some medicines require blood tests to check for progress or problems. Keep follow-up appointments for any blood tests ordered by your providers.  Call 911  Call 911 right away if you have any of the following symptoms of stroke:  · Weakness, tingling, or loss of feeling on one side of your face or body  · Sudden double vision or trouble seeing in one or both eyes  · Sudden trouble talking or slurred speech  · Trouble understanding others  · Sudden, severe headache  · Dizziness, loss of balance, or a sense of falling  · Blackouts or seizures     F.A.S.T. is an easy way to remember the signs of stroke. When you see these signs, you know that you need to call 911 fast.  F.A.S.T. stands for:  · F is for face drooping. One side of the face is drooping or numb. When the person smiles, the smile is uneven.  · A is for arm weakness. One arm is weak or numb. When the person lifts both arms at the same time, one arm may drift downward.  · S is for speech difficulty. You may notice slurred speech or trouble speaking. The person can't repeat a simple sentence correctly when asked.  · T is for time to call 911. If someone shows any of these symptoms, even if they go away, call 911 right away. Make note of the time the symptoms first appeared.     Discharge Instructions: COPD  You have been diagnosed with chronic obstructive pulmonary disease (COPD). This is a name given to a group of diseases that limit the flow of air in and out of your lungs. This makes it harder to breathe. With COPD, you are also more likely to get lung infections. COPD  includes chronic bronchitis and emphysema. COPD is most often caused by heavy, long-term cigarette smoking.  Home care  Quit smoking  · If you smoke, get help to quit. It's the best thing you can do for your COPD and your overall health.  · Join a stop-smoking program. There are even telephone, text message, and online programs to help you quit.  · Ask your healthcare provider about medicines or other methods to help you quit.  · Ask family members to quit smoking as well.  · Don't allow people to smoke in your home, in your car, or when they are around you.  · Don't use e-cigarettes because they have harmful side effects.  Protect yourself from infection  · Wash your hands often. Do your best to keep your hands away from your face. Most germs are spread from your hands to your mouth.  · Get a flu shot every year. Also ask your provider about pneumonia vaccines.  · Stay away from crowds. It's especially important to do this in the winter when more people have colds and flu.  · To stay healthy, get enough sleep, exercise regularly, and eat a balanced diet. You should:  ? Get about 8 hours of sleep every night.  ? Try to exercise for at least 30 minutes on most days.  ? Have healthy foods including fruits and vegetables, 100% whole grains, lean meats and fish, and low-fat dairy products. Try to stay away from foods high in fats and sugar.  Take your medicines and oxygen therapy  Take your medicines exactly as directed. Don't skip doses.  During each appointment, talk with your healthcare provider about your ability to:  · Correctly use inhaler techniques  · [place] with other conditions you have and their treatments and if they affect your COPD  If you use oxygen, use it correctly. That means the amount you use and the length of time you use it.   · Discuss long-term oxygen therapy with your provider.  · Don’t allow smoking in your home, in your car, or around you. This is very important if you use oxygen.  · Try to stay  away from things that may affect your breathing. This includes cold weather, high humidity, smoke, air pollution, dust, and allergens  · Unless your provider has told you otherwise, drink at least 8 glasses of fluid every day to keep mucus thin. Ask about other things that can help.  · Ask your provider to show you pursed-lip breathing to help decrease shortness of breath.  Manage your stress  Stress can make COPD worse. Use this stress management method:  · Find a quiet place and sit or lie in a comfortable position.  · Close your eyes and do breathing exercises for several minutes. Ask your provider about the best way to breathe.  Talk to your healthcare provider about your ability to cope in your normal environment.  Pulmonary rehabilitation  · Pulmonary rehab can help you feel better. These programs include exercise, breathing methods, information about COPD, counseling, and help for smokers.  · Ask your provider or your local hospital about programs in your area. Also talk to your healthcare provider about a self-management program to help control your symptoms.     When to call your healthcare provider  Call your provider right away if you have any of the following:  · More mucus  · Yellow, green, bloody, or smelly mucus  · Fever or chills  · Swollen ankles  Call 911  Call 911 if you have:  · Shortness of breath, wheezing, or trouble breathing that doesn't improve with treatment  · Tightness in your chest that does not go away with your normal medicines  · An irregular heartbeat or feeling that your heart is racing  · Trouble talking  · Feeling of lightheadedness or dizziness  · Feeling of doom  · Skin turning blue, gray, or purple in color    Preventing Falls in the Home  An adult or child can fall for many reasons. If you are an older adult, you may fall because your reaction time slows down. Your muscles and joints may get stiff, weak, or less flexible because of illness, medicines, or a physical condition.  These things can also make a child more likely to fall or be injured in a fall.  Other health problems that make falls more likely include:  · Arthritis  · Dizziness or lightheadedness when you get out of bed (orthostatic hypotension)  · History of a stroke  · Dizziness  · Anemia  · Certain medicines taken for mental illness or to control blood pressure.  · Problems with balance or gait  · Bladder or urinary problems  · History of falls with or without an injury  · Changes in vision (vision impairment)  · Changes in thinking skills and memory (cognitive impairment)  Injuries from a fall can include broken bones, dislocated joints, internal bleeding and cuts. When these injuries are serious enough, they can make it impossible for you or a child who is injured in a fall to live on his or her ownhome.  Prevention tips  To help prevent falls and fall-related injuries, follow the tips below.   Floors  Make floors safer by doing the following:   · Put nonskid pads under area rugs.  · Remove throw rugs.  · Replace worn floor coverings.  · Tack carpets firmly to each step on carpeted stairs. Put nonskid strips on the edges of uncarpeted stairs.  · Keep floors and stairs free of clutter and cords.  · Arrange furniture so there are clear pathways.  · Clean up any spills right away.  · Wear shoes that fit.  Bathrooms     Make bathrooms safer by doing the following:   · Install grab bars in the tub or shower.  · Apply nonskid strips or put a nonskid rubber mat in the tub or shower.  · Sit on a bath chair to bathe.  · Use bathmats with nonskid backing.  Lighting and the environment  Improve lighting in your home by doing the following:   · Keep a flashlight in each room. Or put a lamp next to the bed within easy reach.  · Put nightlights in the bedrooms, hallways, kitchen, and bathrooms.  · Make sure all stairways have good lighting.  · Take your time when going up and down stairs.  · Put handrails on both sides of stairs and in  "walkways for more support. To prevent injury to your wrist or arm, don’t use handrails to pull yourself up.  · Install grab bars to pull yourself up.  · Move or rearrange items that you use often. This will make them easier to find or reach.  · Look at your home to find any safety hazards. Especially look at doorways, walkways, and the driveway. Remove or repair any safety problems that you find.    Warning Signs of Suicide and What You Can Do     If you think a person could be suicidal, ask, \"Have you thought about suicide?\" If they say \"yes,\" they may already have a plan for how and when they will attempt it. Find out as much as you can. The more detailed the plan, and the easier it is to carry out, the more danger the person is in right now.  Know the warning signs  The warning signs for suicide include:  · Threats or talk of suicide  · Sense of hopelessness  · Buying a gun or other weapon  · Statements such as \"Soon, I won't be a problem\" or \"Nothing matters\"  · Giving away items they own, making out a will, or planning their   · Suddenly being happy or calm after being depressed  Factors that put a person at a higher risk of attempting suicide include:  · A history of suicide in the person's family  · Previous suicide attempts  · Alcohol and drug use, along with impulsive behaviors  · Having a diagnose mood disorder such as depression or bipolar disorder  · History of trauma or abuse including bullying  · Significant losses such as a divorce, death of a loved one, financial problems, or legal problems  · Having access to a lethal weapon (for example firearms in the home)  · Chronic physical illnesses, including chronic pain  · Exposure to suicidal behavior of others  Get help  Don't try to handle this alone. You can be the most help by getting the person to a trained professional. Suicidal thinking may be a sign of depression, a serious but treatable illness.  In an emergency--call 911  Don't leave the " "person alone. Anyone who is at imminent risk of suicide needs psychiatric services right away. The person must be continuously monitored, and never left out of sight. Call 911 or a 24-hour suicide crisis hotline. It can be found in the white pages of your phone book under \"Suicide.\" You can also take the person to the nearest hospital emergency room (ER).  Don't keep it a secret and don't wait  Call a mental health clinic or a licensed mental health professional in your area right away: a psychiatrist, clinical psychologist, psychiatric or licensed clinical , marriage and family counselor, or clergy. Tell them you need help for a person who is thinking about suicide.  Resources  · National Suicide Prevention Lifeline  930.847.7909 (415-631-RMEJ)  www.suicidepreventionlifeline.org  · National Suicide Hotline  183.438.5956 (800-SUICIDE)  · National [place] of Mental Health  258.526.3040  www.nimh.nih.gov  · National [place] on Mental Illness  887.167.2627  www.[place].org  · Mental Health [place]  852.391.8403  www.[facility].org           Speech Therapy Discharge Instructions for Nohelia Dickerson    9/24/2019    Diet: Regular - all foods allowed, Thin Liquids - any liquid like water, coffee, tea, juices, or clear fluids like Gatorade, etc.  To increase your ability to pay attention and concentrate it is recommended you follow these strategies:     Monitor your fatigue: Monitoring our fatigue is probably one of the most important strategies we can use. Many people suffer from higher levels of fatigue than normal and must be aware that fatigue will have a major impact on attention capabilities. This means scheduling activities that require your attention at times when you feel at your best.  Make a schedule: Choose a time that’s quiet and unhurried -- maybe at night before you go to bed -- and plan out the next day, down to the task. Use a reminder essie, timer, or alarm to help you stick to that schedule. " Alternate things you want to do with ones you don’t to help your mind stay engaged.  Allow plenty of time to complete tasks.  Be realistic about time:  Figure out a realistic time frame for your daily tasks -- and don’t forget to build in time for breaks if you think you’ll need them.  Quiet your mind by quieting your space: When it’s time to buckle down and get something done, take away the distractions. Use noise-canceling headphones to drown out sounds. Put your phone on silent. Work in a room with a door you can close. If you can do your job from home, set up the space in a way that helps you focus.  Control clutter: Another way to quiet your brain is to clear your space of things you don’t need. It can prevent distractions, and it can help you stay organized because you’ll have fewer things to tidy up.  Get some organizational helpers like under-the-bed containers or over-the-door holders. Ask a friend to help if it seems like you’re swimming in a sea of debris and you don’t know where to start.    It was great working with you Ino Wallace luck in your continued recovery! Belinda, SLP    Occupational Therapy Discharge Instructions for Nohelia Dickerson    9/24/2019    Level of Assist Required for Eating: Able to Complete Eating without Assist  Level of Assist Required for Grooming: Able to Complete Grooming without Assist  Level of Assist Required for Dressing: Able to Complete Dressing without Assist  Level of Assist Required for Toileting: Able to Complete Toileting without Assist  Level of Assist Required for Toilet Transfer: Requires Supervision with Toilet Transfer  Equipment for Toilet Transfer: Grab Bars by Toilet  Level of Assist Required for Bathing: Able to Complete Bathing without Assist  Equipment for Bathing: Tub Transfer Bench, Grab Bars in Tub / Shower, Hand Held Shower Head  Level of Assist Required for Shower Transfer: Requires Supervision with Shower Transfer  Level of Assist Required  for Home Mgmt: Requires Supervision with Home Management  Level of Assist Required for Meal Prep: Requires Supervision with Meal Preparation  Driving: Please Contact Physician Prior to Driving    It has been a pleasure working with you, [place]! We will miss you and wish you all the best!  -Rosalba Luna, OT    Physical Therapy Discharge Instructions for Nohelia Dickerson    9/24/2019    Level of Assist Required for Ambulation: Supervision on Flat Surfaces, Assist for Balance on Curbs, Supervision on Stairs  Distance Patient May Ambulate: 1000(community distances)  Device Recommended for Ambulation: Front-Wheeled Walker  Level of Assist Required for Transfers: Requires No Assist  Device Recommended for Transfers: Front-Wheeled Walker  Home Exercise Program: Refer to Home Exercise Program Handout for Details    Thanks for working hard in PT, it was a pleasure working with you!  JUSTIN QuezadaT

## 2019-09-25 NOTE — PROGRESS NOTES
This patient received individualized medication counseling regarding the current regimen they are receiving in this facility. Potential adverse effects, monitoring parameters, and proper administration were covered in preparation for their discharge.This patient is being treated for hypertension and it is recommended that they monitor and record with a blood pressure device. The patient has been instructed to contact their primary care physician if the HR or blood pressure is abnormal.   The patient asked relevant questions regarding the medications for which answers were provided. Our pharmacy hours and phone number were provided for follow up questions should they arise.    Juan Hylton  [facility]

## 2019-09-25 NOTE — PROGRESS NOTES
[facility] Medicine Daily Progress Note    Date of Service  9/25/2019    Chief Complaint:  Hypertension  PVD  Dizziness    Interval History:  No significant events or changes since last visit    Review of Systems  Review of Systems   Constitutional: Negative for fever.   Eyes: Negative for blurred vision.   Respiratory: Negative for shortness of breath.    Cardiovascular: Negative for palpitations.   Gastrointestinal: Negative for nausea and vomiting.   Neurological: Negative for dizziness and headaches.   Psychiatric/Behavioral: Negative for hallucinations.        Physical Exam  Temp:  [36.2 °C (97.2 °F)-36.7 °C (98.1 °F)] 36.2 °C (97.2 °F)  Pulse:  [70-94] 73  Resp:  [17-20] 17  BP: (118-143)/(74-89) 143/89  SpO2:  [92 %] 92 %    Physical Exam   HENT:   Mouth/Throat: Oropharynx is clear and moist.   Eyes: No scleral icterus.   Cardiovascular: Normal rate and regular rhythm.   No murmur heard.  Pulmonary/Chest: Effort normal and breath sounds normal. No stridor.   Abdominal: Soft. She exhibits no distension. There is no tenderness.   Musculoskeletal: She exhibits no edema.   Skin: Skin is warm. No rash noted. She is not diaphoretic.   Psychiatric: Her behavior is normal.   Nursing note and vitals reviewed.      Fluids    Intake/Output Summary (Last 24 hours) at 9/25/2019 0748  Last data filed at 9/25/2019 0524  Gross per 24 hour   Intake 1460 ml   Output --   Net 1460 ml       Laboratory                        Imaging    Assessment/Plan  * CVA (cerebral vascular accident) ([facility])- (present on admission)  Assessment & Plan  On Eliquis  On Lipitor  MRI concerning for NPH, needs F/U    Orthostatic hypotension  Assessment & Plan  Resolved  Had dizziness on 9/9 & 9/12  Orthostatics (9/9): SBP dropped 18 points from supine to sitting  Orthostatics (9/11): positive  Off Lisinopril (last dose 9/9)  Off Midodrine  Monitor    PVD (peripheral vascular disease) ([facility])- (present on admission)  Assessment & Plan  On Eliquis  On  Lipitor  Note: for out patient f/u with Dr. Wren    Essential hypertension- (present on admission)  Assessment & Plan  BP better recently (9/25)  BP recently rising up in the early am  Off Lisinopril (2nd to ? causing prior dizziness)  On Norvasc: 5 mg daily (9/23)  COnt to monitor

## 2019-09-25 NOTE — PROGRESS NOTES
DATE OF SERVICE:  09/18/2019    The patient was seen for a followup visit.  The patient is doing well on her   overall rehab.  This session was devoted entirely to talking about the   patient's grief over the loss of her daughter approximately a year ago due to   a brain hemorrhage.  The patient said she involved herself deeply in work   after the loss of her daughter.  She said she tries to ____, but sometimes   feels overwhelmed with pain of grief.       ____________________________________     LUCY MAURO, PHD    DEBORAH / LIBERTAD    DD:  09/24/2019 16:17:12  DT:  09/25/2019 14:32:46    D#:  [id number]  Job#:  [id number]

## 2019-09-25 NOTE — DISCHARGE PLANNING
Case management Summary:   Met with patient prior to discharge.   Reviewed all follow up appointments.   Referral made to [facility]. They have accepted referral and are ready to follow.    [facility] has delivered a FWW and tub/shower bench to patient.    Brochures for PCA services and Adult Day Care provided with discharge paperwork.   Patient's granddaughter is picking patient up. I requested that patient share paperwork with her daughter.   Provided completed [employer] disabled person placard application to patient. It has been faxed to [employer] and patient should receive the placards in the mail.  During hospitalization, I have provided support and education and have been available for questions and information during hours of operation, communicated with therapy team and MD along with providing links/resources  to outside services.    Patient verbalizes agreement with all plans and has an understanding of the next steps within the post acute services.     Individualized Goals:   1. Improve functional status to return home with intermittent support of family  2. Patient to have time planned for return to work  3. Patient to have MD F/U appointments and needed DME      Outcome:   1. Met  2. Not met - RTW TBD  3. Met

## 2019-09-25 NOTE — PROGRESS NOTES
Patient care assumed. Report received from Bright Perez. Patient is alert and calm, resting in bed. Call light and bedside table within reach. Will continue to monitor.

## 2019-09-25 NOTE — CARE PLAN
Problem: Communication  Goal: The ability to communicate needs accurately and effectively will improve  Outcome: MET  Note:   Pt is able to communicate her needs effectively to staff.      Problem: Safety  Goal: Will remain free from injury  Outcome: MET  Note:   Pt uses call light consistently for staff assistance. Pt has good safety awareness, no impulsivity observed.      Problem: Infection  Goal: Will remain free from infection  Outcome: MET  Note:   No s/s of infection present      Problem: Bowel/Gastric:  Goal: Normal bowel function is maintained or improved  Outcome: PROGRESSING SLOWER THAN EXPECTED  Note:   Last BM was 9/21. Pt refusing stool softeners.     Problem: Pain Management  Goal: Pain level will decrease to patient's comfort goal  Outcome: MET  Note:   No reports of pain at this time. Will continue to monitor through shift with hourly rounds.

## 2019-09-25 NOTE — PROGRESS NOTES
Patient discharged to home per order.  Discharge instructions reviewed with patient; she verbalizes understanding and signed copies placed in chart.  Patient has all belongings; signed copy of form in chart.  Patient left facility at 1151 via w/c accompanied by rehab staff and grand daughter Valentina.  Have enjoyed working with this pleasant patient.      Patient left phone.. Called Roseanne, her daughter and left message, phone sitting at charge nurse desk

## 2019-09-25 NOTE — DISCHARGE SUMMARY
"Rehab Discharge Note    Admission: 9/6/2019    Discharge: 9/25/2019    Admission Diagnosis:   Active Hospital Problems    Diagnosis   • *CVA (cerebral vascular accident) (HCC)   • PVD (peripheral vascular disease) (HCC)   • Arterial occlusion, lower extremity (HCC)   • COPD   • Chronic respiratory failure (HCC)   • Tobacco abuse   • Essential hypertension       Discharge Diagnosis:  Active Hospital Problems    Diagnosis   • *CVA (cerebral vascular accident) (HCC)   • PVD (peripheral vascular disease) (HCC)   • Arterial occlusion, lower extremity (HCC)   • COPD   • Chronic respiratory failure (HCC)   • Tobacco abuse   • Essential hypertension       HPI prior to admission  Per consult physician Dr. Bundy \"Patient is currently at [facility] after having come in for 4 days of left foot tingling and numbess. Patient was assessed and found to have peripheral artery disease (PAD) in the affected limb, however during her stay she also developed left arm paresthesias followed by heaviness in the left arm and some left face drooping that resolved after a few minutes. MRI brain the next day showed an acute infarcts in the right frontal and right parietal lobes with enlarged lateral ventricles, possibly related to NPH. At this time the stroke is taking priority and she will  follow up with Dr. Milad rWen in 2-4 weeks for her PAD\"     She was started on Eliquis. HgbA1c 6.0, LDL 90, ECHO EF 60 %. She had a short run of v-tach per telemetry last night.      Patient current reports left sided foot weakness, as well as left foot numbness. some dizziness upon arrival to rehabilitation.  Also noted to be tachycardic.  She denies any chest pain. She is right hand dominant.  1 of her daughters is present.  Reports she is still having significant grief from the death of her daughter in June from a ruptured aneurysm.  She denies suicidal ideation.  She was smoking just prior to this admission and is interested in quitting.  She has " "quit previously for 3 years span of time.  She started smoking more after the death of her daughter.     Patient was evaluated by Rehab Medicine physician and Physical Therapy and Occupational Therapy and determined to be appropriate for acute inpatient rehab and was transferred to [facility] on 2019.\"    Rehab Hospital Course    Right KIM and MCA ischemic stroke  Likely cardio-embolic  Continue Eliquis and statin for secondary stroke prophylaxis  Follow up with stroke bridge clinic, needs monitor?    Admission FIM 64 --> 86     Possible NPH  Follow up stroke bridge clinic     Appreciate assistance of hospitalist with her medical co-morbidities:     Hypertension, stable  Orthostatic hypotension, resolved, off midodrine  Tachycardia, resolved  Peripheral vascular disease  Tobacco use, did tobacco counseling with patient  COPD, on oxygen at night - home level     DVT prophylaxis  Eliquis    Functional Status at Discharge  Eatin - Independent  Eating Description:  Increased time  Groomin - Modified Independent  Grooming Description:  Increased time  Bathin - Modified Independent  Bathing Description:  Grab bar, Tub bench, Hand held shower, Increased time  Upper Body Dressin - Independent  Upper Body Dressing Description:  Increased time  Lower Body Dressin - Modified Independent  Lower Body Dressing Description:  6 - Modified Independent  Discharge Location : Home  Patient Discharging with Assist of: Family   Level of Supervision Required: Intermittent Supervision  Recommended Equipment for Discharge: Front-Wheeled Walker;Tub Transfer Bench;Grab Bars by Toilet;Grab Bars in Tub / Shower  Recommended Services Upon Discharge: Home Health Occupational Therapy  Long Term Goals Met: 3  Long Term Goals Not Met: 0  Reason(s) for Goals Not Met: n/a  Criteria for Termination of Services: Maximum Function Achieved for Inpatient Rehabilitation  Walk:  5 - Standby " Prompting/Supervision or Set-up  Distance Walked:  Walks a minimum of 150 feet  Walk Description:  Safety concerns, Verbal cueing, Supervision for safety, Extra time  Wheelchair:  4 - Minimal Assistance  Distance Propelled:  Propels a minimum of 150 feet   Wheelchair Description:  (Pt propelled WC 2x 150' indoors with BUEs. Max verbal cues for utilizing LUE and sequencing.)  Stairs 5 - Standby Prompting/Supervision or Set-up  Stairs DescriptionExtra time, Safety concerns, Hand rails, Supervision for safety  Discharge Location: Home  Patient Discharging with Assist of: Family  Level of Supervision Required Upon Discharge: Intermittent Supervision  Recommended Equipment for Discharge: Front-Wheeled Walker  Recommeded Services Upon Discharge: Home Health Physical Therapy  Long Term Goals Met: 5  Long Term Goals Not Met: 1  Reason(s) for Goals Not Met: NA  Criteria for Termination of Services: Maximum Function Achieved for Inpatient Rehabilitation  Comprehension Mode:  Auditory  Comprehension:  6 - Modified Independent  Comprehension Description:  Verbal cues  Expression Mode:  Vocal  Expression:  7 - Independent  Expression Description:  Verbal cueing  Social Interaction:  7 - Independent  Social Interaction Description:  Increased time  Problem Solvin - Modified Independent  Problem Solving Description:  Verbal cueing, Therapy schedule, Increased time  Memory:  6 - Modified Independent  Memory Description:  Verbal cueing, Therapy schedule  Discharge Location : Home  Patient Discharging with Assist of: Family   Level of Supervision Required: Intermittent Supervision  Recommended Services Upon Discharge: No Follow-Up Speech Therapy Recommended  Long Term Goals Met: all met  Long Term Goals Not Met: na  Reason(s) for Goals Not Met: n/a  Criteria for Termination of Services: Maximum Function Achieved for Inpatient Rehabilitation    Discharge Medication:     Medication List      START taking these medications       Instructions   albuterol 108 (90 Base) MCG/ACT Aers inhalation aerosol  Notes to patient:  inhaler   Inhale 2 Puffs by mouth every four hours as needed.  Dose:  2 Puff     amLODIPine 5 MG Tabs  Start taking on:  9/26/2019  Commonly known as:  NORVASC  Notes to patient:  Blood pressure   Take 1 Tab by mouth every day.  Dose:  5 mg     budesonide-formoterol 160-4.5 MCG/ACT Aero  Commonly known as:  SYMBICORT  Notes to patient:  inhaler   Inhale 2 Puffs by mouth 2 Times a Day.  Dose:  2 Puff     senna-docusate 8.6-50 MG Tabs  Commonly known as:  PERICOLACE or SENOKOT S  Notes to patient:  laxative   Take 2 Tabs by mouth 2 Times a Day.  Dose:  2 Tab        CHANGE how you take these medications      Instructions   apixaban 5mg Tabs  What changed:  Another medication with the same name was removed. Continue taking this medication, and follow the directions you see here.  Commonly known as:  ELIQUIS  Notes to patient:  Blood thinner   Take 1 Tab by mouth 2 Times a Day.  Dose:  5 mg        CONTINUE taking these medications      Instructions   acetaminophen 325 MG Tabs  Commonly known as:  TYLENOL  Notes to patient:  For mild pain   Take 2 Tabs by mouth every 6 hours as needed (Mild Pain; (Pain scale 1-3); Temp greater than 100.5 F).  Dose:  650 mg     atorvastatin 80 MG tablet  Commonly known as:  LIPITOR  Notes to patient:  cholesterol   Take 1 Tab by mouth every evening.  Dose:  80 mg        STOP taking these medications    B COMPLEX PO     lisinopril 10 MG Tabs  Commonly known as:  PRINIVIL     lisinopril 20 MG Tabs  Commonly known as:  PRINIVIL     MULTIVITAMIN PO     nicotine 21 MG/24HR Pt24  Commonly known as:  NICODERM          Agency Name / Phone: [facility] [phone]     Home Health:   Registered Nurse, Occupational Therapist, Physical Therapist, Speech Therapist        DME Provider / Phone: [facility] [phone]     Medical Equipment Ordered: Front Wheel Walker, Tub/shower bench      NOTE: This  information can be found in your final discharge packet that your nurse will give you.       Follow-up Information:     Idalia Cortez M.D.  [address]  [address]  [place] [address]  [phone]  Primary care f/u on Friday, Sept. 27, 2019 @3:20PM, 3:05PM check in     Milad Wren M.D.  [id number]  [place] [address]  [phone]  Surgery f/u Tuesday, Oct. 15, 2019 @ 10:30AM, 10 AM check in     Kala Hernandes D.O.  [address]  [place] [address]  [phone]  Thursday, Oct. 31, 2019 @ 1:30PM, check in at 1PM     [facility]  [address], [address]  [place], [place]   [phone]  On 11/7/2019 Thursday, Nov. 7, 2019  @ 9AM    Condition on Discharge:  Good.    More than 32 minutes was spent on discharging this patient, including face-to-face time, prescription management, and the dictation of this note.

## 2019-09-26 ENCOUNTER — HOME CARE VISIT (OUTPATIENT)
Dept: HOME HEALTH SERVICES | Facility: HOME HEALTHCARE | Age: 74
End: 2019-09-26
Payer: MEDICARE

## 2019-09-26 ENCOUNTER — ANTICOAGULATION MONITORING (OUTPATIENT)
Dept: MEDICAL GROUP | Facility: PHYSICIAN GROUP | Age: 74
End: 2019-09-26

## 2019-09-26 VITALS
OXYGEN SATURATION: 94 % | WEIGHT: 137 LBS | SYSTOLIC BLOOD PRESSURE: 120 MMHG | HEART RATE: 82 BPM | RESPIRATION RATE: 12 BRPM | TEMPERATURE: 98.6 F | DIASTOLIC BLOOD PRESSURE: 80 MMHG | HEIGHT: 72 IN | BODY MASS INDEX: 18.56 KG/M2

## 2019-09-26 PROCEDURE — G0493 RN CARE EA 15 MIN HH/HOSPICE: HCPCS

## 2019-09-26 PROCEDURE — 665001 SOC-HOME HEALTH

## 2019-09-26 SDOH — ECONOMIC STABILITY: HOUSING INSECURITY
HOME SAFETY: OXYGEN SAFETY RISK ASSESSMENT PERFORMED. PATIENT PT WAS GIVEN A NO SMOKING SIGN AND PROVIDED EDUCATION ABOUT WHY IT IS IMPORTANT TO PLACE ONE. PATIENT INSTRUCTED PATIENT/CAREGIVER TO GET ONE AS SOON AS POSSIBLE. INSTRUCTED PATIENT/CAREGIVER TO GET ON

## 2019-09-26 SDOH — ECONOMIC STABILITY: HOUSING INSECURITY
HOME SAFETY: E AS SOON AS POSSIBLE. PATIENT DOES HAVE A FIRE ESCAPE PLAN DEVELOPED. PATIENT DOES NOT HAVE FLAMMABLE MATERIALS PRESENT IN THE HOME PRESENTING A FIRE HAZARD. NO EVIDENCE FOUND OF SMOKING MATERIALS PRESENT IN THE HOME.    HARD WIRED SMOKE DETECTORS H

## 2019-09-26 SDOH — ECONOMIC STABILITY: HOUSING INSECURITY: EVIDENCE OF SMOKING MATERIAL: 0

## 2019-09-26 SDOH — ECONOMIC STABILITY: HOUSING INSECURITY
HOME SAFETY: AD BEEN DISCONNECTED, INST. TO OBTAIN AND INSTALL  BATTERY OPERATED ONES ASAP AS THEY SAVE LIVES AS WELL AS  FIRE EXTINGUISHER.

## 2019-09-26 ASSESSMENT — ACTIVITIES OF DAILY LIVING (ADL): OASIS_M1830: 03

## 2019-09-26 ASSESSMENT — PATIENT HEALTH QUESTIONNAIRE - PHQ9
1. LITTLE INTEREST OR PLEASURE IN DOING THINGS: 00
2. FEELING DOWN, DEPRESSED, IRRITABLE, OR HOPELESS: 01
CLINICAL INTERPRETATION OF PHQ2 SCORE: 0

## 2019-09-26 ASSESSMENT — ENCOUNTER SYMPTOMS
VOMITING: DENIES
NAUSEA: DENIES
SHORTNESS OF BREATH: T
DEPRESSED MOOD: 1

## 2019-09-27 ENCOUNTER — OFFICE VISIT (OUTPATIENT)
Dept: MEDICAL GROUP | Facility: PHYSICIAN GROUP | Age: 74
End: 2019-09-27
Payer: MEDICARE

## 2019-09-27 ENCOUNTER — PATIENT OUTREACH (OUTPATIENT)
Dept: HEALTH INFORMATION MANAGEMENT | Facility: OTHER | Age: 74
End: 2019-09-27

## 2019-09-27 VITALS
TEMPERATURE: 97.4 F | DIASTOLIC BLOOD PRESSURE: 70 MMHG | HEIGHT: 72 IN | HEART RATE: 92 BPM | WEIGHT: 144 LBS | OXYGEN SATURATION: 91 % | SYSTOLIC BLOOD PRESSURE: 116 MMHG | BODY MASS INDEX: 19.5 KG/M2

## 2019-09-27 DIAGNOSIS — Z72.0 TOBACCO ABUSE: ICD-10-CM

## 2019-09-27 DIAGNOSIS — I73.9 PVD (PERIPHERAL VASCULAR DISEASE) (HCC): ICD-10-CM

## 2019-09-27 DIAGNOSIS — Z23 NEED FOR VACCINATION: ICD-10-CM

## 2019-09-27 DIAGNOSIS — Z71.6 ENCOUNTER FOR SMOKING CESSATION COUNSELING: ICD-10-CM

## 2019-09-27 DIAGNOSIS — J42 CHRONIC BRONCHITIS, UNSPECIFIED CHRONIC BRONCHITIS TYPE (HCC): ICD-10-CM

## 2019-09-27 DIAGNOSIS — I63.9 CEREBROVASCULAR ACCIDENT (CVA), UNSPECIFIED MECHANISM (HCC): ICD-10-CM

## 2019-09-27 DIAGNOSIS — J96.11 CHRONIC RESPIRATORY FAILURE WITH HYPOXIA (HCC): ICD-10-CM

## 2019-09-27 DIAGNOSIS — E46 PROTEIN-CALORIE MALNUTRITION, UNSPECIFIED SEVERITY (HCC): ICD-10-CM

## 2019-09-27 PROCEDURE — 90662 IIV NO PRSV INCREASED AG IM: CPT | Performed by: INTERNAL MEDICINE

## 2019-09-27 PROCEDURE — 90715 TDAP VACCINE 7 YRS/> IM: CPT | Performed by: INTERNAL MEDICINE

## 2019-09-27 PROCEDURE — 8041 PR SCP AHA: Performed by: INTERNAL MEDICINE

## 2019-09-27 PROCEDURE — 90472 IMMUNIZATION ADMIN EACH ADD: CPT | Performed by: INTERNAL MEDICINE

## 2019-09-27 PROCEDURE — 99214 OFFICE O/P EST MOD 30 MIN: CPT | Mod: 25 | Performed by: INTERNAL MEDICINE

## 2019-09-27 PROCEDURE — 99406 BEHAV CHNG SMOKING 3-10 MIN: CPT | Mod: 25 | Performed by: INTERNAL MEDICINE

## 2019-09-27 PROCEDURE — G0008 ADMIN INFLUENZA VIRUS VAC: HCPCS | Performed by: INTERNAL MEDICINE

## 2019-09-27 PROCEDURE — G0009 ADMIN PNEUMOCOCCAL VACCINE: HCPCS | Performed by: INTERNAL MEDICINE

## 2019-09-27 PROCEDURE — 90732 PPSV23 VACC 2 YRS+ SUBQ/IM: CPT | Performed by: INTERNAL MEDICINE

## 2019-09-27 RX ORDER — BUDESONIDE AND FORMOTEROL FUMARATE DIHYDRATE 160; 4.5 UG/1; UG/1
2 AEROSOL RESPIRATORY (INHALATION) 2 TIMES DAILY
Qty: 2 INHALER | Refills: 1 | Status: SHIPPED | OUTPATIENT
Start: 2019-09-27 | End: 2019-09-30 | Stop reason: SDUPTHER

## 2019-09-27 NOTE — ASSESSMENT & PLAN NOTE
There is a new problem which was recently diagnosed on her MRI brain in September 2019 and she visited emergency room for left arm paresthesias followed by heaviness in the left arm and some left face drooping that resolved after a few minutes.  She was also diagnosed to have peripheral arterial disease on the work-up done during the hospitalization. MRI brain the next day showed an acute infarcts in the right frontal and right parietal lobes with enlarged lateral ventricles.  Patient is currently on apixaban 5 mg twice daily, atorvastatin 80 mg nightly.

## 2019-09-27 NOTE — PROGRESS NOTES
Subjective:     Nohelia Dickerson is a 74 y.o. female here today for hospital follow-up visit, stroke and Annual Health Assessment.    CVA (cerebral vascular accident) (HCC)  There is a new problem which was recently diagnosed on her MRI brain in September 2019 and she visited emergency room for left arm paresthesias followed by heaviness in the left arm and some left face drooping that resolved after a few minutes.  She was also diagnosed to have peripheral arterial disease on the work-up done during the hospitalization. MRI brain the next day showed an acute infarcts in the right frontal and right parietal lobes with enlarged lateral ventricles.  Patient is currently on apixaban 5 mg twice daily, atorvastatin 80 mg nightly.    PVD (peripheral vascular disease) (HCC)  This is a new problem which patient was diagnosed recent hospitalization given her left-sided numbness on the lower extremities, she is scheduled to follow-up with Dr. Paulino as outpatient.  Currently on Lipitor and Eliquis.    Chronic respiratory failure (HCC)  This is a chronic health problem that is well controlled with current medications and lifestyle measures.  Currently on home oxygen 2 L nasal cannula only in the nights.    COPD  This is a chronic health problem that is well controlled with current medications and lifestyle measures.  Currently on PRN albuterol inhaler.  Last PFTs in July 2019 showing FEV1 at 49%, moderate COPD with an overlap of restrictive pattern also.    Protein calorie malnutrition (HCC)  Patient BMI has decreased during the recent hospitalization.  Her previous BMI was 21.0 in April 2019 and currently does become 19.5.    Tobacco abuse  This is a chronic health problem that is uncontrolled with current medications and lifestyle measures. Pt says that she has been cutting down in the Rehab and trying to be sober for past few weeks.    PHQ score 0, BMI within normal limits, chronic tobacco trying to quit, no fall  injuries    Health Maintenance Summary                Annual Wellness Visit Overdue 1945     HEPATITIS C SCREENING Overdue 1945     PFT SCREENING-FEV1 AND FEV/FVC RATIO / SPIROMETRY SHOULD BE PERFORMED ANNUALLY Overdue 3/11/1964     IMM DTaP/Tdap/Td Vaccine Overdue 3/11/1965     PAP SMEAR Overdue 3/11/1967     IMM ZOSTER VACCINES Overdue 3/11/1996     IMM PNEUMOCOCCAL VACCINE: 65+ Years Overdue 12/6/2017      Done 12/6/2016 Imm Admin: Pneumococcal Conjugate Vaccine (Prevnar/PCV-13)    IMM INFLUENZA Overdue 9/1/2019      Done 12/6/2016 Imm Admin: Influenza Vaccine Adult HD     Patient has more history with this topic...    MAMMOGRAM Next Due 11/9/2019      Done 11/9/2018 MA-SCREENING MAMMO BILAT W/TOMOSYNTHESIS W/CAD     Patient has more history with this topic...    BONE DENSITY Next Due 11/9/2023      Done 11/9/2018 DS-BONE DENSITY STUDY (DEXA)    COLONOSCOPY Next Due 8/3/2028      Done 8/3/2018 REFERRAL TO GI FOR COLONOSCOPY           Annual Health Assessment Questions:     1.  Are you currently engaging in any exercise or physical activity? Yes    2.  How would you describe your mood or emotional well-being today? anxious    3.  Have you had any falls in the last year? No    4.  Have you noticed any problems with your balance or had difficulty walking? Yes    5.  In the last six months have you experienced any leakage of urine? Yes    6. DPA/Advanced Directive: Patient does not have an Advanced Directive.  A packet and workshop information was given on Advanced Directives.    Current medicines (including changes today)  Current Outpatient Medications   Medication Sig Dispense Refill   • apixaban (ELIQUIS) 5mg Tab Take 1 Tab by mouth 2 Times a Day. 200 Tab 1   • budesonide-formoterol (SYMBICORT) 160-4.5 MCG/ACT Aerosol Inhale 2 Puffs by mouth 2 Times a Day. 2 Inhaler 1   • atorvastatin (LIPITOR) 80 MG tablet Take 1 Tab by mouth every evening. 30 Tab 0   • amLODIPine (NORVASC) 5 MG Tab Take 1 Tab by mouth  every day. 30 Tab 0   • albuterol 108 (90 Base) MCG/ACT Aero Soln inhalation aerosol Inhale 2 Puffs by mouth every four hours as needed. 8.5 g 0     No current facility-administered medications for this visit.        She  has a past medical history of Cancer (HCC) (1978), COPD, Hernia of unspecified site of abdominal cavity without mention of obstruction or gangrene (2011), Hypertension, Inguinal hernia, Kidney stones, and Tobacco use.    Morphine    She  reports that she has been smoking cigarettes. She has been smoking about 0.50 packs per day. She has never used smokeless tobacco. She reports that she does not drink alcohol or use drugs.  Ready to quit: Not Answered  Counseling given: Not Answered  Comment: [id number]      Constitutional: Denies fevers or chills  Eyes: Denies changes in vision  Ears/Nose/Throat/Mouth: Denies nasal congestion or sore throat   Cardiovascular: Denies chest pain or palpitations   Respiratory: Denies shortness of breath , Denies cough  Gastrointestinal/Hepatic: Denies abd pain, nausea, vomiting   Genitourinary: Denies dysuria or frequency  Musculoskeletal/Rheum: Denies joint pain and swelling   Neurological: Denies headache  Psychiatric: Denies mood disorder   Endocrine: Denies hx of diabetes or thyroid dysfunction  Heme/Oncology/Lymph Nodes: Denies weight changes or enlarged LNs.   Allergic/Immunologic: Denies hx of allergies      Objective:     Physical Exam:  /70 (BP Location: Left arm, Patient Position: Sitting, BP Cuff Size: Adult)   Pulse 92   Temp 36.3 °C (97.4 °F) (Temporal)   Ht 1.829 m (6')   Wt 65.3 kg (144 lb)   SpO2 91%  Body mass index is 19.53 kg/m².   Constitutional: Alert, no distress.  Skin: Warm, dry, good turgor, no rashes in visible areas.  Eye: Equal, round and reactive, conjunctiva clear, lids normal.  ENMT: Lips without lesions, good dentition, oropharynx clear.  Neck: Trachea midline, no masses, no thyromegaly. No cervical or supraclavicular  lymphadenopathy.  Respiratory: Unlabored respiratory effort, lungs clear to auscultation, no wheezes, no rhonchi.  Cardiovascular: Normal S1, S2, no murmur, no edema.  Abdomen: Soft, non-tender, no masses, no hepatosplenomegaly.  Psych: Alert and oriented x3, normal affect and mood.    Assessment and Plan:     1. Cerebrovascular accident (CVA), unspecified mechanism (HCC)  New problem, as mentioned in HPI above and with physical exam findings have emphasized to continue her Eliquis and Lipitor, patient was concerned if she can slowly stop Eliquis but I have recommended her to continue and get further recommendations from specialists including neurology and vascular surgery at this time given her risk factors of multiple infarctions on the recent MRI, peripheral arterial disease.  Have sent the Eliquis to health care sent to pharmacy for free samples.  - REFERRAL TO NEUROLOGY    2. PVD (peripheral vascular disease) (HCC)  New problem, continue the current Eliquis 5 mg twice daily, atorvastatin 80 mg nightly.  Have refilled her Eliquis as patient is unable to afford at outside pharmacy, sent it to health care sent to pharmacy.  To follow-up with vascular surgery Dr. Paulino.    3. Chronic respiratory failure with hypoxia (HCC)  4. Chronic bronchitis, unspecified chronic bronchitis type (HCC)  Well-controlled, continue the current home oxygen of 2 L nasal cannula in the nights.  Continue the current Symbicort will be sent to her pharmacy.  - budesonide-formoterol (SYMBICORT) 160-4.5 MCG/ACT Aerosol; Inhale 2 Puffs by mouth 2 Times a Day.  Dispense: 2 Inhaler; Refill: 1    5. Protein-calorie malnutrition, unspecified severity (HCC)  Newly diagnosed on recent hospitalization with weight loss of more than 10 pounds, emphasized on good protein diet which she understood and agreed.    6. Tobacco abuse  7. Encounter for smoking cessation counseling  Patient was counseled on smoking cessation, we discussed the benefits of  quitting including decrease risk of MI by 50% after one year of cessation, decreased risk of lung cancer after 12 years, one cigarette is enough to paralyze cilia for 24 hours leading to increase risk of pulmonary infection, etc.... .Also encouraged to set a stop date.  This is more than 3 minutes duration    8. Need for vaccination  - Tdap =>8yo IM  - PneumoVax PPV23 =>3yo  - INFLUENZA VACCINE, HIGH DOSE (65+ ONLY)    Discussion today about general wellness and lifestyle habits:    · Engage in regular physical activity and social activities.  · Prevent falls and reduce trip hazards; using ambulatory aides, hearing and vision testing if appropriate.  · Steps to improve urinary incontinence.  · Advanced care planning.    Follow-Up: Return in about 3 months (around 12/27/2019), or if symptoms worsen or fail to improve.         PLEASE NOTE: This dictation was created using voice recognition software. I have made every reasonable attempt to correct obvious errors, but I expect that there are errors of grammar and possibly content that I did not discover before finalizing the note.

## 2019-09-27 NOTE — ASSESSMENT & PLAN NOTE
This is a new problem which patient was diagnosed recent hospitalization given her left-sided numbness on the lower extremities, she is scheduled to follow-up with Dr. Paulino as outpatient.  Currently on Lipitor and Eliquis.

## 2019-09-27 NOTE — ASSESSMENT & PLAN NOTE
This is a chronic health problem that is uncontrolled with current medications and lifestyle measures. Pt says that she has been cutting down in the Rehab and trying to be sober for past few weeks.

## 2019-09-27 NOTE — ASSESSMENT & PLAN NOTE
This is a chronic health problem that is well controlled with current medications and lifestyle measures.  Currently on PRN albuterol inhaler.  Last PFTs in July 2019 showing FEV1 at 49%, moderate COPD with an overlap of restrictive pattern also.

## 2019-09-27 NOTE — ASSESSMENT & PLAN NOTE
Patient BMI has decreased during the recent hospitalization.  Her previous BMI was 21.0 in April 2019 and currently does become 19.5.

## 2019-09-27 NOTE — ASSESSMENT & PLAN NOTE
This is a chronic health problem that is well controlled with current medications and lifestyle measures.  Currently on home oxygen 2 L nasal cannula only in the nights.

## 2019-09-30 ENCOUNTER — HOME CARE VISIT (OUTPATIENT)
Dept: HOME HEALTH SERVICES | Facility: HOME HEALTHCARE | Age: 74
End: 2019-09-30
Payer: MEDICARE

## 2019-09-30 ENCOUNTER — TELEPHONE (OUTPATIENT)
Dept: MEDICAL GROUP | Facility: PHYSICIAN GROUP | Age: 74
End: 2019-09-30

## 2019-09-30 VITALS
TEMPERATURE: 97.8 F | DIASTOLIC BLOOD PRESSURE: 82 MMHG | SYSTOLIC BLOOD PRESSURE: 134 MMHG | OXYGEN SATURATION: 96 % | HEART RATE: 80 BPM | RESPIRATION RATE: 16 BRPM

## 2019-09-30 VITALS
TEMPERATURE: 98.4 F | DIASTOLIC BLOOD PRESSURE: 60 MMHG | OXYGEN SATURATION: 96 % | SYSTOLIC BLOOD PRESSURE: 110 MMHG | RESPIRATION RATE: 16 BRPM | HEART RATE: 80 BPM

## 2019-09-30 DIAGNOSIS — J42 CHRONIC BRONCHITIS, UNSPECIFIED CHRONIC BRONCHITIS TYPE (HCC): ICD-10-CM

## 2019-09-30 PROCEDURE — G0151 HHCP-SERV OF PT,EA 15 MIN: HCPCS

## 2019-09-30 PROCEDURE — G0495 RN CARE TRAIN/EDU IN HH: HCPCS

## 2019-09-30 RX ORDER — BUDESONIDE AND FORMOTEROL FUMARATE DIHYDRATE 160; 4.5 UG/1; UG/1
2 AEROSOL RESPIRATORY (INHALATION) 2 TIMES DAILY
Qty: 2 INHALER | Refills: 1 | Status: SHIPPED | OUTPATIENT
Start: 2019-09-30 | End: 2019-11-07

## 2019-09-30 SDOH — ECONOMIC STABILITY: HOUSING INSECURITY: EVIDENCE OF SMOKING MATERIAL: 0

## 2019-09-30 ASSESSMENT — ACTIVITIES OF DAILY LIVING (ADL)
TELEPHONE_ASSISTANCE: 0
EATING_ASSISTANCE: 0
BATHING_ASSISTANCE: 2
GROOMING_ASSISTANCE: 0
TOILETING_ASSISTANCE: 0
ORAL_CARE_ASSISTANCE: 0

## 2019-09-30 ASSESSMENT — ENCOUNTER SYMPTOMS
NAUSEA: DENIES
VOMITING: DENIES

## 2019-09-30 NOTE — TELEPHONE ENCOUNTER
1. Caller Name: [facility]                                         Call Back Number: [phone]      Patient approves a detailed voicemail message: N\A    budesonide-formoterol (SYMBICORT) 160-4.5 MCG/ACT Aerosol [[id number]]     Order Details   Dose: 2 Puff Route: Inhalation Frequency: 2 TIMES DAILY   Dispense Quantity: 2 Inhaler Refills: 1 Fills remaining: --           Sig: Inhale 2 Puffs by mouth 2 Times a Day.          Written Date: 09/27/19 Expiration Date: --     Start Date: 09/27/19           apixaban (ELIQUIS) 5mg Tab [[id number]]     Order Details   Dose: 5 mg Route: Oral Frequency: 2 TIMES DAILY   Dispense Quantity: 200 Tab Refills: 1 Fills remaining: --           Sig: Take 1 Tab by mouth 2 Times a Day.            Pharmacy would like to confirm it this is a sample? If so, please resend both indicating that they are samples.

## 2019-10-01 ENCOUNTER — HOME CARE VISIT (OUTPATIENT)
Dept: HOME HEALTH SERVICES | Facility: HOME HEALTHCARE | Age: 74
End: 2019-10-01
Payer: MEDICARE

## 2019-10-01 ENCOUNTER — PATIENT OUTREACH (OUTPATIENT)
Dept: HEALTH INFORMATION MANAGEMENT | Facility: OTHER | Age: 74
End: 2019-10-01

## 2019-10-01 PROCEDURE — G0153 HHCP-SVS OF S/L PATH,EA 15MN: HCPCS

## 2019-10-02 ENCOUNTER — HOME CARE VISIT (OUTPATIENT)
Dept: HOME HEALTH SERVICES | Facility: HOME HEALTHCARE | Age: 74
End: 2019-10-02
Payer: MEDICARE

## 2019-10-02 VITALS
DIASTOLIC BLOOD PRESSURE: 94 MMHG | TEMPERATURE: 98.6 F | SYSTOLIC BLOOD PRESSURE: 138 MMHG | RESPIRATION RATE: 16 BRPM | HEART RATE: 68 BPM | OXYGEN SATURATION: 98 %

## 2019-10-02 VITALS
OXYGEN SATURATION: 96 % | TEMPERATURE: 97.8 F | HEART RATE: 89 BPM | SYSTOLIC BLOOD PRESSURE: 139 MMHG | DIASTOLIC BLOOD PRESSURE: 86 MMHG | RESPIRATION RATE: 16 BRPM

## 2019-10-02 PROCEDURE — G0495 RN CARE TRAIN/EDU IN HH: HCPCS

## 2019-10-02 PROCEDURE — G0152 HHCP-SERV OF OT,EA 15 MIN: HCPCS

## 2019-10-02 ASSESSMENT — ENCOUNTER SYMPTOMS
VOMITING: DENIES
NAUSEA: DENIES

## 2019-10-03 ENCOUNTER — HOME CARE VISIT (OUTPATIENT)
Dept: HOME HEALTH SERVICES | Facility: HOME HEALTHCARE | Age: 74
End: 2019-10-03
Payer: MEDICARE

## 2019-10-03 PROCEDURE — G0151 HHCP-SERV OF PT,EA 15 MIN: HCPCS

## 2019-10-03 ASSESSMENT — ACTIVITIES OF DAILY LIVING (ADL)
DRESSING_UB_ASSISTANCE: 0
ORAL_CARE_ASSISTANCE: 0
EATING_ASSISTANCE: 0
LAUNDRY_ASSISTANCE: 4
BATHING_ASSISTIVE_EQUIPMENT_NEEDED: GRAB BAR
BATHING_ASSISTIVE_EQUIPMENT_USED: SHOWER CHAIR
MEAL_PREP_ASSISTANCE: 4
HOUSEKEEPING_ASSISTANCE: 5
TOILETING_ASSISTANCE: 0
TRANSPORTATION_ASSISTANCE: 6
BATHING_ASSISTANCE: 2
GROOMING_ASSISTANCE: 0
TELEPHONE_ASSISTANCE: 0
DRESSING_LB_ASSISTANCE: 0
SHOPPING_ASSISTANCE: 6

## 2019-10-03 ASSESSMENT — ENCOUNTER SYMPTOMS: DEPRESSED MOOD: 1

## 2019-10-04 ENCOUNTER — HOME CARE VISIT (OUTPATIENT)
Dept: HOME HEALTH SERVICES | Facility: HOME HEALTHCARE | Age: 74
End: 2019-10-04
Payer: MEDICARE

## 2019-10-04 VITALS
DIASTOLIC BLOOD PRESSURE: 60 MMHG | OXYGEN SATURATION: 94 % | HEART RATE: 89 BPM | TEMPERATURE: 98.6 F | RESPIRATION RATE: 16 BRPM | SYSTOLIC BLOOD PRESSURE: 108 MMHG

## 2019-10-04 VITALS
DIASTOLIC BLOOD PRESSURE: 64 MMHG | SYSTOLIC BLOOD PRESSURE: 120 MMHG | HEART RATE: 88 BPM | RESPIRATION RATE: 16 BRPM | OXYGEN SATURATION: 95 % | TEMPERATURE: 98 F

## 2019-10-04 PROCEDURE — G0493 RN CARE EA 15 MIN HH/HOSPICE: HCPCS

## 2019-10-04 SDOH — ECONOMIC STABILITY: HOUSING INSECURITY
HOME SAFETY: OXYGEN SAFETY RISK ASSESSMENT PERFORMED. PATIENT DOES HAVE A NO SMOKING SIGN POSTED IN THE HOME. PATIENT DOES HAVE A WORKING FIRE EXTINGUISHER PRESENT IN THE HOME. SMOKE ALARMS ARE NOT PRESENT AND FUNCTIONAL ON EACH LEVEL OF THE HOME., INSTRUCTED PAT

## 2019-10-04 SDOH — ECONOMIC STABILITY: HOUSING INSECURITY
HOME SAFETY: IENT/CAREGIVER TO GET ONE AS SOON AS POSSIBLE. PATIENT DOES HAVE A FIRE ESCAPE PLAN DEVELOPED. PATIENT DOES NOT HAVE FLAMMABLE MATERIALS PRESENT IN THE HOME PRESENTING A FIRE HAZARD. NO EVIDENCE FOUND OF SMOKING MATERIALS PRESENT IN THE HOME.

## 2019-10-04 SDOH — ECONOMIC STABILITY: HOUSING INSECURITY: EVIDENCE OF SMOKING MATERIAL: 0

## 2019-10-07 ENCOUNTER — HOME CARE VISIT (OUTPATIENT)
Dept: HOME HEALTH SERVICES | Facility: HOME HEALTHCARE | Age: 74
End: 2019-10-07
Payer: MEDICARE

## 2019-10-07 VITALS
OXYGEN SATURATION: 92 % | DIASTOLIC BLOOD PRESSURE: 78 MMHG | HEART RATE: 82 BPM | SYSTOLIC BLOOD PRESSURE: 122 MMHG | TEMPERATURE: 98.6 F | RESPIRATION RATE: 16 BRPM

## 2019-10-07 PROCEDURE — G0151 HHCP-SERV OF PT,EA 15 MIN: HCPCS

## 2019-10-09 ENCOUNTER — HOME CARE VISIT (OUTPATIENT)
Dept: HOME HEALTH SERVICES | Facility: HOME HEALTHCARE | Age: 74
End: 2019-10-09
Payer: MEDICARE

## 2019-10-09 VITALS
DIASTOLIC BLOOD PRESSURE: 80 MMHG | TEMPERATURE: 98.4 F | OXYGEN SATURATION: 94 % | RESPIRATION RATE: 16 BRPM | SYSTOLIC BLOOD PRESSURE: 120 MMHG | HEART RATE: 85 BPM

## 2019-10-09 VITALS
HEART RATE: 85 BPM | OXYGEN SATURATION: 94 % | DIASTOLIC BLOOD PRESSURE: 80 MMHG | RESPIRATION RATE: 16 BRPM | TEMPERATURE: 98.4 F | SYSTOLIC BLOOD PRESSURE: 128 MMHG

## 2019-10-09 PROCEDURE — G0299 HHS/HOSPICE OF RN EA 15 MIN: HCPCS

## 2019-10-09 PROCEDURE — G0157 HHC PT ASSISTANT EA 15: HCPCS

## 2019-10-09 PROCEDURE — G0180 MD CERTIFICATION HHA PATIENT: HCPCS | Performed by: INTERNAL MEDICINE

## 2019-10-09 ASSESSMENT — ENCOUNTER SYMPTOMS
NAUSEA: DENIES
VOMITING: DENIES

## 2019-10-10 ENCOUNTER — HOME CARE VISIT (OUTPATIENT)
Dept: HOME HEALTH SERVICES | Facility: HOME HEALTHCARE | Age: 74
End: 2019-10-10
Payer: MEDICARE

## 2019-10-10 VITALS
HEART RATE: 92 BPM | TEMPERATURE: 97.8 F | SYSTOLIC BLOOD PRESSURE: 140 MMHG | DIASTOLIC BLOOD PRESSURE: 84 MMHG | OXYGEN SATURATION: 97 % | RESPIRATION RATE: 16 BRPM

## 2019-10-10 PROCEDURE — G0152 HHCP-SERV OF OT,EA 15 MIN: HCPCS

## 2019-10-11 ENCOUNTER — HOME CARE VISIT (OUTPATIENT)
Dept: HOME HEALTH SERVICES | Facility: HOME HEALTHCARE | Age: 74
End: 2019-10-11
Payer: MEDICARE

## 2019-10-11 VITALS
SYSTOLIC BLOOD PRESSURE: 118 MMHG | RESPIRATION RATE: 16 BRPM | OXYGEN SATURATION: 94 % | TEMPERATURE: 98 F | DIASTOLIC BLOOD PRESSURE: 54 MMHG | HEART RATE: 79 BPM

## 2019-10-11 PROCEDURE — G0493 RN CARE EA 15 MIN HH/HOSPICE: HCPCS

## 2019-10-11 SDOH — ECONOMIC STABILITY: HOUSING INSECURITY
HOME SAFETY: OXYGEN SAFETY RISK ASSESSMENT PERFORMED. PATIENT DOES HAVE A NO SMOKING SIGN POSTED IN THE HOME. PATIENT DOES HAVE A WORKING FIRE EXTINGUISHER PRESENT IN THE HOME. SMOKE ALARMS ARE PRESENT AND FUNCTIONAL ON EACH LEVEL OF THE HOME. PATIENT DOES HAVE A

## 2019-10-11 SDOH — ECONOMIC STABILITY: HOUSING INSECURITY
HOME SAFETY: FIRE ESCAPE PLAN DEVELOPED. PATIENT DOES NOT HAVE FLAMMABLE MATERIALS PRESENT IN THE HOME PRESENTING A FIRE HAZARD. NO EVIDENCE FOUND OF SMOKING MATERIALS PRESENT IN THE HOME.

## 2019-10-11 SDOH — ECONOMIC STABILITY: HOUSING INSECURITY: EVIDENCE OF SMOKING MATERIAL: 0

## 2019-10-14 ENCOUNTER — HOME CARE VISIT (OUTPATIENT)
Dept: HOME HEALTH SERVICES | Facility: HOME HEALTHCARE | Age: 74
End: 2019-10-14
Payer: MEDICARE

## 2019-10-14 VITALS
DIASTOLIC BLOOD PRESSURE: 64 MMHG | SYSTOLIC BLOOD PRESSURE: 110 MMHG | TEMPERATURE: 99 F | HEART RATE: 88 BPM | RESPIRATION RATE: 16 BRPM | OXYGEN SATURATION: 93 %

## 2019-10-14 PROCEDURE — G0151 HHCP-SERV OF PT,EA 15 MIN: HCPCS

## 2019-10-15 ENCOUNTER — HOME CARE VISIT (OUTPATIENT)
Dept: HOME HEALTH SERVICES | Facility: HOME HEALTHCARE | Age: 74
End: 2019-10-15
Payer: MEDICARE

## 2019-10-16 ENCOUNTER — HOME CARE VISIT (OUTPATIENT)
Dept: HOME HEALTH SERVICES | Facility: HOME HEALTHCARE | Age: 74
End: 2019-10-16
Payer: MEDICARE

## 2019-10-16 VITALS
OXYGEN SATURATION: 93 % | TEMPERATURE: 98.7 F | DIASTOLIC BLOOD PRESSURE: 60 MMHG | SYSTOLIC BLOOD PRESSURE: 100 MMHG | HEART RATE: 84 BPM | RESPIRATION RATE: 18 BRPM

## 2019-10-16 PROCEDURE — G0151 HHCP-SERV OF PT,EA 15 MIN: HCPCS

## 2019-10-16 PROCEDURE — G0152 HHCP-SERV OF OT,EA 15 MIN: HCPCS

## 2019-10-18 ENCOUNTER — HOME CARE VISIT (OUTPATIENT)
Dept: HOME HEALTH SERVICES | Facility: HOME HEALTHCARE | Age: 74
End: 2019-10-18
Payer: MEDICARE

## 2019-10-18 VITALS
RESPIRATION RATE: 16 BRPM | SYSTOLIC BLOOD PRESSURE: 130 MMHG | TEMPERATURE: 98.8 F | OXYGEN SATURATION: 92 % | DIASTOLIC BLOOD PRESSURE: 80 MMHG | HEART RATE: 82 BPM

## 2019-10-18 PROCEDURE — G0152 HHCP-SERV OF OT,EA 15 MIN: HCPCS

## 2019-10-18 PROCEDURE — G0299 HHS/HOSPICE OF RN EA 15 MIN: HCPCS

## 2019-10-19 VITALS
RESPIRATION RATE: 16 BRPM | HEART RATE: 82 BPM | SYSTOLIC BLOOD PRESSURE: 130 MMHG | DIASTOLIC BLOOD PRESSURE: 80 MMHG | OXYGEN SATURATION: 92 % | TEMPERATURE: 98.8 F

## 2019-10-19 ASSESSMENT — ENCOUNTER SYMPTOMS: SHORTNESS OF BREATH: T

## 2019-10-21 ENCOUNTER — HOME CARE VISIT (OUTPATIENT)
Dept: HOME HEALTH SERVICES | Facility: HOME HEALTHCARE | Age: 74
End: 2019-10-21
Payer: MEDICARE

## 2019-10-21 VITALS
OXYGEN SATURATION: 96 % | TEMPERATURE: 98.7 F | HEART RATE: 90 BPM | RESPIRATION RATE: 16 BRPM | DIASTOLIC BLOOD PRESSURE: 76 MMHG | SYSTOLIC BLOOD PRESSURE: 120 MMHG

## 2019-10-21 PROCEDURE — G0151 HHCP-SERV OF PT,EA 15 MIN: HCPCS

## 2019-10-21 PROCEDURE — G0152 HHCP-SERV OF OT,EA 15 MIN: HCPCS

## 2019-10-21 RX ORDER — AMLODIPINE BESYLATE 5 MG/1
TABLET ORAL
Qty: 100 TAB | Refills: 1 | Status: SHIPPED | OUTPATIENT
Start: 2019-10-21 | End: 2020-05-10

## 2019-10-22 ENCOUNTER — HOME CARE VISIT (OUTPATIENT)
Dept: HOME HEALTH SERVICES | Facility: HOME HEALTHCARE | Age: 74
End: 2019-10-22
Payer: MEDICARE

## 2019-10-22 VITALS
OXYGEN SATURATION: 97 % | TEMPERATURE: 96.2 F | RESPIRATION RATE: 16 BRPM | HEART RATE: 95 BPM | DIASTOLIC BLOOD PRESSURE: 84 MMHG | SYSTOLIC BLOOD PRESSURE: 138 MMHG

## 2019-10-22 PROCEDURE — G0299 HHS/HOSPICE OF RN EA 15 MIN: HCPCS

## 2019-10-23 ENCOUNTER — HOME CARE VISIT (OUTPATIENT)
Dept: HOME HEALTH SERVICES | Facility: HOME HEALTHCARE | Age: 74
End: 2019-10-23
Payer: MEDICARE

## 2019-10-23 VITALS
TEMPERATURE: 99.2 F | OXYGEN SATURATION: 96 % | RESPIRATION RATE: 16 BRPM | HEART RATE: 88 BPM | SYSTOLIC BLOOD PRESSURE: 130 MMHG | DIASTOLIC BLOOD PRESSURE: 84 MMHG

## 2019-10-23 PROCEDURE — G0151 HHCP-SERV OF PT,EA 15 MIN: HCPCS

## 2019-10-23 SDOH — ECONOMIC STABILITY: HOUSING INSECURITY: HOME SAFETY: SOME CLUTTER ABOUT HOME YET, REMINDED TO KEEP PATHWAYS CLEAR AND WEAR MORE SUPPORTIVE FOOTWEAR.

## 2019-10-24 ENCOUNTER — HOME CARE VISIT (OUTPATIENT)
Dept: HOME HEALTH SERVICES | Facility: HOME HEALTHCARE | Age: 74
End: 2019-10-24
Payer: MEDICARE

## 2019-10-24 VITALS
HEART RATE: 71 BPM | DIASTOLIC BLOOD PRESSURE: 85 MMHG | SYSTOLIC BLOOD PRESSURE: 138 MMHG | TEMPERATURE: 98.6 F | OXYGEN SATURATION: 94 % | RESPIRATION RATE: 16 BRPM

## 2019-10-24 PROCEDURE — G0152 HHCP-SERV OF OT,EA 15 MIN: HCPCS

## 2019-10-24 ASSESSMENT — ACTIVITIES OF DAILY LIVING (ADL)
HOME_HEALTH_OASIS: 00
OASIS_M1830: 01

## 2019-10-24 ASSESSMENT — PATIENT HEALTH QUESTIONNAIRE - PHQ9
5. POOR APPETITE OR OVEREATING: 0 - NOT AT ALL
CLINICAL INTERPRETATION OF PHQ2 SCORE: 3
SUM OF ALL RESPONSES TO PHQ QUESTIONS 1-9: 3

## 2019-10-25 ENCOUNTER — PATIENT OUTREACH (OUTPATIENT)
Dept: HEALTH INFORMATION MANAGEMENT | Facility: OTHER | Age: 74
End: 2019-10-25

## 2019-10-31 ENCOUNTER — APPOINTMENT (OUTPATIENT)
Dept: PHYSICAL MEDICINE AND REHAB | Facility: REHABILITATION | Age: 74
End: 2019-10-31
Payer: MEDICARE

## 2019-11-05 NOTE — PROGRESS NOTES
A 74-year-old female was a local transfer admission to [facility] from 9/6/2019 to 9/25/2019 to treat Paresthesia of skin. [facility] did not visit the patient bedside. The patient was discharged Home with Home Health.     The patient was ordered to start/continue to take the following medications: Apixaban (Eliquis), Senna-Docusate (Pericolace Or Senokot), Budesonide (Symbicort), Amlodipine Besylate, and Albuterol 108 (90 Base). The patient successfully filled all medications.    The patient was ordered to follow-up with Specialist, PCP, and Home Health. The patient had the following appointments:     1) 9/26/2019 @ 9:00 [facility] - Appointment Kept     2) 9/27/2019 @ 3:20 Idalia Cortez, internal medicine - CONFIRMED AS KEPT     3) 10/31/2019 @ 1:30 Kala Hernandes, general surgery - PATIENT RESCHEDULED    The patient has future appointments scheduled for:     1) 11/7/2019 @ 9:00 [facility], neurology - SCHEDULED     2) 11/22/2019 @ 1:30 Kala Hernandes general surgery - SCHEDULED    [facility] followed the patient for a total of 30 days and Patient  was receptive to services. In summary.  As a result, Patient adhered with Discharge Orders and Patient attended follow up appointments. PPS Screening was conducted and patient scored at a 90%.
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
## 2019-11-07 ENCOUNTER — OFFICE VISIT (OUTPATIENT)
Dept: NEUROLOGY | Facility: MEDICAL CENTER | Age: 74
End: 2019-11-07
Payer: MEDICARE

## 2019-11-07 VITALS
HEIGHT: 72 IN | WEIGHT: 159 LBS | RESPIRATION RATE: 14 BRPM | OXYGEN SATURATION: 96 % | HEART RATE: 82 BPM | DIASTOLIC BLOOD PRESSURE: 78 MMHG | TEMPERATURE: 96.8 F | SYSTOLIC BLOOD PRESSURE: 124 MMHG | BODY MASS INDEX: 21.54 KG/M2

## 2019-11-07 DIAGNOSIS — R73.09 ALTERED GLUCOSE METABOLISM: ICD-10-CM

## 2019-11-07 DIAGNOSIS — I70.90 ATHEROSCLEROSIS: ICD-10-CM

## 2019-11-07 DIAGNOSIS — Z72.0 TOBACCO ABUSE: ICD-10-CM

## 2019-11-07 DIAGNOSIS — I63.9 ISCHEMIC STROKE (HCC): ICD-10-CM

## 2019-11-07 DIAGNOSIS — I10 ESSENTIAL HYPERTENSION: ICD-10-CM

## 2019-11-07 DIAGNOSIS — Z09 HOSPITAL DISCHARGE FOLLOW-UP: ICD-10-CM

## 2019-11-07 DIAGNOSIS — G91.2 NPH (NORMAL PRESSURE HYDROCEPHALUS) (HCC): ICD-10-CM

## 2019-11-07 PROCEDURE — 99215 OFFICE O/P EST HI 40 MIN: CPT | Performed by: NURSE PRACTITIONER

## 2019-11-07 RX ORDER — M-VIT,TX,IRON,MINS/CALC/FOLIC 27MG-0.4MG
1 TABLET ORAL EVERY MORNING
COMMUNITY

## 2019-11-22 ENCOUNTER — OFFICE VISIT (OUTPATIENT)
Dept: PHYSICAL MEDICINE AND REHAB | Facility: REHABILITATION | Age: 74
End: 2019-11-22
Payer: MEDICARE

## 2019-11-22 VITALS
DIASTOLIC BLOOD PRESSURE: 82 MMHG | WEIGHT: 158 LBS | SYSTOLIC BLOOD PRESSURE: 122 MMHG | HEART RATE: 80 BPM | HEIGHT: 72 IN | BODY MASS INDEX: 21.4 KG/M2 | TEMPERATURE: 97.8 F | OXYGEN SATURATION: 93 %

## 2019-11-22 DIAGNOSIS — Z79.01 CHRONIC ANTICOAGULATION: ICD-10-CM

## 2019-11-22 DIAGNOSIS — G81.94 LEFT HEMIPARESIS (HCC): ICD-10-CM

## 2019-11-22 DIAGNOSIS — I73.9 PERIPHERAL VASCULAR DISEASE (HCC): ICD-10-CM

## 2019-11-22 DIAGNOSIS — I63.9 CEREBROVASCULAR ACCIDENT (CVA), UNSPECIFIED MECHANISM (HCC): Primary | ICD-10-CM

## 2019-11-22 DIAGNOSIS — Z78.9 IMPAIRED INSTRUMENTAL ACTIVITIES OF DAILY LIVING (IADL): ICD-10-CM

## 2019-11-22 PROCEDURE — 99214 OFFICE O/P EST MOD 30 MIN: CPT | Performed by: PHYSICAL MEDICINE & REHABILITATION

## 2019-11-22 RX ORDER — LISINOPRIL 10 MG/1
TABLET ORAL
COMMUNITY
Start: 2019-11-04 | End: 2020-02-07

## 2019-11-22 ASSESSMENT — ENCOUNTER SYMPTOMS
FOCAL WEAKNESS: 0
SHORTNESS OF BREATH: 0
CHILLS: 0
CONSTIPATION: 0
FALLS: 0
HEADACHES: 0
BLURRED VISION: 0
FEVER: 0
SENSORY CHANGE: 0
SPEECH CHANGE: 0
MEMORY LOSS: 0
DOUBLE VISION: 0
DEPRESSION: 1
TINGLING: 0
ROS GI COMMENTS: NO BOWEL INCONTINENCE.
DIZZINESS: 0
COUGH: 0

## 2019-11-22 ASSESSMENT — PATIENT HEALTH QUESTIONNAIRE - PHQ9: CLINICAL INTERPRETATION OF PHQ2 SCORE: 0
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
## 2019-11-27 ENCOUNTER — TELEPHONE (OUTPATIENT)
Dept: MEDICAL GROUP | Facility: PHYSICIAN GROUP | Age: 74
End: 2019-11-27

## 2019-11-27 NOTE — TELEPHONE ENCOUNTER
1. Caller Name: Nohelia                                         Call Back Number: [phone] (home)       Patient approves a detailed voicemail message: N\A    Pt called to see if her form has been sent to [employer]. This form has been placed on MA'S desk.

## 2019-11-28 NOTE — TELEPHONE ENCOUNTER
Informed patient she has to fill out her income on [employer] paperwork as well as sign it before  will sign. She agreed on getting it mailed to her.

## 2019-12-16 ENCOUNTER — PATIENT OUTREACH (OUTPATIENT)
Dept: HEALTH INFORMATION MANAGEMENT | Facility: OTHER | Age: 74
End: 2019-12-16

## 2019-12-16 SDOH — ECONOMIC STABILITY: FOOD INSECURITY: WITHIN THE PAST 12 MONTHS, THE FOOD YOU BOUGHT JUST DIDN'T LAST AND YOU DIDN'T HAVE MONEY TO GET MORE.: NEVER TRUE

## 2019-12-16 SDOH — ECONOMIC STABILITY: TRANSPORTATION INSECURITY
IN THE PAST 12 MONTHS, HAS LACK OF TRANSPORTATION KEPT YOU FROM MEETINGS, WORK, OR FROM GETTING THINGS NEEDED FOR DAILY LIVING?: NO

## 2019-12-16 SDOH — ECONOMIC STABILITY: FOOD INSECURITY: WITHIN THE PAST 12 MONTHS, YOU WORRIED THAT YOUR FOOD WOULD RUN OUT BEFORE YOU GOT MONEY TO BUY MORE.: NEVER TRUE

## 2019-12-16 SDOH — ECONOMIC STABILITY: TRANSPORTATION INSECURITY
IN THE PAST 12 MONTHS, HAS THE LACK OF TRANSPORTATION KEPT YOU FROM MEDICAL APPOINTMENTS OR FROM GETTING MEDICATIONS?: NO

## 2019-12-17 NOTE — PROGRESS NOTES
1. [facility]Connect Verified: yes    2. Verify PCP: yes    3. Review and add  to Care Team: yes        5. Reviewed/Updated the following with patient:       •   Communication Preference Obtained? YES  • MyChart Activation: already active       •   E-Mail Address Obtained? YES       •   Appointment Day and Time Preferences? YES       •   Preferred Pharmacy? YES       •   Preferred Lab? YES

## 2019-12-30 ENCOUNTER — TELEPHONE (OUTPATIENT)
Dept: MEDICAL GROUP | Facility: PHYSICIAN GROUP | Age: 74
End: 2019-12-30

## 2019-12-31 NOTE — TELEPHONE ENCOUNTER
Future Appointments       Provider Department Center    1/3/2020 2:00 PM Idalia Cortez M.D.; [facility]  [facility]    1/24/2020 2:00 PM Reilly Pereira M.D. [facility]     2/7/2020 1:00 PM Parth Kinsey M.D. [facility] Neurology         ANNUAL WELLNESS VISIT PRE-VISIT PLANNING WITHOUT OUTREACH    1.  Reviewed note from last office visit with PCP: YES    2.  If any orders were placed at last visit, do we have Results/Consult Notes?        •  Labs - Labs ordered, completed on 09/18/2019 and results are in chart.       •  Imaging - Imaging was not ordered at last office visit.       •  Referrals - No referrals were ordered at last office visit.    3.  Immunizations were updated in Contacts+ using WebIZ?: Yes       •  WebIZ Recommendations: TD, SHINGRIX (Shingles) and CPOX        •  Is patient due for Tdap? NO       •  Is patient due for Shingrix? YES. Patient was not notified of copay/out of pocket cost.     4.  Patient is due for the following Health Maintenance Topics:   Health Maintenance Due   Topic Date Due   • Annual Wellness Visit  1945   • HEPATITIS C SCREENING  1945   • ANNUAL PFT SCREENING-FEV1 AND FEV/FVC RATIO / SPIROMETRY  03/11/1964   • PAP SMEAR  03/11/1967   • IMM ZOSTER VACCINES (1 of 2) 03/11/1996   • MAMMOGRAM  11/09/2019     5.  Reviewed/Updated the following with patient:       •   Preferred Pharmacy? YES       •   Preferred Lab? YES       •   Preferred Communication? YES       •   Allergies? YES       •   Medications? YES. Was Abstract Encounter opened and chart updated? YES       •   Social History? YES. Was Abstract Encounter opened and chart updated? YES       •   Family History (document living status of immediate family members and if + hx of  cancer, diabetes, hypertension, hyperlipidemia, heart attack, stroke) YES. Was Abstract Encounter opened and chart updated? YES    6.   Care Team Updated:       •   DME Company (gait device, O2, CPAP, etc.): YES- 02- 1.5LPM,        •   Other Specialists (eye doctor, derm, GYN, cardiology, endo, etc): YES    7. Orders for overdue Health Maintenance topics pended in Pre-Charting? NO    8.  Patient has the following Care Path diagnoses on Problem List:  COPD    1.  Is patient under the care of a pulmonologist? Yes, CareTeam was updated.  2.  Has patient ever completed a PFT or spirometry? No.  3.  Is patient on oxygen or CPAP? Yes, CareTeam was updated.  4.  Has patient ever had instruction on inhaler technique by health care professional? No  5.  Is patient interested in a referral to respiratory therapy for more information on COPD, inhaler technique, and/or information on establishing an action plan?  No, patient is NOT interested.      9.  Patient was advised: “This is a free wellness visit. The provider will screen for medical conditions to help you stay healthy. If you have other concerns to address you may be asked to discuss these at a separate visit or there may be an additional fee.”     10.  Patient was informed to arrive 15 min prior to their scheduled appointment and bring in their medication bottles.

## 2020-01-03 ENCOUNTER — OFFICE VISIT (OUTPATIENT)
Dept: MEDICAL GROUP | Facility: PHYSICIAN GROUP | Age: 75
End: 2020-01-03
Payer: MEDICARE

## 2020-01-03 VITALS
DIASTOLIC BLOOD PRESSURE: 64 MMHG | HEIGHT: 72 IN | HEART RATE: 82 BPM | TEMPERATURE: 98.5 F | WEIGHT: 166 LBS | OXYGEN SATURATION: 91 % | SYSTOLIC BLOOD PRESSURE: 110 MMHG | BODY MASS INDEX: 22.48 KG/M2

## 2020-01-03 DIAGNOSIS — Z71.6 ENCOUNTER FOR SMOKING CESSATION COUNSELING: ICD-10-CM

## 2020-01-03 DIAGNOSIS — J96.11 CHRONIC RESPIRATORY FAILURE WITH HYPOXIA (HCC): ICD-10-CM

## 2020-01-03 DIAGNOSIS — I73.9 PVD (PERIPHERAL VASCULAR DISEASE) (HCC): ICD-10-CM

## 2020-01-03 DIAGNOSIS — J42 CHRONIC BRONCHITIS, UNSPECIFIED CHRONIC BRONCHITIS TYPE (HCC): ICD-10-CM

## 2020-01-03 DIAGNOSIS — F17.200 TOBACCO DEPENDENCE: ICD-10-CM

## 2020-01-03 DIAGNOSIS — Z12.31 ENCOUNTER FOR SCREENING MAMMOGRAM FOR BREAST CANCER: ICD-10-CM

## 2020-01-03 DIAGNOSIS — I63.9 CEREBROVASCULAR ACCIDENT (CVA), UNSPECIFIED MECHANISM (HCC): ICD-10-CM

## 2020-01-03 DIAGNOSIS — I70.209 ARTERIAL OCCLUSION, LOWER EXTREMITY (HCC): ICD-10-CM

## 2020-01-03 DIAGNOSIS — E46 PROTEIN-CALORIE MALNUTRITION, UNSPECIFIED SEVERITY (HCC): ICD-10-CM

## 2020-01-03 DIAGNOSIS — Z11.59 ENCOUNTER FOR HEPATITIS C SCREENING TEST FOR LOW RISK PATIENT: ICD-10-CM

## 2020-01-03 PROCEDURE — G0439 PPPS, SUBSEQ VISIT: HCPCS | Mod: 25 | Performed by: INTERNAL MEDICINE

## 2020-01-03 PROCEDURE — 8041 PR SCP AHA: Performed by: INTERNAL MEDICINE

## 2020-01-03 PROCEDURE — 99406 BEHAV CHNG SMOKING 3-10 MIN: CPT | Performed by: INTERNAL MEDICINE

## 2020-01-03 ASSESSMENT — PATIENT HEALTH QUESTIONNAIRE - PHQ9: CLINICAL INTERPRETATION OF PHQ2 SCORE: 0

## 2020-01-03 ASSESSMENT — ENCOUNTER SYMPTOMS: GENERAL WELL-BEING: GOOD

## 2020-01-03 ASSESSMENT — ACTIVITIES OF DAILY LIVING (ADL): BATHING_REQUIRES_ASSISTANCE: 0
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
## 2020-01-22 RX ORDER — ATORVASTATIN CALCIUM 80 MG/1
TABLET, FILM COATED ORAL
Qty: 100 TAB | Refills: 2 | Status: SHIPPED | OUTPATIENT
Start: 2020-01-22 | End: 2020-05-10

## 2020-01-22 NOTE — TELEPHONE ENCOUNTER
Was the patient seen in the last year in this department? Yes    Does patient have an active prescription for medications requested? No     Received Request Via: Pharmacy      Pt met protocol?: Yes    OV 1/20     Lab Results   Component Value Date/Time    CHOLSTRLTOT 175 09/04/2019 0333    TRIGLYCERIDE 55 09/04/2019 0333    HDL 74 09/04/2019 0333    LDL 90 09/04/2019 0333

## 2020-01-22 NOTE — TELEPHONE ENCOUNTER
Pt has had OV within the 12 month protocol and lipid panel is current. 9 month supply sent to pharmacy.   Lab Results   Component Value Date/Time    CHOLSTRLTOT 175 09/04/2019 03:33 AM    LDL 90 09/04/2019 03:33 AM    HDL 74 09/04/2019 03:33 AM    TRIGLYCERIDE 55 09/04/2019 03:33 AM       Lab Results   Component Value Date/Time    SODIUM 139 09/18/2019 05:43 AM    POTASSIUM 4.1 09/18/2019 05:43 AM    CHLORIDE 105 09/18/2019 05:43 AM    CO2 25 09/18/2019 05:43 AM    GLUCOSE 98 09/18/2019 05:43 AM    BUN 18 09/18/2019 05:43 AM    CREATININE 0.66 09/18/2019 05:43 AM     Lab Results   Component Value Date/Time    ALKPHOSPHAT 60 09/07/2019 05:20 AM    ASTSGOT 23 09/07/2019 05:20 AM    ALTSGPT 18 09/07/2019 05:20 AM    TBILIRUBIN 0.9 09/07/2019 05:20 AM

## 2020-02-07 ENCOUNTER — OFFICE VISIT (OUTPATIENT)
Dept: NEUROLOGY | Facility: MEDICAL CENTER | Age: 75
End: 2020-02-07
Payer: MEDICARE

## 2020-02-07 VITALS
HEART RATE: 90 BPM | SYSTOLIC BLOOD PRESSURE: 132 MMHG | WEIGHT: 166 LBS | BODY MASS INDEX: 22.48 KG/M2 | HEIGHT: 72 IN | DIASTOLIC BLOOD PRESSURE: 72 MMHG | OXYGEN SATURATION: 93 %

## 2020-02-07 DIAGNOSIS — I63.9 CEREBROVASCULAR ACCIDENT (CVA), UNSPECIFIED MECHANISM (HCC): ICD-10-CM

## 2020-02-07 PROCEDURE — 99215 OFFICE O/P EST HI 40 MIN: CPT | Performed by: PSYCHIATRY & NEUROLOGY

## 2020-02-07 ASSESSMENT — ENCOUNTER SYMPTOMS
SENSORY CHANGE: 0
LOSS OF CONSCIOUSNESS: 1
MEMORY LOSS: 0
TREMORS: 0
TINGLING: 0
INSOMNIA: 0
FALLS: 1
DEPRESSION: 0
SEIZURES: 0

## 2020-02-07 NOTE — PROGRESS NOTES
Subjective:      Nohelia Dickerson is a 74 y.o. female who presents with her daughter, who provides some additional history, for follow-up, last seen in our [facility], but whose MRI imaging study showed possibility of NPH, the history given suggested some issues with urinary incontinence and gait ataxia.     HPI    Nohelia and her daughter state that the balance difficulties really only followed the stroke itself, which occurred back in September.  With outpatient physical therapy, the postural instability improved, she is a little unsteady when she walks but had not been falling with any kind of regularity.  Prior to the stroke she was not requiring the use of an assistive device, she was simply cautious.  They denied issues with shortening of stride length, shuffling, frequent tripping with a foot drop, difficulty standing from a seated position without falling backwards, she was no more stooped, etc.  There was no incoordination with the lower extremities or upper extremities.  She denied neck pain or back pain.  She has never required the use of a cane or other assistive devices.    She had for a while been dealing with some urinary urgency and frequency, these became subacutely worse after her hospitalization, simply because she had less time to get to the bathroom when the urge was present.  Since then, at home, there has been no incontinence, she does know she needs to get to the bathroom sooner rather than later.  She denies any changes in bowel function.    Cognitively, they have never had complaints of deficits.  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  She has an in-law house right next to her daughter and son-in-law.  She lives alone, the place is kept in good shape.  They deny any changes with organization, personality, etc.  She is not repeating herself frequently, misplacing things, burning things on the stove, leaving the house unlocked, etc.   Speech and language are normal.  She still multitasks easily.  Jobs that are started get completed appropriately.  Sleep is normal, there have been no problems with agitated delirium or hallucinations in the afternoons or before she goes to sleep.  Face and name associations are intact.    She did suffer from an incidental right frontal hemispheric series of small cortical and subcortical strokes, consistent with cryptogenic emboli.  With her hospitalization in September, 2019, she was also diagnosed with PAD, started on Eliquis and also discharged on Lipitor.  She remains on these medications.    I reviewed the MRI imaging study, revealing ventriculomegaly that seems out of proportion to the patient's age though this was mostly involving the third and bodies of the lateral ventricles, less so the temporal horns.  There is no evidence of transependymal edema, there was a mild to moderate degree of chronic white matter ischemic changes as well.    Medical, surgical and family histories are reviewed.  There is no family history of dementia, the patient herself lacks a history of MS, seizure, Parkinson's disease, Alzheimer's disease, malignancy, autoimmune disease, ASHKAN, diabetes or liver and kidney disease.  There is no surgical history of note from my standpoint.    No one in the family has a history of dementia, progressive gait ataxia, etc.  She quit tobacco use back in September with her stroke, she does not drink alcohol except on rare occasion.  She is on Eliquis 5 mg twice daily, Norvasc 5 mg daily, Lipitor 80 mg daily, multivitamins and albuterol inhaler as needed.    Review of Systems   Musculoskeletal: Positive for falls.   Neurological: Positive for loss of consciousness. Negative for tingling, tremors, sensory change and seizures.   Psychiatric/Behavioral: Negative for depression and memory loss. The patient does not have insomnia.    All other systems reviewed and are negative.       Objective:     BP  132/72 (BP Location: Left arm, Patient Position: Sitting, BP Cuff Size: Adult)   Pulse 90   Ht 1.829 m (6')   Wt 75.3 kg (166 lb)   SpO2 93%   BMI 22.51 kg/m²      Physical Exam    She appears no acute distress.  Vital signs are stable.  There is no malar rash or sialorrhea.  The neck is supple, range of motion is full.  Cardiac evaluation is unremarkable.    She is fully oriented, her mental processing is a little slowed, but there is no aphasia, agnosia, apraxia, or inattention.  Her mood is euthymic, affect mood appropriate.    PERRLA/EOMI, visual fields are full to finger counting on confrontation bilaterally, there is a mild subjective decrement of temperature on the left side of face when compared to the right, there is a slight droop of the left nasolabial fold at rest only.  The tongue and uvula are midline, there is no dysarthria, hypophonia or tachyphemia.  Shoulder shrug and head rotation are intact.    Musculoskeletal exam reveals normal tone throughout, there is no tremor, asterixis, or drift.  Strength is intact bilaterally.  Reflexes are brisker in the left leg when compared to the right, the left toe is upgoing, the right downgoing.  Reflexes are equally brisk with the upper extremities.    There is no appendicular dystaxia.  Fine motor control is slowed in the left foot when compared to the right, these fine movements are symmetric in the hands and fingers.  She stands very slowly, walks looking at the ground, station is slightly widened, there is a mildly apractic nature.  She does not shuffle.  She must turn slowly to maintain balance.  Tandem walking is an impossibility.    Romberg is absent, sensory exam is intact to temperature and vibration.     Assessment/Plan:     1. Cerebrovascular accident (CVA), unspecified mechanism (HCC)  Though the imaging studies show ventriculomegaly that might be consistent with NPH, her clinical history is not.  She has enough white matter chronic ischemic  "disease that could probably explain what gait ataxia she does have.  There is an apractic quality, tone is increased, she also has some left lower extremity weakness as sequelae of the stroke which is probably why she has some residual deficits.  Prior to the stroke she denies any type of history of progressive and increasing postural instability, worsening urinary function, certainly not cognitive.  For now I simply think we should observe, I spent some time talking with the patient and her daughter about the signs and symptoms I would suggest NPH, and if so they can call the office.  At this time there is no reason to follow through with further neurologic input, she is already on Eliquis for her PAD, from a pure stroke standpoint, she needs to be placed back on Plavix and maintained on Lipitor, maintaining LDL especially below \"normal\".    Time: 40-minute spent face-to-face for exam, review, discussion, and education, of this over 50% of the time spent counseling and coordinating care.    "

## 2020-03-06 ENCOUNTER — OFFICE VISIT (OUTPATIENT)
Dept: CARDIOLOGY | Facility: MEDICAL CENTER | Age: 75
End: 2020-03-06
Payer: MEDICARE

## 2020-03-06 VITALS
SYSTOLIC BLOOD PRESSURE: 138 MMHG | BODY MASS INDEX: 23.3 KG/M2 | HEIGHT: 72 IN | DIASTOLIC BLOOD PRESSURE: 88 MMHG | WEIGHT: 172 LBS | OXYGEN SATURATION: 91 % | HEART RATE: 90 BPM

## 2020-03-06 DIAGNOSIS — I10 ESSENTIAL HYPERTENSION: ICD-10-CM

## 2020-03-06 DIAGNOSIS — I63.9 CEREBROVASCULAR ACCIDENT (CVA), UNSPECIFIED MECHANISM (HCC): ICD-10-CM

## 2020-03-06 PROCEDURE — 99203 OFFICE O/P NEW LOW 30 MIN: CPT | Performed by: INTERNAL MEDICINE

## 2020-03-06 RX ORDER — IBUPROFEN 200 MG
200 TABLET ORAL EVERY 6 HOURS PRN
COMMUNITY
End: 2020-05-10

## 2020-03-06 RX ORDER — BUDESONIDE AND FORMOTEROL FUMARATE DIHYDRATE 160; 4.5 UG/1; UG/1
2 AEROSOL RESPIRATORY (INHALATION) 2 TIMES DAILY
COMMUNITY
Start: 2020-01-21 | End: 2020-04-02 | Stop reason: SDUPTHER

## 2020-03-06 RX ORDER — BUDESONIDE AND FORMOTEROL FUMARATE DIHYDRATE 160; 4.5 UG/1; UG/1
1 AEROSOL RESPIRATORY (INHALATION)
COMMUNITY
Start: 2020-01-21 | End: 2020-05-10

## 2020-03-06 RX ORDER — LOSARTAN POTASSIUM 25 MG/1
25 TABLET ORAL DAILY
Qty: 30 TAB | Refills: 5 | Status: SHIPPED | OUTPATIENT
Start: 2020-03-06 | End: 2020-04-07 | Stop reason: SDUPTHER

## 2020-03-06 ASSESSMENT — ENCOUNTER SYMPTOMS
SHORTNESS OF BREATH: 1
PALPITATIONS: 0
BRUISES/BLEEDS EASILY: 0
LOSS OF CONSCIOUSNESS: 0
SPEECH CHANGE: 0

## 2020-03-06 ASSESSMENT — FIBROSIS 4 INDEX: FIB4 SCORE: 2.12
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
## 2020-03-13 ENCOUNTER — PATIENT OUTREACH (OUTPATIENT)
Dept: HEALTH INFORMATION MANAGEMENT | Facility: OTHER | Age: 75
End: 2020-03-13

## 2020-03-13 NOTE — PROGRESS NOTES
Called member to introduce myself as their SCP  and to wish a Happy Birthday. No answer LM with call back number [phone]

## 2020-04-01 ENCOUNTER — PATIENT OUTREACH (OUTPATIENT)
Dept: HEALTH INFORMATION MANAGEMENT | Facility: OTHER | Age: 75
End: 2020-04-01

## 2020-04-01 ENCOUNTER — TELEPHONE (OUTPATIENT)
Dept: HEALTH INFORMATION MANAGEMENT | Facility: OTHER | Age: 75
End: 2020-04-01

## 2020-04-01 NOTE — TELEPHONE ENCOUNTER
Good afternoon,    Pt is asking for a refill of her Symbicort med. She has an appointment to establish with a new pcp in June. Please review.    Thank you,    Soni

## 2020-04-01 NOTE — PROGRESS NOTES
Pt called for a refill of her Symbicort med. I reached out to the office for a refill and pt did schedule a new to you for June with Dr. Schumacher since the pt wanted to stay at [place]. She also stated she would like to renew the handicap sticker that she had gotten when she left [facility]. I let her know I will find out if she needs to be seen in the office to renew the sticker. She understood and had no further questions at this time.

## 2020-04-02 RX ORDER — BUDESONIDE AND FORMOTEROL FUMARATE DIHYDRATE 160; 4.5 UG/1; UG/1
2 AEROSOL RESPIRATORY (INHALATION) 2 TIMES DAILY
Qty: 1 INHALER | Refills: 2 | Status: SHIPPED | OUTPATIENT
Start: 2020-04-02 | End: 2020-05-10

## 2020-04-03 ENCOUNTER — TELEPHONE (OUTPATIENT)
Dept: CARDIOLOGY | Facility: MEDICAL CENTER | Age: 75
End: 2020-04-03

## 2020-04-03 ENCOUNTER — NON-PROVIDER VISIT (OUTPATIENT)
Dept: CARDIOLOGY | Facility: MEDICAL CENTER | Age: 75
End: 2020-04-03
Attending: INTERNAL MEDICINE
Payer: MEDICARE

## 2020-04-03 DIAGNOSIS — I47.10 SVT (SUPRAVENTRICULAR TACHYCARDIA) (HCC): ICD-10-CM

## 2020-04-03 DIAGNOSIS — I10 ESSENTIAL HYPERTENSION: ICD-10-CM

## 2020-04-03 NOTE — TELEPHONE ENCOUNTER
Home enrollment completed for the 14 day Zio patch program per Dr. Pereira. Monitor will be mailed to patient by [facility].   >Currently pending EOS.

## 2020-04-07 DIAGNOSIS — I10 ESSENTIAL HYPERTENSION: Primary | ICD-10-CM

## 2020-04-07 RX ORDER — LOSARTAN POTASSIUM 25 MG/1
25 TABLET ORAL DAILY
Qty: 100 TAB | Refills: 0 | Status: SHIPPED | OUTPATIENT
Start: 2020-04-07 | End: 2020-08-07

## 2020-04-29 PROCEDURE — 0296T PR EXT ECG > 48HR TO 21 DAY RCRD W/CONECT INTL RCRD: CPT | Performed by: INTERNAL MEDICINE

## 2020-04-29 PROCEDURE — 0298T PR EXT ECG > 48HR TO 21 DAY REVIEW AND INTERPRETATN: CPT | Performed by: INTERNAL MEDICINE

## 2020-04-30 NOTE — PROGRESS NOTES
Pt called and requested a an appointment with Dr Milad Wren, I advised I did not have access to his schedule and she need to call [phone]

## 2020-05-06 ENCOUNTER — TELEPHONE (OUTPATIENT)
Dept: CARDIOLOGY | Facility: MEDICAL CENTER | Age: 75
End: 2020-05-06

## 2020-05-06 NOTE — TELEPHONE ENCOUNTER
Result Notes for ZIO PATCH MONITOR     Notes recorded by Hanane Johnson R.N. on 5/6/2020 at 4:34 PM PDT  Pt was notified by cell phone v/m message of Dr. Pereira's comments about her monitor report. Keep FV 5/26 to discuss in detail. Call back if any questions.  ------    Notes recorded by Reilly Pereira M.D. on 5/6/2020 at 4:20 PM PDT  Franci patch showed brief salvos of svt that were asymptomatic.  No Evidence of PAF that would explain a cause of her stroke,.  Good news  ------

## 2020-05-09 ENCOUNTER — APPOINTMENT (OUTPATIENT)
Dept: RADIOLOGY | Facility: MEDICAL CENTER | Age: 75
End: 2020-05-09
Attending: EMERGENCY MEDICINE
Payer: MEDICARE

## 2020-05-09 ENCOUNTER — HOSPITAL ENCOUNTER (EMERGENCY)
Facility: MEDICAL CENTER | Age: 75
End: 2020-05-10
Attending: EMERGENCY MEDICINE
Payer: MEDICARE

## 2020-05-09 DIAGNOSIS — R10.9 RIGHT FLANK PAIN: ICD-10-CM

## 2020-05-09 DIAGNOSIS — E86.0 DEHYDRATION: ICD-10-CM

## 2020-05-09 LAB
ALBUMIN SERPL BCP-MCNC: 4.2 G/DL (ref 3.2–4.9)
ALBUMIN/GLOB SERPL: 1.5 G/DL
ALP SERPL-CCNC: 128 U/L (ref 30–99)
ALT SERPL-CCNC: 41 U/L (ref 2–50)
ANION GAP SERPL CALC-SCNC: 12 MMOL/L (ref 7–16)
APPEARANCE UR: CLEAR
AST SERPL-CCNC: 36 U/L (ref 12–45)
BASOPHILS # BLD AUTO: 0.6 % (ref 0–1.8)
BASOPHILS # BLD: 0.06 K/UL (ref 0–0.12)
BILIRUB SERPL-MCNC: 0.7 MG/DL (ref 0.1–1.5)
BILIRUB UR QL STRIP.AUTO: NEGATIVE
BUN SERPL-MCNC: 21 MG/DL (ref 8–22)
CALCIUM SERPL-MCNC: 9.1 MG/DL (ref 8.4–10.2)
CHLORIDE SERPL-SCNC: 102 MMOL/L (ref 96–112)
CO2 SERPL-SCNC: 21 MMOL/L (ref 20–33)
COLOR UR: YELLOW
CREAT SERPL-MCNC: 0.75 MG/DL (ref 0.5–1.4)
EOSINOPHIL # BLD AUTO: 0.25 K/UL (ref 0–0.51)
EOSINOPHIL NFR BLD: 2.5 % (ref 0–6.9)
ERYTHROCYTE [DISTWIDTH] IN BLOOD BY AUTOMATED COUNT: 42.9 FL (ref 35.9–50)
GLOBULIN SER CALC-MCNC: 2.8 G/DL (ref 1.9–3.5)
GLUCOSE SERPL-MCNC: 125 MG/DL (ref 65–99)
GLUCOSE UR STRIP.AUTO-MCNC: NEGATIVE MG/DL
HCT VFR BLD AUTO: 39.4 % (ref 37–47)
HGB BLD-MCNC: 13 G/DL (ref 12–16)
IMM GRANULOCYTES # BLD AUTO: 0.04 K/UL (ref 0–0.11)
IMM GRANULOCYTES NFR BLD AUTO: 0.4 % (ref 0–0.9)
KETONES UR STRIP.AUTO-MCNC: NEGATIVE MG/DL
LACTATE BLD-SCNC: 1.1 MMOL/L (ref 0.5–2)
LEUKOCYTE ESTERASE UR QL STRIP.AUTO: NEGATIVE
LIPASE SERPL-CCNC: 35 U/L (ref 7–58)
LYMPHOCYTES # BLD AUTO: 1.94 K/UL (ref 1–4.8)
LYMPHOCYTES NFR BLD: 19.7 % (ref 22–41)
MCH RBC QN AUTO: 28.2 PG (ref 27–33)
MCHC RBC AUTO-ENTMCNC: 33 G/DL (ref 33.6–35)
MCV RBC AUTO: 85.5 FL (ref 81.4–97.8)
MICRO URNS: NORMAL
MONOCYTES # BLD AUTO: 0.76 K/UL (ref 0–0.85)
MONOCYTES NFR BLD AUTO: 7.7 % (ref 0–13.4)
NEUTROPHILS # BLD AUTO: 6.8 K/UL (ref 2–7.15)
NEUTROPHILS NFR BLD: 69.1 % (ref 44–72)
NITRITE UR QL STRIP.AUTO: NEGATIVE
NRBC # BLD AUTO: 0 K/UL
NRBC BLD-RTO: 0 /100 WBC
PH UR STRIP.AUTO: 6.5 [PH] (ref 5–8)
PLATELET # BLD AUTO: 148 K/UL (ref 164–446)
PMV BLD AUTO: 10.6 FL (ref 9–12.9)
POTASSIUM SERPL-SCNC: 4.3 MMOL/L (ref 3.6–5.5)
PROT SERPL-MCNC: 7 G/DL (ref 6–8.2)
PROT UR QL STRIP: NEGATIVE MG/DL
RBC # BLD AUTO: 4.61 M/UL (ref 4.2–5.4)
RBC UR QL AUTO: NEGATIVE
SODIUM SERPL-SCNC: 135 MMOL/L (ref 135–145)
SP GR UR STRIP.AUTO: 1.01
WBC # BLD AUTO: 9.9 K/UL (ref 4.8–10.8)

## 2020-05-09 PROCEDURE — 74176 CT ABD & PELVIS W/O CONTRAST: CPT

## 2020-05-09 PROCEDURE — 96374 THER/PROPH/DIAG INJ IV PUSH: CPT

## 2020-05-09 PROCEDURE — 700105 HCHG RX REV CODE 258: Performed by: EMERGENCY MEDICINE

## 2020-05-09 PROCEDURE — 80053 COMPREHEN METABOLIC PANEL: CPT

## 2020-05-09 PROCEDURE — 85025 COMPLETE CBC W/AUTO DIFF WBC: CPT

## 2020-05-09 PROCEDURE — 700111 HCHG RX REV CODE 636 W/ 250 OVERRIDE (IP): Performed by: EMERGENCY MEDICINE

## 2020-05-09 PROCEDURE — 96375 TX/PRO/DX INJ NEW DRUG ADDON: CPT

## 2020-05-09 PROCEDURE — 83605 ASSAY OF LACTIC ACID: CPT

## 2020-05-09 PROCEDURE — 700102 HCHG RX REV CODE 250 W/ 637 OVERRIDE(OP): Performed by: EMERGENCY MEDICINE

## 2020-05-09 PROCEDURE — 83690 ASSAY OF LIPASE: CPT

## 2020-05-09 PROCEDURE — A9270 NON-COVERED ITEM OR SERVICE: HCPCS | Performed by: EMERGENCY MEDICINE

## 2020-05-09 PROCEDURE — 81003 URINALYSIS AUTO W/O SCOPE: CPT

## 2020-05-09 PROCEDURE — 36415 COLL VENOUS BLD VENIPUNCTURE: CPT

## 2020-05-09 PROCEDURE — 99285 EMERGENCY DEPT VISIT HI MDM: CPT

## 2020-05-09 RX ORDER — ONDANSETRON 4 MG/1
4 TABLET, ORALLY DISINTEGRATING ORAL EVERY 6 HOURS PRN
Qty: 10 TAB | Refills: 0 | Status: SHIPPED | OUTPATIENT
Start: 2020-05-09 | End: 2021-08-31

## 2020-05-09 RX ORDER — TAMSULOSIN HYDROCHLORIDE 0.4 MG/1
0.4 CAPSULE ORAL ONCE
Status: COMPLETED | OUTPATIENT
Start: 2020-05-09 | End: 2020-05-09

## 2020-05-09 RX ORDER — ONDANSETRON 2 MG/ML
4 INJECTION INTRAMUSCULAR; INTRAVENOUS ONCE
Status: COMPLETED | OUTPATIENT
Start: 2020-05-09 | End: 2020-05-09

## 2020-05-09 RX ORDER — HYDROMORPHONE HYDROCHLORIDE 1 MG/ML
0.5 INJECTION, SOLUTION INTRAMUSCULAR; INTRAVENOUS; SUBCUTANEOUS ONCE
Status: COMPLETED | OUTPATIENT
Start: 2020-05-09 | End: 2020-05-09

## 2020-05-09 RX ORDER — ACETAMINOPHEN 500 MG
500 TABLET ORAL EVERY 4 HOURS PRN
Qty: 20 TAB | Refills: 0 | Status: SHIPPED | OUTPATIENT
Start: 2020-05-09 | End: 2020-05-10

## 2020-05-09 RX ORDER — SODIUM CHLORIDE 9 MG/ML
1000 INJECTION, SOLUTION INTRAVENOUS ONCE
Status: COMPLETED | OUTPATIENT
Start: 2020-05-09 | End: 2020-05-09

## 2020-05-09 RX ADMIN — SODIUM CHLORIDE 1000 ML: 9 INJECTION, SOLUTION INTRAVENOUS at 22:27

## 2020-05-09 RX ADMIN — ONDANSETRON HYDROCHLORIDE 4 MG: 2 SOLUTION INTRAMUSCULAR; INTRAVENOUS at 22:27

## 2020-05-09 RX ADMIN — HYDROMORPHONE HYDROCHLORIDE 0.5 MG: 1 INJECTION, SOLUTION INTRAMUSCULAR; INTRAVENOUS; SUBCUTANEOUS at 22:26

## 2020-05-09 RX ADMIN — TAMSULOSIN HYDROCHLORIDE 0.4 MG: 0.4 CAPSULE ORAL at 23:18

## 2020-05-09 RX ADMIN — FAMOTIDINE 20 MG: 10 INJECTION INTRAVENOUS at 22:27

## 2020-05-09 ASSESSMENT — FIBROSIS 4 INDEX: FIB4 SCORE: 2.15

## 2020-05-10 ENCOUNTER — HOSPITAL ENCOUNTER (EMERGENCY)
Facility: MEDICAL CENTER | Age: 75
End: 2020-05-10
Attending: EMERGENCY MEDICINE
Payer: MEDICARE

## 2020-05-10 ENCOUNTER — APPOINTMENT (OUTPATIENT)
Dept: RADIOLOGY | Facility: MEDICAL CENTER | Age: 75
End: 2020-05-10
Attending: EMERGENCY MEDICINE
Payer: MEDICARE

## 2020-05-10 VITALS
SYSTOLIC BLOOD PRESSURE: 135 MMHG | DIASTOLIC BLOOD PRESSURE: 77 MMHG | OXYGEN SATURATION: 92 % | RESPIRATION RATE: 17 BRPM | WEIGHT: 172.18 LBS | HEART RATE: 89 BPM | TEMPERATURE: 97.1 F | BODY MASS INDEX: 23.32 KG/M2 | HEIGHT: 72 IN

## 2020-05-10 VITALS
HEIGHT: 72 IN | HEART RATE: 84 BPM | TEMPERATURE: 96.7 F | DIASTOLIC BLOOD PRESSURE: 67 MMHG | BODY MASS INDEX: 23.29 KG/M2 | SYSTOLIC BLOOD PRESSURE: 132 MMHG | WEIGHT: 171.96 LBS | RESPIRATION RATE: 18 BRPM | OXYGEN SATURATION: 89 %

## 2020-05-10 DIAGNOSIS — M79.18 MUSCLE PAIN, LUMBAR: ICD-10-CM

## 2020-05-10 DIAGNOSIS — R10.9 FLANK PAIN: ICD-10-CM

## 2020-05-10 LAB
ALBUMIN SERPL BCP-MCNC: 4.1 G/DL (ref 3.2–4.9)
ALBUMIN/GLOB SERPL: 1.7 G/DL
ALP SERPL-CCNC: 118 U/L (ref 30–99)
ALT SERPL-CCNC: 37 U/L (ref 2–50)
ANION GAP SERPL CALC-SCNC: 13 MMOL/L (ref 7–16)
APPEARANCE UR: CLEAR
AST SERPL-CCNC: 33 U/L (ref 12–45)
BASOPHILS # BLD AUTO: 0.7 % (ref 0–1.8)
BASOPHILS # BLD: 0.04 K/UL (ref 0–0.12)
BILIRUB SERPL-MCNC: 1 MG/DL (ref 0.1–1.5)
BILIRUB UR QL STRIP.AUTO: NEGATIVE
BUN SERPL-MCNC: 17 MG/DL (ref 8–22)
CALCIUM SERPL-MCNC: 9.1 MG/DL (ref 8.4–10.2)
CHLORIDE SERPL-SCNC: 101 MMOL/L (ref 96–112)
CO2 SERPL-SCNC: 24 MMOL/L (ref 20–33)
COLOR UR: YELLOW
CREAT SERPL-MCNC: 0.78 MG/DL (ref 0.5–1.4)
EOSINOPHIL # BLD AUTO: 0.1 K/UL (ref 0–0.51)
EOSINOPHIL NFR BLD: 1.8 % (ref 0–6.9)
ERYTHROCYTE [DISTWIDTH] IN BLOOD BY AUTOMATED COUNT: 45 FL (ref 35.9–50)
GLOBULIN SER CALC-MCNC: 2.4 G/DL (ref 1.9–3.5)
GLUCOSE SERPL-MCNC: 107 MG/DL (ref 65–99)
GLUCOSE UR STRIP.AUTO-MCNC: NEGATIVE MG/DL
HCT VFR BLD AUTO: 37.6 % (ref 37–47)
HGB BLD-MCNC: 12.2 G/DL (ref 12–16)
IMM GRANULOCYTES # BLD AUTO: 0.01 K/UL (ref 0–0.11)
IMM GRANULOCYTES NFR BLD AUTO: 0.2 % (ref 0–0.9)
KETONES UR STRIP.AUTO-MCNC: NEGATIVE MG/DL
LEUKOCYTE ESTERASE UR QL STRIP.AUTO: NEGATIVE
LIPASE SERPL-CCNC: 31 U/L (ref 7–58)
LYMPHOCYTES # BLD AUTO: 1.49 K/UL (ref 1–4.8)
LYMPHOCYTES NFR BLD: 26.6 % (ref 22–41)
MCH RBC QN AUTO: 28.6 PG (ref 27–33)
MCHC RBC AUTO-ENTMCNC: 32.4 G/DL (ref 33.6–35)
MCV RBC AUTO: 88.1 FL (ref 81.4–97.8)
MICRO URNS: NORMAL
MONOCYTES # BLD AUTO: 0.48 K/UL (ref 0–0.85)
MONOCYTES NFR BLD AUTO: 8.6 % (ref 0–13.4)
NEUTROPHILS # BLD AUTO: 3.48 K/UL (ref 2–7.15)
NEUTROPHILS NFR BLD: 62.1 % (ref 44–72)
NITRITE UR QL STRIP.AUTO: NEGATIVE
NRBC # BLD AUTO: 0 K/UL
NRBC BLD-RTO: 0 /100 WBC
PH UR STRIP.AUTO: 5 [PH] (ref 5–8)
PLATELET # BLD AUTO: 139 K/UL (ref 164–446)
PMV BLD AUTO: 10.9 FL (ref 9–12.9)
POTASSIUM SERPL-SCNC: 4.3 MMOL/L (ref 3.6–5.5)
PROT SERPL-MCNC: 6.5 G/DL (ref 6–8.2)
PROT UR QL STRIP: NEGATIVE MG/DL
RBC # BLD AUTO: 4.27 M/UL (ref 4.2–5.4)
RBC UR QL AUTO: NEGATIVE
SODIUM SERPL-SCNC: 138 MMOL/L (ref 135–145)
SP GR UR STRIP.AUTO: <=1.005
TROPONIN T SERPL-MCNC: 18 NG/L (ref 6–19)
WBC # BLD AUTO: 5.6 K/UL (ref 4.8–10.8)

## 2020-05-10 PROCEDURE — 83690 ASSAY OF LIPASE: CPT

## 2020-05-10 PROCEDURE — 74175 CTA ABDOMEN W/CONTRAST: CPT

## 2020-05-10 PROCEDURE — 80053 COMPREHEN METABOLIC PANEL: CPT

## 2020-05-10 PROCEDURE — 96374 THER/PROPH/DIAG INJ IV PUSH: CPT

## 2020-05-10 PROCEDURE — 99285 EMERGENCY DEPT VISIT HI MDM: CPT

## 2020-05-10 PROCEDURE — 81003 URINALYSIS AUTO W/O SCOPE: CPT

## 2020-05-10 PROCEDURE — 84484 ASSAY OF TROPONIN QUANT: CPT

## 2020-05-10 PROCEDURE — 700111 HCHG RX REV CODE 636 W/ 250 OVERRIDE (IP): Performed by: EMERGENCY MEDICINE

## 2020-05-10 PROCEDURE — 85025 COMPLETE CBC W/AUTO DIFF WBC: CPT

## 2020-05-10 PROCEDURE — 700117 HCHG RX CONTRAST REV CODE 255: Performed by: EMERGENCY MEDICINE

## 2020-05-10 PROCEDURE — 700105 HCHG RX REV CODE 258: Performed by: EMERGENCY MEDICINE

## 2020-05-10 RX ORDER — HYDROMORPHONE HYDROCHLORIDE 1 MG/ML
1 INJECTION, SOLUTION INTRAMUSCULAR; INTRAVENOUS; SUBCUTANEOUS ONCE
Status: COMPLETED | OUTPATIENT
Start: 2020-05-10 | End: 2020-05-10

## 2020-05-10 RX ORDER — CYCLOBENZAPRINE HCL 5 MG
5 TABLET ORAL 3 TIMES DAILY PRN
Qty: 30 TAB | Refills: 0 | Status: SHIPPED | OUTPATIENT
Start: 2020-05-10 | End: 2022-01-08

## 2020-05-10 RX ORDER — IBUPROFEN 200 MG
400 TABLET ORAL EVERY 6 HOURS PRN
Status: SHIPPED | COMMUNITY
End: 2020-06-17

## 2020-05-10 RX ORDER — AMLODIPINE BESYLATE 5 MG/1
5 TABLET ORAL DAILY
Status: SHIPPED | COMMUNITY
End: 2020-05-26 | Stop reason: SDUPTHER

## 2020-05-10 RX ORDER — ATORVASTATIN CALCIUM 80 MG/1
80 TABLET, FILM COATED ORAL EVERY EVENING
Status: SHIPPED | COMMUNITY
End: 2020-08-10 | Stop reason: SDUPTHER

## 2020-05-10 RX ORDER — ALBUTEROL SULFATE 90 UG/1
1-2 AEROSOL, METERED RESPIRATORY (INHALATION) EVERY 6 HOURS PRN
COMMUNITY

## 2020-05-10 RX ORDER — SODIUM CHLORIDE 9 MG/ML
1000 INJECTION, SOLUTION INTRAVENOUS ONCE
Status: COMPLETED | OUTPATIENT
Start: 2020-05-10 | End: 2020-05-10

## 2020-05-10 RX ADMIN — HYDROMORPHONE HYDROCHLORIDE 1 MG: 1 INJECTION, SOLUTION INTRAMUSCULAR; INTRAVENOUS; SUBCUTANEOUS at 17:15

## 2020-05-10 RX ADMIN — IOHEXOL 100 ML: 350 INJECTION, SOLUTION INTRAVENOUS at 17:34

## 2020-05-10 RX ADMIN — SODIUM CHLORIDE 1000 ML: 9 INJECTION, SOLUTION INTRAVENOUS at 17:15

## 2020-05-10 ASSESSMENT — FIBROSIS 4 INDEX: FIB4 SCORE: 2.81

## 2020-05-10 NOTE — ED TRIAGE NOTES
Greg N/V Pt came to ED  30 min ago but pain had resolved so she left Pt returned when pain returned Spasmotic and severe Pt appears uncomfortable

## 2020-05-10 NOTE — ED NOTES
Med rec updated and complete  Allergies reviewed  Called pt on cell phone @ [phone] to verify all medications.   Pt reports that she has not picked up her ZOFRAN 4MG or METAMUCIL yet.  Pt reports that she is not able to afford her SYMBICORT 160-4.5MCG.  Pt reports no antibiotics in the last 2 weeks

## 2020-05-10 NOTE — ED PROVIDER NOTES
"ED Provider Note    ED Provider Note    Scribed for Marlo French MD by Marlo French M.D.. 5/9/2020, 10:12 PM.    Primary care provider: Idalia Cortez M.D.  Means of arrival: Private  History obtained from: Patient  History limited by: None    CHIEF COMPLAINT  Chief Complaint   Patient presents with   • Flank Pain     Int Rt flank pain Insidious onset 1500 today Hx renal calculi \" feels the same \"       HPI  Nohelia Dickerson is a 75 y.o. female who presents to the Emergency Department for evaluation of abdominal pain that began today at about 3 PM.  Patient relates this is similar to when she has passed previous kidney stones.  Last kidney stone passed spontaneously about 2 months ago, patient was in fact seen at this facility in 2018 for what was thought to be an obstruction and was found to be a 3 mm calculus at the [facility].  Patient notes pain is colicky in character, localized to these very vague symptoms right flank is is radiation into the anterior abdomen.  No dysuria but she does note urinary urgency.  No hematuria.  No nausea, no vomiting, no fever.  Is been trying to drink copious water with no improvement.  No chest pain, no dyspnea.;  She is anticoagulated for history of TIA/CVA with Eliquis.    REVIEW OF SYSTEMS  Pertinent positives include colicky flank pain. Pertinent negatives include no fever, no dysuria.  All other systems reviewed and negative.    PAST MEDICAL HISTORY   has a past medical history of Cancer (HCC) (1978), COPD, Hernia of unspecified site of abdominal cavity without mention of obstruction or gangrene (2011), Hypertension, Inguinal hernia, Kidney stones, TIA (transient ischemic attack), and Tobacco use.    SURGICAL HISTORY   has a past surgical history that includes hernia repair; thyroidectomy; other (1983); and inguinal hernia repair (3/28/2011).    SOCIAL HISTORY  Social History     Tobacco Use   • Smoking status: Former Smoker     Packs/day: 0.50     " Types: Cigarettes     Start date: 9/2/2019   • Smokeless tobacco: Never Used   • Tobacco comment: [id number]   Substance Use Topics   • Alcohol use: No   • Drug use: No      Social History     Substance and Sexual Activity   Drug Use No       FAMILY HISTORY  Family History   Problem Relation Age of Onset   • Cancer Mother         Breast Cancer   • Heart Disease Father    • Stroke Father    • Heart Attack Father    • Diabetes Brother    • Other Daughter         Aneurysm   • No Known Problems Daughter        CURRENT MEDICATIONS  Home Medications    **Home medications have not yet been reviewed for this encounter**         ALLERGIES  Allergies   Allergen Reactions   • Morphine Itching     Hallucinations, altered mentations       PHYSICAL EXAM  VITAL SIGNS: /77   Pulse 89   Temp 36.2 °C (97.1 °F) (Temporal)   Resp 17   Ht 1.829 m (6')   Wt 78.1 kg (172 lb 2.9 oz)   SpO2 92%   BMI 23.35 kg/m²     General: Alert, no acute distress, appears uncomfortable  Skin: Warm, dry, mildly pale  Head: Normocephalic, atraumatic  Neck: Trachea midline, no tenderness  Eye: PERRL, normal conjunctiva  Cardiovascular: Regular rate and rhythm, No murmur, Normal peripheral perfusion  Respiratory: Lungs CTA, respirations are non-labored, breath sounds are equal  Gastrointestinal: Soft, right CVA tenderness, there is also bilateral upper quadrant tenderness, no guarding, no rebound, no rigidity.  Musculoskeletal: No swelling, no deformity, no peripheral edema, 2+ DP and PT pulses symmetrical bilaterally, brisk capillary refill  Neurological: Alert and oriented to person, place, time, and situation  Lymphatics: No lymphadenopathy  Psychiatric: Cooperative, appropriate mood & affect      DIAGNOSTIC STUDIES/PROCEDURES    LABS  Results for orders placed or performed during the hospital encounter of 05/09/20   URINALYSIS,CULTURE IF INDICATED   Result Value Ref Range    Color Yellow     Character Clear     Specific Gravity 1.015 <1.035     Ph 6.5 5.0 - 8.0    Glucose Negative Negative mg/dL    Ketones Negative Negative mg/dL    Protein Negative Negative mg/dL    Bilirubin Negative Negative    Nitrite Negative Negative    Leukocyte Esterase Negative Negative    Occult Blood Negative Negative    Micro Urine Req see below    CBC WITH DIFFERENTIAL   Result Value Ref Range    WBC 9.9 4.8 - 10.8 K/uL    RBC 4.61 4.20 - 5.40 M/uL    Hemoglobin 13.0 12.0 - 16.0 g/dL    Hematocrit 39.4 37.0 - 47.0 %    MCV 85.5 81.4 - 97.8 fL    MCH 28.2 27.0 - 33.0 pg    MCHC 33.0 (L) 33.6 - 35.0 g/dL    RDW 42.9 35.9 - 50.0 fL    Platelet Count 148 (L) 164 - 446 K/uL    MPV 10.6 9.0 - 12.9 fL    Neutrophils-Polys 69.10 44.00 - 72.00 %    Lymphocytes 19.70 (L) 22.00 - 41.00 %    Monocytes 7.70 0.00 - 13.40 %    Eosinophils 2.50 0.00 - 6.90 %    Basophils 0.60 0.00 - 1.80 %    Immature Granulocytes 0.40 0.00 - 0.90 %    Nucleated RBC 0.00 /100 WBC    Neutrophils (Absolute) 6.80 2.00 - 7.15 K/uL    Lymphs (Absolute) 1.94 1.00 - 4.80 K/uL    Monos (Absolute) 0.76 0.00 - 0.85 K/uL    Eos (Absolute) 0.25 0.00 - 0.51 K/uL    Baso (Absolute) 0.06 0.00 - 0.12 K/uL    Immature Granulocytes (abs) 0.04 0.00 - 0.11 K/uL    NRBC (Absolute) 0.00 K/uL   COMP METABOLIC PANEL   Result Value Ref Range    Sodium 135 135 - 145 mmol/L    Potassium 4.3 3.6 - 5.5 mmol/L    Chloride 102 96 - 112 mmol/L    Co2 21 20 - 33 mmol/L    Anion Gap 12.0 7.0 - 16.0    Glucose 125 (H) 65 - 99 mg/dL    Bun 21 8 - 22 mg/dL    Creatinine 0.75 0.50 - 1.40 mg/dL    Calcium 9.1 8.4 - 10.2 mg/dL    AST(SGOT) 36 12 - 45 U/L    ALT(SGPT) 41 2 - 50 U/L    Alkaline Phosphatase 128 (H) 30 - 99 U/L    Total Bilirubin 0.7 0.1 - 1.5 mg/dL    Albumin 4.2 3.2 - 4.9 g/dL    Total Protein 7.0 6.0 - 8.2 g/dL    Globulin 2.8 1.9 - 3.5 g/dL    A-G Ratio 1.5 g/dL   LIPASE   Result Value Ref Range    Lipase 35 7 - 58 U/L   LACTIC ACID   Result Value Ref Range    Lactic Acid 1.1 0.5 - 2.0 mmol/L   ESTIMATED GFR   Result Value Ref  Range    GFR If African American >60 >60 mL/min/1.73 m 2    GFR If Non African American >60 >60 mL/min/1.73 m 2     All labs reviewed by me.      RADIOLOGY  CT-RENAL COLIC EVALUATION(A/P W/O)   Final Result      1.  No acute abnormality in the abdomen or pelvis. No renal stones or hydronephrosis.   2.  Cholelithiasis.   3.  Colonic diverticulosis.        The radiologist's interpretation of all radiological studies have been reviewed by me.    COURSE & MEDICAL DECISION MAKING  Pertinent Labs & Imaging studies reviewed. (See chart for details)    10:12 PM - Patient seen and examined at bedside. Patient will be treated with hydromorphone given she cannot tolerate NSAIDs as she is anticoagulated and she has an allergy to morphine. Ordered metabolic work-up including CT imaging of the abdomen pelvis to evaluate her symptoms. The differential diagnoses include but are not limited to: Renal colic, colitis, pyelonephritis    2307: Patient reassessed, she is feeling much better.  Skin tone is somewhat improved from his elicitation misfire.  I have updated her with unremarkable labs, we are awaiting CT report at this time. Ordered Flomax 0.4 mg p.o. given high suspicion at this point of renal colic.    2344: Patient reassessed, remains in no acute distress, relieved here of unremarkable imaging.    Patient Vitals for the past 24 hrs:   BP Temp Temp src Pulse Resp SpO2 Height Weight   05/09/20 2356 135/77 -- -- -- -- -- -- --   05/09/20 2355 -- -- -- 89 17 92 % -- --   05/09/20 2305 146/81 -- -- 96 19 92 % -- --   05/09/20 2233 160/91 -- -- 94 17 93 % -- --   05/09/20 2226 -- -- -- -- (!) 22 -- -- --   05/09/20 2146 (!) 181/109 36.2 °C (97.1 °F) Temporal 95 16 92 % -- --   05/09/20 2138 -- -- -- -- -- -- 1.829 m (6') 78.1 kg (172 lb 2.9 oz)         HYDRATION: Based on the patient's presentation of Dehydration the patient was given IV fluids. IV Hydration was used because oral hydration failed due to Currently n.p.o. pending  imaging. Upon recheck following hydration, the patient was Feeling better, heart rate improving, currently 89.    Decision Making:  This is a 75 y.o. year old female who presents with colicky right flank pain, character to previous kidney stone.  Certainly renal colic is high on the differential given her age I think confirmation with CT imaging is a good idea especially considering there is no hematuria noted on urinalysis.  She appears clinically dehydrated and given currently n.p.o. pending imaging IV fluid resuscitation is indicated.  Work-up thankfully is quite benign, no leukocytosis, no evidence of UTI or pyelonephritis.  CT is unremarkable though I do note some gas and stool on my assessment of the images.  I written her for Metamucil, no evidence of any obstructive process.  Though presentation certainly is consistent with renal colic given there is no hydronephrosis and no evidence of a stone especially likely already passed the stone and likely was experiencing some ureteral spasms.  No indication for narcotic analgesia for home or continued Flomax.  Patient is feeling well and stable for discharge.    The patient will return for new or worsening symptoms and is stable at the time of discharge.    Patient has had high blood pressure while in the emergency department, felt likely secondary to medical condition. Counseled patient to monitor blood pressure at home and follow up with primary care physician.      DISPOSITION:  Patient will be discharged home in stable condition.    FOLLOW UP:  Idalia Cortez M.D.  [facility] [address] [address]  [phone]    Schedule an appointment as soon as possible for a visit         OUTPATIENT MEDICATIONS:  Discharge Medication List as of 5/9/2020 11:46 PM      START taking these medications    Details   Psyllium (METAMUCIL) 28 % packet Take 1 Packet by mouth 2 times a day for 5 days., Disp-10 Packet, R-0, Print Rx Paper      ondansetron (ZOFRAN  ODT) 4 MG TABLET DISPERSIBLE Take 1 Tab by mouth every 6 hours as needed for Nausea., Disp-10 Tab, R-0, Print Rx Paper      acetaminophen (TYLENOL) 500 MG Tab Take 1 Tab by mouth every four hours as needed for Mild Pain., Disp-20 Tab, R-0, Print Rx Paper               FINAL IMPRESSION  1. Right flank pain    2. Dehydration          IMarlo M.D. (Scribe), am scribing for, and in the presence of, Marlo French MD.    Electronically signed by: Marlo French M.D. (Scribe), 5/9/2020    IMarlo MD personally performed the services described in this documentation, as scribed by Marlo French M.D. in my presence, and it is both accurate and complete    The note accurately reflects work and decisions made by me.  Marlo French M.D.  5/10/2020  2:18 AM
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
## 2020-05-11 NOTE — ED NOTES
Pt d/c home. IV removed. Prescription and f/u discussed with pt. Pt verbalizes understandings. RX 1. Pt has all belongings with her. All questions addressed and answered.

## 2020-05-21 RX ORDER — APIXABAN 5 MG/1
TABLET, FILM COATED ORAL
Qty: 60 TAB | Refills: 0 | Status: SHIPPED | OUTPATIENT
Start: 2020-05-21 | End: 2020-06-17

## 2020-05-22 NOTE — PROGRESS NOTES
Returned pt's call, when pt calls back please advise that Medication refill was approved and sent it to the pharmacy.

## 2020-05-26 DIAGNOSIS — I10 ESSENTIAL HYPERTENSION: ICD-10-CM

## 2020-05-26 RX ORDER — AMLODIPINE BESYLATE 5 MG/1
5 TABLET ORAL DAILY
Qty: 30 TAB | Refills: 0 | Status: SHIPPED | OUTPATIENT
Start: 2020-05-26 | End: 2020-06-22

## 2020-05-26 NOTE — TELEPHONE ENCOUNTER
Received request via: Patient    Was the patient seen in the last year in this department? Yes    Does the patient have an active prescription (recently filled or refills available) for medication(s) requested? No    -Patient has an appointment 6/8/20 to establish care, however, patient is out of medication and would like a refill just until she comes in for the visit.    Please advise.

## 2020-06-08 ENCOUNTER — OFFICE VISIT (OUTPATIENT)
Dept: MEDICAL GROUP | Facility: PHYSICIAN GROUP | Age: 75
End: 2020-06-08
Payer: MEDICARE

## 2020-06-08 VITALS
HEART RATE: 84 BPM | HEIGHT: 72 IN | OXYGEN SATURATION: 93 % | TEMPERATURE: 98.1 F | WEIGHT: 170 LBS | DIASTOLIC BLOOD PRESSURE: 78 MMHG | SYSTOLIC BLOOD PRESSURE: 124 MMHG | BODY MASS INDEX: 23.03 KG/M2

## 2020-06-08 DIAGNOSIS — J96.11 CHRONIC RESPIRATORY FAILURE WITH HYPOXIA (HCC): ICD-10-CM

## 2020-06-08 DIAGNOSIS — I10 ESSENTIAL HYPERTENSION: ICD-10-CM

## 2020-06-08 DIAGNOSIS — J42 CHRONIC BRONCHITIS, UNSPECIFIED CHRONIC BRONCHITIS TYPE (HCC): ICD-10-CM

## 2020-06-08 DIAGNOSIS — I63.9 CEREBROVASCULAR ACCIDENT (CVA), UNSPECIFIED MECHANISM (HCC): ICD-10-CM

## 2020-06-08 DIAGNOSIS — I73.9 PVD (PERIPHERAL VASCULAR DISEASE) (HCC): ICD-10-CM

## 2020-06-08 PROBLEM — Z00.00 HEALTHCARE MAINTENANCE: Status: RESOLVED | Noted: 2018-05-22 | Resolved: 2020-06-08

## 2020-06-08 PROBLEM — J06.9 VIRAL URI WITH COUGH: Status: RESOLVED | Noted: 2019-04-18 | Resolved: 2020-06-08

## 2020-06-08 PROCEDURE — 99214 OFFICE O/P EST MOD 30 MIN: CPT | Performed by: INTERNAL MEDICINE

## 2020-06-08 ASSESSMENT — FIBROSIS 4 INDEX: FIB4 SCORE: 2.89

## 2020-06-08 NOTE — PROGRESS NOTES
New Patient     No chief complaint on file.      Subjective:     HPI:   Nohelia presents today with the following.    1. PVD (peripheral vascular disease) ([facility])    Lower Extremity   Arterial Duplex Report   Vascular Laboratory   CONCLUSIONS   Outflow disease in left lower extremity as depicted below:   1) The left femoral artery is occluded at the mid-distal level with    collateral channels identified.    2) The left popliteal artery is also occluded. Reconstitution of flow visualized within the proximal tibial arteries.   3) No flow identified within the distal posterior tibial artery, suggesting more proximal occlusion    -Mention is following up with vascular surgeon for that  -Compliant with Eliquis 5 mg daily    2. Essential hypertension  -Controlled, compliant with amlodipine 5 mg daily, losartan 25 mg daily  -Patient denied having hypertension symptoms    3. Cerebrovascular accident (CVA), unspecified mechanism ([facility])  -MRI September 2019  IMPRESSION:  1.  Scattered areas of acute ischemia in the high RIGHT frontal and high RIGHT parietal cortex  2.  No hemorrhage  3.  Moderate atrophy with disproportionate enlargement of the third and lateral ventricles relative to the other CSF containing spaces in a pattern which could indicate normal pressure hydrocephalus in the appropriate clinical setting  4.  Advanced white matter changes    -For secondary prevention she is compliant with Eliquis as well as statin  -Patient was seen by cardiologist for ruling out atrial fibrillation, mention had heart monitor recorder for 1 month with no evidence of A. Fib    4. Chronic bronchitis, unspecified chronic bronchitis type (HCC)  5. Chronic respiratory failure with hypoxia ([facility])  -Currently on PRN albuterol inhaler maybe once a week  -PFTs in July 2019 showing FEV1 at 49%, moderate COPD with an overlap of restrictive pattern also.  -Patient stop smoking in September 2019 with formal smoking 50 years of less than 1 pack a  day  -She is using 1/2 L oxygen at night, patient would like a new DME paperwork for changing her oxygen supply Company  -She was not able to afford Symbicort co-pay    Patient Active Problem List    Diagnosis Date Noted   • Protein calorie malnutrition (HCC) 09/27/2019   • CVA (cerebral vascular accident) ([facility]) 09/04/2019   • PVD (peripheral vascular disease) ([facility]) 09/03/2019   • Arterial occlusion, lower extremity ([facility]) 09/03/2019   • History of nephrolithiasis 08/24/2018   • Hydronephrosis 08/07/2018   • COPD 08/07/2018   • Chronic respiratory failure ([facility]) 08/07/2018   • Tobacco dependence 05/22/2018   • Essential hypertension 05/22/2018       Current Outpatient Medications on File Prior to Visit   Medication Sig Dispense Refill   • amLODIPine (NORVASC) 5 MG Tab Take 1 Tab by mouth every day. 30 Tab 0   • ELIQUIS 5 MG Tab TAKE 1 TABLET BY MOUTH TWICE A DAY 60 Tab 0   • albuterol 108 (90 Base) MCG/ACT Aero Soln inhalation aerosol Inhale 2 Puffs by mouth every 6 hours as needed for Shortness of Breath.     • ibuprofen (MOTRIN) 200 MG Tab Take 400 mg by mouth every 6 hours as needed for Mild Pain.     • atorvastatin (LIPITOR) 80 MG tablet Take 80 mg by mouth every evening.     • CRANBERRY PO Take 3 Caps by mouth as needed (For urinary pain). For urinary pain     • cyclobenzaprine (FLEXERIL) 5 MG tablet Take 1 Tab by mouth 3 times a day as needed. 30 Tab 0   • ondansetron (ZOFRAN ODT) 4 MG TABLET DISPERSIBLE Take 1 Tab by mouth every 6 hours as needed for Nausea. 10 Tab 0   • losartan (COZAAR) 25 MG Tab Take 1 Tab by mouth every day. 100 Tab 0   • Multiple Vitamins-Minerals (PRESERVISION AREDS 2 PO) Take 1 Cap by mouth 2 Times a Day.     • therapeutic multivitamin-minerals (THERAGRAN-M) Tab Take 1 Tab by mouth every day.       No current facility-administered medications on file prior to visit.        Allergies, past medical history, past surgical history, family history, social history reviewed and updated    ROS:      - Constitutional: Negative for fever, chills,    - Eye: Negative for blurry vision    - Cardiovascular: Negative for chest pain      Physical Exam:     /78 (BP Location: Left arm, Patient Position: Sitting, BP Cuff Size: Adult)   Pulse 84   Temp 36.7 °C (98.1 °F) (Temporal)   Ht 1.829 m (6')   Wt 77.1 kg (170 lb)   SpO2 93%   BMI 23.06 kg/m²   General: Normal appearing. No distress.  ENT: oropharynx without exudates.    Eyes: conjunctiva clear lids without ptosis  Pulmonary: Diminished air entry bilaterally, scattered wheezing, normal effort.   Cardiovascular: Regular rate and rhythm  Abdomen: Soft, nontender,  Lymph: No cervical or supraclavicular palpable lymph nodes  Psych: Normal mood and affect.     I have reviewed pertinent labs and diagnostic tests associated with this visit with specific comments listed under the assessment and plan below      Assessment and Plan:     75 y.o. female with the following issues.    1. PVD (peripheral vascular disease) ([facility])  -Continue Eliquis    2. Essential hypertension  -Blood pressure medication as above    3. Cerebrovascular accident (CVA), unspecified mechanism (HCC)  -Continue statin and Eliquis for secondary prevention    4. Chronic bronchitis, unspecified chronic bronchitis type (HCC)  5. Chronic respiratory failure with hypoxia (HCC)  - fluticasone-salmeterol (ADVAIR) 250-50 MCG/DOSE AEROSOL POWDER, BREATH ACTIVATED; Inhale 1 Puff by mouth every 12 hours.  Dispense: 1 Inhaler; Refill: 3  -Continue albuterol as needed  - Continue oxygen at night, new [facility] paperwork order placed for oxygen supply    Follow Up:      Return in about 6 months (around 12/8/2020) for follow up.    Please note that this dictation was created using voice recognition software. I have made every reasonable attempt to correct obvious errors, but I expect that there are errors of grammar and possibly content that I did not discover before finalizing the note.    Signed by: Winsome ESQUIVEL  PEGGY Schumacher.

## 2020-06-17 RX ORDER — APIXABAN 5 MG/1
TABLET, FILM COATED ORAL
Qty: 60 TAB | Refills: 0 | Status: SHIPPED | OUTPATIENT
Start: 2020-06-17 | End: 2020-06-17

## 2020-06-17 NOTE — TELEPHONE ENCOUNTER
Given 1 month courtesy fill for patient to establish with PCP (use to see Dr. Cortez). Last seen 6/8/2020.

## 2020-06-18 DIAGNOSIS — I10 ESSENTIAL HYPERTENSION: ICD-10-CM

## 2020-06-22 NOTE — TELEPHONE ENCOUNTER
Patient needs new PCP.  Pt to make appt prior to more refills.  Last seen by PCP 06/8/2022 with Dr. Schumacher, and 1/3/2020 with PCP.   Dr. Schumacher indicated to continue on current HTN meds. Will forward for approval.

## 2020-06-23 RX ORDER — AMLODIPINE BESYLATE 5 MG/1
TABLET ORAL
Qty: 90 TAB | Refills: 1 | Status: SHIPPED | OUTPATIENT
Start: 2020-06-23 | End: 2020-12-16

## 2020-07-16 RX ORDER — APIXABAN 5 MG/1
TABLET, FILM COATED ORAL
Qty: 180 TAB | Refills: 0 | Status: SHIPPED | OUTPATIENT
Start: 2020-07-16 | End: 2020-10-19

## 2020-08-07 DIAGNOSIS — I10 ESSENTIAL HYPERTENSION: ICD-10-CM

## 2020-08-07 RX ORDER — LOSARTAN POTASSIUM 25 MG/1
TABLET ORAL
Qty: 100 TAB | Refills: 0 | Status: SHIPPED | OUTPATIENT
Start: 2020-08-07 | End: 2020-12-30

## 2020-08-10 RX ORDER — ATORVASTATIN CALCIUM 80 MG/1
80 TABLET, FILM COATED ORAL EVERY EVENING
Qty: 30 TAB | Refills: 0 | Status: SHIPPED | OUTPATIENT
Start: 2020-08-10 | End: 2020-09-02

## 2020-09-02 RX ORDER — ATORVASTATIN CALCIUM 80 MG/1
TABLET, FILM COATED ORAL
Qty: 90 TAB | Refills: 1 | Status: SHIPPED | OUTPATIENT
Start: 2020-09-02 | End: 2020-12-11

## 2020-10-07 DIAGNOSIS — I10 ESSENTIAL HYPERTENSION: ICD-10-CM

## 2020-10-08 RX ORDER — LOSARTAN POTASSIUM 25 MG/1
25 TABLET ORAL
Qty: 100 TAB | Refills: 1 | OUTPATIENT
Start: 2020-10-08

## 2020-10-10 NOTE — THERAPY
Occupational Therapy  Daily Treatment     Patient Name: Nohelia Dickerson  Age:  74 y.o., Sex:  female  Medical Record #: [id number]  Today's Date: 9/19/2019     Precautions  Precautions: (P) Fall Risk  Comments: (P) posterior/L lateral lean, L rafael, monitor BP    Safety   ADL Safety : Requires Supervision for Safety, Requires Physical Assist for Safety, Requires Cueing for Safety  Bathroom Safety: Requires Supervision for Safety, Requires Physical Assist for Safety, Requires Cuing for Safety  Comments: (P) Home setup of bathroom clarified with pt; pt has tub shower with toilet directly next to it limiting space for TTB, she does have a shower seat at home. Pt able to step in/out of bathtub x2 with Min A using grab bars with verbal cues for foot placement. Discussed recommendation of grab bar placement to increase safety which pt beleived could be installed by son in law. Per pt she also has a commode she can place over toilet    Subjective    Pt agreeable to OT     Objective       09/19/19 1301   Precautions   Precautions Fall Risk   Comments posterior/L lateral lean, L rafael, monitor BP   Safety    Comments Home setup of bathroom clarified with pt; pt has tub shower with toilet directly next to it limiting space for TTB, she does have a shower seat at home. Pt able to step in/out of bathtub x2 with Min A using grab bars with verbal cues for foot placement. Discussed recommendation of grab bar placement to increase safety which pt beleived could be installed by son in law. Per pt she also has a commode she can place over toilet   Sitting Lower Body Exercises   Nustep Resistance Level 5  (15 min for bilateral LE strengthening and endurance)   Balance   Comments in // bars; side stepping leading with LLE x8 reps with 2 rest breaks. Functional mobility on unit with FWW, min A, mod cues for attention to LLE and for safe FWW placement   OT Total Time Spent   OT Individual Total Time Spent (Mins) 60   OT Charge Group    OT Self Care / ADL 1   OT Therapeutic Exercise  3       Assessment    Pt tolerated session well, discussed home bathroom/shower setup with rec for grab bar placement and likely need for physical assist in/out of shower, will benefit from follow up trials toward increased safety. Continues to be limited by decreased attention to L and awareness of L side, impaired balance, impaired attention, and decreased functional endurance.     Plan    IADLs, balance, LLE strength/neuro re-ed, safety with ADLs and related functional mobility.      No

## 2020-10-19 RX ORDER — APIXABAN 5 MG/1
TABLET, FILM COATED ORAL
Qty: 60 TAB | Refills: 5 | Status: SHIPPED | OUTPATIENT
Start: 2020-10-19 | End: 2021-05-04

## 2020-12-11 DIAGNOSIS — I63.9 CEREBROVASCULAR ACCIDENT (CVA), UNSPECIFIED MECHANISM (HCC): ICD-10-CM

## 2020-12-11 DIAGNOSIS — I10 ESSENTIAL HYPERTENSION: ICD-10-CM

## 2020-12-11 RX ORDER — ATORVASTATIN CALCIUM 80 MG/1
TABLET, FILM COATED ORAL
Qty: 100 TAB | Refills: 0 | Status: SHIPPED | OUTPATIENT
Start: 2020-12-11 | End: 2021-01-05

## 2020-12-11 NOTE — TELEPHONE ENCOUNTER
Last seen by PCP 06/8/2020.   Lab Results   Component Value Date/Time    CHOLSTRLTOT 175 09/04/2019 03:33 AM    LDL 90 09/04/2019 03:33 AM    HDL 74 09/04/2019 03:33 AM    TRIGLYCERIDE 55 09/04/2019 03:33 AM       Lab Results   Component Value Date/Time    SODIUM 138 05/10/2020 04:57 PM    POTASSIUM 4.3 05/10/2020 04:57 PM    CHLORIDE 101 05/10/2020 04:57 PM    CO2 24 05/10/2020 04:57 PM    GLUCOSE 107 (H) 05/10/2020 04:57 PM    BUN 17 05/10/2020 04:57 PM    CREATININE 0.78 05/10/2020 04:57 PM     Lab Results   Component Value Date/Time    ALKPHOSPHAT 118 (H) 05/10/2020 04:57 PM    ASTSGOT 33 05/10/2020 04:57 PM    ALTSGPT 37 05/10/2020 04:57 PM    TBILIRUBIN 1.0 05/10/2020 04:57 PM       Will send 3 month(s) to the pharmacy.

## 2020-12-16 DIAGNOSIS — I10 ESSENTIAL HYPERTENSION: ICD-10-CM

## 2020-12-16 RX ORDER — AMLODIPINE BESYLATE 5 MG/1
TABLET ORAL
Qty: 90 TAB | Refills: 1 | Status: SHIPPED | OUTPATIENT
Start: 2020-12-16 | End: 2021-06-22

## 2020-12-16 NOTE — TELEPHONE ENCOUNTER
Last seen by PCP 06/8/2020.   Last Blood Pressure reading was 124/78 on 6/8/2020    Lab Results   Component Value Date/Time    SODIUM 138 05/10/2020 04:57 PM    POTASSIUM 4.3 05/10/2020 04:57 PM    CHLORIDE 101 05/10/2020 04:57 PM    CO2 24 05/10/2020 04:57 PM    GLUCOSE 107 (H) 05/10/2020 04:57 PM    BUN 17 05/10/2020 04:57 PM    CREATININE 0.78 05/10/2020 04:57 PM     Will send 6 month(s) to the pharmacy.

## 2020-12-30 DIAGNOSIS — I10 ESSENTIAL HYPERTENSION: ICD-10-CM

## 2020-12-31 RX ORDER — LOSARTAN POTASSIUM 25 MG/1
TABLET ORAL
Qty: 30 TAB | Refills: 0 | Status: SHIPPED | OUTPATIENT
Start: 2020-12-31 | End: 2021-05-03

## 2021-01-15 DIAGNOSIS — Z23 NEED FOR VACCINATION: ICD-10-CM

## 2021-01-20 ENCOUNTER — PATIENT OUTREACH (OUTPATIENT)
Dept: HEALTH INFORMATION MANAGEMENT | Facility: OTHER | Age: 76
End: 2021-01-20

## 2021-02-20 NOTE — ED TRIAGE NOTES
Pt was seen in our department yesterday and evaluated for right flank colicky pain.  She reports a hx of recurring kidney stones.  She returns today complaining of similar symptomatology affecting her left flank.  She reports a Hx of COPD, on chronic nightly O2 supplementation (O2 sat 86% during triage, and increased to 95% on 2 liters).   Chief Complaint   Patient presents with   • Flank Pain   /83   Pulse 100   Temp 35.9 °C (96.7 °F) (Temporal)   Resp 18   Ht 1.829 m (6')   Wt 78 kg (171 lb 15.3 oz)   SpO2 (S) (!) 86% Comment: Hx of COPD, on chronic nightly O2 supplementation  BMI 23.32 kg/m²          no

## 2021-05-03 ENCOUNTER — PATIENT OUTREACH (OUTPATIENT)
Dept: HEALTH INFORMATION MANAGEMENT | Facility: OTHER | Age: 76
End: 2021-05-03

## 2021-05-03 DIAGNOSIS — I10 ESSENTIAL HYPERTENSION: ICD-10-CM

## 2021-05-03 NOTE — PROGRESS NOTES
Aisha verified scheuled Comprehensive Health  Assessment     Tuesday, May 18, 2021  1:00 pm     Dr. Araiza - [phone]

## 2021-05-04 RX ORDER — LOSARTAN POTASSIUM 25 MG/1
TABLET ORAL
Qty: 90 TABLET | Refills: 0 | Status: SHIPPED | OUTPATIENT
Start: 2021-05-04 | End: 2021-08-09

## 2021-05-14 NOTE — PROGRESS NOTES
Outcome: Left Message  appointment reminder for Comprehensive Health  Assessment may 18 at 1:00 pm     Please transfer to Patient Outreach Team at [phone] when patient returns call.    Attempt #1

## 2021-06-01 PROBLEM — R32 URINE INCONTINENCE: Status: ACTIVE | Noted: 2021-06-01

## 2021-06-01 PROBLEM — E78.5 DYSLIPIDEMIA: Status: ACTIVE | Noted: 2021-06-01

## 2021-06-01 PROBLEM — M62.838 MUSCLE SPASM: Status: ACTIVE | Noted: 2021-06-01

## 2021-06-09 RX ORDER — ATORVASTATIN CALCIUM 80 MG/1
80 TABLET, FILM COATED ORAL
Qty: 30 TABLET | Refills: 0 | Status: SHIPPED | OUTPATIENT
Start: 2021-06-09 | End: 2021-09-27

## 2021-06-09 NOTE — TELEPHONE ENCOUNTER
Last appointment was on 06/8/2020  Lab Results   Component Value Date/Time    CHOLSTRLTOT 175 09/04/2019 03:33 AM    LDL 90 09/04/2019 03:33 AM    HDL 74 09/04/2019 03:33 AM    TRIGLYCERIDE 55 09/04/2019 03:33 AM       Lab Results   Component Value Date/Time    SODIUM 138 05/10/2020 04:57 PM    POTASSIUM 4.3 05/10/2020 04:57 PM    CHLORIDE 101 05/10/2020 04:57 PM    CO2 24 05/10/2020 04:57 PM    GLUCOSE 107 (H) 05/10/2020 04:57 PM    BUN 17 05/10/2020 04:57 PM    CREATININE 0.78 05/10/2020 04:57 PM     Lab Results   Component Value Date/Time    ALKPHOSPHAT 118 (H) 05/10/2020 04:57 PM    ASTSGOT 33 05/10/2020 04:57 PM    ALTSGPT 37 05/10/2020 04:57 PM    TBILIRUBIN 1.0 05/10/2020 04:57 PM    Pt to make appt and get labs prior to more refills.

## 2021-07-14 RX ORDER — ATORVASTATIN CALCIUM 80 MG/1
80 TABLET, FILM COATED ORAL
Qty: 30 TABLET | Refills: 0 | OUTPATIENT
Start: 2021-07-14

## 2021-07-14 NOTE — TELEPHONE ENCOUNTER
Lab Results   Component Value Date/Time    CHOLSTRLTOT 175 09/04/2019 03:33 AM    LDL 90 09/04/2019 03:33 AM    HDL 74 09/04/2019 03:33 AM    TRIGLYCERIDE 55 09/04/2019 03:33 AM       Lab Results   Component Value Date/Time    SODIUM 138 05/10/2020 04:57 PM    POTASSIUM 4.3 05/10/2020 04:57 PM    CHLORIDE 101 05/10/2020 04:57 PM    CO2 24 05/10/2020 04:57 PM    GLUCOSE 107 (H) 05/10/2020 04:57 PM    BUN 17 05/10/2020 04:57 PM    CREATININE 0.78 05/10/2020 04:57 PM     Lab Results   Component Value Date/Time    ALKPHOSPHAT 118 (H) 05/10/2020 04:57 PM    ASTSGOT 33 05/10/2020 04:57 PM    ALTSGPT 37 05/10/2020 04:57 PM    TBILIRUBIN 1.0 05/10/2020 04:57 PM      Last appointment was on 06/8/2020  Pt to make appt and get labs prior to more refills.  Given courtesy fill already.

## 2021-08-07 DIAGNOSIS — I10 ESSENTIAL HYPERTENSION: ICD-10-CM

## 2021-08-09 RX ORDER — LOSARTAN POTASSIUM 25 MG/1
TABLET ORAL
Qty: 100 TABLET | Refills: 0 | Status: SHIPPED | OUTPATIENT
Start: 2021-08-09 | End: 2021-10-26

## 2021-08-24 RX ORDER — APIXABAN 5 MG/1
TABLET, FILM COATED ORAL
Qty: 60 TABLET | Refills: 2 | Status: SHIPPED | OUTPATIENT
Start: 2021-08-24 | End: 2021-12-16

## 2021-08-25 ENCOUNTER — TELEPHONE (OUTPATIENT)
Dept: MEDICAL GROUP | Facility: PHYSICIAN GROUP | Age: 76
End: 2021-08-25

## 2021-08-25 NOTE — TELEPHONE ENCOUNTER
ESTABLISHED PATIENT PRE-VISIT PLANNING     Patient was NOT contacted to complete PVP.     Note: Patient will not be contacted if there is no indication to call.     1.  Reviewed notes from the last few office visits within the medical group: Yes    2.  If any orders were placed at last visit or intended to be done for this visit (i.e. 6 mos follow-up), do we have Results/Consult Notes?         •  Labs - a lipid was ordered but Pt has not been seen for a year and provider may want to add more labs to order.   Note: If patient appointment is for lab review and patient did not complete labs, check with provider if OK to reschedule patient until labs completed.       •  Imaging - Imaging was not ordered at last office visit.       •  Referrals - No referrals were ordered at last office visit.    3. Is this appointment scheduled as a Hospital Follow-Up? No    4.  Immunizations were updated in Epic using Reconcile Outside Information activity? Yes    5.  Patient is due for the following Health Maintenance Topics:   Health Maintenance Due   Topic Date Due   • HEPATITIS C SCREENING  Never done   • PAP SMEAR  Never done   • IMM ZOSTER VACCINES (1 of 2) Never done   • MAMMOGRAM  11/09/2019   • Annual Pulmonary Function Test / Spirometry  07/12/2020       6.  AHA (Pulse[employer]) form printed for Provider? Yes

## 2021-08-27 DIAGNOSIS — I10 ESSENTIAL HYPERTENSION: ICD-10-CM

## 2021-08-27 RX ORDER — AMLODIPINE BESYLATE 5 MG/1
TABLET ORAL
Qty: 30 TABLET | Refills: 0 | Status: SHIPPED | OUTPATIENT
Start: 2021-08-27 | End: 2021-09-20

## 2021-08-31 ENCOUNTER — OFFICE VISIT (OUTPATIENT)
Dept: MEDICAL GROUP | Facility: PHYSICIAN GROUP | Age: 76
End: 2021-08-31
Payer: MEDICARE

## 2021-08-31 VITALS
SYSTOLIC BLOOD PRESSURE: 112 MMHG | DIASTOLIC BLOOD PRESSURE: 84 MMHG | HEART RATE: 87 BPM | HEIGHT: 72 IN | BODY MASS INDEX: 24.03 KG/M2 | TEMPERATURE: 96.7 F | WEIGHT: 177.4 LBS | OXYGEN SATURATION: 91 %

## 2021-08-31 DIAGNOSIS — Z87.891 FORMER SMOKER: ICD-10-CM

## 2021-08-31 DIAGNOSIS — N39.498 OTHER URINARY INCONTINENCE: ICD-10-CM

## 2021-08-31 DIAGNOSIS — Z12.31 ENCOUNTER FOR SCREENING MAMMOGRAM FOR BREAST CANCER: ICD-10-CM

## 2021-08-31 DIAGNOSIS — R73.01 IMPAIRED FASTING GLUCOSE: ICD-10-CM

## 2021-08-31 DIAGNOSIS — I10 ESSENTIAL HYPERTENSION: ICD-10-CM

## 2021-08-31 DIAGNOSIS — N13.30 HYDRONEPHROSIS OF LEFT KIDNEY: ICD-10-CM

## 2021-08-31 DIAGNOSIS — Z11.59 NEED FOR HEPATITIS C SCREENING TEST: ICD-10-CM

## 2021-08-31 DIAGNOSIS — E78.5 DYSLIPIDEMIA: ICD-10-CM

## 2021-08-31 DIAGNOSIS — E55.9 VITAMIN D DEFICIENCY: ICD-10-CM

## 2021-08-31 DIAGNOSIS — Z00.00 HEALTH CARE MAINTENANCE: ICD-10-CM

## 2021-08-31 PROBLEM — F17.200 TOBACCO DEPENDENCE: Status: RESOLVED | Noted: 2018-05-22 | Resolved: 2021-08-31

## 2021-08-31 PROCEDURE — 99214 OFFICE O/P EST MOD 30 MIN: CPT | Performed by: INTERNAL MEDICINE

## 2021-08-31 ASSESSMENT — FIBROSIS 4 INDEX: FIB4 SCORE: 2.93

## 2021-08-31 ASSESSMENT — PATIENT HEALTH QUESTIONNAIRE - PHQ9: CLINICAL INTERPRETATION OF PHQ2 SCORE: 0

## 2021-08-31 NOTE — PROGRESS NOTES
Established Patient    Chief Complaint   Patient presents with   • Hypertension Follow-up       Subjective:     HPI:   Nohelia presents today with the following.    3. Other urinary incontinence  8. Hydronephrosis of left kidney  >> Reported leaky urine, especially at night, sudden associated with urgency, denies dysuria, hematuria, abdominal pain, also pale imaging she has left-sided hydronephrosis, patient denied having vaginal discharge, history of hysterectomy or other pelvic surgeries  >> Patient tried to perform Kegel exercises    Patient Active Problem List    Diagnosis Date Noted   • Vitamin D deficiency 08/31/2021   • Former smoker 08/31/2021   • Dyslipidemia 06/01/2021   • Urine incontinence 06/01/2021   • Protein calorie malnutrition ([facility]) 09/27/2019   • CVA (cerebral vascular accident) ([facility]) 09/04/2019   • PVD (peripheral vascular disease) ([facility]) 09/03/2019   • Arterial occlusion, lower extremity ([facility]) 09/03/2019   • History of nephrolithiasis 08/24/2018   • Hydronephrosis of left kidney 08/07/2018   • COPD (chronic obstructive pulmonary disease) ([facility]) 08/07/2018   • Chronic respiratory failure ([facility]) 08/07/2018   • Thrombocytopenia ([facility]) 08/06/2018   • Essential hypertension 05/22/2018       Current Outpatient Medications on File Prior to Visit   Medication Sig Dispense Refill   • MAGNESIUM PO Take  by mouth.     • amLODIPine (NORVASC) 5 MG Tab TAKE 1 TABLET BY MOUTH EVERY DAY 30 Tablet 0   • ELIQUIS 5 MG Tab TAKE 1 TABLET BY MOUTH TWICE A DAY ( FILL 30 DAYS ONLY PER PT) 60 Tablet 2   • losartan (COZAAR) 25 MG Tab TAKE 1 TABLET BY MOUTH EVERY  tablet 0   • atorvastatin (LIPITOR) 80 MG tablet Take 1 tablet by mouth every day. N THE EVENING. Pt to make appt and get labs prior to more refills. 30 tablet 0   • Pumpkin Seed-Soy Germ (AZO BLADDER CONTROL/GO-LESS) Cap Take 1 capsule by mouth every day.     • WIXELA INHUB 250-50 MCG/DOSE AEROSOL POWDER, BREATH ACTIVATED TAKE 1 PUFF BY MOUTH EVERY 12 HOURS  "3 Each 0   • albuterol 108 (90 Base) MCG/ACT Aero Soln inhalation aerosol Inhale 2 Puffs by mouth every 6 hours as needed for Shortness of Breath.     • cyclobenzaprine (FLEXERIL) 5 MG tablet Take 1 Tab by mouth 3 times a day as needed. 30 Tab 0   • Multiple Vitamins-Minerals (PRESERVISION AREDS 2 PO) Take 1 Cap by mouth 2 Times a Day.     • therapeutic multivitamin-minerals (THERAGRAN-M) Tab Take 1 Tab by mouth every day.       No current facility-administered medications on file prior to visit.       Allergies, past medical history, past surgical history, family history, social history reviewed and updated    ROS:  All other systems reviewed and are negative except as stated in the HPI     Physical Exam:     /84 (BP Location: Left arm, Patient Position: Sitting, BP Cuff Size: Adult)   Pulse 87   Temp 35.9 °C (96.7 °F) (Temporal)   Ht 1.88 m (6' 2\")   Wt 80.5 kg (177 lb 6.4 oz)   SpO2 91%   BMI 22.78 kg/m²   General: Normal appearing. No distress.  ENT: oropharynx without exudates.    Eyes: conjunctiva clear lids without ptosis  Pulmonary: Clear to ausculation.  Normal effort.   Cardiovascular: Regular rate and rhythm  Abdomen: Soft, nontender,  Lymph: No cervical or supraclavicular palpable lymph nodes  Psych: Normal mood and affect.     I have reviewed pertinent labs and diagnostic tests associated with this visit with specific comments listed under the assessment and plan below      Assessment and Plan:     76 y.o. female with the following issues.    1. Need for hepatitis C screening test  - HCV Scrn ( [id number] [employer]); Future    2. Encounter for screening mammogram for breast cancer  - MA-SCREENING MAMMO BILAT W/TOMOSYNTHESIS W/CAD; Future    3. Other urinary incontinence  8. Hydronephrosis of left kidney  - Comp Metabolic Panel; Future  - US-RENAL; Future  - URINALYSIS,CULTURE IF INDICATED; Future  - REFERRAL TO GYNECOLOGY    4. Dyslipidemia  - Lipid Profile; Future    5. Vitamin D " deficiency  - VITAMIN D,25 HYDROXY; Future    6. Health care maintenance  - VITAMIN B12; Future  - TSH+FREE T4  - T3 FREE; Future  - THYROID PEROXIDASE  (TPO) AB; Future  - MAGNESIUM, RBC    7. Impaired fasting glucose  - HEMOGLOBIN A1C; Future  - INSULIN FASTING; Future      9. Essential hypertension  - TSH+FREE T4  - T3 FREE; Future  - THYROID PEROXIDASE  (TPO) AB; Future    Follow Up:      Return in about 6 weeks (around 10/12/2021).  Labs  Please note that this dictation was created using voice recognition software. I have made every reasonable attempt to correct obvious errors, but I expect that there are errors of grammar and possibly content that I did not discover before finalizing the note.    Signed by: Winsome Schumacher M.D.

## 2021-09-19 DIAGNOSIS — I10 ESSENTIAL HYPERTENSION: ICD-10-CM

## 2021-09-20 RX ORDER — AMLODIPINE BESYLATE 5 MG/1
TABLET ORAL
Qty: 30 TABLET | Refills: 0 | Status: SHIPPED | OUTPATIENT
Start: 2021-09-20 | End: 2021-10-15

## 2021-09-21 ENCOUNTER — GYNECOLOGY VISIT (OUTPATIENT)
Dept: OBGYN | Facility: CLINIC | Age: 76
End: 2021-09-21
Payer: MEDICARE

## 2021-09-21 VITALS
BODY MASS INDEX: 23.98 KG/M2 | SYSTOLIC BLOOD PRESSURE: 142 MMHG | HEIGHT: 72 IN | HEART RATE: 78 BPM | DIASTOLIC BLOOD PRESSURE: 80 MMHG | WEIGHT: 177 LBS

## 2021-09-21 DIAGNOSIS — I63.9 CEREBROVASCULAR ACCIDENT (CVA), UNSPECIFIED MECHANISM (HCC): ICD-10-CM

## 2021-09-21 DIAGNOSIS — N95.2 ATROPHIC VAGINITIS: ICD-10-CM

## 2021-09-21 DIAGNOSIS — I10 ESSENTIAL HYPERTENSION: ICD-10-CM

## 2021-09-21 DIAGNOSIS — N39.46 URINARY INCONTINENCE, MIXED: ICD-10-CM

## 2021-09-21 DIAGNOSIS — N31.9 NEUROGENIC BLADDER: ICD-10-CM

## 2021-09-21 DIAGNOSIS — R39.9 UTI SYMPTOMS: Primary | ICD-10-CM

## 2021-09-21 LAB
APPEARANCE UR: CLEAR
BILIRUB UR STRIP-MCNC: NORMAL MG/DL
COLOR UR AUTO: YELLOW
GLUCOSE UR STRIP.AUTO-MCNC: NEGATIVE MG/DL
KETONES UR STRIP.AUTO-MCNC: NEGATIVE MG/DL
LEUKOCYTE ESTERASE UR QL STRIP.AUTO: NEGATIVE
NITRITE UR QL STRIP.AUTO: NEGATIVE
PH UR STRIP.AUTO: 6 [PH] (ref 5–8)
PROT UR QL STRIP: NEGATIVE MG/DL
RBC UR QL AUTO: NEGATIVE
SP GR UR STRIP.AUTO: 1.01
UROBILINOGEN UR STRIP-MCNC: NORMAL MG/DL

## 2021-09-21 PROCEDURE — 99204 OFFICE O/P NEW MOD 45 MIN: CPT | Performed by: STUDENT IN AN ORGANIZED HEALTH CARE EDUCATION/TRAINING PROGRAM

## 2021-09-21 PROCEDURE — 81002 URINALYSIS NONAUTO W/O SCOPE: CPT | Performed by: STUDENT IN AN ORGANIZED HEALTH CARE EDUCATION/TRAINING PROGRAM

## 2021-09-21 RX ORDER — ESTRADIOL 0.1 MG/G
CREAM VAGINAL
Qty: 1 EACH | Refills: 3 | Status: SHIPPED | OUTPATIENT
Start: 2021-09-21 | End: 2022-01-08

## 2021-09-21 ASSESSMENT — FIBROSIS 4 INDEX: FIB4 SCORE: 2.93

## 2021-09-21 NOTE — Clinical Note
Dear Dr. Schumacher,     Thank you for the interesting referral. Please see my consultation note attached.     Best regards,   Kayden Purvis MD
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
## 2021-09-21 NOTE — NON-PROVIDER
PT here today   PT States frequency and urgency. PT states feels urine coming out when sleeping. PT wakes up about 5 times a night to urinate onset 2 years after PT experiences stroke.   Hysterectomy? Never  Good #:  [phone]   Bladder Scan:  First read 4; Second read 13  Pharmacy Verified

## 2021-09-27 RX ORDER — ATORVASTATIN CALCIUM 80 MG/1
80 TABLET, FILM COATED ORAL
Qty: 30 TABLET | Refills: 0 | Status: SHIPPED | OUTPATIENT
Start: 2021-09-27 | End: 2021-10-15

## 2021-09-28 ENCOUNTER — HOSPITAL ENCOUNTER (OUTPATIENT)
Dept: LAB | Facility: MEDICAL CENTER | Age: 76
End: 2021-09-28
Attending: INTERNAL MEDICINE
Payer: MEDICARE

## 2021-09-28 DIAGNOSIS — N39.498 OTHER URINARY INCONTINENCE: ICD-10-CM

## 2021-09-28 DIAGNOSIS — I10 ESSENTIAL HYPERTENSION: ICD-10-CM

## 2021-09-28 DIAGNOSIS — I63.9 CEREBROVASCULAR ACCIDENT (CVA), UNSPECIFIED MECHANISM (HCC): ICD-10-CM

## 2021-09-28 DIAGNOSIS — Z00.00 HEALTH CARE MAINTENANCE: ICD-10-CM

## 2021-09-28 DIAGNOSIS — Z11.59 NEED FOR HEPATITIS C SCREENING TEST: ICD-10-CM

## 2021-09-28 DIAGNOSIS — R73.01 IMPAIRED FASTING GLUCOSE: ICD-10-CM

## 2021-09-28 DIAGNOSIS — E78.5 DYSLIPIDEMIA: ICD-10-CM

## 2021-09-28 DIAGNOSIS — E55.9 VITAMIN D DEFICIENCY: ICD-10-CM

## 2021-09-28 LAB
25(OH)D3 SERPL-MCNC: 31 NG/ML (ref 30–100)
ALBUMIN SERPL BCP-MCNC: 4.6 G/DL (ref 3.2–4.9)
ALBUMIN/GLOB SERPL: 1.7 G/DL
ALP SERPL-CCNC: 93 U/L (ref 30–99)
ALT SERPL-CCNC: 17 U/L (ref 2–50)
ANION GAP SERPL CALC-SCNC: 10 MMOL/L (ref 7–16)
APPEARANCE UR: ABNORMAL
AST SERPL-CCNC: 21 U/L (ref 12–45)
BACTERIA #/AREA URNS HPF: ABNORMAL /HPF
BILIRUB SERPL-MCNC: 1.3 MG/DL (ref 0.1–1.5)
BILIRUB UR QL STRIP.AUTO: NEGATIVE
BUN SERPL-MCNC: 18 MG/DL (ref 8–22)
CALCIUM SERPL-MCNC: 9.6 MG/DL (ref 8.5–10.5)
CHLORIDE SERPL-SCNC: 105 MMOL/L (ref 96–112)
CHOLEST SERPL-MCNC: 154 MG/DL (ref 100–199)
CO2 SERPL-SCNC: 26 MMOL/L (ref 20–33)
COLOR UR: YELLOW
CREAT SERPL-MCNC: 0.66 MG/DL (ref 0.5–1.4)
EPI CELLS #/AREA URNS HPF: ABNORMAL /HPF
EST. AVERAGE GLUCOSE BLD GHB EST-MCNC: 128 MG/DL
FASTING STATUS PATIENT QL REPORTED: NORMAL
GLOBULIN SER CALC-MCNC: 2.7 G/DL (ref 1.9–3.5)
GLUCOSE SERPL-MCNC: 97 MG/DL (ref 65–99)
GLUCOSE UR STRIP.AUTO-MCNC: NEGATIVE MG/DL
HBA1C MFR BLD: 6.1 % (ref 4–5.6)
HCV AB SER QL: NORMAL
HDLC SERPL-MCNC: 92 MG/DL
HYALINE CASTS #/AREA URNS LPF: ABNORMAL /LPF
KETONES UR STRIP.AUTO-MCNC: NEGATIVE MG/DL
LDLC SERPL CALC-MCNC: 48 MG/DL
LEUKOCYTE ESTERASE UR QL STRIP.AUTO: ABNORMAL
MICRO URNS: ABNORMAL
NITRITE UR QL STRIP.AUTO: NEGATIVE
PH UR STRIP.AUTO: 6 [PH] (ref 5–8)
POTASSIUM SERPL-SCNC: 3.9 MMOL/L (ref 3.6–5.5)
PROT SERPL-MCNC: 7.3 G/DL (ref 6–8.2)
PROT UR QL STRIP: 100 MG/DL
RBC # URNS HPF: ABNORMAL /HPF
RBC UR QL AUTO: ABNORMAL
SODIUM SERPL-SCNC: 141 MMOL/L (ref 135–145)
SP GR UR STRIP.AUTO: >=1.03
T3FREE SERPL-MCNC: 2.68 PG/ML (ref 2–4.4)
T4 FREE SERPL-MCNC: 1.06 NG/DL (ref 0.93–1.7)
THYROPEROXIDASE AB SERPL-ACNC: <9 IU/ML (ref 0–9)
TRIGL SERPL-MCNC: 72 MG/DL (ref 0–149)
TSH SERPL DL<=0.005 MIU/L-ACNC: 1.31 UIU/ML (ref 0.38–5.33)
UROBILINOGEN UR STRIP.AUTO-MCNC: 0.2 MG/DL
VIT B12 SERPL-MCNC: 788 PG/ML (ref 211–911)
WBC #/AREA URNS HPF: ABNORMAL /HPF

## 2021-09-28 PROCEDURE — 83036 HEMOGLOBIN GLYCOSYLATED A1C: CPT

## 2021-09-28 PROCEDURE — 87086 URINE CULTURE/COLONY COUNT: CPT

## 2021-09-28 PROCEDURE — 84443 ASSAY THYROID STIM HORMONE: CPT

## 2021-09-28 PROCEDURE — G0472 HEP C SCREEN HIGH RISK/OTHER: HCPCS

## 2021-09-28 PROCEDURE — 83525 ASSAY OF INSULIN: CPT

## 2021-09-28 PROCEDURE — 36415 COLL VENOUS BLD VENIPUNCTURE: CPT

## 2021-09-28 PROCEDURE — 83735 ASSAY OF MAGNESIUM: CPT

## 2021-09-28 PROCEDURE — 81001 URINALYSIS AUTO W/SCOPE: CPT

## 2021-09-28 PROCEDURE — 80061 LIPID PANEL: CPT

## 2021-09-28 PROCEDURE — 86376 MICROSOMAL ANTIBODY EACH: CPT

## 2021-09-28 PROCEDURE — 84481 FREE ASSAY (FT-3): CPT

## 2021-09-28 PROCEDURE — 82607 VITAMIN B-12: CPT

## 2021-09-28 PROCEDURE — 84439 ASSAY OF FREE THYROXINE: CPT

## 2021-09-28 PROCEDURE — 82306 VITAMIN D 25 HYDROXY: CPT

## 2021-09-28 PROCEDURE — 80053 COMPREHEN METABOLIC PANEL: CPT

## 2021-09-29 ENCOUNTER — TELEPHONE (OUTPATIENT)
Dept: MEDICAL GROUP | Facility: PHYSICIAN GROUP | Age: 76
End: 2021-09-29

## 2021-09-29 DIAGNOSIS — Z79.899 MEDICATION MANAGEMENT: ICD-10-CM

## 2021-09-29 NOTE — TELEPHONE ENCOUNTER
Phone Number Called: [phone]    Call outcome: Spoke to patient regarding message below.    Message: This RN called Nohelia Dickerson, a 76 y.o. female, for a Blood Pressure Check.    [unfilled]  - Home Monitoring: Blood Pressure at home ranging: Patient does not take BP at home  - Medications: Patient did take blood pressure medication(s) today before appointment. Last dose of blood pressure medication(s) was on 9/29/2021.   - Medication Compliance: Patient reports they are taking blood pressure medication(s) every day as prescribed by provider.    Patient does not have symptoms today.      Patient is currently in the Donut Hole with her Eliquis. Patient needs script to go back to original price. Referral placed to pharmacotherapy program for patient per Dr Schumacher.

## 2021-09-29 NOTE — TELEPHONE ENCOUNTER
Phone Number Called: [phone]    Call outcome: Did not leave a detailed message. Requested patient to call back.    Message: This RN called Nohelia Dickerson, a 76 y.o. female, for a Blood Pressure Check.

## 2021-09-30 ENCOUNTER — DOCUMENTATION (OUTPATIENT)
Dept: VASCULAR LAB | Facility: MEDICAL CENTER | Age: 76
End: 2021-09-30

## 2021-09-30 LAB
BACTERIA UR CULT: NORMAL
INSULIN P FAST SERPL-ACNC: 4 UIU/ML (ref 3–25)
SIGNIFICANT IND 70042: NORMAL
SITE SITE: NORMAL
SOURCE SOURCE: NORMAL

## 2021-09-30 NOTE — PROGRESS NOTES
[facility] Heart and Vascular Health and Pharmacotherapy Programs    Received anticoagulation referral for Eliquis due to h/o CVA from Dr. Schumacher on 9/29/21    Pt may be needing assistance with costs of DOAC.    Attempted to contact pt. Unable to contact pt at this time.     Left voicemail for pt to contact clinic to establish care with anticoagulation clinic. Will attempt to contact at a later date.     Insurance: [facility]   PCP: [facility]  Locations to be seen: Any - pt sees PCP at [facility] Anticoagulation/Pharmacotherapy Clinic at [phone], fax [phone]    Ron Santos, BekahD

## 2021-10-01 LAB — MAGNESIUM RBC-SCNC: 5.1 MG/DL (ref 3.6–7.5)

## 2021-10-04 ENCOUNTER — DOCUMENTATION (OUTPATIENT)
Dept: VASCULAR LAB | Facility: MEDICAL CENTER | Age: 76
End: 2021-10-04

## 2021-10-04 NOTE — PROGRESS NOTES
[facility] Heart and Vascular Health and Pharmacotherapy Programs     Received anticoagulation referral for Eliquis due to h/o CVA from Dr. Schumacher on 9/29/21     Pt is in the donut hole and cannot afford Eliquis at this time. Gave patient 's number to inquire about patient assistance. Plan to see patient in person on Thursday to assist with filling out any assistance paperwork. Confirmed that she has Eliquis and is currently taking it.     Called patient back and [facility] to have patient bring proof of income and proof of medical expenses.     Insurance: [facility]   PCP: [facility]  Locations to be seen: Any - pt sees PCP at [facility] Anticoagulation/Pharmacotherapy Clinic at [phone], fax [phone]    Mahad Reynoso, BekahD

## 2021-10-05 ENCOUNTER — HOSPITAL ENCOUNTER (OUTPATIENT)
Dept: RADIOLOGY | Facility: MEDICAL CENTER | Age: 76
End: 2021-10-05
Attending: INTERNAL MEDICINE
Payer: MEDICARE

## 2021-10-05 DIAGNOSIS — Z12.31 ENCOUNTER FOR SCREENING MAMMOGRAM FOR BREAST CANCER: ICD-10-CM

## 2021-10-05 PROCEDURE — 77063 BREAST TOMOSYNTHESIS BI: CPT

## 2021-10-07 ENCOUNTER — ANTICOAGULATION VISIT (OUTPATIENT)
Dept: MEDICAL GROUP | Facility: PHYSICIAN GROUP | Age: 76
End: 2021-10-07
Payer: MEDICARE

## 2021-10-07 VITALS — HEART RATE: 75 BPM | WEIGHT: 177 LBS | BODY MASS INDEX: 23.98 KG/M2 | HEIGHT: 72 IN

## 2021-10-07 DIAGNOSIS — Z79.01 CHRONIC ANTICOAGULATION: Primary | ICD-10-CM

## 2021-10-07 LAB — INR PPP: 1.2 (ref 2–3.5)

## 2021-10-07 PROCEDURE — 99211 OFF/OP EST MAY X REQ PHY/QHP: CPT | Performed by: INTERNAL MEDICINE

## 2021-10-07 PROCEDURE — 85610 PROTHROMBIN TIME: CPT | Performed by: INTERNAL MEDICINE

## 2021-10-07 ASSESSMENT — FIBROSIS 4 INDEX: FIB4 SCORE: 2.75

## 2021-10-07 NOTE — Clinical Note
Dr. Bloch,  The computers were down when I saw this patient.  Basically, she had a stroke about 2 years ago has been on Eliquis 5 mg twice daily.  The only reason she is coming in to see us is to get Eliquis samples.  Thanks,  Ole

## 2021-10-08 NOTE — PROGRESS NOTES
Anticoagulation Summary  As of 10/7/2021    INR goal:     TTR:  --   INR used for dosin.20 (10/7/2021)   Warfarin maintenance plan:  No maintenance plan   Next INR check:  2021   Target end date:      Indications    CVA (cerebral vascular accident) (HCC) [I63.9]             Anticoagulation Episode Summary     INR check location:      Preferred lab:      Send INR reminders to:      Comments:  Eliquis 5 mg twice daily      Anticoagulation Care Providers     Provider Role Specialty Phone number    Winsome Schumacher M.D.  Internal Medicine [phone]        Anticoagulation Patient Findings        HPI:     • Nohelia Dickerson was referred to the [facility] clinic for anticoagulation management with Eliquis for CVA.  She is here mostly because she fell into the donut hole and Eliquis was become cost prohibitive.  She is working with the drug manufacture to receive Eliquis until the beginning of the year.  I will also send her a prescription of Eliquis to our [facility] pharmacy for her.  Other than this issue, she would mostly like to manage her Eliquis via her primary care.  •     Care Team      PCP:  Winsome Schumacher M.D.  [address]  [place] [address]  [phone]    Meds, Vitals, and labs     3 vitals included with today's appt-unless patient declined:  (BP, HR, weight, ht, RR)   Vitals:    10/07/21 1737   Pulse: 75   Weight: 80.3 kg (177 lb)   Height: 1.829 m (6')       Lab Results   Component Value Date/Time    ALKPHOSPHAT 93 2021 08:46 AM    ASTSGOT 21 2021 08:46 AM    ALTSGPT 17 2021 08:46 AM    TBILIRUBIN 1.3 2021 08:46 AM    ALBUMIN 4.6 2021 08:46 AM    BUN 18 2021 08:46 AM    CREATININE 0.66 2021 08:46 AM    RBC 4.27 05/10/2020 04:57 PM    HEMOGLOBIN 12.2 05/10/2020 04:57 PM    HEMATOCRIT 37.6 05/10/2020 04:57 PM    PLATELETCT 139 (L) 05/10/2020 04:57 PM    INR 1.20 (A) 10/07/2021 05:36 PM          Current Outpatient Medications:   •   atorvastatin, 80 mg, Oral, QDAY  •  estradiol, Apply 1g cream inside vagina using applicator nightly for 2 weeks, then twice per week thereafter  •  amLODIPine, TAKE 1 TABLET BY MOUTH EVERY DAY  •  MAGNESIUM PO, Take  by mouth.  •  Eliquis, TAKE 1 TABLET BY MOUTH TWICE A DAY ( FILL 30 DAYS ONLY PER PT)  •  losartan, TAKE 1 TABLET BY MOUTH EVERY DAY  •  AZO Bladder Control/Go-Less, 1 Capsule, Oral, DAILY  •  Wixela Inhub, TAKE 1 PUFF BY MOUTH EVERY 12 HOURS  •  albuterol, 2 Puff, Inhalation, Q6HRS PRN  •  cyclobenzaprine, 5 mg, Oral, TID PRN  •  Multiple Vitamins-Minerals (PRESERVISION AREDS 2 PO), 1 Capsule, Oral, BID  •  therapeutic multivitamin-minerals, 1 Tablet, Oral, DAILY    Plan:     Continue Eliquis 5 mg twice daily  Sample sent [facility] the [facility]       Follow-up:   • Our protocol suggests we test in 4 weeks    • This decision was made using shared decision making with the pt and benefits vs risks were discussed.    • Labs to be completed prior to next f/u - CBC, CMP-patient declined    Additional information discussed with patient:   • Pt educated to contact our clinic with any changes in medications or s/s of bleeding or thrombosis.  • Education was provided today regarding tips to reduce their bleed risk and dietary constraints while on a anticoagulant.  • Pt gave verbal consent  to leave a VM with detailed medical information about this topic.   • Pt education done regarding the medication listed above.  · Lifestyle safety, ie smoking, ETOH, hobby safety, fall safety/prevention  · Procedures for missed doses or suspected missed doses, surgeries/procedures, travel, dental work, any medication changes    National recommendations regarding anticoagulation therapy:   The CHEST guidelines recommends frequent INR monitoring at regular intervals (a few days up to a max of 12 weeks) to ensure patients are on the proper dose of warfarin, and patients are not having any complications from therapy.   INRs can dramatically change over a short time period due to diet, medications, and medical conditions.       Jose R Cheema, PharmD, MS, BCACP, [facility] of Heart and Vascular Health  Phone [phone] fax [phone]    This note was created using voice recognition software (Dragon). The accuracy of the dictation is limited by the abilities of the software. I have reviewed the note prior to signing, however some errors in grammar and context are still possible. If you have any questions related to this note please do not hesitate to contact our office.         [facility] Pharmacotherapy Program Consent                                             Name    Nohelia Dickerson    MRN number: [id number]    the following are guidelines for participation in the [facility] Pharmacotherapy Program.     I, ____Nohelia Dickerson_____, understand and voluntarily agree to participate in the [facility] Pharmacotherapy Program and to have services provided to me by pharmacists working in collaboration with my provider.    I understand the pharmacist within the [facility] Pharmacotherapy Program may initiate, modify or discontinue my medications, order appropriate testing and appointments, perform exams, monitor treatment, and make clinical evaluations and decisions pursuant to a collaborative practice agreement with my provider.  I understand the pharmacist within the [facility] Pharmacotherapy Program is not a physician, osteopathic physician, advanced practice registered nurse or physician assistant and may not diagnose.  I will take all my medications as instructed and not change the way I take it without first talking to my provider or a pharmacist within the [facility] Pharmacotherapy Program.  I understand that if I am late to my appointment I may not be able to be seen by a pharmacist at that time and will have to reschedule my appointment.  During appointment with pharmacist I  understand that pharmacist has the right not to answer questions or perform services outside the pharmacist’s scope of practice.  By signing below, I provide informed consent for the pharmacist to provide these services and for my participation in the [facility] Pharmacotherapy Program.      Nohelia Dickerson           [id number]          10/07/21  Patient Name                   MRN number  Date     ____Obtained verbal consent from Nohelia Dickerson

## 2021-10-14 ENCOUNTER — TELEPHONE (OUTPATIENT)
Dept: MEDICAL GROUP | Facility: PHYSICIAN GROUP | Age: 76
End: 2021-10-14

## 2021-10-14 ENCOUNTER — DOCUMENTATION (OUTPATIENT)
Dept: VASCULAR LAB | Facility: MEDICAL CENTER | Age: 76
End: 2021-10-14

## 2021-10-14 DIAGNOSIS — I70.209 ARTERIAL OCCLUSION, LOWER EXTREMITY (HCC): ICD-10-CM

## 2021-10-14 DIAGNOSIS — I73.9 PVD (PERIPHERAL VASCULAR DISEASE) (HCC): ICD-10-CM

## 2021-10-14 DIAGNOSIS — I63.9 CEREBROVASCULAR ACCIDENT (CVA), UNSPECIFIED MECHANISM (HCC): ICD-10-CM

## 2021-10-14 NOTE — PROGRESS NOTES
Our clinical pharmacist discuss case with PCP was recommended vasculitis in consultation determine whether or not patient should stay on anticoagulation or potentially switch to an antiplatelet agent  We will ask MA to schedule vascular medicine consultation    Michael J. Bloch, MD  Anticoagulation Center

## 2021-10-14 NOTE — TELEPHONE ENCOUNTER
"Phone Number Called: [phone]    Call outcome: Left detailed message for patient. Informed to call back with any additional questions.    Message: Called to inform patient that referral has been placed to vascular medicine for her.       ----- Message from Winsome Schumacher M.D. sent at 10/14/2021 12:15 PM PDT -----  Regarding: FW: ? indication for eliquis  Please inform patient that I referred to vascular medicine for further evaluation and management of vascular disease with intake of Eliquis.  Thanks  ----- Message -----  From: Jose R Cheema, PharmD  Sent: 10/11/2021   7:10 AM PDT  To: Winsome Schumacher M.D., #  Subject: FW: ? indication for eliquis                     Dr. Schumacher,    This patient was referred to us to help with her Eliquis co-pay.  Based on the information below, Dr. Bloch noticed that neurology recommended discontinuing Eliquis and continuing on Plavix.    We could not find direct evidence that she had atrial fibrillation, looks like she has a questionable history based on the cardiology note.    Thanks  Ole  ----- Message -----  From: Michael J Bloch, M.D.  Sent: 10/10/2021   6:28 PM PDT  To: Michael J Bloch, M.D., #  Subject: RE: ? indication                                 I'd suggest starting with speaking with her pcp   ----- Message -----  From: Jose R Cheema, BekahD  Sent: 10/8/2021   5:14 PM PDT  To: Michael J Bloch, M.D., #  Subject: RE: ? indication                                 I apologize, at her initial appointment with me she did report she had a arrhythmia that possibly led to her stroke. At that time, I could not verify anything in the computers as the Internet was down all afternoon.    when I looked at the cardiology note on 3/6 Dr. Pereira said:  \"The patient has a history of palpitations or known PAF\"     Based on that, I assumed it was reasonable that her stroke could be cardioembolic.     I did not filter out and look specifically for the neurology note, but I can " "see the recommendations he had below.    Neurology note 2/7/2020:  \"she is already on Eliquis for her PAD, from a pure stroke standpoint, she needs to be placed back on Plavix and maintained on Lipitor, maintaining LDL especially below \"normal\".\"     In the media tab 4/29/2020 the Holter monitor did have some supraventricular tachycardia, and paroxysmal supraventricular tachycardia, 53 episodes, but did not have any atrial fibrillation.    Based on this, discontinuing Eliquis and initiating Plavix might be more appropriate. Can I schedule her an appointment in the vascular clinic, or would you like me to speak with her PCP?      Thanks  Ole   ----- Message -----  From: Michael J Bloch, M.D.  Sent: 10/8/2021   4:42 PM PDT  To: Michael J Bloch, M.D., #  Subject: ? indication                                     I don't see an indication for warfarin.   Sounds like w/u for afib was negative.   Most recent neuro note says to go back to clopidogrel.   Please advise!!    mb  ----- Message -----  From: Jose R Cheema, PharmD  Sent: 10/7/2021   5:44 PM PDT  To: Michael J Bloch, M.D.    Dr. Bloch,  The computers were down when I saw this patient.  Basically, she had a stroke about 2 years ago has been on Eliquis 5 mg twice daily.  The only reason she is coming in to see us is to get Eliquis samples.  Thanks,  Ole              "

## 2021-10-15 ENCOUNTER — APPOINTMENT (OUTPATIENT)
Dept: RADIOLOGY | Facility: MEDICAL CENTER | Age: 76
End: 2021-10-15
Attending: INTERNAL MEDICINE
Payer: MEDICARE

## 2021-10-15 DIAGNOSIS — I10 ESSENTIAL HYPERTENSION: ICD-10-CM

## 2021-10-15 RX ORDER — AMLODIPINE BESYLATE 5 MG/1
TABLET ORAL
Qty: 100 TABLET | Refills: 3 | Status: SHIPPED | OUTPATIENT
Start: 2021-10-15 | End: 2022-01-08

## 2021-10-15 RX ORDER — ATORVASTATIN CALCIUM 80 MG/1
80 TABLET, FILM COATED ORAL
Qty: 100 TABLET | Refills: 3 | Status: SHIPPED | OUTPATIENT
Start: 2021-10-15 | End: 2022-12-28

## 2021-10-20 ENCOUNTER — TELEPHONE (OUTPATIENT)
Dept: MEDICAL GROUP | Facility: PHYSICIAN GROUP | Age: 76
End: 2021-10-20

## 2021-10-20 NOTE — TELEPHONE ENCOUNTER
ESTABLISHED PATIENT PRE-VISIT PLANNING     Patient was NOT contacted to complete PVP.     Note: Patient will not be contacted if there is no indication to call.     1.  Reviewed notes from the last few office visits within the medical group: Yes    2.  If any orders were placed at last visit or intended to be done for this visit (i.e. 6 mos follow-up), do we have Results/Consult Notes?         •  Labs - Labs ordered, completed on 9/28/21 and results are in chart.  Note: If patient appointment is for lab review and patient did not complete labs, check with provider if OK to reschedule patient until labs completed.       •  Imaging - MAMMO DONE, ULTRASOUND NOT DONE.       •  Referrals - Referral ordered, patient was seen and consult notes are in chart. Care Teams updated  YES.    3. Is this appointment scheduled as a Hospital Follow-Up? No    4.  Immunizations were updated in Epic using Reconcile Outside Information activity? Yes    5.  Patient is due for the following Health Maintenance Topics:   Health Maintenance Due   Topic Date Due   • PAP SMEAR  Never done   • IMM ZOSTER VACCINES (1 of 2) Never done   • Annual Pulmonary Function Test / Spirometry  07/12/2020   • IMM INFLUENZA (1) 09/01/2021       6.  AHA (Pulse8) form printed for Provider? Yes

## 2021-10-26 ENCOUNTER — DOCUMENTATION (OUTPATIENT)
Dept: VASCULAR LAB | Facility: MEDICAL CENTER | Age: 76
End: 2021-10-26

## 2021-10-26 ENCOUNTER — OFFICE VISIT (OUTPATIENT)
Dept: VASCULAR LAB | Facility: MEDICAL CENTER | Age: 76
End: 2021-10-26
Attending: INTERNAL MEDICINE
Payer: MEDICARE

## 2021-10-26 ENCOUNTER — OFFICE VISIT (OUTPATIENT)
Dept: MEDICAL GROUP | Facility: PHYSICIAN GROUP | Age: 76
End: 2021-10-26
Payer: MEDICARE

## 2021-10-26 VITALS
BODY MASS INDEX: 24.11 KG/M2 | SYSTOLIC BLOOD PRESSURE: 168 MMHG | HEART RATE: 89 BPM | HEIGHT: 72 IN | WEIGHT: 178 LBS | DIASTOLIC BLOOD PRESSURE: 100 MMHG

## 2021-10-26 VITALS
HEIGHT: 72 IN | DIASTOLIC BLOOD PRESSURE: 98 MMHG | TEMPERATURE: 97.4 F | SYSTOLIC BLOOD PRESSURE: 160 MMHG | HEART RATE: 88 BPM | BODY MASS INDEX: 23.84 KG/M2 | OXYGEN SATURATION: 91 % | WEIGHT: 176 LBS

## 2021-10-26 DIAGNOSIS — I10 ESSENTIAL HYPERTENSION: ICD-10-CM

## 2021-10-26 DIAGNOSIS — I10 HYPERTENSION, UNSPECIFIED TYPE: ICD-10-CM

## 2021-10-26 DIAGNOSIS — E55.9 VITAMIN D DEFICIENCY: ICD-10-CM

## 2021-10-26 DIAGNOSIS — R73.03 PREDIABETES: ICD-10-CM

## 2021-10-26 DIAGNOSIS — I63.9 CARDIOEMBOLIC STROKE (HCC): ICD-10-CM

## 2021-10-26 DIAGNOSIS — E78.5 DYSLIPIDEMIA: ICD-10-CM

## 2021-10-26 DIAGNOSIS — Z87.891 FORMER SMOKER: ICD-10-CM

## 2021-10-26 DIAGNOSIS — N39.498 OTHER URINARY INCONTINENCE: ICD-10-CM

## 2021-10-26 DIAGNOSIS — D68.69 SECONDARY HYPERCOAGULABILITY DISORDER (HCC): ICD-10-CM

## 2021-10-26 DIAGNOSIS — I74.9 ARTERIAL EMBOLISM (HCC): ICD-10-CM

## 2021-10-26 PROCEDURE — 99212 OFFICE O/P EST SF 10 MIN: CPT

## 2021-10-26 PROCEDURE — 99214 OFFICE O/P EST MOD 30 MIN: CPT | Performed by: INTERNAL MEDICINE

## 2021-10-26 RX ORDER — LOSARTAN POTASSIUM 100 MG/1
100 TABLET ORAL DAILY
Qty: 90 TABLET | Refills: 3 | Status: SHIPPED | OUTPATIENT
Start: 2021-10-26 | End: 2022-07-15

## 2021-10-26 ASSESSMENT — ENCOUNTER SYMPTOMS
SPEECH CHANGE: 0
DEPRESSION: 0
SHORTNESS OF BREATH: 0
NERVOUS/ANXIOUS: 0
PALPITATIONS: 0
COUGH: 0
WEIGHT LOSS: 0
FOCAL WEAKNESS: 0

## 2021-10-26 ASSESSMENT — FIBROSIS 4 INDEX
FIB4 SCORE: 2.75
FIB4 SCORE: 2.75

## 2021-10-26 NOTE — PATIENT INSTRUCTIONS
"Natural vitamins from whole foods ( mainly from vegetables)   Magnesium glycinate supplement  400 mg daily  Vitamin D3 5000 uints with K2 supplement   Turmeric, kathy, Boswellia, black pepper, Cinnamon combination supplement   CoQ10 100 mg daily   Grounding >> watch \"The Earthing Movie with Keny Juan\".   Green Tea Matcha Organic     "
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
## 2021-10-26 NOTE — PROGRESS NOTES
Established Patient    Chief Complaint   Patient presents with   • Follow-Up     6 week follow up       Subjective:     HPI:   Nohelia presents today with the following.    1. Prediabetes  2. Essential hypertension  3. Vitamin D deficiency  Patient recently had lab with some borderline deficiency vitamin D, she has stable prediabetic A1c level, she has access to sugar and some refined carbs, patient denied having alex diabetes symptoms    Blood pressure today little bit on the higher side, she check blood pressure at home with normal range according to the patient, she is taking amlodipine 5 mg daily, losartan 25 mg daily, denies any related symptoms      4. Other urinary incontinence  Mention her symptom improved after seeing gynecologist for symptomatic management, she is also seeing physical therapy pelvic floor for her urinary symptoms, urine culture is negative    Patient Active Problem List    Diagnosis Date Noted   • Prediabetes 10/26/2021   • Vitamin D deficiency 08/31/2021   • Former smoker 08/31/2021   • Dyslipidemia 06/01/2021   • Urine incontinence 06/01/2021   • Protein calorie malnutrition ([facility]) 09/27/2019   • CVA (cerebral vascular accident) ([facility]) 09/04/2019   • PVD (peripheral vascular disease) ([facility]) 09/03/2019   • Arterial occlusion, lower extremity ([facility]) 09/03/2019   • History of nephrolithiasis 08/24/2018   • Hydronephrosis of left kidney 08/07/2018   • COPD (chronic obstructive pulmonary disease) ([facility]) 08/07/2018   • Chronic respiratory failure ([facility]) 08/07/2018   • Thrombocytopenia ([facility]) 08/06/2018   • Essential hypertension 05/22/2018       Current Outpatient Medications on File Prior to Visit   Medication Sig Dispense Refill   • atorvastatin (LIPITOR) 80 MG tablet TAKE 1 TABLET BY MOUTH EVERY DAY. N THE EVENING. PT TO MAKE APPT AND GET LABS PRIOR TO MORE REFILLS. 100 Tablet 3   • amLODIPine (NORVASC) 5 MG Tab TAKE 1 TABLET BY MOUTH EVERY  Tablet 3   • apixaban (ELIQUIS) 5mg Tab Take 1  Tablet by mouth 2 times a day. Provide 30 days of samples to the patient if available 60 Tablet 0   • estradiol (ESTRACE VAGINAL) 0.1 MG/GM vaginal cream Apply 1g cream inside vagina using applicator nightly for 2 weeks, then twice per week thereafter 1 Each 3   • MAGNESIUM PO Take  by mouth.     • ELIQUIS 5 MG Tab TAKE 1 TABLET BY MOUTH TWICE A DAY ( FILL 30 DAYS ONLY PER PT) 60 Tablet 2   • losartan (COZAAR) 25 MG Tab TAKE 1 TABLET BY MOUTH EVERY  tablet 0   • Pumpkin Seed-Soy Germ (AZO BLADDER CONTROL/GO-LESS) Cap Take 1 capsule by mouth every day.     • WIXELA INHUB 250-50 MCG/DOSE AEROSOL POWDER, BREATH ACTIVATED TAKE 1 PUFF BY MOUTH EVERY 12 HOURS 3 Each 0   • albuterol 108 (90 Base) MCG/ACT Aero Soln inhalation aerosol Inhale 2 Puffs by mouth every 6 hours as needed for Shortness of Breath.     • cyclobenzaprine (FLEXERIL) 5 MG tablet Take 1 Tab by mouth 3 times a day as needed. 30 Tab 0   • Multiple Vitamins-Minerals (PRESERVISION AREDS 2 PO) Take 1 Cap by mouth 2 Times a Day.     • therapeutic multivitamin-minerals (THERAGRAN-M) Tab Take 1 Tab by mouth every day.       No current facility-administered medications on file prior to visit.       Allergies, past medical history, past surgical history, family history, social history reviewed and updated    ROS:  All other systems reviewed and are negative except as stated in the HPI       Physical Exam:     /98 (BP Location: Right arm, Patient Position: Sitting, BP Cuff Size: Adult)   Pulse 88   Temp 36.3 °C (97.4 °F)   Ht 1.829 m (6')   Wt 79.8 kg (176 lb)   SpO2 91%   BMI 23.87 kg/m²   General: Normal appearing. No distress.  ENT: oropharynx without exudates.    Eyes: conjunctiva clear lids without ptosis  Pulmonary: Clear to ausculation.  Normal effort.   Cardiovascular: Regular rate and rhythm  Abdomen: Soft, nontender,  Lymph: No cervical or supraclavicular palpable lymph nodes  Psych: Normal mood and affect.     I have reviewed pertinent  labs and diagnostic tests associated with this visit with specific comments listed under the assessment and plan below      Assessment and Plan:     76 y.o. female with the following issues.    1. Prediabetes  2. Essential hypertension  3. Vitamin D deficiency  Continue blood pressure medication    -discussion in details on target blood pressure goal, advised monitoring BP closely at home.  Have BP log to present at follow-up visit or send through my chart.   -Advised low-salt diet, healthy dietary option include plenty of vegetable, reduce carb and sugar, regular exercise as tolerated, healthy fat/protein, also avoid alcohol, no NSAIDs  If symptoms worsen or persist patient can return to clinic for reevaluation.  Red flags and strict emergency room precautions discussed.  Discussed side effects of medication. Patient understand    Educated regarding healthy lifestyle healthy diet as well as avoidance of diabetes type 2 progression    Follow Up:      Return in about 3 months (around 1/26/2022).  Chronic condition, prediabetes  Please note that this dictation was created using voice recognition software. I have made every reasonable attempt to correct obvious errors, but I expect that there are errors of grammar and possibly content that I did not discover before finalizing the note.    Signed by: Winsome Schumacher M.D.

## 2021-11-15 PROBLEM — N39.46 URINARY INCONTINENCE, MIXED: Status: ACTIVE | Noted: 2021-11-15

## 2021-11-15 PROBLEM — N31.9 NEUROGENIC BLADDER: Status: ACTIVE | Noted: 2021-11-15

## 2021-11-15 NOTE — PROCEDURES
Procedure note: Complex urodynamic testing    Procedure performed:      - 07878 Complex CMG w/ voiding pressure study AND urethral pressure  - 64645 Complex uroflow  - 43081 EMG studies anal or urethral sphincter   - 84167 Intraabdominal catheter   - 80769 Insertion of non-indwelling catheter    Pre-operative diagnosis:   - Mixed urinary incontinence (N39.46)  - Neurogenic bladder (N31.9)      Indication: Ms.Patricia Kalpana Dickerson is a 76 y.o. year old mixed urinary incontinence/neurogenic bladder with nocturia predominant symptoms Her symptoms include:                   Voids per day: 5          Voids per night: 5                 Urinary incontinence episodes per day: Many              Urge leakage:  On Movement to Bathroom and Full Bladder              Stress leakage: With Cough and With Laugh              Continuous / insensible urine loss: No               Nocturnal enuresis: No               Leakage volume: Moderate              Number of pads/day: 3 large               Bladder emptying: Complete              Voiding symptoms: Crede to Void and Weak Stream              UTI in last 12 months: No              Other urologic history: kidney stones 3 years ago, no procedure.    She was referred for PFPT and started on vaginal estrogen at her last visit. She has been adherent to estrogen and notes mild improvement with PFPT (4 session). They noted hypertonicity on the side of her stroke. Oral medical therapy has been deferred to urodynamic results to evaluate emptying.     She presents for complex urodynamic testing today to fully elucidate her bladder function and symptom pathophysiology.     Verbal consent was obtained after review of risk and benefit.     Chaperone: Kala Gimenez MA    Bladder diary:   - Did not bring to visit    Procedure: The patient was taken to the urodynamic suite and placed in the urodynamic chair. She underwent sterile prep with betadine prior to catheterization. There was a negative  urinalysis. Air-charged catheters were placed in the urethra/bladder and vagina. Urodynamics were performed using routine techniques. There was no prolapse. There were no complications.     Urodynamic findings:     Preliminary Uroflometry     o Flow pattern: uninterpretable (low volume)  o Voided volume: 6.3 mL  o Post-void residual: 15 mL    Filling cystometrogram    o First sensation: 30 mL  o First desire: 111 mL  o Strong Desire: 145 mL - DO with full bladder leak  o Urodynamic capacity: 284 mL  o Stress leakage: Not sufficiently test due to DOs  o Uninhibited detrusor contractions present: yes  o Leakage with DO: yes - full bladder leak DO at 145mL (Pves 49) on first fill. Multiple smaller DOs without leak on slower fill.   o Compliance: Normal    Urethral pressure profile    o Maximum urethral closing pressure (MUCP) ave: 40 cm H2O  o Morphology: normal    Pressure voiding study    o The patient's voiding mechanism was accomplished by detrusor contraction  o Pdet at peak flow: 6.6 cm H2O  o Pelvic floor EMG silenced during voiding: yes, although becomes uninterpretable due to wet leads  o Max flow: 20  o Average flow: 6.9  o Voiding time 25 sec  o PVR: 58  o Some rise in Joslyn with void, although cath was noted to be expelled after voiding. Very low suspicion for DSD.     Pelvic floor EMG: uninterpretable due to wet leads    Assessment:     She has completed urodynamic testing, which was uncomplicated.     - Filling: Detrusor overactivity with leakage, average/small bladder capacity, normal compliance. MUCP borderline low  - Voiding: Void by detrusor contraction, complete emptying.     Plan:   - Counseled on findings.   - I recommend starting treatment focused on OAB/nocturia/urgency as this is most bothersome and most predominant on UDS testing. She likely has a stress component as well, although I recommend addressing this after urgency is treated.   - Continue with PFPT  - Rx sent for mirabegron 25mg once  daily with f/u in 1month. Her BP is well-controlled. Counseled on RBA. She is not a candidate for anticholinergic therapy due to her age and prior stroke, and its cognitive risks. Vibegron and trospium (CNS sparing antichol) are both nonformulary  - If medication is insufficient she is a candidate for all third line therapies, however I think she will gain the most benefit from intradetrusor chemodenervation (botox). R/b/a reviewed and information provided.     -f/u in 1 months for KASSANDRA Purvis MD, FACOG    Female Pelvic Medicine and Reconstructive Surgery  Department of Obstetrics and Gynecology  [facility] [employer]  [facility]

## 2021-11-16 ENCOUNTER — OFFICE VISIT (OUTPATIENT)
Dept: OBGYN | Facility: CLINIC | Age: 76
End: 2021-11-16
Payer: MEDICARE

## 2021-11-16 VITALS
HEART RATE: 85 BPM | WEIGHT: 176 LBS | SYSTOLIC BLOOD PRESSURE: 146 MMHG | BODY MASS INDEX: 23.87 KG/M2 | DIASTOLIC BLOOD PRESSURE: 80 MMHG

## 2021-11-16 DIAGNOSIS — N31.9 NEUROGENIC BLADDER: Primary | ICD-10-CM

## 2021-11-16 DIAGNOSIS — R39.9 URINARY SYMPTOM OR SIGN: ICD-10-CM

## 2021-11-16 DIAGNOSIS — N39.46 URINARY INCONTINENCE, MIXED: ICD-10-CM

## 2021-11-16 LAB
APPEARANCE UR: CLEAR
BILIRUB UR STRIP-MCNC: NORMAL MG/DL
COLOR UR AUTO: YELLOW
GLUCOSE UR STRIP.AUTO-MCNC: NEGATIVE MG/DL
KETONES UR STRIP.AUTO-MCNC: NEGATIVE MG/DL
LEUKOCYTE ESTERASE UR QL STRIP.AUTO: NEGATIVE
NITRITE UR QL STRIP.AUTO: NEGATIVE
PH UR STRIP.AUTO: 6 [PH] (ref 5–8)
PROT UR QL STRIP: NEGATIVE MG/DL
RBC UR QL AUTO: NEGATIVE
SP GR UR STRIP.AUTO: 1.02
UROBILINOGEN UR STRIP-MCNC: NORMAL MG/DL

## 2021-11-16 PROCEDURE — 51729 CYSTOMETROGRAM W/VP&UP: CPT | Performed by: STUDENT IN AN ORGANIZED HEALTH CARE EDUCATION/TRAINING PROGRAM

## 2021-11-16 PROCEDURE — 51784 ANAL/URINARY MUSCLE STUDY: CPT | Performed by: STUDENT IN AN ORGANIZED HEALTH CARE EDUCATION/TRAINING PROGRAM

## 2021-11-16 PROCEDURE — 81002 URINALYSIS NONAUTO W/O SCOPE: CPT | Performed by: STUDENT IN AN ORGANIZED HEALTH CARE EDUCATION/TRAINING PROGRAM

## 2021-11-16 PROCEDURE — 51741 ELECTRO-UROFLOWMETRY FIRST: CPT | Performed by: STUDENT IN AN ORGANIZED HEALTH CARE EDUCATION/TRAINING PROGRAM

## 2021-11-16 PROCEDURE — 51797 INTRAABDOMINAL PRESSURE TEST: CPT | Performed by: STUDENT IN AN ORGANIZED HEALTH CARE EDUCATION/TRAINING PROGRAM

## 2021-11-16 ASSESSMENT — FIBROSIS 4 INDEX: FIB4 SCORE: 2.75

## 2021-11-16 NOTE — NON-PROVIDER
PT here today for UDS  PT States no questions or concerns   Good #: [phone]    Pharmacy Verified

## 2021-12-20 RX ORDER — APIXABAN 5 MG/1
TABLET, FILM COATED ORAL
Qty: 60 TABLET | Refills: 2 | Status: SHIPPED | OUTPATIENT
Start: 2021-12-20 | End: 2022-01-08

## 2022-01-08 ENCOUNTER — APPOINTMENT (OUTPATIENT)
Dept: RADIOLOGY | Facility: MEDICAL CENTER | Age: 77
End: 2022-01-08
Attending: EMERGENCY MEDICINE
Payer: MEDICARE

## 2022-01-08 ENCOUNTER — HOSPITAL ENCOUNTER (EMERGENCY)
Facility: MEDICAL CENTER | Age: 77
End: 2022-01-08
Attending: EMERGENCY MEDICINE
Payer: MEDICARE

## 2022-01-08 VITALS
OXYGEN SATURATION: 92 % | RESPIRATION RATE: 16 BRPM | TEMPERATURE: 97.8 F | DIASTOLIC BLOOD PRESSURE: 78 MMHG | WEIGHT: 171.96 LBS | HEART RATE: 86 BPM | HEIGHT: 72 IN | SYSTOLIC BLOOD PRESSURE: 121 MMHG | BODY MASS INDEX: 23.29 KG/M2

## 2022-01-08 DIAGNOSIS — R42 DIZZINESS: ICD-10-CM

## 2022-01-08 DIAGNOSIS — R25.2 MUSCLE CRAMPS: ICD-10-CM

## 2022-01-08 DIAGNOSIS — E87.6 HYPOKALEMIA: ICD-10-CM

## 2022-01-08 LAB
ALBUMIN SERPL BCP-MCNC: 4.2 G/DL (ref 3.2–4.9)
ALBUMIN/GLOB SERPL: 1.8 G/DL
ALP SERPL-CCNC: 85 U/L (ref 30–99)
ALT SERPL-CCNC: 30 U/L (ref 2–50)
ANION GAP SERPL CALC-SCNC: 12 MMOL/L (ref 7–16)
AST SERPL-CCNC: 36 U/L (ref 12–45)
BASOPHILS # BLD AUTO: 0.4 % (ref 0–1.8)
BASOPHILS # BLD: 0.02 K/UL (ref 0–0.12)
BILIRUB SERPL-MCNC: 0.6 MG/DL (ref 0.1–1.5)
BUN SERPL-MCNC: 22 MG/DL (ref 8–22)
CALCIUM SERPL-MCNC: 8.4 MG/DL (ref 8.4–10.2)
CHLORIDE SERPL-SCNC: 101 MMOL/L (ref 96–112)
CO2 SERPL-SCNC: 21 MMOL/L (ref 20–33)
CREAT SERPL-MCNC: 1.07 MG/DL (ref 0.5–1.4)
EKG IMPRESSION: NORMAL
EOSINOPHIL # BLD AUTO: 0.01 K/UL (ref 0–0.51)
EOSINOPHIL NFR BLD: 0.2 % (ref 0–6.9)
ERYTHROCYTE [DISTWIDTH] IN BLOOD BY AUTOMATED COUNT: 44.4 FL (ref 35.9–50)
GLOBULIN SER CALC-MCNC: 2.4 G/DL (ref 1.9–3.5)
GLUCOSE SERPL-MCNC: 110 MG/DL (ref 65–99)
HCT VFR BLD AUTO: 44 % (ref 37–47)
HGB BLD-MCNC: 14.7 G/DL (ref 12–16)
IMM GRANULOCYTES # BLD AUTO: 0.01 K/UL (ref 0–0.11)
IMM GRANULOCYTES NFR BLD AUTO: 0.2 % (ref 0–0.9)
LYMPHOCYTES # BLD AUTO: 0.97 K/UL (ref 1–4.8)
LYMPHOCYTES NFR BLD: 19.7 % (ref 22–41)
MCH RBC QN AUTO: 29.1 PG (ref 27–33)
MCHC RBC AUTO-ENTMCNC: 33.4 G/DL (ref 33.6–35)
MCV RBC AUTO: 87.1 FL (ref 81.4–97.8)
MONOCYTES # BLD AUTO: 0.64 K/UL (ref 0–0.85)
MONOCYTES NFR BLD AUTO: 13 % (ref 0–13.4)
NEUTROPHILS # BLD AUTO: 3.27 K/UL (ref 2–7.15)
NEUTROPHILS NFR BLD: 66.5 % (ref 44–72)
NRBC # BLD AUTO: 0 K/UL
NRBC BLD-RTO: 0 /100 WBC
PLATELET # BLD AUTO: 145 K/UL (ref 164–446)
PMV BLD AUTO: 10.1 FL (ref 9–12.9)
POTASSIUM SERPL-SCNC: 3.5 MMOL/L (ref 3.6–5.5)
PROT SERPL-MCNC: 6.6 G/DL (ref 6–8.2)
RBC # BLD AUTO: 5.05 M/UL (ref 4.2–5.4)
SODIUM SERPL-SCNC: 134 MMOL/L (ref 135–145)
TROPONIN T SERPL-MCNC: 25 NG/L (ref 6–19)
TROPONIN T SERPL-MCNC: 29 NG/L (ref 6–19)
WBC # BLD AUTO: 4.9 K/UL (ref 4.8–10.8)

## 2022-01-08 PROCEDURE — 80053 COMPREHEN METABOLIC PANEL: CPT

## 2022-01-08 PROCEDURE — 84484 ASSAY OF TROPONIN QUANT: CPT

## 2022-01-08 PROCEDURE — U0005 INFEC AGEN DETEC AMPLI PROBE: HCPCS

## 2022-01-08 PROCEDURE — 93005 ELECTROCARDIOGRAM TRACING: CPT

## 2022-01-08 PROCEDURE — A9270 NON-COVERED ITEM OR SERVICE: HCPCS | Performed by: EMERGENCY MEDICINE

## 2022-01-08 PROCEDURE — U0003 INFECTIOUS AGENT DETECTION BY NUCLEIC ACID (DNA OR RNA); SEVERE ACUTE RESPIRATORY SYNDROME CORONAVIRUS 2 (SARS-COV-2) (CORONAVIRUS DISEASE [COVID-19]), AMPLIFIED PROBE TECHNIQUE, MAKING USE OF HIGH THROUGHPUT TECHNOLOGIES AS DESCRIBED BY CMS-2020-01-R: HCPCS

## 2022-01-08 PROCEDURE — 700102 HCHG RX REV CODE 250 W/ 637 OVERRIDE(OP): Performed by: EMERGENCY MEDICINE

## 2022-01-08 PROCEDURE — 85025 COMPLETE CBC W/AUTO DIFF WBC: CPT

## 2022-01-08 PROCEDURE — 93005 ELECTROCARDIOGRAM TRACING: CPT | Performed by: EMERGENCY MEDICINE

## 2022-01-08 PROCEDURE — 99284 EMERGENCY DEPT VISIT MOD MDM: CPT

## 2022-01-08 PROCEDURE — 71045 X-RAY EXAM CHEST 1 VIEW: CPT

## 2022-01-08 RX ORDER — MAGNESIUM SALICYLATE 580 MG/1
TABLET ORAL
Status: SHIPPED | COMMUNITY
End: 2022-01-08

## 2022-01-08 RX ORDER — MIRABEGRON 25 MG/1
25 TABLET, FILM COATED, EXTENDED RELEASE ORAL EVERY MORNING
Status: SHIPPED | COMMUNITY
End: 2022-03-08 | Stop reason: SDUPTHER

## 2022-01-08 RX ORDER — MAGNESIUM SALICYLATE 325 MG
650 TABLET ORAL EVERY 6 HOURS PRN
Status: SHIPPED | COMMUNITY
End: 2023-04-25

## 2022-01-08 RX ORDER — AMLODIPINE BESYLATE 5 MG/1
5 TABLET ORAL EVERY MORNING
Status: SHIPPED | COMMUNITY
End: 2022-08-22

## 2022-01-08 RX ORDER — POTASSIUM CHLORIDE 20 MEQ/1
20 TABLET, EXTENDED RELEASE ORAL ONCE
Status: COMPLETED | OUTPATIENT
Start: 2022-01-08 | End: 2022-01-08

## 2022-01-08 RX ORDER — IBUPROFEN 200 MG
400 TABLET ORAL EVERY 6 HOURS PRN
Status: SHIPPED | COMMUNITY
End: 2022-11-17

## 2022-01-08 RX ORDER — ESTRADIOL 0.1 MG/G
1 CREAM VAGINAL
Status: SHIPPED | COMMUNITY
End: 2023-07-17

## 2022-01-08 RX ORDER — ACETAMINOPHEN 500 MG
1000 TABLET ORAL EVERY 6 HOURS PRN
COMMUNITY
End: 2022-02-10

## 2022-01-08 RX ADMIN — POTASSIUM CHLORIDE 20 MEQ: 1500 TABLET, EXTENDED RELEASE ORAL at 14:22

## 2022-01-08 ASSESSMENT — FIBROSIS 4 INDEX: FIB4 SCORE: 2.75

## 2022-01-08 ASSESSMENT — PAIN DESCRIPTION - DESCRIPTORS: DESCRIPTORS: CRAMPING

## 2022-01-08 ASSESSMENT — LIFESTYLE VARIABLES: DO YOU DRINK ALCOHOL: NO

## 2022-01-08 NOTE — ED TRIAGE NOTES
Presents complaining of dizziness, and leg cramping recurring throughout today.  Chief Complaint   Patient presents with   • Dizziness   • Cramps   • Leg Pain     /78   Pulse 100   Temp 36.4 °C (97.5 °F) (Temporal)   Resp 20   Ht 1.829 m (6')   Wt 78 kg (171 lb 15.3 oz)   SpO2 92%   BMI 23.32 kg/m²   Has this patient been vaccinated for COVID YES  If not, would they like to be vaccinated while in the ER if eligible?  N/A  Would the patient like to speak with the ERP about the possibility of receiving the COVID vaccine today before making a decision? N/A

## 2022-01-08 NOTE — ED NOTES
Med rec completed per patient at bedside.  Allergies reviewed with patient.  No oral antibiotics in last 30 days.  Patient unsure of strengths of her vitamins/supplements.  Patient is on ELIQUIS (5 mg).  Patient's pharmacy: [facility] on [address].
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
## 2022-01-09 LAB
SARS-COV-2 RNA RESP QL NAA+PROBE: DETECTED
SPECIMEN SOURCE: ABNORMAL

## 2022-01-09 NOTE — DISCHARGE INSTRUCTIONS
Follow-up with your primary doctor for recheck as soon as possible.  Return if worse or for any concerns

## 2022-01-09 NOTE — ED NOTES
Pt discharged to daughter, reviewed all discharge instructions including follow up, pt verbalizes understanding, and denies questions.   Escorted to lobby, pt ambulates with cane. No belongings left in room.

## 2022-01-15 ENCOUNTER — APPOINTMENT (OUTPATIENT)
Dept: RADIOLOGY | Facility: MEDICAL CENTER | Age: 77
DRG: 871 | End: 2022-01-15
Attending: EMERGENCY MEDICINE
Payer: MEDICARE

## 2022-01-15 ENCOUNTER — HOSPITAL ENCOUNTER (INPATIENT)
Facility: MEDICAL CENTER | Age: 77
LOS: 5 days | DRG: 871 | End: 2022-01-20
Attending: EMERGENCY MEDICINE | Admitting: STUDENT IN AN ORGANIZED HEALTH CARE EDUCATION/TRAINING PROGRAM
Payer: MEDICARE

## 2022-01-15 DIAGNOSIS — U07.1 PNEUMONIA DUE TO COVID-19 VIRUS: ICD-10-CM

## 2022-01-15 DIAGNOSIS — R06.03 RESPIRATORY DISTRESS: ICD-10-CM

## 2022-01-15 DIAGNOSIS — J12.82 PNEUMONIA DUE TO COVID-19 VIRUS: ICD-10-CM

## 2022-01-15 DIAGNOSIS — R53.1 WEAKNESS: ICD-10-CM

## 2022-01-15 DIAGNOSIS — R53.1 GENERALIZED WEAKNESS: ICD-10-CM

## 2022-01-15 DIAGNOSIS — Z78.9 DNI (DO NOT INTUBATE): ICD-10-CM

## 2022-01-15 DIAGNOSIS — R09.02 HYPOXIA: ICD-10-CM

## 2022-01-15 DIAGNOSIS — U07.1 COVID-19 VIRUS INFECTION: ICD-10-CM

## 2022-01-15 PROBLEM — Z86.73 HISTORY OF STROKE: Chronic | Status: ACTIVE | Noted: 2022-01-15

## 2022-01-15 PROBLEM — R94.31 QT PROLONGATION: Status: ACTIVE | Noted: 2022-01-15

## 2022-01-15 PROBLEM — A41.89 SEPSIS DUE TO COVID-19 (HCC): Status: ACTIVE | Noted: 2022-01-15

## 2022-01-15 PROBLEM — J96.21 ACUTE ON CHRONIC RESPIRATORY FAILURE WITH HYPOXEMIA (HCC): Status: ACTIVE | Noted: 2022-01-15

## 2022-01-15 PROBLEM — J44.1 ACUTE EXACERBATION OF CHRONIC OBSTRUCTIVE PULMONARY DISEASE (COPD) (HCC): Status: ACTIVE | Noted: 2018-08-07

## 2022-01-15 PROBLEM — R79.89 ELEVATED BRAIN NATRIURETIC PEPTIDE (BNP) LEVEL: Status: ACTIVE | Noted: 2022-01-15

## 2022-01-15 PROBLEM — G92.8 TOXIC METABOLIC ENCEPHALOPATHY: Status: ACTIVE | Noted: 2022-01-15

## 2022-01-15 PROBLEM — E88.09 HYPOALBUMINEMIA: Status: ACTIVE | Noted: 2022-01-15

## 2022-01-15 LAB
ALBUMIN SERPL BCP-MCNC: 3.6 G/DL (ref 3.2–4.9)
ALBUMIN/GLOB SERPL: 1.1 G/DL
ALP SERPL-CCNC: 73 U/L (ref 30–99)
ALT SERPL-CCNC: 28 U/L (ref 2–50)
ANION GAP SERPL CALC-SCNC: 15 MMOL/L (ref 7–16)
APPEARANCE UR: CLEAR
AST SERPL-CCNC: 45 U/L (ref 12–45)
BACTERIA #/AREA URNS HPF: ABNORMAL /HPF
BASOPHILS # BLD AUTO: 0.3 % (ref 0–1.8)
BASOPHILS # BLD: 0.02 K/UL (ref 0–0.12)
BILIRUB SERPL-MCNC: 0.7 MG/DL (ref 0.1–1.5)
BILIRUB UR QL STRIP.AUTO: NEGATIVE
BUN SERPL-MCNC: 18 MG/DL (ref 8–22)
CALCIUM SERPL-MCNC: 8.5 MG/DL (ref 8.4–10.2)
CHLORIDE SERPL-SCNC: 98 MMOL/L (ref 96–112)
CO2 SERPL-SCNC: 24 MMOL/L (ref 20–33)
COLOR UR: YELLOW
CREAT SERPL-MCNC: 0.78 MG/DL (ref 0.5–1.4)
CRP SERPL HS-MCNC: 13.71 MG/DL (ref 0–0.75)
D DIMER PPP IA.FEU-MCNC: 0.59 UG/ML (FEU) (ref 0–0.5)
D DIMER PPP IA.FEU-MCNC: 0.59 UG/ML (FEU) (ref 0–0.5)
EKG IMPRESSION: NORMAL
EOSINOPHIL # BLD AUTO: 0 K/UL (ref 0–0.51)
EOSINOPHIL NFR BLD: 0 % (ref 0–6.9)
EPI CELLS #/AREA URNS HPF: ABNORMAL /HPF
ERYTHROCYTE [DISTWIDTH] IN BLOOD BY AUTOMATED COUNT: 43.6 FL (ref 35.9–50)
ERYTHROCYTE [SEDIMENTATION RATE] IN BLOOD BY WESTERGREN METHOD: 33 MM/HOUR (ref 0–25)
GLOBULIN SER CALC-MCNC: 3.2 G/DL (ref 1.9–3.5)
GLUCOSE SERPL-MCNC: 131 MG/DL (ref 65–99)
GLUCOSE UR STRIP.AUTO-MCNC: NEGATIVE MG/DL
HCT VFR BLD AUTO: 40.8 % (ref 37–47)
HGB BLD-MCNC: 13.6 G/DL (ref 12–16)
IMM GRANULOCYTES # BLD AUTO: 0.07 K/UL (ref 0–0.11)
IMM GRANULOCYTES NFR BLD AUTO: 0.9 % (ref 0–0.9)
INR PPP: 1.31 (ref 0.87–1.13)
KETONES UR STRIP.AUTO-MCNC: 15 MG/DL
LACTATE BLD-SCNC: 1.1 MMOL/L (ref 0.5–2)
LACTATE BLD-SCNC: 1.2 MMOL/L (ref 0.5–2)
LACTATE BLD-SCNC: 1.4 MMOL/L (ref 0.5–2)
LEUKOCYTE ESTERASE UR QL STRIP.AUTO: ABNORMAL
LYMPHOCYTES # BLD AUTO: 1.28 K/UL (ref 1–4.8)
LYMPHOCYTES NFR BLD: 16.8 % (ref 22–41)
MAGNESIUM SERPL-MCNC: 1.7 MG/DL (ref 1.5–2.5)
MCH RBC QN AUTO: 28.9 PG (ref 27–33)
MCHC RBC AUTO-ENTMCNC: 33.3 G/DL (ref 33.6–35)
MCV RBC AUTO: 86.8 FL (ref 81.4–97.8)
MICRO URNS: ABNORMAL
MONOCYTES # BLD AUTO: 0.6 K/UL (ref 0–0.85)
MONOCYTES NFR BLD AUTO: 7.9 % (ref 0–13.4)
NEUTROPHILS # BLD AUTO: 5.63 K/UL (ref 2–7.15)
NEUTROPHILS NFR BLD: 74.1 % (ref 44–72)
NITRITE UR QL STRIP.AUTO: NEGATIVE
NRBC # BLD AUTO: 0 K/UL
NRBC BLD-RTO: 0 /100 WBC
NT-PROBNP SERPL IA-MCNC: 1235 PG/ML (ref 0–125)
PH UR STRIP.AUTO: 5.5 [PH] (ref 5–8)
PHOSPHATE SERPL-MCNC: 2.7 MG/DL (ref 2.5–4.5)
PLATELET # BLD AUTO: 153 K/UL (ref 164–446)
PMV BLD AUTO: 10.4 FL (ref 9–12.9)
POTASSIUM SERPL-SCNC: 3.4 MMOL/L (ref 3.6–5.5)
PROCALCITONIN SERPL-MCNC: 0.12 NG/ML
PROT SERPL-MCNC: 6.8 G/DL (ref 6–8.2)
PROT UR QL STRIP: NEGATIVE MG/DL
PROTHROMBIN TIME: 15.3 SEC (ref 12–14.6)
RBC # BLD AUTO: 4.7 M/UL (ref 4.2–5.4)
RBC # URNS HPF: ABNORMAL /HPF
RBC UR QL AUTO: ABNORMAL
SODIUM SERPL-SCNC: 137 MMOL/L (ref 135–145)
SP GR UR STRIP.AUTO: 1.01
T4 FREE SERPL-MCNC: 1.21 NG/DL (ref 0.93–1.7)
TROPONIN T SERPL-MCNC: 16 NG/L (ref 6–19)
TROPONIN T SERPL-MCNC: 19 NG/L (ref 6–19)
TSH SERPL DL<=0.005 MIU/L-ACNC: 0.31 UIU/ML (ref 0.38–5.33)
WBC # BLD AUTO: 7.6 K/UL (ref 4.8–10.8)
WBC #/AREA URNS HPF: ABNORMAL /HPF

## 2022-01-15 PROCEDURE — A9270 NON-COVERED ITEM OR SERVICE: HCPCS | Performed by: STUDENT IN AN ORGANIZED HEALTH CARE EDUCATION/TRAINING PROGRAM

## 2022-01-15 PROCEDURE — 700101 HCHG RX REV CODE 250: Performed by: EMERGENCY MEDICINE

## 2022-01-15 PROCEDURE — 99223 1ST HOSP IP/OBS HIGH 75: CPT | Mod: AI | Performed by: STUDENT IN AN ORGANIZED HEALTH CARE EDUCATION/TRAINING PROGRAM

## 2022-01-15 PROCEDURE — 83880 ASSAY OF NATRIURETIC PEPTIDE: CPT

## 2022-01-15 PROCEDURE — 770020 HCHG ROOM/CARE - TELE (206)

## 2022-01-15 PROCEDURE — 81001 URINALYSIS AUTO W/SCOPE: CPT

## 2022-01-15 PROCEDURE — 84145 PROCALCITONIN (PCT): CPT

## 2022-01-15 PROCEDURE — 700111 HCHG RX REV CODE 636 W/ 250 OVERRIDE (IP): Performed by: EMERGENCY MEDICINE

## 2022-01-15 PROCEDURE — 71045 X-RAY EXAM CHEST 1 VIEW: CPT

## 2022-01-15 PROCEDURE — 94640 AIRWAY INHALATION TREATMENT: CPT

## 2022-01-15 PROCEDURE — A9270 NON-COVERED ITEM OR SERVICE: HCPCS | Performed by: EMERGENCY MEDICINE

## 2022-01-15 PROCEDURE — 84484 ASSAY OF TROPONIN QUANT: CPT

## 2022-01-15 PROCEDURE — 85610 PROTHROMBIN TIME: CPT

## 2022-01-15 PROCEDURE — 85379 FIBRIN DEGRADATION QUANT: CPT | Mod: 91

## 2022-01-15 PROCEDURE — 82746 ASSAY OF FOLIC ACID SERUM: CPT

## 2022-01-15 PROCEDURE — 86140 C-REACTIVE PROTEIN: CPT

## 2022-01-15 PROCEDURE — 84100 ASSAY OF PHOSPHORUS: CPT

## 2022-01-15 PROCEDURE — 80053 COMPREHEN METABOLIC PANEL: CPT

## 2022-01-15 PROCEDURE — 83735 ASSAY OF MAGNESIUM: CPT

## 2022-01-15 PROCEDURE — 700105 HCHG RX REV CODE 258: Performed by: EMERGENCY MEDICINE

## 2022-01-15 PROCEDURE — 700102 HCHG RX REV CODE 250 W/ 637 OVERRIDE(OP): Performed by: STUDENT IN AN ORGANIZED HEALTH CARE EDUCATION/TRAINING PROGRAM

## 2022-01-15 PROCEDURE — 700102 HCHG RX REV CODE 250 W/ 637 OVERRIDE(OP): Performed by: EMERGENCY MEDICINE

## 2022-01-15 PROCEDURE — 82607 VITAMIN B-12: CPT

## 2022-01-15 PROCEDURE — 84443 ASSAY THYROID STIM HORMONE: CPT

## 2022-01-15 PROCEDURE — 85025 COMPLETE CBC W/AUTO DIFF WBC: CPT

## 2022-01-15 PROCEDURE — 85652 RBC SED RATE AUTOMATED: CPT

## 2022-01-15 PROCEDURE — 87040 BLOOD CULTURE FOR BACTERIA: CPT

## 2022-01-15 PROCEDURE — 700111 HCHG RX REV CODE 636 W/ 250 OVERRIDE (IP): Performed by: STUDENT IN AN ORGANIZED HEALTH CARE EDUCATION/TRAINING PROGRAM

## 2022-01-15 PROCEDURE — 83605 ASSAY OF LACTIC ACID: CPT | Mod: 91

## 2022-01-15 PROCEDURE — 94760 N-INVAS EAR/PLS OXIMETRY 1: CPT

## 2022-01-15 PROCEDURE — 84439 ASSAY OF FREE THYROXINE: CPT

## 2022-01-15 PROCEDURE — 93005 ELECTROCARDIOGRAM TRACING: CPT | Performed by: STUDENT IN AN ORGANIZED HEALTH CARE EDUCATION/TRAINING PROGRAM

## 2022-01-15 RX ORDER — POTASSIUM CHLORIDE 20 MEQ/1
40 TABLET, EXTENDED RELEASE ORAL EVERY 6 HOURS
Status: COMPLETED | OUTPATIENT
Start: 2022-01-15 | End: 2022-01-16

## 2022-01-15 RX ORDER — AMLODIPINE BESYLATE 5 MG/1
5 TABLET ORAL EVERY MORNING
Status: DISCONTINUED | OUTPATIENT
Start: 2022-01-16 | End: 2022-01-20 | Stop reason: HOSPADM

## 2022-01-15 RX ORDER — DEXAMETHASONE SODIUM PHOSPHATE 4 MG/ML
6 INJECTION, SOLUTION INTRA-ARTICULAR; INTRALESIONAL; INTRAMUSCULAR; INTRAVENOUS; SOFT TISSUE ONCE
Status: COMPLETED | OUTPATIENT
Start: 2022-01-15 | End: 2022-01-15

## 2022-01-15 RX ORDER — PREDNISONE 20 MG/1
40 TABLET ORAL DAILY
Status: COMPLETED | OUTPATIENT
Start: 2022-01-15 | End: 2022-01-19

## 2022-01-15 RX ORDER — M-VIT,TX,IRON,MINS/CALC/FOLIC 27MG-0.4MG
1 TABLET ORAL EVERY MORNING
Status: DISCONTINUED | OUTPATIENT
Start: 2022-01-15 | End: 2022-01-20 | Stop reason: HOSPADM

## 2022-01-15 RX ORDER — SODIUM CHLORIDE, SODIUM LACTATE, POTASSIUM CHLORIDE, AND CALCIUM CHLORIDE .6; .31; .03; .02 G/100ML; G/100ML; G/100ML; G/100ML
500 INJECTION, SOLUTION INTRAVENOUS
Status: DISCONTINUED | OUTPATIENT
Start: 2022-01-15 | End: 2022-01-20 | Stop reason: HOSPADM

## 2022-01-15 RX ORDER — FUROSEMIDE 20 MG/1
20 TABLET ORAL
Status: DISCONTINUED | OUTPATIENT
Start: 2022-01-15 | End: 2022-01-20 | Stop reason: HOSPADM

## 2022-01-15 RX ORDER — ACETAMINOPHEN 500 MG
500 TABLET ORAL ONCE
Status: COMPLETED | OUTPATIENT
Start: 2022-01-15 | End: 2022-01-15

## 2022-01-15 RX ORDER — AZITHROMYCIN 250 MG/1
500 TABLET, FILM COATED ORAL DAILY
Status: DISCONTINUED | OUTPATIENT
Start: 2022-01-15 | End: 2022-01-15

## 2022-01-15 RX ORDER — AZITHROMYCIN 250 MG/1
250 TABLET, FILM COATED ORAL DAILY
Status: COMPLETED | OUTPATIENT
Start: 2022-01-16 | End: 2022-01-19

## 2022-01-15 RX ORDER — SODIUM CHLORIDE, SODIUM LACTATE, POTASSIUM CHLORIDE, CALCIUM CHLORIDE 600; 310; 30; 20 MG/100ML; MG/100ML; MG/100ML; MG/100ML
1000 INJECTION, SOLUTION INTRAVENOUS ONCE
Status: COMPLETED | OUTPATIENT
Start: 2022-01-15 | End: 2022-01-15

## 2022-01-15 RX ORDER — ONDANSETRON 4 MG/1
4 TABLET, ORALLY DISINTEGRATING ORAL EVERY 6 HOURS PRN
Status: DISCONTINUED | OUTPATIENT
Start: 2022-01-15 | End: 2022-01-15

## 2022-01-15 RX ORDER — BUDESONIDE AND FORMOTEROL FUMARATE DIHYDRATE 160; 4.5 UG/1; UG/1
2 AEROSOL RESPIRATORY (INHALATION)
Status: DISCONTINUED | OUTPATIENT
Start: 2022-01-15 | End: 2022-01-20 | Stop reason: HOSPADM

## 2022-01-15 RX ORDER — SODIUM CHLORIDE, SODIUM LACTATE, POTASSIUM CHLORIDE, AND CALCIUM CHLORIDE .6; .31; .03; .02 G/100ML; G/100ML; G/100ML; G/100ML
30 INJECTION, SOLUTION INTRAVENOUS
Status: DISCONTINUED | OUTPATIENT
Start: 2022-01-15 | End: 2022-01-20 | Stop reason: HOSPADM

## 2022-01-15 RX ORDER — ACETAMINOPHEN 325 MG/1
650 TABLET ORAL EVERY 6 HOURS PRN
Status: DISCONTINUED | OUTPATIENT
Start: 2022-01-15 | End: 2022-01-20 | Stop reason: HOSPADM

## 2022-01-15 RX ORDER — ONDANSETRON 2 MG/ML
4 INJECTION INTRAMUSCULAR; INTRAVENOUS EVERY 6 HOURS PRN
Status: DISCONTINUED | OUTPATIENT
Start: 2022-01-15 | End: 2022-01-15

## 2022-01-15 RX ORDER — ANTIOX #8/OM3/DHA/EPA/LUT/ZEAX 250-2.5 MG
1 CAPSULE ORAL 2 TIMES DAILY
Status: DISCONTINUED | OUTPATIENT
Start: 2022-01-15 | End: 2022-01-15

## 2022-01-15 RX ORDER — ACETAMINOPHEN 325 MG/1
650 TABLET ORAL ONCE
Status: COMPLETED | OUTPATIENT
Start: 2022-01-15 | End: 2022-01-15

## 2022-01-15 RX ORDER — IPRATROPIUM BROMIDE AND ALBUTEROL SULFATE 2.5; .5 MG/3ML; MG/3ML
3 SOLUTION RESPIRATORY (INHALATION)
Status: DISCONTINUED | OUTPATIENT
Start: 2022-01-15 | End: 2022-01-20 | Stop reason: HOSPADM

## 2022-01-15 RX ORDER — AZITHROMYCIN 250 MG/1
500 TABLET, FILM COATED ORAL DAILY
Status: COMPLETED | OUTPATIENT
Start: 2022-01-15 | End: 2022-01-15

## 2022-01-15 RX ORDER — ATORVASTATIN CALCIUM 40 MG/1
80 TABLET, FILM COATED ORAL EVERY EVENING
Status: DISCONTINUED | OUTPATIENT
Start: 2022-01-15 | End: 2022-01-20 | Stop reason: HOSPADM

## 2022-01-15 RX ORDER — GLUCOSAMINE/MSM/CHONDROITIN A 500-83-400
1 TABLET ORAL EVERY MORNING
Status: DISCONTINUED | OUTPATIENT
Start: 2022-01-15 | End: 2022-01-15

## 2022-01-15 RX ORDER — VITAMIN B COMPLEX
2000 TABLET ORAL DAILY
Status: DISCONTINUED | OUTPATIENT
Start: 2022-01-16 | End: 2022-01-20 | Stop reason: HOSPADM

## 2022-01-15 RX ORDER — ALBUTEROL SULFATE 90 UG/1
2 AEROSOL, METERED RESPIRATORY (INHALATION) EVERY 4 HOURS PRN
Status: DISCONTINUED | OUTPATIENT
Start: 2022-01-15 | End: 2022-01-20 | Stop reason: HOSPADM

## 2022-01-15 RX ORDER — GUAIFENESIN 600 MG/1
600 TABLET, EXTENDED RELEASE ORAL 2 TIMES DAILY PRN
Status: DISCONTINUED | OUTPATIENT
Start: 2022-01-15 | End: 2022-01-20 | Stop reason: HOSPADM

## 2022-01-15 RX ORDER — DEXAMETHASONE 4 MG/1
6 TABLET ORAL DAILY
Status: DISCONTINUED | OUTPATIENT
Start: 2022-01-16 | End: 2022-01-15

## 2022-01-15 RX ORDER — LOSARTAN POTASSIUM 25 MG/1
100 TABLET ORAL DAILY
Refills: 3 | Status: DISCONTINUED | OUTPATIENT
Start: 2022-01-16 | End: 2022-01-20 | Stop reason: HOSPADM

## 2022-01-15 RX ORDER — DOXYCYCLINE 100 MG/1
100 TABLET ORAL EVERY 12 HOURS
Status: DISCONTINUED | OUTPATIENT
Start: 2022-01-15 | End: 2022-01-15

## 2022-01-15 RX ADMIN — FUROSEMIDE 20 MG: 20 TABLET ORAL at 18:33

## 2022-01-15 RX ADMIN — DEXAMETHASONE SODIUM PHOSPHATE 6 MG: 4 INJECTION, SOLUTION INTRA-ARTICULAR; INTRALESIONAL; INTRAMUSCULAR; INTRAVENOUS; SOFT TISSUE at 14:41

## 2022-01-15 RX ADMIN — ACETAMINOPHEN 500 MG: 500 TABLET, FILM COATED ORAL at 17:00

## 2022-01-15 RX ADMIN — APIXABAN 5 MG: 5 TABLET, FILM COATED ORAL at 18:34

## 2022-01-15 RX ADMIN — PREDNISONE 40 MG: 20 TABLET ORAL at 18:32

## 2022-01-15 RX ADMIN — SODIUM CHLORIDE, POTASSIUM CHLORIDE, SODIUM LACTATE AND CALCIUM CHLORIDE 1000 ML: 600; 310; 30; 20 INJECTION, SOLUTION INTRAVENOUS at 14:41

## 2022-01-15 RX ADMIN — IPRATROPIUM BROMIDE AND ALBUTEROL SULFATE 3 ML: .5; 2.5 SOLUTION RESPIRATORY (INHALATION) at 14:28

## 2022-01-15 RX ADMIN — ACETAMINOPHEN 650 MG: 325 TABLET ORAL at 14:27

## 2022-01-15 RX ADMIN — BUDESONIDE AND FORMOTEROL FUMARATE DIHYDRATE 2 PUFF: 160; 4.5 AEROSOL RESPIRATORY (INHALATION) at 22:07

## 2022-01-15 RX ADMIN — POTASSIUM CHLORIDE 40 MEQ: 1500 TABLET, EXTENDED RELEASE ORAL at 18:33

## 2022-01-15 RX ADMIN — MULTIPLE VITAMINS W/ MINERALS TAB 1 TABLET: TAB at 18:32

## 2022-01-15 RX ADMIN — ATORVASTATIN CALCIUM 80 MG: 40 TABLET, FILM COATED ORAL at 18:34

## 2022-01-15 RX ADMIN — AZITHROMYCIN MONOHYDRATE 500 MG: 250 TABLET ORAL at 18:41

## 2022-01-15 ASSESSMENT — ENCOUNTER SYMPTOMS
DIZZINESS: 0
DOUBLE VISION: 0
VOMITING: 0
INSOMNIA: 0
FOCAL WEAKNESS: 0
BRUISES/BLEEDS EASILY: 0
BLOOD IN STOOL: 0
SORE THROAT: 1
WEAKNESS: 0
SHORTNESS OF BREATH: 0
CONSTIPATION: 0
HEADACHES: 0
FEVER: 1
NERVOUS/ANXIOUS: 0
SPUTUM PRODUCTION: 1
MYALGIAS: 1
BLURRED VISION: 0
COUGH: 1
FALLS: 0
ABDOMINAL PAIN: 0
CHILLS: 1
PALPITATIONS: 0
NAUSEA: 0
MEMORY LOSS: 0
DIARRHEA: 0

## 2022-01-15 ASSESSMENT — COGNITIVE AND FUNCTIONAL STATUS - GENERAL
DAILY ACTIVITIY SCORE: 20
DRESSING REGULAR LOWER BODY CLOTHING: A LITTLE
TURNING FROM BACK TO SIDE WHILE IN FLAT BAD: A LITTLE
TOILETING: A LITTLE
WALKING IN HOSPITAL ROOM: A LITTLE
MOBILITY SCORE: 18
CLIMB 3 TO 5 STEPS WITH RAILING: A LITTLE
SUGGESTED CMS G CODE MODIFIER DAILY ACTIVITY: CJ
DRESSING REGULAR UPPER BODY CLOTHING: A LITTLE
MOVING TO AND FROM BED TO CHAIR: A LITTLE
HELP NEEDED FOR BATHING: A LITTLE
SUGGESTED CMS G CODE MODIFIER MOBILITY: CK
MOVING FROM LYING ON BACK TO SITTING ON SIDE OF FLAT BED: A LITTLE
STANDING UP FROM CHAIR USING ARMS: A LITTLE

## 2022-01-15 ASSESSMENT — LIFESTYLE VARIABLES
HAVE PEOPLE ANNOYED YOU BY CRITICIZING YOUR DRINKING: NO
HOW MANY TIMES IN THE PAST YEAR HAVE YOU HAD 5 OR MORE DRINKS IN A DAY: 0
TOTAL SCORE: 0
TOTAL SCORE: 0
EVER FELT BAD OR GUILTY ABOUT YOUR DRINKING: NO
TOTAL SCORE: 0
HAVE YOU EVER FELT YOU SHOULD CUT DOWN ON YOUR DRINKING: NO
ON A TYPICAL DAY WHEN YOU DRINK ALCOHOL HOW MANY DRINKS DO YOU HAVE: 0
AVERAGE NUMBER OF DAYS PER WEEK YOU HAVE A DRINK CONTAINING ALCOHOL: 0
ALCOHOL_USE: NO
CONSUMPTION TOTAL: NEGATIVE
SUBSTANCE_ABUSE: 0
EVER HAD A DRINK FIRST THING IN THE MORNING TO STEADY YOUR NERVES TO GET RID OF A HANGOVER: NO

## 2022-01-15 ASSESSMENT — CHA2DS2 SCORE
CHA2DS2 VASC SCORE: 8
SEX: FEMALE
AGE 75 OR GREATER: YES
VASCULAR DISEASE: YES
HYPERTENSION: YES
AGE 65 TO 74: NO
CHF OR LEFT VENTRICULAR DYSFUNCTION: NO
PRIOR STROKE OR TIA OR THROMBOEMBOLISM: YES
DIABETES: YES

## 2022-01-15 ASSESSMENT — FIBROSIS 4 INDEX
FIB4 SCORE: 4.17
FIB4 SCORE: 3.4

## 2022-01-15 ASSESSMENT — PATIENT HEALTH QUESTIONNAIRE - PHQ9
1. LITTLE INTEREST OR PLEASURE IN DOING THINGS: NOT AT ALL
SUM OF ALL RESPONSES TO PHQ9 QUESTIONS 1 AND 2: 0
2. FEELING DOWN, DEPRESSED, IRRITABLE, OR HOPELESS: NOT AT ALL

## 2022-01-15 ASSESSMENT — PAIN DESCRIPTION - PAIN TYPE: TYPE: ACUTE PAIN

## 2022-01-15 NOTE — ED NOTES
Dr Flaherty evaluated patient.  Pending admit orders with a bed assignment.  Family member at bedside

## 2022-01-15 NOTE — ASSESSMENT & PLAN NOTE
This is Sepsis Present on admission  SIRS criteria identified on my evaluation include: Fever, with temperature greater than 101 deg F, Tachycardia, with heart rate greater than 90 BPM and Tachypnea, with respirations greater than 20 per minute  Source is COVID-19 infection  Sepsis protocol initiated  Fluid resuscitation ordered per protocol: Judicious IV fluids in setting of COVID-19.  IV antibiotics as appropriate for source of sepsis: Not indicated at this time.  Procalcitonin pending.  While organ dysfunction may be noted elsewhere in this problem list or in the chart, degree of organ dysfunction does not meet CMS criteria for severe sepsis    Sepsis was secondary to COVID and has resolved.

## 2022-01-15 NOTE — ED TRIAGE NOTES
"Chief Complaint   Patient presents with   • Shortness of Breath     Recent Dx of covid, progressively worsening SoB and weakness.    • Weakness     \"I can't even walk to the bathroom I'm so weak.\"     ED Triage Vitals [01/15/22 1401]   Enc Vitals Group      Blood Pressure  135/78      Pulse (!) 107      Respiration (!) 26      Temperature (!) 38.4 °C (101.1 °F)      Temp src Oral      Pulse Oximetry (!) 85 %      Weight 78 kg (172 lb)      Height 1.829 m (6')     Per EMS, patient's sat on baseline O2 was 85%. Improved to 93% with 4L O2 via NC.  "

## 2022-01-15 NOTE — H&P
"Hospital Medicine History & Physical Note    Date of Service  1/15/2022    Primary Care Physician  Winsome Schumacher M.D.    Consultants       Code Status  Partial Code   Do not intubate, CPR okay    Chief Complaint  Chief Complaint   Patient presents with   • Shortness of Breath     Recent Dx of covid, progressively worsening SoB and weakness.    • Weakness     \"I can't even walk to the bathroom I'm so weak.\"       History of Presenting Illness  Nohelia Dickerson is a 76 y.o. female who presented 1/15/2022 via ambulance for worsening weakness and shortness of breath.  This is a pleasant woman with a history of COPD on 1.5 LPM oxygen 24/7, hypertension, prediabetes, peripheral arterial disease, dyslipidemia, TIA and stroke on apixaban.  Patient reports being vaccinated x2 without a booster for COVID-19.  She presented initially 1 week ago with lightheadedness and bilateral leg cramping and was found to have mild hypokalemia.She was given potassium supplementation.  She did test positive for COVID-19.  Patient reports that she called the ambulance today due to significant worsening of her weakness.  She reports that she cannot longer walk walk to the bathroom on her own because she is so weak.   She denies any exacerbating factors to her worsening weakness.  It is improved with increasing her oxygen.    She reports eating clam chowder without any nausea or vomiting.  No diarrhea or constipation.  He does report also worsening over cough, which has become productive.  She reports having smoked 1 pack/day for 50 years and quit 2.5 years ago.  She denies any current shortness of breath although she is in obvious respiratory distress.  She also reports significant fevers and chills along with her fatigue.    In the ER, patient was noted to have a fever of 101.9, tachycardia with heart rate into the 120s and tachypneic with respiratory rate in the 20s to 40s.  She was noted to be hypoxic down to 85% on 2 LPM oxygen via " nasal cannula, which was increased to 4.5 L for SPO2 of 92%.    I discussed the plan of care with patient, family, bedside RN and ER physician, Dr. Morales.    Review of Systems  Review of Systems   Constitutional: Positive for chills, fever and malaise/fatigue.   HENT: Positive for congestion, ear pain and sore throat.    Eyes: Negative for blurred vision and double vision.   Respiratory: Positive for cough and sputum production. Negative for shortness of breath.    Cardiovascular: Negative for chest pain and palpitations.   Gastrointestinal: Negative for abdominal pain, blood in stool, constipation, diarrhea, nausea and vomiting.   Genitourinary: Positive for frequency (chronic). Negative for dysuria.   Musculoskeletal: Positive for joint pain and myalgias. Negative for falls.   Skin: Negative for itching and rash.   Neurological: Negative for dizziness, focal weakness, weakness and headaches.   Endo/Heme/Allergies: Negative for environmental allergies. Does not bruise/bleed easily.   Psychiatric/Behavioral: Negative for memory loss and substance abuse. The patient is not nervous/anxious and does not have insomnia.        Past Medical History   has a past medical history of Cancer (HCC) (1978), COPD, Healthcare maintenance (5/22/2018), Hernia of unspecified site of abdominal cavity without mention of obstruction or gangrene (2011), Hypertension, Inguinal hernia, Kidney stones, TIA (transient ischemic attack), Tobacco dependence (5/22/2018), Tobacco use, and Viral URI with cough (4/18/2019).    Surgical History   has a past surgical history that includes hernia repair; thyroidectomy; other (1983); and inguinal hernia repair (3/28/2011).     Family History  family history includes Cancer in her mother; Diabetes in her brother; Heart Attack in her father; Heart Disease in her father; No Known Problems in her daughter; Other in her daughter; Stroke in her father.   Family history reviewed with patient. There is no family  history that is pertinent to the chief complaint.     Social History   reports that she quit smoking about 3 years ago. Her smoking use included cigarettes. She started smoking about 60 years ago. She smoked 1.00 pack per day. She has never used smokeless tobacco. She reports that she does not drink alcohol and does not use drugs.    Allergies  Allergies   Allergen Reactions   • Morphine Unspecified     Hallucinations, nightmares, and altered mentations       Medications  Prior to Admission Medications   Prescriptions Last Dose Informant Patient Reported? Taking?   Cholecalciferol (VITAMIN D3 PO)  Patient Yes No   Sig: Take 2 Capsules by mouth every day.   Coenzyme Q10 (COQ-10 PO)  Patient Yes No   Sig: Take 1 Capsule by mouth every morning.   MAGNESIUM PO  Patient Yes No   Sig: Take 1 Tablet by mouth every morning.   MYRBETRIQ 25 MG TABLET SR 24 HR  Patient Yes No   Sig: Take 25 mg by mouth every morning.   Magnesium Salicylate (DOANS PILLS) 325 MG Tab  Patient Yes No   Sig: Take 650 mg by mouth every 6 hours as needed (Mild Pain). 2 tablets = 650 mg.   Multiple Vitamins-Minerals (PRESERVISION AREDS 2 PO)  Patient Yes No   Sig: Take 1 Cap by mouth 2 Times a Day.   acetaminophen (TYLENOL) 500 MG Tab  Patient Yes No   Sig: Take 1,000 mg by mouth every 6 hours as needed for Mild Pain. 2 tablets = 1,000 mg.   albuterol 108 (90 Base) MCG/ACT Aero Soln inhalation aerosol  Patient Yes No   Sig: Inhale 1-2 Puffs every 6 hours as needed for Shortness of Breath.   amLODIPine (NORVASC) 5 MG Tab  Patient Yes No   Sig: Take 5 mg by mouth every morning.   apixaban (ELIQUIS) 5mg Tab  Patient No No   Sig: Take 1 Tablet by mouth 2 times a day. Provide 30 days of samples to the patient if available   atorvastatin (LIPITOR) 80 MG tablet  Patient No No   Sig: TAKE 1 TABLET BY MOUTH EVERY DAY. N THE EVENING. PT TO MAKE APPT AND GET LABS PRIOR TO MORE REFILLS.   estradiol (ESTRACE VAGINAL) 0.1 MG/GM vaginal cream  Patient Yes No   Sig:  Insert 1 g into the vagina two times a week.   fluticasone-salmeterol (WIXELA INHUB) 250-50 MCG/DOSE AEROSOL POWDER, BREATH ACTIVATED  Patient Yes No   Sig: Inhale 1 Puff every 12 hours.   ibuprofen (MOTRIN) 200 MG Tab  Patient Yes No   Sig: Take 400 mg by mouth every 6 hours as needed for Mild Pain. 2 tablets = 400 mg.   losartan (COZAAR) 100 MG Tab  Patient No No   Sig: Take 1 Tablet by mouth every day.   therapeutic multivitamin-minerals (THERAGRAN-M) Tab  Patient Yes No   Sig: Take 1 Tablet by mouth every morning.      Facility-Administered Medications: None       Physical Exam  Temp:  [36.9 °C (98.4 °F)-38.8 °C (101.9 °F)] 36.9 °C (98.4 °F)  Pulse:  [] 99  Resp:  [18-26] 18  BP: (121-152)/(69-82) 121/71  SpO2:  [85 %-95 %] 95 %  Blood Pressure : 135/78   Temperature: (Abnormal) 38.4 °C (101.1 °F)   Pulse: (Abnormal) 107   Respiration: (Abnormal) 26   Pulse Oximetry: (Abnormal) 85 %       Physical Exam  Vitals and nursing note reviewed.   Constitutional:       General: She is in acute distress.      Appearance: She is well-developed. She is ill-appearing and toxic-appearing. She is not diaphoretic.   HENT:      Head: Normocephalic and atraumatic.      Right Ear: External ear normal.      Left Ear: External ear normal.      Nose: Nose normal.      Mouth/Throat:      Mouth: Mucous membranes are dry.      Pharynx: Oropharynx is clear. No oropharyngeal exudate or posterior oropharyngeal erythema.   Eyes:      General: No scleral icterus.        Right eye: No discharge.         Left eye: No discharge.      Conjunctiva/sclera: Conjunctivae normal.      Pupils: Pupils are equal, round, and reactive to light.   Neck:      Thyroid: No thyromegaly.      Vascular: No JVD.      Trachea: No tracheal deviation.   Cardiovascular:      Rate and Rhythm: Normal rate and regular rhythm.      Pulses: Normal pulses.      Heart sounds: Normal heart sounds. No murmur heard.  No friction rub. No gallop.    Pulmonary:      Effort:  Tachypnea and respiratory distress present.      Breath sounds: No stridor. Examination of the right-upper field reveals rales. Examination of the right-middle field reveals rales. Examination of the left-middle field reveals rhonchi and rales. Examination of the right-lower field reveals rales. Examination of the left-lower field reveals rhonchi and rales. Rhonchi and rales present. No wheezing.   Abdominal:      General: Bowel sounds are normal. There is no distension.      Palpations: Abdomen is soft. There is no mass.      Tenderness: There is no abdominal tenderness. There is no guarding or rebound.   Musculoskeletal:         General: No tenderness or deformity.      Cervical back: Neck supple.      Right lower leg: No edema.      Left lower leg: No edema.   Lymphadenopathy:      Cervical: No cervical adenopathy.   Skin:     General: Skin is warm and dry.      Capillary Refill: Capillary refill takes 2 to 3 seconds.      Coloration: Skin is not pale.      Findings: No erythema or rash.   Neurological:      Mental Status: She is alert. She is disoriented.      Sensory: No sensory deficit.      Motor: No weakness or abnormal muscle tone.      Comments: Alert and oriented x2   Psychiatric:         Behavior: Behavior normal.         Thought Content: Thought content normal.         Judgment: Judgment normal.         Laboratory:  Recent Labs     01/15/22  1440   WBC 7.6   RBC 4.70   HEMOGLOBIN 13.6   HEMATOCRIT 40.8   MCV 86.8   MCH 28.9   MCHC 33.3*   RDW 43.6   PLATELETCT 153*   MPV 10.4     Recent Labs     01/15/22  1440   SODIUM 137   POTASSIUM 3.4*   CHLORIDE 98   CO2 24   GLUCOSE 131*   BUN 18   CREATININE 0.78   CALCIUM 8.5     Recent Labs     01/15/22  1440   ALTSGPT 28   ASTSGOT 45   ALKPHOSPHAT 73   TBILIRUBIN 0.7   GLUCOSE 131*     Recent Labs     01/15/22  1440   INR 1.31*     Recent Labs     01/15/22  1440   NTPROBNP 1235*         No results for input(s): TROPONINT in the last 72  hours.    Imaging:  DX-CHEST-PORTABLE (1 VIEW)   Final Result      1.  Interstitial opacities in the peripheral left lung field and left lung base compatible with multifocal Covid 19 pneumonia.   2.  Cardiomegaly.   3.  Atherosclerotic plaque.          I have personally reviewed the patient's chest x-ray.  Per my read large lung volumes with interstitial infiltrates left greater than the right consistent with COVID-19.  Sharp costophrenic angles bilaterally.      EKG per my read shows sinus tachycardia with HR 1043, QTc 471, no significant ST elevation or depression.    Assessment/Plan:  I anticipate this patient will require at least two midnights for appropriate medical management, necessitating inpatient admission.    * Acute on chronic respiratory failure with hypoxemia (HCC)- (present on admission)  Assessment & Plan  Secondary to COVID-19 infection in setting of COPD with exacerbation.  Baseline oxygen need 1.5 LPM 24/7.  Currently requiring 4.5 LPM for SPO2 of 92%.    Admit to telemetry inpatient status.  Continuous telemetry monitoring.  Continue oxygen as per respiratory protocol.  Continuous pulse oximetry.  Respiratory therapy consult.  Duo nebs every 4 hours and as needed.  Symbicort.  Incentive spirometry.  Proning as tolerated.  Change dexamethasone to prednisone given history of COPD with now exacerbation.  Check ABG, D-dimer, CRP, and procalcitonin level.    Pneumonia due to COVID-19 virus- (present on admission)  Assessment & Plan  As per chest x-ray and worsening hypoxia.  Received dexamethasone 6 mg IV in the ER.    Change dexamethasone to prednisone 40 mg by mouth daily due to confounding COPD with exacerbation.  Azithromycin daily for 5 days.    Generalized weakness- (present on admission)  Assessment & Plan  Secondary to COVID-19.    PT and OT consults.  Check B12 and folate levels.  Fall precautions.    Toxic metabolic encephalopathy- (present on admission)  Assessment & Plan  She is currently  disoriented to time. Encephalopathy likely due to COVID-19 infection and sepsis.    Neurochecks every 4 hours.  Fall and aspiration precautions.    Sepsis due to COVID-19 (HCC)- (present on admission)  Assessment & Plan  This is Sepsis Present on admission  SIRS criteria identified on my evaluation include: Fever, with temperature greater than 101 deg F, Tachycardia, with heart rate greater than 90 BPM and Tachypnea, with respirations greater than 20 per minute  Source is COVID-19 infection  Sepsis protocol initiated  Fluid resuscitation ordered per protocol: Judicious IV fluids in setting of COVID-19.  IV antibiotics as appropriate for source of sepsis: Not indicated at this time.  Procalcitonin pending.  While organ dysfunction may be noted elsewhere in this problem list or in the chart, degree of organ dysfunction does not meet CMS criteria for severe sepsis    Acute exacerbation of chronic obstructive pulmonary disease (COPD) (HCC)- (present on admission)  Assessment & Plan  Secondary to COVID 19 pneumonia.    Prednisone 40 mg daily for 5 days given history of COPD in setting of COVID-19 infection.  Consider 5 additional days of dexamethasone 6 mg daily.  Start Symbicort.  Azithromycin for 5 days.  Continue breathing treatments and supportive therapy as per above.  Patient is okay with BiPAP and not intubation.    Elevated brain natriuretic peptide (BNP) level- (present on admission)  Assessment & Plan  NT pro BNP elevated at 1235 suggestive of mild fluid overload in setting of COVID-19 pneumonia, COPD, and pulmonary hypertension.    Starte furosemide 20 mg by mouth daily.    Hypoalbuminemia- (present on admission)  Assessment & Plan  Secondary to capillary extravasation of albumin in setting of COVID-19.    High protein low carbohydrate diet.  Consider albumin infusion if albumin <3 with worsening hypoxia.    History of stroke- (present on admission)  Assessment & Plan  As per history in 2019.  No apparent  residual deficits.    Continue home apixaban and atorvastatin.    Secondary hypercoagulability disorder (HCC)- (present on admission)  Assessment & Plan  As per history.    Continue home apixaban.    Prediabetes- (present on admission)  Assessment & Plan  As per history.  Last hemoglobin A1c 6.1 in September 2021.    Carbohydrate consistent diet    Vitamin D deficiency- (present on admission)  Assessment & Plan  As per history.    Continue home vitamin D.    Dyslipidemia- (present on admission)  Assessment & Plan  As per history.    Continue home atorvastatin.    PVD (peripheral vascular disease) (HCC)- (present on admission)  Assessment & Plan  As per history.    Continue home apixaban and atorvastatin.    DNI (do not intubate)- (present on admission)  Assessment & Plan  I discussed the CODE STATUS with the patient as well as her daughter.  The patient wishes to be DNI, CPR okay.      VTE prophylaxis: SCDs/TEDs and enoxaparin ppx

## 2022-01-15 NOTE — ED NOTES
Pharmacy Medication Reconciliation      ~Medication reconciliation updated and complete per patient at bedside  ~Allergies have been verified and updated   ~No oral ABX within the last 30 days  ~Patient home pharmacy:[facility]
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
## 2022-01-16 ENCOUNTER — APPOINTMENT (OUTPATIENT)
Dept: RADIOLOGY | Facility: MEDICAL CENTER | Age: 77
DRG: 871 | End: 2022-01-16
Attending: INTERNAL MEDICINE
Payer: MEDICARE

## 2022-01-16 PROBLEM — R50.9 FEVER: Status: ACTIVE | Noted: 2022-01-16

## 2022-01-16 LAB
ALBUMIN SERPL BCP-MCNC: 3 G/DL (ref 3.2–4.9)
ALBUMIN/GLOB SERPL: 0.9 G/DL
ALP SERPL-CCNC: 67 U/L (ref 30–99)
ALT SERPL-CCNC: 27 U/L (ref 2–50)
ANION GAP SERPL CALC-SCNC: 11 MMOL/L (ref 7–16)
AST SERPL-CCNC: 41 U/L (ref 12–45)
BASOPHILS # BLD AUTO: 0.1 % (ref 0–1.8)
BASOPHILS # BLD: 0.01 K/UL (ref 0–0.12)
BILIRUB SERPL-MCNC: 0.7 MG/DL (ref 0.1–1.5)
BUN SERPL-MCNC: 19 MG/DL (ref 8–22)
CALCIUM SERPL-MCNC: 8.3 MG/DL (ref 8.4–10.2)
CHLORIDE SERPL-SCNC: 107 MMOL/L (ref 96–112)
CO2 SERPL-SCNC: 22 MMOL/L (ref 20–33)
CREAT SERPL-MCNC: 0.59 MG/DL (ref 0.5–1.4)
EOSINOPHIL # BLD AUTO: 0 K/UL (ref 0–0.51)
EOSINOPHIL NFR BLD: 0 % (ref 0–6.9)
ERYTHROCYTE [DISTWIDTH] IN BLOOD BY AUTOMATED COUNT: 45 FL (ref 35.9–50)
FOLATE SERPL-MCNC: 31 NG/ML
GLOBULIN SER CALC-MCNC: 3.2 G/DL (ref 1.9–3.5)
GLUCOSE BLD-MCNC: 137 MG/DL (ref 65–99)
GLUCOSE BLD-MCNC: 187 MG/DL (ref 65–99)
GLUCOSE BLD-MCNC: 208 MG/DL (ref 65–99)
GLUCOSE SERPL-MCNC: 250 MG/DL (ref 65–99)
HCT VFR BLD AUTO: 39.6 % (ref 37–47)
HGB BLD-MCNC: 12.8 G/DL (ref 12–16)
IMM GRANULOCYTES # BLD AUTO: 0.05 K/UL (ref 0–0.11)
IMM GRANULOCYTES NFR BLD AUTO: 0.6 % (ref 0–0.9)
LACTATE BLD-SCNC: 1.1 MMOL/L (ref 0.5–2)
LYMPHOCYTES # BLD AUTO: 0.72 K/UL (ref 1–4.8)
LYMPHOCYTES NFR BLD: 8.4 % (ref 22–41)
MAGNESIUM SERPL-MCNC: 1.9 MG/DL (ref 1.5–2.5)
MCH RBC QN AUTO: 28.8 PG (ref 27–33)
MCHC RBC AUTO-ENTMCNC: 32.3 G/DL (ref 33.6–35)
MCV RBC AUTO: 89 FL (ref 81.4–97.8)
MONOCYTES # BLD AUTO: 0.47 K/UL (ref 0–0.85)
MONOCYTES NFR BLD AUTO: 5.5 % (ref 0–13.4)
NEUTROPHILS # BLD AUTO: 7.28 K/UL (ref 2–7.15)
NEUTROPHILS NFR BLD: 85.4 % (ref 44–72)
NRBC # BLD AUTO: 0 K/UL
NRBC BLD-RTO: 0 /100 WBC
PHOSPHATE SERPL-MCNC: 3.1 MG/DL (ref 2.5–4.5)
PLATELET # BLD AUTO: 158 K/UL (ref 164–446)
PMV BLD AUTO: 11 FL (ref 9–12.9)
POTASSIUM SERPL-SCNC: 4.5 MMOL/L (ref 3.6–5.5)
PROT SERPL-MCNC: 6.2 G/DL (ref 6–8.2)
RBC # BLD AUTO: 4.45 M/UL (ref 4.2–5.4)
SODIUM SERPL-SCNC: 140 MMOL/L (ref 135–145)
VIT B12 SERPL-MCNC: 2751 PG/ML (ref 211–911)
WBC # BLD AUTO: 8.5 K/UL (ref 4.8–10.8)

## 2022-01-16 PROCEDURE — 83605 ASSAY OF LACTIC ACID: CPT

## 2022-01-16 PROCEDURE — 99233 SBSQ HOSP IP/OBS HIGH 50: CPT | Performed by: INTERNAL MEDICINE

## 2022-01-16 PROCEDURE — A9270 NON-COVERED ITEM OR SERVICE: HCPCS | Performed by: STUDENT IN AN ORGANIZED HEALTH CARE EDUCATION/TRAINING PROGRAM

## 2022-01-16 PROCEDURE — 93970 EXTREMITY STUDY: CPT

## 2022-01-16 PROCEDURE — 82962 GLUCOSE BLOOD TEST: CPT

## 2022-01-16 PROCEDURE — A9270 NON-COVERED ITEM OR SERVICE: HCPCS | Performed by: INTERNAL MEDICINE

## 2022-01-16 PROCEDURE — 80053 COMPREHEN METABOLIC PANEL: CPT

## 2022-01-16 PROCEDURE — 94760 N-INVAS EAR/PLS OXIMETRY 1: CPT

## 2022-01-16 PROCEDURE — 700102 HCHG RX REV CODE 250 W/ 637 OVERRIDE(OP): Performed by: INTERNAL MEDICINE

## 2022-01-16 PROCEDURE — 97162 PT EVAL MOD COMPLEX 30 MIN: CPT

## 2022-01-16 PROCEDURE — 94640 AIRWAY INHALATION TREATMENT: CPT

## 2022-01-16 PROCEDURE — 99285 EMERGENCY DEPT VISIT HI MDM: CPT

## 2022-01-16 PROCEDURE — 85025 COMPLETE CBC W/AUTO DIFF WBC: CPT

## 2022-01-16 PROCEDURE — 770020 HCHG ROOM/CARE - TELE (206)

## 2022-01-16 PROCEDURE — 96374 THER/PROPH/DIAG INJ IV PUSH: CPT

## 2022-01-16 PROCEDURE — 83735 ASSAY OF MAGNESIUM: CPT

## 2022-01-16 PROCEDURE — 84100 ASSAY OF PHOSPHORUS: CPT

## 2022-01-16 PROCEDURE — 700102 HCHG RX REV CODE 250 W/ 637 OVERRIDE(OP): Performed by: STUDENT IN AN ORGANIZED HEALTH CARE EDUCATION/TRAINING PROGRAM

## 2022-01-16 PROCEDURE — 700111 HCHG RX REV CODE 636 W/ 250 OVERRIDE (IP): Performed by: STUDENT IN AN ORGANIZED HEALTH CARE EDUCATION/TRAINING PROGRAM

## 2022-01-16 RX ORDER — BENZONATATE 100 MG/1
100 CAPSULE ORAL 3 TIMES DAILY PRN
Status: DISCONTINUED | OUTPATIENT
Start: 2022-01-16 | End: 2022-01-20 | Stop reason: HOSPADM

## 2022-01-16 RX ORDER — DEXTROSE MONOHYDRATE 25 G/50ML
50 INJECTION, SOLUTION INTRAVENOUS
Status: DISCONTINUED | OUTPATIENT
Start: 2022-01-16 | End: 2022-01-20 | Stop reason: HOSPADM

## 2022-01-16 RX ADMIN — LOSARTAN POTASSIUM 100 MG: 25 TABLET, FILM COATED ORAL at 05:22

## 2022-01-16 RX ADMIN — APIXABAN 5 MG: 5 TABLET, FILM COATED ORAL at 17:18

## 2022-01-16 RX ADMIN — APIXABAN 5 MG: 5 TABLET, FILM COATED ORAL at 05:23

## 2022-01-16 RX ADMIN — ATORVASTATIN CALCIUM 80 MG: 40 TABLET, FILM COATED ORAL at 17:18

## 2022-01-16 RX ADMIN — AMLODIPINE BESYLATE 5 MG: 5 TABLET ORAL at 05:23

## 2022-01-16 RX ADMIN — FUROSEMIDE 20 MG: 20 TABLET ORAL at 05:23

## 2022-01-16 RX ADMIN — INSULIN HUMAN 1 UNITS: 100 INJECTION, SOLUTION PARENTERAL at 17:20

## 2022-01-16 RX ADMIN — PREDNISONE 40 MG: 20 TABLET ORAL at 05:23

## 2022-01-16 RX ADMIN — GUAIFENESIN 600 MG: 600 TABLET, EXTENDED RELEASE ORAL at 17:18

## 2022-01-16 RX ADMIN — AZITHROMYCIN MONOHYDRATE 250 MG: 250 TABLET ORAL at 05:23

## 2022-01-16 RX ADMIN — Medication 2000 UNITS: at 05:23

## 2022-01-16 RX ADMIN — BENZONATATE 100 MG: 100 CAPSULE ORAL at 22:34

## 2022-01-16 RX ADMIN — ACETAMINOPHEN 650 MG: 325 TABLET, FILM COATED ORAL at 20:58

## 2022-01-16 RX ADMIN — BUDESONIDE AND FORMOTEROL FUMARATE DIHYDRATE 2 PUFF: 160; 4.5 AEROSOL RESPIRATORY (INHALATION) at 20:59

## 2022-01-16 RX ADMIN — POTASSIUM CHLORIDE 40 MEQ: 1500 TABLET, EXTENDED RELEASE ORAL at 00:41

## 2022-01-16 RX ADMIN — MULTIPLE VITAMINS W/ MINERALS TAB 1 TABLET: TAB at 05:23

## 2022-01-16 RX ADMIN — BUDESONIDE AND FORMOTEROL FUMARATE DIHYDRATE 2 PUFF: 160; 4.5 AEROSOL RESPIRATORY (INHALATION) at 07:08

## 2022-01-16 RX ADMIN — INSULIN HUMAN 2 UNITS: 100 INJECTION, SOLUTION PARENTERAL at 11:57

## 2022-01-16 ASSESSMENT — COGNITIVE AND FUNCTIONAL STATUS - GENERAL
SUGGESTED CMS G CODE MODIFIER MOBILITY: CL
WALKING IN HOSPITAL ROOM: TOTAL
MOBILITY SCORE: 11
MOVING TO AND FROM BED TO CHAIR: A LITTLE
TURNING FROM BACK TO SIDE WHILE IN FLAT BAD: A LITTLE
STANDING UP FROM CHAIR USING ARMS: TOTAL
CLIMB 3 TO 5 STEPS WITH RAILING: TOTAL
MOVING FROM LYING ON BACK TO SITTING ON SIDE OF FLAT BED: A LOT

## 2022-01-16 ASSESSMENT — ENCOUNTER SYMPTOMS
PALPITATIONS: 0
NERVOUS/ANXIOUS: 0
BLURRED VISION: 0
COUGH: 1
DIZZINESS: 0
FEVER: 0
FALLS: 0
VOMITING: 0
BACK PAIN: 0
HEARTBURN: 0
CHILLS: 0
SHORTNESS OF BREATH: 1
ABDOMINAL PAIN: 0
HEADACHES: 0
DOUBLE VISION: 0

## 2022-01-16 ASSESSMENT — GAIT ASSESSMENTS: GAIT LEVEL OF ASSIST: UNABLE TO PARTICIPATE

## 2022-01-16 ASSESSMENT — LIFESTYLE VARIABLES: SUBSTANCE_ABUSE: 0

## 2022-01-16 NOTE — PROGRESS NOTES
0830 Assessment completed, patient is alert and oriented x 4, patient denies any pain or discomfort at this time. Patient sitting up eating breakfast and states she feels better today. Patient sating 90-91% on 4L oxymask, educated patient on need to place oxymask back on as soon as she takes a bite of food to prevent desaturation. Patient agreed and placed mask back on. Offered patient nasal cannula, patient stated the mask was fine.     1030 US tech at bedside. PT to work with another patient and then come back and do eval for discharge planning.

## 2022-01-16 NOTE — PROGRESS NOTES
Telemetry Shift Summary     Rhythm SR  HR Range 66-90  Ectopy R-PVC/PAC  Measurements 0.20/0.10/0.40           Normal Values  Rhythm SR  HR Range    Measurements 0.12-0.20 / 0.06-0.10  / 0.30-0.52

## 2022-01-16 NOTE — PROGRESS NOTES
Isolation Precautions:    Patient is on ENHANCED DROPLET isolation for COVID-19..    Patient educated on reason for isolation, how the infection may be transmitted, and how to help prevent transmission to others.     Patient educated that enhanced droplet precautions involves staff and visitors wearing PPE, follow  Standard Precautions and perform meticulous hand hygiene in order to prevent transmission of infection. (Contact Precautions: gown and gloves; Special Contact Precautions: gown and gloves, with the added requirement of soap and water for hand hygiene; Droplet Precautions: surgical mask worn by staff and visitors in the room, and worn by the patient when out of the room; Airborne Precautions: involves staff wearing PPE to include an N95 Respirator or Controlled Air Purifying Respirator (CAPR).  Visitors should be limited and may wear an N95 mask).         Patient transport and mobilization on unit. Patient educated that they may leave their room, but prior to exiting, the patient needs to have on a fresh patient gown, ensure the potentially infectious area is covered, perform appropriate hand hygiene immediately prior to exiting the room. (*For Airborne Precautions: Patient educated that time out of the room should be minimized and limited to transport to diagnostic procedures or other activities. Patient is to wear surgical mask when required to leave the patient room).

## 2022-01-16 NOTE — ASSESSMENT & PLAN NOTE
- Has not recurred, likely due to patient's ongoing COVID-19 infection.  - Procalcitonin is within normal limits. Blood cultures negative

## 2022-01-16 NOTE — PROGRESS NOTES
4 Eyes Skin Assessment Completed by JOSE Escobedo and Zahra RN.    Head WDL  Ears WDL  Nose WDL  Mouth WDL  Neck WDL  Breast/Chest WDL  Shoulder Blades WDL  Spine WDL  (R) Arm/Elbow/Hand WDL  (L) Arm/Elbow/Hand WDL  Abdomen WDL  Groin WDL  Scrotum/Coccyx/Buttocks WDL  (R) Leg WDL  (L) Leg WDL  (R) Heel/Foot/Toe WDL  (L) Heel/Foot/Toe WDL          Devices In Places Nasal Cannula      Interventions In Place Pressure Redistribution Mattress    Possible Skin Injury No    Pictures Uploaded Into Epic N/A  Wound Consult Placed N/A  RN Wound Prevention Protocol Ordered No

## 2022-01-16 NOTE — ASSESSMENT & PLAN NOTE
Secondary to capillary extravasation of albumin in setting of COVID-19.    High protein low carbohydrate diet.  Consider albumin infusion if albumin <3 with worsening hypoxia.

## 2022-01-16 NOTE — ASSESSMENT & PLAN NOTE
- Prednisone 40 mg daily for 5 days given history of COPD in setting of COVID-19 infection, then convert to Decadron tomorrow to cover COVID   - Azithromycin completed  - Continue breathing treatments and supportive therapy as per above.  - Patient is okay with BiPAP and not intubation.  - No further wheezing on exam

## 2022-01-16 NOTE — ASSESSMENT & PLAN NOTE
Patient is alert and oriented today, she is also aware of her surroundings.  We will continue to monitor closely.

## 2022-01-16 NOTE — CARE PLAN
The patient is Stable - Low risk of patient condition declining or worsening    Shift Goals  Clinical Goals: work with PT/OT, attempt to wean off oxygen  Patient Goals: feel better    Progress made toward(s) clinical / shift goals:  Patient is to work with PT this morning for discharge planning. Patient continues to sat 90% with 4L oxymask, educated on use of IS and proning.    Patient is not progressing towards the following goals:    Problem: Respiratory  Goal: Patient will achieve/maintain optimum respiratory ventilation and gas exchange  Outcome: Progressing  Patient continues to sat 90% with 4L oxymask, educated on use of IS and proning. Will attempt to wean off oxygen as saturation increases and patient tolerates.    Problem: Knowledge Deficit - Standard  Goal: Patient and family/care givers will demonstrate understanding of plan of care, disease process/condition, diagnostic tests and medications  Outcome: Progressing  Discussed plan of care with patient including diet order, proning and use of IS, oxygen monitoring, PT/OT eval and US of BLEs. Patient agreed and verbalized understanding.

## 2022-01-16 NOTE — ASSESSMENT & PLAN NOTE
"As per previous hospitalist: \"I discussed the CODE STATUS with the patient as well as her daughter.  The patient wishes to be DNI, CPR okay.\"  "

## 2022-01-16 NOTE — PROGRESS NOTES
Report received from Gregg GARCIA. Plan of care discussed. RT at bedside. Safety precautions in place.

## 2022-01-16 NOTE — PROGRESS NOTES
"Hospital Medicine Daily Progress Note    Date of Service  1/16/2022    Chief Complaint  Nohelia Dickerson is a 76 y.o. female admitted 1/15/2022 with shortness of breath    Hospital Course  As per chart review:  \"76 y.o. female who presented 1/15/2022 via ambulance for worsening weakness and shortness of breath.  This is a pleasant woman with a history of COPD on 1.5 LPM oxygen 24/7, hypertension, prediabetes, peripheral arterial disease, dyslipidemia, TIA and stroke on apixaban.  Patient reports being vaccinated x2 without a booster for COVID-19.  She presented initially 1 week ago with lightheadedness and bilateral leg cramping and was found to have mild hypokalemia.She was given potassium supplementation.  She did test positive for COVID-19.  Patient reports that she called the ambulance today due to significant worsening of her weakness.  She reports that she cannot longer walk walk to the bathroom on her own because she is so weak.   She denies any exacerbating factors to her worsening weakness.  It is improved with increasing her oxygen.    She reports eating clam chowder without any nausea or vomiting.  No diarrhea or constipation.  He does report also worsening over cough, which has become productive.  She reports having smoked 1 pack/day for 50 years and quit 2.5 years ago.  She denies any current shortness of breath although she is in obvious respiratory distress.  She also reports significant fevers and chills along with her fatigue.     In the ER, patient was noted to have a fever of 101.9, tachycardia with heart rate into the 120s and tachypneic with respiratory rate in the 20s to 40s.  She was noted to be hypoxic down to 85% on 2 LPM oxygen via nasal cannula, which was increased to 4.5 L for SPO2 of 92%.\"    Interval Problem Update  1/16: Patient seen at bedside this morning.  Patient lying in bed.  Patient is alert and oriented today, also aware of her surroundings.  Patient mentions she feels very " weak.  Patient requiring between 4 to 5 L of oxygen at the time of my evaluation.  Pending PT/OT.  Pending ultrasound of the lower extremities.  We will continue with prednisone for now for 5 days, if needed will extend more days of dexamethasone.    I have personally seen and examined the patient at bedside. I discussed the plan of care with patient, bedside RN and charge RN.    Consultants/Specialty  None    Code Status  Partial Code    Disposition  Patient is not medically cleared for discharge.   Anticipate discharge to pending PT/OT.  I have placed the appropriate orders for post-discharge needs.    Review of Systems  Review of Systems   Constitutional: Positive for malaise/fatigue. Negative for chills and fever.   HENT: Negative for hearing loss and nosebleeds.    Eyes: Negative for blurred vision and double vision.   Respiratory: Positive for cough and shortness of breath.    Cardiovascular: Negative for chest pain and palpitations.   Gastrointestinal: Negative for abdominal pain, heartburn and vomiting.   Genitourinary: Negative for dysuria and urgency.   Musculoskeletal: Negative for back pain and falls.   Skin: Negative for itching and rash.   Neurological: Negative for dizziness and headaches.   Psychiatric/Behavioral: Negative for substance abuse. The patient is not nervous/anxious.    All other systems reviewed and are negative.       Physical Exam  Temp:  [36.2 °C (97.2 °F)-38.8 °C (101.9 °F)] 37.1 °C (98.7 °F)  Pulse:  [] 75  Resp:  [18-26] 18  BP: (105-152)/() 119/68  SpO2:  [85 %-99 %] 90 %    Physical Exam  Vitals and nursing note reviewed.   Constitutional:       General: She is not in acute distress.     Appearance: Normal appearance. She is ill-appearing.   HENT:      Head: Normocephalic and atraumatic.      Right Ear: External ear normal.      Left Ear: External ear normal.      Mouth/Throat:      Pharynx: No oropharyngeal exudate or posterior oropharyngeal erythema.   Eyes:       General:         Right eye: No discharge.         Left eye: No discharge.   Cardiovascular:      Rate and Rhythm: Normal rate and regular rhythm.      Pulses: Normal pulses.      Heart sounds: No murmur heard.  No gallop.    Pulmonary:      Effort: Respiratory distress (on NC) present.      Breath sounds: Rhonchi present. No wheezing.   Abdominal:      General: Bowel sounds are normal. There is no distension.      Palpations: Abdomen is soft.      Tenderness: There is no abdominal tenderness. There is no guarding.   Musculoskeletal:         General: No swelling or tenderness. Normal range of motion.      Cervical back: Normal range of motion and neck supple. No tenderness.   Skin:     General: Skin is warm and dry.   Neurological:      General: No focal deficit present.      Mental Status: She is alert and oriented to person, place, and time.      Motor: No weakness.   Psychiatric:         Mood and Affect: Mood normal.         Behavior: Behavior normal.         Thought Content: Thought content normal.         Judgment: Judgment normal.         Fluids    Intake/Output Summary (Last 24 hours) at 1/16/2022 0948  Last data filed at 1/15/2022 2000  Gross per 24 hour   Intake 1150 ml   Output 210 ml   Net 940 ml       Laboratory  Recent Labs     01/15/22  1440 01/16/22  0428   WBC 7.6 8.5   RBC 4.70 4.45   HEMOGLOBIN 13.6 12.8   HEMATOCRIT 40.8 39.6   MCV 86.8 89.0   MCH 28.9 28.8   MCHC 33.3* 32.3*   RDW 43.6 45.0   PLATELETCT 153* 158*   MPV 10.4 11.0     Recent Labs     01/15/22  1440 01/16/22  0428   SODIUM 137 140   POTASSIUM 3.4* 4.5   CHLORIDE 98 107   CO2 24 22   GLUCOSE 131* 250*   BUN 18 19   CREATININE 0.78 0.59   CALCIUM 8.5 8.3*     Recent Labs     01/15/22  1440   INR 1.31*               Imaging  DX-CHEST-PORTABLE (1 VIEW)   Final Result      1.  Interstitial opacities in the peripheral left lung field and left lung base compatible with multifocal Covid 19 pneumonia.   2.  Cardiomegaly.   3.  Atherosclerotic  "plaque.      US-EXTREMITY VENOUS LOWER BILAT    (Results Pending)        Assessment/Plan  * Acute on chronic respiratory failure with hypoxemia (HCC)- (present on admission)  Assessment & Plan  Secondary to COVID-19 infection in setting of COPD with exacerbation.  Baseline oxygen need 1.5 LPM 24/7.  Currently requiring 4.5 LPM for SPO2 of 92%.    Admit to telemetry inpatient status.  Continuous telemetry monitoring.  Continue oxygen as per respiratory protocol.  Continuous pulse oximetry.  Respiratory therapy consult.  Duo nebs every 4 hours and as needed.  Symbicort.  Incentive spirometry.  Proning as tolerated.  Change dexamethasone to prednisone given history of COPD with now exacerbation.  Check ABG, D-dimer, CRP, and procalcitonin level.    1/16: D-dimer is mildly elevated, we have ordered ultrasound of the lower extremities, pending results.  Procalcitonin is within normal limits.    Fever  Assessment & Plan  Patient had an episode of fever one 101.9 yesterday.  Pending blood cultures.  This could be the patient's ongoing COVID-19 infection.  Procalcitonin is within normal limits.  Monitor.    Pneumonia due to COVID-19 virus- (present on admission)  Assessment & Plan  As per chest x-ray and worsening hypoxia.  Received dexamethasone 6 mg IV in the ER.    Change dexamethasone to prednisone 40 mg by mouth daily due to confounding COPD with exacerbation.  Azithromycin daily for 5 days.    Elevated brain natriuretic peptide (BNP) level- (present on admission)  Assessment & Plan  NT pro BNP elevated at 1235 suggestive of mild fluid overload in setting of COVID-19 pneumonia, COPD, and pulmonary hypertension.    Starte furosemide 20 mg by mouth daily.    DNI (do not intubate)- (present on admission)  Assessment & Plan  As per previous hospitalist: \"I discussed the CODE STATUS with the patient as well as her daughter.  The patient wishes to be DNI, CPR okay.\"    History of stroke- (present on admission)  Assessment & " Plan  As per history in 2019.  No apparent residual deficits.    Continue home apixaban and atorvastatin.    Generalized weakness- (present on admission)  Assessment & Plan  Secondary to COVID-19.    PT and OT consults.  Check B12 and folate levels.  Fall precautions.    Toxic metabolic encephalopathy- (present on admission)  Assessment & Plan  She is currently disoriented to time. Encephalopathy likely due to COVID-19 infection and sepsis.    Neurochecks every 4 hours.  Fall and aspiration precautions.    1/16: Patient is alert and oriented today, she is also aware of her surroundings.  We will continue to monitor closely.    Sepsis due to COVID-19 (HCC)- (present on admission)  Assessment & Plan  This is Sepsis Present on admission  SIRS criteria identified on my evaluation include: Fever, with temperature greater than 101 deg F, Tachycardia, with heart rate greater than 90 BPM and Tachypnea, with respirations greater than 20 per minute  Source is COVID-19 infection  Sepsis protocol initiated  Fluid resuscitation ordered per protocol: Judicious IV fluids in setting of COVID-19.  IV antibiotics as appropriate for source of sepsis: Not indicated at this time.  Procalcitonin pending.  While organ dysfunction may be noted elsewhere in this problem list or in the chart, degree of organ dysfunction does not meet CMS criteria for severe sepsis    1/16: The patient does not seem to meet sepsis criteria today.    Secondary hypercoagulability disorder (HCC)- (present on admission)  Assessment & Plan  As per history.    Continue home apixaban.    Prediabetes- (present on admission)  Assessment & Plan  As per history.  Last hemoglobin A1c 6.1 in September 2021.    Carbohydrate consistent diet    1/16: Glucose this morning was 250.  We have ordered insulin sliding scale for now as the patient is on steroids.  we have ordered new A1c.    Vitamin D deficiency- (present on admission)  Assessment & Plan  As per  history.    Continue home vitamin D.    Dyslipidemia- (present on admission)  Assessment & Plan  As per history.    Continue home atorvastatin.    PVD (peripheral vascular disease) (HCC)- (present on admission)  Assessment & Plan  As per history.    Continue home apixaban and atorvastatin.    Acute exacerbation of chronic obstructive pulmonary disease (COPD) (HCC)- (present on admission)  Assessment & Plan  Secondary to COVID 19 pneumonia.    Prednisone 40 mg daily for 5 days given history of COPD in setting of COVID-19 infection.  Consider additional days of dexamethasone 6 mg daily.  Start Symbicort.  Azithromycin  Continue breathing treatments and supportive therapy as per above.  Patient is okay with BiPAP and not intubation.    Thrombocytopenia (HCC)- (present on admission)  Assessment & Plan  Mild, seems to be chronic.  No signs of bleeding at this time.       VTE prophylaxis: therapeutic anticoagulation with apixaban    I have performed a physical exam and reviewed and updated ROS and Plan today (1/16/2022). In review of yesterday's note (1/15/2022), there are no changes except as documented above.

## 2022-01-16 NOTE — CARE PLAN
The patient is Watcher - Medium risk of patient condition declining or worsening    Shift Goals  Clinical Goals: Wean O2, prone, eat  Patient Goals: Rest, sleep    Progress made toward(s) clinical / shift goals:    Problem: Hemodynamics  Goal: Patient's hemodynamics, fluid balance and neurologic status will be stable or improve  Outcome: Progressing     Problem: Fluid Volume  Goal: Fluid volume balance will be maintained  Outcome: Progressing     Problem: Urinary - Renal Perfusion  Goal: Ability to achieve and maintain adequate renal perfusion and functioning will improve  Outcome: Progressing       Patient is not progressing towards the following goals:      Problem: Respiratory  Goal: Patient will achieve/maintain optimum respiratory ventilation and gas exchange  Outcome: Not Progressing   Pt O2 had to be increased to 12L but able to wean down once patient proned. Pt however desats when supine. Will continue to encourage to prone.

## 2022-01-16 NOTE — ASSESSMENT & PLAN NOTE
- pro BNP elevated at 1235 suggestive of mild fluid overload in setting of COVID-19 pneumonia, COPD, and pulmonary hypertension.  - On Lasix daily - echo normal, will hold tomorrow's Lasix and monitor

## 2022-01-16 NOTE — PROGRESS NOTES
Received patient, vitals stable, patient pleasant AOx4. On 4 L nasal cannula saturating 95%. No complaints at this time. Bed in lowest position, call bell in reach. Patient updated on plan of care.

## 2022-01-16 NOTE — ASSESSMENT & PLAN NOTE
- Completed 5d of Prednisone for COPD exacerbation, convert to Decadron tomorrow    - Azithromycin completed for COPD exacerbation

## 2022-01-16 NOTE — PROGRESS NOTES
Admit profile and med rec completed. Discussed POC with pt, including monitoring, trending lab levels and pertinent tests. Welcome folder provided and discussed. Communication board filled out. Questions and concerns addressed - patient verbalized understanding. Fall precautions in place. Pt demonstrates ability to use call light appropriately. Pt left in lowest position. Bed locked and low. Bed alarm on.

## 2022-01-16 NOTE — ASSESSMENT & PLAN NOTE
-  A1c is 6.6.  She will require repeat A1c as an outpatient.  Continue insulin sliding scale for now as the patient is on steroids. Can likely utilize diet control as an outpatient

## 2022-01-16 NOTE — ASSESSMENT & PLAN NOTE
- Secondary to COVID-19 infection in setting of COPD with exacerbation.  Baseline oxygen need 1.5 L qhs.  Currently requiring 3L RTC.   - Respiratory therapy consult.  - Duo nebs every 4 hours and as needed.  - Symbicort.  - d-dimer is age appropriate   - Procal is normal

## 2022-01-17 LAB
EST. AVERAGE GLUCOSE BLD GHB EST-MCNC: 143 MG/DL
GLUCOSE BLD-MCNC: 133 MG/DL (ref 65–99)
GLUCOSE BLD-MCNC: 172 MG/DL (ref 65–99)
GLUCOSE BLD-MCNC: 223 MG/DL (ref 65–99)
HBA1C MFR BLD: 6.6 % (ref 4–5.6)

## 2022-01-17 PROCEDURE — 83036 HEMOGLOBIN GLYCOSYLATED A1C: CPT

## 2022-01-17 PROCEDURE — 82962 GLUCOSE BLOOD TEST: CPT | Mod: 91

## 2022-01-17 PROCEDURE — A9270 NON-COVERED ITEM OR SERVICE: HCPCS | Performed by: STUDENT IN AN ORGANIZED HEALTH CARE EDUCATION/TRAINING PROGRAM

## 2022-01-17 PROCEDURE — 700111 HCHG RX REV CODE 636 W/ 250 OVERRIDE (IP): Performed by: STUDENT IN AN ORGANIZED HEALTH CARE EDUCATION/TRAINING PROGRAM

## 2022-01-17 PROCEDURE — 94640 AIRWAY INHALATION TREATMENT: CPT

## 2022-01-17 PROCEDURE — 700102 HCHG RX REV CODE 250 W/ 637 OVERRIDE(OP): Performed by: STUDENT IN AN ORGANIZED HEALTH CARE EDUCATION/TRAINING PROGRAM

## 2022-01-17 PROCEDURE — 770006 HCHG ROOM/CARE - MED/SURG/GYN SEMI*

## 2022-01-17 PROCEDURE — 94760 N-INVAS EAR/PLS OXIMETRY 1: CPT

## 2022-01-17 PROCEDURE — 700102 HCHG RX REV CODE 250 W/ 637 OVERRIDE(OP): Performed by: INTERNAL MEDICINE

## 2022-01-17 PROCEDURE — 97166 OT EVAL MOD COMPLEX 45 MIN: CPT

## 2022-01-17 PROCEDURE — A9270 NON-COVERED ITEM OR SERVICE: HCPCS | Performed by: INTERNAL MEDICINE

## 2022-01-17 PROCEDURE — 99233 SBSQ HOSP IP/OBS HIGH 50: CPT | Performed by: INTERNAL MEDICINE

## 2022-01-17 RX ADMIN — INSULIN HUMAN 1 UNITS: 100 INJECTION, SOLUTION PARENTERAL at 12:33

## 2022-01-17 RX ADMIN — ATORVASTATIN CALCIUM 80 MG: 40 TABLET, FILM COATED ORAL at 18:02

## 2022-01-17 RX ADMIN — BUDESONIDE AND FORMOTEROL FUMARATE DIHYDRATE 2 PUFF: 160; 4.5 AEROSOL RESPIRATORY (INHALATION) at 07:30

## 2022-01-17 RX ADMIN — APIXABAN 5 MG: 5 TABLET, FILM COATED ORAL at 06:08

## 2022-01-17 RX ADMIN — FUROSEMIDE 20 MG: 20 TABLET ORAL at 06:08

## 2022-01-17 RX ADMIN — PREDNISONE 40 MG: 20 TABLET ORAL at 06:08

## 2022-01-17 RX ADMIN — Medication 2000 UNITS: at 06:08

## 2022-01-17 RX ADMIN — BUDESONIDE AND FORMOTEROL FUMARATE DIHYDRATE 2 PUFF: 160; 4.5 AEROSOL RESPIRATORY (INHALATION) at 19:46

## 2022-01-17 RX ADMIN — LOSARTAN POTASSIUM 100 MG: 25 TABLET, FILM COATED ORAL at 06:07

## 2022-01-17 RX ADMIN — APIXABAN 5 MG: 5 TABLET, FILM COATED ORAL at 18:02

## 2022-01-17 RX ADMIN — MULTIPLE VITAMINS W/ MINERALS TAB 1 TABLET: TAB at 06:07

## 2022-01-17 RX ADMIN — GUAIFENESIN 600 MG: 600 TABLET, EXTENDED RELEASE ORAL at 19:47

## 2022-01-17 RX ADMIN — AMLODIPINE BESYLATE 5 MG: 5 TABLET ORAL at 06:08

## 2022-01-17 RX ADMIN — INSULIN HUMAN 2 UNITS: 100 INJECTION, SOLUTION PARENTERAL at 18:03

## 2022-01-17 RX ADMIN — BENZONATATE 100 MG: 100 CAPSULE ORAL at 19:47

## 2022-01-17 RX ADMIN — AZITHROMYCIN MONOHYDRATE 250 MG: 250 TABLET ORAL at 06:08

## 2022-01-17 ASSESSMENT — ENCOUNTER SYMPTOMS
PALPITATIONS: 0
NERVOUS/ANXIOUS: 0
DOUBLE VISION: 0
HEADACHES: 0
HEARTBURN: 0
SHORTNESS OF BREATH: 1
VOMITING: 0
FEVER: 0
FALLS: 0
DIZZINESS: 0
CHILLS: 0
BLURRED VISION: 0
BACK PAIN: 0
COUGH: 1
ABDOMINAL PAIN: 0

## 2022-01-17 ASSESSMENT — GAIT ASSESSMENTS: DISTANCE (FEET): 10

## 2022-01-17 ASSESSMENT — ACTIVITIES OF DAILY LIVING (ADL): TOILETING: INDEPENDENT

## 2022-01-17 ASSESSMENT — LIFESTYLE VARIABLES: SUBSTANCE_ABUSE: 0

## 2022-01-17 ASSESSMENT — PAIN DESCRIPTION - PAIN TYPE
TYPE: ACUTE PAIN
TYPE: ACUTE PAIN
TYPE: ACUTE PAIN;CHRONIC PAIN

## 2022-01-17 NOTE — PROGRESS NOTES
Telemetry Shift Summary    Rhythm SR  HR Range 64 - 88  Ectopy rPACs, rPVCs, &rCOUPs  Measurements 0.16/0.08/0.40        Normal Values  Rhythm SR  HR Range    Measurements 0.12-0.20 / 0.06-0.10  / 0.30-0.52

## 2022-01-17 NOTE — PROGRESS NOTES
Telemetry Shift Summary    Rhythm SR  HR Range 70-83s  Ectopy rare PVCs and PACs  Measurements .20/.08/.36        Normal Values  Rhythm SR  HR Range    Measurements 0.12-0.20 / 0.06-0.10  / 0.30-0.52

## 2022-01-17 NOTE — CARE PLAN
Problem: Nutritional:  Goal: Achieve adequate nutritional intake  Description: Patient will consume >50% of meals and supplements.   Outcome: Not Met   See RD note.

## 2022-01-17 NOTE — PROGRESS NOTES
Report given to Oswald GARCIA. Plan of care discussed. Patient resting comfortably in bed. Safety precautions in place.

## 2022-01-17 NOTE — CARE PLAN
The patient is Stable - Low risk of patient condition declining or worsening    Shift Goals  Clinical Goals: Decrease O2 needs and PT/OT  Patient Goals: Feel better  Family Goals: Information    Progress made toward(s) clinical / shift goals: Maintained adequate oxygenation this shift. PT and OT involved in care.     Patient is not progressing towards the following goals: Unable to wean this shift.       Problem: Respiratory  Goal: Patient will achieve/maintain optimum respiratory ventilation and gas exchange  Outcome: Progressing     Problem: Knowledge Deficit - Standard  Goal: Patient and family/care givers will demonstrate understanding of plan of care, disease process/condition, diagnostic tests and medications  Outcome: Progressing

## 2022-01-17 NOTE — DISCHARGE PLANNING
.Anticipated Discharge Disposition:  SNF     Action: Per chart review PT recommending post acute and SNF referral in place. SW intern called pt's mobile phone number ([phone]) to complete assessment and discuss SNF, no answer, unable to leave vm due to vm box full. SW intern called pt's room number, no answer.     Barriers to Discharge: SNF choice and pt ten days from positive Covid test on 1/18    Plan: CM to continue to follow for discharge needs. SW to follow up for pt for SNF choice.     Reviewed by [facility]    1456 SW intern attempted to call pt's mobile phone. No answer was able to leave vm.     [id number] WE[facility] [employer] messaged JOSE Banuelos to have pt call to discuss choice.     Reviewed by WE[facility]

## 2022-01-17 NOTE — DIETARY
Nutrition services: Day 2 of admit.  Nohelia Dickerson is a 76 y.o. female with admitting DX of Acute on chronic respiratory failure with hypoxemia     Consult received for MST of 3 on nutrition screen due to report of wt loss of unsure amount x < 1 week and poor PO PTA.       Assessment:  Height: 182.9 cm (6')  Weight: 75.5 kg (166 lb 7.2 oz)(bed scale)  Body mass index is 22.57 kg/m²., BMI classification: WNL  Diet/Intake: consistent CHO (diabetic),25% to 25-50%    Evaluation:   1. Pt presented with worsening weakness and shortness of breath. Pt is COVID-19 +. DX includes COPD on 1.5 LPM O2 24/7, HTN, prediabetes, dyslipidemia, vit D deficiency. No report of N&V. Currently on 4.5 LPM via Oxymask w/ saturation in the 90s.   2. RD is not able to visit pt in her room due to dept policy on enhanced droplet isolation. RD attempted to call pt on both her room and cell phone. No answer. Pt may benefit from addition of supplement for additional kcals and overall nutrition. Current PO intake most likely is not adequate.   3. Weights reviewed in Epic. Pt with wt loss of 2.5 kg (3.2%) x 7 days. Wt loss is significant and severe.   4. Labs: 1/16/22: glucose=250 (H). Glucose accu-cks: 133-208. 9/28/21: A1c=6.1, vit D=31  5. Meds: Lasix, SSI, prednisone, MVI+min, vit D3     Malnutrition Risk: unable to determine    Recommendations/Plan:  1. Add Boost Glucose Control to meals TID   2. Encourage intake of >50%  3. Document intake of all meals and supplements as % taken in ADL's to provide interdisciplinary communication across all shifts.   4. Monitor weight.  5. Nutrition rep will continue to see patient for ongoing meal and snack preferences.     RD will follow.

## 2022-01-17 NOTE — PROGRESS NOTES
"Hospital Medicine Daily Progress Note    Date of Service  1/17/2022    Chief Complaint  Nohelia Dickerson is a 76 y.o. female admitted 1/15/2022 with shortness of breath    Hospital Course  As per chart review:  \"76 y.o. female who presented 1/15/2022 via ambulance for worsening weakness and shortness of breath.  This is a pleasant woman with a history of COPD on 1.5 LPM oxygen 24/7, hypertension, prediabetes, peripheral arterial disease, dyslipidemia, TIA and stroke on apixaban.  Patient reports being vaccinated x2 without a booster for COVID-19.  She presented initially 1 week ago with lightheadedness and bilateral leg cramping and was found to have mild hypokalemia.She was given potassium supplementation.  She did test positive for COVID-19.  Patient reports that she called the ambulance today due to significant worsening of her weakness.  She reports that she cannot longer walk walk to the bathroom on her own because she is so weak.   She denies any exacerbating factors to her worsening weakness.  It is improved with increasing her oxygen.    She reports eating clam chowder without any nausea or vomiting.  No diarrhea or constipation.  He does report also worsening over cough, which has become productive.  She reports having smoked 1 pack/day for 50 years and quit 2.5 years ago.  She denies any current shortness of breath although she is in obvious respiratory distress.  She also reports significant fevers and chills along with her fatigue.     In the ER, patient was noted to have a fever of 101.9, tachycardia with heart rate into the 120s and tachypneic with respiratory rate in the 20s to 40s.  She was noted to be hypoxic down to 85% on 2 LPM oxygen via nasal cannula, which was increased to 4.5 L for SPO2 of 92%.\"    Interval Problem Update  1/16: Patient seen at bedside this morning.  Patient lying in bed.  Patient is alert and oriented today, also aware of her surroundings.  Patient mentions she feels very " weak.  Patient requiring between 4 to 5 L of oxygen at the time of my evaluation.  Pending PT/OT.  Pending ultrasound of the lower extremities.  We will continue with prednisone for now for 5 days, if needed will extend more days of dexamethasone.    1/17: Patient seen at bedside this morning.  Patient lying in bed.  The patient still complaining of weakness.  She is alert and oriented today.  PT recommending postacute placement.  SNF referral placed.  Patient requiring 6 L of oxygen today at the time of my evaluation.  As per nursing no other overnight events reported.    I have personally seen and examined the patient at bedside. I discussed the plan of care with patient, bedside RN and charge RN.    Consultants/Specialty  None    Code Status  Partial Code    Disposition  Patient is not medically cleared for discharge.   Anticipate discharge to SNF.  I have placed the appropriate orders for post-discharge needs.    Review of Systems  Review of Systems   Constitutional: Positive for malaise/fatigue. Negative for chills and fever.   HENT: Negative for hearing loss and nosebleeds.    Eyes: Negative for blurred vision and double vision.   Respiratory: Positive for cough and shortness of breath.    Cardiovascular: Negative for chest pain and palpitations.   Gastrointestinal: Negative for abdominal pain, heartburn and vomiting.   Genitourinary: Negative for dysuria and urgency.   Musculoskeletal: Negative for back pain and falls.   Skin: Negative for itching and rash.   Neurological: Negative for dizziness and headaches.   Psychiatric/Behavioral: Negative for substance abuse. The patient is not nervous/anxious.    All other systems reviewed and are negative.       Physical Exam  Temp:  [36.9 °C (98.5 °F)-37.1 °C (98.8 °F)] 37.1 °C (98.8 °F)  Pulse:  [70-97] 97  Resp:  [18-20] 20  BP: (117-140)/(66-79) 117/66  SpO2:  [90 %-94 %] 91 %    Physical Exam  Vitals and nursing note reviewed.   Constitutional:       General: She  is not in acute distress.     Appearance: Normal appearance. She is ill-appearing.   HENT:      Head: Normocephalic and atraumatic.      Right Ear: External ear normal.      Left Ear: External ear normal.      Mouth/Throat:      Pharynx: No oropharyngeal exudate or posterior oropharyngeal erythema.   Eyes:      General:         Right eye: No discharge.         Left eye: No discharge.   Cardiovascular:      Rate and Rhythm: Normal rate and regular rhythm.      Pulses: Normal pulses.      Heart sounds: No murmur heard.  No gallop.    Pulmonary:      Effort: Respiratory distress (on NC) present.      Breath sounds: Rhonchi present. No wheezing.   Abdominal:      General: Bowel sounds are normal. There is no distension.      Palpations: Abdomen is soft.      Tenderness: There is no abdominal tenderness. There is no guarding.   Musculoskeletal:         General: No swelling or tenderness. Normal range of motion.      Cervical back: Normal range of motion and neck supple. No tenderness.   Skin:     General: Skin is warm and dry.   Neurological:      General: No focal deficit present.      Mental Status: She is alert and oriented to person, place, and time.      Motor: No weakness.   Psychiatric:         Mood and Affect: Mood normal.         Behavior: Behavior normal.         Thought Content: Thought content normal.         Judgment: Judgment normal.         Fluids    Intake/Output Summary (Last 24 hours) at 1/17/2022 1040  Last data filed at 1/17/2022 0900  Gross per 24 hour   Intake 600 ml   Output --   Net 600 ml       Laboratory  Recent Labs     01/15/22  1440 01/16/22  0428   WBC 7.6 8.5   RBC 4.70 4.45   HEMOGLOBIN 13.6 12.8   HEMATOCRIT 40.8 39.6   MCV 86.8 89.0   MCH 28.9 28.8   MCHC 33.3* 32.3*   RDW 43.6 45.0   PLATELETCT 153* 158*   MPV 10.4 11.0     Recent Labs     01/15/22  1440 01/16/22  0428   SODIUM 137 140   POTASSIUM 3.4* 4.5   CHLORIDE 98 107   CO2 24 22   GLUCOSE 131* 250*   BUN 18 19   CREATININE 0.78  0.59   CALCIUM 8.5 8.3*     Recent Labs     01/15/22  1440   INR 1.31*               Imaging  US-EXTREMITY VENOUS LOWER BILAT   Final Result      No evidence of bilateral lower extremity deep venous thrombosis.      DX-CHEST-PORTABLE (1 VIEW)   Final Result      1.  Interstitial opacities in the peripheral left lung field and left lung base compatible with multifocal Covid 19 pneumonia.   2.  Cardiomegaly.   3.  Atherosclerotic plaque.           Assessment/Plan  * Acute on chronic respiratory failure with hypoxemia (HCC)- (present on admission)  Assessment & Plan  Secondary to COVID-19 infection in setting of COPD with exacerbation.  Baseline oxygen need 1.5 LPM 24/7.  Currently requiring 4.5 LPM for SPO2 of 92%.    Admit to telemetry inpatient status.  Continuous telemetry monitoring.  Continue oxygen as per respiratory protocol.  Continuous pulse oximetry.  Respiratory therapy consult.  Duo nebs every 4 hours and as needed.  Symbicort.  Incentive spirometry.  Proning as tolerated.  Change dexamethasone to prednisone given history of COPD with now exacerbation.  Check ABG, D-dimer, CRP, and procalcitonin level.    1/16: D-dimer is mildly elevated, we have ordered ultrasound of the lower extremities, pending results.  Procalcitonin is within normal limits.    1/17: Ultrasound of the lower extremities did not report DVT.  The patient requiring 6 L of oxygen today at the time of my evaluation.    Fever  Assessment & Plan  Patient had an episode of fever one 101.9 yesterday.  Pending blood cultures.  This could be the patient's ongoing COVID-19 infection.  Procalcitonin is within normal limits.  Monitor.    Pneumonia due to COVID-19 virus- (present on admission)  Assessment & Plan  As per chest x-ray and worsening hypoxia.  Received dexamethasone 6 mg IV in the ER.    Change dexamethasone to prednisone 40 mg by mouth daily due to confounding COPD with exacerbation. If needed we will continue dexamethasone after the  "initial prednisone for COPD exacerbation.  Azithromycin    Elevated brain natriuretic peptide (BNP) level- (present on admission)  Assessment & Plan  NT pro BNP elevated at 1235 suggestive of mild fluid overload in setting of COVID-19 pneumonia, COPD, and pulmonary hypertension.    Starte furosemide 20 mg by mouth daily.    DNI (do not intubate)- (present on admission)  Assessment & Plan  As per previous hospitalist: \"I discussed the CODE STATUS with the patient as well as her daughter.  The patient wishes to be DNI, CPR okay.\"    History of stroke- (present on admission)  Assessment & Plan  As per history in 2019.  No apparent residual deficits.    Continue home apixaban and atorvastatin.    Generalized weakness- (present on admission)  Assessment & Plan  Secondary to COVID-19.    PT and OT consults.  Check B12 and folate levels.  Fall precautions.    1/17: SNF referral placed    Toxic metabolic encephalopathy- (present on admission)  Assessment & Plan  She is currently disoriented to time. Encephalopathy likely due to COVID-19 infection and sepsis.    Neurochecks every 4 hours.  Fall and aspiration precautions.    1/17: Patient is alert and oriented today, she is also aware of her surroundings.  We will continue to monitor closely.    Sepsis due to COVID-19 (HCC)- (present on admission)  Assessment & Plan  This is Sepsis Present on admission  SIRS criteria identified on my evaluation include: Fever, with temperature greater than 101 deg F, Tachycardia, with heart rate greater than 90 BPM and Tachypnea, with respirations greater than 20 per minute  Source is COVID-19 infection  Sepsis protocol initiated  Fluid resuscitation ordered per protocol: Judicious IV fluids in setting of COVID-19.  IV antibiotics as appropriate for source of sepsis: Not indicated at this time.  Procalcitonin pending.  While organ dysfunction may be noted elsewhere in this problem list or in the chart, degree of organ dysfunction does not " meet CMS criteria for severe sepsis    1/17: The patient does not seem to meet sepsis criteria today at the time of my evaluation.    Secondary hypercoagulability disorder (HCC)- (present on admission)  Assessment & Plan  As per history.    Continue home apixaban.    Prediabetes- (present on admission)  Assessment & Plan  As per history.  Last hemoglobin A1c 6.1 in September 2021.    Carbohydrate consistent diet    1/16: Glucose this morning was 250.  we have ordered new A1c.    1/17: A1c is 6.6.  She will require repeat A1c as an outpatient.  Continue insulin sliding scale for now as the patient is on steroids.    Vitamin D deficiency- (present on admission)  Assessment & Plan  As per history.    Continue home vitamin D.    Dyslipidemia- (present on admission)  Assessment & Plan  As per history.    Continue home atorvastatin.    PVD (peripheral vascular disease) ([facility])- (present on admission)  Assessment & Plan  As per history.    Continue home apixaban and atorvastatin.    Acute exacerbation of chronic obstructive pulmonary disease (COPD) ([facility])- (present on admission)  Assessment & Plan  Secondary to COVID 19 pneumonia.    Prednisone 40 mg daily for 5 days given history of COPD in setting of COVID-19 infection.  Consider additional days of dexamethasone 6 mg daily.  Start Symbicort.  Azithromycin  Continue breathing treatments and supportive therapy as per above.  Patient is okay with BiPAP and not intubation.    Thrombocytopenia (HCC)- (present on admission)  Assessment & Plan  Mild, seems to be chronic.  No signs of bleeding at this time.       VTE prophylaxis: therapeutic anticoagulation with apixaban    I have performed a physical exam and reviewed and updated ROS and Plan today (1/17/2022). In review of yesterday's note (1/16/2022), there are no changes except as documented above.

## 2022-01-17 NOTE — THERAPY
"Occupational Therapy   Initial Evaluation     Patient Name: Nohelia Dickerson  Age:  76 y.o., Sex:  female  Medical Record #: [id number]  Today's Date: 1/17/2022     Precautions  Precautions: Fall Risk  Comments:  (incidence of fall at home )    Assessment  Patient is 76 y.o. female with a diagnosis of acute on chronic respiratory failure with hypoxemia. Past medical history includes COPD, DM, TIA, PAD. Pt stated that she was independent with ADLs and functional mobility prior to hospitalization. 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 Pt presented with deficits in overall strength, balance and endurance that affects ADLs and functional mobility. Pt was on 5 L O2 during session. Pt was educated with energy conservation techniques and proper breathing strategies.Pt needed rest breaks during evaluation. Pt will benefit from post acute care placement prior to d/c to home to maximize rehab potential needed for ADLs and mobility.       Plan    Recommend Occupational Therapy 3 times per week for 3 visits for the following treatments:  Neuro Re-Education / Balance, Self Care/Activities of Daily Living, Therapeutic Activities and Therapeutic Exercises.    DC Equipment Recommendations: Unable to determine at this time  Discharge Recommendations: Recommend post-acute placement for additional occupational therapy services prior to discharge home     Subjective    \" I want to go back to work\"   Objective       01/17/22 1420   Prior Living Situation   Prior Services Home-Independent   Housing / Facility 1 Story House   Steps Into Home 2   Equipment Owned Front-Wheel Walker;Hand Held Shower;Tub / Shower Seat;Other (Comments)  (oxygen concentrator)   Lives with - Patient's Self Care Capacity Adult Children;Other (Comments)   Comments lives with granddaughter   Prior Level of ADL Function   Self Feeding Independent   Grooming / Hygiene Independent   Bathing Independent   Dressing Independent   Toileting " "Independent   Prior Level of IADL Function   Medication Management Independent   Laundry Independent   Kitchen Mobility Independent   Home Management Independent   Prior Level Of Mobility Independent Without Device in Community   Driving / Transportation Driving Independent   History of Falls   History of Falls Yes   Date of Last Fall   (last month Dec 2021)   Precautions   Precautions Fall Risk   Comments   (incidence of fall at home )   Vitals   O2 (LPM) 5   O2 Delivery Device Nasal Cannula   Pain   Intervention Declines   Pain 0 - 10 Group   Pain Rating Scale (NPRS) 0   Therapist Pain Assessment Post Activity Pain Same as Prior to Activity   Cognition    Cognition / Consciousness WDL   Level of Consciousness Alert   Comments pleasant and cooperative   Active ROM Upper Body   Active ROM Upper Body  WDL   Strength Upper Body   Upper Body Strength  WDL   Balance Assessment   Sitting Balance (Static) Fair   Sitting Balance (Dynamic) Fair   Standing Balance (Static) Fair   Standing Balance (Dynamic) Fair -   Weight Shift Sitting Fair   Weight Shift Standing Fair   Comments with FWW   Bed Mobility    Scooting Contact Guard Assist   Comments   (patient was seated in the recliner chair at time of OT)   ADL Assessment   Eating Modified Independent   Grooming Contact Guard Assist  (while standing with FWW in front of the sink)   Upper Body Dressing Minimal Assist   Lower Body Dressing Moderate Assist  (needed assist for donning pants and socks)   Comments   (needed rest breaks for LE dressing )   Functional Mobility   Sit to Stand Minimal Assist   Bed, Chair, Wheelchair Transfer Contact Guard Assist   Transfer Method Stand Step   Mobility with FWW ; in room    Distance (Feet) 10   # of Times Distance was Traveled 2   Activity Tolerance   Sitting in Chair 20 mins   Standing 3 mins   Comments limited by endurance issues   Patient / Family Goals   Patient / Family Goal #1 \" get back to my work\"    Short Term Goals   Short " Term Goal # 1 Pt will perform LE dressing set up with good endurance within 3 sessions   Short Term Goal # 2 Pt will perform toileting activity set up with good endurance within 3 sessions    Short Term Goal # 3 Pt will demonstrate safe functional transfer and mobility with FWW set up with good endurance withiin 3 sessions   Education Group   Education Provided Role of Occupational Therapist   Role of Occupational Therapist Patient Response Patient;Acceptance;Explanation;Verbal Demonstration   Problem List   Problem List Decreased Active Daily Living Skills;Decreased Functional Mobility;Decreased Activity Tolerance;Safety Awareness Deficits / Cognition;Impaired Postural Control / Balance;Decreased Upper Extremity Strength Right;Decreased Upper Extremity Strength Left   Anticipated Discharge Equipment and Recommendations   DC Equipment Recommendations Unable to determine at this time   Discharge Recommendations Recommend post-acute placement for additional occupational therapy services prior to discharge home   Interdisciplinary Plan of Care Collaboration   IDT Collaboration with  Nursing   Patient Position at End of Therapy Seated;Call Light within Reach;Tray Table within Reach   Collaboration Comments RN made aware   Session Information   Date / Session Number  1/17, #1 ( 1/3, 1/23)   Priority 2

## 2022-01-18 ENCOUNTER — APPOINTMENT (OUTPATIENT)
Dept: CARDIOLOGY | Facility: MEDICAL CENTER | Age: 77
DRG: 871 | End: 2022-01-18
Attending: FAMILY MEDICINE
Payer: MEDICARE

## 2022-01-18 PROBLEM — R74.01 TRANSAMINITIS: Status: ACTIVE | Noted: 2022-01-18

## 2022-01-18 LAB
ALBUMIN SERPL BCP-MCNC: 3.4 G/DL (ref 3.2–4.9)
ALBUMIN/GLOB SERPL: 1 G/DL
ALP SERPL-CCNC: 86 U/L (ref 30–99)
ALT SERPL-CCNC: 74 U/L (ref 2–50)
ANION GAP SERPL CALC-SCNC: 11 MMOL/L (ref 7–16)
AST SERPL-CCNC: 93 U/L (ref 12–45)
BASOPHILS # BLD AUTO: 0 % (ref 0–1.8)
BASOPHILS # BLD: 0 K/UL (ref 0–0.12)
BILIRUB SERPL-MCNC: 0.7 MG/DL (ref 0.1–1.5)
BUN SERPL-MCNC: 38 MG/DL (ref 8–22)
CALCIUM SERPL-MCNC: 8.9 MG/DL (ref 8.4–10.2)
CHLORIDE SERPL-SCNC: 101 MMOL/L (ref 96–112)
CO2 SERPL-SCNC: 26 MMOL/L (ref 20–33)
CREAT SERPL-MCNC: 0.79 MG/DL (ref 0.5–1.4)
EOSINOPHIL # BLD AUTO: 0 K/UL (ref 0–0.51)
EOSINOPHIL NFR BLD: 0 % (ref 0–6.9)
ERYTHROCYTE [DISTWIDTH] IN BLOOD BY AUTOMATED COUNT: 45.2 FL (ref 35.9–50)
GLOBULIN SER CALC-MCNC: 3.5 G/DL (ref 1.9–3.5)
GLUCOSE BLD-MCNC: 101 MG/DL (ref 65–99)
GLUCOSE BLD-MCNC: 156 MG/DL (ref 65–99)
GLUCOSE BLD-MCNC: 216 MG/DL (ref 65–99)
GLUCOSE BLD-MCNC: 221 MG/DL (ref 65–99)
GLUCOSE SERPL-MCNC: 99 MG/DL (ref 65–99)
HAV IGM SERPL QL IA: NORMAL
HBV CORE IGM SER QL: NORMAL
HBV SURFACE AG SER QL: NORMAL
HCT VFR BLD AUTO: 41.8 % (ref 37–47)
HCV AB SER QL: NORMAL
HGB BLD-MCNC: 13.7 G/DL (ref 12–16)
LV EJECT FRACT  99904: 55
LV EJECT FRACT MOD 2C 99903: 55.4
LV EJECT FRACT MOD 4C 99902: 53.91
LV EJECT FRACT MOD BP 99901: 54.85
LYMPHOCYTES # BLD AUTO: 1.53 K/UL (ref 1–4.8)
LYMPHOCYTES NFR BLD: 14 % (ref 22–41)
MANUAL DIFF BLD: NORMAL
MCH RBC QN AUTO: 28.9 PG (ref 27–33)
MCHC RBC AUTO-ENTMCNC: 32.8 G/DL (ref 33.6–35)
MCV RBC AUTO: 88.2 FL (ref 81.4–97.8)
MONOCYTES # BLD AUTO: 0.55 K/UL (ref 0–0.85)
MONOCYTES NFR BLD AUTO: 5 % (ref 0–13.4)
NEUTROPHILS # BLD AUTO: 8.83 K/UL (ref 2–7.15)
NEUTROPHILS NFR BLD: 79 % (ref 44–72)
NEUTS BAND NFR BLD MANUAL: 2 % (ref 0–10)
NRBC # BLD AUTO: 0 K/UL
NRBC BLD-RTO: 0 /100 WBC
PLATELET # BLD AUTO: 211 K/UL (ref 164–446)
PLATELET BLD QL SMEAR: NORMAL
PMV BLD AUTO: 10.8 FL (ref 9–12.9)
POTASSIUM SERPL-SCNC: 3.9 MMOL/L (ref 3.6–5.5)
PROT SERPL-MCNC: 6.9 G/DL (ref 6–8.2)
RBC # BLD AUTO: 4.74 M/UL (ref 4.2–5.4)
RBC BLD AUTO: NORMAL
SODIUM SERPL-SCNC: 138 MMOL/L (ref 135–145)
WBC # BLD AUTO: 10.9 K/UL (ref 4.8–10.8)

## 2022-01-18 PROCEDURE — A9270 NON-COVERED ITEM OR SERVICE: HCPCS | Performed by: STUDENT IN AN ORGANIZED HEALTH CARE EDUCATION/TRAINING PROGRAM

## 2022-01-18 PROCEDURE — 97110 THERAPEUTIC EXERCISES: CPT

## 2022-01-18 PROCEDURE — A9270 NON-COVERED ITEM OR SERVICE: HCPCS | Performed by: INTERNAL MEDICINE

## 2022-01-18 PROCEDURE — 700111 HCHG RX REV CODE 636 W/ 250 OVERRIDE (IP): Performed by: STUDENT IN AN ORGANIZED HEALTH CARE EDUCATION/TRAINING PROGRAM

## 2022-01-18 PROCEDURE — 94760 N-INVAS EAR/PLS OXIMETRY 1: CPT

## 2022-01-18 PROCEDURE — 770006 HCHG ROOM/CARE - MED/SURG/GYN SEMI*

## 2022-01-18 PROCEDURE — 94640 AIRWAY INHALATION TREATMENT: CPT

## 2022-01-18 PROCEDURE — 85027 COMPLETE CBC AUTOMATED: CPT

## 2022-01-18 PROCEDURE — 700102 HCHG RX REV CODE 250 W/ 637 OVERRIDE(OP): Performed by: INTERNAL MEDICINE

## 2022-01-18 PROCEDURE — 93306 TTE W/DOPPLER COMPLETE: CPT

## 2022-01-18 PROCEDURE — 97116 GAIT TRAINING THERAPY: CPT

## 2022-01-18 PROCEDURE — 82962 GLUCOSE BLOOD TEST: CPT | Mod: 91

## 2022-01-18 PROCEDURE — 80074 ACUTE HEPATITIS PANEL: CPT

## 2022-01-18 PROCEDURE — 93306 TTE W/DOPPLER COMPLETE: CPT | Mod: 26 | Performed by: INTERNAL MEDICINE

## 2022-01-18 PROCEDURE — 85007 BL SMEAR W/DIFF WBC COUNT: CPT

## 2022-01-18 PROCEDURE — 700102 HCHG RX REV CODE 250 W/ 637 OVERRIDE(OP): Performed by: STUDENT IN AN ORGANIZED HEALTH CARE EDUCATION/TRAINING PROGRAM

## 2022-01-18 PROCEDURE — 99232 SBSQ HOSP IP/OBS MODERATE 35: CPT | Performed by: FAMILY MEDICINE

## 2022-01-18 PROCEDURE — 80053 COMPREHEN METABOLIC PANEL: CPT

## 2022-01-18 RX ADMIN — GUAIFENESIN 600 MG: 600 TABLET, EXTENDED RELEASE ORAL at 21:29

## 2022-01-18 RX ADMIN — APIXABAN 5 MG: 5 TABLET, FILM COATED ORAL at 17:31

## 2022-01-18 RX ADMIN — MULTIPLE VITAMINS W/ MINERALS TAB 1 TABLET: TAB at 06:32

## 2022-01-18 RX ADMIN — PREDNISONE 40 MG: 20 TABLET ORAL at 06:33

## 2022-01-18 RX ADMIN — BUDESONIDE AND FORMOTEROL FUMARATE DIHYDRATE 2 PUFF: 160; 4.5 AEROSOL RESPIRATORY (INHALATION) at 20:30

## 2022-01-18 RX ADMIN — LOSARTAN POTASSIUM 100 MG: 25 TABLET, FILM COATED ORAL at 06:33

## 2022-01-18 RX ADMIN — AZITHROMYCIN MONOHYDRATE 250 MG: 250 TABLET ORAL at 06:33

## 2022-01-18 RX ADMIN — Medication 2000 UNITS: at 06:32

## 2022-01-18 RX ADMIN — BUDESONIDE AND FORMOTEROL FUMARATE DIHYDRATE 2 PUFF: 160; 4.5 AEROSOL RESPIRATORY (INHALATION) at 07:58

## 2022-01-18 RX ADMIN — AMLODIPINE BESYLATE 5 MG: 5 TABLET ORAL at 06:33

## 2022-01-18 RX ADMIN — FUROSEMIDE 20 MG: 20 TABLET ORAL at 06:33

## 2022-01-18 RX ADMIN — APIXABAN 5 MG: 5 TABLET, FILM COATED ORAL at 06:33

## 2022-01-18 RX ADMIN — INSULIN HUMAN 2 UNITS: 100 INJECTION, SOLUTION PARENTERAL at 11:36

## 2022-01-18 RX ADMIN — ATORVASTATIN CALCIUM 80 MG: 40 TABLET, FILM COATED ORAL at 17:31

## 2022-01-18 RX ADMIN — BENZONATATE 100 MG: 100 CAPSULE ORAL at 21:30

## 2022-01-18 RX ADMIN — INSULIN HUMAN 2 UNITS: 100 INJECTION, SOLUTION PARENTERAL at 17:28

## 2022-01-18 ASSESSMENT — GAIT ASSESSMENTS
DEVIATION: SHUFFLED GAIT;BRADYKINETIC;DECREASED HEEL STRIKE;DECREASED TOE OFF
GAIT LEVEL OF ASSIST: CONTACT GUARD ASSIST
ASSISTIVE DEVICE: FRONT WHEEL WALKER
DISTANCE (FEET): 50

## 2022-01-18 ASSESSMENT — COGNITIVE AND FUNCTIONAL STATUS - GENERAL
MOVING FROM LYING ON BACK TO SITTING ON SIDE OF FLAT BED: A LOT
MOVING TO AND FROM BED TO CHAIR: A LITTLE
STANDING UP FROM CHAIR USING ARMS: A LITTLE
WALKING IN HOSPITAL ROOM: A LITTLE
SUGGESTED CMS G CODE MODIFIER MOBILITY: CK
TURNING FROM BACK TO SIDE WHILE IN FLAT BAD: A LITTLE
MOBILITY SCORE: 15
CLIMB 3 TO 5 STEPS WITH RAILING: TOTAL

## 2022-01-18 ASSESSMENT — ENCOUNTER SYMPTOMS
PALPITATIONS: 0
BACK PAIN: 0
ABDOMINAL PAIN: 0
BLURRED VISION: 0
VOMITING: 0
FEVER: 0
CHILLS: 0
FALLS: 0
COUGH: 1
SHORTNESS OF BREATH: 0
HEARTBURN: 0
NERVOUS/ANXIOUS: 0
DOUBLE VISION: 0
HEADACHES: 0
DIZZINESS: 0

## 2022-01-18 ASSESSMENT — PAIN DESCRIPTION - PAIN TYPE: TYPE: ACUTE PAIN

## 2022-01-18 ASSESSMENT — LIFESTYLE VARIABLES: SUBSTANCE_ABUSE: 0

## 2022-01-18 NOTE — INFECTION CONTROL
This patient is considered COVID RECOVERED.    Patient initially tested positive for COVID on 1/8/2022  Patient is greater than or equal to 10 days from symptom onset (1/6/2022) and/or positive test, with symptom improvement.  Per the CDC guidance, this patient no longer requires transmission based precautions.  Patient may be placed on any unit per the bed assignment policy, including placement in a semi-private room with a roommate.

## 2022-01-18 NOTE — PROGRESS NOTES
"Hospital Medicine Daily Progress Note    Date of Service  1/18/2022    Chief Complaint  Nohelia Dickerson is a 76 y.o. female admitted 1/15/2022 with shortness of breath    Hospital Course  As per chart review:  \"76 y.o. female who presented 1/15/2022 via ambulance for worsening weakness and shortness of breath.  This is a pleasant woman with a history of COPD on 1.5 LPM oxygen 24/7, hypertension, prediabetes, peripheral arterial disease, dyslipidemia, TIA and stroke on apixaban.  Patient reports being vaccinated x2 without a booster for COVID-19.  She presented initially 1 week ago with lightheadedness and bilateral leg cramping and was found to have mild hypokalemia.She was given potassium supplementation.  She did test positive for COVID-19.  Patient reports that she called the ambulance today due to significant worsening of her weakness.  She reports that she cannot longer walk walk to the bathroom on her own because she is so weak.   She denies any exacerbating factors to her worsening weakness.  It is improved with increasing her oxygen.    She reports eating clam chowder without any nausea or vomiting.  No diarrhea or constipation.  He does report also worsening over cough, which has become productive.  She reports having smoked 1 pack/day for 50 years and quit 2.5 years ago.  She denies any current shortness of breath although she is in obvious respiratory distress.  She also reports significant fevers and chills along with her fatigue.     In the ER, patient was noted to have a fever of 101.9, tachycardia with heart rate into the 120s and tachypneic with respiratory rate in the 20s to 40s.  She was noted to be hypoxic down to 85% on 2 LPM oxygen via nasal cannula, which was increased to 4.5 L for SPO2 of 92%.\"    Interval Problem Update  1/16: Patient seen at bedside this morning.  Patient lying in bed.  Patient is alert and oriented today, also aware of her surroundings.  Patient mentions she feels very " weak.  Patient requiring between 4 to 5 L of oxygen at the time of my evaluation.  Pending PT/OT.  Pending ultrasound of the lower extremities.  We will continue with prednisone for now for 5 days, if needed will extend more days of dexamethasone.    1/17: Patient seen at bedside this morning.  Patient lying in bed.  The patient still complaining of weakness.  She is alert and oriented today.  PT recommending postacute placement.  SNF referral placed.  Patient requiring 6 L of oxygen today at the time of my evaluation.  As per nursing no other overnight events reported.    1/18 - Pt states she feels significantly better today - is on 5L and no longer wheezing. On D4 of prednisone - given her underlying covid, would benefit from slow taper.    I have personally seen and examined the patient at bedside. I discussed the plan of care with patient, bedside RN and charge RN.    Consultants/Specialty  None    Code Status  Partial Code    Disposition  Patient is not medically cleared for discharge.   Anticipate discharge to SNF.  I have placed the appropriate orders for post-discharge needs.    Review of Systems  Review of Systems   Constitutional: Positive for malaise/fatigue. Negative for chills and fever.   HENT: Negative for hearing loss and nosebleeds.    Eyes: Negative for blurred vision and double vision.   Respiratory: Positive for cough. Negative for shortness of breath.    Cardiovascular: Negative for chest pain and palpitations.   Gastrointestinal: Negative for abdominal pain, heartburn and vomiting.   Genitourinary: Negative for dysuria and urgency.   Musculoskeletal: Negative for back pain and falls.   Skin: Negative for itching and rash.   Neurological: Negative for dizziness and headaches.   Psychiatric/Behavioral: Negative for substance abuse. The patient is not nervous/anxious.    All other systems reviewed and are negative.       Physical Exam  Temp:  [36.4 °C (97.5 °F)-36.7 °C (98.1 °F)] 36.6 °C (97.9  °F)  Pulse:  [64-86] 75  Resp:  [17-22] 17  BP: (118-140)/(42-79) 129/75  SpO2:  [89 %-94 %] 93 %    Physical Exam  Vitals and nursing note reviewed.   Constitutional:       General: She is not in acute distress.     Appearance: Normal appearance. She is ill-appearing.   HENT:      Head: Normocephalic and atraumatic.      Right Ear: External ear normal.      Left Ear: External ear normal.      Mouth/Throat:      Pharynx: No oropharyngeal exudate or posterior oropharyngeal erythema.   Eyes:      General:         Right eye: No discharge.         Left eye: No discharge.   Cardiovascular:      Rate and Rhythm: Normal rate and regular rhythm.      Pulses: Normal pulses.      Heart sounds: No murmur heard.  No gallop.    Pulmonary:      Effort: Respiratory distress (on NC) present.      Breath sounds: Rhonchi present. No wheezing.   Abdominal:      General: Bowel sounds are normal. There is no distension.      Palpations: Abdomen is soft.      Tenderness: There is no abdominal tenderness. There is no guarding.   Musculoskeletal:         General: No swelling or tenderness. Normal range of motion.      Cervical back: Normal range of motion and neck supple. No tenderness.   Skin:     General: Skin is warm and dry.   Neurological:      General: No focal deficit present.      Mental Status: She is alert and oriented to person, place, and time.      Motor: No weakness.   Psychiatric:         Mood and Affect: Mood normal.         Behavior: Behavior normal.         Thought Content: Thought content normal.         Judgment: Judgment normal.         Fluids    Intake/Output Summary (Last 24 hours) at 1/18/2022 1511  Last data filed at 1/18/2022 1357  Gross per 24 hour   Intake 700 ml   Output --   Net 700 ml       Laboratory  Recent Labs     01/16/22  0428 01/18/22  0149   WBC 8.5 10.9*   RBC 4.45 4.74   HEMOGLOBIN 12.8 13.7   HEMATOCRIT 39.6 41.8   MCV 89.0 88.2   MCH 28.8 28.9   MCHC 32.3* 32.8*   RDW 45.0 45.2   PLATELETCT 158*  "211   MPV 11.0 10.8     Recent Labs     01/16/22  0428 01/18/22  0149   SODIUM 140 138   POTASSIUM 4.5 3.9   CHLORIDE 107 101   CO2 22 26   GLUCOSE 250* 99   BUN 19 38*   CREATININE 0.59 0.79   CALCIUM 8.3* 8.9                   Imaging  US-EXTREMITY VENOUS LOWER BILAT   Final Result      No evidence of bilateral lower extremity deep venous thrombosis.      DX-CHEST-PORTABLE (1 VIEW)   Final Result      1.  Interstitial opacities in the peripheral left lung field and left lung base compatible with multifocal Covid 19 pneumonia.   2.  Cardiomegaly.   3.  Atherosclerotic plaque.           Assessment/Plan  * Acute on chronic respiratory failure with hypoxemia (HCC)- (present on admission)  Assessment & Plan  - Secondary to COVID-19 infection in setting of COPD with exacerbation.  Baseline oxygen need 1.5 L qhs.  Currently requiring 5L RTC.   - Respiratory therapy consult.  - Duo nebs every 4 hours and as needed.  - Symbicort.  - d-dimer is age appropriate   - Procal is normal    Fever  Assessment & Plan  - Has not recurred, likely due to patient's ongoing COVID-19 infection.  - Procalcitonin is within normal limits. Blood cultures negative      Pneumonia due to COVID-19 virus- (present on admission)  Assessment & Plan  - Changed dexamethasone to prednisone 40 mg by mouth daily due to confounding COPD with exacerbation. We will wean slowly at discharge given her continued elevated O2 requirement due to covid.   - Azithromycin    Elevated brain natriuretic peptide (BNP) level- (present on admission)  Assessment & Plan  - pro BNP elevated at 1235 suggestive of mild fluid overload in setting of COVID-19 pneumonia, COPD, and pulmonary hypertension.  - On Lasix daily - previous echo in 2019 without CHF. Will recheck BNP and echo.    DNI (do not intubate)- (present on admission)  Assessment & Plan  As per previous hospitalist: \"I discussed the CODE STATUS with the patient as well as her daughter.  The patient wishes to be " "DNI, CPR okay.\"    History of stroke- (present on admission)  Assessment & Plan  As per history in 2019.  No apparent residual deficits.  Continue home apixaban and atorvastatin.    Generalized weakness- (present on admission)  Assessment & Plan  Due to COVID  SNF referral placed, pt now 10 days out of diagnosis, awaiting bed availability     Toxic metabolic encephalopathy- (present on admission)  Assessment & Plan  Patient is alert and oriented today, she is also aware of her surroundings.  We will continue to monitor closely.    Sepsis due to COVID-19 (HCC)- (present on admission)  Assessment & Plan  This is Sepsis Present on admission  SIRS criteria identified on my evaluation include: Fever, with temperature greater than 101 deg F, Tachycardia, with heart rate greater than 90 BPM and Tachypnea, with respirations greater than 20 per minute  Source is COVID-19 infection  Sepsis protocol initiated  Fluid resuscitation ordered per protocol: Judicious IV fluids in setting of COVID-19.  IV antibiotics as appropriate for source of sepsis: Not indicated at this time.  Procalcitonin pending.  While organ dysfunction may be noted elsewhere in this problem list or in the chart, degree of organ dysfunction does not meet CMS criteria for severe sepsis    Sepsis was secondary to COVID and has resolved.    Secondary hypercoagulability disorder (HCC)- (present on admission)  Assessment & Plan  As per history.  Continue home apixaban.    Prediabetes- (present on admission)  Assessment & Plan  -  A1c is 6.6.  She will require repeat A1c as an outpatient.  Continue insulin sliding scale for now as the patient is on steroids. Can likely utilize diet control as an outpatient     Vitamin D deficiency- (present on admission)  Assessment & Plan  As per history.  Continue home vitamin D.    Dyslipidemia- (present on admission)  Assessment & Plan  As per history.    Continue home atorvastatin.    PVD (peripheral vascular disease) ([facility])- " (present on admission)  Assessment & Plan  As per history.  Continue home apixaban and atorvastatin.    Acute exacerbation of chronic obstructive pulmonary disease (COPD) (HCC)- (present on admission)  Assessment & Plan  - Prednisone 40 mg daily for 5 days given history of COPD in setting of COVID-19 infection, then slow taper to cover COVID   - Azithromycin  - Continue breathing treatments and supportive therapy as per above.  - Patient is okay with BiPAP and not intubation.  - No further wheezing on exam    Thrombocytopenia (HCC)- (present on admission)  Assessment & Plan  Resolved, secondary to acute illness      Hypertension, unspecified type- (present on admission)  Assessment & Plan  - Continue home Losartan and Norvasc         VTE prophylaxis: therapeutic anticoagulation with apixaban    I have performed a physical exam and reviewed and updated ROS and Plan today (1/18/2022). In review of yesterday's note (1/17/2022), there are no changes except as documented above.

## 2022-01-18 NOTE — DISCHARGE PLANNING
Received Choice form at 0856  Agency/Facility Name: [facility]  Referral sent per Choice form @ [phone]  Agency/Facility Name: [facility]  Spoke To: Manuela  Outcome: Per manuela they do not have a bed today [facility] will call back tomorrow.

## 2022-01-18 NOTE — DISCHARGE PLANNING
Anticipated Discharge Disposition: SNF     Action: Due to Covid-19 infection SW intern called pt ([phone]) to discuss choice, pt verbally requested referrals sent to [facility]. SW intern and pt completed assessment via phone. Pt lives in private home with daughter, granddaughter and granddaughters boyfriend. Before admission pt was independent in all IADL's and ADL's, pt owns DME consisting of cane and walker.    0906 SW intern faxed SNF choice form to [facility] with pt's verbal consent      Barriers to Discharge: Medical clearance.    Plan: CM to continue to follow for discharge needs.      Care Transition Team Assessment    Information Source  Orientation Level: Oriented X4  Information Given By: Patient  Informant's Name: Nohelia Dickerson  Who is responsible for making decisions for patient? : Patient    Readmission Evaluation  Is this a readmission?: Yes - unplanned readmission    Elopement Risk  Legal Hold: No  Ambulatory or Self Mobile in Wheelchair: Yes  Disoriented: No  Psychiatric Symptoms: None  History of Wandering: No  Elopement this Admit: No  Vocalizing Wanting to Leave: No  Displays Behaviors, Body Language Wanting to Leave: No-Not at Risk for Elopement  Elopement Risk: Not at Risk for Elopement    Interdisciplinary Discharge Planning  Lives with - Patient's Self Care Capacity: Adult Children,Other (Comments)  Patient or legal guardian wants to designate a caregiver: No  Housing / Facility: [address]  Prior Services: Home-Independent    Discharge Preparedness  What is your plan after discharge?: Uncertain - pending medical team collaboration,Home with help,Skilled nursing facility  What are your discharge supports?: Child,Other (comment) (grandchild and grandchild's significant other)  Prior Functional Level: Ambulatory  Difficulity with ADLs: None  Difficulity with IADLs: None    Functional Assesment  Prior Functional Level: Ambulatory    Finances  Financial  Barriers to Discharge: No  Prescription Coverage: Yes    Vision / Hearing Impairment  Vision Impairment : Yes  Right Eye Vision: Impaired,Wears Glasses  Left Eye Vision: Impaired,Wears Glasses  Hearing Impairment : No         Advance Directive  Advance Directive?: None (currently setting up DPOA with daughter)    Domestic Abuse  Have you ever been the victim of abuse or violence?: No  Physical Abuse or Sexual Abuse: No  Verbal Abuse or Emotional Abuse: No  Possible Abuse/Neglect Reported to:: Not Applicable    Psychological Assessment  History of Substance Abuse: None  History of Psychiatric Problems: No  Non-compliant with Treatment: No  Newly Diagnosed Illness: No    Discharge Risks or Barriers  Discharge risks or barriers?: No    Anticipated Discharge Information  Discharge Disposition: D/T to IP rehab facility w/planned hosp IP readmit (40)  Discharge Address: [address] [place] [address] [place] [address]    Reviewed by [facility]

## 2022-01-18 NOTE — THERAPY
Physical Therapy   Daily Treatment     Patient Name: Nohelia Dickerson  Age:  76 y.o., Sex:  female  Medical Record #: [id number]  Today's Date: 1/18/2022     Precautions  Precautions: Fall Risk  Comments: h/o COVID    Assessment        Pt seen for PT treatment including BLE therapeutic exercises, standing balance, and gait training. Pt demonstrating improvement in mobility and tolerance but continues to be hindered by generalized fatigue.   Pt will benefit from continued inpatient as well as post acute PT.       Plan    Continue current treatment plan.    DC Equipment Recommendations: Unable to determine at this time  Discharge Recommendations: Recommend post-acute placement for additional physical therapy services prior to discharge home         Objective       01/18/22 1348   Supine Lower Body Exercise   Hip Abduction 1 set of 10   Hip Adduction  1 set of 10   Short Arc Quad 1 set of 10   Heel Slide 1 set of 10   Ankle Pumps 1 set of 10   Balance   Sitting Balance (Static) Fair   Sitting Balance (Dynamic) Fair -   Standing Balance (Static) Fair -   Standing Balance (Dynamic) Fair -   Weight Shift Sitting Fair   Weight Shift Standing Fair   Skilled Intervention Sequencing;Tactile Cuing   Comments w/FWW   Gait Analysis   Gait Level Of Assist Contact Guard Assist   Assistive Device Front Wheel Walker   Distance (Feet) 50   # of Times Distance was Traveled 1   Deviation Shuffled Gait;Bradykinetic;Decreased Heel Strike;Decreased Toe Off  (heavy reliance on FWW)   # of Stairs Climbed 0   Weight Bearing Status no restrictions.    Skilled Intervention Verbal Cuing   Bed Mobility    Supine to Sit Supervised   Sit to Supine Supervised   Scooting Supervised   Rolling Supervised   Skilled Intervention Verbal Cuing   Functional Mobility   Sit to Stand Minimal Assist   Bed, Chair, Wheelchair Transfer Minimal Assist   Toilet Transfers Moderate Assist   Transfer Method Stand Step   Mobility bed to toilet [facility]   Skilled Intervention  Tactile Cuing;Sequencing   Activity Tolerance   Sitting in Chair 12   Sitting Edge of Bed 5   Patient / Family Goals    Patient / Family Goal #1 to be able to walk again    Goal #1 Outcome Progressing as expected   Short Term Goals    Short Term Goal # 1 Pt will transition supine <> sit with spv within 6 V in order to progress functional mobility.    Goal Outcome # 1 Progressing as expected   Short Term Goal # 2 Pt will transition sit <> stand with FWW and spv within 6 V in order to progress functional mobility.   Goal Outcome # 2 Progressing as expected   Short Term Goal # 3 Pt will ambulate 100ft with FWW and spv within 6 V in order to progress functional mobility.    Goal Outcome # 3 Progressing as expected   Short Term Goal # 4 Pt will ascend/descend 2 steps with Phani within 6 V in order to access her home.    Goal Outcome # 4 Goal not met   Education Group   Role of Physical Therapist Patient Response Patient;Acceptance;Explanation;Verbal Demonstration   Exercises - Supine Patient Response Patient;Acceptance;Explanation;Action Demonstration

## 2022-01-18 NOTE — CARE PLAN
The patient is Stable - Low risk of patient condition declining or worsening    Shift Goals  Clinical Goals: Wean O2, up to chair  Patient Goals: Feel better  Family Goals: Information    Progress made toward(s) clinical / shift goals:  Patient has been up to chair for meals. Patient is requires 5L O2. Patient's strength is improving.    Problem: Knowledge Deficit - Standard  Goal: Patient and family/care givers will demonstrate understanding of plan of care, disease process/condition, diagnostic tests and medications  Outcome: Progressing     Problem: Fall Risk  Goal: Patient will remain free from falls  Outcome: Progressing       Patient is not progressing towards the following goals:

## 2022-01-19 LAB
ALBUMIN SERPL BCP-MCNC: 3.4 G/DL (ref 3.2–4.9)
ALBUMIN/GLOB SERPL: 1 G/DL
ALP SERPL-CCNC: 75 U/L (ref 30–99)
ALT SERPL-CCNC: 67 U/L (ref 2–50)
ANION GAP SERPL CALC-SCNC: 14 MMOL/L (ref 7–16)
AST SERPL-CCNC: 57 U/L (ref 12–45)
BASOPHILS # BLD AUTO: 0 % (ref 0–1.8)
BASOPHILS # BLD: 0 K/UL (ref 0–0.12)
BILIRUB SERPL-MCNC: 0.8 MG/DL (ref 0.1–1.5)
BUN SERPL-MCNC: 31 MG/DL (ref 8–22)
CALCIUM SERPL-MCNC: 8.9 MG/DL (ref 8.4–10.2)
CHLORIDE SERPL-SCNC: 105 MMOL/L (ref 96–112)
CO2 SERPL-SCNC: 26 MMOL/L (ref 20–33)
CREAT SERPL-MCNC: 0.66 MG/DL (ref 0.5–1.4)
EOSINOPHIL # BLD AUTO: 0 K/UL (ref 0–0.51)
EOSINOPHIL NFR BLD: 0 % (ref 0–6.9)
ERYTHROCYTE [DISTWIDTH] IN BLOOD BY AUTOMATED COUNT: 44.5 FL (ref 35.9–50)
GLOBULIN SER CALC-MCNC: 3.3 G/DL (ref 1.9–3.5)
GLUCOSE BLD-MCNC: 112 MG/DL (ref 65–99)
GLUCOSE BLD-MCNC: 150 MG/DL (ref 65–99)
GLUCOSE BLD-MCNC: 157 MG/DL (ref 65–99)
GLUCOSE BLD-MCNC: 179 MG/DL (ref 65–99)
GLUCOSE BLD-MCNC: 85 MG/DL (ref 65–99)
GLUCOSE SERPL-MCNC: 85 MG/DL (ref 65–99)
HCT VFR BLD AUTO: 40 % (ref 37–47)
HGB BLD-MCNC: 13.3 G/DL (ref 12–16)
LYMPHOCYTES # BLD AUTO: 1.98 K/UL (ref 1–4.8)
LYMPHOCYTES NFR BLD: 20 % (ref 22–41)
MANUAL DIFF BLD: NORMAL
MCH RBC QN AUTO: 29 PG (ref 27–33)
MCHC RBC AUTO-ENTMCNC: 33.3 G/DL (ref 33.6–35)
MCV RBC AUTO: 87.3 FL (ref 81.4–97.8)
MONOCYTES # BLD AUTO: 0.2 K/UL (ref 0–0.85)
MONOCYTES NFR BLD AUTO: 2 % (ref 0–13.4)
NEUTROPHILS # BLD AUTO: 7.72 K/UL (ref 2–7.15)
NEUTROPHILS NFR BLD: 78 % (ref 44–72)
NRBC # BLD AUTO: 0 K/UL
NRBC BLD-RTO: 0 /100 WBC
NT-PROBNP SERPL IA-MCNC: 1094 PG/ML (ref 0–125)
PLATELET # BLD AUTO: 216 K/UL (ref 164–446)
PLATELET BLD QL SMEAR: NORMAL
PMV BLD AUTO: 10.4 FL (ref 9–12.9)
POTASSIUM SERPL-SCNC: 3.8 MMOL/L (ref 3.6–5.5)
PROT SERPL-MCNC: 6.7 G/DL (ref 6–8.2)
RBC # BLD AUTO: 4.58 M/UL (ref 4.2–5.4)
RBC BLD AUTO: NORMAL
SODIUM SERPL-SCNC: 145 MMOL/L (ref 135–145)
WBC # BLD AUTO: 9.9 K/UL (ref 4.8–10.8)

## 2022-01-19 PROCEDURE — 700102 HCHG RX REV CODE 250 W/ 637 OVERRIDE(OP): Performed by: STUDENT IN AN ORGANIZED HEALTH CARE EDUCATION/TRAINING PROGRAM

## 2022-01-19 PROCEDURE — 83880 ASSAY OF NATRIURETIC PEPTIDE: CPT

## 2022-01-19 PROCEDURE — 94640 AIRWAY INHALATION TREATMENT: CPT

## 2022-01-19 PROCEDURE — 85027 COMPLETE CBC AUTOMATED: CPT

## 2022-01-19 PROCEDURE — 700111 HCHG RX REV CODE 636 W/ 250 OVERRIDE (IP): Performed by: STUDENT IN AN ORGANIZED HEALTH CARE EDUCATION/TRAINING PROGRAM

## 2022-01-19 PROCEDURE — 94664 DEMO&/EVAL PT USE INHALER: CPT

## 2022-01-19 PROCEDURE — 770006 HCHG ROOM/CARE - MED/SURG/GYN SEMI*

## 2022-01-19 PROCEDURE — 80053 COMPREHEN METABOLIC PANEL: CPT

## 2022-01-19 PROCEDURE — A9270 NON-COVERED ITEM OR SERVICE: HCPCS | Performed by: STUDENT IN AN ORGANIZED HEALTH CARE EDUCATION/TRAINING PROGRAM

## 2022-01-19 PROCEDURE — 85007 BL SMEAR W/DIFF WBC COUNT: CPT

## 2022-01-19 PROCEDURE — 82962 GLUCOSE BLOOD TEST: CPT | Mod: 91

## 2022-01-19 PROCEDURE — 94760 N-INVAS EAR/PLS OXIMETRY 1: CPT

## 2022-01-19 PROCEDURE — 99232 SBSQ HOSP IP/OBS MODERATE 35: CPT | Performed by: FAMILY MEDICINE

## 2022-01-19 RX ORDER — DEXAMETHASONE 4 MG/1
6 TABLET ORAL DAILY
Status: DISCONTINUED | OUTPATIENT
Start: 2022-01-20 | End: 2022-01-20 | Stop reason: HOSPADM

## 2022-01-19 RX ADMIN — LOSARTAN POTASSIUM 100 MG: 25 TABLET, FILM COATED ORAL at 06:17

## 2022-01-19 RX ADMIN — INSULIN HUMAN 1 UNITS: 100 INJECTION, SOLUTION PARENTERAL at 18:14

## 2022-01-19 RX ADMIN — AMLODIPINE BESYLATE 5 MG: 5 TABLET ORAL at 06:17

## 2022-01-19 RX ADMIN — ATORVASTATIN CALCIUM 80 MG: 40 TABLET, FILM COATED ORAL at 18:12

## 2022-01-19 RX ADMIN — INSULIN HUMAN 1 UNITS: 100 INJECTION, SOLUTION PARENTERAL at 11:33

## 2022-01-19 RX ADMIN — APIXABAN 5 MG: 5 TABLET, FILM COATED ORAL at 18:12

## 2022-01-19 RX ADMIN — MULTIPLE VITAMINS W/ MINERALS TAB 1 TABLET: TAB at 06:18

## 2022-01-19 RX ADMIN — BUDESONIDE AND FORMOTEROL FUMARATE DIHYDRATE 2 PUFF: 160; 4.5 AEROSOL RESPIRATORY (INHALATION) at 20:25

## 2022-01-19 RX ADMIN — Medication 2000 UNITS: at 06:17

## 2022-01-19 RX ADMIN — BUDESONIDE AND FORMOTEROL FUMARATE DIHYDRATE 2 PUFF: 160; 4.5 AEROSOL RESPIRATORY (INHALATION) at 08:08

## 2022-01-19 RX ADMIN — APIXABAN 5 MG: 5 TABLET, FILM COATED ORAL at 06:17

## 2022-01-19 RX ADMIN — FUROSEMIDE 20 MG: 20 TABLET ORAL at 06:18

## 2022-01-19 RX ADMIN — PREDNISONE 40 MG: 20 TABLET ORAL at 06:17

## 2022-01-19 RX ADMIN — AZITHROMYCIN MONOHYDRATE 250 MG: 250 TABLET ORAL at 06:17

## 2022-01-19 ASSESSMENT — ENCOUNTER SYMPTOMS
HEADACHES: 0
FEVER: 0
DOUBLE VISION: 0
FALLS: 0
ABDOMINAL PAIN: 0
BACK PAIN: 0
PALPITATIONS: 0
NERVOUS/ANXIOUS: 0
SHORTNESS OF BREATH: 0
VOMITING: 0
DIZZINESS: 0
COUGH: 1
BLURRED VISION: 0
CHILLS: 0
HEARTBURN: 0

## 2022-01-19 ASSESSMENT — LIFESTYLE VARIABLES: SUBSTANCE_ABUSE: 0

## 2022-01-19 ASSESSMENT — PAIN DESCRIPTION - PAIN TYPE
TYPE: ACUTE PAIN
TYPE: ACUTE PAIN;CHRONIC PAIN

## 2022-01-19 NOTE — PROGRESS NOTES
0700: Report received from Oswald GARCIA  . Pt resting comfortably in bed at the time of report. Plan of care assumed. Safety precautions in place and call light within reach.    0830: Spoke with patient and updated on plan of care. Encouraged questions. All questions answered at this time.

## 2022-01-19 NOTE — CARE PLAN
The patient is Stable - Low risk of patient condition declining or worsening    Shift Goals  Clinical Goals: Find placement  Patient Goals: Increase mobility, shower  Family Goals: Support    Progress made toward(s) clinical / shift goals:  Patient has been medically cleared and is awaiting bed placement.     Problem: Respiratory  Goal: Patient will achieve/maintain optimum respiratory ventilation and gas exchange  Outcome: Progressing  Patient has been weaned down to 3L.     Problem: Knowledge Deficit - Standard  Goal: Patient and family/care givers will demonstrate understanding of plan of care, disease process/condition, diagnostic tests and medications  Outcome: Progressing  Updated patient on plan of care. All questions and concerns have been addressed at this time.        Patient is not progressing towards the following goals:

## 2022-01-19 NOTE — DISCHARGE PLANNING
[facility]ency[facility] Name[facility]  Outcome: Left a message for admissions.  Awaiting a call back.

## 2022-01-19 NOTE — PROGRESS NOTES
"Hospital Medicine Daily Progress Note    Date of Service  1/19/2022    Chief Complaint  Nohelia Dickerson is a 76 y.o. female admitted 1/15/2022 with shortness of breath    Hospital Course  As per chart review:  \"76 y.o. female who presented 1/15/2022 via ambulance for worsening weakness and shortness of breath.  This is a pleasant woman with a history of COPD on 1.5 LPM oxygen 24/7, hypertension, prediabetes, peripheral arterial disease, dyslipidemia, TIA and stroke on apixaban.  Patient reports being vaccinated x2 without a booster for COVID-19.  She presented initially 1 week ago with lightheadedness and bilateral leg cramping and was found to have mild hypokalemia.She was given potassium supplementation.  She did test positive for COVID-19.  Patient reports that she called the ambulance today due to significant worsening of her weakness.  She reports that she cannot longer walk walk to the bathroom on her own because she is so weak.   She denies any exacerbating factors to her worsening weakness.  It is improved with increasing her oxygen.    She reports eating clam chowder without any nausea or vomiting.  No diarrhea or constipation.  He does report also worsening over cough, which has become productive.  She reports having smoked 1 pack/day for 50 years and quit 2.5 years ago.  She denies any current shortness of breath although she is in obvious respiratory distress.  She also reports significant fevers and chills along with her fatigue.     In the ER, patient was noted to have a fever of 101.9, tachycardia with heart rate into the 120s and tachypneic with respiratory rate in the 20s to 40s.  She was noted to be hypoxic down to 85% on 2 LPM oxygen via nasal cannula, which was increased to 4.5 L for SPO2 of 92%.\"    Interval Problem Update  1/16: Patient seen at bedside this morning.  Patient lying in bed.  Patient is alert and oriented today, also aware of her surroundings.  Patient mentions she feels very " weak.  Patient requiring between 4 to 5 L of oxygen at the time of my evaluation.  Pending PT/OT.  Pending ultrasound of the lower extremities.  We will continue with prednisone for now for 5 days, if needed will extend more days of dexamethasone.    1/17: Patient seen at bedside this morning.  Patient lying in bed.  The patient still complaining of weakness.  She is alert and oriented today.  PT recommending postacute placement.  SNF referral placed.  Patient requiring 6 L of oxygen today at the time of my evaluation.  As per nursing no other overnight events reported.    1/18 - Pt states she feels significantly better today - is on 5L and no longer wheezing. On D4 of prednisone - given her underlying covid, would benefit from slow taper.    1/19 - Pt now on 3L O2 (on 1/5L at home), no wheezing on exam, is on D5 of prednisone for COPD exacerbation with COVID. Will transition to Decadron tomorrow am. Awaiting SNF bed placement as PT/PT recommending from SNF.     I have personally seen and examined the patient at bedside. I discussed the plan of care with patient, bedside RN and charge RN.    Consultants/Specialty  None    Code Status  Partial Code    Disposition  Patient is medically cleared for discharge.   Anticipate discharge to SNF.  I have placed the appropriate orders for post-discharge needs.    Review of Systems  Review of Systems   Constitutional: Positive for malaise/fatigue. Negative for chills and fever.   HENT: Negative for hearing loss and nosebleeds.    Eyes: Negative for blurred vision and double vision.   Respiratory: Positive for cough. Negative for shortness of breath.    Cardiovascular: Negative for chest pain and palpitations.   Gastrointestinal: Negative for abdominal pain, heartburn and vomiting.   Genitourinary: Negative for dysuria and urgency.   Musculoskeletal: Negative for back pain and falls.   Skin: Negative for itching and rash.   Neurological: Negative for dizziness and headaches.    Psychiatric/Behavioral: Negative for substance abuse. The patient is not nervous/anxious.    All other systems reviewed and are negative.       Physical Exam  Temp:  [36.4 °C (97.6 °F)] 36.4 °C (97.6 °F)  Pulse:  [] 109  Resp:  [18-20] 20  BP: (124-150)/(79-86) 133/85  SpO2:  [89 %-92 %] 92 %    Physical Exam  Vitals and nursing note reviewed.   Constitutional:       General: She is not in acute distress.     Appearance: Normal appearance. She is not ill-appearing.   HENT:      Head: Normocephalic and atraumatic.      Right Ear: External ear normal.      Left Ear: External ear normal.      Mouth/Throat:      Pharynx: No oropharyngeal exudate or posterior oropharyngeal erythema.   Eyes:      General:         Right eye: No discharge.         Left eye: No discharge.   Cardiovascular:      Rate and Rhythm: Normal rate and regular rhythm.      Pulses: Normal pulses.      Heart sounds: No murmur heard.  No gallop.    Pulmonary:      Effort: No respiratory distress.      Breath sounds: No wheezing or rhonchi.      Comments: No wheezing, coarse breath sounds bilaterally    Abdominal:      General: Bowel sounds are normal. There is no distension.      Palpations: Abdomen is soft.      Tenderness: There is no abdominal tenderness. There is no guarding.   Musculoskeletal:         General: No swelling or tenderness. Normal range of motion.      Cervical back: Normal range of motion and neck supple. No tenderness.   Skin:     General: Skin is warm and dry.   Neurological:      General: No focal deficit present.      Mental Status: She is alert and oriented to person, place, and time.      Motor: No weakness.   Psychiatric:         Mood and Affect: Mood normal.         Behavior: Behavior normal.         Thought Content: Thought content normal.         Judgment: Judgment normal.         Fluids    Intake/Output Summary (Last 24 hours) at 1/19/2022 1458  Last data filed at 1/19/2022 1242  Gross per 24 hour   Intake 480 ml    Output --   Net 480 ml       Laboratory  Recent Labs     01/18/22  0149 01/19/22  0235   WBC 10.9* 9.9   RBC 4.74 4.58   HEMOGLOBIN 13.7 13.3   HEMATOCRIT 41.8 40.0   MCV 88.2 87.3   MCH 28.9 29.0   MCHC 32.8* 33.3*   RDW 45.2 44.5   PLATELETCT 211 216   MPV 10.8 10.4     Recent Labs     01/18/22  0149 01/19/22  0235   SODIUM 138 145   POTASSIUM 3.9 3.8   CHLORIDE 101 105   CO2 26 26   GLUCOSE 99 85   BUN 38* 31*   CREATININE 0.79 0.66   CALCIUM 8.9 8.9                   Imaging  EC-ECHOCARDIOGRAM COMPLETE W/O CONT   Final Result      US-EXTREMITY VENOUS LOWER BILAT   Final Result      No evidence of bilateral lower extremity deep venous thrombosis.      DX-CHEST-PORTABLE (1 VIEW)   Final Result      1.  Interstitial opacities in the peripheral left lung field and left lung base compatible with multifocal Covid 19 pneumonia.   2.  Cardiomegaly.   3.  Atherosclerotic plaque.           Assessment/Plan  * Acute on chronic respiratory failure with hypoxemia (HCC)- (present on admission)  Assessment & Plan  - Secondary to COVID-19 infection in setting of COPD with exacerbation.  Baseline oxygen need 1.5 L qhs.  Currently requiring 3L RTC.   - Respiratory therapy consult.  - Duo nebs every 4 hours and as needed.  - Symbicort.  - d-dimer is age appropriate   - Procal is normal    Transaminitis  Assessment & Plan  - No abdominal symptoms  - Hepatitis panel negative  - Trending down  - Secondary to COVID      Fever  Assessment & Plan  - Has not recurred, likely due to patient's ongoing COVID-19 infection.  - Procalcitonin is within normal limits. Blood cultures negative      Pneumonia due to COVID-19 virus- (present on admission)  Assessment & Plan  - Completed 5d of Prednisone for COPD exacerbation, convert to Decadron tomorrow    - Azithromycin completed for COPD exacerbation    Elevated brain natriuretic peptide (BNP) level- (present on admission)  Assessment & Plan  - pro BNP elevated at 1235 suggestive of mild fluid  "overload in setting of COVID-19 pneumonia, COPD, and pulmonary hypertension.  - On Lasix daily - echo normal, will hold tomorrow's Lasix and monitor    DNI (do not intubate)- (present on admission)  Assessment & Plan  As per previous hospitalist: \"I discussed the CODE STATUS with the patient as well as her daughter.  The patient wishes to be DNI, CPR okay.\"    History of stroke- (present on admission)  Assessment & Plan  As per history in 2019.  No apparent residual deficits.  Continue home apixaban and atorvastatin.    Generalized weakness- (present on admission)  Assessment & Plan  Due to COVID  SNF referral placed, pt now 10 days out of diagnosis, awaiting bed availability     Toxic metabolic encephalopathy- (present on admission)  Assessment & Plan  Patient is alert and oriented today, she is also aware of her surroundings.  We will continue to monitor closely.    Sepsis due to COVID-19 (HCC)- (present on admission)  Assessment & Plan  This is Sepsis Present on admission  SIRS criteria identified on my evaluation include: Fever, with temperature greater than 101 deg F, Tachycardia, with heart rate greater than 90 BPM and Tachypnea, with respirations greater than 20 per minute  Source is COVID-19 infection  Sepsis protocol initiated  Fluid resuscitation ordered per protocol: Judicious IV fluids in setting of COVID-19.  IV antibiotics as appropriate for source of sepsis: Not indicated at this time.  Procalcitonin pending.  While organ dysfunction may be noted elsewhere in this problem list or in the chart, degree of organ dysfunction does not meet CMS criteria for severe sepsis    Sepsis was secondary to COVID and has resolved.    Secondary hypercoagulability disorder (HCC)- (present on admission)  Assessment & Plan  As per history.  Continue home apixaban.    Prediabetes- (present on admission)  Assessment & Plan  -  A1c is 6.6.  She will require repeat A1c as an outpatient.  Continue insulin sliding scale for " now as the patient is on steroids. Can likely utilize diet control as an outpatient     Vitamin D deficiency- (present on admission)  Assessment & Plan  As per history.  Continue home vitamin D.    Dyslipidemia- (present on admission)  Assessment & Plan  As per history.    Continue home atorvastatin.    PVD (peripheral vascular disease) ([facility])- (present on admission)  Assessment & Plan  As per history.  Continue home apixaban and atorvastatin.    Acute exacerbation of chronic obstructive pulmonary disease (COPD) ([facility])- (present on admission)  Assessment & Plan  - Prednisone 40 mg daily for 5 days given history of COPD in setting of COVID-19 infection, then convert to Decadron tomorrow to cover COVID   - Azithromycin completed  - Continue breathing treatments and supportive therapy as per above.  - Patient is okay with BiPAP and not intubation.  - No further wheezing on exam    Thrombocytopenia (HCC)- (present on admission)  Assessment & Plan  Resolved, secondary to acute illness      Hypertension, unspecified type- (present on admission)  Assessment & Plan  - Continue home Losartan and Norvasc         VTE prophylaxis: therapeutic anticoagulation with apixaban    I have performed a physical exam and reviewed and updated ROS and Plan today (1/19/2022). In review of yesterday's note (1/18/2022), there are no changes except as documented above.

## 2022-01-19 NOTE — CARE PLAN
The patient is Stable - Low risk of patient condition declining or worsening    Shift Goals  Clinical Goals: Wean O2, increase mobility  Patient Goals: Rest, get better  Family Goals: Support, updates    Progress made toward(s) clinical / shift goals:  Patient is down to 3.5L O2. Patient has been up to chair for all meals.    Problem: Respiratory  Goal: Patient will achieve/maintain optimum respiratory ventilation and gas exchange  Outcome: Progressing     Problem: Knowledge Deficit - Standard  Goal: Patient and family/care givers will demonstrate understanding of plan of care, disease process/condition, diagnostic tests and medications  Outcome: Progressing       Patient is not progressing towards the following goals:

## 2022-01-19 NOTE — PROGRESS NOTES
0710: Report received from JOSE Akers  . Pt resting in chair at the time of report. Plan of care assumed. Safety precautions in place and call light within reach.

## 2022-01-20 VITALS
BODY MASS INDEX: 22.54 KG/M2 | HEIGHT: 72 IN | HEART RATE: 84 BPM | TEMPERATURE: 97.4 F | OXYGEN SATURATION: 93 % | RESPIRATION RATE: 20 BRPM | DIASTOLIC BLOOD PRESSURE: 66 MMHG | SYSTOLIC BLOOD PRESSURE: 124 MMHG | WEIGHT: 166.45 LBS

## 2022-01-20 LAB
ALBUMIN SERPL BCP-MCNC: 3.2 G/DL (ref 3.2–4.9)
ALBUMIN/GLOB SERPL: 1 G/DL
ALP SERPL-CCNC: 70 U/L (ref 30–99)
ALT SERPL-CCNC: 57 U/L (ref 2–50)
ANION GAP SERPL CALC-SCNC: 13 MMOL/L (ref 7–16)
AST SERPL-CCNC: 36 U/L (ref 12–45)
BACTERIA BLD CULT: NORMAL
BACTERIA BLD CULT: NORMAL
BILIRUB SERPL-MCNC: 0.8 MG/DL (ref 0.1–1.5)
BUN SERPL-MCNC: 32 MG/DL (ref 8–22)
CALCIUM SERPL-MCNC: 8.4 MG/DL (ref 8.4–10.2)
CHLORIDE SERPL-SCNC: 104 MMOL/L (ref 96–112)
CO2 SERPL-SCNC: 26 MMOL/L (ref 20–33)
CREAT SERPL-MCNC: 0.65 MG/DL (ref 0.5–1.4)
GLOBULIN SER CALC-MCNC: 3.2 G/DL (ref 1.9–3.5)
GLUCOSE BLD-MCNC: 197 MG/DL (ref 65–99)
GLUCOSE BLD-MCNC: 84 MG/DL (ref 65–99)
GLUCOSE SERPL-MCNC: 89 MG/DL (ref 65–99)
POTASSIUM SERPL-SCNC: 3.6 MMOL/L (ref 3.6–5.5)
PROT SERPL-MCNC: 6.4 G/DL (ref 6–8.2)
SIGNIFICANT IND 70042: NORMAL
SIGNIFICANT IND 70042: NORMAL
SITE SITE: NORMAL
SITE SITE: NORMAL
SODIUM SERPL-SCNC: 143 MMOL/L (ref 135–145)
SOURCE SOURCE: NORMAL
SOURCE SOURCE: NORMAL

## 2022-01-20 PROCEDURE — 94640 AIRWAY INHALATION TREATMENT: CPT

## 2022-01-20 PROCEDURE — 82962 GLUCOSE BLOOD TEST: CPT

## 2022-01-20 PROCEDURE — 80053 COMPREHEN METABOLIC PANEL: CPT

## 2022-01-20 PROCEDURE — 97535 SELF CARE MNGMENT TRAINING: CPT

## 2022-01-20 PROCEDURE — 99239 HOSP IP/OBS DSCHRG MGMT >30: CPT | Performed by: FAMILY MEDICINE

## 2022-01-20 PROCEDURE — 700102 HCHG RX REV CODE 250 W/ 637 OVERRIDE(OP): Performed by: FAMILY MEDICINE

## 2022-01-20 PROCEDURE — 700102 HCHG RX REV CODE 250 W/ 637 OVERRIDE(OP): Performed by: STUDENT IN AN ORGANIZED HEALTH CARE EDUCATION/TRAINING PROGRAM

## 2022-01-20 PROCEDURE — A9270 NON-COVERED ITEM OR SERVICE: HCPCS | Performed by: FAMILY MEDICINE

## 2022-01-20 PROCEDURE — 97530 THERAPEUTIC ACTIVITIES: CPT

## 2022-01-20 PROCEDURE — A9270 NON-COVERED ITEM OR SERVICE: HCPCS | Performed by: STUDENT IN AN ORGANIZED HEALTH CARE EDUCATION/TRAINING PROGRAM

## 2022-01-20 PROCEDURE — 94760 N-INVAS EAR/PLS OXIMETRY 1: CPT

## 2022-01-20 RX ORDER — BENZONATATE 100 MG/1
100 CAPSULE ORAL 3 TIMES DAILY PRN
Qty: 60 CAPSULE | Refills: 0 | Status: SHIPPED
Start: 2022-01-20 | End: 2023-07-17

## 2022-01-20 RX ORDER — GUAIFENESIN 600 MG/1
600 TABLET, EXTENDED RELEASE ORAL 2 TIMES DAILY
Qty: 28 TABLET | Status: SHIPPED
Start: 2022-01-20 | End: 2022-02-14

## 2022-01-20 RX ORDER — DEXAMETHASONE 6 MG/1
6 TABLET ORAL DAILY
Qty: 4 TABLET | Refills: 0 | Status: SHIPPED
Start: 2022-01-21 | End: 2022-01-25

## 2022-01-20 RX ADMIN — Medication 2000 UNITS: at 05:55

## 2022-01-20 RX ADMIN — DEXAMETHASONE 6 MG: 4 TABLET ORAL at 05:54

## 2022-01-20 RX ADMIN — BUDESONIDE AND FORMOTEROL FUMARATE DIHYDRATE 2 PUFF: 160; 4.5 AEROSOL RESPIRATORY (INHALATION) at 08:50

## 2022-01-20 RX ADMIN — INSULIN HUMAN 1 UNITS: 100 INJECTION, SOLUTION PARENTERAL at 11:00

## 2022-01-20 RX ADMIN — MULTIPLE VITAMINS W/ MINERALS TAB 1 TABLET: TAB at 05:54

## 2022-01-20 RX ADMIN — APIXABAN 5 MG: 5 TABLET, FILM COATED ORAL at 05:55

## 2022-01-20 RX ADMIN — LOSARTAN POTASSIUM 100 MG: 25 TABLET, FILM COATED ORAL at 05:54

## 2022-01-20 RX ADMIN — AMLODIPINE BESYLATE 5 MG: 5 TABLET ORAL at 05:54

## 2022-01-20 ASSESSMENT — COGNITIVE AND FUNCTIONAL STATUS - GENERAL
DRESSING REGULAR UPPER BODY CLOTHING: A LITTLE
SUGGESTED CMS G CODE MODIFIER DAILY ACTIVITY: CK
EATING MEALS: A LITTLE
DRESSING REGULAR LOWER BODY CLOTHING: A LITTLE
PERSONAL GROOMING: A LITTLE
TOILETING: A LITTLE
DAILY ACTIVITIY SCORE: 17
HELP NEEDED FOR BATHING: A LOT

## 2022-01-20 ASSESSMENT — GAIT ASSESSMENTS: DISTANCE (FEET): 20

## 2022-01-20 NOTE — DISCHARGE SUMMARY
"Discharge Summary    CHIEF COMPLAINT ON ADMISSION  Chief Complaint   Patient presents with   • Shortness of Breath     Recent Dx of covid, progressively worsening SoB and weakness.    • Weakness     \"I can't even walk to the bathroom I'm so weak.\"       Reason for Admission  EMS     Admission Date  1/15/2022    CODE STATUS  Partial Code    HPI & HOSPITAL COURSE  This is a 76 y.o. female here with a history of COPD, HTN, prediabetes, PAD, HLD, TIA and CVA who is vaccinated but has not received a booster who was admitted for COVID PNA.  She was treated with prednisone at first for concurrent COPD exacerbation and then conversion to decadron, and is now on 4L O2 - her baseline is 1.5L. She has a very mild LFT elevation related to COVID that is stable/declining, and metabolic encephalopathy present on admission has since resolved.  PT/OT is recommending SNF and pt has a bed available today and is in stable condition for discharge.      Therefore, she is discharged in good and stable condition to skilled nursing facility.    The patient met 2-midnight criteria for an inpatient stay at the time of discharge.    Discharge Date  1/20/22    FOLLOW UP ITEMS POST DISCHARGE  PCP after discharge from SNF     DISCHARGE DIAGNOSES  Principal Problem:    Acute on chronic respiratory failure with hypoxemia (HCC) POA: Yes  Active Problems:    Hypertension, unspecified type POA: Yes    Thrombocytopenia (HCC) POA: Yes    Acute exacerbation of chronic obstructive pulmonary disease (COPD) (HCC) POA: Yes      Overview: IMO Update    PVD (peripheral vascular disease) (HCC) POA: Yes    Dyslipidemia POA: Yes    Vitamin D deficiency POA: Yes    Prediabetes POA: Yes    Secondary hypercoagulability disorder (HCC) POA: Yes    Sepsis due to COVID-19 (HCC) POA: Yes    Toxic metabolic encephalopathy POA: Yes    Generalized weakness POA: Yes    History of stroke (Chronic) POA: Yes    DNI (do not intubate) POA: Yes    Elevated brain natriuretic " peptide (BNP) level POA: Yes    Pneumonia due to COVID-19 virus POA: Yes    Fever POA: Unknown    Transaminitis POA: Unknown  Resolved Problems:    * No resolved hospital problems. *      FOLLOW UP  Future Appointments   Date Time Provider Department Center   4/26/2022 11:20 AM Michael J Bloch, M.D. [facility] None     [facility]  [address]  [place] [address]  [phone]        Winsome Schumacher M.D.  [address]  [address]  [place] [id number] [phone]      Once out of SNF      MEDICATIONS ON DISCHARGE     Medication List      START taking these medications      Instructions   benzonatate 100 MG Caps  Commonly known as: TESSALON   Take 1 Capsule by mouth 3 times a day as needed for Cough.  Dose: 100 mg     dexamethasone 6 MG Tabs  Start taking on: January 21, 2022  Commonly known as: DECADRON   Take 1 Tablet by mouth every day for 4 days.  Dose: 6 mg     guaiFENesin  MG Tb12  Commonly known as: MUCINEX   Take 1 Tablet by mouth 2 times a day.  Dose: 600 mg        CONTINUE taking these medications      Instructions   acetaminophen 500 MG Tabs  Commonly known as: TYLENOL   Take 1,000 mg by mouth every 6 hours as needed for Mild Pain. 2 tablets = 1,000 mg.  Dose: 1,000 mg     albuterol 108 (90 Base) MCG/ACT Aers inhalation aerosol   Inhale 1-2 Puffs every 6 hours as needed for Shortness of Breath.  Dose: 1-2 Puff     amLODIPine 5 MG Tabs  Commonly known as: NORVASC   Take 5 mg by mouth every morning.  Dose: 5 mg     apixaban 5mg Tabs  Commonly known as: Eliquis   Doctor's comments: This rx was submitted by a pharmacist working under a collaborative practice agreement.  Take 1 Tablet by mouth 2 times a day. Provide 30 days of samples to the patient if available  Dose: 5 mg     atorvastatin 80 MG tablet  Commonly known as: LIPITOR   TAKE 1 TABLET BY MOUTH EVERY DAY. N THE EVENING. PT TO MAKE APPT AND GET LABS PRIOR TO MORE REFILLS.  Dose: 80 mg     COQ-10 PO   Take 1 Capsule by  mouth every morning.  Dose: 1 Capsule     Doans Pills 325 MG Tabs  Generic drug: Magnesium Salicylate   Take 650 mg by mouth every 6 hours as needed (Mild Pain). 2 tablets = 650 mg.  Dose: 650 mg     ESTRACE VAGINAL 0.1 MG/GM vaginal cream  Generic drug: estradiol   Insert 1 g into the vagina two times a week. Wednesday & Sunday  Dose: 1 g     ibuprofen 200 MG Tabs  Commonly known as: MOTRIN   Take 400 mg by mouth every 6 hours as needed for Mild Pain. 2 tablets = 400 mg.  Dose: 400 mg     losartan 100 MG Tabs  Commonly known as: COZAAR   Take 1 Tablet by mouth every day.  Dose: 100 mg     MAGNESIUM PO   Take 1 Tablet by mouth every morning.  Dose: 1 Tablet     Myrbetriq 25 MG Tb24  Generic drug: Mirabegron ER   Take 25 mg by mouth every morning.  Dose: 25 mg     * therapeutic multivitamin-minerals Tabs   Take 1 Tablet by mouth every morning.  Dose: 1 Tablet     * PRESERVISION AREDS 2 PO   Take 1 Cap by mouth 2 Times a Day.  Dose: 1 Capsule     VITAMIN D3 PO   Take 2 Capsules by mouth every day.  Dose: 2 Capsule     Wixela Inhub 250-50 MCG/DOSE Aepb  Generic drug: fluticasone-salmeterol   Inhale 1 Puff every 12 hours.  Dose: 1 Puff         * This list has 2 medication(s) that are the same as other medications prescribed for you. Read the directions carefully, and ask your doctor or other care provider to review them with you.                Allergies  Allergies   Allergen Reactions   • Morphine Unspecified     Hallucinations, nightmares, and altered mentations       DIET  Orders Placed This Encounter   Procedures   • Diet Order Diet: Consistent CHO (Diabetic) (No high carbohydrate juices or sodas)     Standing Status:   Standing     Number of Occurrences:   1     Order Specific Question:   Diet:     Answer:   Consistent CHO (Diabetic) [4]     Comments:   No high carbohydrate juices or sodas       ACTIVITY  As tolerated and directed by skilled nursing.  Weight bearing as  tolerated    CONSULTATIONS  None    PROCEDURES  None    LABORATORY  Lab Results   Component Value Date    SODIUM 143 01/20/2022    POTASSIUM 3.6 01/20/2022    CHLORIDE 104 01/20/2022    CO2 26 01/20/2022    GLUCOSE 89 01/20/2022    BUN 32 (H) 01/20/2022    CREATININE 0.65 01/20/2022        Lab Results   Component Value Date    WBC 9.9 01/19/2022    HEMOGLOBIN 13.3 01/19/2022    HEMATOCRIT 40.0 01/19/2022    PLATELETCT 216 01/19/2022        Total time of the discharge process exceeds 33 minutes.

## 2022-01-20 NOTE — CARE PLAN
The patient is Stable - Low risk of patient condition declining or worsening    Shift Goals  Clinical Goals: Placement to SNF, PT/OT reval for home health, and Safety  Patient Goals: Increase mobility and go home  Family Goals: Support    Progress made toward(s) clinical / shift goals:  Pending bed availability for SNF placement, improving mobility for home health potential D/C, Patient has remained safe throughout shift.     Patient is not progressing towards the following goals:      Problem: Knowledge Deficit - Standard  Goal: Patient and family/care givers will demonstrate understanding of plan of care, disease process/condition, diagnostic tests and medications  Outcome: Progressing   Patient demonstrated verbal understanding of education provided. Reinforcement required.   Problem: Fall Risk  Goal: Patient will remain free from falls  Outcome: Progressing   Patient verbally demonstrated fall precaution rationale.

## 2022-01-20 NOTE — DISCHARGE PLANNING
Anticipated Discharge Disposition: [facility]    Action: Discussed pt in morning rounds. Per MD, pt will go to SNF if they have a bed today or home with HH if there is no SNF bed.     0957: LSW notified by DPA that [facility] can take pt today.    1013: Per RN Nusrat, pt can transfer by wheelchair. Per RN preference is home with dtr.     LSW met with pt at bedside to discuss d/c plan. LSW notified pt that [facility] has bed. LSW notified pt of [facility]'s address. Pt states she is agreeable to going to [facility]. Pt gave verbal consent to transfer to [facility].    LSW notified DPA, pt can transfer by wheelchair.     Barriers to Discharge: None    Plan: LSW to follow up with DPA for transport time. LSW to complete transport packet.     1330: JOSE Martin placed completed transfer packet in pt's cupboard. Bedside RN notified.

## 2022-01-20 NOTE — CARE PLAN
Problem: Nutritional:  Goal: Achieve adequate nutritional intake  Description: Patient will consume >50% of meals and supplements.   Outcome: Met   PO adequate at % of most meals. RD will follow PRN or weekly.

## 2022-01-20 NOTE — CARE PLAN
The patient is Watcher - Medium risk of patient condition declining or worsening    Shift Goals  Clinical Goals: home vs SNF  Patient Goals: discharge home  Family Goals: Support      Problem: Hemodynamics  Goal: Patient's hemodynamics, fluid balance and neurologic status will be stable or improve  Outcome: Progressing     Problem: Fluid Volume  Goal: Fluid volume balance will be maintained  Outcome: Progressing     Problem: Urinary - Renal Perfusion  Goal: Ability to achieve and maintain adequate renal perfusion and functioning will improve  Outcome: Progressing     Problem: Respiratory  Goal: Patient will achieve/maintain optimum respiratory ventilation and gas exchange  Outcome: Progressing    Patient has stated she wants to discharge home with family, not SNF.

## 2022-01-20 NOTE — DISCHARGE INSTRUCTIONS
Discharge Instructions    Discharged to other by ambulance with self. Discharged via wheelchair, hospital escort: Yes.  Special equipment needed: Wheelchair    Be sure to schedule a follow-up appointment with your primary care doctor or any specialists as instructed.     Discharge Plan:   Influenza Vaccine Indication: Patient Refuses    I understand that a diet low in cholesterol, fat, and sodium is recommended for good health. Unless I have been given specific instructions below for another diet, I accept this instruction as my diet prescription.   Other diet: diabetic      Special Instructions: None    · Is patient discharged on Warfarin / Coumadin?   No     Depression / Suicide Risk    As you are discharged from this [facility] facility, it is important to learn how to keep safe from harming yourself.    Recognize the warning signs:  · Abrupt changes in personality, positive or negative- including increase in energy   · Giving away possessions  · Change in eating patterns- significant weight changes-  positive or negative  · Change in sleeping patterns- unable to sleep or sleeping all the time   · Unwillingness or inability to communicate  · Depression  · Unusual sadness, discouragement and loneliness  · Talk of wanting to die  · Neglect of personal appearance   · Rebelliousness- reckless behavior  · Withdrawal from people/activities they love  · Confusion- inability to concentrate     If you or a loved one observes any of these behaviors or has concerns about self-harm, here's what you can do:  · Talk about it- your feelings and reasons for harming yourself  · Remove any means that you might use to hurt yourself (examples: pills, rope, extension cords, firearm)  · Get professional help from the community (Mental Health, Substance Abuse, psychological counseling)  · Do not be alone:Call your Safe Contact- someone whom you trust who will be there for you.  · Call your local CRISIS HOTLINE 054-9429 or  098-978-3630  · Call your local Children's Mobile Crisis Response Team [place] (098) 918-4768 or www.51edj.Zumeo.com  · Call the toll free National Suicide Prevention Hotlines   · National Suicide Prevention Lifeline 550-099-OZRY (0050)  · National [place] Line Network 800-SUICIDE (139-3234)

## 2022-01-20 NOTE — RESPIRATORY CARE
COPD EDUCATION by COPD CLINICAL EDUCATOR  1/19/2022 at 5:04 PM by Mayra Schwartz RRT     Patient reviewed by COPD education team. Patient does have a history or diagnosis of COPD and is a former smoker.  Patient was not interested in full COPD program. Provided patient with a Short intervention  completed with this patient covering: What is COPD (how the lungs work), daily medications rescue and maintenance, breathing techniques, infection prevention and oxygen safety were covered in detail.  A comprehensive packet including information about COPD, treatments, and oxygen safety was given.   Provided spacer with instruction for use, care, and cleaning.        COPD Screen  COPD Risk Screening  Do you have a history of COPD?: Yes  Do you have a Pulmonologist?: No (has not seen since 2019)    COPD Assessment  COPD Clinical Specialists ONLY  COPD Education Initiated: Yes--Short Intervention (Pt is [facility], information on [facility] given, pt didnt have any complaints about her COPD has been managing it well, did not want referrals sent is going to SNF at [place])  Physician Name: Monet Snow M.D. - Encouraged pt to follow up with PCP and go back to Pulmonologist  Pulmonologist Name: mukesh Adams MD 2019 - Moderate mixed obstructive and restrictive airway disease with no significant response to bronchodilators, most consistent with moderate degree of chronic bronchitis. Lung volumes demonstrate extrathoracic restrictive lung disease.  Diffusion shows moderate reduction in VA and DLCO most consistent with moderate degree of emphysema given chronic obstructive pulmonary disease  Referrals Initiated:  (Pt declined and will be discharging to SNF)  Is this a COPD exacerbation patient?: No (COVID)  $ Demo/Eval of SVN's, MDI's and Aerosols: Yes    PFT Results    No results found for: PFT    Meds to Beds  Would the patient like to opt in for Bedside Medication Delivery at Discharge?: No     MY COPD  "ACTION PLAN     It is recommended that patients and physicians /healthcare providers complete this action plan together. This plan should be discussed at each physician visit and updated as needed.    The green, yellow and red zones show groups of symptoms of COPD. This list of symptoms is not comprehensive, and you may experience other symptoms. In the \"Actions\" column, your healthcare provider has recommended actions for you to take based on your symptoms.    Patient Name: Nohelia Dickerson   YOB: 1945   Last Updated on: 1/19/2022  5:04 PM   Green Zone:  I am doing well today Actions   •  Usual activitiy and exercise level •  Take daily medications   •  Usual amounts of cough and phlegm/mucus •  Use oxygen as prescribed   •  Sleep well at night •  Continue regular exercise/diet plan   •  Appetite is good •  At all times avoid cigarette smoke, inhaled irritants     Daily Medications (these medications are taken every day):   Fluticasone and Salmeterol (Wixela)  Albuterol (Accuneb, Proair, Proventil, Ventolin) 1 Puff  2 Puffs Twice daily  Every 4 hours PRN     Additional Information:  Use spacer with Rescue Inhaler,  rinse mouth after use of medications        Yellow Zone:  I am having a bad day or a COPD flare Actions   •  More breathless than usual •  Continue daily medications   •  I have less energy for my daily activities •  Use quick relief inhaler as ordered   •  Increased or thicker phlegm/mucus •  Use oxygen as prescribed   •  Using quick relief inhaler/nebulizer more often •  Get plenty of rest   •  Swelling of ankles more than usual •  Use pursed lip breathing   •  More coughing than usual •  At all times avoid cigarette smoke, inhaled irritants   •  I feel like I have a \"chest cold\"   •  Poor sleep and my symptoms woke me up   •  My appetite is not good   •  My medicine is not helping    •  Call provider immediately if symptoms don’t improve     Continue daily medications, add rescue " medications:   Albuterol 2 Puffs Every 4 hours       Medications to be used during a flare up, (as Discussed with Provider):           Additional Information:  Call provider immediately if symptoms do not improve    Red Zone:  I need urgent medical care Actions   •  Severe shortness of breath even at rest •  Call 911 or seek medical care immediately   •  Not able to do any activity because of breathing    •  Fever or shaking chills    •  Feeling confused or very drowsy     •  Chest pains    •  Coughing up blood

## 2022-01-20 NOTE — THERAPY
"Occupational Therapy  Daily Treatment     Patient Name: Nohelia Dickerson  Age:  76 y.o., Sex:  female  Medical Record #: [id number]  Today's Date: 1/20/2022     Precautions  Precautions: Fall Risk  Comments: h/o COVID    Assessment    Pt is progressing as expected with therapy, O2 saturation and balance continues to improve. However, pt still requires O2 at 4L vs her use of 1.5L at nighttime at home. She was provided education re: energy conservation techniques, activity pacing, O2 cord mgt, and safety during ADL txfs and functional mob. Pt will continue to benefit from short term post acute placement prior to dc home, as well as acute OT in house. Per recent DC planner note, pt has been accepted at [facility].    Plan    Continue current treatment plan.    DC Equipment Recommendations: Unable to determine at this time  Discharge Recommendations: Recommend post-acute placement for additional occupational therapy services prior to discharge home    Subjective    \"I'm feeling much better.\"     Objective       01/20/22 1129   Vitals   Pulse Oximetry 93 %   O2 (LPM) 4   O2 Delivery Device Silicone Nasal Cannula   Pain   Pain Scales 0 to 10 Scale    Intervention Declines   Pain 0 - 10 Group   Pain Rating Scale (NPRS) 0   Therapist Pain Assessment Post Activity Pain Same as Prior to Activity   Cognition    Cognition / Consciousness WDL   Level of Consciousness Alert   Comments pleasant and cooperative   Active ROM Upper Body   Active ROM Upper Body  WDL   Strength Upper Body   Upper Body Strength  WDL   Other Treatments   Other Treatments Provided ADL txf technique training and O2 cord management for home safety; energy conservation and activity pacing techniques, safety during txfs, ADLs in both seated and standing levels.   Balance   Sitting Balance (Static) Fair +   Sitting Balance (Dynamic) Fair   Standing Balance (Static) Fair   Standing Balance (Dynamic) Fair -   Weight Shift Sitting Good   Weight Shift Standing " "Fair   Skilled Intervention Verbal Cuing;Postural Facilitation;Sequencing   Comments with FWW   Bed Mobility    Supine to Sit Supervised   Sit to Supine Supervised   Scooting Supervised   Rolling Supervised   Skilled Intervention Verbal Cuing;Sequencing;Postural Facilitation   Comments HOB elevated, rails utilized   Activities of Daily Living   Grooming Standing;Standby Assist   Upper Body Dressing Minimal Assist   Lower Body Dressing Minimal Assist   Toileting Minimal Assist   Skilled Intervention Verbal Cuing;Sequencing;Postural Facilitation   How much help from another person does the patient currently need...   Putting on and taking off regular lower body clothing? 3   Bathing (including washing, rinsing, and drying)? 2   Toileting, which includes using a toilet, bedpan, or urinal? 3   Putting on and taking off regular upper body clothing? 3   Taking care of personal grooming such as brushing teeth? 3   Eating meals? 3   6 Clicks Daily Activity Score 17   Functional Mobility   Sit to Stand Standby Assist   Bed, Chair, Wheelchair Transfer Contact Guard Assist   Toilet Transfers Contact Guard Assist   Transfer Method Stand Step   Mobility in room with FWW   Distance (Feet) 20   # of Times Distance was Traveled 2   Skilled Intervention Verbal Cuing;Postural Facilitation;Sequencing   Comments cues required for O2 cord mgt, sequencing, and safety during turning   Activity Tolerance   Sitting in Chair 10 mins   Sitting Edge of Bed 5 mins   Standing 10 mins   Comments limited by fatigue   Patient / Family Goals   Patient / Family Goal #1 \" get back to my work\"    Goal #1 Outcome Progressing as expected   Short Term Goals   Short Term Goal # 1 Pt will perform LE dressing set up with good endurance within 3 sessions   Goal Outcome # 1 Progressing as expected   Short Term Goal # 2 Pt will perform toileting activity set up with good endurance within 3 sessions    Goal Outcome # 2 Progressing as expected   Short Term Goal # " 3 Pt will demonstrate safe functional transfer and mobility with FWW set up with good endurance withiin 3 sessions   Goal Outcome # 3 Progressing as expected   Education Group   Education Provided Role of Occupational Therapist;Home Safety;Transfers;Energy Conservation   Energy Conservation Patient Response Patient;Acceptance;Explanation   Home Safety Patient Response Patient;Acceptance;Explanation;Verbal Demonstration   Transfers Patient Response Patient;Acceptance;Explanation;Demonstration;Verbal Demonstration;Action Demonstration;Reinforcement Needed   Anticipated Discharge Equipment and Recommendations   DC Equipment Recommendations Unable to determine at this time   Discharge Recommendations Recommend post-acute placement for additional occupational therapy services prior to discharge home   Interdisciplinary Plan of Care Collaboration   IDT Collaboration with  Nursing   Patient Position at End of Therapy Seated;Call Light within Reach;Tray Table within Reach;Phone within Reach   Collaboration Comments RN aware of OT session and dc recs   Session Information   Date / Session Number  1/20 #2 (2/3, 1/23)   Priority 2

## 2022-01-20 NOTE — DISCHARGE PLANNING
@1015  Agency/Facility Name: [facility]  Spoke To: Manuela  Outcome: Transport set for 1600.    @1000  Agency/Facility Name: [facility]  Spoke To: Manuela  Outcome: Has a bed today.

## 2022-02-04 ENCOUNTER — HOME HEALTH ADMISSION (OUTPATIENT)
Dept: HOME HEALTH SERVICES | Facility: HOME HEALTHCARE | Age: 77
End: 2022-02-04
Payer: MEDICARE

## 2022-02-10 ENCOUNTER — HOME CARE VISIT (OUTPATIENT)
Dept: HOME HEALTH SERVICES | Facility: HOME HEALTHCARE | Age: 77
End: 2022-02-10
Payer: MEDICARE

## 2022-02-10 VITALS
SYSTOLIC BLOOD PRESSURE: 130 MMHG | WEIGHT: 166 LBS | RESPIRATION RATE: 18 BRPM | BODY MASS INDEX: 22.48 KG/M2 | TEMPERATURE: 97.4 F | HEIGHT: 72 IN | OXYGEN SATURATION: 91 % | DIASTOLIC BLOOD PRESSURE: 78 MMHG | HEART RATE: 81 BPM

## 2022-02-10 PROCEDURE — G0493 RN CARE EA 15 MIN HH/HOSPICE: HCPCS

## 2022-02-10 PROCEDURE — 665001 SOC-HOME HEALTH

## 2022-02-10 SDOH — ECONOMIC STABILITY: HOUSING INSECURITY: EVIDENCE OF SMOKING MATERIAL: 0

## 2022-02-10 ASSESSMENT — ENCOUNTER SYMPTOMS
PAIN LOCATION - PAIN QUALITY: SPASMS
SUBJECTIVE PAIN PROGRESSION: UNCHANGED
PAIN LOCATION - EXACERBATING FACTORS: STANDING TO LONG
COUGH: 1
PAIN SEVERITY GOAL: 0/10
SHORTNESS OF BREATH: T
VOMITING: DENIES
PERSON REPORTING PAIN: PATIENT
LOWEST PAIN SEVERITY IN PAST 24 HOURS: 0/10
HIGHEST PAIN SEVERITY IN PAST 24 HOURS: 6/10
PAIN: 1
PAIN LOCATION - PAIN FREQUENCY: INFREQUENT
PAIN LOCATION - PAIN SEVERITY: 6/10
PAIN LOCATION - PAIN DURATION: 20MIN
NAUSEA: DENIES
PAIN LOCATION: BACK

## 2022-02-10 ASSESSMENT — FIBROSIS 4 INDEX: FIB4 SCORE: 1.66

## 2022-02-10 ASSESSMENT — PATIENT HEALTH QUESTIONNAIRE - PHQ9
CLINICAL INTERPRETATION OF PHQ2 SCORE: 0
1. LITTLE INTEREST OR PLEASURE IN DOING THINGS: 00
2. FEELING DOWN, DEPRESSED, IRRITABLE, OR HOPELESS: 00

## 2022-02-10 ASSESSMENT — ACTIVITIES OF DAILY LIVING (ADL)
TRANSPORTATION COMMENTS: SOB WITH MODERATE EXERTION
OASIS_M1830: 03

## 2022-02-10 NOTE — CASE COMMUNICATION
Primary dx/Skilled need:Acute and Chronic resp. failure with hypoxia secondary to COVID infection -greater than 30 days ago.  Debility and muscular weakness-amb. instability.  History of CVA with left side minimal weakness. Had prednisone induced DM now resolved. HTN and PVD  Skilled care for Resp. assessment and intervention 3 prn SN visits.  PT for deconditioning and strengthening assessment and intervention  SN frequency.3 prn  [id number]  Disciplines ordered.SN PT---Refused OT SLP and HHA  Insurance and authorization.[facility]  Certification period.2/10/22 to 4/10/22  Special considerations.none

## 2022-02-11 ENCOUNTER — DOCUMENTATION (OUTPATIENT)
Dept: VASCULAR LAB | Facility: MEDICAL CENTER | Age: 77
End: 2022-02-11

## 2022-02-11 NOTE — PROGRESS NOTES
Medication chart review for [facility] services    PCP:  Winsome Schumacher M.D.  [address]  [place] [address]  Fax: [phone]    Current medication list     Current Outpatient Medications:   •  Home Care Oxygen, 1 L/min, Inhalation, QHS  •  benzonatate, 100 mg, Oral, TID PRN  •  guaiFENesin ER, 600 mg, Oral, BID (Patient not taking: Reported on 2/10/2022)  •  amLODIPine, 5 mg, Oral, QAM  •  estradiol, 1 g, Vaginal, 2X A WEEK  •  Myrbetriq, 25 mg, Oral, QAM  •  fluticasone-salmeterol, 1 Puff, Inhalation, Q12HRS  •  Cholecalciferol (VITAMIN D3 PO), 2 Capsule, Oral, DAILY  •  Coenzyme Q10 (COQ-10 PO), 1 Capsule, Oral, QAM  •  Doans Pills, 650 mg, Oral, Q6HRS PRN  •  ibuprofen, 400 mg, Oral, Q6HRS PRN  •  losartan, 100 mg, Oral, DAILY  •  atorvastatin, 80 mg, Oral, QDAY  •  apixaban, 5 mg, Oral, BID  •  MAGNESIUM PO, 1 Tablet, Oral, QAM  •  albuterol, 1-2 Puff, Inhalation, Q6HRS PRN  •  Multiple Vitamins-Minerals (PRESERVISION AREDS 2 PO), 1 Capsule, Oral, BID  •  therapeutic multivitamin-minerals, 1 Tablet, Oral, QAM    Allergies   Allergen Reactions   • Morphine Unspecified     Hallucinations, nightmares, and altered mentations       Labs     Lab Results   Component Value Date/Time    SODIUM 143 01/20/2022 03:00 AM    POTASSIUM 3.6 01/20/2022 03:00 AM    CHLORIDE 104 01/20/2022 03:00 AM    CO2 26 01/20/2022 03:00 AM    GLUCOSE 89 01/20/2022 03:00 AM    BUN 32 (H) 01/20/2022 03:00 AM    CREATININE 0.65 01/20/2022 03:00 AM     Lab Results   Component Value Date/Time    ALKPHOSPHAT 70 01/20/2022 03:00 AM    ASTSGOT 36 01/20/2022 03:00 AM    ALTSGPT 57 (H) 01/20/2022 03:00 AM    TBILIRUBIN 0.8 01/20/2022 03:00 AM    INR 1.31 (H) 01/15/2022 02:40 PM    ALBUMIN 3.2 01/20/2022 03:00 AM        Assessment and Plan:   • Received referral from [facility]. Medications reviewed.   Drug-Drug: ibuprofen and apixaban  Use of Direct Factor Xa Inhibitors with NSAIDs may increase the risk of  bleeding.          Jose R Cheema, PharmD, MS, BCACP, [facility]  Phone [phone] fax [phone]    This note was created using voice recognition software (Dragon). The accuracy of the dictation is limited by the abilities of the software. I have reviewed the note prior to signing, however some errors in grammar and context are still possible. If you have any questions related to this note please do not hesitate to contact our office.

## 2022-02-13 ENCOUNTER — HOME CARE VISIT (OUTPATIENT)
Dept: HOME HEALTH SERVICES | Facility: HOME HEALTHCARE | Age: 77
End: 2022-02-13
Payer: MEDICARE

## 2022-02-14 ENCOUNTER — HOME CARE VISIT (OUTPATIENT)
Dept: HOME HEALTH SERVICES | Facility: HOME HEALTHCARE | Age: 77
End: 2022-02-14
Payer: MEDICARE

## 2022-02-15 ENCOUNTER — HOME CARE VISIT (OUTPATIENT)
Dept: HOME HEALTH SERVICES | Facility: HOME HEALTHCARE | Age: 77
End: 2022-02-15
Payer: MEDICARE

## 2022-02-15 NOTE — CASE COMMUNICATION
Quality Review for SOC OASIS by ZAC Roa, JOSE on  2/15/22    Edits completed by ZAC Roa RN:  1.  is yes for PVD   2.  is 4 as pt only wears oxygen at night (ar rest)  3. Completed F2F information  4. Added COPD to care plan

## 2022-02-16 ENCOUNTER — HOME CARE VISIT (OUTPATIENT)
Dept: HOME HEALTH SERVICES | Facility: HOME HEALTHCARE | Age: 77
End: 2022-02-16
Payer: MEDICARE

## 2022-02-16 VITALS
HEART RATE: 78 BPM | TEMPERATURE: 98.9 F | DIASTOLIC BLOOD PRESSURE: 87 MMHG | OXYGEN SATURATION: 91 % | SYSTOLIC BLOOD PRESSURE: 138 MMHG | RESPIRATION RATE: 18 BRPM

## 2022-02-16 PROCEDURE — G0180 MD CERTIFICATION HHA PATIENT: HCPCS | Performed by: INTERNAL MEDICINE

## 2022-02-16 PROCEDURE — G0151 HHCP-SERV OF PT,EA 15 MIN: HCPCS

## 2022-02-17 SDOH — ECONOMIC STABILITY: HOUSING INSECURITY: EVIDENCE OF SMOKING MATERIAL: 0

## 2022-02-17 ASSESSMENT — ACTIVITIES OF DAILY LIVING (ADL)
BATHING_COMMENTS: NT
AMBULATION ASSISTANCE ON FLAT SURFACES: 1
CURRENT_FUNCTION: INDEPENDENT
GROOMING_COMMENTS: NT
PHYSICAL TRANSFERS ASSESSED: 1
AMBULATION ASSISTANCE: 1
FEEDING_COMMENTS: NT
AMBULATION ASSISTANCE: INDEPENDENT

## 2022-02-17 ASSESSMENT — ENCOUNTER SYMPTOMS: DENIES PAIN: 1

## 2022-02-17 NOTE — CASE COMMUNICATION
I agree with these changes  ----- Message -----  From: Izabella Roa R.N.  Sent: 2/15/2022   7:59 AM PST  To: Candelaria Stoddard R.N.      Quality Review for SOC OASIS by ZAC Roa, RN on  2/15/22    Edits completed by ZAC Roa, RN:  1.  is yes for PVD   2.  is 4 as pt only wears oxygen at night (ar rest)  3. Completed F2F information  4. Added COPD to care plan

## 2022-02-22 ENCOUNTER — HOME CARE VISIT (OUTPATIENT)
Dept: HOME HEALTH SERVICES | Facility: HOME HEALTHCARE | Age: 77
End: 2022-02-22
Payer: MEDICARE

## 2022-02-23 ENCOUNTER — HOME CARE VISIT (OUTPATIENT)
Dept: HOME HEALTH SERVICES | Facility: HOME HEALTHCARE | Age: 77
End: 2022-02-23
Payer: MEDICARE

## 2022-02-23 VITALS
SYSTOLIC BLOOD PRESSURE: 118 MMHG | RESPIRATION RATE: 18 BRPM | TEMPERATURE: 97.4 F | DIASTOLIC BLOOD PRESSURE: 78 MMHG | HEART RATE: 76 BPM | OXYGEN SATURATION: 91 %

## 2022-02-23 PROCEDURE — G0151 HHCP-SERV OF PT,EA 15 MIN: HCPCS

## 2022-02-23 ASSESSMENT — ENCOUNTER SYMPTOMS
PAIN LOCATION: RIGHT HAND
PAIN LOCATION - EXACERBATING FACTORS: USING THE HAND
PAIN SEVERITY GOAL: 0/10
PERSON REPORTING PAIN: PATIENT
PAIN LOCATION - PAIN SEVERITY: 1/10
PAIN LOCATION - RELIEVING FACTORS: NOT MOVING
PAIN: 1
PAIN LOCATION - PAIN FREQUENCY: INTERMITTENT
PAIN LOCATION - PAIN QUALITY: ACHE
HIGHEST PAIN SEVERITY IN PAST 24 HOURS: 2/10
LOWEST PAIN SEVERITY IN PAST 24 HOURS: 0/10

## 2022-03-02 ENCOUNTER — HOME CARE VISIT (OUTPATIENT)
Dept: HOME HEALTH SERVICES | Facility: HOME HEALTHCARE | Age: 77
End: 2022-03-02
Payer: MEDICARE

## 2022-03-02 VITALS
HEART RATE: 76 BPM | SYSTOLIC BLOOD PRESSURE: 110 MMHG | OXYGEN SATURATION: 90 % | RESPIRATION RATE: 18 BRPM | DIASTOLIC BLOOD PRESSURE: 70 MMHG | TEMPERATURE: 98.4 F

## 2022-03-02 PROCEDURE — G0151 HHCP-SERV OF PT,EA 15 MIN: HCPCS

## 2022-03-02 ASSESSMENT — ENCOUNTER SYMPTOMS: DENIES PAIN: 1

## 2022-03-08 ENCOUNTER — HOME CARE VISIT (OUTPATIENT)
Dept: HOME HEALTH SERVICES | Facility: HOME HEALTHCARE | Age: 77
End: 2022-03-08
Payer: MEDICARE

## 2022-03-08 ENCOUNTER — PATIENT MESSAGE (OUTPATIENT)
Dept: OBGYN | Facility: CLINIC | Age: 77
End: 2022-03-08
Payer: MEDICARE

## 2022-03-08 VITALS
HEART RATE: 84 BPM | DIASTOLIC BLOOD PRESSURE: 70 MMHG | RESPIRATION RATE: 18 BRPM | SYSTOLIC BLOOD PRESSURE: 114 MMHG | TEMPERATURE: 98 F | OXYGEN SATURATION: 90 %

## 2022-03-08 PROCEDURE — G0151 HHCP-SERV OF PT,EA 15 MIN: HCPCS

## 2022-03-08 RX ORDER — MIRABEGRON 25 MG/1
25 TABLET, FILM COATED, EXTENDED RELEASE ORAL EVERY MORNING
Qty: 30 TABLET | Refills: 3 | Status: SHIPPED | OUTPATIENT
Start: 2022-03-08 | End: 2023-02-09 | Stop reason: SDUPTHER

## 2022-03-08 ASSESSMENT — BALANCE ASSESSMENTS
TURNING 360 DEGREES STEPS: 0 - DISCONTINUOUS STEPS
ATTEMPTS TO ARISE: 2 - ABLE TO RISE, ONE ATTEMPT
SITTING BALANCE: 1 - STEADY, SAFE
ARISES: 1 - ABLE, USES ARMS TO HELP
NUDGED: 1 - STAGGERS, GRABS, CATCHES SELF
ARISING SCORE: 1
EYES CLOSED AT MAXIMUM POSITION NUDGED: 0 - UNSTEADY
SITTING DOWN: 1 - USES ARMS OR NOT SMOOTH MOTION
STANDING BALANCE: 1 - STEADY BUT WIDE STANCE AND USES CANE OR OTHER SUPPORT
IMMEDIATE STANDING BALANCE FIRST 5 SECONDS: 2 - STEADY WITHOUT WALKER OR OTHER SUPPORT
BALANCE SCORE: 9
NUDGED SCORE: 1

## 2022-03-08 ASSESSMENT — ACTIVITIES OF DAILY LIVING (ADL)
AMBULATION ASSISTANCE: INDEPENDENT
AMBULATION ASSISTANCE ON FLAT SURFACES: 1
PHYSICAL TRANSFERS ASSESSED: 1
CURRENT_FUNCTION: INDEPENDENT
AMBULATION ASSISTANCE: 1

## 2022-03-08 ASSESSMENT — GAIT ASSESSMENTS
PATH: 1 - MILD/MODERATE DEVIATION OR USES WALKING AID
TRUNK SCORE: 1
TRUNK: 1 - NO SWAY BUT FLEXION OF KNEES OR BACK OR SPREADS ARMS WHILE WALKING
WALKING STANCE: 1 - HEELS ALMOST TOUCHING WHILE WALKING
STEP SYMMETRY: 1 - RIGHT AND LEFT STEP LENGTH APPEAR EQUAL
BALANCE AND GAIT SCORE: 18
INITIATION OF GAIT IMMEDIATELY AFTER GO: 1 - NO HESITANCY
STEP CONTINUITY: 1 - STEPS APPEAR CONTINUOUS
GAIT SCORE: 9
PATH SCORE: 1

## 2022-03-08 ASSESSMENT — ENCOUNTER SYMPTOMS
PERSON REPORTING PAIN: PATIENT
DENIES PAIN: 1

## 2022-03-08 NOTE — TELEPHONE ENCOUNTER
1158 03/08/2022  Pt called in and identity was verified. Pt is requesting a refill of the medication, myrbetriq. Pt stated that she missed an appointment with Dr. Purvis back in January and has run out of this medication. Pt is not feeling well and will call back to make an appoint to see Dr. Purvis when she is feeling better. The medication request has been pended and sent to Dr. Purvis for review.  Mo STINSON

## 2022-03-08 NOTE — TELEPHONE ENCOUNTER
1328 03/08/2022  A message was sent the the Pt via [employer] stating that a refill of her requested prescription has been approved and sent to her pharmacy.  Mo

## 2022-03-15 ENCOUNTER — HOME CARE VISIT (OUTPATIENT)
Dept: HOME HEALTH SERVICES | Facility: HOME HEALTHCARE | Age: 77
End: 2022-03-15
Payer: MEDICARE

## 2022-03-15 VITALS
DIASTOLIC BLOOD PRESSURE: 68 MMHG | RESPIRATION RATE: 18 BRPM | SYSTOLIC BLOOD PRESSURE: 120 MMHG | HEART RATE: 84 BPM | TEMPERATURE: 97.6 F | OXYGEN SATURATION: 92 %

## 2022-03-15 PROCEDURE — 665001 SOC-HOME HEALTH

## 2022-03-15 PROCEDURE — G0151 HHCP-SERV OF PT,EA 15 MIN: HCPCS

## 2022-03-15 ASSESSMENT — ENCOUNTER SYMPTOMS
SEVERE DYSPNEA: 1
DENIES PAIN: 1
LOWEST PAIN SEVERITY IN PAST 24 HOURS: 0/10
PAIN SEVERITY GOAL: 0/10
HIGHEST PAIN SEVERITY IN PAST 24 HOURS: 0/10
PERSON REPORTING PAIN: PATIENT

## 2022-03-22 ENCOUNTER — HOME CARE VISIT (OUTPATIENT)
Dept: HOME HEALTH SERVICES | Facility: HOME HEALTHCARE | Age: 77
End: 2022-03-22
Payer: MEDICARE

## 2022-03-22 VITALS
OXYGEN SATURATION: 94 % | SYSTOLIC BLOOD PRESSURE: 104 MMHG | HEART RATE: 76 BPM | TEMPERATURE: 98 F | RESPIRATION RATE: 16 BRPM | DIASTOLIC BLOOD PRESSURE: 56 MMHG

## 2022-03-22 PROCEDURE — G0151 HHCP-SERV OF PT,EA 15 MIN: HCPCS

## 2022-03-22 ASSESSMENT — ENCOUNTER SYMPTOMS
DENIES PAIN: 1
PERSON REPORTING PAIN: PATIENT

## 2022-03-22 ASSESSMENT — ACTIVITIES OF DAILY LIVING (ADL)
HOME_HEALTH_OASIS: 00
OASIS_M1830: 01

## 2022-03-23 ASSESSMENT — ENCOUNTER SYMPTOMS: HIGHEST PAIN SEVERITY IN PAST 24 HOURS: 0/10

## 2022-03-24 ENCOUNTER — HOME CARE VISIT (OUTPATIENT)
Dept: HOME HEALTH SERVICES | Facility: HOME HEALTHCARE | Age: 77
End: 2022-03-24
Payer: MEDICARE

## 2022-03-24 ENCOUNTER — DOCUMENTATION (OUTPATIENT)
Dept: HOME HEALTH SERVICES | Facility: HOME HEALTHCARE | Age: 77
End: 2022-03-24
Payer: MEDICARE

## 2022-03-24 NOTE — CASE COMMUNICATION
Thank you [place], I agree with these changes.  Mi  ----- Message -----  From: Latha Oliver R.N.  Sent: 3/24/2022  10:36 AM PDT  To: Mi Carl PT      Quality Review Completed for DC OASIS by BRADEN Oliver, RN on 3/24/2022:  Edits completed by BRADEN Oliver, RN:  1.  is yes to pain and pressure ulcer per care plan interventions  2. [facility] referral for COPD exacerbation in recent past

## 2022-03-24 NOTE — CASE COMMUNICATION
Quality Review Completed for DC OASIS by BRADEN Oliver RN on 3/24/2022:  Edits completed by BRADEN Oliver RN:  1.  is yes to pain and pressure ulcer per care plan interventions  2. [facility] referral for COPD exacerbation in recent past

## 2022-03-24 NOTE — CASE COMMUNICATION
FYI: Nohelia has been referred to the [facility] Outreach program for continued care and follow up for COPD after discharge from [facility]. Thank you for allowing [facility] to serve your patients health care needs.

## 2022-03-28 ENCOUNTER — TELEPHONE (OUTPATIENT)
Dept: MEDICAL GROUP | Facility: PHYSICIAN GROUP | Age: 77
End: 2022-03-28
Payer: MEDICARE

## 2022-03-28 NOTE — TELEPHONE ENCOUNTER
ESTABLISHED PATIENT PRE-VISIT PLANNING     Patient was NOT contacted to complete PVP.     Note: Patient will not be contacted if there is no indication to call.     1.  Reviewed notes from the last few office visits within the medical group: Yes    2.  If any orders were placed at last visit or intended to be done for this visit (i.e. 6 mos follow-up), do we have Results/Consult Notes?         •  Labs - ED LABS ON FILE   Note: If patient appointment is for lab review and patient did not complete labs, check with provider if OK to reschedule patient until labs completed.       •  Imaging - ED IMAGING ON FILE        •  Referrals - No referrals were ordered at last office visit.    3. Is this appointment scheduled as a Hospital Follow-Up? No    4.  Immunizations were updated in Epic using Reconcile Outside Information activity? Yes    5.  Patient is due for the following Health Maintenance Topics:   Health Maintenance Due   Topic Date Due   • PAP SMEAR  Never done   • IMM ZOSTER VACCINES (1 of 2) Never done   • Annual Pulmonary Function Test / Spirometry  07/12/2020   • COVID-19 Vaccine (3 - Booster for Pfizer series) 08/23/2021       6.  AHA (Pulse8) form printed for Provider? Yes

## 2022-04-05 ENCOUNTER — OFFICE VISIT (OUTPATIENT)
Dept: MEDICAL GROUP | Facility: PHYSICIAN GROUP | Age: 77
End: 2022-04-05
Payer: MEDICARE

## 2022-04-05 VITALS
HEIGHT: 72 IN | TEMPERATURE: 97.9 F | WEIGHT: 175 LBS | DIASTOLIC BLOOD PRESSURE: 82 MMHG | OXYGEN SATURATION: 89 % | HEART RATE: 113 BPM | RESPIRATION RATE: 14 BRPM | BODY MASS INDEX: 23.7 KG/M2 | SYSTOLIC BLOOD PRESSURE: 138 MMHG

## 2022-04-05 DIAGNOSIS — R73.03 PREDIABETES: ICD-10-CM

## 2022-04-05 DIAGNOSIS — E55.9 VITAMIN D DEFICIENCY: ICD-10-CM

## 2022-04-05 DIAGNOSIS — I70.209 ARTERIAL OCCLUSION, LOWER EXTREMITY (HCC): ICD-10-CM

## 2022-04-05 DIAGNOSIS — H26.9 CATARACT OF LEFT EYE, UNSPECIFIED CATARACT TYPE: ICD-10-CM

## 2022-04-05 DIAGNOSIS — R74.01 TRANSAMINITIS: ICD-10-CM

## 2022-04-05 DIAGNOSIS — E89.0 HISTORY OF PARTIAL THYROIDECTOMY: ICD-10-CM

## 2022-04-05 DIAGNOSIS — I73.9 PVD (PERIPHERAL VASCULAR DISEASE) (HCC): ICD-10-CM

## 2022-04-05 DIAGNOSIS — E78.5 DYSLIPIDEMIA: ICD-10-CM

## 2022-04-05 DIAGNOSIS — J96.11 CHRONIC RESPIRATORY FAILURE WITH HYPOXIA (HCC): ICD-10-CM

## 2022-04-05 DIAGNOSIS — Z86.16 HISTORY OF COVID-19: ICD-10-CM

## 2022-04-05 DIAGNOSIS — R79.89 LOW TSH LEVEL: ICD-10-CM

## 2022-04-05 DIAGNOSIS — Z13.21 ENCOUNTER FOR VITAMIN DEFICIENCY SCREENING: ICD-10-CM

## 2022-04-05 DIAGNOSIS — R00.2 PALPITATION: ICD-10-CM

## 2022-04-05 DIAGNOSIS — R74.01 ELEVATION OF LEVELS OF LIVER TRANSAMINASE LEVELS: ICD-10-CM

## 2022-04-05 PROBLEM — E11.9 TYPE 2 DIABETES MELLITUS WITHOUT COMPLICATIONS (HCC): Status: RESOLVED | Noted: 2022-01-20 | Resolved: 2022-04-05

## 2022-04-05 PROBLEM — E11.9 TYPE 2 DIABETES MELLITUS WITHOUT COMPLICATIONS (HCC): Status: ACTIVE | Noted: 2022-01-20

## 2022-04-05 PROBLEM — R50.9 FEVER: Status: RESOLVED | Noted: 2022-01-16 | Resolved: 2022-04-05

## 2022-04-05 PROBLEM — M62.81 MUSCLE WEAKNESS (GENERALIZED): Status: ACTIVE | Noted: 2022-01-20

## 2022-04-05 PROBLEM — R73.01 IMPAIRED FASTING GLUCOSE: Status: ACTIVE | Noted: 2022-04-05

## 2022-04-05 PROBLEM — J12.82 PNEUMONIA DUE TO COVID-19 VIRUS: Status: RESOLVED | Noted: 2022-01-15 | Resolved: 2022-04-05

## 2022-04-05 PROBLEM — G92.8 TOXIC METABOLIC ENCEPHALOPATHY: Status: RESOLVED | Noted: 2022-01-15 | Resolved: 2022-04-05

## 2022-04-05 PROBLEM — D69.6 THROMBOCYTOPENIA (HCC): Status: RESOLVED | Noted: 2018-08-06 | Resolved: 2022-04-05

## 2022-04-05 PROBLEM — E46 PROTEIN CALORIE MALNUTRITION (HCC): Status: RESOLVED | Noted: 2019-09-27 | Resolved: 2022-04-05

## 2022-04-05 PROBLEM — U07.1 SEPSIS DUE TO COVID-19 (HCC): Status: RESOLVED | Noted: 2022-01-15 | Resolved: 2022-04-05

## 2022-04-05 PROBLEM — J96.21 ACUTE ON CHRONIC RESPIRATORY FAILURE WITH HYPOXEMIA (HCC): Status: RESOLVED | Noted: 2022-01-15 | Resolved: 2022-04-05

## 2022-04-05 PROBLEM — E83.42 HYPOMAGNESEMIA: Status: ACTIVE | Noted: 2022-01-20

## 2022-04-05 PROBLEM — A41.89 SEPSIS DUE TO COVID-19 (HCC): Status: RESOLVED | Noted: 2022-01-15 | Resolved: 2022-04-05

## 2022-04-05 PROBLEM — U07.1 PNEUMONIA DUE TO COVID-19 VIRUS: Status: RESOLVED | Noted: 2022-01-15 | Resolved: 2022-04-05

## 2022-04-05 PROCEDURE — 99214 OFFICE O/P EST MOD 30 MIN: CPT | Performed by: INTERNAL MEDICINE

## 2022-04-05 ASSESSMENT — FIBROSIS 4 INDEX: FIB4 SCORE: 1.68

## 2022-04-05 NOTE — PROGRESS NOTES
Established Patient    Chief Complaint   Patient presents with   • Follow-Up     COVID        Subjective:     HPI:   Nohelia presents today with the following.    Patient Active Problem List    Diagnosis Date Noted   • History of partial thyroidectomy 04/05/2022   • Impaired fasting glucose 04/05/2022   • History of COVID-19 04/05/2022   • Hyperlipidemia, unspecified 01/20/2022   • Hypomagnesemia 01/20/2022   • Muscle weakness (generalized) 01/20/2022   • Transaminitis 01/18/2022   • Generalized weakness 01/15/2022   • History of stroke 01/15/2022   • DNI (do not intubate) 01/15/2022   • Hypoalbuminemia 01/15/2022   • Elevated brain natriuretic peptide (BNP) level 01/15/2022   • Neurogenic bladder 11/15/2021   • Urinary incontinence, mixed 11/15/2021   • Prediabetes 10/26/2021   • Arterial embolism ([facility]) 10/26/2021   • Secondary hypercoagulability disorder ([facility]) 10/26/2021   • Vitamin D deficiency 08/31/2021   • Former smoker 08/31/2021   • Dyslipidemia 06/01/2021   • Urine incontinence 06/01/2021   • Cardioembolic stroke ([facility]) 09/04/2019   • PVD (peripheral vascular disease) ([facility]) 09/03/2019   • Arterial occlusion, lower extremity ([facility]) 09/03/2019   • History of nephrolithiasis 08/24/2018   • Hydronephrosis of left kidney 08/07/2018   • Acute exacerbation of chronic obstructive pulmonary disease (COPD) ([facility]) 08/07/2018   • Chronic respiratory failure ([facility]) 08/07/2018   • Hypertension, unspecified type 05/22/2018       Current Outpatient Medications on File Prior to Visit   Medication Sig Dispense Refill   • MYRBETRIQ 25 MG TABLET SR 24 HR Take 25 mg by mouth every morning. 30 Tablet 3   • Home Care Oxygen Inhale 1.5 L/min at bedtime. Oxygen dose range: 1.5  L/min at bedtime  Respiratory route via: Nasal Cannula   Oxygen supplier: Preferred         • benzonatate (TESSALON) 100 MG Cap Take 1 Capsule by mouth 3 times a day as needed for Cough. 60 Capsule 0   • amLODIPine (NORVASC) 5 MG Tab Take 5 mg by mouth every  morning.     • estradiol (ESTRACE) 0.1 MG/GM vaginal cream Insert 1 g into the vagina two times a week. Wednesday & Sunday     • fluticasone-salmeterol (ADVAIR) 250-50 MCG/DOSE AEROSOL POWDER, BREATH ACTIVATED Inhale 1 Puff every 12 hours.     • Cholecalciferol (VITAMIN D3 PO) Take 2 Capsules by mouth every day.     • Coenzyme Q10 (COQ-10 PO) Take 1 Capsule by mouth every morning.     • Magnesium Salicylate (DOANS PILLS) 325 MG Tab Take 650 mg by mouth every 6 hours as needed (Mild Pain). 2 tablets = 650 mg.     • ibuprofen (MOTRIN) 200 MG Tab Take 400 mg by mouth every 6 hours as needed for Mild Pain. 2 tablets = 400 mg.     • losartan (COZAAR) 100 MG Tab Take 1 Tablet by mouth every day. 90 Tablet 3   • atorvastatin (LIPITOR) 80 MG tablet TAKE 1 TABLET BY MOUTH EVERY DAY. N THE EVENING. PT TO MAKE APPT AND GET LABS PRIOR TO MORE REFILLS. 100 Tablet 3   • apixaban (ELIQUIS) 5mg Tab Take 1 Tablet by mouth 2 times a day. Provide 30 days of samples to the patient if available 60 Tablet 0   • MAGNESIUM PO Take 1 Tablet by mouth every morning.     • albuterol 108 (90 Base) MCG/ACT Aero Soln inhalation aerosol Inhale 1-2 Puffs every 6 hours as needed for Shortness of Breath.     • Multiple Vitamins-Minerals (PRESERVISION AREDS 2 PO) Take 1 Cap by mouth 2 Times a Day.     • therapeutic multivitamin-minerals (THERAGRAN-M) Tab Take 1 Tablet by mouth every morning.       No current facility-administered medications on file prior to visit.       Allergies, past medical history, past surgical history, family history, social history reviewed and updated    ROS:  All other systems reviewed and are negative except as stated in the HPI       Physical Exam:     /82 (BP Location: Left arm, Patient Position: Sitting, BP Cuff Size: Adult)   Pulse (!) 113   Temp 36.6 °C (97.9 °F) (Temporal)   Resp 14   Ht 1.829 m (6')   Wt 79.4 kg (175 lb)   SpO2 89%   BMI 23.73 kg/m²   General: Normal appearing. No distress.  Pulmonary:  Clear to ausculation.  Normal effort.   Cardiovascular: Regular rate and rhythm    Assessment and Plan:     77 y.o. female with the following issues.    1. Low TSH level  2. History of partial thyroidectomy  14. Palpitation  > Reported history of partial thyroidectomy more than 40 years ago due to mass, today she has some palpitation, denies any chest pain, she has some abnormality of TSH during COVID-19 pneumonia which is expected, denied having signs or symptoms for hypothyroidism  - FREE THYROXINE; Future  - TSH; Future  - T3 FREE; Future  - THYROID PEROXIDASE  (TPO) AB; Future    3. Cataract of left eye, unspecified cataract type  - Referral to Ophthalmology      5. Transaminitis  Possibly secondary to COVID-19 pneumonia  - Comp Metabolic Panel; Future    6. History of COVID-19  Resolved, she is back to 1 and half liter oxygen at night for COPD, trying to do physical activity as well using a cane    7. Prediabetes  A1c was little bit high secondary to steroids intake during last hospitalization as well    8. Vitamin D deficiency  - VITAMIN D,25 HYDROXY; Future    9. Dyslipidemia  - Lipid Profile; Future    10. Chronic respiratory failure with hypoxia (HCC)  Chronic, stable, continue oxygen 1 and half liter at night    11. PVD (peripheral vascular disease) (HCC)  12. Arterial occlusion, lower extremity (HCC)  Chronic, stable, continue follow-up with vascular medicine, continue apixaban 5 mg twice daily as well as Lipitor 80 mg daily    13. Encounter for vitamin deficiency screening  - VITAMIN B12; Future    Follow Up:      Return in about 6 weeks (around 5/17/2022).  labs  Please note that this dictation was created using voice recognition software. I have made every reasonable attempt to correct obvious errors, but I expect that there are errors of grammar and possibly content that I did not discover before finalizing the note.    Signed by: Winsome Schumacher M.D.

## 2022-04-18 RX ORDER — APIXABAN 5 MG/1
TABLET, FILM COATED ORAL
Qty: 60 TABLET | Refills: 1 | Status: SHIPPED | OUTPATIENT
Start: 2022-04-18 | End: 2022-06-16 | Stop reason: SDUPTHER

## 2022-04-26 ENCOUNTER — APPOINTMENT (OUTPATIENT)
Dept: VASCULAR LAB | Facility: MEDICAL CENTER | Age: 77
End: 2022-04-26
Attending: INTERNAL MEDICINE
Payer: MEDICARE

## 2022-06-06 ENCOUNTER — TELEPHONE (OUTPATIENT)
Dept: MEDICAL GROUP | Facility: PHYSICIAN GROUP | Age: 77
End: 2022-06-06

## 2022-06-06 DIAGNOSIS — H26.9 CATARACT OF LEFT EYE, UNSPECIFIED CATARACT TYPE: ICD-10-CM

## 2022-06-06 NOTE — TELEPHONE ENCOUNTER
VOICEMAIL  1. Caller Name: Nohelia Dickerson                        Call Back Number: [phone] (home)       2. Message: Patient is looking to get another referral sent to [facility] eye [facility].    3. Patient approves office to leave a detailed voicemail/MyChart message: yes

## 2022-07-19 ENCOUNTER — GYNECOLOGY VISIT (OUTPATIENT)
Dept: OBGYN | Facility: CLINIC | Age: 77
End: 2022-07-19
Payer: MEDICARE

## 2022-07-19 VITALS — SYSTOLIC BLOOD PRESSURE: 181 MMHG | DIASTOLIC BLOOD PRESSURE: 103 MMHG | BODY MASS INDEX: 23.87 KG/M2 | WEIGHT: 176 LBS

## 2022-07-19 DIAGNOSIS — N95.2 ATROPHIC VAGINITIS: ICD-10-CM

## 2022-07-19 DIAGNOSIS — N39.46 URINARY INCONTINENCE, MIXED: ICD-10-CM

## 2022-07-19 DIAGNOSIS — N31.9 NEUROGENIC BLADDER: ICD-10-CM

## 2022-07-19 DIAGNOSIS — N32.81 OAB (OVERACTIVE BLADDER): ICD-10-CM

## 2022-07-19 PROCEDURE — 99213 OFFICE O/P EST LOW 20 MIN: CPT | Performed by: STUDENT IN AN ORGANIZED HEALTH CARE EDUCATION/TRAINING PROGRAM

## 2022-07-19 ASSESSMENT — FIBROSIS 4 INDEX: FIB4 SCORE: 1.68
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
## 2022-08-08 RX ORDER — APIXABAN 5 MG/1
TABLET, FILM COATED ORAL
Qty: 60 TABLET | Refills: 1 | Status: SHIPPED | OUTPATIENT
Start: 2022-08-08 | End: 2022-10-20

## 2022-08-22 ENCOUNTER — OFFICE VISIT (OUTPATIENT)
Dept: MEDICAL GROUP | Facility: PHYSICIAN GROUP | Age: 77
End: 2022-08-22
Payer: MEDICARE

## 2022-08-22 VITALS
HEIGHT: 72 IN | OXYGEN SATURATION: 91 % | HEART RATE: 81 BPM | TEMPERATURE: 97.4 F | DIASTOLIC BLOOD PRESSURE: 70 MMHG | RESPIRATION RATE: 14 BRPM | BODY MASS INDEX: 23.57 KG/M2 | SYSTOLIC BLOOD PRESSURE: 126 MMHG | WEIGHT: 174 LBS

## 2022-08-22 DIAGNOSIS — Z86.718 HISTORY OF ARTERIAL EMBOLISM: ICD-10-CM

## 2022-08-22 DIAGNOSIS — E78.5 DYSLIPIDEMIA: ICD-10-CM

## 2022-08-22 DIAGNOSIS — I10 HYPERTENSION, UNSPECIFIED TYPE: ICD-10-CM

## 2022-08-22 PROBLEM — R73.01 IMPAIRED FASTING GLUCOSE: Status: RESOLVED | Noted: 2022-04-05 | Resolved: 2022-08-22

## 2022-08-22 PROBLEM — I74.9 ARTERIAL EMBOLISM (HCC): Status: RESOLVED | Noted: 2021-10-26 | Resolved: 2022-08-22

## 2022-08-22 PROCEDURE — 99214 OFFICE O/P EST MOD 30 MIN: CPT | Performed by: INTERNAL MEDICINE

## 2022-08-22 ASSESSMENT — FIBROSIS 4 INDEX: FIB4 SCORE: 1.68

## 2022-08-22 NOTE — PROGRESS NOTES
Established Patient    Chief Complaint   Patient presents with    Follow-Up     bp       Subjective:     HPI:   Nohelia presents today with the following.    Patient Active Problem List    Diagnosis Date Noted    History of arterial embolism 08/22/2022    History of partial thyroidectomy 04/05/2022    History of COVID-19 04/05/2022    Hyperlipidemia, unspecified 01/20/2022    Hypomagnesemia 01/20/2022    Muscle weakness (generalized) 01/20/2022    Transaminitis 01/18/2022    Generalized weakness 01/15/2022    History of stroke 01/15/2022    DNI (do not intubate) 01/15/2022    Hypoalbuminemia 01/15/2022    Elevated brain natriuretic peptide (BNP) level 01/15/2022    Neurogenic bladder 11/15/2021    Urinary incontinence, mixed 11/15/2021    Prediabetes 10/26/2021    Secondary hypercoagulability disorder (HCC) 10/26/2021    Vitamin D deficiency 08/31/2021    Former smoker 08/31/2021    Dyslipidemia 06/01/2021    Urine incontinence 06/01/2021    Cardioembolic stroke (HCC) 09/04/2019    PVD (peripheral vascular disease) ([facility]) 09/03/2019    Arterial occlusion, lower extremity ([facility]) 09/03/2019    History of nephrolithiasis 08/24/2018    Hydronephrosis of left kidney 08/07/2018    Acute exacerbation of chronic obstructive pulmonary disease (COPD) ([facility]) 08/07/2018    Chronic respiratory failure ([facility]) 08/07/2018    Hypertension, unspecified type 05/22/2018       Current Outpatient Medications on File Prior to Visit   Medication Sig Dispense Refill    ELIQUIS 5 MG Tab TAKE 1 TABLET BY MOUTH TWICE A DAY 60 Tablet 1    losartan (COZAAR) 100 MG Tab TAKE 1 TABLET BY MOUTH EVERY  Tablet 0    MYRBETRIQ 25 MG TABLET SR 24 HR Take 25 mg by mouth every morning. 30 Tablet 3    Home Care Oxygen Inhale 1.5 L/min at bedtime. Oxygen dose range: 1.5  L/min at bedtime  Respiratory route via: Nasal Cannula   Oxygen supplier: Preferred          benzonatate (TESSALON) 100 MG Cap Take 1 Capsule by mouth 3 times a day as needed for Cough.  60 Capsule 0    estradiol (ESTRACE) 0.1 MG/GM vaginal cream Insert 1 g into the vagina two times a week. Wednesday & Sunday      fluticasone-salmeterol (ADVAIR) 250-50 MCG/DOSE AEROSOL POWDER, BREATH ACTIVATED Inhale 1 Puff every 12 hours.      Cholecalciferol (VITAMIN D3 PO) Take 2 Capsules by mouth every day.      Coenzyme Q10 (COQ-10 PO) Take 1 Capsule by mouth every morning.      Magnesium Salicylate (DOANS PILLS) 325 MG Tab Take 650 mg by mouth every 6 hours as needed (Mild Pain). 2 tablets = 650 mg.      ibuprofen (MOTRIN) 200 MG Tab Take 400 mg by mouth every 6 hours as needed for Mild Pain. 2 tablets = 400 mg.      atorvastatin (LIPITOR) 80 MG tablet TAKE 1 TABLET BY MOUTH EVERY DAY. N THE EVENING. PT TO MAKE APPT AND GET LABS PRIOR TO MORE REFILLS. 100 Tablet 3    MAGNESIUM PO Take 1 Tablet by mouth every morning.      albuterol 108 (90 Base) MCG/ACT Aero Soln inhalation aerosol Inhale 1-2 Puffs every 6 hours as needed for Shortness of Breath.      Multiple Vitamins-Minerals (PRESERVISION AREDS 2 PO) Take 1 Cap by mouth 2 Times a Day.      therapeutic multivitamin-minerals (THERAGRAN-M) Tab Take 1 Tablet by mouth every morning.       No current facility-administered medications on file prior to visit.       Allergies, past medical history, past surgical history, family history, social history reviewed and updated    ROS:  All other systems reviewed and are negative except as stated in the HPI       Physical Exam:     /70 (BP Location: Left arm, Patient Position: Sitting, BP Cuff Size: Adult)   Pulse 81   Temp 36.3 °C (97.4 °F) (Temporal)   Resp 14   Ht 1.829 m (6')   Wt 78.9 kg (174 lb)   SpO2 91%   BMI 23.60 kg/m²   General: Normal appearing. No distress.  Pulmonary: Clear to ausculation.  Normal effort.   Cardiovascular: Regular rate and rhythm    Assessment and Plan:     77 y.o. female with the following issues.    2. Hypertension, unspecified type  3. Dyslipidemia  Patient was seen by  ophthalmologist for cataract, she had high blood pressure at that time, she was requiring hydralazine IV to control her blood pressure, since then blood pressures within normal range, she check at home with normal range, today blood pressure is normal, reported compliance with losartan 100 mg daily, denied having related symptoms, she was on amlodipine 5 mg daily, she stopped before because of low blood pressure number, currently denied having chest pain or shortness of breath or leg swelling  > Advised to follow-up with vascular medicine as well    -discussion in details on target blood pressure goal, advised monitoring BP closely at home.  Have BP log to present at follow-up visit or send through my chart.   -Advised low-salt diet, healthy dietary option include plenty of vegetable, reduce refine carbohydrates and sugar, regular exercise as tolerated, healthy fat/protein/carbs, also avoid alcohol, no NSAIDs  If symptoms worsen or persist patient can return to clinic for reevaluation.  Red flags and strict emergency room precautions discussed.  Discussed side effects of medication. Patient understand    Follow Up:      Return in about 3 months (around 11/22/2022) for Hypertension management.    Please note that this dictation was created using voice recognition software. I have made every reasonable attempt to correct obvious errors, but I expect that there are errors of grammar and possibly content that I did not discover before finalizing the note.    Signed by: Winsome Schumacher M.D.

## 2022-10-20 RX ORDER — APIXABAN 5 MG/1
TABLET, FILM COATED ORAL
Qty: 60 TABLET | Refills: 0 | Status: SHIPPED | OUTPATIENT
Start: 2022-10-20 | End: 2022-11-17

## 2022-11-17 RX ORDER — APIXABAN 5 MG/1
TABLET, FILM COATED ORAL
Qty: 60 TABLET | Refills: 0 | Status: SHIPPED | OUTPATIENT
Start: 2022-11-17 | End: 2022-12-28

## 2022-11-17 NOTE — TELEPHONE ENCOUNTER
Received request via: Pharmacy    Was the patient seen in the last year in this department? Yes    Does the patient have an active prescription (recently filled or refills available) for medication(s) requested? No    Does the patient have residential Plus and need 100 day supply (blood pressure, diabetes and cholesterol meds only)? Medication is not for cholesterol, blood pressure or diabetes

## 2022-12-23 NOTE — TELEPHONE ENCOUNTER
Received request via: Pharmacy    Was the patient seen in the last year in this department? Yes    Does the patient have an active prescription (recently filled or refills available) for medication(s) requested? No    Does the patient have [facility] Plus and need 100 day supply (blood pressure, diabetes and cholesterol meds only)? Medication is not for cholesterol, blood pressure or diabetes

## 2022-12-28 RX ORDER — ATORVASTATIN CALCIUM 80 MG/1
80 TABLET, FILM COATED ORAL
Qty: 100 TABLET | Refills: 3 | Status: SHIPPED | OUTPATIENT
Start: 2022-12-28 | End: 2024-02-14

## 2022-12-28 RX ORDER — APIXABAN 5 MG/1
TABLET, FILM COATED ORAL
Qty: 60 TABLET | Refills: 0 | Status: SHIPPED
Start: 2022-12-28 | End: 2023-02-06

## 2023-02-06 DIAGNOSIS — I10 HYPERTENSION, UNSPECIFIED TYPE: ICD-10-CM

## 2023-02-06 RX ORDER — APIXABAN 5 MG/1
TABLET, FILM COATED ORAL
Qty: 60 TABLET | Refills: 0 | Status: SHIPPED | OUTPATIENT
Start: 2023-02-06 | End: 2023-03-10

## 2023-02-06 NOTE — TELEPHONE ENCOUNTER
Received request via: Pharmacy    Was the patient seen in the last year in this department? Yes    Does the patient have an active prescription (recently filled or refills available) for medication(s) requested? No    Does the patient have [facility] Plus and need 100 day supply (blood pressure, diabetes and cholesterol meds only)? Medication is not for cholesterol, blood pressure or diabetes

## 2023-02-07 RX ORDER — LOSARTAN POTASSIUM 100 MG/1
100 TABLET ORAL DAILY
Qty: 100 TABLET | Refills: 0 | Status: SHIPPED | OUTPATIENT
Start: 2023-02-07 | End: 2023-03-10

## 2023-02-09 RX ORDER — MIRABEGRON 25 MG/1
25 TABLET, FILM COATED, EXTENDED RELEASE ORAL EVERY MORNING
Qty: 30 TABLET | Refills: 3 | Status: SHIPPED | OUTPATIENT
Start: 2023-02-09 | End: 2023-05-09 | Stop reason: SDUPTHER

## 2023-03-07 NOTE — PROGRESS NOTES
Report received from Rose GARCIA. Plan of care discussed. Safety precautions in place. Daughter at bedside.      10:30

## 2023-03-09 ENCOUNTER — TELEPHONE (OUTPATIENT)
Dept: MEDICAL GROUP | Facility: PHYSICIAN GROUP | Age: 78
End: 2023-03-09

## 2023-03-09 DIAGNOSIS — I10 HYPERTENSION, UNSPECIFIED TYPE: ICD-10-CM

## 2023-03-09 NOTE — TELEPHONE ENCOUNTER
Received request via: Patient    Was the patient seen in the last year in this department? Yes    Does the patient have an active prescription (recently filled or refills available) for medication(s) requested? No    Does the patient have longterm Plus and need 100 day supply (blood pressure, diabetes and cholesterol meds only)? Patient does have SCP

## 2023-03-10 DIAGNOSIS — J44.1 ACUTE EXACERBATION OF CHRONIC OBSTRUCTIVE PULMONARY DISEASE (COPD) (HCC): ICD-10-CM

## 2023-03-10 DIAGNOSIS — J96.11 CHRONIC RESPIRATORY FAILURE WITH HYPOXIA (HCC): ICD-10-CM

## 2023-03-10 DIAGNOSIS — J43.9 PULMONARY EMPHYSEMA, UNSPECIFIED EMPHYSEMA TYPE (HCC): ICD-10-CM

## 2023-03-10 RX ORDER — FLUTICASONE PROPIONATE AND SALMETEROL 250; 50 UG/1; UG/1
1 POWDER RESPIRATORY (INHALATION) EVERY 12 HOURS
Qty: 60 EACH | Refills: 3 | Status: SHIPPED | OUTPATIENT
Start: 2023-03-10 | End: 2023-04-25

## 2023-03-10 RX ORDER — APIXABAN 5 MG/1
TABLET, FILM COATED ORAL
Qty: 60 TABLET | Refills: 0 | Status: SHIPPED | OUTPATIENT
Start: 2023-03-10 | End: 2023-04-19

## 2023-03-10 RX ORDER — LOSARTAN POTASSIUM 100 MG/1
100 TABLET ORAL DAILY
Qty: 100 TABLET | Refills: 0 | Status: SHIPPED | OUTPATIENT
Start: 2023-03-10 | End: 2023-08-02

## 2023-03-12 NOTE — CARE PLAN
Problem: Respiratory:  Goal: Respiratory status will improve  Intervention: Educate and encourage coughing and deep breathing  Note:   Received pt with   2 L nc . Pt uses O2 at night .Reinforced deep breathing and coughing exercise . Lung sounds clear bilaterally . Incentive spirometer use reinforced.      [Initial Evaluation] : an initial evaluation [FreeTextEntry1] : patient has not been to the office for six years or more..but has been seen previously for acid reflux

## 2023-04-19 RX ORDER — APIXABAN 5 MG/1
TABLET, FILM COATED ORAL
Qty: 60 TABLET | Refills: 0 | Status: SHIPPED | OUTPATIENT
Start: 2023-04-19 | End: 2023-04-25 | Stop reason: SDUPTHER

## 2023-04-25 ENCOUNTER — HOSPITAL ENCOUNTER (OUTPATIENT)
Dept: LAB | Facility: MEDICAL CENTER | Age: 78
End: 2023-04-25
Attending: FAMILY MEDICINE
Payer: MEDICARE

## 2023-04-25 ENCOUNTER — OFFICE VISIT (OUTPATIENT)
Dept: MEDICAL GROUP | Facility: PHYSICIAN GROUP | Age: 78
End: 2023-04-25
Payer: MEDICARE

## 2023-04-25 VITALS
HEIGHT: 72 IN | HEART RATE: 104 BPM | BODY MASS INDEX: 24.71 KG/M2 | TEMPERATURE: 97.4 F | WEIGHT: 182.4 LBS | OXYGEN SATURATION: 94 % | SYSTOLIC BLOOD PRESSURE: 168 MMHG | DIASTOLIC BLOOD PRESSURE: 100 MMHG

## 2023-04-25 DIAGNOSIS — R73.01 IMPAIRED FASTING GLUCOSE: ICD-10-CM

## 2023-04-25 DIAGNOSIS — E89.0 HISTORY OF PARTIAL THYROIDECTOMY: ICD-10-CM

## 2023-04-25 DIAGNOSIS — I63.9 CARDIOEMBOLIC STROKE (HCC): ICD-10-CM

## 2023-04-25 DIAGNOSIS — Z86.718 HISTORY OF ARTERIAL EMBOLISM: ICD-10-CM

## 2023-04-25 DIAGNOSIS — Z00.00 PREVENTATIVE HEALTH CARE: ICD-10-CM

## 2023-04-25 DIAGNOSIS — I10 HYPERTENSION, UNSPECIFIED TYPE: ICD-10-CM

## 2023-04-25 DIAGNOSIS — J42 CHRONIC BRONCHITIS, UNSPECIFIED CHRONIC BRONCHITIS TYPE (HCC): ICD-10-CM

## 2023-04-25 DIAGNOSIS — Z86.73 HISTORY OF EMBOLIC STROKE: ICD-10-CM

## 2023-04-25 DIAGNOSIS — E78.5 DYSLIPIDEMIA: ICD-10-CM

## 2023-04-25 PROBLEM — J96.10 CHRONIC RESPIRATORY FAILURE (HCC): Status: RESOLVED | Noted: 2018-08-07 | Resolved: 2023-04-25

## 2023-04-25 PROBLEM — Z86.16 HISTORY OF COVID-19: Status: RESOLVED | Noted: 2022-04-05 | Resolved: 2023-04-25

## 2023-04-25 LAB
ALBUMIN SERPL BCP-MCNC: 4.3 G/DL (ref 3.2–4.9)
ALBUMIN/GLOB SERPL: 1.5 G/DL
ALP SERPL-CCNC: 93 U/L (ref 30–99)
ALT SERPL-CCNC: 20 U/L (ref 2–50)
ANION GAP SERPL CALC-SCNC: 12 MMOL/L (ref 7–16)
AST SERPL-CCNC: 20 U/L (ref 12–45)
BASOPHILS # BLD AUTO: 0.5 % (ref 0–1.8)
BASOPHILS # BLD: 0.03 K/UL (ref 0–0.12)
BILIRUB SERPL-MCNC: 1.2 MG/DL (ref 0.1–1.5)
BUN SERPL-MCNC: 15 MG/DL (ref 8–22)
CALCIUM ALBUM COR SERPL-MCNC: 9.2 MG/DL (ref 8.5–10.5)
CALCIUM SERPL-MCNC: 9.4 MG/DL (ref 8.5–10.5)
CHLORIDE SERPL-SCNC: 105 MMOL/L (ref 96–112)
CHOLEST SERPL-MCNC: 146 MG/DL (ref 100–199)
CO2 SERPL-SCNC: 24 MMOL/L (ref 20–33)
CREAT SERPL-MCNC: 0.68 MG/DL (ref 0.5–1.4)
EOSINOPHIL # BLD AUTO: 0.2 K/UL (ref 0–0.51)
EOSINOPHIL NFR BLD: 3.3 % (ref 0–6.9)
ERYTHROCYTE [DISTWIDTH] IN BLOOD BY AUTOMATED COUNT: 44.8 FL (ref 35.9–50)
EST. AVERAGE GLUCOSE BLD GHB EST-MCNC: 128 MG/DL
FASTING STATUS PATIENT QL REPORTED: NORMAL
GFR SERPLBLD CREATININE-BSD FMLA CKD-EPI: 90 ML/MIN/1.73 M 2
GLOBULIN SER CALC-MCNC: 2.8 G/DL (ref 1.9–3.5)
GLUCOSE SERPL-MCNC: 99 MG/DL (ref 65–99)
HBA1C MFR BLD: 6.1 % (ref 4–5.6)
HCT VFR BLD AUTO: 46.8 % (ref 37–47)
HDLC SERPL-MCNC: 88 MG/DL
HGB BLD-MCNC: 15 G/DL (ref 12–16)
IMM GRANULOCYTES # BLD AUTO: 0.03 K/UL (ref 0–0.11)
IMM GRANULOCYTES NFR BLD AUTO: 0.5 % (ref 0–0.9)
LDLC SERPL CALC-MCNC: 47 MG/DL
LYMPHOCYTES # BLD AUTO: 1.79 K/UL (ref 1–4.8)
LYMPHOCYTES NFR BLD: 29.9 % (ref 22–41)
MCH RBC QN AUTO: 28.5 PG (ref 27–33)
MCHC RBC AUTO-ENTMCNC: 32.1 G/DL (ref 33.6–35)
MCV RBC AUTO: 88.8 FL (ref 81.4–97.8)
MONOCYTES # BLD AUTO: 0.39 K/UL (ref 0–0.85)
MONOCYTES NFR BLD AUTO: 6.5 % (ref 0–13.4)
NEUTROPHILS # BLD AUTO: 3.54 K/UL (ref 2–7.15)
NEUTROPHILS NFR BLD: 59.3 % (ref 44–72)
NRBC # BLD AUTO: 0 K/UL
NRBC BLD-RTO: 0 /100 WBC
PLATELET # BLD AUTO: 172 K/UL (ref 164–446)
PMV BLD AUTO: 11.3 FL (ref 9–12.9)
POTASSIUM SERPL-SCNC: 4.7 MMOL/L (ref 3.6–5.5)
PROT SERPL-MCNC: 7.1 G/DL (ref 6–8.2)
RBC # BLD AUTO: 5.27 M/UL (ref 4.2–5.4)
SODIUM SERPL-SCNC: 141 MMOL/L (ref 135–145)
TRIGL SERPL-MCNC: 57 MG/DL (ref 0–149)
TSH SERPL DL<=0.005 MIU/L-ACNC: 1.46 UIU/ML (ref 0.38–5.33)
WBC # BLD AUTO: 6 K/UL (ref 4.8–10.8)

## 2023-04-25 PROCEDURE — 84443 ASSAY THYROID STIM HORMONE: CPT

## 2023-04-25 PROCEDURE — 80053 COMPREHEN METABOLIC PANEL: CPT

## 2023-04-25 PROCEDURE — 36415 COLL VENOUS BLD VENIPUNCTURE: CPT

## 2023-04-25 PROCEDURE — 99214 OFFICE O/P EST MOD 30 MIN: CPT | Performed by: FAMILY MEDICINE

## 2023-04-25 PROCEDURE — 83036 HEMOGLOBIN GLYCOSYLATED A1C: CPT

## 2023-04-25 PROCEDURE — 80061 LIPID PANEL: CPT

## 2023-04-25 PROCEDURE — 85025 COMPLETE CBC W/AUTO DIFF WBC: CPT

## 2023-04-25 ASSESSMENT — PATIENT HEALTH QUESTIONNAIRE - PHQ9: CLINICAL INTERPRETATION OF PHQ2 SCORE: 0

## 2023-04-25 ASSESSMENT — FIBROSIS 4 INDEX: FIB4 SCORE: 1.7

## 2023-04-25 NOTE — PROGRESS NOTES
CHIEF COMPLAINT / REASON FOR VISIT  Nohelia Dickerson is a 78 y.o. female that presents today for follow-up medications    HISTORY OF PRESENT ILLNESS    Patient reports stroke in 2019, suspected cardioembolic, she was placed on Eliquis 5 mg BID and recommended this longterm    This morning blood pressure 120/80s    OBJECTIVE     BP (!) 168/100 (BP Location: Left arm, Patient Position: Sitting, BP Cuff Size: Adult)   Pulse (!) 104   Temp 36.3 °C (97.4 °F) (Temporal)   Ht 1.829 m (6')   Wt 82.7 kg (182 lb 6.4 oz)   SpO2 94%   BMI 24.74 kg/m²  Body mass index is 24.74 kg/m².    PHYSICAL EXAM  Constitutional: Sitting comfortably, in no acute distress, responds to questions appropriately.  Eyes:  No conjunctival injection, no scleral icterus, PERRL  Heart: Regular S1 S2, no murmurs, rub, or gallops  Lungs: Clear to auscultation bilaterally, no wheezes, rales, or rhonchi  Abdomen: Soft, nontender  Extremities: No lower extremity edema  Skin: Warm and dry, no rashes or lesions on face or exposed upper extremities    ASSESSMENT & PLAN  1. History of embolic stroke  2. Cardioembolic stroke (HCC)  3. History of arterial embolism  History of cryptogenic embolic stroke in 2019, suspected possible cardioembolic.  Was placed on Eliquis 5 mg twice daily and recommended to continue long-term.  Continue current therapy.    4. Impaired fasting glucose  Last hemoglobin A1c was in diabetic range, but has not been rechecked since then.  We will repeat A1c and fasting glucose.  - HEMOGLOBIN A1C; Future  - Comp Metabolic Panel; Future    5. Dyslipidemia  Last LDL-C at goal of less than 70 on atorvastatin 80 mg daily.  Recheck lipid panel  - Lipid Profile; Future    6. History of partial thyroidectomy  - TSH WITH REFLEX TO FT4; Future    7. Hypertension, unspecified type  Reports whitecoat hypertension, checks her blood pressure at home and is generally normotensive.  Continue current losartan 100 mg daily    8. Chronic  bronchitis, unspecified chronic bronchitis type (HCC)  COPD, stable on Advair 250-50 mcg 1 puff twice daily.  As needed albuterol inhaler    9. Preventative health care  - CBC WITH DIFFERENTIAL; Future

## 2023-04-26 ENCOUNTER — TELEPHONE (OUTPATIENT)
Dept: MEDICAL GROUP | Facility: PHYSICIAN GROUP | Age: 78
End: 2023-04-26
Payer: MEDICARE

## 2023-04-26 NOTE — TELEPHONE ENCOUNTER
----- Message from Freddy Garcia M.D. sent at 4/26/2023  9:41 AM PDT -----  Please call patient and inform that her labs show prediabetes, but otherwise her labs are normal, including cholesterol levels, blood counts, kidneys, liver, and thyroid.    Also please ask her to check her blood pressure at home while she is on the phone with you so that you can upload her home blood pressure to epic.

## 2023-05-03 ENCOUNTER — TELEPHONE (OUTPATIENT)
Dept: HEALTH INFORMATION MANAGEMENT | Facility: OTHER | Age: 78
End: 2023-05-03
Payer: MEDICARE

## 2023-05-09 RX ORDER — MIRABEGRON 25 MG/1
25 TABLET, FILM COATED, EXTENDED RELEASE ORAL EVERY MORNING
Qty: 90 TABLET | Refills: 3 | Status: SHIPPED | OUTPATIENT
Start: 2023-05-09 | End: 2023-08-07

## 2023-05-12 ENCOUNTER — GYNECOLOGY VISIT (OUTPATIENT)
Dept: OBGYN | Facility: CLINIC | Age: 78
End: 2023-05-12
Payer: MEDICARE

## 2023-05-12 DIAGNOSIS — N31.9 NEUROGENIC BLADDER: ICD-10-CM

## 2023-05-12 DIAGNOSIS — N32.81 OAB (OVERACTIVE BLADDER): ICD-10-CM

## 2023-05-12 PROCEDURE — 1126F AMNT PAIN NOTED NONE PRSNT: CPT | Performed by: STUDENT IN AN ORGANIZED HEALTH CARE EDUCATION/TRAINING PROGRAM

## 2023-05-12 PROCEDURE — 99213 OFFICE O/P EST LOW 20 MIN: CPT | Performed by: STUDENT IN AN ORGANIZED HEALTH CARE EDUCATION/TRAINING PROGRAM

## 2023-05-12 ASSESSMENT — FIBROSIS 4 INDEX: FIB4 SCORE: 2
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
## 2023-05-31 ENCOUNTER — PATIENT MESSAGE (OUTPATIENT)
Dept: HEALTH INFORMATION MANAGEMENT | Facility: OTHER | Age: 78
End: 2023-05-31

## 2023-06-05 ENCOUNTER — OFFICE VISIT (OUTPATIENT)
Dept: URGENT CARE | Facility: PHYSICIAN GROUP | Age: 78
End: 2023-06-05
Payer: MEDICARE

## 2023-06-05 VITALS
SYSTOLIC BLOOD PRESSURE: 150 MMHG | OXYGEN SATURATION: 91 % | DIASTOLIC BLOOD PRESSURE: 102 MMHG | HEART RATE: 101 BPM | BODY MASS INDEX: 24.38 KG/M2 | WEIGHT: 180 LBS | TEMPERATURE: 97.4 F | HEIGHT: 72 IN | RESPIRATION RATE: 14 BRPM

## 2023-06-05 DIAGNOSIS — I10 ELEVATED BLOOD PRESSURE READING WITH DIAGNOSIS OF HYPERTENSION: ICD-10-CM

## 2023-06-05 PROCEDURE — 3080F DIAST BP >= 90 MM HG: CPT | Performed by: PHYSICIAN ASSISTANT

## 2023-06-05 PROCEDURE — 99214 OFFICE O/P EST MOD 30 MIN: CPT | Performed by: PHYSICIAN ASSISTANT

## 2023-06-05 PROCEDURE — 3077F SYST BP >= 140 MM HG: CPT | Performed by: PHYSICIAN ASSISTANT

## 2023-06-05 ASSESSMENT — ENCOUNTER SYMPTOMS
BLURRED VISION: 0
HEADACHES: 1
DIZZINESS: 1
FEVER: 0
NAUSEA: 0
DOUBLE VISION: 0
HYPERTENSION: 1
SHORTNESS OF BREATH: 0
VOMITING: 0

## 2023-06-05 ASSESSMENT — FIBROSIS 4 INDEX: FIB4 SCORE: 2
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
## 2023-06-30 ENCOUNTER — OFFICE VISIT (OUTPATIENT)
Dept: MEDICAL GROUP | Facility: PHYSICIAN GROUP | Age: 78
End: 2023-06-30
Payer: MEDICARE

## 2023-06-30 VITALS
TEMPERATURE: 97.7 F | OXYGEN SATURATION: 89 % | HEART RATE: 90 BPM | WEIGHT: 174 LBS | SYSTOLIC BLOOD PRESSURE: 188 MMHG | RESPIRATION RATE: 20 BRPM | BODY MASS INDEX: 23.57 KG/M2 | DIASTOLIC BLOOD PRESSURE: 100 MMHG | HEIGHT: 72 IN

## 2023-06-30 DIAGNOSIS — I70.209 ARTERIAL OCCLUSION, LOWER EXTREMITY (HCC): ICD-10-CM

## 2023-06-30 DIAGNOSIS — I10 PRIMARY HYPERTENSION: ICD-10-CM

## 2023-06-30 DIAGNOSIS — I73.9 PERIPHERAL ARTERIAL DISEASE (HCC): ICD-10-CM

## 2023-06-30 PROCEDURE — 3080F DIAST BP >= 90 MM HG: CPT | Performed by: FAMILY MEDICINE

## 2023-06-30 PROCEDURE — 3077F SYST BP >= 140 MM HG: CPT | Performed by: FAMILY MEDICINE

## 2023-06-30 PROCEDURE — 99214 OFFICE O/P EST MOD 30 MIN: CPT | Performed by: FAMILY MEDICINE

## 2023-06-30 RX ORDER — AMLODIPINE BESYLATE 5 MG/1
5 TABLET ORAL DAILY
Qty: 90 TABLET | Refills: 3 | Status: SHIPPED | OUTPATIENT
Start: 2023-06-30

## 2023-06-30 ASSESSMENT — FIBROSIS 4 INDEX: FIB4 SCORE: 2

## 2023-06-30 NOTE — PROGRESS NOTES
"CHIEF COMPLAINT / REASON FOR VISIT  Nohelia Dickerson is a 78 y.o. female that presents today for blood pressure follow-up    HISTORY OF PRESENT ILLNESS  Blood pressures have been elevated at home, systolics in the 160s.  She does not have any headache, vision changes, chest pain, dyspnea.  She has been taking losartan 100 mg daily as prescribed.    Social History     Tobacco Use    Smoking status: Former     Packs/day: 1.00     Types: Cigarettes     Start date:      Quit date:      Years since quittin.4    Smokeless tobacco: Never    Tobacco comments:     [id number]   Vaping Use    Vaping Use: Never used   Substance Use Topics    Alcohol use: No    Drug use: No     OBJECTIVE    BP (!) 188/100 (BP Location: Left arm, Patient Position: Sitting, BP Cuff Size: Adult)   Pulse 90   Temp 36.5 °C (97.7 °F) (Temporal)   Resp 20   Ht 1.829 m (6')   Wt 78.9 kg (174 lb)   SpO2 89%   BMI 23.60 kg/m²      PHYSICAL EXAM  Constitutional: Sitting comfortably, in no acute distress, responds to questions appropriately.  Head: Normocephalic  Heart: Regular S1 S2, no murmurs, rub, or gallops  Lungs: Clear to auscultation bilaterally, no wheezes, rales, or rhonchi  Extremities: No lower extremity edema  Skin: Warm and dry, no rashes or lesions on face or exposed upper extremities    ASSESSMENT & PLAN  1. Primary hypertension  Uncontrolled, goal at least less than 140/90, ideal less than 130/80.  Currently taking losartan 100 mg daily.  We will initiate amlodipine 5 mg daily.  She monitors her blood pressures daily at home.  If greater than 140/90 we will schedule repeat visit, consider increasing amlodipine to 10 mg daily.    2. Arterial occlusion, lower extremity (HCC)  3. Peripheral arterial disease (HCC)  CTA aorta with lower extremity runoff 2019: \"There is occlusion of the distal left superficial femoral artery at the adductor canal by thrombus. Opacified collateral vessels and reconstitution of flow " "within the trifurcation arteries are identified. There is delayed flow through the right popliteal artery and trifurcation arteries with no abrupt occlusion or focal stenosis identified.\"  This was suspected by vascular medicine to be cardioembolic.  She is asymptomatic, but if she develops claudication would consider lower extremity arterial duplex ultrasound.  Continue with medical management with lipid control, blood pressure control, and indefinite anticoagulation.    Follow-up visit in 3 months for chronic disease management  "

## 2023-07-17 ENCOUNTER — APPOINTMENT (OUTPATIENT)
Dept: RADIOLOGY | Facility: MEDICAL CENTER | Age: 78
DRG: 184 | End: 2023-07-17
Attending: STUDENT IN AN ORGANIZED HEALTH CARE EDUCATION/TRAINING PROGRAM
Payer: MEDICARE

## 2023-07-17 ENCOUNTER — HOSPITAL ENCOUNTER (INPATIENT)
Facility: MEDICAL CENTER | Age: 78
LOS: 2 days | DRG: 184 | End: 2023-07-20
Attending: STUDENT IN AN ORGANIZED HEALTH CARE EDUCATION/TRAINING PROGRAM | Admitting: HOSPITALIST
Payer: MEDICARE

## 2023-07-17 DIAGNOSIS — J90 PLEURAL EFFUSION ON LEFT: ICD-10-CM

## 2023-07-17 DIAGNOSIS — R07.89 LEFT-SIDED CHEST WALL PAIN: ICD-10-CM

## 2023-07-17 DIAGNOSIS — S22.42XA MULTIPLE FRACTURES OF RIBS, LEFT SIDE, INITIAL ENCOUNTER FOR CLOSED FRACTURE: ICD-10-CM

## 2023-07-17 DIAGNOSIS — J96.21 ACUTE ON CHRONIC RESPIRATORY FAILURE WITH HYPOXIA (HCC): ICD-10-CM

## 2023-07-17 DIAGNOSIS — R09.02 HYPOXIA: ICD-10-CM

## 2023-07-17 DIAGNOSIS — S22.42XA CLOSED FRACTURE OF MULTIPLE RIBS OF LEFT SIDE, INITIAL ENCOUNTER: ICD-10-CM
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
## 2023-07-17 PROCEDURE — 36415 COLL VENOUS BLD VENIPUNCTURE: CPT

## 2023-07-17 PROCEDURE — 71101 X-RAY EXAM UNILAT RIBS/CHEST: CPT | Mod: LT

## 2023-07-17 PROCEDURE — 99285 EMERGENCY DEPT VISIT HI MDM: CPT

## 2023-07-17 RX ORDER — KETOROLAC TROMETHAMINE 30 MG/ML
15 INJECTION, SOLUTION INTRAMUSCULAR; INTRAVENOUS ONCE
Status: COMPLETED | OUTPATIENT
Start: 2023-07-17 | End: 2023-07-18

## 2023-07-17 ASSESSMENT — FIBROSIS 4 INDEX: FIB4 SCORE: 2

## 2023-07-18 PROBLEM — R09.02 HYPOXIA: Status: ACTIVE | Noted: 2023-07-18

## 2023-07-18 PROBLEM — S22.42XA CLOSED FRACTURE OF MULTIPLE RIBS OF LEFT SIDE: Status: ACTIVE | Noted: 2023-07-18

## 2023-07-18 PROBLEM — Z71.89 ACP (ADVANCE CARE PLANNING): Status: ACTIVE | Noted: 2023-07-18

## 2023-07-18 LAB
ALBUMIN SERPL BCP-MCNC: 3.9 G/DL (ref 3.2–4.9)
ALBUMIN/GLOB SERPL: 1.3 G/DL
ALP SERPL-CCNC: 101 U/L (ref 30–99)
ALT SERPL-CCNC: 23 U/L (ref 2–50)
ANION GAP SERPL CALC-SCNC: 14 MMOL/L (ref 7–16)
AST SERPL-CCNC: 21 U/L (ref 12–45)
BASOPHILS # BLD AUTO: 0.7 % (ref 0–1.8)
BASOPHILS # BLD: 0.05 K/UL (ref 0–0.12)
BILIRUB SERPL-MCNC: 1.4 MG/DL (ref 0.1–1.5)
BUN SERPL-MCNC: 22 MG/DL (ref 8–22)
CALCIUM ALBUM COR SERPL-MCNC: 9.4 MG/DL (ref 8.5–10.5)
CALCIUM SERPL-MCNC: 9.3 MG/DL (ref 8.4–10.2)
CHLORIDE SERPL-SCNC: 101 MMOL/L (ref 96–112)
CO2 SERPL-SCNC: 22 MMOL/L (ref 20–33)
CREAT SERPL-MCNC: 0.73 MG/DL (ref 0.5–1.4)
EOSINOPHIL # BLD AUTO: 0.19 K/UL (ref 0–0.51)
EOSINOPHIL NFR BLD: 2.5 % (ref 0–6.9)
ERYTHROCYTE [DISTWIDTH] IN BLOOD BY AUTOMATED COUNT: 43.6 FL (ref 35.9–50)
GFR SERPLBLD CREATININE-BSD FMLA CKD-EPI: 84 ML/MIN/1.73 M 2
GLOBULIN SER CALC-MCNC: 2.9 G/DL (ref 1.9–3.5)
GLUCOSE SERPL-MCNC: 111 MG/DL (ref 65–99)
HCT VFR BLD AUTO: 40.3 % (ref 37–47)
HGB BLD-MCNC: 13.6 G/DL (ref 12–16)
IMM GRANULOCYTES # BLD AUTO: 0.03 K/UL (ref 0–0.11)
IMM GRANULOCYTES NFR BLD AUTO: 0.4 % (ref 0–0.9)
LYMPHOCYTES # BLD AUTO: 1.71 K/UL (ref 1–4.8)
LYMPHOCYTES NFR BLD: 22.3 % (ref 22–41)
MCH RBC QN AUTO: 29.4 PG (ref 27–33)
MCHC RBC AUTO-ENTMCNC: 33.7 G/DL (ref 32.2–35.5)
MCV RBC AUTO: 87.2 FL (ref 81.4–97.8)
MONOCYTES # BLD AUTO: 0.76 K/UL (ref 0–0.85)
MONOCYTES NFR BLD AUTO: 9.9 % (ref 0–13.4)
NEUTROPHILS # BLD AUTO: 4.92 K/UL (ref 1.82–7.42)
NEUTROPHILS NFR BLD: 64.2 % (ref 44–72)
NRBC # BLD AUTO: 0 K/UL
NRBC BLD-RTO: 0 /100 WBC (ref 0–0.2)
PLATELET # BLD AUTO: 191 K/UL (ref 164–446)
PMV BLD AUTO: 9.9 FL (ref 9–12.9)
POTASSIUM SERPL-SCNC: 4 MMOL/L (ref 3.6–5.5)
PROT SERPL-MCNC: 6.8 G/DL (ref 6–8.2)
RBC # BLD AUTO: 4.62 M/UL (ref 4.2–5.4)
SODIUM SERPL-SCNC: 137 MMOL/L (ref 135–145)
WBC # BLD AUTO: 7.7 K/UL (ref 4.8–10.8)

## 2023-07-18 PROCEDURE — 99497 ADVNCD CARE PLAN 30 MIN: CPT | Mod: 25 | Performed by: HOSPITALIST

## 2023-07-18 PROCEDURE — A9270 NON-COVERED ITEM OR SERVICE: HCPCS | Performed by: HOSPITALIST

## 2023-07-18 PROCEDURE — 96374 THER/PROPH/DIAG INJ IV PUSH: CPT

## 2023-07-18 PROCEDURE — 80053 COMPREHEN METABOLIC PANEL: CPT

## 2023-07-18 PROCEDURE — 94760 N-INVAS EAR/PLS OXIMETRY 1: CPT

## 2023-07-18 PROCEDURE — 700102 HCHG RX REV CODE 250 W/ 637 OVERRIDE(OP): Performed by: HOSPITALIST

## 2023-07-18 PROCEDURE — 71250 CT THORAX DX C-: CPT

## 2023-07-18 PROCEDURE — 700101 HCHG RX REV CODE 250: Performed by: FAMILY MEDICINE

## 2023-07-18 PROCEDURE — 770001 HCHG ROOM/CARE - MED/SURG/GYN PRIV*

## 2023-07-18 PROCEDURE — 700111 HCHG RX REV CODE 636 W/ 250 OVERRIDE (IP): Performed by: STUDENT IN AN ORGANIZED HEALTH CARE EDUCATION/TRAINING PROGRAM

## 2023-07-18 PROCEDURE — 85025 COMPLETE CBC W/AUTO DIFF WBC: CPT

## 2023-07-18 PROCEDURE — 36415 COLL VENOUS BLD VENIPUNCTURE: CPT

## 2023-07-18 PROCEDURE — 99223 1ST HOSP IP/OBS HIGH 75: CPT | Mod: AI | Performed by: HOSPITALIST

## 2023-07-18 RX ORDER — AMOXICILLIN 250 MG
2 CAPSULE ORAL 2 TIMES DAILY
Status: DISCONTINUED | OUTPATIENT
Start: 2023-07-18 | End: 2023-07-20 | Stop reason: HOSPADM

## 2023-07-18 RX ORDER — ACETAMINOPHEN 325 MG/1
650 TABLET ORAL EVERY 6 HOURS PRN
Status: DISCONTINUED | OUTPATIENT
Start: 2023-07-18 | End: 2023-07-18

## 2023-07-18 RX ORDER — HYDROMORPHONE HYDROCHLORIDE 1 MG/ML
0.25 INJECTION, SOLUTION INTRAMUSCULAR; INTRAVENOUS; SUBCUTANEOUS
Status: DISCONTINUED | OUTPATIENT
Start: 2023-07-18 | End: 2023-07-20 | Stop reason: HOSPADM

## 2023-07-18 RX ORDER — ACETAMINOPHEN 500 MG
1000 TABLET ORAL EVERY 6 HOURS PRN
Status: DISCONTINUED | OUTPATIENT
Start: 2023-07-23 | End: 2023-07-20 | Stop reason: HOSPADM

## 2023-07-18 RX ORDER — POLYETHYLENE GLYCOL 3350 17 G/17G
1 POWDER, FOR SOLUTION ORAL
Status: DISCONTINUED | OUTPATIENT
Start: 2023-07-18 | End: 2023-07-20 | Stop reason: HOSPADM

## 2023-07-18 RX ORDER — ACETAMINOPHEN 500 MG
1000 TABLET ORAL EVERY 6 HOURS PRN
COMMUNITY

## 2023-07-18 RX ORDER — LOSARTAN POTASSIUM 50 MG/1
100 TABLET ORAL DAILY
Status: DISCONTINUED | OUTPATIENT
Start: 2023-07-18 | End: 2023-07-20 | Stop reason: HOSPADM

## 2023-07-18 RX ORDER — AMLODIPINE BESYLATE 5 MG/1
5 TABLET ORAL DAILY
Status: DISCONTINUED | OUTPATIENT
Start: 2023-07-18 | End: 2023-07-20 | Stop reason: HOSPADM

## 2023-07-18 RX ORDER — OXYCODONE HYDROCHLORIDE 5 MG/1
2.5 TABLET ORAL
Status: DISCONTINUED | OUTPATIENT
Start: 2023-07-18 | End: 2023-07-20 | Stop reason: HOSPADM

## 2023-07-18 RX ORDER — ONDANSETRON 2 MG/ML
4 INJECTION INTRAMUSCULAR; INTRAVENOUS EVERY 4 HOURS PRN
Status: DISCONTINUED | OUTPATIENT
Start: 2023-07-18 | End: 2023-07-20 | Stop reason: HOSPADM

## 2023-07-18 RX ORDER — OXYCODONE HYDROCHLORIDE 5 MG/1
5 TABLET ORAL
Status: DISCONTINUED | OUTPATIENT
Start: 2023-07-18 | End: 2023-07-20 | Stop reason: HOSPADM

## 2023-07-18 RX ORDER — ATORVASTATIN CALCIUM 40 MG/1
80 TABLET, FILM COATED ORAL EVERY EVENING
Status: DISCONTINUED | OUTPATIENT
Start: 2023-07-18 | End: 2023-07-20 | Stop reason: HOSPADM

## 2023-07-18 RX ORDER — ACETAMINOPHEN 500 MG
1000 TABLET ORAL EVERY 6 HOURS
Status: DISCONTINUED | OUTPATIENT
Start: 2023-07-18 | End: 2023-07-20 | Stop reason: HOSPADM

## 2023-07-18 RX ORDER — LIDOCAINE 50 MG/G
1 PATCH TOPICAL EVERY 24 HOURS
Status: DISCONTINUED | OUTPATIENT
Start: 2023-07-18 | End: 2023-07-20 | Stop reason: HOSPADM

## 2023-07-18 RX ORDER — ONDANSETRON 4 MG/1
4 TABLET, ORALLY DISINTEGRATING ORAL EVERY 4 HOURS PRN
Status: DISCONTINUED | OUTPATIENT
Start: 2023-07-18 | End: 2023-07-20 | Stop reason: HOSPADM

## 2023-07-18 RX ORDER — BISACODYL 10 MG
10 SUPPOSITORY, RECTAL RECTAL
Status: DISCONTINUED | OUTPATIENT
Start: 2023-07-18 | End: 2023-07-20 | Stop reason: HOSPADM

## 2023-07-18 RX ADMIN — APIXABAN 5 MG: 5 TABLET, FILM COATED ORAL at 06:03

## 2023-07-18 RX ADMIN — AMLODIPINE BESYLATE 5 MG: 5 TABLET ORAL at 06:03

## 2023-07-18 RX ADMIN — ACETAMINOPHEN 1000 MG: 500 TABLET ORAL at 06:02

## 2023-07-18 RX ADMIN — SENNOSIDES AND DOCUSATE SODIUM 2 TABLET: 50; 8.6 TABLET ORAL at 06:04

## 2023-07-18 RX ADMIN — ACETAMINOPHEN 1000 MG: 500 TABLET ORAL at 01:53

## 2023-07-18 RX ADMIN — ACETAMINOPHEN 1000 MG: 500 TABLET ORAL at 17:17

## 2023-07-18 RX ADMIN — KETOROLAC TROMETHAMINE 15 MG: 30 INJECTION, SOLUTION INTRAMUSCULAR; INTRAVENOUS at 00:28

## 2023-07-18 RX ADMIN — ACETAMINOPHEN 1000 MG: 500 TABLET ORAL at 11:43

## 2023-07-18 RX ADMIN — LIDOCAINE 1 PATCH: 50 PATCH TOPICAL at 10:25

## 2023-07-18 RX ADMIN — MOMETASONE FUROATE AND FORMOTEROL FUMARATE DIHYDRATE 2 PUFF: 200; 5 AEROSOL RESPIRATORY (INHALATION) at 11:43

## 2023-07-18 RX ADMIN — APIXABAN 5 MG: 5 TABLET, FILM COATED ORAL at 17:17

## 2023-07-18 RX ADMIN — LOSARTAN POTASSIUM 100 MG: 50 TABLET, FILM COATED ORAL at 06:03

## 2023-07-18 RX ADMIN — ATORVASTATIN CALCIUM 80 MG: 40 TABLET, FILM COATED ORAL at 17:17

## 2023-07-18 ASSESSMENT — LIFESTYLE VARIABLES
AVERAGE NUMBER OF DAYS PER WEEK YOU HAVE A DRINK CONTAINING ALCOHOL: 0
HAVE YOU EVER FELT YOU SHOULD CUT DOWN ON YOUR DRINKING: NO
TOTAL SCORE: 0
HAVE PEOPLE ANNOYED YOU BY CRITICIZING YOUR DRINKING: NO
ON A TYPICAL DAY WHEN YOU DRINK ALCOHOL HOW MANY DRINKS DO YOU HAVE: 0
TOTAL SCORE: 0
HOW MANY TIMES IN THE PAST YEAR HAVE YOU HAD 5 OR MORE DRINKS IN A DAY: 0
TOTAL SCORE: 0
ALCOHOL_USE: NO
EVER HAD A DRINK FIRST THING IN THE MORNING TO STEADY YOUR NERVES TO GET RID OF A HANGOVER: NO
CONSUMPTION TOTAL: NEGATIVE
EVER FELT BAD OR GUILTY ABOUT YOUR DRINKING: NO

## 2023-07-18 ASSESSMENT — COGNITIVE AND FUNCTIONAL STATUS - GENERAL
MOBILITY SCORE: 20
DAILY ACTIVITIY SCORE: 24
MOVING FROM LYING ON BACK TO SITTING ON SIDE OF FLAT BED: A LITTLE
TURNING FROM BACK TO SIDE WHILE IN FLAT BAD: A LITTLE
WALKING IN HOSPITAL ROOM: A LITTLE
SUGGESTED CMS G CODE MODIFIER MOBILITY: CJ
SUGGESTED CMS G CODE MODIFIER DAILY ACTIVITY: CH
CLIMB 3 TO 5 STEPS WITH RAILING: A LITTLE

## 2023-07-18 ASSESSMENT — ENCOUNTER SYMPTOMS
DIZZINESS: 0
DEPRESSION: 0
SHORTNESS OF BREATH: 1
HEADACHES: 0
VOMITING: 0
PALPITATIONS: 0
WEAKNESS: 0
COUGH: 0
DOUBLE VISION: 0
SORE THROAT: 0
MYALGIAS: 0
FEVER: 0
INSOMNIA: 0
NECK PAIN: 0
NAUSEA: 0
BRUISES/BLEEDS EASILY: 0
BLURRED VISION: 0

## 2023-07-18 ASSESSMENT — FIBROSIS 4 INDEX: FIB4 SCORE: 1.77

## 2023-07-18 ASSESSMENT — PAIN DESCRIPTION - PAIN TYPE
TYPE: ACUTE PAIN

## 2023-07-18 NOTE — DISCHARGE PLANNING
"[facility] TCN chart review completed. The most current review of medical record, knowledge of pt's PLOF and social support, LACE+ score of 65, no 6 clicks scores present; although per chart review of nursing notes noted patient with use of FWW, documented \"difficulty getting up\" (please see RN note 7/18/2023 at 12:04PM).    TCN seen at bedside in ED. Introduced TCN program. Provided education regarding post acute levels of care. Discussed [facility] plan benefits (Meds to Beds, medical uber and [facility] transitional care). Pt verbalized understanding.     Patient endorsed she resides with her daughter and granddaughter, noting family provides her with assistance as necessary. Patient reported independence in ADLs, IADLs, noting family provides transportation assistance as \"my car is not working at this time.\" Patient endorsed changes to mobility \"every since COVID 2 years ago\" characterized as \"my legs are just gone.\" Patient endorsed use of walker in the home and a cane when ambulating in the community. Patient noted she has not seen physical therapy since beginning to use assistive devices with ambulation.     Requested provider consults for: PT from attending physician, Dr. Seaman, via voalte. Choice proactively obtained for HH, faxed to [facility] and given to CM.     TCN will continue to follow and collaborate with discharge planning team as additional post acute needs arise. Thank you.     Completed today  Voalte sent to attending physician, Dr. Seaman, requesting consideration of PT consult as appropriate in light of patient mobility concerns  Choice obtained: HH  [facility] referral (N); TCN to follow for possible outpatient f/u with primary care physician versus [facility] dependent upon patient discharge planning needs   "

## 2023-07-18 NOTE — ED NOTES
Med rec updated and complete, per pt   Allergies reviewed, per pt  Pt reports that she took a half tablet (12.5MG) of her MYRBETRIQ 25MG yesterday @ 1000.

## 2023-07-18 NOTE — ED PROVIDER NOTES
ED Provider Note    CHIEF COMPLAINT  Chief Complaint   Patient presents with    Rib Injury    Rib Pain    Shortness of Breath     Patient report she fell on 07/08/2023 in the bathroom hitting the plastic tower to the left rib and shortness of breath due to the pain.  Increase rib pain due to coughing.  Patient denies hitting her head. Patient takes eliquis.    T-5000 GLF       EXTERNAL RECORDS REVIEWED  Outpatient Notes Seen on 6/30 for primary hypertension as an outpatient.    HPI/ROS  LIMITATION TO HISTORY   Select: : None  OUTSIDE HISTORIAN(S):  None    Nohelia Kalpana Dickerson is a 78 y.o. female who presents with left-sided rib pain.  Patient says that she had a fall on 8 July.  She describes accidentally stepping backwards over her slippers and fell in the bathroom hitting a plastic tower. She denies any dizziness or lightheadedness prior to the fall.  She is certain that she did not hit her head or lose consciousness.  She has had no neck pain, headache or vomiting.  She has had pain to the left chest wall since the injury.  She says that it does hurt to cough however she feels that the pain is starting to improve.  She has been taking Tylenol with some relief.  She denies any abdominal pain.  She has been able to ambulate since the injury.  She is on anticoagulation and is compliant.  She denies any fevers or chills.  She denies any cough but says that she has not been unable to cough due to the pain.  I appreciate triage note says that she feels short of breath however patient denies to me any increase in shortness of breath from her baseline.  She uses 1.5L of home oxygen for COPD, typically only requiring it at night.  She does not smoke tobacco.     PAST MEDICAL HISTORY   has a past medical history of Asthma, Blood clotting disorder (HCC), Cancer (HCC) (1978), COPD, Healthcare maintenance (5/22/2018), Hernia of unspecified site of abdominal cavity without mention of obstruction or gangrene (2011),  Hypertension, Inguinal hernia, Kidney stones, TIA (transient ischemic attack), Tobacco dependence (2018), Tobacco use, and Viral URI with cough (2019).    SURGICAL HISTORY   has a past surgical history that includes hernia repair; thyroidectomy; other (1983); inguinal hernia repair (2011); and cataract extraction with iol (Bilateral, 2022).    FAMILY HISTORY  Family History   Problem Relation Age of Onset    Cancer Mother         Breast Cancer    Heart Disease Father     Stroke Father     Heart Attack Father     Diabetes Brother     Other Daughter         Aneurysm    No Known Problems Daughter        SOCIAL HISTORY  Social History     Tobacco Use    Smoking status: Former     Packs/day: 1.00     Types: Cigarettes     Start date:      Quit date:      Years since quittin.5    Smokeless tobacco: Never    Tobacco comments:     [id number]   Vaping Use    Vaping Use: Never used   Substance and Sexual Activity    Alcohol use: No    Drug use: No    Sexual activity: Yes     Partners: Male       CURRENT MEDICATIONS  Home Medications       Reviewed by Thelma Lora R.N. (Registered Nurse) on 23 at 2100  Med List Status: Complete     Medication Last Dose Status   albuterol 108 (90 Base) MCG/ACT Aero Soln inhalation aerosol 2023 Active   amLODIPine (NORVASC) 5 MG Tab 2023 Active   apixaban (ELIQUIS) 5mg Tab 2023 Active   atorvastatin (LIPITOR) 80 MG tablet 2023 Active   Cholecalciferol (VITAMIN D3 PO) 2023 Active   Coenzyme Q10 (COQ-10 PO) 2023 Active   fluticasone-salmeterol (ADVAIR) 250-50 MCG/DOSE AEROSOL POWDER, BREATH ACTIVATED 2023 Active   Home Care Oxygen 2023 Active   losartan (COZAAR) 100 MG Tab 2023 Active   Multiple Vitamins-Minerals (PRESERVISION AREDS 2 PO) 2023 Active   MYRBETRIQ 25 MG TABLET SR 24 HR 2023 Active   therapeutic multivitamin-minerals (THERAGRAN-M) Tab 2023 Active                     ALLERGIES  Allergies   Allergen Reactions    Morphine Unspecified     Hallucinations, nightmares, and altered mentations       PHYSICAL EXAM  VITAL SIGNS: /74   Pulse 84   Temp 36.3 °C (97.3 °F) (Temporal)   Resp 18   Ht 1.829 m (6')   Wt 80.5 kg (177 lb 7.5 oz)   SpO2 93%   BMI 24.07 kg/m²    Constitutional: Awake and alert . Non toxic  HENT: Normocephalic, atraumatic  Eyes: Normal inspection  Neck: Grossly normal range of motion.  Cardiovascular: Tachycardic heart rate, Normal rhythm.  Symmetric peripheral pulses.   Thorax & Lungs: No respiratory distress, No wheezing, No rales, No rhonchi,  She has left chest wall tenderness. No ecchymosis, broken skin or crepitus   Abdomen: Soft, non-distended, nontender to palpation in all 4 quadrants, no mass  Skin: No obvious rash.  Extremities: Warm, well perfused. No clubbing, cyanosis, edema   Neurologic: Grossly normal   Psychiatric: Normal for situation      DIAGNOSTIC STUDIES / PROCEDURES    LABS  Results for orders placed or performed during the hospital encounter of 07/17/23   CBC WITH DIFFERENTIAL   Result Value Ref Range    WBC 7.7 4.8 - 10.8 K/uL    RBC 4.62 4.20 - 5.40 M/uL    Hemoglobin 13.6 12.0 - 16.0 g/dL    Hematocrit 40.3 37.0 - 47.0 %    MCV 87.2 81.4 - 97.8 fL    MCH 29.4 27.0 - 33.0 pg    MCHC 33.7 32.2 - 35.5 g/dL    RDW 43.6 35.9 - 50.0 fL    Platelet Count 191 164 - 446 K/uL    MPV 9.9 9.0 - 12.9 fL    Neutrophils-Polys 64.20 44.00 - 72.00 %    Lymphocytes 22.30 22.00 - 41.00 %    Monocytes 9.90 0.00 - 13.40 %    Eosinophils 2.50 0.00 - 6.90 %    Basophils 0.70 0.00 - 1.80 %    Immature Granulocytes 0.40 0.00 - 0.90 %    Nucleated RBC 0.00 0.00 - 0.20 /100 WBC    Neutrophils (Absolute) 4.92 1.82 - 7.42 K/uL    Lymphs (Absolute) 1.71 1.00 - 4.80 K/uL    Monos (Absolute) 0.76 0.00 - 0.85 K/uL    Eos (Absolute) 0.19 0.00 - 0.51 K/uL    Baso (Absolute) 0.05 0.00 - 0.12 K/uL    Immature Granulocytes (abs) 0.03 0.00 - 0.11 K/uL    NRBC  (Absolute) 0.00 K/uL   COMP METABOLIC PANEL   Result Value Ref Range    Sodium 137 135 - 145 mmol/L    Potassium 4.0 3.6 - 5.5 mmol/L    Chloride 101 96 - 112 mmol/L    Co2 22 20 - 33 mmol/L    Anion Gap 14.0 7.0 - 16.0    Glucose 111 (H) 65 - 99 mg/dL    Bun 22 8 - 22 mg/dL    Creatinine 0.73 0.50 - 1.40 mg/dL    Calcium 9.3 8.4 - 10.2 mg/dL    AST(SGOT) 21 12 - 45 U/L    ALT(SGPT) 23 2 - 50 U/L    Alkaline Phosphatase 101 (H) 30 - 99 U/L    Total Bilirubin 1.4 0.1 - 1.5 mg/dL    Albumin 3.9 3.2 - 4.9 g/dL    Total Protein 6.8 6.0 - 8.2 g/dL    Globulin 2.9 1.9 - 3.5 g/dL    A-G Ratio 1.3 g/dL   CORRECTED CALCIUM   Result Value Ref Range    Correct Calcium 9.4 8.5 - 10.5 mg/dL   ESTIMATED GFR   Result Value Ref Range    GFR (CKD-EPI) 84 >60 mL/min/1.73 m 2         RADIOLOGY  I have independently interpreted the diagnostic imaging associated with this visit and am waiting the final reading from the radiologist.   My preliminary interpretation is as follows: Left Rib Fractures  Radiologist interpretation:   CT-CHEST (THORAX) W/O   Final Result         1. Acute mildly displaced fractures of the left lateral 5th to 9th ribs.   2. Trace left pleural effusion. No pneumothorax.   3. New bibasilar opacities, left more than right, likely atelectasis.   4. Mild interlobular septal thickening and mild      [id number] (WITH 1-VIEW CXR) LEFT   Final Result      Acute mildly displaced fractures of the left lateral 5-9 ribs            COURSE & MEDICAL DECISION MAKING    ED Observation Status? Yes; I am placing the patient in to an observation status due to a diagnostic uncertainty as well as therapeutic intensity. Patient placed in observation status at 10:13 PM, 7/17/2023.     Observation plan is as follows: CXR, Labs, Serial Exams    Upon Reevaluation, the patient's condition has: not improved; and will be escalated to hospitalization.    Patient discharged from ED Observation status at 12:59 AM 7/18/2023    INITIAL  ASSESSMENT, COURSE AND PLAN  Care Narrative: This is a 78-year-old with COPD on 1.5 L of home oxygen who presents with left chest wall pain after a mechanical ground level fall.  She is mildly tachycardic on arrival, systemically well-appearing,  on 2L oxygen.  Patient is certain that she did not hit her head or lose consciousness and given now days since injury, I doubt intracranial bleed.  Chest x-ray obtained notable for 5 left-sided rib fractures (ribs 5-9).  No associated pneumothorax or contusion.  She has no infectious symptoms and no signs of pneumonia on XR . Labs are reassuring no leukocytosis, no significant electrolyte derangement, normal renal and liver function testing.  She was given toradol for pain with some improvement .     11:18 PM  I discussed with Dr. Marte (Trauma surgery at [facility]).  He recommended to obtain a CT scan of chest.  He felt that she was appropriate for management here at [facility], provided she did not have any additional or severe injuries identified on CT.    CT does show redemonstration of left rib fractures, 5- 9.  She has a small pleural effusion but otherwise no contusion, pneumothorax or other signs of cardiopulmonary trauma.  The patient was reevaluated, she has required increase in her supplemental oxygen up to 4 L.  Her pain is controlled at rest however with attempted ambulation she becomes more dyspneic.  She is only able to .  Patient is agreeable to hospital admission.  I discussed with Dr. Dickerson who will admit for further management.    HTN/IDDM FOLLOW UP:  The patient has known hypertension and is being followed by their primary care doctor        DISPOSITION AND DISCUSSIONS  I have discussed management of the patient with the following physicians and PAPITO's:  Dr. Dickerson.  Dr. Marte     Discussion of management with other Hospitals in [place] or appropriate source(s): None       FINAL DIAGNOSIS  1. Multiple fractures of ribs, left side, initial encounter for  closed fracture Acute   2. Pleural effusion on left Acute   3. Ground-level fall Acute   4. Acute on chronic respiratory failure with hypoxia (HCC) Acute   5. Left-sided chest wall pain Acute          Electronically signed by: Amanda Rowe M.D., 7/17/2023 10:05 PM

## 2023-07-18 NOTE — ASSESSMENT & PLAN NOTE
-On Eliquis for prior history of TIA.  There is no evidence of bleeding at this time.  Follow-up CBC in the morning.

## 2023-07-18 NOTE — PROGRESS NOTES
This is a female who had a GLF 9 days ago while on Eliquis for TIA history. She presented to hospital with SOB and was found to have rib fractures of the L 5th-9th rib with associated hypoxia. [facility] stay at [facility] and pursue pain management. Pt was admitted for hypoxia secondary to atelectasis from splinting secondary to rib fractures. Lidoderm patch added. Avoid NSAIDs due to Eliquis usage. No current s/s PNA, pt advised of importance of deep breathing.

## 2023-07-18 NOTE — H&P
"Hospital Medicine History & Physical Note    Date of Service  7/18/2023    Primary Care Physician  Freddy Garcia M.D.    Consultants  trauma surgery    Specialist Names: ERP spokw with Dr. Marte     Code Status  Full Code: CODE STATUS and advance care planning was discussed with the patient on admission, see assessment and plan for more details.    Chief Complaint  Chief Complaint   Patient presents with    Rib Injury    Rib Pain    Shortness of Breath     Patient report she fell on 07/08/2023 in the bathroom hitting the plastic tower to the left rib and shortness of breath due to the pain.  Increase rib pain due to coughing.  Patient denies hitting her head. Patient takes eliquis.    T-5000 GLF       History of Presenting Illness  Nohelia Dickerson is a 78 y.o. female with history of ground-level fall in her bathroom bathroom on 7/8/2023, 9 days ago.  Patient stepped backwards over her slippers and fell hitting the side of her left chest on a small plastic furniture.  She denies loss of consciousness or head injury but is anticoagulated on Eliquis for history of prior TIA.  No headache, no neck pain, no nausea vomiting.  Ever since her injury, she is having worsening chest pain in spite of taking Tylenol with no significant relief.  Patient is able to ambulate but feels like taking a deep breath is causing pain and has more \"shallow breathing \".  She denies having cough.  No fever.  Patient has COPD and at baseline needs 1.5 L of oxygen at night.  Her granddaughter was afraid that she could have pneumonia after her injury and she decided to come to the ER on 7/17/2023 for further evaluation.    Patient had CT chest imaging done today showing acute mildly displaced fractures of her left lateral 5th-9th rib.  She has trace left pleural effusion and no pneumothorax.  Patient is now requiring 4 L of oxygen via nasal cannula to maintain O2 saturations above 90% and her baseline is 1.5 L.  ERP discussed this " case with trauma surgery team at [facility] (Dr. Marte) who is recommending pain control and no additional interventions clearing patient to stay at [facility].       I discussed the plan of care with patient and ERP Dr. Rowe .    Review of Systems  Review of Systems   Constitutional:  Positive for malaise/fatigue. Negative for fever.   HENT:  Negative for congestion and sore throat.    Eyes:  Negative for blurred vision and double vision.   Respiratory:  Positive for shortness of breath. Negative for cough.    Cardiovascular:  Positive for chest pain. Negative for palpitations.   Gastrointestinal:  Negative for nausea and vomiting.   Genitourinary:  Negative for dysuria and urgency.   Musculoskeletal:  Negative for myalgias and neck pain.   Skin:  Negative for itching and rash.   Neurological:  Negative for dizziness, weakness and headaches.   Endo/Heme/Allergies:  Does not bruise/bleed easily.   Psychiatric/Behavioral:  Negative for depression. The patient does not have insomnia.        Past Medical History   has a past medical history of Asthma, Blood clotting disorder (HCC), Cancer ([facility]) (1978), COPD, Healthcare maintenance (5/22/2018), Hernia of unspecified site of abdominal cavity without mention of obstruction or gangrene (2011), Hypertension, Inguinal hernia, Kidney stones, TIA (transient ischemic attack), Tobacco dependence (5/22/2018), Tobacco use, and Viral URI with cough (4/18/2019).    Surgical History   has a past surgical history that includes hernia repair; thyroidectomy; other (01/01/1983); inguinal hernia repair (03/28/2011); and cataract extraction with iol (Bilateral, 08/2022).     Family History  family history includes Cancer in her mother; Diabetes in her brother; Heart Attack in her father; Heart Disease in her father; No Known Problems in her daughter; Other in her daughter; Stroke in her father.   Family history reviewed with patient. There is no family history that is pertinent to the  chief complaint.     Social History   reports that she quit smoking about 4 years ago. Her smoking use included cigarettes. She started smoking about 61 years ago. She smoked an average of 1 pack per day. She has never used smokeless tobacco. She reports that she does not drink alcohol and does not use drugs.    Allergies  Allergies   Allergen Reactions    Morphine Unspecified     Hallucinations, nightmares, and altered mentations       Medications  Prior to Admission Medications   Prescriptions Last Dose Informant Patient Reported? Taking?   Cholecalciferol (VITAMIN D3 PO) 7/17/2023 at 1000 Patient Yes No   Sig: Take 2 Capsules by mouth every day.   Coenzyme Q10 (COQ-10 PO) 7/17/2023 at 1000 Patient Yes No   Sig: Take 1 Capsule by mouth every morning.   Home Care Oxygen 7/17/2023  Yes No   Sig: Inhale 1.5 L/min at bedtime. Oxygen dose range: 1.5  L/min at bedtime  Respiratory route via: Nasal Cannula   Oxygen supplier: [facility]       MYRBETRIQ 25 MG TABLET SR 24 HR 7/17/2023 at 1000  No No   Sig: Take 1 Tablet by mouth every morning for 90 days.   Patient taking differently: Take 25 mg by mouth every morning. Patient took 1/2 tablet today 07/17/2023   Multiple Vitamins-Minerals (PRESERVISION AREDS 2 PO) 7/17/2023 at 1000 Patient Yes No   Sig: Take 1 Cap by mouth 2 Times a Day.   albuterol 108 (90 Base) MCG/ACT Aero Soln inhalation aerosol 7/17/2023 at 1800 Patient Yes No   Sig: Inhale 1-2 Puffs every 6 hours as needed for Shortness of Breath.   amLODIPine (NORVASC) 5 MG Tab 7/17/2023 at 1000  No No   Sig: Take 1 Tablet by mouth every day.   apixaban (ELIQUIS) 5mg Tab 7/17/2023 at 1930  No No   Sig: Take 1 Tablet by mouth 2 times a day.   atorvastatin (LIPITOR) 80 MG tablet 7/17/2023 at 1930  No No   Sig: TAKE 1 TABLET BY MOUTH EVERY DAY. N THE EVENING. PT TO MAKE APPT AND GET LABS PRIOR TO MORE REFILLS.   fluticasone-salmeterol (ADVAIR) 250-50 MCG/DOSE AEROSOL POWDER, BREATH ACTIVATED 7/17/2023 at 1600 Patient Yes  No   Sig: Inhale 1 Puff every 12 hours.   losartan (COZAAR) 100 MG Tab 7/17/2023 at 1000  No No   Sig: TAKE 1 TABLET BY MOUTH EVERY DAY   therapeutic multivitamin-minerals (THERAGRAN-M) Tab 7/17/2023 at 1000 Patient Yes No   Sig: Take 1 Tablet by mouth every morning.      Facility-Administered Medications: None       Physical Exam  Temp:  [36.3 °C (97.3 °F)] 36.3 °C (97.3 °F)  Pulse:  [117] 117  Resp:  [18] 18  BP: (143)/(89) 143/89  SpO2:  [88 %-94 %] 94 %  Blood Pressure : (!) 143/89   Temperature: 36.3 °C (97.3 °F)   Pulse: (!) 117   Respiration: 18   Pulse Oximetry: 94 %       Physical Exam  Constitutional:       Appearance: Normal appearance.   HENT:      Head: Normocephalic and atraumatic.      Mouth/Throat:      Mouth: Mucous membranes are moist.      Pharynx: Oropharynx is clear.   Eyes:      Extraocular Movements: Extraocular movements intact.      Pupils: Pupils are equal, round, and reactive to light.   Cardiovascular:      Rate and Rhythm: Normal rate and regular rhythm.      Heart sounds: Normal heart sounds.   Pulmonary:      Effort: Pulmonary effort is normal.      Breath sounds: Normal breath sounds.      Comments: Shallow breathing.  Left chest wall is tender to palpation.  Abdominal:      General: Abdomen is flat. Bowel sounds are normal.      Palpations: Abdomen is soft.   Musculoskeletal:      Cervical back: Normal range of motion and neck supple.   Skin:     General: Skin is warm and dry.   Neurological:      General: No focal deficit present.      Mental Status: She is alert and oriented to person, place, and time.   Psychiatric:         Mood and Affect: Mood normal.         Behavior: Behavior normal.         Laboratory:  Recent Labs     07/18/23  0025   WBC 7.7   RBC 4.62   HEMOGLOBIN 13.6   HEMATOCRIT 40.3   MCV 87.2   MCH 29.4   MCHC 33.7   RDW 43.6   PLATELETCT 191   MPV 9.9           Imaging:  CT-CHEST (THORAX) W/O   Final Result         1. Acute mildly displaced fractures of the left  lateral 5th to 9th ribs.   2. Trace left pleural effusion. No pneumothorax.   3. New bibasilar opacities, left more than right, likely atelectasis.   4. Mild interlobular septal thickening and mild      [id number] (WITH 1-VIEW CXR) LEFT   Final Result      Acute mildly displaced fractures of the left lateral 5-9 ribs          X-Ray:  I have personally reviewed the images and compared with prior images.    Assessment/Plan:  Justification for Admission Status  I anticipate this patient will require at least two midnights for appropriate medical management, necessitating inpatient admission because 78-year-old female with a ground-level fall 9 days ago and chest injury.  She has fifth through ninth rib fracture.  She is currently on 4 L, increase amount as compared to baseline 1.5 L of supplemental oxygen at night.  Trauma team was consulted.  She needs pain control and optimization of her oxygen.        * Hypoxia- (present on admission)  Assessment & Plan  -Inpatient status on medical floor with remote telemetry.  -Patient had a ground-level fall 9 days ago hitting her left chest on a plastic furniture.  She has fifth through ninth rib fractures and no pneumothorax.  -Patient has pain that is not controlled with Tylenol resulting in very shallow breathing causing her to be hypoxic.  -ERP discussed the case with trauma surgery team at [facility].  There is no additional intervention recommended at this time and given injury happened 9 days ago, consider her stable.  Recommendation is that the patient is okay to stay at [facility].  -I started pain medication therapy.  -Work with respiratory team to try to wean her off 4 L of oxygen in the morning once her pain is better controlled.    Closed fracture of multiple ribs of left side  Assessment & Plan  -Status post ground-level fall injury 9 days ago.  Patient has fifth through ninth rib fracture on the left side of her chest.  -Plan as above.    Chronic  "respiratory failure (HCC)  Assessment & Plan  -Due to underlying COPD.  She requires 1.5 L of supplemental oxygen at night patient has an oxygen concentrator at home.  She is currently requiring 4 L of oxygen to maintain O2 saturations above 90% likely due to shallow breathing and underlying chest pain after recent ground-level fall and trauma to the chest.    ACP (advance care planning)  Assessment & Plan  Had detailed conversation with the patient about advance care planning.  Patient had previously been DNR-DNI but she had multiple questions about it.  She clarified that what she really do not want is to say \"long-term on a ventilator if there is no chance of survival \".  We talked about different scenarios and what that means in terms of CPR and possibly intubation.  Patient reports having difficulty recovering from COVID infection and at that time, she truly considered to be DNR/DNI but feels like \"my life has a new meeting now \".  He enjoys the company of her daughters and grandchildren.  She is requesting to be a full code.  She is okay to receive full medical care including, but not limited to, IV antibiotics, IV fluids and treatment in the ICU if needed.  This conversation was held in the emergency department during admission process.  I encouraged her to share her decision with her family members.  Patient was alone at the time of my evaluation.  I offered to have POLST form signed this evening which she declined.  Total amount of time allocated on discussion about advance care planning and CODE STATUS was 16 minutes.    Hyperlipidemia, unspecified- (present on admission)  Assessment & Plan  -Continue atorvastatin.    Neurogenic bladder- (present on admission)  Assessment & Plan  -She takes as needed Myrbetriq.  She is okay to hold this medication for now.    Secondary hypercoagulability disorder (HCC)- (present on admission)  Assessment & Plan  -On Eliquis for prior history of TIA.  There is no evidence " of bleeding at this time.  Follow-up CBC in the morning.    Former smoker- (present on admission)  Assessment & Plan  -Patient with smoking about 4 years ago.    COPD (chronic obstructive pulmonary disease) (HCC)- (present on admission)  Assessment & Plan  -Without exacerbation.  Continue Advair.    Primary hypertension- (present on admission)  Assessment & Plan  -Continue losartan and amlodipine.        VTE prophylaxis: therapeutic anticoagulation with Eliquis

## 2023-07-18 NOTE — PROGRESS NOTES
Pt arrived on floor from ED, A&Ox4, on RA, reviewed orders, and medication requisition and admission profile done. Oriented Pt to the floor. Plan of care discussed with Pt and answered questions asked. Safety precautions in place and hourly rounding established with CNA.

## 2023-07-18 NOTE — ASSESSMENT & PLAN NOTE
"Had detailed conversation with the patient about advance care planning.  Patient had previously been DNR-DNI but she had multiple questions about it.  She clarified that what she really do not want is to say \"long-term on a ventilator if there is no chance of survival \".  We talked about different scenarios and what that means in terms of CPR and possibly intubation.  Patient reports having difficulty recovering from COVID infection and at that time, she truly considered to be DNR/DNI but feels like \"my life has a new meeting now \".  He enjoys the company of her daughters and grandchildren.  She is requesting to be a full code.  She is okay to receive full medical care including, but not limited to, IV antibiotics, IV fluids and treatment in the ICU if needed.  This conversation was held in the emergency department during admission process.  I encouraged her to share her decision with her family members.  Patient was alone at the time of my evaluation.  I offered to have POLST form signed this evening which she declined.  Total amount of time allocated on discussion about advance care planning and CODE STATUS was 16 minutes.  "

## 2023-07-18 NOTE — ED NOTES
"Pt up to BSC with assist- has difficulty getting up and down.  Denies pain at this time \"I'm not moving\".    Am meds given.  "

## 2023-07-18 NOTE — PROGRESS NOTES
4 Eyes Skin Assessment Completed by Lee, RN and JOAN Mcclendon RN.    Head WDL  Ears WDL  Nose WDL  Mouth WDL  Neck WDL  Breast/Chest WDL  Shoulder Blades WDL  Spine Bruising L back, Scar L lower back  (R) Arm/Elbow/Hand Scar  (L) Arm/Elbow/Hand WDL  Abdomen WDL  Groin  Dry  Scrotum/Coccyx/Buttocks WDL  (R) Leg WDL  (L) Leg Scab ankle  (R) Heel/Foot/Toe WDL  (L) Heel/Foot/Toe Redness and Blanching great toe          Devices In Places Pulse Ox and Nasal Cannula      Interventions In Place Pressure Redistribution Mattress    Possible Skin Injury No    Pictures Uploaded Into Epic N/A  Wound Consult Placed N/A  RN Wound Prevention Protocol Ordered No

## 2023-07-18 NOTE — ED NOTES
Assisted pt to bedside commode using FWW, difficulty getting up. Changed depend and pad. Inhaler and Tylenol given.

## 2023-07-18 NOTE — ED NOTES
Received pt's report from Sulma GARCIA. Pt is resting comfortably in bed, not in any distress or SOB, on 4L via NC at 93%. Pt denies pain at this time. Call light within reach. All needs met at this time.

## 2023-07-18 NOTE — ED TRIAGE NOTES
78 yr old female to triage via wheel chair  Chief Complaint   Patient presents with    Rib Injury    Rib Pain    Shortness of Breath     Patient report she fell on 07/08/2023 in the bathroom hitting the plastic tower to the left rib and shortness of breath due to the pain.  Increase rib pain due to coughing.  Patient denies hitting her head. Patient takes eliquis.    T-5000 GLF     Patient uses oxygen at home 1.5 L NC    BP (!) 143/89   Pulse (!) 117   Temp 36.3 °C (97.3 °F) (Temporal)   Resp 18   Ht 1.829 m (6')   Wt 80.5 kg (177 lb 7.5 oz)   SpO2 88%   BMI 24.07 kg/m²

## 2023-07-18 NOTE — ASSESSMENT & PLAN NOTE
-Due to underlying COPD.  She requires 1.5 L of supplemental oxygen at night patient has an oxygen concentrator at home.  She is currently requiring 4 L of oxygen to maintain O2 saturations above 90% likely due to shallow breathing and underlying chest pain after recent ground-level fall and trauma to the chest.

## 2023-07-18 NOTE — ASSESSMENT & PLAN NOTE
-Status post ground-level fall injury 9 days ago.  Patient has fifth through ninth rib fracture on the left side of her chest.  -Plan as above.

## 2023-07-19 LAB
ANION GAP SERPL CALC-SCNC: 12 MMOL/L (ref 7–16)
BUN SERPL-MCNC: 24 MG/DL (ref 8–22)
CALCIUM SERPL-MCNC: 9.2 MG/DL (ref 8.4–10.2)
CHLORIDE SERPL-SCNC: 105 MMOL/L (ref 96–112)
CO2 SERPL-SCNC: 24 MMOL/L (ref 20–33)
CREAT SERPL-MCNC: 0.64 MG/DL (ref 0.5–1.4)
ERYTHROCYTE [DISTWIDTH] IN BLOOD BY AUTOMATED COUNT: 44.4 FL (ref 35.9–50)
FLUAV RNA SPEC QL NAA+PROBE: NEGATIVE
FLUBV RNA SPEC QL NAA+PROBE: NEGATIVE
GFR SERPLBLD CREATININE-BSD FMLA CKD-EPI: 91 ML/MIN/1.73 M 2
GLUCOSE SERPL-MCNC: 91 MG/DL (ref 65–99)
HCT VFR BLD AUTO: 42.3 % (ref 37–47)
HGB BLD-MCNC: 13.8 G/DL (ref 12–16)
MCH RBC QN AUTO: 29.3 PG (ref 27–33)
MCHC RBC AUTO-ENTMCNC: 32.6 G/DL (ref 32.2–35.5)
MCV RBC AUTO: 89.8 FL (ref 81.4–97.8)
PLATELET # BLD AUTO: 181 K/UL (ref 164–446)
PMV BLD AUTO: 10.2 FL (ref 9–12.9)
POTASSIUM SERPL-SCNC: 4.1 MMOL/L (ref 3.6–5.5)
RBC # BLD AUTO: 4.71 M/UL (ref 4.2–5.4)
RSV RNA SPEC QL NAA+PROBE: NEGATIVE
SARS-COV-2 RNA RESP QL NAA+PROBE: NOTDETECTED
SODIUM SERPL-SCNC: 141 MMOL/L (ref 135–145)
SPECIMEN SOURCE: NORMAL
WBC # BLD AUTO: 5.5 K/UL (ref 4.8–10.8)

## 2023-07-19 PROCEDURE — 85027 COMPLETE CBC AUTOMATED: CPT

## 2023-07-19 PROCEDURE — 94640 AIRWAY INHALATION TREATMENT: CPT

## 2023-07-19 PROCEDURE — A9270 NON-COVERED ITEM OR SERVICE: HCPCS | Performed by: HOSPITALIST

## 2023-07-19 PROCEDURE — 99232 SBSQ HOSP IP/OBS MODERATE 35: CPT | Performed by: FAMILY MEDICINE

## 2023-07-19 PROCEDURE — 94760 N-INVAS EAR/PLS OXIMETRY 1: CPT

## 2023-07-19 PROCEDURE — 700101 HCHG RX REV CODE 250: Performed by: FAMILY MEDICINE

## 2023-07-19 PROCEDURE — 97161 PT EVAL LOW COMPLEX 20 MIN: CPT

## 2023-07-19 PROCEDURE — 80048 BASIC METABOLIC PNL TOTAL CA: CPT

## 2023-07-19 PROCEDURE — C9803 HOPD COVID-19 SPEC COLLECT: HCPCS | Performed by: FAMILY MEDICINE

## 2023-07-19 PROCEDURE — 36415 COLL VENOUS BLD VENIPUNCTURE: CPT

## 2023-07-19 PROCEDURE — 0241U HCHG SARS-COV-2 COVID-19 NFCT DS RESP RNA 4 TRGT MIC: CPT

## 2023-07-19 PROCEDURE — 700102 HCHG RX REV CODE 250 W/ 637 OVERRIDE(OP): Performed by: HOSPITALIST

## 2023-07-19 PROCEDURE — 770001 HCHG ROOM/CARE - MED/SURG/GYN PRIV*

## 2023-07-19 RX ORDER — LIDOCAINE 50 MG/G
1 PATCH TOPICAL EVERY 24 HOURS
Qty: 10 PATCH | Refills: 1 | Status: SHIPPED | OUTPATIENT
Start: 2023-07-20

## 2023-07-19 RX ADMIN — AMLODIPINE BESYLATE 5 MG: 5 TABLET ORAL at 05:12

## 2023-07-19 RX ADMIN — MOMETASONE FUROATE AND FORMOTEROL FUMARATE DIHYDRATE 2 PUFF: 200; 5 AEROSOL RESPIRATORY (INHALATION) at 00:04

## 2023-07-19 RX ADMIN — LIDOCAINE 1 PATCH: 50 PATCH TOPICAL at 10:38

## 2023-07-19 RX ADMIN — ACETAMINOPHEN 1000 MG: 500 TABLET ORAL at 12:32

## 2023-07-19 RX ADMIN — APIXABAN 5 MG: 5 TABLET, FILM COATED ORAL at 05:12

## 2023-07-19 RX ADMIN — ACETAMINOPHEN 1000 MG: 500 TABLET ORAL at 17:30

## 2023-07-19 RX ADMIN — APIXABAN 5 MG: 5 TABLET, FILM COATED ORAL at 17:30

## 2023-07-19 RX ADMIN — MOMETASONE FUROATE AND FORMOTEROL FUMARATE DIHYDRATE 2 PUFF: 200; 5 AEROSOL RESPIRATORY (INHALATION) at 20:17

## 2023-07-19 RX ADMIN — MOMETASONE FUROATE AND FORMOTEROL FUMARATE DIHYDRATE 2 PUFF: 200; 5 AEROSOL RESPIRATORY (INHALATION) at 10:14

## 2023-07-19 RX ADMIN — ACETAMINOPHEN 1000 MG: 500 TABLET ORAL at 05:12

## 2023-07-19 RX ADMIN — ATORVASTATIN CALCIUM 80 MG: 40 TABLET, FILM COATED ORAL at 17:30

## 2023-07-19 RX ADMIN — LOSARTAN POTASSIUM 100 MG: 50 TABLET, FILM COATED ORAL at 05:12

## 2023-07-19 ASSESSMENT — COGNITIVE AND FUNCTIONAL STATUS - GENERAL
MOBILITY SCORE: 24
SUGGESTED CMS G CODE MODIFIER MOBILITY: CH

## 2023-07-19 ASSESSMENT — PATIENT HEALTH QUESTIONNAIRE - PHQ9
2. FEELING DOWN, DEPRESSED, IRRITABLE, OR HOPELESS: NOT AT ALL
SUM OF ALL RESPONSES TO PHQ9 QUESTIONS 1 AND 2: 0
1. LITTLE INTEREST OR PLEASURE IN DOING THINGS: NOT AT ALL

## 2023-07-19 ASSESSMENT — GAIT ASSESSMENTS
DISTANCE (FEET): 150
DEVIATION: STEP TO
GAIT LEVEL OF ASSIST: SUPERVISED
ASSISTIVE DEVICE: FRONT WHEEL WALKER

## 2023-07-19 ASSESSMENT — PAIN DESCRIPTION - PAIN TYPE: TYPE: ACUTE PAIN

## 2023-07-19 NOTE — PROGRESS NOTES
0710 - patient was given request coffee.    0347 - ambulated in hallway    1010 - up to bathroom    1058 - ambulated in hallway    1159 - ambulated in hallway        1720 - up to bathroom

## 2023-07-19 NOTE — CARE PLAN
The patient is Stable - Low risk of patient condition declining or worsening    Shift Goals  Clinical Goals: pt will not require increased oxygen this shift.  Patient Goals: sleep comfortably    Progress made toward(s) clinical / shift goals:      Pt weaned to 2 liters oxygen this shift.    Problem: Fall Risk  Goal: Patient will remain free from falls  Outcome: Progressing     Problem: Knowledge Deficit - Standard  Goal: Patient and family/care givers will demonstrate understanding of plan of care, disease process/condition, diagnostic tests and medications  Outcome: Progressing     Problem: Knowledge Deficit - COPD  Goal: Patient/significant other demonstrates understanding of disease process, utilization of the Action Plan, medications and discharge instruction  Outcome: Progressing     Problem: Risk for Infection - COPD  Goal: Patient will remain free from signs and symptoms of infection  Outcome: Progressing     Problem: Nutrition - Advanced  Goal: Patient will display progressive weight gain toward goal have adequate food and fluid intake  Outcome: Progressing     Problem: Ineffective Airway Clearance  Goal: Patient will maintain patent airway with clear/clearing breath sounds  Outcome: Progressing     Problem: Impaired Gas Exchange  Goal: Patient will demonstrate improved ventilation and adequate oxygenation and participate in treatment regimen within the level of ability/situation.  Outcome: Progressing       Patient is not progressing towards the following goals:

## 2023-07-19 NOTE — DISCHARGE PLANNING
Case Management Discharge Planning    Admission Date: 7/17/2023  GMLOS: 3  ALOS: 1    6-Clicks ADL Score: 24  6-Clicks Mobility Score: 24      Anticipated Discharge Dispo: Discharge Disposition: Discharged to home/self care (01)    DME Needed: Yes    DME Ordered: Yes    Action(s) Taken:     RNCM attended IDT rounds and reviewed chart.   Spoke to [facility] TCN Ty. Per Ty, pt's O2 BL is 1.5L at night and is on service with [facility].    Updated O2 order received. Choice form completed and faxed to DPA.     Escalations Completed: None    Medically Clear: Yes    Next Steps: f/u with [facility] regarding O2 delivery.    Barriers to Discharge: DME

## 2023-07-19 NOTE — DISCHARGE PLANNING
[facility] TCN chart review completed. Collaborated with SHALINI Trivedi. Current discharge considerations are home. Patient seen at bedside. Confirms owns FWW, and confirms utilizes 1.5 L O2 only at night per baseline.  CM informed it is through [facility].  No further TCN needs.     TCN will continue to follow and collaborate with discharge planning team as additional post acute needs arise. Thank you.    Completed:  PT recommends no needs   Choice obtained: HH  [facility] referral (N); TCN to follow for possible outpatient f/u with primary care physician versus [facility] dependent upon patient discharge planning needs

## 2023-07-19 NOTE — CARE PLAN
Problem: Respiratory  Goal: Patient will achieve/maintain optimum respiratory ventilation and gas exchange  Outcome: Progressing     Problem: Fall Risk  Goal: Patient will remain free from falls  Outcome: Progressing   The patient is Stable - Low risk of patient condition declining or worsening    Shift Goals  Clinical Goals: Wean Pt down to baseline O2, free from falls this shift, report pain <4/10  Patient Goals: rest and go home tomorrow    Progress made toward(s) clinical / shift goals:  Patient is down to 2.5L of O2, sitting up for meals to help with better respiratory clearance, calls when needed to get to the bathroom.    Patient is not progressing towards the following goals:n/a

## 2023-07-19 NOTE — DISCHARGE INSTRUCTIONS
Diet: Diet Order Diet: Cardiac  Activity: As tolerated  Follow Up: Please see your primary care physician in 1-2 weeks to test if you can stop using oxygen during the day   Disposition:Home  Diagnosis: Hypoxia    Follow up with your Primary Care Provider Freddy Garcia M.D. as scheduled or sooner if your symptoms persist or worsen.  Return to Emergency Room for severe chest pain, shortness of breath, signs of a stroke, or any other emergencies.

## 2023-07-19 NOTE — DISCHARGE PLANNING
Agency/Facility name: [facility]  Spoke to: Shayna  Outcome: DPA advised that patient is already establish with facility but will renew contract for another year. Facility will call family to see if patient has tanks already at home.    Agency/Facility name: [facility]  Spoke to: Shayna  Outcome: DPA advised facility called patient family to set up payment plan due to patient has a past due balance with facility. Patient niece stated patient has O2 at home and will bring one to  for DC.

## 2023-07-19 NOTE — DISCHARGE SUMMARY
Discharge Summary    CHIEF COMPLAINT ON ADMISSION  Chief Complaint   Patient presents with    Rib Injury    Rib Pain    Shortness of Breath     Patient report she fell on 07/08/2023 in the bathroom hitting the plastic tower to the left rib and shortness of breath due to the pain.  Increase rib pain due to coughing.  Patient denies hitting her head. Patient takes eliquis.    T-5000 GLF       Reason for Admission  rib pain, unable to cough      Admission Date  7/17/2023    CODE STATUS  Full Code    HPI & HOSPITAL COURSE  This is a 78 y.o. female here with a history of COPD on 1.5L nocturnally who had a fall on 7/8 at which time she hit her ribs on some furniture.  She had continued chest pain after that with inspiration, and decided to come to hospital at the behest of her granddaughter to ensure she didn't have a PNA - she does not have a PNA, but does have rib fractures from the 5th to 9th L ribs.  Trauma was contacted at [facility] who recommended pain control with no other interventions.     At admission, pt was requiring 4L of supplemental O2 due to splinting - lidoderm patch was ordered and patient was educated on the risk of PNA with splinting.  She is now able to draw ~1000-1500cc on IS, and is not requiring supplemental pain meds. She is requiring 1.5L of O2 while ambulating, which I expect to continue to improve as her rib pain improves with healing - she will get an Rx for lidoderm patches at discharge, and we will send her home with her IS and encourage deep breathing.      Therefore, she is discharged in good and stable condition to home with close outpatient follow-up.    The patient recovered much more quickly than anticipated on admission.    Discharge Date  7/19/23    FOLLOW UP ITEMS POST DISCHARGE  Pt is to see her PCP in one week    DISCHARGE DIAGNOSES  Principal Problem:    Hypoxia (POA: Yes)  Active Problems:    Primary hypertension (POA: Yes)    COPD (chronic obstructive pulmonary disease) (HCC)  (POA: Yes)      Overview: IMO Update    Chronic respiratory failure (HCC) (POA: Unknown)      Overview: Use 1 & 1/2 L oxygen at night    Former smoker (POA: Yes)    Secondary hypercoagulability disorder (HCC) (POA: Yes)    Neurogenic bladder (POA: Yes)    Hyperlipidemia, unspecified (POA: Yes)    Closed fracture of multiple ribs of left side (POA: Unknown)    ACP (advance care planning) (POA: Unknown)  Resolved Problems:    * No resolved hospital problems. *      FOLLOW UP  Future Appointments   Date Time Provider Department Center   8/14/2023 10:00 AM Kayden Purvis M.D. [address]     No follow-up provider specified.    MEDICATIONS ON DISCHARGE     Medication List        START taking these medications        Instructions   lidocaine 5 % Ptch  Start taking on: July 20, 2023  Commonly known as: Lidoderm   Place 1 Patch on the skin every 24 hours.  Dose: 1 Patch            CHANGE how you take these medications        Instructions   atorvastatin 80 MG tablet  What changed: when to take this  Commonly known as: Lipitor   TAKE 1 TABLET BY MOUTH EVERY DAY. N THE EVENING. PT TO MAKE APPT AND GET LABS PRIOR TO MORE REFILLS.  Dose: 80 mg     Myrbetriq 25 MG Tb24  What changed: additional instructions  Generic drug: mirabegron ER   Take 1 Tablet by mouth every morning for 90 days.  Dose: 25 mg            CONTINUE taking these medications        Instructions   acetaminophen 500 MG Tabs  Commonly known as: Tylenol   Take 1,000 mg by mouth every 6 hours as needed. Indications: Pain  Dose: 1,000 mg     albuterol 108 (90 Base) MCG/ACT Aers inhalation aerosol   Inhale 1-2 Puffs every 6 hours as needed for Shortness of Breath.  Dose: 1-2 Puff     amLODIPine 5 MG Tabs  Commonly known as: Norvasc   Take 1 Tablet by mouth every day.  Dose: 5 mg     apixaban 5mg Tabs  Commonly known as: Eliquis   Take 1 Tablet by mouth 2 times a day.  Dose: 5 mg     B COMPLEX PO   Take 1 Tablet by mouth every day.  Dose: 1 Tablet     COQ-10 PO    Take 1 Capsule by mouth every morning.  Dose: 1 Capsule     FISH OIL PO   Take 1 Capsule by mouth every day.  Dose: 1 Capsule     fluticasone-salmeterol 250-50 MCG/DOSE Aepb  Commonly known as: ADVAIR   Inhale 1 Puff every 12 hours.  Dose: 1 Puff     Home Care Oxygen   Inhale 1.5 L/min at bedtime. Oxygen dose range: 1.5  L/min at bedtime  Respiratory route via: Nasal Cannula   Oxygen supplier: Preferred  Dose: 1.5 L/min     losartan 100 MG Tabs  Commonly known as: Cozaar   TAKE 1 TABLET BY MOUTH EVERY DAY  Dose: 100 mg     * therapeutic multivitamin-minerals Tabs   Take 1 Tablet by mouth every morning.  Dose: 1 Tablet     * PRESERVISION AREDS 2 PO   Take 1 Cap by mouth 2 Times a Day.  Dose: 1 Capsule     VITAMIN D3 PO   Take 2 Capsules by mouth every day.  Dose: 2 Capsule           * This list has 2 medication(s) that are the same as other medications prescribed for you. Read the directions carefully, and ask your doctor or other care provider to review them with you.                  Allergies  Allergies   Allergen Reactions    Morphine Unspecified     Hallucinations, nightmares, and altered mentations    Lactaid [Lactase] Diarrhea     Upset stomach       DIET  Orders Placed This Encounter   Procedures    Diet Order Diet: Cardiac     Standing Status:   Standing     Number of Occurrences:   1     Order Specific Question:   Diet:     Answer:   Cardiac [6]       ACTIVITY  As tolerated.  Weight bearing as tolerated    CONSULTATIONS  None    PROCEDURES  None    LABORATORY  Lab Results   Component Value Date    SODIUM 141 07/19/2023    POTASSIUM 4.1 07/19/2023    CHLORIDE 105 07/19/2023    CO2 24 07/19/2023    GLUCOSE 91 07/19/2023    BUN 24 (H) 07/19/2023    CREATININE 0.64 07/19/2023        Lab Results   Component Value Date    WBC 5.5 07/19/2023    HEMOGLOBIN 13.8 07/19/2023    HEMATOCRIT 42.3 07/19/2023    PLATELETCT 181 07/19/2023        Total time of the discharge process exceeds 32 minutes.

## 2023-07-19 NOTE — PROGRESS NOTES
Hospital Medicine Daily Progress Note    Date of Service  7/19/2023    Chief Complaint  Nohelia Dickerson is a 78 y.o. female admitted 7/17/2023 with pleuritic chest pain    Hospital Course  This is a 78 y.o. female here with a history of COPD on 1.5L nocturnally who had a fall on 7/8 at which time she hit her ribs on some furniture.  She had continued chest pain after that with inspiration, and decided to come to hospital at the behest of her granddaughter to ensure she didn't have a PNA - she does not have a PNA, but does have rib fractures from the 5th to 9th L ribs.  Trauma was contacted at [facility] who recommended pain control with no other interventions.      At admission, pt was requiring 4L of supplemental O2 due to splinting - lidoderm patch was ordered and patient was educated on the risk of PNA with splinting.    Interval Problem Update  7/19 - Pt was to discharge on 1.5L O2, but having more rib pain today.  Will keep overnight and monitor, give supplement pain meds and dc in the am.     I have discussed this patient's plan of care and discharge plan at IDT rounds today with Case Management, Nursing, Nursing leadership, and other members of the IDT team.    Consultants/Specialty  none    Code Status  Full Code    Disposition  Medically Cleared  I have placed the appropriate orders for post-discharge needs.    Review of Systems  ROS     Physical Exam  Temp:  [36.4 °C (97.6 °F)-36.7 °C (98 °F)] 36.7 °C (98 °F)  Pulse:  [] 93  Resp:  [15-18] 18  BP: (112-150)/(69-96) 145/85  SpO2:  [91 %-95 %] 91 %    Physical Exam  Vitals and nursing note reviewed.   Constitutional:       Appearance: Normal appearance.   Cardiovascular:      Rate and Rhythm: Normal rate and regular rhythm.   Pulmonary:      Effort: Pulmonary effort is normal.      Breath sounds: Normal breath sounds.   Abdominal:      General: Abdomen is flat. Bowel sounds are normal.      Palpations: Abdomen is soft.   Musculoskeletal:          General: Normal range of motion.   Skin:     General: Skin is warm and dry.   Neurological:      General: No focal deficit present.      Mental Status: She is alert and oriented to person, place, and time.   Psychiatric:         Mood and Affect: Mood normal.         Behavior: Behavior normal.         Fluids    Intake/Output Summary (Last 24 hours) at 7/19/2023 1554  Last data filed at 7/19/2023 1400  Gross per 24 hour   Intake 700 ml   Output --   Net 700 ml       Laboratory  Recent Labs     07/18/23  0025 07/19/23 0213   WBC 7.7 5.5   RBC 4.62 4.71   HEMOGLOBIN 13.6 13.8   HEMATOCRIT 40.3 42.3   MCV 87.2 89.8   MCH 29.4 29.3   MCHC 33.7 32.6   RDW 43.6 44.4   PLATELETCT 191 181   MPV 9.9 10.2     Recent Labs     07/18/23 0025 07/19/23 0213   SODIUM 137 141   POTASSIUM 4.0 4.1   CHLORIDE 101 105   CO2 22 24   GLUCOSE 111* 91   BUN 22 24*   CREATININE 0.73 0.64   CALCIUM 9.3 9.2                   Imaging  CT-CHEST (THORAX) W/O   Final Result         1. Acute mildly displaced fractures of the left lateral 5th to 9th ribs.   2. Trace left pleural effusion. No pneumothorax.   3. New bibasilar opacities, left more than right, likely atelectasis.   4. Mild interlobular septal thickening and mild      [id number] (WITH 1-VIEW CXR) LEFT   Final Result      Acute mildly displaced fractures of the left lateral 5-9 ribs           Assessment/Plan  * Hypoxia- (present on admission)  Assessment & Plan  -Inpatient status on medical floor with remote telemetry.  -Patient had a ground-level fall 9 days ago hitting her left chest on a plastic furniture.  She has fifth through ninth rib fractures and no pneumothorax.  -Patient has pain that is not controlled with Tylenol resulting in very shallow breathing causing her to be hypoxic.  -ERP discussed the case with trauma surgery team at [facility].  There is no additional intervention recommended at this time and given injury happened 9 days ago, consider her stable.   "Recommendation is that the patient is okay to stay at [facility].  -I started pain medication therapy.  -Work with respiratory team to try to wean her off 4 L of oxygen in the morning once her pain is better controlled.    ACP (advance care planning)  Assessment & Plan  Had detailed conversation with the patient about advance care planning.  Patient had previously been DNR-DNI but she had multiple questions about it.  She clarified that what she really do not want is to say \"long-term on a ventilator if there is no chance of survival \".  We talked about different scenarios and what that means in terms of CPR and possibly intubation.  Patient reports having difficulty recovering from COVID infection and at that time, she truly considered to be DNR/DNI but feels like \"my life has a new meeting now \".  He enjoys the company of her daughters and grandchildren.  She is requesting to be a full code.  She is okay to receive full medical care including, but not limited to, IV antibiotics, IV fluids and treatment in the ICU if needed.  This conversation was held in the emergency department during admission process.  I encouraged her to share her decision with her family members.  Patient was alone at the time of my evaluation.  I offered to have POLST form signed this evening which she declined.  Total amount of time allocated on discussion about advance care planning and CODE STATUS was 16 minutes.    Closed fracture of multiple ribs of left side  Assessment & Plan  -Status post ground-level fall injury 9 days ago.  Patient has fifth through ninth rib fracture on the left side of her chest.  -Plan as above.    Hyperlipidemia, unspecified- (present on admission)  Assessment & Plan  -Continue atorvastatin.    Neurogenic bladder- (present on admission)  Assessment & Plan  -She takes as needed Myrbetriq.  She is okay to hold this medication for now.    Secondary hypercoagulability disorder (HCC)- (present on " admission)  Assessment & Plan  -On Eliquis for prior history of TIA.  There is no evidence of bleeding at this time.  Follow-up CBC in the morning.    Former smoker- (present on admission)  Assessment & Plan  -Patient with smoking about 4 years ago.    Chronic respiratory failure (HCC)  Assessment & Plan  -Due to underlying COPD.  She requires 1.5 L of supplemental oxygen at night patient has an oxygen concentrator at home.  She is currently requiring 4 L of oxygen to maintain O2 saturations above 90% likely due to shallow breathing and underlying chest pain after recent ground-level fall and trauma to the chest.    COPD (chronic obstructive pulmonary disease) (HCC)- (present on admission)  Assessment & Plan  -Without exacerbation.  Continue Advair.    Primary hypertension- (present on admission)  Assessment & Plan  -Continue losartan and amlodipine.         VTE prophylaxis: SCDs/TEDs and therapeutic anticoagulation with Eliquis    I have performed a physical exam and reviewed and updated ROS and Plan today (7/19/2023). In review of yesterday's note (7/18/2023), there are no changes except as documented above.

## 2023-07-19 NOTE — THERAPY
Physical Therapy   Initial Evaluation     Patient Name: Nohelia Dickerson  Age:  78 y.o., Sex:  female  Medical Record #: [id number]  Today's Date: 7/19/2023     Precautions  Precautions: (P) Fall Risk  Comments: (P) mod    Assessment  Patient is 78 y.o. female with a diagnosis of L rib Fx with hypoxia.Pt lives at home with grandchild and is mod active.Pt appears to be safe with bed mob,transfers and ambulation.She has no equipment needs.02 sat 88% on 1 litre with amb      Plan         DC Equipment Recommendations: (P) None  Discharge Recommendations: (P) Anticipate that the patient will have no further physical therapy needs after discharge from the hospital     Objective       07/19/23 0900   Charge Group   PT Evaluation PT Evaluation Low   Total Time Spent   PT Evaluation Time Spent (Mins) 25   Precautions   Precautions Fall Risk   Comments mod   Prior Living Situation   Prior Services None   Housing / Facility 1 Story House   Steps Into Home 2   Steps In Home 0   Equipment Owned Front-Wheel Walker   Lives with - Patient's Self Care Capacity Adult Children   Prior Level of Functional Mobility   Bed Mobility Independent   Transfer Status Independent   Ambulation Independent   Ambulation Distance limited community   Assistive Devices Used None   Stairs Independent   Cognition    Cognition / Consciousness WDL   Passive ROM Lower Body   Passive ROM Lower Body WDL   Active ROM Lower Body    Active ROM Lower Body  WDL   Strength Lower Body   Comments general weakness   Coordination Lower Body    Coordination Lower Body  WDL   Balance Assessment   Sitting Balance (Static) Good   Sitting Balance (Dynamic) Good   Standing Balance (Static) Fair +   Standing Balance (Dynamic) Fair   Weight Shift Sitting Good   Weight Shift Standing Good   Bed Mobility    Supine to Sit Modified Independent   Sit to Supine Modified Independent   Scooting Modified Independent   Gait Analysis   Gait Level Of Assist Supervised   Assistive  Device Front Wheel Walker   Distance (Feet) 150   # of Times Distance was Traveled 1   Deviation Step To   Weight Bearing Status full   Functional Mobility   Sit to Stand Supervised   Bed, Chair, Wheelchair Transfer Supervised   Transfer Method Stand Step   How much difficulty does the patient currently have...   Turning over in bed (including adjusting bedclothes, sheets and blankets)? 4   Sitting down on and standing up from a chair with arms (e.g., wheelchair, bedside commode, etc.) 4   Moving from lying on back to sitting on the side of the bed? 4   How much help from another person does the patient currently need...   Moving to and from a bed to a chair (including a wheelchair)? 4   Need to walk in a hospital room? 4   Climbing 3-5 steps with a railing? 4   6 clicks Mobility Score 24   Activity Tolerance   Sitting Edge of Bed 10   Standing 10   Patient / Family Goals    Patient / Family Goal #1 home   Anticipated Discharge Equipment and Recommendations   DC Equipment Recommendations None   Discharge Recommendations Anticipate that the patient will have no further physical therapy needs after discharge from the hospital   Interdisciplinary Plan of Care Collaboration   IDT Collaboration with  Nursing   Session Information   Date / Session Number  7/19   Priority 0

## 2023-07-19 NOTE — PROGRESS NOTES
Pt is awake in bed. VSS. Pt c/o 3/10 pain to L ribs, no n/v. Pt declines pain interventions at this time. Pt has O2 sat of 96% on 2.5 liters oxygen. RN weaned pt down to 2 liters. Lung sounds are diminished throughout all lung lobes. Pt has no c/o shortness of breath while at rest. RN coached pt on IS, pt is able to inhale 750 ml. Pt is requesting to rest. Fall precautions in place.    2204: Pt awake in bed watching TV. O2 saturation at 92% on 2 liters oxygen.    2325: Pt awakes to voice. Pt has no c/o pain or shortness of breath at this time.  in place.    0215: RN gave pt warm blanket per request. No other needs at this time.    0400: pt resting in bed. Chest rise observed.    0515: medicated pt with morning medications. Pt has no other needs at this time.

## 2023-07-19 NOTE — FACE TO FACE
"Face to Face Note  -  Durable Medical Equipment    Krystin Seaman M.D. - NPI: [id number]  I certify that this patient is under my care and that they had a durable medical equipment(DME)face to face encounter by myself that meets the physician DME face-to-face encounter requirements with this patient on:    Date of encounter:   Patient:                    MRN:                       YOB: 2022  Nohelia Dickerson  [id number]  1945     The encounter with the patient was in whole, or in part, for the following medical condition, which is the primary reason for durable medical equipment:  COPD, atelectasis, rib fracture    I certify that, based on my findings, the following durable medical equipment is medically necessary:    Oxygen   HOME O2 Saturation Measurements:(Values must be present for Home Oxygen orders)  Room air sat at rest: 90  Room air sat with amb: 86  With liters of O2: 1.5, O2 sat at rest with O2: 93  With Liters of O2: 1.5, O2 sat with amb with O2 : 90  Is the patient mobile?: Yes  If patient feels more short of breath, they can go up to 6 liters per minute and contact healthcare provider.    Supporting Symptoms: The patient requires supplemental oxygen, as the following interventions have been tried with limited or no improvement: \"Ambulation with oximetry.    My Clinical findings support the need for the above equipment due to:  Hypoxia  "

## 2023-07-19 NOTE — PROGRESS NOTES
Bedside report received from ZAC Woods RN. Alert and oriented X4, on 2L NC in no acute respiratory distress. Daily plan of care discussed. Pt complains of no pain.    No other needs at this time. Call light and personal belongings within reach. Hourly rounding in place. Chart reviewed for recent labs, notes, and orders.

## 2023-07-19 NOTE — PROGRESS NOTES
1556 - up to bathroom    1749 - assisted Pt to sit at edge of bed for dinner.    1827 - up to bathroom

## 2023-07-20 VITALS
OXYGEN SATURATION: 91 % | HEIGHT: 72 IN | BODY MASS INDEX: 23.53 KG/M2 | SYSTOLIC BLOOD PRESSURE: 126 MMHG | DIASTOLIC BLOOD PRESSURE: 86 MMHG | TEMPERATURE: 98 F | RESPIRATION RATE: 20 BRPM | WEIGHT: 173.72 LBS | HEART RATE: 75 BPM

## 2023-07-20 PROCEDURE — 94640 AIRWAY INHALATION TREATMENT: CPT

## 2023-07-20 PROCEDURE — 700101 HCHG RX REV CODE 250: Performed by: FAMILY MEDICINE

## 2023-07-20 PROCEDURE — A9270 NON-COVERED ITEM OR SERVICE: HCPCS | Performed by: HOSPITALIST

## 2023-07-20 PROCEDURE — 99239 HOSP IP/OBS DSCHRG MGMT >30: CPT | Performed by: FAMILY MEDICINE

## 2023-07-20 PROCEDURE — 700102 HCHG RX REV CODE 250 W/ 637 OVERRIDE(OP): Performed by: HOSPITALIST

## 2023-07-20 PROCEDURE — 94760 N-INVAS EAR/PLS OXIMETRY 1: CPT

## 2023-07-20 RX ADMIN — LIDOCAINE 1 PATCH: 50 PATCH TOPICAL at 11:09

## 2023-07-20 RX ADMIN — APIXABAN 5 MG: 5 TABLET, FILM COATED ORAL at 05:53

## 2023-07-20 RX ADMIN — ACETAMINOPHEN 1000 MG: 500 TABLET ORAL at 02:03

## 2023-07-20 RX ADMIN — LOSARTAN POTASSIUM 100 MG: 50 TABLET, FILM COATED ORAL at 05:53

## 2023-07-20 RX ADMIN — MOMETASONE FUROATE AND FORMOTEROL FUMARATE DIHYDRATE 2 PUFF: 200; 5 AEROSOL RESPIRATORY (INHALATION) at 08:25

## 2023-07-20 RX ADMIN — ACETAMINOPHEN 1000 MG: 500 TABLET ORAL at 07:38

## 2023-07-20 RX ADMIN — AMLODIPINE BESYLATE 5 MG: 5 TABLET ORAL at 05:53

## 2023-07-20 ASSESSMENT — PAIN DESCRIPTION - PAIN TYPE: TYPE: ACUTE PAIN

## 2023-07-20 NOTE — PROGRESS NOTES
Hourly Rounding    0800 Pt sitting up at side of bed eating breakfast    1000 Pt in room waiting on O2 to aarive    1200 Pt discharging home in stable condition via wheelchair.

## 2023-07-20 NOTE — RESPIRATORY CARE
"   COPD EDUCATION by COPD CLINICAL EDUCATOR  7/20/2023 at 8:32 AM by Mayra Schwartz, RRT     Patient reviewed by COPD education team. Patient does have a history or diagnosis of COPD and is a former smoker.  Was not interested in COPD program. Provided a short intervention  completed with this patient covering: What is COPD (how the lungs work), daily medications rescue and maintenance, breathing techniques, infection prevention and oxygen safety were covered in detail.  Information on [facility] service given, as well as [facility].         COPD Screen  COPD Risk Screening  Do you have a history of COPD?: Yes  Do you have a Pulmonologist?: No    COPD Assessment  COPD Clinical Specialists ONLY  COPD Education Initiated: Yes--Short Intervention (Pt quit smoking in 2019, her breathing has gotten much better, she has not seen Pulmonary since 2019 and where she is no longer driving doesnt plan to go back. She is compliant w/Advair, gave info on [facility] and [facility])  DME Company: none  Physician Follow Up Appointment: 07/28/23  Appt Time: 1500  Physician Name: Freddy Garcia M.D.  Pulmonologist Name: none  Referrals Initiated: Yes  Pulmonary Rehab: Declined  Smoking Cessation: N/A  Hospice: N/A  Home Health Care: Yes  Mobile Urgent Care Services: N/A (info given)  [facility]iatric Specialty Group: N/A (info given)  Private In-Home Care Agency: N/A  Interdisciplinary Rounds: Attendance at Rounds (30 Min)    PFT Results    No results found for: PFT    Meds to Beds  Would the patient like to opt in for Bedside Medication Delivery at Discharge?: No     MY COPD ACTION PLAN     It is recommended that patients and physicians /healthcare providers complete this action plan together. This plan should be discussed at each physician visit and updated as needed.    The green, yellow and red zones show groups of symptoms of COPD. This list of symptoms is not comprehensive, and you may experience other symptoms. In the \"Actions\" " "column, your healthcare provider has recommended actions for you to take based on your symptoms.    Patient Name: Nohelia Dickerson   YOB: 1945   Last Updated on: 7/20/2023  8:32 AM   Green Zone:  I am doing well today Actions     Usual activitiy and exercise level   Take daily medications     Usual amounts of cough and phlegm/mucus   Use oxygen as prescribed     Sleep well at night   Continue regular exercise/diet plan     Appetite is good   At all times avoid cigarette smoke, inhaled irritants     Daily Medications (these medications are taken every day):   Fluticasone and Salmeterol (Wixela)  Albuterol (Accuneb, Proair, Proventil, Ventolin) 1 Puff  2 Puffs Twice daily  Every 4 hours PRN     Additional Information:  Use spacer with Rescue Inhaler,  rinse mouth after use of medications        Yellow Zone:  I am having a bad day or a COPD flare Actions     More breathless than usual   Continue daily medications     I have less energy for my daily activities   Use quick relief inhaler as ordered     Increased or thicker phlegm/mucus   Use oxygen as prescribed     Using quick relief inhaler/nebulizer more often   Get plenty of rest     Swelling of ankles more than usual   Use pursed lip breathing     More coughing than usual   At all times avoid cigarette smoke, inhaled irritants     I feel like I have a \"chest cold\"     Poor sleep and my symptoms woke me up     My appetite is not good     My medicine is not helping      Call provider immediately if symptoms don’t improve     Continue daily medications, add rescue medications:   Albuterol 2 Puffs Every 4 hours       Medications to be used during a flare up, (as Discussed with Provider):           Additional Information:  Call provider immediately if symptoms do not improve    Red Zone:  I need urgent medical care Actions     Severe shortness of breath even at rest   Call 911 or seek medical care immediately     Not able to do any activity because " of breathing      Fever or shaking chills      Feeling confused or very drowsy       Chest pains      Coughing up blood

## 2023-07-20 NOTE — DISCHARGE SUMMARY
Discharge Summary    CHIEF COMPLAINT ON ADMISSION  Chief Complaint   Patient presents with    Rib Injury    Rib Pain    Shortness of Breath     Patient report she fell on 07/08/2023 in the bathroom hitting the plastic tower to the left rib and shortness of breath due to the pain.  Increase rib pain due to coughing.  Patient denies hitting her head. Patient takes eliquis.    T-5000 GLF       Reason for Admission  rib pain, unable to cough      Admission Date  7/17/2023    CODE STATUS  Full Code    HPI & HOSPITAL COURSE  This is a 78 y.o. female here with a history of COPD on 1.5L nocturnally who had a fall on 7/8 at which time she hit her ribs on some furniture.  She had continued chest pain after that with inspiration, and decided to come to hospital at the behest of her granddaughter to ensure she didn't have a PNA - she does not have a PNA, but does have rib fractures from the 5th to 9th L ribs.  Trauma was contacted at [facility] who recommended pain control with no other interventions.     At admission, pt was requiring 4L of supplemental O2 due to splinting - lidoderm patch was ordered and patient was educated on the risk of PNA with splinting.  She is now able to draw ~1000-1500cc on IS, and is not requiring supplemental pain meds. She is requiring 1.5L of O2 while ambulating, which I expect to continue to improve as her rib pain improves with healing - she will get an Rx for lidoderm patches at discharge, and we will send her home with her IS and encourage deep breathing.      Therefore, she is discharged in good and stable condition to home with close outpatient follow-up.    The patient recovered much more quickly than anticipated on admission.    Discharge Date  7/20/23    FOLLOW UP ITEMS POST DISCHARGE  Pt is to see her PCP in one week    DISCHARGE DIAGNOSES  Principal Problem:    Hypoxia (POA: Yes)  Active Problems:    Primary hypertension (POA: Yes)    COPD (chronic obstructive pulmonary disease) (HCC)  (POA: Yes)      Overview: IMO Update    Chronic respiratory failure (HCC) (POA: Unknown)      Overview: Use 1 & 1/2 L oxygen at night    Former smoker (POA: Yes)    Secondary hypercoagulability disorder (HCC) (POA: Yes)    Neurogenic bladder (POA: Yes)    Hyperlipidemia, unspecified (POA: Yes)    Closed fracture of multiple ribs of left side (POA: Unknown)    ACP (advance care planning) (POA: Unknown)  Resolved Problems:    * No resolved hospital problems. *      FOLLOW UP  Future Appointments   Date Time Provider Department Center   8/14/2023 10:00 AM Kayden Purvis M.D. [address]     No follow-up provider specified.    MEDICATIONS ON DISCHARGE     Medication List        START taking these medications        Instructions   lidocaine 5 % Ptch  Commonly known as: Lidoderm   Place 1 Patch on the skin every 24 hours.  Dose: 1 Patch            CHANGE how you take these medications        Instructions   atorvastatin 80 MG tablet  What changed: when to take this  Commonly known as: Lipitor   TAKE 1 TABLET BY MOUTH EVERY DAY. N THE EVENING. PT TO MAKE APPT AND GET LABS PRIOR TO MORE REFILLS.  Dose: 80 mg     Myrbetriq 25 MG Tb24  What changed: additional instructions  Generic drug: mirabegron ER   Take 1 Tablet by mouth every morning for 90 days.  Dose: 25 mg            CONTINUE taking these medications        Instructions   acetaminophen 500 MG Tabs  Commonly known as: Tylenol   Take 1,000 mg by mouth every 6 hours as needed. Indications: Pain  Dose: 1,000 mg     albuterol 108 (90 Base) MCG/ACT Aers inhalation aerosol   Inhale 1-2 Puffs every 6 hours as needed for Shortness of Breath.  Dose: 1-2 Puff     amLODIPine 5 MG Tabs  Commonly known as: Norvasc   Take 1 Tablet by mouth every day.  Dose: 5 mg     apixaban 5mg Tabs  Commonly known as: Eliquis   Take 1 Tablet by mouth 2 times a day.  Dose: 5 mg     B COMPLEX PO   Take 1 Tablet by mouth every day.  Dose: 1 Tablet     COQ-10 PO   Take 1 Capsule by mouth every  morning.  Dose: 1 Capsule     FISH OIL PO   Take 1 Capsule by mouth every day.  Dose: 1 Capsule     fluticasone-salmeterol 250-50 MCG/DOSE Aepb  Commonly known as: ADVAIR   Inhale 1 Puff every 12 hours.  Dose: 1 Puff     Home Care Oxygen   Inhale 1.5 L/min at bedtime. Oxygen dose range: 1.5  L/min at bedtime  Respiratory route via: Nasal Cannula   Oxygen supplier: [employer]  Dose: 1.5 L/min     losartan 100 MG Tabs  Commonly known as: Cozaar   TAKE 1 TABLET BY MOUTH EVERY DAY  Dose: 100 mg     * therapeutic multivitamin-minerals Tabs   Take 1 Tablet by mouth every morning.  Dose: 1 Tablet     * PRESERVISION AREDS 2 PO   Take 1 Cap by mouth 2 Times a Day.  Dose: 1 Capsule     VITAMIN D3 PO   Take 2 Capsules by mouth every day.  Dose: 2 Capsule           * This list has 2 medication(s) that are the same as other medications prescribed for you. Read the directions carefully, and ask your doctor or other care provider to review them with you.                  Allergies  Allergies   Allergen Reactions    Morphine Unspecified     Hallucinations, nightmares, and altered mentations    Lactaid [Lactase] Diarrhea     Upset stomach       DIET  Orders Placed This Encounter   Procedures    Diet Order Diet: Cardiac     Standing Status:   Standing     Number of Occurrences:   1     Order Specific Question:   Diet:     Answer:   Cardiac [6]       ACTIVITY  As tolerated.  Weight bearing as tolerated    CONSULTATIONS  None    PROCEDURES  None    LABORATORY  Lab Results   Component Value Date    SODIUM 141 07/19/2023    POTASSIUM 4.1 07/19/2023    CHLORIDE 105 07/19/2023    CO2 24 07/19/2023    GLUCOSE 91 07/19/2023    BUN 24 (H) 07/19/2023    CREATININE 0.64 07/19/2023        Lab Results   Component Value Date    WBC 5.5 07/19/2023    HEMOGLOBIN 13.8 07/19/2023    HEMATOCRIT 42.3 07/19/2023    PLATELETCT 181 07/19/2023        Total time of the discharge process exceeds 32 minutes.

## 2023-07-20 NOTE — PROGRESS NOTES
0700 introduced myself to patient. assisted pt to sit up for breakfast.     0900 checked on patient. Patient watching TV.     1100 student nurse assisted pt to the bathroom.

## 2023-07-20 NOTE — CARE PLAN
The patient is Stable - Low risk of patient condition declining or worsening    Shift Goals  Clinical Goals: Patient oxygen saturation will be above 90% this shift  Patient Goals: Rest, sleep    Progress made toward(s) clinical / shift goals:  Patient's oxygen saturation above 90%, on 1 L nasal cannula. Patient able to ambulate with front wheel walker.     Patient is not progressing towards the following goals: N/A

## 2023-07-20 NOTE — PROGRESS NOTES
Rounding     1947 Introduction, Up to the bathroom   2101 Vitals check  2330 Up to the bathroom   0125 Patient sleeping   0203 Vitals check   0400 Patient sleeping   0456 Vitals check  0630 Patient sleeping

## 2023-07-20 NOTE — DISCHARGE PLANNING
Case Management Discharge Planning    Admission Date: 7/17/2023  GMLOS: 3  ALOS: 2    6-Clicks ADL Score: 24  6-Clicks Mobility Score: 24      Anticipated Discharge Dispo: Discharge Disposition: Discharged to home/self care (01)    DME Needed: Yes    DME Ordered: Yes    Action(s) Taken: Spoke to pt via phone call. Per pt, she spoke to [employer] and made a payment.    RNCM spoke to Kusum from [facility] who confirmed they received they payment and will keep pt on service. Per Kusum, pt has concentrator and tanks she can use.    Spoke to pt. Per pt, her friend will deliver a tank to her bedside for DC.     Escalations Completed: DME Company    Medically Clear: Yes

## 2023-07-20 NOTE — PROGRESS NOTES
1900 - Received bedside patient report from JOSE Barnes. Patient is awake, alert & oriented x4. Patient on 1 L nasal cannula. Patient resting in bed, respirations unlabored. Patient educated on call light use for needs. Belongings within reach. Bed at lowest position. Safety precautions in place.     Chart check complete.     2101 - Vital signs recorded.    2346 - Medication administered per MAR.    0203 - Vital signs recorded.    0325 - Patient resting in bed, respirations unlabored.    0456 - Vital signs recorded.    0601 - Up to bathroom.    0700 - Report given to JOSE Thakur.

## 2023-07-20 NOTE — PROGRESS NOTES
Telemetry Shift Summary    Rhythm SR/ST  HR Range   Ectopy rPVC, rPAC, rTrig  Measurements 0.20/0.08/0.36        Normal Values  Rhythm SR  HR Range    Measurements 0.12-0.20 / 0.06-0.10  / 0.30-0.52

## 2023-07-21 ENCOUNTER — PATIENT OUTREACH (OUTPATIENT)
Dept: MEDICAL GROUP | Facility: PHYSICIAN GROUP | Age: 78
End: 2023-07-21
Payer: MEDICARE

## 2023-07-21 NOTE — PROGRESS NOTES
Transitional Care Management  TCM Outreach Date and Time: Filed (7/21/2023 10:05 AM)    Discharge Questions  Actual Discharge Date: 07/20/23  Now that you are home, how are you feeling?: Good  Did you receive any new prescriptions?: Yes  Were you able to get them filled?: No (Pt's family member will  today)  Reason prescriptions not filled?: Other (please specify) (Family member to  today)  Do you have any questions about your current medications or new medications (Review Med Rec)?: No  Do you have a follow up appointment scheduled with your PCP?: Yes  Appointment Date: 07/28/23  Appointment Time: 1500  Any issues or paperwork you wish to discuss with your PCP?: No  Does this patient qualify for the CCM program?: No    Transitional Care  Number of attempts made to contact patient: 1  Current or previous attempts competed within two business days of discharge? : Yes  Provided education regarding treatment plan, medications, self-management, ADLs?: Yes  Has patient completed an Advanced Directive?: No  Has the Care Manager's phone number provided?: No  Is there anything else I can help you with?: No    Discharge Summary  Chief Complaint: Rib injury/rib pain/Shortness of breath/T-5000 GLF  Admitting Diagnosis: Rib pain/Unable to cough  Discharge Diagnosis: Hypoxia

## 2023-07-26 ENCOUNTER — DOCUMENTATION (OUTPATIENT)
Dept: HEALTH INFORMATION MANAGEMENT | Facility: OTHER | Age: 78
End: 2023-07-26
Payer: MEDICARE

## 2023-08-02 DIAGNOSIS — I10 HYPERTENSION, UNSPECIFIED TYPE: ICD-10-CM

## 2023-08-02 RX ORDER — LOSARTAN POTASSIUM 100 MG/1
100 TABLET ORAL DAILY
Qty: 100 TABLET | Refills: 3 | Status: SHIPPED | OUTPATIENT
Start: 2023-08-02

## 2023-08-13 ENCOUNTER — TELEPHONE (OUTPATIENT)
Dept: OBGYN | Facility: CLINIC | Age: 78
End: 2023-08-13
Payer: MEDICARE

## 2023-08-14 ENCOUNTER — HOSPITAL ENCOUNTER (OUTPATIENT)
Facility: MEDICAL CENTER | Age: 78
End: 2023-08-14
Attending: STUDENT IN AN ORGANIZED HEALTH CARE EDUCATION/TRAINING PROGRAM
Payer: MEDICARE

## 2023-08-14 ENCOUNTER — OFFICE VISIT (OUTPATIENT)
Dept: OBGYN | Facility: CLINIC | Age: 78
End: 2023-08-14
Payer: MEDICARE

## 2023-08-14 VITALS — SYSTOLIC BLOOD PRESSURE: 138 MMHG | HEART RATE: 100 BPM | DIASTOLIC BLOOD PRESSURE: 82 MMHG

## 2023-08-14 DIAGNOSIS — R82.90 ABNORMAL URINALYSIS: ICD-10-CM

## 2023-08-14 DIAGNOSIS — N39.41 URGE URINARY INCONTINENCE: ICD-10-CM

## 2023-08-14 DIAGNOSIS — N32.81 OAB (OVERACTIVE BLADDER): Primary | ICD-10-CM

## 2023-08-14 DIAGNOSIS — N31.9 NEUROGENIC BLADDER: ICD-10-CM

## 2023-08-14 LAB
APPEARANCE UR: CLEAR
BILIRUB UR STRIP-MCNC: NORMAL MG/DL
COLOR UR AUTO: NORMAL
GLUCOSE UR STRIP.AUTO-MCNC: NEGATIVE MG/DL
KETONES UR STRIP.AUTO-MCNC: NORMAL MG/DL
LEUKOCYTE ESTERASE UR QL STRIP.AUTO: NORMAL
NITRITE UR QL STRIP.AUTO: NEGATIVE
PH UR STRIP.AUTO: 5 [PH] (ref 5–8)
PROT UR QL STRIP: NORMAL MG/DL
RBC UR QL AUTO: NEGATIVE
SP GR UR STRIP.AUTO: 1.02
UROBILINOGEN UR STRIP-MCNC: NORMAL MG/DL

## 2023-08-14 PROCEDURE — 52287 CYSTOSCOPY CHEMODENERVATION: CPT | Performed by: STUDENT IN AN ORGANIZED HEALTH CARE EDUCATION/TRAINING PROGRAM

## 2023-08-14 PROCEDURE — 3079F DIAST BP 80-89 MM HG: CPT | Performed by: STUDENT IN AN ORGANIZED HEALTH CARE EDUCATION/TRAINING PROGRAM

## 2023-08-14 PROCEDURE — 3075F SYST BP GE 130 - 139MM HG: CPT | Performed by: STUDENT IN AN ORGANIZED HEALTH CARE EDUCATION/TRAINING PROGRAM

## 2023-08-14 PROCEDURE — 87086 URINE CULTURE/COLONY COUNT: CPT

## 2023-08-14 PROCEDURE — 81002 URINALYSIS NONAUTO W/O SCOPE: CPT | Performed by: STUDENT IN AN ORGANIZED HEALTH CARE EDUCATION/TRAINING PROGRAM

## 2023-08-14 PROCEDURE — 87077 CULTURE AEROBIC IDENTIFY: CPT

## 2023-08-14 PROCEDURE — 87186 SC STD MICRODIL/AGAR DIL: CPT

## 2023-08-14 RX ORDER — SULFAMETHOXAZOLE AND TRIMETHOPRIM 800; 160 MG/1; MG/1
1 TABLET ORAL 2 TIMES DAILY
Qty: 6 TABLET | Refills: 0 | Status: SHIPPED | OUTPATIENT
Start: 2023-08-14 | End: 2023-08-17

## 2023-08-14 NOTE — TELEPHONE ENCOUNTER
Called Nohelia and confirmed identity. Reminded her to hold evening and morning dose of apixiban to decrease procedural bleeding. Confirmed 9am arrival.     Kayden Purvis MD

## 2023-08-14 NOTE — PROCEDURES
Procedure: Bladder chemodenervation with onabotulintoxinA    Nohelia Dickerson is a 78 y.o. with urge urinary incontinence/OAB not adequately responsive to behavioral and oral medication intervention. She was thoroughly counseled on the procedure and consented for cystourethroscopy and bladder chemodenervation with onabotulinum toxin A.  She is aware the risks include infection, bladder perforation, rare reaction to medication, transient urinary retention.  Symptomatic improvement tends to occur at 1 week after injection, and last between 6 and 12 months.  All questions were answered    30 minutes prior to the procedure her urethra was instilled with lido jet and bladder backfilled with 60 mL of 1% lidocaine for analgesia.  She was given Bactrim DS x1 for antibiotic prophylaxis, and will be given a tail course. She held her apixiban for 1 day prior to procedure.     Cystoscopy w/Botox    Date/Time: 8/13/2023 7:39 PM    Performed by: Kayden Purvis M.D.  Authorized by: Kayden Purvis M.D.    Procedure discussed: discussed risks, benefits and alternatives    Chaperone present: yes    Timeout: timeout called immediately prior to procedure    Prep: patient was prepped and draped in usual sterile fashion    Prep type: Betadine    Anesthesia: local anesthesia      Procedure Details     Cystoscope type: flexible    Cystoscopy route: transurethral      Cystoscopy location: native bladder      Irrigation used: saline      Position: dorsal lithotomy    Urethra     Urethra: normal      Vagina     Vagina: normal      Bladder     Bladder: normal      Botox Injection Details     Changes since previous cystoscopy: no changes since previous cystoscopy      Indication: overactive bladder      Volume per injection comment: 2.0 mL    Total number of injections: 5    Total number of units: 100    Post-Procedure Details     Appearance of urine after procedure: clear    Appearance of urine after procedure comment: Excellent  hemostasis at all injection sites confirmed with observation at low bladder pressures before completing procedure.     Outcome: patient tolerated procedure well with no complications      Disposition: discharged home in satisfactory condition        Kayden Purvis MD

## 2023-08-17 LAB
BACTERIA UR CULT: ABNORMAL
BACTERIA UR CULT: ABNORMAL
SIGNIFICANT IND 70042: ABNORMAL
SITE SITE: ABNORMAL
SOURCE SOURCE: ABNORMAL

## 2023-08-28 ENCOUNTER — OFFICE VISIT (OUTPATIENT)
Dept: MEDICAL GROUP | Facility: PHYSICIAN GROUP | Age: 78
End: 2023-08-28
Payer: MEDICARE

## 2023-08-28 VITALS
TEMPERATURE: 98.7 F | DIASTOLIC BLOOD PRESSURE: 80 MMHG | OXYGEN SATURATION: 89 % | HEIGHT: 72 IN | BODY MASS INDEX: 23.57 KG/M2 | WEIGHT: 174 LBS | SYSTOLIC BLOOD PRESSURE: 134 MMHG | RESPIRATION RATE: 12 BRPM | HEART RATE: 98 BPM

## 2023-08-28 DIAGNOSIS — Z78.0 POSTMENOPAUSAL: ICD-10-CM

## 2023-08-28 DIAGNOSIS — R73.03 PREDIABETES: ICD-10-CM

## 2023-08-28 DIAGNOSIS — S22.42XS CLOSED FRACTURE OF MULTIPLE RIBS OF LEFT SIDE, SEQUELA: ICD-10-CM

## 2023-08-28 DIAGNOSIS — J42 CHRONIC BRONCHITIS, UNSPECIFIED CHRONIC BRONCHITIS TYPE (HCC): ICD-10-CM

## 2023-08-28 DIAGNOSIS — M85.80 OSTEOPENIA, UNSPECIFIED LOCATION: ICD-10-CM

## 2023-08-28 DIAGNOSIS — E55.9 VITAMIN D DEFICIENCY: ICD-10-CM

## 2023-08-28 DIAGNOSIS — J96.11 CHRONIC RESPIRATORY FAILURE WITH HYPOXIA (HCC): ICD-10-CM

## 2023-08-28 PROCEDURE — 3075F SYST BP GE 130 - 139MM HG: CPT | Performed by: FAMILY MEDICINE

## 2023-08-28 PROCEDURE — 3079F DIAST BP 80-89 MM HG: CPT | Performed by: FAMILY MEDICINE

## 2023-08-28 PROCEDURE — 99214 OFFICE O/P EST MOD 30 MIN: CPT | Performed by: FAMILY MEDICINE

## 2023-08-28 RX ORDER — FLUTICASONE FUROATE, UMECLIDINIUM BROMIDE AND VILANTEROL TRIFENATATE 200; 62.5; 25 UG/1; UG/1; UG/1
1 POWDER RESPIRATORY (INHALATION) DAILY
Qty: 60 EACH | Refills: 3 | Status: SHIPPED | OUTPATIENT
Start: 2023-08-28

## 2023-08-28 ASSESSMENT — FIBROSIS 4 INDEX: FIB4 SCORE: 1.86

## 2023-08-28 NOTE — PROGRESS NOTES
CHIEF COMPLAINT / REASON FOR VISIT  Nohelia Dickerson is a 78 y.o. female that presents today for follow-up ribs    HISTORY OF PRESENT ILLNESS  Recently fell (mechanical ground level fall) in bathroom, fractured 5 ribs on left, was evaluated by surgery, recommended non-operative management.  Her rib pain is improving with time.    Hx osteopenia on DEXA in 2018. Not currently taking calcium supplement. Is taking OTC vit D supplement.    COPD - oxygen at night, checks O2 saturations    OBJECTIVE     /80 (BP Location: Left arm, Patient Position: Sitting, BP Cuff Size: Adult)   Pulse 98   Temp 37.1 °C (98.7 °F) (Temporal)   Resp 12   Ht 1.829 m (6')   Wt 78.9 kg (174 lb)   SpO2 89%   BMI 23.60 kg/m²      PHYSICAL EXAM  Constitutional: Sitting comfortably, in no acute distress, responds to questions appropriately.  Heart: Regular S1 S2, no murmurs, rub, or gallops  Lungs: Clear to auscultation bilaterally, prolonged expiratory phase  Extremities: No lower extremity edema  Skin: Warm and dry, no rashes or lesions on face or exposed upper extremities    ASSESSMENT & PLAN  1. Osteopenia, unspecified location  Hx osteopenia on DEXA in 2018. Not currently taking calcium supplement. Is taking OTC vit D supplement.  Recommended she add 1200 mg calcium supplement to her daily regimen.  We will repeat bone density scan.  - DS-BONE DENSITY STUDY (DEXA); Future  - VITAMIN D,25 HYDROXY (DEFICIENCY); Future    2. Closed fracture of multiple ribs of left side, sequela  She is almost 6 weeks out from mechanical ground level with 5 left-sided rib fractures, minimally displaced. Was evaluated by surgery during hospitalization, recommended non-operative management.  Her pain is gradually improving.    3. Chronic respiratory failure with hypoxia (HCC)  4. Chronic bronchitis, unspecified chronic bronchitis type (HCC)  Chronic hypoxic respiratory failure secondary to COPD.  Uses oxygen via nasal cannula at night, and as  needed during the day to maintain SpO2 88 to 92%.  She is not on a LAMA maintenance inhaler so we will switch her Advair to Trelegy.  - Fluticasone-Umeclidin-Vilant (TRELEGY ELLIPTA) 200-62.5-25 MCG/ACT AEROSOL POWDER, BREATH ACTIVATED; Inhale 1 Puff every day.  Dispense: 60 Each; Refill: 3    5. Postmenopausal  - DS-BONE DENSITY STUDY (DEXA); Future    6. Vitamin D deficiency  - VITAMIN D,25 HYDROXY (DEFICIENCY); Future    7. Prediabetes  - HEMOGLOBIN A1C; Future    We will inform patient of results via [email or web address], unless requiring significant action then will call back for visit to discuss

## 2023-10-02 NOTE — TELEPHONE ENCOUNTER
Phone Number Called: [phone] (home)       Call outcome: Spoke to patient regarding message below.    Message: pt advised of the results and states her Bp is 164/94   Dressing: bandage

## 2023-10-10 ENCOUNTER — HOSPITAL ENCOUNTER (OUTPATIENT)
Dept: RADIOLOGY | Facility: MEDICAL CENTER | Age: 78
End: 2023-10-10
Attending: FAMILY MEDICINE
Payer: MEDICARE

## 2023-10-10 DIAGNOSIS — Z12.31 ENCOUNTER FOR SCREENING MAMMOGRAM FOR BREAST CANCER: ICD-10-CM

## 2023-10-10 DIAGNOSIS — M85.80 OSTEOPENIA, UNSPECIFIED LOCATION: ICD-10-CM

## 2023-10-10 DIAGNOSIS — Z78.0 POSTMENOPAUSAL: ICD-10-CM

## 2023-10-10 PROCEDURE — 77063 BREAST TOMOSYNTHESIS BI: CPT

## 2023-10-10 PROCEDURE — 77080 DXA BONE DENSITY AXIAL: CPT

## 2023-11-07 NOTE — PROGRESS NOTES
DATE OF SERVICE:  09/20/2019    The patient was seen for a followup visit.  The patient reports she is doing   well in terms of strength and overall endurance.  She said her balance is   getting much better.  She says she is confident she could return home   successfully.    This session was devoted talking about the patient's grief over the loss of   her daughter approximately a year ago due to a brain hemorrhage.       ____________________________________     LUCY MAURO, PHD    DEBORAH / LIBERTAD    DD:  09/24/2019 16:11:56  DT:  09/25/2019 14:57:31    D#:  [id number]  Job#:  [id number]   16

## 2023-11-15 ENCOUNTER — TELEPHONE (OUTPATIENT)
Dept: MEDICAL GROUP | Facility: PHYSICIAN GROUP | Age: 78
End: 2023-11-15
Payer: MEDICARE

## 2023-11-15 DIAGNOSIS — M62.838 MUSCLE SPASM: ICD-10-CM

## 2023-11-15 RX ORDER — TIZANIDINE 2 MG/1
2 TABLET ORAL 2 TIMES DAILY PRN
Qty: 30 TABLET | Refills: 0 | Status: SHIPPED | OUTPATIENT
Start: 2023-11-15

## 2023-11-15 NOTE — TELEPHONE ENCOUNTER
VOICEMAIL  1. Caller Name: Nohelia Dickerson                        Call Back Number: [phone] (home)       2. Message: Patient is having muscle spasms and will like to have something to help treat this    3. Patient approves office to leave a detailed voicemail/MyChart message: yes

## 2023-11-15 NOTE — TELEPHONE ENCOUNTER
Phone Number Called: [phone] (home)       Call outcome: Left detailed message for patient. Informed to call back with any additional questions.    Message: left vm

## 2024-02-14 RX ORDER — ATORVASTATIN CALCIUM 80 MG/1
80 TABLET, FILM COATED ORAL
Qty: 100 TABLET | Refills: 3 | Status: SHIPPED | OUTPATIENT
Start: 2024-02-14

## 2024-02-14 NOTE — TELEPHONE ENCOUNTER
Received request via: Pharmacy    Was the patient seen in the last year in this department? Yes    Does the patient have an active prescription (recently filled or refills available) for medication(s) requested? No    Pharmacy Name: [facility]    Does the patient have alf Plus and need 100 day supply (blood pressure, diabetes and cholesterol meds only)? Medication is not for cholesterol, blood pressure or diabetes

## 2024-02-20 ENCOUNTER — HOSPITAL ENCOUNTER (EMERGENCY)
Facility: MEDICAL CENTER | Age: 79
End: 2024-02-21
Attending: STUDENT IN AN ORGANIZED HEALTH CARE EDUCATION/TRAINING PROGRAM
Payer: MEDICARE

## 2024-02-20 ENCOUNTER — APPOINTMENT (OUTPATIENT)
Dept: RADIOLOGY | Facility: MEDICAL CENTER | Age: 79
End: 2024-02-20
Attending: STUDENT IN AN ORGANIZED HEALTH CARE EDUCATION/TRAINING PROGRAM
Payer: MEDICARE

## 2024-02-20 DIAGNOSIS — R60.9 PERIPHERAL EDEMA: ICD-10-CM

## 2024-02-20 DIAGNOSIS — M79.89 LEG SWELLING: ICD-10-CM

## 2024-02-20 LAB
BASOPHILS # BLD AUTO: 0.8 % (ref 0–1.8)
BASOPHILS # BLD: 0.05 K/UL (ref 0–0.12)
EOSINOPHIL # BLD AUTO: 0.25 K/UL (ref 0–0.51)
EOSINOPHIL NFR BLD: 4 % (ref 0–6.9)
ERYTHROCYTE [DISTWIDTH] IN BLOOD BY AUTOMATED COUNT: 42.9 FL (ref 35.9–50)
HCT VFR BLD AUTO: 40.8 % (ref 37–47)
HGB BLD-MCNC: 13.8 G/DL (ref 12–16)
IMM GRANULOCYTES # BLD AUTO: 0.02 K/UL (ref 0–0.11)
IMM GRANULOCYTES NFR BLD AUTO: 0.3 % (ref 0–0.9)
LYMPHOCYTES # BLD AUTO: 1.97 K/UL (ref 1–4.8)
LYMPHOCYTES NFR BLD: 31.2 % (ref 22–41)
MCH RBC QN AUTO: 29.2 PG (ref 27–33)
MCHC RBC AUTO-ENTMCNC: 33.8 G/DL (ref 32.2–35.5)
MCV RBC AUTO: 86.4 FL (ref 81.4–97.8)
MONOCYTES # BLD AUTO: 0.53 K/UL (ref 0–0.85)
MONOCYTES NFR BLD AUTO: 8.4 % (ref 0–13.4)
NEUTROPHILS # BLD AUTO: 3.49 K/UL (ref 1.82–7.42)
NEUTROPHILS NFR BLD: 55.3 % (ref 44–72)
NRBC # BLD AUTO: 0 K/UL
NRBC BLD-RTO: 0 /100 WBC (ref 0–0.2)
PLATELET # BLD AUTO: 178 K/UL (ref 164–446)
PMV BLD AUTO: 10.6 FL (ref 9–12.9)
RBC # BLD AUTO: 4.72 M/UL (ref 4.2–5.4)
WBC # BLD AUTO: 6.3 K/UL (ref 4.8–10.8)

## 2024-02-20 PROCEDURE — 73600 X-RAY EXAM OF ANKLE: CPT | Mod: RT

## 2024-02-20 PROCEDURE — 93971 EXTREMITY STUDY: CPT | Mod: RT

## 2024-02-20 PROCEDURE — 99284 EMERGENCY DEPT VISIT MOD MDM: CPT

## 2024-02-20 PROCEDURE — 80053 COMPREHEN METABOLIC PANEL: CPT

## 2024-02-20 PROCEDURE — 36415 COLL VENOUS BLD VENIPUNCTURE: CPT

## 2024-02-20 PROCEDURE — 85025 COMPLETE CBC W/AUTO DIFF WBC: CPT

## 2024-02-20 ASSESSMENT — FIBROSIS 4 INDEX: FIB4 SCORE: 1.86

## 2024-02-21 VITALS
HEART RATE: 105 BPM | TEMPERATURE: 97.7 F | OXYGEN SATURATION: 91 % | RESPIRATION RATE: 17 BRPM | HEIGHT: 72 IN | SYSTOLIC BLOOD PRESSURE: 143 MMHG | DIASTOLIC BLOOD PRESSURE: 84 MMHG | WEIGHT: 174 LBS | BODY MASS INDEX: 23.57 KG/M2

## 2024-02-21 LAB
ALBUMIN SERPL BCP-MCNC: 3.8 G/DL (ref 3.2–4.9)
ALBUMIN/GLOB SERPL: 1.2 G/DL
ALP SERPL-CCNC: 91 U/L (ref 30–99)
ALT SERPL-CCNC: 17 U/L (ref 2–50)
ANION GAP SERPL CALC-SCNC: 12 MMOL/L (ref 7–16)
AST SERPL-CCNC: 21 U/L (ref 12–45)
BILIRUB SERPL-MCNC: 0.8 MG/DL (ref 0.1–1.5)
BUN SERPL-MCNC: 16 MG/DL (ref 8–22)
CALCIUM ALBUM COR SERPL-MCNC: 9.3 MG/DL (ref 8.5–10.5)
CALCIUM SERPL-MCNC: 9.1 MG/DL (ref 8.5–10.5)
CHLORIDE SERPL-SCNC: 108 MMOL/L (ref 96–112)
CO2 SERPL-SCNC: 22 MMOL/L (ref 20–33)
CREAT SERPL-MCNC: 0.76 MG/DL (ref 0.5–1.4)
GFR SERPLBLD CREATININE-BSD FMLA CKD-EPI: 80 ML/MIN/1.73 M 2
GLOBULIN SER CALC-MCNC: 3.1 G/DL (ref 1.9–3.5)
GLUCOSE SERPL-MCNC: 113 MG/DL (ref 65–99)
POTASSIUM SERPL-SCNC: 4.2 MMOL/L (ref 3.6–5.5)
PROT SERPL-MCNC: 6.9 G/DL (ref 6–8.2)
SODIUM SERPL-SCNC: 142 MMOL/L (ref 135–145)

## 2024-02-21 NOTE — ED NOTES
Pt discharged to home. Pt was given follow up instructions. All lines removed prior to discharge. Pt verbalized understanding of all instructions for discharge and is ambulatory out of ED with steady gait. AOx4
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
## 2024-02-21 NOTE — ED TRIAGE NOTES
Chief Complaint   Patient presents with    Leg Swelling     Pt reports R lower leg swelling since 2/11. +edema      Pt to triage via WC for above complaint. Pt denies any trauma to leg prior to swelling.     Pt placed in lobby and educated on triage process. Encouraged to alert staff to any changes.    A+Ox4, GCS 15, NAD.     BP (!) 184/118   Pulse 99   Temp 36.4 °C (97.6 °F) (Temporal)   Resp 18   Ht 1.829 m (6')   Wt 78.9 kg (174 lb)   SpO2 91%   BMI 23.60 kg/m²

## 2024-02-21 NOTE — DISCHARGE INSTRUCTIONS
Wear the compression stockings as we discussed keep the leg elevated, if you develop any fevers increasing redness return for recheck

## 2024-04-03 ENCOUNTER — TELEPHONE (OUTPATIENT)
Dept: HEALTH INFORMATION MANAGEMENT | Facility: OTHER | Age: 79
End: 2024-04-03

## 2024-04-13 NOTE — THERAPY
"Physical Therapy   Initial Evaluation     Patient Name: Nohelia Dickerson  Age:  76 y.o., Sex:  female  Medical Record #: [id number]  Today's Date: 1/16/2022     Precautions  Precautions: Fall Risk    Assessment  Patient is 76 y.o. female presents with shortness of breath, significant weakness and COVID-19 virus; PMH significant for COPD on 1.5L 24/7, HTN, preDM, smoking, PAD, TIA. Pt demonstrating limited functional mobility during assessment today requiring Phani for rolling for pericare, modA for supine <> sit, and modA for STS with FWW. Pt with significant BLE weakness, unable to achieve full upright standing or weight shift in order to take a step. Pt assisted back to bed at end of session, SpO2 dropped into upper 80s% on 4.5L oxymask but maintained in 90s% on 5.5L. Pt lives with her daughter and granddaughter in a single level home and was still working as a hairdresser. Pt would benefit from post acute rehab placement prior to returning home due to her current deficits listed above. Will continue to follow.     Plan    Recommend Physical Therapy 3 times per week until therapy goals are met for the following treatments:  Bed Mobility, Equipment, Gait Training, Manual Therapy, Neuro Re-Education / Balance, Self Care/Home Evaluation, Sensory Integration Techniques, Stair Training, Therapeutic Activities and Therapeutic Exercises    DC Equipment Recommendations: Unable to determine at this time  Discharge Recommendations: Recommend post-acute placement for additional physical therapy services prior to discharge home       Subjective    \"I am just so weak now.\"     Objective       01/16/22 1156   Prior Living Situation   Prior Services Home-Independent   Housing / Facility 1 Story House   Steps Into Home 2   Steps In Home 0   Equipment Owned Front-Wheel Walker   Lives with - Patient's Self Care Capacity Adult Children   Comments pt lives with her dtr, grandtr, and grandtr's S.O.    Prior Level of Functional Mobility "   Bed Mobility Independent   Transfer Status Independent   Ambulation Independent   Distance Ambulation (Feet)   (community)   Assistive Devices Used None   Stairs Independent   Comments pt was working as a    History of Falls   History of Falls No   Cognition    Cognition / Consciousness WDL   Level of Consciousness Alert   Comments pleasant and cooperative   Passive ROM Lower Body   Passive ROM Lower Body WDL   Active ROM Lower Body    Active ROM Lower Body  WDL   Strength Lower Body   Lower Body Strength  X   Comments BLEs grossly 3+/5   Sensation Lower Body   Lower Extremity Sensation   WDL   Lower Body Muscle Tone   Lower Body Muscle Tone  WDL   Neurological Concerns   Neurological Concerns No   Coordination Lower Body    Coordination Lower Body  WDL   Balance Assessment   Sitting Balance (Static) Fair   Sitting Balance (Dynamic) Fair -   Standing Balance (Static) Poor   Standing Balance (Dynamic) Trace +   Weight Shift Sitting Fair   Weight Shift Standing Poor   Comments w/ FWW   Gait Analysis   Gait Level Of Assist Unable to Participate   Weight Bearing Status FWB   Bed Mobility    Supine to Sit Minimal Assist   Sit to Supine Minimal Assist   Scooting Minimal Assist   Rolling Minimal Assist to Rt.;Minimum Assist to Lt.   Comments w/ bed rails    Functional Mobility   Sit to Stand Moderate Assist   Mobility STS with FWW at EOB x2 attempts   Comments pt only able to tolerate standing at EOB with modA due to limited trunk/hip extension and posterior lean against the bed    Activity Tolerance   Sitting in Chair NT   Sitting Edge of Bed 10mins   Standing <1min   Comments limited by BLE weakness   Patient / Family Goals    Patient / Family Goal #1 to be able to walk again    Short Term Goals    Short Term Goal # 1 Pt will transition supine <> sit with spv within 6 V in order to progress functional mobility.    Short Term Goal # 2 Pt will transition sit <> stand with FWW and spv within 6 V in order to  progress functional mobility.   Short Term Goal # 3 Pt will ambulate 100ft with FWW and spv within 6 V in order to progress functional mobility.    Short Term Goal # 4 Pt will ascend/descend 2 steps with Phani within 6 V in order to access her home.    Education Group   Education Provided Role of Physical Therapist   Role of Physical Therapist Patient Response Patient;Acceptance;Explanation;Verbal Demonstration;Reinforcement Needed   Problem List    Problems Impaired Bed Mobility;Impaired Transfers;Impaired Ambulation;Impaired Balance;Functional Strength Deficit;Decreased Activity Tolerance          right upper quadrant/epigastric

## 2024-05-14 NOTE — TELEPHONE ENCOUNTER
Received request via: Pharmacy    Was the patient seen in the last year in this department? Yes    Does the patient have an active prescription (recently filled or refills available) for medication(s) requested? No    Pharmacy Name:[facility]     Does the patient have shelter Plus and need 100 day supply (blood pressure, diabetes and cholesterol meds only)? Yes, quantity updated to 100 days

## 2024-05-15 RX ORDER — AMLODIPINE BESYLATE 5 MG/1
5 TABLET ORAL DAILY
Qty: 100 TABLET | Refills: 3 | Status: SHIPPED | OUTPATIENT
Start: 2024-05-15

## 2024-05-15 RX ORDER — APIXABAN 5 MG/1
5 TABLET, FILM COATED ORAL 2 TIMES DAILY
Qty: 180 TABLET | Refills: 3 | Status: SHIPPED | OUTPATIENT
Start: 2024-05-15

## 2024-06-12 DIAGNOSIS — J42 CHRONIC BRONCHITIS, UNSPECIFIED CHRONIC BRONCHITIS TYPE (HCC): ICD-10-CM

## 2024-06-12 DIAGNOSIS — J96.11 CHRONIC RESPIRATORY FAILURE WITH HYPOXIA (HCC): ICD-10-CM

## 2024-06-12 RX ORDER — FLUTICASONE FUROATE, UMECLIDINIUM BROMIDE AND VILANTEROL TRIFENATATE 200; 62.5; 25 UG/1; UG/1; UG/1
1 POWDER RESPIRATORY (INHALATION)
Qty: 60 EACH | Refills: 3 | Status: SHIPPED | OUTPATIENT
Start: 2024-06-12

## 2024-06-26 NOTE — ASSESSMENT & PLAN NOTE
[facility]  Date of Service: 06/25/24  Mariluz Koenig  Date of Birth 1944  Code Status----FULL CODE         HPI:  This is a 79-year-old female who was previously living in assisted living here at [facility]. She had a fall December 31, 2023, within her room area. She had back pain, eventually leading to a diagnosis of a T10 burst fracture. She also had urinary tract infection present on the admission. She underwent a T7 to L1 instrumented fusion with stabilization of the T10 burst fracture. She had some complications with post-op care, with some acute on chronic urinary incontinence with urinary tract infection being treated during this hospitalization. Cognition has been a little variable, her initial week after surgery was complicated by pain and pain medications, currently behaviors improving. She is still not as engaged with therapy and tends to report pain and early fatigue. No significant fever chills. Wound status reported to be OK. Incidental finding of some small bilateral pleural effusions on her most recent x-ray slight left basilar opacity. At the time of her admission, she had tested COVID positive. Suspicion that most of that disease was over prior to her admission and does have some residual lung findings as above, but not currently require any supplemental oxygen.     Over the interval:  Patient is being seen for annual wellness visit, monthly rounds at extended care facility, follow-up from back pain, left shoulder pain, bilateral knee pain, dry cough.  Patient asleep in chair on arrival for assessment.  Awakens to gentle shake.  She denies any pain initially.  States back pain much improved, feels the injections in her knee is starting to wear off already.  After assessment completed and I walked out of the room nurses aide asked me to go back and see her as patient complaining of left shoulder pain.  Staff state this called for close no recent falls no recent  -Inpatient status on medical floor with remote telemetry.  -Patient had a ground-level fall 9 days ago hitting her left chest on a plastic furniture.  She has fifth through ninth rib fractures and no pneumothorax.  -Patient has pain that is not controlled with Tylenol resulting in very shallow breathing causing her to be hypoxic.  -ERP discussed the case with trauma surgery team at [facility].  There is no additional intervention recommended at this time and given injury happened 9 days ago, consider her stable.  Recommendation is that the patient is okay to stay at [facility].  -I started pain medication therapy.  -Work with respiratory team to try to wean her off 4 L of oxygen in the morning once her pain is better controlled.

## 2024-07-19 DIAGNOSIS — I10 HYPERTENSION, UNSPECIFIED TYPE: ICD-10-CM

## 2024-07-22 RX ORDER — LOSARTAN POTASSIUM 100 MG/1
100 TABLET ORAL DAILY
Qty: 100 TABLET | Refills: 0 | Status: SHIPPED | OUTPATIENT
Start: 2024-07-22

## 2024-09-11 ENCOUNTER — TELEPHONE (OUTPATIENT)
Dept: HEALTH INFORMATION MANAGEMENT | Facility: OTHER | Age: 79
End: 2024-09-11
Payer: MEDICARE

## 2025-01-15 NOTE — DISCHARGE PLANNING
Case Management Discharge Instructions  Discharge Date September 25, 2019     Agency Name / Phone: [facility] [phone]     Home Health:   Registered Nurse, Occupational Therapist, Physical Therapist, Speech Therapist        DME Provider / Phone: [facility] [phone]     Medical Equipment Ordered: Front Wheel Walker, Tub/shower bench      NOTE: This information can be found in your final discharge packet that your nurse will give you.       Follow-up Information:     Idalia Cortez M.D.  [address]  [place] [address]  [phone]  Primary care f/u on Friday, Sept. 27, 2019 @3:20PM, 3:05PM check in     Milad Wren M.D.  [id number]  [address] [address]  [phone]  Surgery f/u Tuesday, Oct. 15, 2019 @ 10:30AM, 10 AM check in    Kala Hernandes D.O.  [address] [facility] [address]  [phone]  Thursday, Oct. 31, 2019 @ 1:30PM, check in at 1PM       [facility]  [address], [address]  [place], [place]   [phone]  On 11/7/2019 Thursday, Nov. 7, 2019  @ 9AM           0

## 2025-01-16 ENCOUNTER — APPOINTMENT (OUTPATIENT)
Dept: RADIOLOGY | Facility: MEDICAL CENTER | Age: 80
DRG: 177 | End: 2025-01-16
Attending: STUDENT IN AN ORGANIZED HEALTH CARE EDUCATION/TRAINING PROGRAM
Payer: MEDICARE

## 2025-01-16 ENCOUNTER — HOSPITAL ENCOUNTER (INPATIENT)
Facility: MEDICAL CENTER | Age: 80
LOS: 3 days | DRG: 177 | End: 2025-01-19
Attending: STUDENT IN AN ORGANIZED HEALTH CARE EDUCATION/TRAINING PROGRAM | Admitting: INTERNAL MEDICINE
Payer: MEDICARE

## 2025-01-16 ENCOUNTER — HOSPITAL ENCOUNTER (EMERGENCY)
Facility: MEDICAL CENTER | Age: 80
End: 2025-01-16
Attending: EMERGENCY MEDICINE
Payer: MEDICARE

## 2025-01-16 VITALS
HEIGHT: 72 IN | OXYGEN SATURATION: 95 % | BODY MASS INDEX: 23.7 KG/M2 | DIASTOLIC BLOOD PRESSURE: 62 MMHG | RESPIRATION RATE: 20 BRPM | WEIGHT: 175 LBS | SYSTOLIC BLOOD PRESSURE: 128 MMHG | TEMPERATURE: 97.6 F | HEART RATE: 99 BPM

## 2025-01-16 DIAGNOSIS — R53.1 WEAKNESS: ICD-10-CM
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
## 2025-01-16 DIAGNOSIS — J44.0 CHRONIC OBSTRUCTIVE PULMONARY DISEASE WITH ACUTE LOWER RESPIRATORY INFECTION (HCC): ICD-10-CM

## 2025-01-16 DIAGNOSIS — S93.402A SPRAIN OF LEFT ANKLE, UNSPECIFIED LIGAMENT, INITIAL ENCOUNTER: ICD-10-CM

## 2025-01-16 DIAGNOSIS — E86.0 DEHYDRATION: ICD-10-CM

## 2025-01-16 DIAGNOSIS — R19.7 NAUSEA VOMITING AND DIARRHEA: ICD-10-CM

## 2025-01-16 DIAGNOSIS — J96.01 ACUTE RESPIRATORY FAILURE WITH HYPOXIA (HCC): ICD-10-CM

## 2025-01-16 DIAGNOSIS — J18.9 PNEUMONIA OF BOTH LOWER LOBES DUE TO INFECTIOUS ORGANISM: ICD-10-CM

## 2025-01-16 DIAGNOSIS — J18.9 PNEUMONIA OF RIGHT MIDDLE LOBE DUE TO INFECTIOUS ORGANISM: ICD-10-CM

## 2025-01-16 DIAGNOSIS — S20.211A CONTUSION OF RIB ON RIGHT SIDE, INITIAL ENCOUNTER: ICD-10-CM

## 2025-01-16 DIAGNOSIS — R11.2 NAUSEA VOMITING AND DIARRHEA: ICD-10-CM

## 2025-01-16 PROBLEM — R73.9 HYPERGLYCEMIA: Status: ACTIVE | Noted: 2025-01-16

## 2025-01-16 PROBLEM — J96.21 ACUTE ON CHRONIC RESPIRATORY FAILURE WITH HYPOXIA (HCC): Status: ACTIVE | Noted: 2018-08-07

## 2025-01-16 PROBLEM — D64.9 ANEMIA: Status: ACTIVE | Noted: 2025-01-16

## 2025-01-16 LAB
ALBUMIN SERPL BCP-MCNC: 3.2 G/DL (ref 3.2–4.9)
ALBUMIN SERPL BCP-MCNC: 3.6 G/DL (ref 3.2–4.9)
ALBUMIN/GLOB SERPL: 1 G/DL
ALBUMIN/GLOB SERPL: 1.2 G/DL
ALP SERPL-CCNC: 117 U/L (ref 30–99)
ALP SERPL-CCNC: 86 U/L (ref 30–99)
ALT SERPL-CCNC: 14 U/L (ref 2–50)
ALT SERPL-CCNC: 18 U/L (ref 2–50)
ANION GAP SERPL CALC-SCNC: 12 MMOL/L (ref 7–16)
ANION GAP SERPL CALC-SCNC: 14 MMOL/L (ref 7–16)
AST SERPL-CCNC: 19 U/L (ref 12–45)
AST SERPL-CCNC: 22 U/L (ref 12–45)
BASOPHILS # BLD AUTO: 0.1 % (ref 0–1.8)
BASOPHILS # BLD AUTO: 0.3 % (ref 0–1.8)
BASOPHILS # BLD: 0.01 K/UL (ref 0–0.12)
BASOPHILS # BLD: 0.03 K/UL (ref 0–0.12)
BILIRUB SERPL-MCNC: 0.7 MG/DL (ref 0.1–1.5)
BILIRUB SERPL-MCNC: 0.8 MG/DL (ref 0.1–1.5)
BUN SERPL-MCNC: 18 MG/DL (ref 8–22)
BUN SERPL-MCNC: 19 MG/DL (ref 8–22)
CALCIUM ALBUM COR SERPL-MCNC: 9.1 MG/DL (ref 8.5–10.5)
CALCIUM ALBUM COR SERPL-MCNC: 9.3 MG/DL (ref 8.5–10.5)
CALCIUM SERPL-MCNC: 8.7 MG/DL (ref 8.4–10.2)
CALCIUM SERPL-MCNC: 8.8 MG/DL (ref 8.5–10.5)
CHLORIDE SERPL-SCNC: 103 MMOL/L (ref 96–112)
CHLORIDE SERPL-SCNC: 106 MMOL/L (ref 96–112)
CO2 SERPL-SCNC: 20 MMOL/L (ref 20–33)
CO2 SERPL-SCNC: 22 MMOL/L (ref 20–33)
CREAT SERPL-MCNC: 0.83 MG/DL (ref 0.5–1.4)
CREAT SERPL-MCNC: 0.99 MG/DL (ref 0.5–1.4)
EKG IMPRESSION: NORMAL
EKG IMPRESSION: NORMAL
EOSINOPHIL # BLD AUTO: 0.01 K/UL (ref 0–0.51)
EOSINOPHIL # BLD AUTO: 0.02 K/UL (ref 0–0.51)
EOSINOPHIL NFR BLD: 0.1 % (ref 0–6.9)
EOSINOPHIL NFR BLD: 0.2 % (ref 0–6.9)
ERYTHROCYTE [DISTWIDTH] IN BLOOD BY AUTOMATED COUNT: 42.9 FL (ref 35.9–50)
ERYTHROCYTE [DISTWIDTH] IN BLOOD BY AUTOMATED COUNT: 43 FL (ref 35.9–50)
FLUAV RNA SPEC QL NAA+PROBE: NEGATIVE
FLUAV RNA SPEC QL NAA+PROBE: NEGATIVE
FLUBV RNA SPEC QL NAA+PROBE: NEGATIVE
FLUBV RNA SPEC QL NAA+PROBE: NEGATIVE
GFR SERPLBLD CREATININE-BSD FMLA CKD-EPI: 58 ML/MIN/1.73 M 2
GFR SERPLBLD CREATININE-BSD FMLA CKD-EPI: 72 ML/MIN/1.73 M 2
GLOBULIN SER CALC-MCNC: 3.1 G/DL (ref 1.9–3.5)
GLOBULIN SER CALC-MCNC: 3.2 G/DL (ref 1.9–3.5)
GLUCOSE SERPL-MCNC: 163 MG/DL (ref 65–99)
GLUCOSE SERPL-MCNC: 179 MG/DL (ref 65–99)
HCT VFR BLD AUTO: 36.3 % (ref 37–47)
HCT VFR BLD AUTO: 40.8 % (ref 37–47)
HGB BLD-MCNC: 11.8 G/DL (ref 12–16)
HGB BLD-MCNC: 13.4 G/DL (ref 12–16)
IMM GRANULOCYTES # BLD AUTO: 0.02 K/UL (ref 0–0.11)
IMM GRANULOCYTES # BLD AUTO: 0.03 K/UL (ref 0–0.11)
IMM GRANULOCYTES NFR BLD AUTO: 0.2 % (ref 0–0.9)
IMM GRANULOCYTES NFR BLD AUTO: 0.4 % (ref 0–0.9)
LACTATE SERPL-SCNC: 1.9 MMOL/L (ref 0.5–2)
LIPASE SERPL-CCNC: 25 U/L (ref 11–82)
LYMPHOCYTES # BLD AUTO: 0.63 K/UL (ref 1–4.8)
LYMPHOCYTES # BLD AUTO: 0.93 K/UL (ref 1–4.8)
LYMPHOCYTES NFR BLD: 10.9 % (ref 22–41)
LYMPHOCYTES NFR BLD: 6.9 % (ref 22–41)
MAGNESIUM SERPL-MCNC: 1.8 MG/DL (ref 1.5–2.5)
MCH RBC QN AUTO: 28.2 PG (ref 27–33)
MCH RBC QN AUTO: 28.7 PG (ref 27–33)
MCHC RBC AUTO-ENTMCNC: 32.5 G/DL (ref 32.2–35.5)
MCHC RBC AUTO-ENTMCNC: 32.8 G/DL (ref 32.2–35.5)
MCV RBC AUTO: 86.6 FL (ref 81.4–97.8)
MCV RBC AUTO: 87.4 FL (ref 81.4–97.8)
MONOCYTES # BLD AUTO: 0.67 K/UL (ref 0–0.85)
MONOCYTES # BLD AUTO: 0.68 K/UL (ref 0–0.85)
MONOCYTES NFR BLD AUTO: 7.5 % (ref 0–13.4)
MONOCYTES NFR BLD AUTO: 7.8 % (ref 0–13.4)
NEUTROPHILS # BLD AUTO: 6.9 K/UL (ref 1.82–7.42)
NEUTROPHILS # BLD AUTO: 7.71 K/UL (ref 1.82–7.42)
NEUTROPHILS NFR BLD: 80.7 % (ref 44–72)
NEUTROPHILS NFR BLD: 84.9 % (ref 44–72)
NRBC # BLD AUTO: 0 K/UL
NRBC # BLD AUTO: 0 K/UL
NRBC BLD-RTO: 0 /100 WBC (ref 0–0.2)
NRBC BLD-RTO: 0 /100 WBC (ref 0–0.2)
NT-PROBNP SERPL IA-MCNC: 488 PG/ML (ref 0–125)
PLATELET # BLD AUTO: 164 K/UL (ref 164–446)
PLATELET # BLD AUTO: 173 K/UL (ref 164–446)
PMV BLD AUTO: 10.1 FL (ref 9–12.9)
PMV BLD AUTO: 10.5 FL (ref 9–12.9)
POTASSIUM SERPL-SCNC: 3.7 MMOL/L (ref 3.6–5.5)
POTASSIUM SERPL-SCNC: 3.8 MMOL/L (ref 3.6–5.5)
PROCALCITONIN SERPL-MCNC: 0.12 NG/ML
PROT SERPL-MCNC: 6.4 G/DL (ref 6–8.2)
PROT SERPL-MCNC: 6.7 G/DL (ref 6–8.2)
RBC # BLD AUTO: 4.19 M/UL (ref 4.2–5.4)
RBC # BLD AUTO: 4.67 M/UL (ref 4.2–5.4)
RSV RNA SPEC QL NAA+PROBE: NEGATIVE
RSV RNA SPEC QL NAA+PROBE: NEGATIVE
SARS-COV-2 RNA RESP QL NAA+PROBE: NOTDETECTED
SARS-COV-2 RNA RESP QL NAA+PROBE: NOTDETECTED
SODIUM SERPL-SCNC: 137 MMOL/L (ref 135–145)
SODIUM SERPL-SCNC: 140 MMOL/L (ref 135–145)
SPECIMEN SOURCE: NORMAL
WBC # BLD AUTO: 8.6 K/UL (ref 4.8–10.8)
WBC # BLD AUTO: 9.1 K/UL (ref 4.8–10.8)

## 2025-01-16 PROCEDURE — 93005 ELECTROCARDIOGRAM TRACING: CPT | Mod: TC | Performed by: EMERGENCY MEDICINE

## 2025-01-16 PROCEDURE — 700102 HCHG RX REV CODE 250 W/ 637 OVERRIDE(OP): Performed by: INTERNAL MEDICINE

## 2025-01-16 PROCEDURE — 93005 ELECTROCARDIOGRAM TRACING: CPT | Mod: TC | Performed by: STUDENT IN AN ORGANIZED HEALTH CARE EDUCATION/TRAINING PROGRAM

## 2025-01-16 PROCEDURE — 71275 CT ANGIOGRAPHY CHEST: CPT

## 2025-01-16 PROCEDURE — 99285 EMERGENCY DEPT VISIT HI MDM: CPT

## 2025-01-16 PROCEDURE — 96374 THER/PROPH/DIAG INJ IV PUSH: CPT

## 2025-01-16 PROCEDURE — 0241U HCHG SARS-COV-2 COVID-19 NFCT DS RESP RNA 4 TRGT MIC: CPT

## 2025-01-16 PROCEDURE — 85025 COMPLETE CBC W/AUTO DIFF WBC: CPT

## 2025-01-16 PROCEDURE — 93005 ELECTROCARDIOGRAM TRACING: CPT | Mod: TC

## 2025-01-16 PROCEDURE — 700111 HCHG RX REV CODE 636 W/ 250 OVERRIDE (IP): Mod: JZ | Performed by: EMERGENCY MEDICINE

## 2025-01-16 PROCEDURE — A9270 NON-COVERED ITEM OR SERVICE: HCPCS | Performed by: INTERNAL MEDICINE

## 2025-01-16 PROCEDURE — 73610 X-RAY EXAM OF ANKLE: CPT | Mod: LT

## 2025-01-16 PROCEDURE — 700117 HCHG RX CONTRAST REV CODE 255: Performed by: STUDENT IN AN ORGANIZED HEALTH CARE EDUCATION/TRAINING PROGRAM

## 2025-01-16 PROCEDURE — 83735 ASSAY OF MAGNESIUM: CPT

## 2025-01-16 PROCEDURE — 99223 1ST HOSP IP/OBS HIGH 75: CPT | Performed by: INTERNAL MEDICINE

## 2025-01-16 PROCEDURE — A9270 NON-COVERED ITEM OR SERVICE: HCPCS | Performed by: STUDENT IN AN ORGANIZED HEALTH CARE EDUCATION/TRAINING PROGRAM

## 2025-01-16 PROCEDURE — 83690 ASSAY OF LIPASE: CPT

## 2025-01-16 PROCEDURE — 71045 X-RAY EXAM CHEST 1 VIEW: CPT

## 2025-01-16 PROCEDURE — 84145 PROCALCITONIN (PCT): CPT

## 2025-01-16 PROCEDURE — 0241U HCHG SARS-COV-2 COVID-19 NFCT DS RESP RNA 4 TRGT ED POC: CPT

## 2025-01-16 PROCEDURE — 700111 HCHG RX REV CODE 636 W/ 250 OVERRIDE (IP): Mod: JZ | Performed by: STUDENT IN AN ORGANIZED HEALTH CARE EDUCATION/TRAINING PROGRAM

## 2025-01-16 PROCEDURE — 94760 N-INVAS EAR/PLS OXIMETRY 1: CPT

## 2025-01-16 PROCEDURE — 80053 COMPREHEN METABOLIC PANEL: CPT | Mod: 91

## 2025-01-16 PROCEDURE — 80053 COMPREHEN METABOLIC PANEL: CPT

## 2025-01-16 PROCEDURE — 700101 HCHG RX REV CODE 250: Performed by: STUDENT IN AN ORGANIZED HEALTH CARE EDUCATION/TRAINING PROGRAM

## 2025-01-16 PROCEDURE — 83605 ASSAY OF LACTIC ACID: CPT

## 2025-01-16 PROCEDURE — 36415 COLL VENOUS BLD VENIPUNCTURE: CPT

## 2025-01-16 PROCEDURE — 770006 HCHG ROOM/CARE - MED/SURG/GYN SEMI*

## 2025-01-16 PROCEDURE — 700105 HCHG RX REV CODE 258: Performed by: STUDENT IN AN ORGANIZED HEALTH CARE EDUCATION/TRAINING PROGRAM

## 2025-01-16 PROCEDURE — 700102 HCHG RX REV CODE 250 W/ 637 OVERRIDE(OP): Performed by: STUDENT IN AN ORGANIZED HEALTH CARE EDUCATION/TRAINING PROGRAM

## 2025-01-16 PROCEDURE — 700105 HCHG RX REV CODE 258: Performed by: EMERGENCY MEDICINE

## 2025-01-16 PROCEDURE — 94640 AIRWAY INHALATION TREATMENT: CPT

## 2025-01-16 PROCEDURE — 83880 ASSAY OF NATRIURETIC PEPTIDE: CPT

## 2025-01-16 PROCEDURE — 85025 COMPLETE CBC W/AUTO DIFF WBC: CPT | Mod: 91

## 2025-01-16 RX ORDER — ENOXAPARIN SODIUM 100 MG/ML
40 INJECTION SUBCUTANEOUS DAILY
Status: DISCONTINUED | OUTPATIENT
Start: 2025-01-16 | End: 2025-01-16

## 2025-01-16 RX ORDER — ATORVASTATIN CALCIUM 40 MG/1
80 TABLET, FILM COATED ORAL DAILY
Status: DISCONTINUED | OUTPATIENT
Start: 2025-01-17 | End: 2025-01-19 | Stop reason: HOSPADM

## 2025-01-16 RX ORDER — IPRATROPIUM BROMIDE AND ALBUTEROL SULFATE 2.5; .5 MG/3ML; MG/3ML
3 SOLUTION RESPIRATORY (INHALATION) ONCE
Status: COMPLETED | OUTPATIENT
Start: 2025-01-16 | End: 2025-01-16

## 2025-01-16 RX ORDER — IPRATROPIUM BROMIDE AND ALBUTEROL SULFATE 2.5; .5 MG/3ML; MG/3ML
3 SOLUTION RESPIRATORY (INHALATION)
Status: DISCONTINUED | OUTPATIENT
Start: 2025-01-16 | End: 2025-01-17

## 2025-01-16 RX ORDER — AMOXICILLIN 250 MG
2 CAPSULE ORAL EVERY EVENING
Status: DISCONTINUED | OUTPATIENT
Start: 2025-01-17 | End: 2025-01-19 | Stop reason: HOSPADM

## 2025-01-16 RX ORDER — OXYCODONE HYDROCHLORIDE 5 MG/1
5 TABLET ORAL
Status: DISCONTINUED | OUTPATIENT
Start: 2025-01-16 | End: 2025-01-19 | Stop reason: HOSPADM

## 2025-01-16 RX ORDER — ONDANSETRON 4 MG/1
4 TABLET, ORALLY DISINTEGRATING ORAL EVERY 4 HOURS PRN
Status: DISCONTINUED | OUTPATIENT
Start: 2025-01-16 | End: 2025-01-19 | Stop reason: HOSPADM

## 2025-01-16 RX ORDER — LIDOCAINE 4 G/G
1 PATCH TOPICAL EVERY 24 HOURS
Status: DISCONTINUED | OUTPATIENT
Start: 2025-01-16 | End: 2025-01-19 | Stop reason: HOSPADM

## 2025-01-16 RX ORDER — ACETAMINOPHEN 500 MG
1000 TABLET ORAL ONCE
Status: DISCONTINUED | OUTPATIENT
Start: 2025-01-16 | End: 2025-01-16

## 2025-01-16 RX ORDER — ALBUTEROL SULFATE 5 MG/ML
2.5 SOLUTION RESPIRATORY (INHALATION)
Status: DISCONTINUED | OUTPATIENT
Start: 2025-01-16 | End: 2025-01-16

## 2025-01-16 RX ORDER — PREDNISONE 20 MG/1
40 TABLET ORAL DAILY
Status: DISCONTINUED | OUTPATIENT
Start: 2025-01-17 | End: 2025-01-17

## 2025-01-16 RX ORDER — GUAIFENESIN/DEXTROMETHORPHAN 100-10MG/5
10 SYRUP ORAL EVERY 6 HOURS PRN
Status: DISCONTINUED | OUTPATIENT
Start: 2025-01-16 | End: 2025-01-19 | Stop reason: HOSPADM

## 2025-01-16 RX ORDER — SODIUM CHLORIDE 9 MG/ML
1000 INJECTION, SOLUTION INTRAVENOUS ONCE
Status: COMPLETED | OUTPATIENT
Start: 2025-01-16 | End: 2025-01-16

## 2025-01-16 RX ORDER — ONDANSETRON 4 MG/1
4 TABLET, ORALLY DISINTEGRATING ORAL EVERY 8 HOURS PRN
Qty: 20 TABLET | Refills: 0 | Status: SHIPPED | OUTPATIENT
Start: 2025-01-16

## 2025-01-16 RX ORDER — ACETAMINOPHEN 500 MG
1000 TABLET ORAL EVERY 8 HOURS
Status: DISCONTINUED | OUTPATIENT
Start: 2025-01-16 | End: 2025-01-19 | Stop reason: HOSPADM

## 2025-01-16 RX ORDER — ALBUTEROL SULFATE 90 UG/1
1-2 INHALANT RESPIRATORY (INHALATION)
Status: DISCONTINUED | OUTPATIENT
Start: 2025-01-16 | End: 2025-01-17

## 2025-01-16 RX ORDER — ONDANSETRON 2 MG/ML
4 INJECTION INTRAMUSCULAR; INTRAVENOUS ONCE
Status: COMPLETED | OUTPATIENT
Start: 2025-01-16 | End: 2025-01-16

## 2025-01-16 RX ORDER — DOXYCYCLINE 100 MG/1
100 TABLET ORAL ONCE
Status: COMPLETED | OUTPATIENT
Start: 2025-01-16 | End: 2025-01-16

## 2025-01-16 RX ORDER — POLYETHYLENE GLYCOL 3350 17 G/17G
1 POWDER, FOR SOLUTION ORAL
Status: DISCONTINUED | OUTPATIENT
Start: 2025-01-16 | End: 2025-01-19 | Stop reason: HOSPADM

## 2025-01-16 RX ORDER — DOXYCYCLINE 100 MG/1
100 TABLET ORAL EVERY 12 HOURS
Status: DISCONTINUED | OUTPATIENT
Start: 2025-01-17 | End: 2025-01-19 | Stop reason: HOSPADM

## 2025-01-16 RX ORDER — LOSARTAN POTASSIUM 50 MG/1
100 TABLET ORAL DAILY
Status: DISCONTINUED | OUTPATIENT
Start: 2025-01-17 | End: 2025-01-19 | Stop reason: HOSPADM

## 2025-01-16 RX ORDER — HYDRALAZINE HYDROCHLORIDE 20 MG/ML
10 INJECTION INTRAMUSCULAR; INTRAVENOUS EVERY 4 HOURS PRN
Status: DISCONTINUED | OUTPATIENT
Start: 2025-01-16 | End: 2025-01-19 | Stop reason: HOSPADM

## 2025-01-16 RX ORDER — ONDANSETRON 2 MG/ML
4 INJECTION INTRAMUSCULAR; INTRAVENOUS EVERY 4 HOURS PRN
Status: DISCONTINUED | OUTPATIENT
Start: 2025-01-16 | End: 2025-01-19 | Stop reason: HOSPADM

## 2025-01-16 RX ORDER — AMLODIPINE BESYLATE 5 MG/1
5 TABLET ORAL DAILY
Status: DISCONTINUED | OUTPATIENT
Start: 2025-01-17 | End: 2025-01-19 | Stop reason: HOSPADM

## 2025-01-16 RX ORDER — OXYCODONE HYDROCHLORIDE 10 MG/1
10 TABLET ORAL
Status: DISCONTINUED | OUTPATIENT
Start: 2025-01-16 | End: 2025-01-19 | Stop reason: HOSPADM

## 2025-01-16 RX ADMIN — IOHEXOL 50 ML: 350 INJECTION, SOLUTION INTRAVENOUS at 21:44

## 2025-01-16 RX ADMIN — IPRATROPIUM BROMIDE AND ALBUTEROL SULFATE 3 ML: .5; 2.5 SOLUTION RESPIRATORY (INHALATION) at 18:30

## 2025-01-16 RX ADMIN — ACETAMINOPHEN 1000 MG: 500 TABLET ORAL at 23:22

## 2025-01-16 RX ADMIN — LIDOCAINE 1 PATCH: 4 PATCH TOPICAL at 19:51

## 2025-01-16 RX ADMIN — AMPICILLIN AND SULBACTAM 3 G: 1; 2 INJECTION, POWDER, FOR SOLUTION INTRAMUSCULAR; INTRAVENOUS at 23:22

## 2025-01-16 RX ADMIN — DOXYCYCLINE 100 MG: 100 TABLET, FILM COATED ORAL at 23:22

## 2025-01-16 RX ADMIN — SODIUM CHLORIDE 1000 ML: 9 INJECTION, SOLUTION INTRAVENOUS at 02:16

## 2025-01-16 RX ADMIN — ONDANSETRON 4 MG: 2 INJECTION INTRAMUSCULAR; INTRAVENOUS at 02:14

## 2025-01-16 ASSESSMENT — ENCOUNTER SYMPTOMS
POLYDIPSIA: 0
TREMORS: 0
HEADACHES: 0
FEVER: 0
SPUTUM PRODUCTION: 0
FALLS: 1
CHILLS: 0
NECK PAIN: 0
HEMOPTYSIS: 0
FOCAL WEAKNESS: 0
WEIGHT LOSS: 0
SPEECH CHANGE: 0
COUGH: 0
ORTHOPNEA: 0
PALPITATIONS: 0
NERVOUS/ANXIOUS: 0
BLURRED VISION: 0
NAUSEA: 0
BACK PAIN: 0
HALLUCINATIONS: 0
DOUBLE VISION: 0
BRUISES/BLEEDS EASILY: 0
FLANK PAIN: 0
VOMITING: 0
HEARTBURN: 0
PHOTOPHOBIA: 0

## 2025-01-16 ASSESSMENT — LIFESTYLE VARIABLES: SUBSTANCE_ABUSE: 0

## 2025-01-16 ASSESSMENT — FIBROSIS 4 INDEX
FIB4 SCORE: 2.42
FIB4 SCORE: 2.23

## 2025-01-16 NOTE — ED TRIAGE NOTES
.  Chief Complaint   Patient presents with    Fall    Weakness    N/V    Diarrhea    Loss of Appetite    Fever    Chills     Pt was BIB EMS c/o fall with bilateral weakness, N/V/D, loss of appetite, fever and chills. No signs of hip injury, AxO 4 GCS 15/15. Blood glucose checked 256 mg/dl. Pt has hx of COPD on 2.5 L/min NC.     ./62   Pulse 89   Temp 36.4 °C (97.6 °F) (Oral)   Resp (!) 22   Ht 1.829 m (6')   Wt 79.4 kg (175 lb)   SpO2 93%   BMI 23.73 kg/m²

## 2025-01-16 NOTE — DISCHARGE PLANNING
[facility] Unit Clerk called if RN SHALINI could help set-up GMT ride for pt going back home. Pt on O2 2.5 L NC and needs assistance with ambulation. GMT ride set-up Res#[id number] total: $360.24, Pick-up Time/Next Available: 7:30-8am. Unit clerk made aware and [facility] [address] [facility] [place] [address].
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
## 2025-01-16 NOTE — DISCHARGE INSTRUCTIONS
You were seen in the emergency department for nausea, vomiting, diarrhea.  You were noted to be dehydrated were given IV fluids.  Your physical exam was reassuring.  Your labs were also reassuring.  After IV fluids and nausea medicines you are able to start drinking fluids.  You are being discharged home with nausea medicine that dissolves in the mouth.  We discussed hospitalization but you have elected to go home.  This appears to be reasonable.  Please drink plenty of fluids.      Return if you develop:  Abdominal pain, ongoing vomiting, chest pain, shortness of breath, new weakness, any other new or concerning

## 2025-01-16 NOTE — ED NOTES
AFTER THE TEST  A steri-strip and bandage will be placed over the incision. You may shower after 24 hours. Remove bandage after 24 hours. Remove bandage after the shower. Leave the steri-strips in place to fall off on their own. If after 1 week the steri-strips are still on, you may remove them. Avoid swimming or soaking in tub for 3 days.     You may have mild discomfort at the test site. If needed, you may take Tylenol (Acetaminophen) for pain. Please avoid taking NSAIDs, Motrin, Advil, Aleve, or ibuprofen for 24 hours following the biopsy. After 24 hours you may resume NSAIDSs.     If you take aspirin, Plavix, Coumadin, Xarelto or Eliquis please tell us. If these medications were stopped by your provider, please ask them when to resume.     You may have some tenderness, bruising or slight bleeding at the site. Please apply ice packs to the site for 15 minutes on and 15 minutes off for a 2 hour minimum.     Most people can return to their usual routine after the procedure. Avoid Strenuous activity for 24 hours.     Sleep in a bra the night after your biopsy. Continue to do so for comfort.     Call your provider if you have any of the following symptoms :  Fever  Increased pain  Increased bleeding  Redness  Increased swelling  Yellowish drainage  Your provider will get the biopsy results within 5 - 7 days. Call your provider with any questions.     Patient education brochure and pain/comfort measures have been reviewed.   Phone number provided to contact [facility] if problems arise.     Patient verbalized understanding of home going instructions.  Patient left the breast center at 1030.   GMT ETA around 7:30am-8am.

## 2025-01-17 PROBLEM — J44.0 CHRONIC OBSTRUCTIVE PULMONARY DISEASE WITH ACUTE LOWER RESPIRATORY INFECTION (HCC): Status: ACTIVE | Noted: 2025-01-17

## 2025-01-17 LAB
ALBUMIN SERPL BCP-MCNC: 3.1 G/DL (ref 3.2–4.9)
ALBUMIN/GLOB SERPL: 1 G/DL
ALP SERPL-CCNC: 116 U/L (ref 30–99)
ALT SERPL-CCNC: 17 U/L (ref 2–50)
ANION GAP SERPL CALC-SCNC: 13 MMOL/L (ref 7–16)
AST SERPL-CCNC: 28 U/L (ref 12–45)
BASOPHILS # BLD AUTO: 0.4 % (ref 0–1.8)
BASOPHILS # BLD: 0.03 K/UL (ref 0–0.12)
BILIRUB SERPL-MCNC: 0.8 MG/DL (ref 0.1–1.5)
BUN SERPL-MCNC: 17 MG/DL (ref 8–22)
CALCIUM ALBUM COR SERPL-MCNC: 9.2 MG/DL (ref 8.5–10.5)
CALCIUM SERPL-MCNC: 8.5 MG/DL (ref 8.5–10.5)
CHLORIDE SERPL-SCNC: 107 MMOL/L (ref 96–112)
CO2 SERPL-SCNC: 20 MMOL/L (ref 20–33)
CREAT SERPL-MCNC: 0.73 MG/DL (ref 0.5–1.4)
EKG IMPRESSION: NORMAL
EOSINOPHIL # BLD AUTO: 0.05 K/UL (ref 0–0.51)
EOSINOPHIL NFR BLD: 0.7 % (ref 0–6.9)
ERYTHROCYTE [DISTWIDTH] IN BLOOD BY AUTOMATED COUNT: 44.5 FL (ref 35.9–50)
GFR SERPLBLD CREATININE-BSD FMLA CKD-EPI: 84 ML/MIN/1.73 M 2
GLOBULIN SER CALC-MCNC: 3.1 G/DL (ref 1.9–3.5)
GLUCOSE SERPL-MCNC: 147 MG/DL (ref 65–99)
HCT VFR BLD AUTO: 35.4 % (ref 37–47)
HGB BLD-MCNC: 11.6 G/DL (ref 12–16)
IMM GRANULOCYTES # BLD AUTO: 0.01 K/UL (ref 0–0.11)
IMM GRANULOCYTES NFR BLD AUTO: 0.1 % (ref 0–0.9)
LYMPHOCYTES # BLD AUTO: 1.13 K/UL (ref 1–4.8)
LYMPHOCYTES NFR BLD: 16.2 % (ref 22–41)
MCH RBC QN AUTO: 28.7 PG (ref 27–33)
MCHC RBC AUTO-ENTMCNC: 32.8 G/DL (ref 32.2–35.5)
MCV RBC AUTO: 87.6 FL (ref 81.4–97.8)
MONOCYTES # BLD AUTO: 0.62 K/UL (ref 0–0.85)
MONOCYTES NFR BLD AUTO: 8.9 % (ref 0–13.4)
NEUTROPHILS # BLD AUTO: 5.12 K/UL (ref 1.82–7.42)
NEUTROPHILS NFR BLD: 73.7 % (ref 44–72)
NRBC # BLD AUTO: 0 K/UL
NRBC BLD-RTO: 0 /100 WBC (ref 0–0.2)
PLATELET # BLD AUTO: 161 K/UL (ref 164–446)
PMV BLD AUTO: 10.2 FL (ref 9–12.9)
POTASSIUM SERPL-SCNC: 3.4 MMOL/L (ref 3.6–5.5)
PROT SERPL-MCNC: 6.2 G/DL (ref 6–8.2)
RBC # BLD AUTO: 4.04 M/UL (ref 4.2–5.4)
SODIUM SERPL-SCNC: 140 MMOL/L (ref 135–145)
WBC # BLD AUTO: 7 K/UL (ref 4.8–10.8)

## 2025-01-17 PROCEDURE — 700101 HCHG RX REV CODE 250: Performed by: STUDENT IN AN ORGANIZED HEALTH CARE EDUCATION/TRAINING PROGRAM

## 2025-01-17 PROCEDURE — A9270 NON-COVERED ITEM OR SERVICE: HCPCS | Performed by: INTERNAL MEDICINE

## 2025-01-17 PROCEDURE — 700102 HCHG RX REV CODE 250 W/ 637 OVERRIDE(OP): Performed by: INTERNAL MEDICINE

## 2025-01-17 PROCEDURE — 94664 DEMO&/EVAL PT USE INHALER: CPT

## 2025-01-17 PROCEDURE — 97166 OT EVAL MOD COMPLEX 45 MIN: CPT

## 2025-01-17 PROCEDURE — 97535 SELF CARE MNGMENT TRAINING: CPT

## 2025-01-17 PROCEDURE — 700111 HCHG RX REV CODE 636 W/ 250 OVERRIDE (IP): Mod: JZ | Performed by: INTERNAL MEDICINE

## 2025-01-17 PROCEDURE — 99233 SBSQ HOSP IP/OBS HIGH 50: CPT | Performed by: INTERNAL MEDICINE

## 2025-01-17 PROCEDURE — 36415 COLL VENOUS BLD VENIPUNCTURE: CPT

## 2025-01-17 PROCEDURE — 700105 HCHG RX REV CODE 258: Performed by: INTERNAL MEDICINE

## 2025-01-17 PROCEDURE — 97162 PT EVAL MOD COMPLEX 30 MIN: CPT

## 2025-01-17 PROCEDURE — 700101 HCHG RX REV CODE 250: Performed by: INTERNAL MEDICINE

## 2025-01-17 PROCEDURE — 94640 AIRWAY INHALATION TREATMENT: CPT

## 2025-01-17 PROCEDURE — 94669 MECHANICAL CHEST WALL OSCILL: CPT

## 2025-01-17 PROCEDURE — 80053 COMPREHEN METABOLIC PANEL: CPT

## 2025-01-17 PROCEDURE — 85025 COMPLETE CBC W/AUTO DIFF WBC: CPT

## 2025-01-17 PROCEDURE — 770006 HCHG ROOM/CARE - MED/SURG/GYN SEMI*

## 2025-01-17 RX ORDER — PREDNISONE 20 MG/1
40 TABLET ORAL DAILY
Status: DISCONTINUED | OUTPATIENT
Start: 2025-01-17 | End: 2025-01-19 | Stop reason: HOSPADM

## 2025-01-17 RX ORDER — ALBUTEROL SULFATE 5 MG/ML
2.5 SOLUTION RESPIRATORY (INHALATION)
Status: DISCONTINUED | OUTPATIENT
Start: 2025-01-17 | End: 2025-01-19 | Stop reason: HOSPADM

## 2025-01-17 RX ORDER — POTASSIUM CHLORIDE 1500 MG/1
40 TABLET, EXTENDED RELEASE ORAL 3 TIMES DAILY
Status: COMPLETED | OUTPATIENT
Start: 2025-01-17 | End: 2025-01-17

## 2025-01-17 RX ADMIN — AMLODIPINE BESYLATE 5 MG: 5 TABLET ORAL at 06:21

## 2025-01-17 RX ADMIN — ATORVASTATIN CALCIUM 80 MG: 40 TABLET, FILM COATED ORAL at 06:22

## 2025-01-17 RX ADMIN — PREDNISONE 40 MG: 20 TABLET ORAL at 01:42

## 2025-01-17 RX ADMIN — FLUTICASONE FUROATE, UMECLIDINIUM BROMIDE AND VILANTEROL TRIFENATATE 1 PUFF: 200; 62.5; 25 POWDER RESPIRATORY (INHALATION) at 02:58

## 2025-01-17 RX ADMIN — ACETAMINOPHEN 1000 MG: 500 TABLET ORAL at 17:46

## 2025-01-17 RX ADMIN — IPRATROPIUM BROMIDE AND ALBUTEROL SULFATE 3 ML: .5; 2.5 SOLUTION RESPIRATORY (INHALATION) at 06:53

## 2025-01-17 RX ADMIN — AMPICILLIN AND SULBACTAM 3 G: 1; 2 INJECTION, POWDER, FOR SOLUTION INTRAMUSCULAR; INTRAVENOUS at 12:15

## 2025-01-17 RX ADMIN — POTASSIUM CHLORIDE 40 MEQ: 1500 TABLET, EXTENDED RELEASE ORAL at 12:15

## 2025-01-17 RX ADMIN — SENNOSIDES AND DOCUSATE SODIUM 2 TABLET: 50; 8.6 TABLET ORAL at 17:44

## 2025-01-17 RX ADMIN — AMPICILLIN AND SULBACTAM 3 G: 1; 2 INJECTION, POWDER, FOR SOLUTION INTRAMUSCULAR; INTRAVENOUS at 17:49

## 2025-01-17 RX ADMIN — LIDOCAINE 1 PATCH: 4 PATCH TOPICAL at 17:44

## 2025-01-17 RX ADMIN — DOXYCYCLINE 100 MG: 100 TABLET, FILM COATED ORAL at 06:21

## 2025-01-17 RX ADMIN — AMPICILLIN AND SULBACTAM 3 G: 1; 2 INJECTION, POWDER, FOR SOLUTION INTRAMUSCULAR; INTRAVENOUS at 06:19

## 2025-01-17 RX ADMIN — IPRATROPIUM BROMIDE AND ALBUTEROL SULFATE 3 ML: .5; 2.5 SOLUTION RESPIRATORY (INHALATION) at 10:07

## 2025-01-17 RX ADMIN — ACETAMINOPHEN 1000 MG: 500 TABLET ORAL at 07:24

## 2025-01-17 RX ADMIN — LOSARTAN POTASSIUM 100 MG: 50 TABLET, FILM COATED ORAL at 06:21

## 2025-01-17 RX ADMIN — DOXYCYCLINE 100 MG: 100 TABLET, FILM COATED ORAL at 17:44

## 2025-01-17 RX ADMIN — IPRATROPIUM BROMIDE AND ALBUTEROL SULFATE 3 ML: .5; 2.5 SOLUTION RESPIRATORY (INHALATION) at 02:52

## 2025-01-17 RX ADMIN — IPRATROPIUM BROMIDE AND ALBUTEROL SULFATE 3 ML: .5; 2.5 SOLUTION RESPIRATORY (INHALATION) at 14:19

## 2025-01-17 RX ADMIN — POTASSIUM CHLORIDE 40 MEQ: 1500 TABLET, EXTENDED RELEASE ORAL at 07:24

## 2025-01-17 SDOH — ECONOMIC STABILITY: TRANSPORTATION INSECURITY
IN THE PAST 12 MONTHS, HAS LACK OF RELIABLE TRANSPORTATION KEPT YOU FROM MEDICAL APPOINTMENTS, MEETINGS, WORK OR FROM GETTING THINGS NEEDED FOR DAILY LIVING?: NO

## 2025-01-17 ASSESSMENT — COGNITIVE AND FUNCTIONAL STATUS - GENERAL
MOVING TO AND FROM BED TO CHAIR: A LITTLE
DAILY ACTIVITIY SCORE: 21
WALKING IN HOSPITAL ROOM: A LOT
STANDING UP FROM CHAIR USING ARMS: A LITTLE
SUGGESTED CMS G CODE MODIFIER DAILY ACTIVITY: CJ
DAILY ACTIVITIY SCORE: 17
MOBILITY SCORE: 15
MOVING FROM LYING ON BACK TO SITTING ON SIDE OF FLAT BED: A LOT
CLIMB 3 TO 5 STEPS WITH RAILING: TOTAL
DRESSING REGULAR UPPER BODY CLOTHING: A LITTLE
HELP NEEDED FOR BATHING: A LOT
MOVING FROM LYING ON BACK TO SITTING ON SIDE OF FLAT BED: A LITTLE
SUGGESTED CMS G CODE MODIFIER MOBILITY: CK
STANDING UP FROM CHAIR USING ARMS: A LOT
SUGGESTED CMS G CODE MODIFIER MOBILITY: CK
WALKING IN HOSPITAL ROOM: A LITTLE
MOBILITY SCORE: 15
TURNING FROM BACK TO SIDE WHILE IN FLAT BAD: A LITTLE
MOVING TO AND FROM BED TO CHAIR: A LITTLE
DRESSING REGULAR LOWER BODY CLOTHING: A LITTLE
CLIMB 3 TO 5 STEPS WITH RAILING: A LOT
DRESSING REGULAR LOWER BODY CLOTHING: A LOT
DRESSING REGULAR UPPER BODY CLOTHING: A LITTLE
PERSONAL GROOMING: A LITTLE
SUGGESTED CMS G CODE MODIFIER DAILY ACTIVITY: CK
TURNING FROM BACK TO SIDE WHILE IN FLAT BAD: A LITTLE
TOILETING: A LOT

## 2025-01-17 ASSESSMENT — PAIN DESCRIPTION - PAIN TYPE
TYPE: ACUTE PAIN

## 2025-01-17 ASSESSMENT — ENCOUNTER SYMPTOMS
FEVER: 1
NAUSEA: 1
DIARRHEA: 1
VOMITING: 1
MYALGIAS: 1
CHILLS: 1

## 2025-01-17 ASSESSMENT — LIFESTYLE VARIABLES
CONSUMPTION TOTAL: NEGATIVE
ON A TYPICAL DAY WHEN YOU DRINK ALCOHOL HOW MANY DRINKS DO YOU HAVE: 0
AVERAGE NUMBER OF DAYS PER WEEK YOU HAVE A DRINK CONTAINING ALCOHOL: 1
HAVE YOU EVER FELT YOU SHOULD CUT DOWN ON YOUR DRINKING: NO
TOTAL SCORE: 0
ALCOHOL_USE: YES
TOTAL SCORE: 0
TOTAL SCORE: 0
HAVE PEOPLE ANNOYED YOU BY CRITICIZING YOUR DRINKING: NO
HOW MANY TIMES IN THE PAST YEAR HAVE YOU HAD 5 OR MORE DRINKS IN A DAY: 0
EVER HAD A DRINK FIRST THING IN THE MORNING TO STEADY YOUR NERVES TO GET RID OF A HANGOVER: NO
DOES PATIENT WANT TO STOP DRINKING: NO
EVER FELT BAD OR GUILTY ABOUT YOUR DRINKING: NO

## 2025-01-17 ASSESSMENT — SOCIAL DETERMINANTS OF HEALTH (SDOH)
WITHIN THE LAST YEAR, HAVE YOU BEEN AFRAID OF YOUR PARTNER OR EX-PARTNER?: NO
WITHIN THE PAST 12 MONTHS, THE FOOD YOU BOUGHT JUST DIDN'T LAST AND YOU DIDN'T HAVE MONEY TO GET MORE: NEVER TRUE
WITHIN THE LAST YEAR, HAVE YOU BEEN HUMILIATED OR EMOTIONALLY ABUSED IN OTHER WAYS BY YOUR PARTNER OR EX-PARTNER?: NO
WITHIN THE PAST 12 MONTHS, YOU WORRIED THAT YOUR FOOD WOULD RUN OUT BEFORE YOU GOT THE MONEY TO BUY MORE: NEVER TRUE
WITHIN THE LAST YEAR, HAVE YOU BEEN KICKED, HIT, SLAPPED, OR OTHERWISE PHYSICALLY HURT BY YOUR PARTNER OR EX-PARTNER?: NO
IN THE PAST 12 MONTHS, HAS THE ELECTRIC, GAS, OIL, OR WATER COMPANY THREATENED TO SHUT OFF SERVICE IN YOUR HOME?: NO
WITHIN THE LAST YEAR, HAVE TO BEEN RAPED OR FORCED TO HAVE ANY KIND OF SEXUAL ACTIVITY BY YOUR PARTNER OR EX-PARTNER?: NO

## 2025-01-17 ASSESSMENT — ACTIVITIES OF DAILY LIVING (ADL): TOILETING: INDEPENDENT

## 2025-01-17 ASSESSMENT — FIBROSIS 4 INDEX
FIB4 SCORE: 2.34
FIB4 SCORE: 3.29

## 2025-01-17 ASSESSMENT — GAIT ASSESSMENTS: GAIT LEVEL OF ASSIST: UNABLE TO PARTICIPATE

## 2025-01-17 NOTE — ED NOTES
Medication history reviewed with patient at bedside.   Med rec is complete  Allergies reviewed.     Patient has not had any outpatient antibiotics in the last 30 days.   Anticoagulants: Eliquis 5mg- Last dose 1/4/25 per patient.    Patient states she has not taken any medications for 2 days.       Paul Taylor, PhT

## 2025-01-17 NOTE — ASSESSMENT & PLAN NOTE
1/18/2025  Not in exacerbation  Presented with cough, expiratory wheezing, worsening of chronic hypoxia  Plan: DuoNeb every 4 hours  Prednisone 40 mg once a day  Repeat viral tests  Antibiotics for concomitant pneumonia with concern for aspiration  RT protocol, supplemental oxygen.    Resume home inhaler Oswaldo Blankenship

## 2025-01-17 NOTE — ED NOTES
Pt medicated per MAR. ED tech at bedside for splint application. Pt reports she had a covid test yesterday

## 2025-01-17 NOTE — ED NOTES
US PIV placed, labs drawn and sent. EKG completed. Pt provided mouth swabs. Notified ERP that pt had COVID swab yesterday.

## 2025-01-17 NOTE — PROGRESS NOTES
"Hospital Medicine Daily Progress Note    Date of Service  1/17/2025    Chief Complaint  Nohelia Dickerson is a 79 y.o. female admitted 1/16/2025 with   Chief Complaint   Patient presents with    GLF     Pt BIBA from home for a GLF last night, (-) HS, (-) LOC, (+) thinners. Pt was seen here last night after fall for \"weakness\" and did not get any imaging done.  Woke up today and has had difficulty ambulating due to left ankle pain and c/o right sided rib pain. A&Ox4.   Given 1g Tylenol PTA.        Hospital Course  No notes on file    Nohelia Dickerson is a 79 y.o. 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  She was originally seen at the emergency department last night after a syncopal episode, with complaints of nausea vomiting diarrhea, chills and fever.  She was given some IV fluids and discharged home.  COVID-19/influenza/RSV PCR negative.    Today she came back with complaints of ongoing left ankle pain. Patient states that she has been having left upper quadrant abdominal pain nausea and vomiting intermittently in the last 6 months.  Today she denies any nausea vomiting or diarrhea and actually feels hungry.  She states that chronic cough has not changed   Patient was evaluated with a CTA of the chest which was negative for PE .There are right middle and bilateral lower lobe infiltrates.  X-ray of the left ankle negative for fracture.  Patient desaturated to 82% and was placed on 3 L nasal cannula.  She noted to have some expiratory wheezes bilaterally.  She was started on Unasyn and doxycycline for pneumonia.    Interval Problem Update  Patient was seen and examined at bedside.  I have personally reviewed and interpreted vitals, labs, and imaging.    1/17.  Afebrile.  Tachypnea " is improved.  On 2-3L NC.  Replete CANDY  Denies fever, chills, chest pains.  Shortness of breath is improved.  Left ankle pain and rib pain are improved.  Nausea and vomiting are improved.  Case was discussed with pulmonary.  Continue Unasyn and doxycycline for right middle lobe and bilateral lower lobe pneumonia.  Wheezing and increased oxygen requirement are secondary to pneumonia.  Patient does not have COPD exacerbation.  Hgb 11.6  P 161  Procal 0.12    I have discussed this patient's plan of care and discharge plan at IDT rounds today with Case Management, Nursing, Nursing leadership, and other members of the IDT team.    Consultants/Specialty  None    Code Status  Full Code    Disposition  The patient is not medically cleared for discharge to home or a post-acute facility.  Anticipate discharge to: skilled nursing facility    I have placed the appropriate orders for post-discharge needs.    Review of Systems  Review of Systems   Constitutional:  Positive for chills and fever.   Gastrointestinal:  Positive for diarrhea, nausea and vomiting.   Musculoskeletal:  Positive for joint pain and myalgias.        Physical Exam  Temp:  [36.1 °C (97 °F)-36.4 °C (97.5 °F)] 36.1 °C (97 °F)  Pulse:  [] 91  Resp:  [18-20] 18  BP: ()/(54-72) 123/69  SpO2:  [82 %-95 %] 93 %    Physical Exam  Vitals and nursing note reviewed.   Constitutional:       Appearance: Normal appearance. She is ill-appearing.   HENT:      Head: Normocephalic and atraumatic.      Right Ear: External ear normal.      Left Ear: External ear normal.      Nose: Nose normal.      Mouth/Throat:      Mouth: Mucous membranes are moist.      Pharynx: Oropharynx is clear. No oropharyngeal exudate or posterior oropharyngeal erythema.   Eyes:      Extraocular Movements: Extraocular movements intact.      Conjunctiva/sclera: Conjunctivae normal.   Cardiovascular:      Rate and Rhythm: Normal rate and regular rhythm.      Pulses: Normal pulses.      Heart  sounds: Normal heart sounds. No murmur heard.  Pulmonary:      Effort: Tachypnea and respiratory distress present.      Breath sounds: No stridor. Wheezing present. No rales.   Abdominal:      General: Abdomen is flat. Bowel sounds are normal. There is no distension.      Palpations: Abdomen is soft. There is no mass.      Tenderness: There is no abdominal tenderness.   Musculoskeletal:      Cervical back: Normal range of motion.   Skin:     General: Skin is warm.      Capillary Refill: Capillary refill takes less than 2 seconds.   Neurological:      General: No focal deficit present.      Mental Status: She is alert and oriented to person, place, and time. Mental status is at baseline.      Cranial Nerves: No cranial nerve deficit.   Psychiatric:         Mood and Affect: Mood normal.         Behavior: Behavior normal.         Fluids    Intake/Output Summary (Last 24 hours) at 1/17/2025 0641  Last data filed at 1/17/2025 0100  Gross per 24 hour   Intake 240 ml   Output --   Net 240 ml        Laboratory  Recent Labs     01/16/25  0205 01/16/25 2110 01/17/25  0221   WBC 9.1 8.6 7.0   RBC 4.67 4.19* 4.04*   HEMOGLOBIN 13.4 11.8* 11.6*   HEMATOCRIT 40.8 36.3* 35.4*   MCV 87.4 86.6 87.6   MCH 28.7 28.2 28.7   MCHC 32.8 32.5 32.8   RDW 43.0 42.9 44.5   PLATELETCT 164 173 161*   MPV 10.5 10.1 10.2     Recent Labs     01/16/25  0252 01/16/25  2110 01/17/25  0221   SODIUM 137 140 140   POTASSIUM 3.7 3.8 3.4*   CHLORIDE 103 106 107   CO2 20 22 20   GLUCOSE 179* 163* 147*   BUN 19 18 17   CREATININE 0.99 0.83 0.73   CALCIUM 8.7 8.8 8.5                   Imaging  CT-CTA CHEST PULMONARY ARTERY W/ RECONS   Final Result         1.  No pulmonary embolus appreciated.   2.  Right middle lobe and bilateral lower lobe infiltrates   3.  Emphysema      DX-CHEST-PORTABLE (1 VIEW)   Final Result      Mild diffuse interstitial prominence which could be seen in setting of edema and/or infection.      DX-ANKLE 3+ VIEWS LEFT   Final Result       Diffuse demineralization without acute fracture or malalignment identified.           Assessment/Plan  * Pneumonia due to infectious organism- (present on admission)  Assessment & Plan  1/17/2025  Presented with cough for 2 days, chills and CTA of the chest showing infiltrates in right middle and bilateral lower lobes.  Will treat empirically for pneumonia with Unasyn and doxycycline  Guaifenesin as needed for cough  Sputum culture, repeat COVID-19/influenza/RSV pending    Chronic obstructive pulmonary disease with acute lower respiratory infection (HCC)  Assessment & Plan  1/17/2025  Not in exacerbation  Presented with cough, expiratory wheezing, worsening of chronic hypoxia  Plan: DuoNeb every 4 hours  Prednisone 40 mg once a day  Repeat viral tests  Antibiotics for concomitant pneumonia with concern for aspiration  RT protocol, supplemental oxygen.    Resume home inhaler Trelegy Ellipta    Anemia  Assessment & Plan  1/17/2025  Hemoglobin dropped from 13.4->11.8 within 18 hours.  Could be secondary to hemodilution since patient received some IV fluids  Will continue trending hemoglobin every 12 hours and hold Eliquis for now.  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  Will continue holding for now due to concern for drop in hemoglobin.    Generalized weakness- (present on admission)  Assessment & Plan  1/17/2025  Fall precautions    Peripheral arterial disease (HCC)- (present on admission)  Assessment & Plan  1/17/2025  ]Resume Lipitor, Eliquis    Acute on chronic respiratory failure with hypoxia (HCC)- (present on admission)  Assessment & Plan  1/17/2025  Patient has been using 1.5 L nasal cannula at home, currently requires 3 L  Continue treatment as above for COPD, pneumonia  Wean down  as able    Primary hypertension- (present on admission)  Assessment & Plan  1/17/2025  Resume amlodipine and losartan with holding parameters  Monitor blood pressure         VTE prophylaxis: Patient is on apixaban for history of CVA, peripheral arterial disease.  Hold anticoagulation for now with downtrending hemoglobin    I have performed a physical exam and reviewed and updated ROS and Plan today (1/17/2025). In review of yesterday's note (1/16/2025), there are no changes except as documented above.    Greater than 50 minutes spent prepping to see patient (e.g. review of tests) obtaining and/or reviewing separately obtained history. Performing a medically appropriate examination and/ evaluation.  Counseling and educating the patient/family/caregiver.  Ordering medications, tests, or procedures.  Referring and communicating with other health care professionals.  Documenting clinical information in EPIC.  Independently interpreting results and communicating results to patient/family/caregiver.  Care coordination.

## 2025-01-17 NOTE — ASSESSMENT & PLAN NOTE
1/18/2025  She has not been taking Eliquis for 12 days.  Will continue holding for now due to concern for drop in hemoglobin.

## 2025-01-17 NOTE — CARE PLAN
The patient is Stable - Low risk of patient condition declining or worsening    Shift Goals  Clinical Goals: IV antibiotics  Patient Goals: rest, comfort  Family Goals: kulwant    Progress made toward(s) clinical / shift goals:  pt is a&o x4. O2 sats maintained on 2L. IV patent and saline locked. No reports of pain at this time. Bed alarm on. All needs currently addressed.    Patient is not progressing towards the following goals:

## 2025-01-17 NOTE — ED TRIAGE NOTES
"Chief Complaint   Patient presents with    GLF     Pt BIBA from home for a GLF last night, (-) HS, (-) LOC, (+) thinners. Pt was seen here last night after fall for \"weakness\" and did not get any imaging done.  Woke up today and has had difficulty ambulating due to left ankle pain and c/o right sided rib pain. A&Ox4.   Given 1g Tylenol PTA.        "

## 2025-01-17 NOTE — ED NOTES
Bedside report received from off going RN/tech: Martinez RN, assumed care of patient.  POC discussed with patient. Call light within reach, all needs addressed at this time.       Fall risk interventions in place: Move the patient closer to the nurse's station, Patient's personal possessions are with in their safe reach, Place socks on patient, Place fall risk sign on patient's door, and Keep floor surfaces clean and dry (all applicable per [facility] Fall risk assessment)   Continuous monitoring: Cardiac Leads, Pulse Ox, or Blood Pressure  IVF/IV medications: Not Applicable   Oxygen: How many liters 3L, Traced the line to wall oxygen, and No oxygen tank in room  Bedside sitter: Not Applicable   Isolation: Not Applicable

## 2025-01-17 NOTE — ASSESSMENT & PLAN NOTE
1/18/2025  Patient has been using 1.5 L nasal cannula at home, currently requires 3 L  Continue treatment as above for COPD, pneumonia  Wean down as able

## 2025-01-17 NOTE — THERAPY
Occupational Therapy   Initial Evaluation     Patient Name: Nohelia Dickerson  Age:  79 y.o., Sex:  female  Medical Record #: [id number]  Today's Date: 1/17/2025     Precautions  Precautions: Fall Risk  Comments: aircast for L ankle sprain, no WB restrictions per chart.    Assessment  Patient is 79 y.o. female came to ED after syncopal episode, but d/c'd home. Returned when unable to walk 2/2 L ankle pain and N/V/D continued. Found to have acute on chronic respiratory failure 2/2 PNA, anemia, hyperglycemia, and L ankle sprain. PMHX of COPD on 1.5L O2, embolic stroke, thyroid CA s/p thyroidectomy, and PAD; reports balance issues and weakness had worsened PTA. Reports lives with dtr, granddtr, and great grandson. Her dtr and grandaughter work during the day, but can assist when home. Currently limited by decreased functional mobility, activity tolerance, strength, balance, adherence to precautions, and pain which are currently affecting pt's ability to complete ADLs/txfs/IADLs at baseline. Will continue to follow.     Plan    Occupational Therapy Initial Treatment Plan   Treatment Interventions: Self Care / Activities of Daily Living, Adaptive Equipment, Neuro Re-Education / Balance, Therapeutic Exercises, Therapeutic Activity, Family / Caregiver Training  Treatment Frequency: 4 Times per Week  Duration: Until Therapy Goals Met    DC Equipment Recommendations: Unable to determine at this time  Discharge Recommendations: Recommend post-acute placement for additional occupational therapy services prior to discharge home      Objective     01/17/25 1419   Prior Living Situation   Prior Services Home-Independent   Housing / Facility 1 Story House   Steps Into Home 2   Bathroom Set up Bathtub / Shower Combination;Shower Chair   Equipment Owned 4-Wheel Walker;Tub / Shower Seat;Single Point Cane   Lives with - Patient's Self Care Capacity Adult Children;Child Less than 18 Years of Age   Comments Reports lives with dtr,  granddtr, and great grandson. Her dtr and curtis work during the day, but can assist when home.   Prior Level of ADL Function   Self Feeding Independent   Grooming / Hygiene Independent   Bathing Independent   Dressing Independent   Toileting Independent   Prior Level of IADL Function   Medication Management Independent   Laundry Dependent   Kitchen Mobility Requires Assist   Finances Dependent   Home Management Dependent   Shopping Dependent   Prior Level Of Mobility Independent With Device in Home   History of Falls   History of Falls Yes   Date of Last Fall 01/16/25  (reason for admit)   Precautions   Precautions Fall Risk   Comments aircast for L ankle sprain, no WB restrictions per chart.   Vitals   O2 (LPM) 1.5   O2 Delivery Device Silicone Nasal Cannula   Vitals Comments baseline 1.5L O2   Pain 0 - 10 Group   Location Ankle   Location Orientation Left   Therapist Pain Assessment Post Activity;Nurse Notified  (not quantified)   Cognition    Cognition / Consciousness WDL   Level of Consciousness Alert   Comments very pleasant and cooperative; receptive to education   Passive ROM Upper Body   Passive ROM Upper Body WDL   Active ROM Upper Body   Active ROM Upper Body  WDL   Strength Upper Body   Upper Body Strength  X   Gross Strength Generalized Weakness, Equal Bilaterally.    Comments grossly 3+/5   Balance Assessment   Sitting Balance (Static) Fair   Sitting Balance (Dynamic) Poor +   Standing Balance (Static) Fair -   Standing Balance (Dynamic) Poor +   Weight Shift Sitting Fair   Weight Shift Standing Poor   Comments w/FWW; pt with several posterior LOBs req Phani and mod v/cs to correct   Bed Mobility    Supine to Sit Moderate Assist   Sit to Supine Contact Guard Assist   Scooting Minimal Assist   Comments HOB elevated   ADL Assessment   Eating Supervision   Grooming Supervision;Seated   Lower Body Dressing Minimal Assist  (posterior LOB)   Toileting Moderate Assist  (purewick, but likely could get to  BSC)   Comments Educated on adaptive techniques for STS, posterior vs anterior LOB safety, pathology of bedrest, advocating for self at post acute facility, and importance of continued EOB/OOB activity.   Functional Mobility   Sit to Stand Minimal Assist  (bed elevated)   Mobility w/FWW: sup>EOB>STS>steps at EOB>BTB   Edema / Skin Assessment   Edema / Skin  Not Assessed   Activity Tolerance   Comments limited by pain, fatigue, weakness, and balance   Patient / Family Goals   Patient / Family Goal #1 to go home   Short Term Goals   Short Term Goal # 1 LB dressing with min A (pants)   Short Term Goal # 2 toilet txf w/SPV (w/RTS)   Short Term Goal # 3 toileting w/SPV   Short Term Goal # 4 standing g/h with min A taking RBs PRN   Education Group   Education Provided Role of Occupational Therapist;Pathology of bedrest   Role of Occupational Therapist Patient Response Patient;Acceptance;Explanation;Verbal Demonstration;Reinforcement Needed   Pathology of Bedrest Patient Response Patient;Acceptance;Explanation;Verbal Demonstration;Reinforcement Needed   Occupational Therapy Initial Treatment Plan    Treatment Interventions Self Care / Activities of Daily Living;Adaptive Equipment;Neuro Re-Education / Balance;Therapeutic Exercises;Therapeutic Activity;Family / Caregiver Training   Treatment Frequency 4 Times per Week   Duration Until Therapy Goals Met   Problem List   Problem List Decreased Active Daily Living Skills;Decreased Homemaking Skills;Decreased Upper Extremity Strength Right;Decreased Upper Extremity Strength Left;Decreased Functional Mobility;Decreased Activity Tolerance;Impaired Posture / Trunk Alignment;Impaired Postural Control / Balance

## 2025-01-17 NOTE — PROGRESS NOTES
Chart reviewed and case discussed with Dr Guy  Agree that presentation is consistent with aspiration pneumonia and COPD with lower respiratory tract infection, NOT COPD exacerbation  Problem list updated and COPD RT notified    __________  Juan Penaloza MD  Pulmonary and Critical Care Medicine  [facility]

## 2025-01-17 NOTE — ASSESSMENT & PLAN NOTE
1/17/2025  Presented with cough, expiratory wheezing, worsening of chronic hypoxia  Plan: DuoNeb every 4 hours  Prednisone 40 mg once a day  Repeat viral tests  Antibiotics for concomitant pneumonia  RT protocol, supplemental oxygen.    Resume home inhaler Oswaldo Blankenship

## 2025-01-17 NOTE — PROGRESS NOTES
4 Eyes Skin Assessment Completed by JOSE Del Toro and JOSE Cardona.    Head WDL  Ears WDL  Nose WDL  Mouth WDL  Neck WDL  Breast/Chest WDL  Shoulder Blades WDL  Spine WDL  (R) Arm/Elbow/Hand Redness and Blanching  (L) Arm/Elbow/Hand Redness and Blanching, bruising  Abdomen WDL  Groin WDL  Scrotum/Coccyx/Buttocks Redness, Blanching, and Excoriation  (R) Leg flaky  (L) Leg flaky  (R) Heel/Foot/Toe wound to lateral ankle, redness & boggy to heels  (L) Heel/Foot/Toe redness, boggy to heels          Devices In Places IV, NC      Interventions In Place Gray Ear Foams, Heel Mepilex, Sacral Mepilex, Pillows, Elbow Mepilex, and Barrier Cream    Possible Skin Injury Yes    Pictures Uploaded Into Epic N/A  Wound Consult Placed Yes - already consulted  RN Wound Prevention Protocol Ordered No

## 2025-01-17 NOTE — ASSESSMENT & PLAN NOTE
1/18/2025  Presented with cough for 2 days, chills and CTA of the chest showing infiltrates in right middle and bilateral lower lobes.  Will treat empirically for pneumonia with Unasyn and doxycycline  Guaifenesin as needed for cough  Sputum culture, repeat COVID-19/influenza/RSV pending

## 2025-01-17 NOTE — THERAPY
"Physical Therapy   Initial Evaluation     Patient Name: Nohelia Dickerson  Age:  79 y.o., Sex:  female  Medical Record #: [id number]  Today's Date: 1/17/2025     Precautions  Precautions: Fall Risk  Comments: aircast L foot, no WB restrictions negative xray    Assessment  Patient is 79 y.o. female with GLF on 1/15, admitted after difficulty ambulating 2/2 L ankle pain and R rib pain (imaging negative). PMHX of oxygen dependent COPD on 1.5 L nasal cannula, embolic stroke, on apixaban, thyroid cancer status post thyroidectomy, hypertension, peripheral arterial disease. Pt required Jorgito for bed mobility, MaxA for STS from bed, Jorgito once standing to take steps to chair with FWW with very slow pace and shuffled steps requiring cues for sequencing, hand placement and safety. Pt independent with mobility at baseline with FWW. Recommend placement at this time as pt below her baseline function and at high risk for falls and re-admission. Pt would benefit from continued acute IP PT services to address said deficits.     Plan    Physical Therapy Initial Treatment Plan   Treatment Plan : Bed Mobility, Equipment, Family / Caregiver Training, Gait Training, Manual Therapy, Neuro Re-Education / Balance, Self Care / Home Evaluation, Stair Training, Therapeutic Activities, Therapeutic Exercise  Treatment Frequency: 4 Times per Week  Duration: Until Therapy Goals Met    DC Equipment Recommendations: Unable to determine at this time  Discharge Recommendations: Recommend post-acute placement for additional physical therapy services prior to discharge home       Subjective    \"I could kiss that doctor when he told me I have pneumonia!\"      Objective     01/17/25 1007   Vitals   O2 (LPM) 1.5   O2 Delivery Device Silicone Nasal Cannula   Vitals Comments baseline O2   Pain 0 - 10 Group   Therapist Pain Assessment Post Activity Pain Same as Prior to Activity;Nurse Notified;0   Prior Living Situation   Housing / Facility 1 [address] House "   Steps Into Home 2   Steps In Home 0   Rail None   Bathroom Set up Bathtub / Shower Combination;Shower Chair   Equipment Owned Front-Wheel Walker;4-Wheel Walker;Single Point Cane   Lives with - Patient's Self Care Capacity Adult Children;Child Less than 18 Years of Age   Comments Lives in mother quarters with daughter, granddaughter and greatgrandaughter. Reports daughter is home at the moment   Prior Level of Functional Mobility   Bed Mobility Independent   Transfer Status Independent   Ambulation Independent   Ambulation Distance   (community)   Assistive Devices Used   (FWW in house, shopping cart in community)   Stairs Independent   Comments Reports has not left the house since she got a 4WW for xmas. Prior was walking around a grocery store. Does not really drive much anymore   History of Falls   History of Falls Yes   Date of Last Fall   (related to admission)   Cognition    Cognition / Consciousness WDL   Level of Consciousness Alert   Comments pleasant and cooperative   Active ROM Lower Body    Active ROM Lower Body  WDL   Strength Lower Body   Comments BLE grossly 3+/5, difficulty with lift off from bed during STS   Sensation Lower Body   Comments denies N/T BLE   Lower Body Muscle Tone   Lower Body Muscle Tone  WDL   Coordination Lower Body    Coordination Lower Body  Not Tested   Balance Assessment   Sitting Balance (Static) Fair +   Sitting Balance (Dynamic) Fair   Standing Balance (Static) Fair -   Standing Balance (Dynamic) Poor +   Weight Shift Sitting Fair   Weight Shift Standing Poor   Comments w/ FWW   Bed Mobility    Supine to Sit Minimal Assist   Scooting Contact Guard Assist   Comments HOB raised, use of rail   Gait Analysis   Gait Level Of Assist Unable to Participate   Comments steps to chair only   Functional Mobility   Sit to Stand Maximal Assist  (from lowest bed height)   Bed, Chair, Wheelchair Transfer Minimal Assist   Transfer Method Stand Step   Mobility supine, EOB, w/FWW to stand and  steps to chair   Comments cues for sequencing transfer, posterior lean on bed with initial stand, cues for hand placement for transfer to chair   6 Clicks Assessment - How much HELP from from another person do you currently need... (If the patient hasn't done an activity recently, how much help from another person do you think he/she would need if he/she tried?)   Turning from your back to your side while in a flat bed without using bedrails? 3   Moving from lying on your back to sitting on the side of a flat bed without using bedrails? 3   Moving to and from a bed to a chair (including a wheelchair)? 3   Standing up from a chair using your arms (e.g., wheelchair, or bedside chair)? 2   Walking in hospital room? 2   Climbing 3-5 steps with a railing? 2   6 clicks Mobility Score 15   Activity Tolerance   Sitting in Chair up post   Sitting Edge of Bed 3 min   Standing 3 min   Short Term Goals    Short Term Goal # 1 Pt will perform supine <> sit with SPV in 6 visits to get in/out of bed   Short Term Goal # 2 Pt will perform stand step transfers with FWW and SPV in 6 visits to get in/out of chair   Short Term Goal # 3 Pt will ambulate 150ft with FWW and SPV in 6 visits to access home environment   Short Term Goal # 4 Pt will ascend/descend 2 steps with unilateral UE support and SBA in 6 visits to safely enter/exit home   Education Group   Education Provided Role of Physical Therapist;Use of Assistive Device   Role of Physical Therapist Patient Response Patient;Acceptance;Explanation;Verbal Demonstration   Use of Assistive Device Patient Response Patient;Acceptance;Explanation;Verbal Demonstration;Action Demonstration;Reinforcement Needed  (cues for hand placement, sequencing, and safety with FWW)   Additional Comments Pt receptive to self management and compensatory strategies with mobility   Physical Therapy Initial Treatment Plan    Treatment Plan  Bed Mobility;Equipment;Family / Caregiver Training;Gait  Training;Manual Therapy;Neuro Re-Education / Balance;Self Care / Home Evaluation;Stair Training;Therapeutic Activities;Therapeutic Exercise   Treatment Frequency 4 Times per Week   Duration Until Therapy Goals Met   Problem List    Problems Impaired Bed Mobility;Impaired Transfers;Impaired Ambulation;Functional Strength Deficit;Impaired Balance;Decreased Activity Tolerance;Safety Awareness Deficits / Cognition   Anticipated Discharge Equipment and Recommendations   DC Equipment Recommendations Unable to determine at this time   Discharge Recommendations Recommend post-acute placement for additional physical therapy services prior to discharge home   Interdisciplinary Plan of Care Collaboration   IDT Collaboration with  Nursing;Occupational Therapist  (senior care plus TCN)   Patient Position at End of Therapy Seated;Chair Alarm On;Call Light within Reach;Tray Table within Reach;Phone within Reach   Collaboration Comments RN updated   Session Information   Date / Session Number  1/17- 1 (1/4, 1/23)
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
## 2025-01-17 NOTE — PROGRESS NOTES
4 Eyes Skin Assessment Completed by Serge RN and JOSE Jay.    Head WDL  Ears WDL  Nose WDL  Mouth WDL  Neck WDL  Breast/Chest WDL  Shoulder Blades WDL  Spine WDL  (R) Arm/Elbow/Hand Redness and Blanching  (L) Arm/Elbow/Hand Redness, Blanching, and Bruising  Abdomen Scar  Groin WDL  Scrotum/Coccyx/Buttocks Redness and Blanching  (R) Leg Discoloration  (L) Leg WDL  (R) Heel/Foot/Toe Redness and Blanching  (L) Heel/Foot/Toe Redness, Blanching, and Scab,  callous          Devices In Places Blood Pressure Cuff and Nasal Cannula      Interventions In Place Heel Mepilex, Sacral Mepilex, Pillows, Elbow Mepilex, Q2 Turns, Barrier Cream, and Heels Loaded W/Pillows    Possible Skin Injury Yes    Pictures Uploaded Into Epic Yes  Wound Consult Placed Yes  RN Wound Prevention Protocol Ordered Yes

## 2025-01-17 NOTE — ED NOTES
Pt medicated by assist RN. Pt to [address]/ 01 via [facility]. Pt with all belongings in possession. Pt is medical and on 3L NC. Report has been given to Serge GARCIA.

## 2025-01-17 NOTE — PROGRESS NOTES
Received report from ED. Pt arrived to floor via transport. Pt awaiting room. All needs met. Bed is in lowest locked position.

## 2025-01-17 NOTE — RESPIRATORY CARE
COPD EDUCATION by COPD CLINICAL EDUCATOR  1/17/2025  at  3:30 PM by Jud Salcedo RRT     Patient interviewed by education team.  Patient declined or is unable to participate in the full program.  Therefore, a short intervention has been conducted.  A comprehensive packet including information about COPD, types of treatments to manage their disease and safe home Oxygen usage was provided and reviewed with patient at the bedside. Patient given a spacer with instruction.       COPD Screen  COPD Risk Screening  Do you have a history of COPD?: Yes    COPD Assessment  COPD Clinical Specialists ONLY  COPD Education Initiated: Yes--Short Intervention (Mindy , lives with her daughter and granddaughter. Given COPD booklet and spacer. Declined referral to Pulmonary, will follow up with PCP.)  Is this a COPD exacerbation patient?: No (Yes per H&P, order set used, not in exacerbation per IP Pulmonary)  DME Company: [facility]  DME Equipment Type: O2 @ 1.5 L  Physician Follow Up Appointment:  (apt reguested)  Physician Name: Monet Snow M.D.  Pulmonologist Name: Declined referral assistance  Referrals Initiated:  (none indicated at this time, quit smoking in 2019)  $ Demo/Eval of SVN's, MDI's and Aerosols: Yes (spacer)  (OP) Pulmonary Function Testing: Yes (7/12/2019: FEV1 51%, ratio 51%)  Interdisciplinary Rounds: Attendance at Rounds (30 Min)    PFT Results    7/12/2019: FEV1 51%, ratio 51%    Meds to Beds  [facility] provides bedside medication delivery for all eligible patients at discharge and you have been automatically enrolled in the Meds to Beds Program. Would you like to opt out of this program for any reason?: Yes - Opt Out  Reason the patient would like to opt out of Bedside Medication Delivery at Discharge?: Patient prefers another pharmacy     MY COPD ACTION PLAN     It is recommended that patients and physicians /healthcare providers complete this action plan together. This plan should be discussed at  "each physician visit and updated as needed.    The green, yellow and red zones show groups of symptoms of COPD. This list of symptoms is not comprehensive, and you may experience other symptoms. In the \"Actions\" column, your healthcare provider has recommended actions for you to take based on your symptoms.    Patient Name: Nohelia Dickerson   YOB: 1945   Last Updated on: 1/17/2025  3:29 PM   Green Zone:  I am doing well today Actions     Usual activitiy and exercise level   Take daily medications     Usual amounts of cough and phlegm/mucus   Use oxygen as prescribed     Sleep well at night   Continue regular exercise/diet plan     Appetite is good   At all times avoid cigarette smoke, inhaled irritants     Daily Medications (these medications are taken every day):   Fluticasone and Umeclidinium and Vilanterol (Trelogy) 1 Puff Once daily     Additional Information:  Rinse mouth after taking          Yellow Zone:  I am having a bad day or a COPD flare Actions     More breathless than usual   Continue daily medications     I have less energy for my daily activities   Use quick relief inhaler as ordered     Increased or thicker phlegm/mucus   Use oxygen as prescribed     Using quick relief inhaler/nebulizer more often   Get plenty of rest     Swelling of ankles more than usual   Use pursed lip breathing     More coughing than usual   At all times avoid cigarette smoke, inhaled irritants     I feel like I have a \"chest cold\"     Poor sleep and my symptoms woke me up     My appetite is not good     My medicine is not helping      Call provider immediately if symptoms don’t improve     Continue daily medications, add rescue medications:   Albuterol 2 Puffs Every 4 hours       Medications to be used during a flare up, (as Discussed with Provider):           Additional Information:  Use the spacer with your rescue inhaler    Red Zone:  I need urgent medical care Actions     Severe shortness of breath " even at rest   Call 911 or seek medical care immediately     Not able to do any activity because of breathing      Fever or shaking chills      Feeling confused or very drowsy       Chest pains      Coughing up blood

## 2025-01-17 NOTE — ASSESSMENT & PLAN NOTE
1/18/2025  Hemoglobin dropped from 13.4->11.8 within 18 hours.  Could be secondary to hemodilution since patient received some IV fluids  Will continue trending hemoglobin every 12 hours and hold Eliquis for now.  Denies any melena or blood in vomit

## 2025-01-17 NOTE — DISCHARGE PLANNING
"HTH/SCP TCN chart review completed. The most current review of medical record, knowledge of pt's PLOF and social support, LACE+ score of 69, 6 clicks scores of 21 ADL and 15 mobility were considered.  Collaborated with SHALINI Jolly prior to meeting with the pt.     TCN met with patient at bedside. Introduced TCN program. Patient endorsed she resides with her daughter, granddaughter and great-grandson in a one story home. Patient stated use of a walker in the home and a four-point cane in the community. Patient stated baseline oxygen needs at 1.5 liters supplied through [facility]. Patient advised of changes to mobility previous to acute hospitalization.     Provided education regarding post acute levels of care. Education provided regarding case management policy for blanket SNF referrals. Discussed HTH/SCP plan benefits. Pt verbalized understanding. Patient advised she would be open to consideration of home healthcare and potentially post acute placement to a  place with \"good rehab\" if recommended by therapy noting previous good experience at [facility], and no to return to [facility].     Choice proactively obtained for HH ([facility] , [facility]) and DME ([facility]) and given to CM. Patient stated understanding and agreement to  protocol of sending SNF referrals should post acute placement be recommended by therapy. Following TCN visit with patient at bedside, voalte received from PT providing update of recommendations for post-acute placement.     In collaboration with SHALINI, current discharge considerations are noted to be post- acute placement/ SNF. TCN will continue to follow and collaborate with discharge planning team as additional post acute needs arise. Thank you.     Completed today:  Voalte received from PT notifying of current recommendations for post acute placement  Choice obtained: HH ([facility] , [facility]) and DME ([facility]). Patient aware of  protocol of sending SNF " referrals and will choose from accepting.  Please note patient stated preference for [facility], during initial TCN visit  Pt aware of [facility]'s blanket referral policy  SCP with [facility] PCP. Anticipate post acute placement at discharge    UDPATE 12:35PM- TCN chart review completed. Collaborated with SHALINI Jolly. Discussed current PT recommendations. In collaboration with SHALINI, TCN met with patient at bedside Discussed current PT recommendations with patient in agreement to post acute placement considerations at this time. Choice obtained for SNF level of care noted to [facility]1 [facility], [facility]2 [facility]. Choice form provided to CM. TCN will continue to follow.

## 2025-01-17 NOTE — CARE PLAN
The patient is Stable - Low risk of patient condition declining or worsening    Shift Goals  Clinical Goals: Pt O2 saturation will remain > 91%  Patient Goals: Rest, feel better  Family Goals: DORY    Progress made toward(s) clinical / shift goals:  Pt O2 remained > 90% with 3L NC      Problem: Pain - Standard  Goal: Alleviation of pain or a reduction in pain to the patient’s comfort goal  Outcome: Progressing     Problem: Knowledge Deficit - Standard  Goal: Patient and family/care givers will demonstrate understanding of plan of care, disease process/condition, diagnostic tests and medications  Outcome: Progressing     Problem: Knowledge Deficit - COPD  Goal: Patient/significant other demonstrates understanding of disease process, utilization of the Action Plan, medications and discharge instruction  Outcome: Progressing     Problem: Risk for Infection - COPD  Goal: Patient will remain free from signs and symptoms of infection  Outcome: Progressing       Patient is not progressing towards the following goals:

## 2025-01-18 LAB
ALBUMIN SERPL BCP-MCNC: 3.1 G/DL (ref 3.2–4.9)
BUN SERPL-MCNC: 24 MG/DL (ref 8–22)
CALCIUM ALBUM COR SERPL-MCNC: 9.3 MG/DL (ref 8.5–10.5)
CALCIUM SERPL-MCNC: 8.6 MG/DL (ref 8.5–10.5)
CHLORIDE SERPL-SCNC: 110 MMOL/L (ref 96–112)
CO2 SERPL-SCNC: 21 MMOL/L (ref 20–33)
CREAT SERPL-MCNC: 0.78 MG/DL (ref 0.5–1.4)
ERYTHROCYTE [DISTWIDTH] IN BLOOD BY AUTOMATED COUNT: 44 FL (ref 35.9–50)
GFR SERPLBLD CREATININE-BSD FMLA CKD-EPI: 77 ML/MIN/1.73 M 2
GLUCOSE SERPL-MCNC: 110 MG/DL (ref 65–99)
HCT VFR BLD AUTO: 33.6 % (ref 37–47)
HGB BLD-MCNC: 11.1 G/DL (ref 12–16)
MAGNESIUM SERPL-MCNC: 1.9 MG/DL (ref 1.5–2.5)
MCH RBC QN AUTO: 28.9 PG (ref 27–33)
MCHC RBC AUTO-ENTMCNC: 33 G/DL (ref 32.2–35.5)
MCV RBC AUTO: 87.5 FL (ref 81.4–97.8)
PHOSPHATE SERPL-MCNC: 3.2 MG/DL (ref 2.5–4.5)
PLATELET # BLD AUTO: 183 K/UL (ref 164–446)
PMV BLD AUTO: 10.9 FL (ref 9–12.9)
POTASSIUM SERPL-SCNC: 4.1 MMOL/L (ref 3.6–5.5)
RBC # BLD AUTO: 3.84 M/UL (ref 4.2–5.4)
SODIUM SERPL-SCNC: 141 MMOL/L (ref 135–145)
WBC # BLD AUTO: 7.8 K/UL (ref 4.8–10.8)

## 2025-01-18 PROCEDURE — 700102 HCHG RX REV CODE 250 W/ 637 OVERRIDE(OP): Performed by: INTERNAL MEDICINE

## 2025-01-18 PROCEDURE — 97602 WOUND(S) CARE NON-SELECTIVE: CPT

## 2025-01-18 PROCEDURE — 99233 SBSQ HOSP IP/OBS HIGH 50: CPT | Performed by: INTERNAL MEDICINE

## 2025-01-18 PROCEDURE — 36415 COLL VENOUS BLD VENIPUNCTURE: CPT

## 2025-01-18 PROCEDURE — 700101 HCHG RX REV CODE 250: Performed by: STUDENT IN AN ORGANIZED HEALTH CARE EDUCATION/TRAINING PROGRAM

## 2025-01-18 PROCEDURE — A9270 NON-COVERED ITEM OR SERVICE: HCPCS | Performed by: INTERNAL MEDICINE

## 2025-01-18 PROCEDURE — 80069 RENAL FUNCTION PANEL: CPT

## 2025-01-18 PROCEDURE — 700111 HCHG RX REV CODE 636 W/ 250 OVERRIDE (IP): Performed by: INTERNAL MEDICINE

## 2025-01-18 PROCEDURE — 700105 HCHG RX REV CODE 258: Performed by: INTERNAL MEDICINE

## 2025-01-18 PROCEDURE — 85027 COMPLETE CBC AUTOMATED: CPT

## 2025-01-18 PROCEDURE — 770006 HCHG ROOM/CARE - MED/SURG/GYN SEMI*

## 2025-01-18 PROCEDURE — 83735 ASSAY OF MAGNESIUM: CPT

## 2025-01-18 RX ORDER — LANOLIN ALCOHOL/MO/W.PET/CERES
400 CREAM (GRAM) TOPICAL 2 TIMES DAILY
Status: COMPLETED | OUTPATIENT
Start: 2025-01-18 | End: 2025-01-18

## 2025-01-18 RX ADMIN — DOXYCYCLINE 100 MG: 100 TABLET, FILM COATED ORAL at 16:46

## 2025-01-18 RX ADMIN — DOXYCYCLINE 100 MG: 100 TABLET, FILM COATED ORAL at 06:05

## 2025-01-18 RX ADMIN — LIDOCAINE 1 PATCH: 4 PATCH TOPICAL at 16:45

## 2025-01-18 RX ADMIN — Medication 400 MG: at 06:05

## 2025-01-18 RX ADMIN — ACETAMINOPHEN 1000 MG: 500 TABLET ORAL at 16:46

## 2025-01-18 RX ADMIN — ATORVASTATIN CALCIUM 80 MG: 40 TABLET, FILM COATED ORAL at 06:05

## 2025-01-18 RX ADMIN — AMOXICILLIN AND CLAVULANATE POTASSIUM 1 TABLET: 875; 125 TABLET, FILM COATED ORAL at 13:05

## 2025-01-18 RX ADMIN — AMPICILLIN AND SULBACTAM 3 G: 1; 2 INJECTION, POWDER, FOR SOLUTION INTRAMUSCULAR; INTRAVENOUS at 12:04

## 2025-01-18 RX ADMIN — LOSARTAN POTASSIUM 100 MG: 50 TABLET, FILM COATED ORAL at 06:05

## 2025-01-18 RX ADMIN — AMLODIPINE BESYLATE 5 MG: 5 TABLET ORAL at 06:05

## 2025-01-18 RX ADMIN — AMPICILLIN AND SULBACTAM 3 G: 1; 2 INJECTION, POWDER, FOR SOLUTION INTRAMUSCULAR; INTRAVENOUS at 00:25

## 2025-01-18 RX ADMIN — AMPICILLIN AND SULBACTAM 3 G: 1; 2 INJECTION, POWDER, FOR SOLUTION INTRAMUSCULAR; INTRAVENOUS at 06:12

## 2025-01-18 RX ADMIN — PREDNISONE 40 MG: 20 TABLET ORAL at 06:05

## 2025-01-18 RX ADMIN — ACETAMINOPHEN 1000 MG: 500 TABLET ORAL at 00:28

## 2025-01-18 RX ADMIN — Medication 400 MG: at 16:46

## 2025-01-18 RX ADMIN — SENNOSIDES AND DOCUSATE SODIUM 2 TABLET: 50; 8.6 TABLET ORAL at 16:46

## 2025-01-18 RX ADMIN — ACETAMINOPHEN 1000 MG: 500 TABLET ORAL at 08:46

## 2025-01-18 ASSESSMENT — ENCOUNTER SYMPTOMS
DIARRHEA: 1
NAUSEA: 1
MYALGIAS: 1
VOMITING: 1
FEVER: 1
CHILLS: 1

## 2025-01-18 ASSESSMENT — PAIN DESCRIPTION - PAIN TYPE
TYPE: ACUTE PAIN

## 2025-01-18 ASSESSMENT — FIBROSIS 4 INDEX: FIB4 SCORE: 2.89

## 2025-01-18 NOTE — PROGRESS NOTES
4 Eyes Skin Assessment Completed by JOSE Ashton and JOSE Alexis.    Head WDL  Ears WDL  Nose WDL  Mouth WDL  Neck WDL  Breast/Chest WDL  Shoulder Blades WDL  Spine WDL  (R) Arm/Elbow/Hand Redness and Blanching  (L) Arm/Elbow/Hand Redness, Blanching, and Bruising  Abdomen WDL  Groin WDL  Scrotum/Coccyx/Buttocks Redness and Blanching  (R) Leg - dry/flaky  (L) Leg - dry/flaky, swelling  (R) Heel/Foot/Toe Redness, Blanching, and Boggy  (L) Heel/Foot/Toe Redness, Blanching, Boggy, and Swelling      Devices In Places Nasal Cannula, LLE aircast      Interventions In Place NC W/Ear Foams, Heel Mepilex, and Elbow Mepilex    Possible Skin Injury No    Pictures Uploaded Into Epic N/A  Wound Consult Placed Yes - previously ordered  RN Wound Prevention Protocol Ordered No

## 2025-01-18 NOTE — DISCHARGE PLANNING
DPA spoke with [facility] regarding admissions no availability today/They can accept at 1300 tomorrow Sunday 01/19/No W/C van available sunday's/Advised CM

## 2025-01-18 NOTE — DISCHARGE PLANNING
DC Transport Scheduled    Transport Company Scheduled:  [facility]      Scheduled Date: 1/19/2025  Scheduled Time: 1300    Transport Type: Wheelchair  Destination: [facility]  [address], [place] [address]    Notified care team of scheduled transport via Voalte.     If there are any changes needed to the DC transportation scheduled, please contact [facility] at ext. [phone] between the hours of [phone]. If outside those hours, contact the ED Case Manager at ext. [phone].

## 2025-01-18 NOTE — DISCHARGE PLANNING
Case Management Discharge Planning    Admission Date: 1/16/2025  GMLOS: 4.6  ALOS: 2    6-Clicks ADL Score: 17  6-Clicks Mobility Score: 15  PT and/or OT Eval ordered: Yes  Post-acute Referrals Ordered: Yes  Post-acute Choice Obtained: Yes  Has referral(s) been sent to post-acute provider:  Yes      Anticipated Discharge Dispo:      DME Needed: No    Action(s) Taken: OTHER    Discussed patient's case with MD during Morning Huddle. Per MD, patient is medically clear for SNF.    SW requested DPA clarifies if [facility] is able to accept patient today.     Escalations Completed: None    Medically Clear: Yes    Next Steps: SNF.     Barriers to Discharge: None    Is the patient up for discharge tomorrow:

## 2025-01-18 NOTE — PROGRESS NOTES
"Hospital Medicine Daily Progress Note    Date of Service  1/18/2025    Chief Complaint  Nohelia Dickerson is a 79 y.o. female admitted 1/16/2025 with   Chief Complaint   Patient presents with    GLF     Pt BIBA from home for a GLF last night, (-) HS, (-) LOC, (+) thinners. Pt was seen here last night after fall for \"weakness\" and did not get any imaging done.  Woke up today and has had difficulty ambulating due to left ankle pain and c/o right sided rib pain. A&Ox4.   Given 1g Tylenol PTA.        Hospital Course  No notes on file    Nohelia Dickerson is a 79 y.o. 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  She was originally seen at the emergency department last night after a syncopal episode, with complaints of nausea vomiting diarrhea, chills and fever.  She was given some IV fluids and discharged home.  COVID-19/influenza/RSV PCR negative.    Today she came back with complaints of ongoing left ankle pain. Patient states that she has been having left upper quadrant abdominal pain nausea and vomiting intermittently in the last 6 months.  Today she denies any nausea vomiting or diarrhea and actually feels hungry.  She states that chronic cough has not changed   Patient was evaluated with a CTA of the chest which was negative for PE .There are right middle and bilateral lower lobe infiltrates.  X-ray of the left ankle negative for fracture.  Patient desaturated to 82% and was placed on 3 L nasal cannula.  She noted to have some expiratory wheezes bilaterally.  She was started on Unasyn and doxycycline for pneumonia.    Interval Problem Update  Patient was seen and examined at bedside.  I have personally reviewed and interpreted vitals, labs, and imaging.    1/17.  Afebrile.  Tachypnea " is improved.  On 2-3L NC.  Replete K.  Denies fever, chills, chest pains.  Shortness of breath is improved.  Left ankle pain and rib pain are improved.  Nausea and vomiting are improved.  Case was discussed with pulmonary.  Continue Unasyn and doxycycline for right middle lobe and bilateral lower lobe pneumonia.  Wheezing and increased oxygen requirement are secondary to pneumonia.  Patient does not have COPD exacerbation.  Procal 0.12  1/18.  Afebrile.  Intermittent tachycardia.  On 2-3 L nasal cannula.  Replete mag.  Denies fever, chills, chest pains.  Still short of breath.  Still with some rib pain.  Ankle feels a lot better but she has not been walking.  Continue antibiotics for pneumonia.  Will need placement.  Hgb 11.6 > 11.1  P 161 > 183    I have discussed this patient's plan of care and discharge plan at IDT rounds today with Case Management, Nursing, Nursing leadership, and other members of the IDT team.    Consultants/Specialty  None    Code Status  Full Code    Disposition  The patient is not medically cleared for discharge to home or a post-acute facility.  Anticipate discharge to: skilled nursing facility    I have placed the appropriate orders for post-discharge needs.    Review of Systems  Review of Systems   Constitutional:  Positive for chills and fever.   Gastrointestinal:  Positive for diarrhea, nausea and vomiting.   Musculoskeletal:  Positive for joint pain and myalgias.        Physical Exam  Temp:  [36 °C (96.8 °F)-36.7 °C (98 °F)] 36.4 °C (97.6 °F)  Pulse:  [] 84  Resp:  [18-19] 18  BP: (112-136)/(61-85) 136/85  SpO2:  [90 %-91 %] 91 %    Physical Exam  Vitals and nursing note reviewed.   Constitutional:       Appearance: Normal appearance. She is ill-appearing.   HENT:      Head: Normocephalic and atraumatic.      Right Ear: External ear normal.      Left Ear: External ear normal.      Nose: Nose normal.      Mouth/Throat:      Mouth: Mucous membranes are moist.      Pharynx:  Oropharynx is clear. No oropharyngeal exudate or posterior oropharyngeal erythema.   Eyes:      Extraocular Movements: Extraocular movements intact.      Conjunctiva/sclera: Conjunctivae normal.   Cardiovascular:      Rate and Rhythm: Normal rate and regular rhythm.      Pulses: Normal pulses.      Heart sounds: Normal heart sounds. No murmur heard.  Pulmonary:      Effort: Tachypnea and respiratory distress present.      Breath sounds: No stridor. Wheezing present. No rales.   Abdominal:      General: Abdomen is flat. Bowel sounds are normal. There is no distension.      Palpations: Abdomen is soft. There is no mass.      Tenderness: There is no abdominal tenderness.   Musculoskeletal:      Cervical back: Normal range of motion.   Skin:     General: Skin is warm.      Capillary Refill: Capillary refill takes less than 2 seconds.   Neurological:      General: No focal deficit present.      Mental Status: She is alert and oriented to person, place, and time. Mental status is at baseline.      Cranial Nerves: No cranial nerve deficit.   Psychiatric:         Mood and Affect: Mood normal.         Behavior: Behavior normal.         Fluids    Intake/Output Summary (Last 24 hours) at 1/18/2025 0551  Last data filed at 1/18/2025 0311  Gross per 24 hour   Intake 800 ml   Output 300 ml   Net 500 ml        Laboratory  Recent Labs     01/16/25 2110 01/17/25 0221 01/18/25  0111   WBC 8.6 7.0 7.8   RBC 4.19* 4.04* 3.84*   HEMOGLOBIN 11.8* 11.6* 11.1*   HEMATOCRIT 36.3* 35.4* 33.6*   MCV 86.6 87.6 87.5   MCH 28.2 28.7 28.9   MCHC 32.5 32.8 33.0   RDW 42.9 44.5 44.0   PLATELETCT 173 161* 183   MPV 10.1 10.2 10.9     Recent Labs     01/16/25 2110 01/17/25  0221 01/18/25  0111   SODIUM 140 140 141   POTASSIUM 3.8 3.4* 4.1   CHLORIDE 106 107 110   CO2 22 20 21   GLUCOSE 163* 147* 110*   BUN 18 17 24*   CREATININE 0.83 0.73 0.78   CALCIUM 8.8 8.5 8.6                   Imaging  CT-CTA CHEST PULMONARY ARTERY W/ RECONS   Final Result          1.  No pulmonary embolus appreciated.   2.  Right middle lobe and bilateral lower lobe infiltrates   3.  Emphysema      DX-CHEST-PORTABLE (1 VIEW)   Final Result      Mild diffuse interstitial prominence which could be seen in setting of edema and/or infection.      DX-ANKLE 3+ VIEWS LEFT   Final Result      Diffuse demineralization without acute fracture or malalignment identified.           Assessment/Plan  * Pneumonia due to infectious organism- (present on admission)  Assessment & Plan  1/18/2025  Presented with cough for 2 days, chills and CTA of the chest showing infiltrates in right middle and bilateral lower lobes.  Will treat empirically for pneumonia with Unasyn and doxycycline  Guaifenesin as needed for cough  Sputum culture, repeat COVID-19/influenza/RSV pending    Chronic obstructive pulmonary disease with acute lower respiratory infection (HCC)  Assessment & Plan  1/18/2025  Not in exacerbation  Presented with cough, expiratory wheezing, worsening of chronic hypoxia  Plan: DuoNeb every 4 hours  Prednisone 40 mg once a day  Repeat viral tests  Antibiotics for concomitant pneumonia with concern for aspiration  RT protocol, supplemental oxygen.    Resume home inhaler Trelegy Ellipta    Anemia  Assessment & Plan  1/18/2025  Hemoglobin dropped from 13.4->11.8 within 18 hours.  Could be secondary to hemodilution since patient received some IV fluids  Will continue trending hemoglobin every 12 hours and hold Eliquis for now.  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  Will continue holding for now due to concern for drop in hemoglobin.    Generalized weakness- (present on admission)  Assessment & Plan  1/18/2025  Fall precautions    Peripheral arterial disease (HCC)- (present on  admission)  Assessment & Plan  1/18/2025  ]Resume Lipitor, Eliquis    Acute on chronic respiratory failure with hypoxia (HCC)- (present on admission)  Assessment & Plan  1/18/2025  Patient has been using 1.5 L nasal cannula at home, currently requires 3 L  Continue treatment as above for COPD, pneumonia  Wean down as able    Primary hypertension- (present on admission)  Assessment & Plan  1/18/2025  Resume amlodipine and losartan with holding parameters  Monitor blood pressure         VTE prophylaxis: Patient is on apixaban for history of CVA, peripheral arterial disease.  Hold anticoagulation for now with downtrending hemoglobin    I have performed a physical exam and reviewed and updated ROS and Plan today (1/18/2025). In review of yesterday's note (1/17/2025), there are no changes except as documented above.    Greater than 51 minutes spent prepping to see patient (e.g. review of tests) obtaining and/or reviewing separately obtained history. Performing a medically appropriate examination and/ evaluation.  Counseling and educating the patient/family/caregiver.  Ordering medications, tests, or procedures.  Referring and communicating with other health care professionals.  Documenting clinical information in EPIC.  Independently interpreting results and communicating results to patient/family/caregiver.  Care coordination.

## 2025-01-18 NOTE — CARE PLAN
The patient is Stable - Low risk of patient condition declining or worsening    Shift Goals  Clinical Goals: Free from injury  Patient Goals: rest  Family Goals: kulwant    Progress made toward(s) clinical / shift goals:  Patient is A&Ox4. IV Abt ongoing. No SOB noted. Scheduled tylenol for pain effective. Slept most of the night. Due meds given. Call light in reach.    Problem: Pain - Standard  Goal: Alleviation of pain or a reduction in pain to the patient’s comfort goal  Outcome: Progressing     Problem: Ineffective Airway Clearance  Goal: Patient will maintain patent airway with clear/clearing breath sounds  Outcome: Progressing        Patient is not progressing towards the following goals:

## 2025-01-18 NOTE — CARE PLAN
The patient is Stable - Low risk of patient condition declining or worsening      Problem: Pain - Standard  Goal: Alleviation of pain or a reduction in pain to the patient’s comfort goal  Outcome: Progressing     Problem: Knowledge Deficit - Standard  Goal: Patient and family/care givers will demonstrate understanding of plan of care, disease process/condition, diagnostic tests and medications  Outcome: Progressing     Problem: Skin Integrity  Goal: Skin integrity is maintained or improved  Outcome: Progressing     Problem: Fall Risk  Goal: Patient will remain free from falls  Outcome: Progressing       Shift Goals  Clinical Goals: pain management, safety,  Patient Goals: rest comfortably  Family Goals: na    Patient is not progressing towards the following goals:

## 2025-01-18 NOTE — DISCHARGE PLANNING
Case Management Discharge Planning    Admission Date: 1/16/2025  GMLOS: 4.6  ALOS: 2    6-Clicks ADL Score: 17  6-Clicks Mobility Score: 15  PT and/or OT Eval ordered: Yes  Post-acute Referrals Ordered: Yes  Post-acute Choice Obtained: Yes  Has referral(s) been sent to post-acute provider:  Yes      Anticipated Discharge Dispo:      DME Needed: No    Action(s) Taken: OTHER    Per MD, patient is medically clear for SNF.    Per DPA, [facility] does not anticipate bed available until Monday.     Per DPA, [facility] is able to accept, bed available tomorrow 1/19/25 at 1300.    SW met with patient at bedside. SW explained [facility] is unable to accept due to lack of bed availability.    Patient authorized transfer to [facility]. Patient signed [facility] Form.    Per RN, patient is appropriate for transfer via Wheelchair Van.    SW entered Transfer Request Order in EPIC.     PASRR File # [id number]    14:15:pm - ASF faxed to Marj Mott.    Per Zee Ride Line Coordinator, GMT to [facility] is scheduled for Sunday 1/19/25 at 1300, MD aware    Escalations Completed: None    Medically Clear: Yes    Next Steps: SNF    Next Steps: Sunday [facility] Staff to complete Transfer Packet.    Barriers to Discharge: None    Is the patient up for discharge tomorrow: Yes    Is transport arranged for discharge disposition: Yes

## 2025-01-18 NOTE — PROGRESS NOTES
4 Eyes Skin Assessment Completed by JOSE Bob and JOSE Alexis.    Head WDL  Ears WDL  Nose WDL  Mouth WDL  Neck WDL  Breast/Chest WDL  Shoulder Blades WDL  Spine WDL  (R) Arm/Elbow/Hand Redness and Blanching  (L) Arm/Elbow/Hand Redness, Blanching, and Bruising  Abdomen WDL  Groin WDL  Scrotum/Coccyx/Buttocks Redness, Blanching, and Excoriation  (R) Leg WDL  (L) Leg WDL  (R) Heel/Foot/Toe Redness, Blanching, and Boggy  (L) Heel/Foot/Toe Redness, Blanching, and Boggy          Devices In Places {Devices In Place:99316}      Interventions In Place {Interventions In Place:44473}    Possible Skin Injury {Possible Skin Injury:67999}    Pictures Uploaded Into Epic {Pictures Uploaded Into Epic:84776}  Wound Consult Placed {Wound Consult Placed:47020}  RN Wound Prevention Protocol Ordered {YES/NO:20266}

## 2025-01-19 VITALS
DIASTOLIC BLOOD PRESSURE: 64 MMHG | HEART RATE: 101 BPM | BODY MASS INDEX: 26.73 KG/M2 | SYSTOLIC BLOOD PRESSURE: 111 MMHG | WEIGHT: 197.31 LBS | RESPIRATION RATE: 19 BRPM | OXYGEN SATURATION: 91 % | HEIGHT: 72 IN | TEMPERATURE: 96.5 F

## 2025-01-19 LAB
ALBUMIN SERPL BCP-MCNC: 3.1 G/DL (ref 3.2–4.9)
BUN SERPL-MCNC: 23 MG/DL (ref 8–22)
CALCIUM ALBUM COR SERPL-MCNC: 9.5 MG/DL (ref 8.5–10.5)
CALCIUM SERPL-MCNC: 8.8 MG/DL (ref 8.5–10.5)
CHLORIDE SERPL-SCNC: 107 MMOL/L (ref 96–112)
CO2 SERPL-SCNC: 23 MMOL/L (ref 20–33)
CREAT SERPL-MCNC: 0.67 MG/DL (ref 0.5–1.4)
ERYTHROCYTE [DISTWIDTH] IN BLOOD BY AUTOMATED COUNT: 43.1 FL (ref 35.9–50)
FERRITIN SERPL-MCNC: 160 NG/ML (ref 10–291)
GFR SERPLBLD CREATININE-BSD FMLA CKD-EPI: 89 ML/MIN/1.73 M 2
GLUCOSE SERPL-MCNC: 91 MG/DL (ref 65–99)
HCT VFR BLD AUTO: 34.4 % (ref 37–47)
HGB BLD-MCNC: 11.5 G/DL (ref 12–16)
HGB RETIC QN AUTO: 26.7 PG/CELL (ref 29–35)
IMM RETICS NFR: 11.5 % (ref 2.6–16.1)
IRON SATN MFR SERPL: 9 % (ref 15–55)
IRON SERPL-MCNC: 22 UG/DL (ref 40–170)
MAGNESIUM SERPL-MCNC: 1.8 MG/DL (ref 1.5–2.5)
MCH RBC QN AUTO: 28.4 PG (ref 27–33)
MCHC RBC AUTO-ENTMCNC: 33.4 G/DL (ref 32.2–35.5)
MCV RBC AUTO: 84.9 FL (ref 81.4–97.8)
PHOSPHATE SERPL-MCNC: 3.3 MG/DL (ref 2.5–4.5)
PLATELET # BLD AUTO: 219 K/UL (ref 164–446)
PMV BLD AUTO: 10.4 FL (ref 9–12.9)
POTASSIUM SERPL-SCNC: 4 MMOL/L (ref 3.6–5.5)
RBC # BLD AUTO: 4.05 M/UL (ref 4.2–5.4)
RETICS # AUTO: 0.04 M/UL (ref 0.04–0.12)
RETICS/RBC NFR: 1 % (ref 0.8–2.6)
SODIUM SERPL-SCNC: 141 MMOL/L (ref 135–145)
TIBC SERPL-MCNC: 236 UG/DL (ref 250–450)
TRANSFERRIN SERPL-MCNC: 182 MG/DL (ref 200–370)
UIBC SERPL-MCNC: 214 UG/DL (ref 110–370)
VIT B12 SERPL-MCNC: 921 PG/ML (ref 211–911)
WBC # BLD AUTO: 8.9 K/UL (ref 4.8–10.8)

## 2025-01-19 PROCEDURE — 85027 COMPLETE CBC AUTOMATED: CPT

## 2025-01-19 PROCEDURE — A9270 NON-COVERED ITEM OR SERVICE: HCPCS | Performed by: INTERNAL MEDICINE

## 2025-01-19 PROCEDURE — 90662 IIV NO PRSV INCREASED AG IM: CPT | Performed by: INTERNAL MEDICINE

## 2025-01-19 PROCEDURE — 85046 RETICYTE/HGB CONCENTRATE: CPT

## 2025-01-19 PROCEDURE — 80069 RENAL FUNCTION PANEL: CPT

## 2025-01-19 PROCEDURE — 700111 HCHG RX REV CODE 636 W/ 250 OVERRIDE (IP): Performed by: INTERNAL MEDICINE

## 2025-01-19 PROCEDURE — 3E02340 INTRODUCTION OF INFLUENZA VACCINE INTO MUSCLE, PERCUTANEOUS APPROACH: ICD-10-PCS | Performed by: INTERNAL MEDICINE

## 2025-01-19 PROCEDURE — 82607 VITAMIN B-12: CPT

## 2025-01-19 PROCEDURE — 700102 HCHG RX REV CODE 250 W/ 637 OVERRIDE(OP): Performed by: INTERNAL MEDICINE

## 2025-01-19 PROCEDURE — 83550 IRON BINDING TEST: CPT

## 2025-01-19 PROCEDURE — 83540 ASSAY OF IRON: CPT

## 2025-01-19 PROCEDURE — 84466 ASSAY OF TRANSFERRIN: CPT

## 2025-01-19 PROCEDURE — 36415 COLL VENOUS BLD VENIPUNCTURE: CPT

## 2025-01-19 PROCEDURE — 90471 IMMUNIZATION ADMIN: CPT

## 2025-01-19 PROCEDURE — 82728 ASSAY OF FERRITIN: CPT

## 2025-01-19 PROCEDURE — 83735 ASSAY OF MAGNESIUM: CPT

## 2025-01-19 PROCEDURE — 97602 WOUND(S) CARE NON-SELECTIVE: CPT

## 2025-01-19 PROCEDURE — 99239 HOSP IP/OBS DSCHRG MGMT >30: CPT | Performed by: INTERNAL MEDICINE

## 2025-01-19 RX ORDER — LANOLIN ALCOHOL/MO/W.PET/CERES
400 CREAM (GRAM) TOPICAL 2 TIMES DAILY
Status: DISCONTINUED | OUTPATIENT
Start: 2025-01-19 | End: 2025-01-19 | Stop reason: HOSPADM

## 2025-01-19 RX ORDER — PREDNISONE 20 MG/1
40 TABLET ORAL DAILY
Status: SHIPPED
Start: 2025-01-19 | End: 2025-01-21

## 2025-01-19 RX ORDER — DOXYCYCLINE 100 MG/1
100 TABLET ORAL EVERY 12 HOURS
Status: ACTIVE | DISCHARGE
Start: 2025-01-19 | End: 2025-01-24

## 2025-01-19 RX ADMIN — AMLODIPINE BESYLATE 5 MG: 5 TABLET ORAL at 04:37

## 2025-01-19 RX ADMIN — ACETAMINOPHEN 1000 MG: 500 TABLET ORAL at 00:45

## 2025-01-19 RX ADMIN — Medication 400 MG: at 06:04

## 2025-01-19 RX ADMIN — DOXYCYCLINE 100 MG: 100 TABLET, FILM COATED ORAL at 04:36

## 2025-01-19 RX ADMIN — PREDNISONE 40 MG: 20 TABLET ORAL at 04:37

## 2025-01-19 RX ADMIN — AMOXICILLIN AND CLAVULANATE POTASSIUM 1 TABLET: 875; 125 TABLET, FILM COATED ORAL at 04:36

## 2025-01-19 RX ADMIN — INFLUENZA A VIRUS A/VICTORIA/4897/2022 IVR-238 (H1N1) ANTIGEN (FORMALDEHYDE INACTIVATED), INFLUENZA A VIRUS A/CALIFORNIA/122/2022 SAN-022 (H3N2) ANTIGEN (FORMALDEHYDE INACTIVATED), AND INFLUENZA B VIRUS B/MICHIGAN/01/2021 ANTIGEN (FORMALDEHYDE INACTIVATED) 0.5 ML: 60; 60; 60 INJECTION, SUSPENSION INTRAMUSCULAR at 09:20

## 2025-01-19 RX ADMIN — ATORVASTATIN CALCIUM 80 MG: 40 TABLET, FILM COATED ORAL at 04:37

## 2025-01-19 RX ADMIN — ACETAMINOPHEN 1000 MG: 500 TABLET ORAL at 09:19

## 2025-01-19 RX ADMIN — LOSARTAN POTASSIUM 100 MG: 50 TABLET, FILM COATED ORAL at 04:37

## 2025-01-19 ASSESSMENT — PAIN DESCRIPTION - PAIN TYPE
TYPE: ACUTE PAIN
TYPE: ACUTE PAIN

## 2025-01-19 NOTE — DISCHARGE INSTRUCTIONS
Discharge Instructions per Dr. Manoj Guy D.O.    DIET: Diet Order Diet: Consistent CHO (Diabetic)    ACTIVITY: As tolerated    A proper diet that is low in grease, fat, and salt, along with 30 minutes of exercise per day will lead to weight loss, and better controlled blood sugar and blood pressure.    DIAGNOSIS: Pneumonia due to infectious organism    Follow up with your Primary Care Provider Pcp as scheduled or sooner if your symptoms persist or worsen.  Return to Emergency Room for sever chest pain, shortness of breath, signs of a stroke, or any other emergencies.      Diet    Resume your normal diet as tolerated.  A diet low in cholesterol, fat, and sodium is recommended for good health.       Activity    Resume Your Normal Activity    You may resume your normal activity as tolerated.  Rest as needed.

## 2025-01-19 NOTE — PROGRESS NOTES
4 Eyes Skin Assessment Completed by JOSE Maxwell and JOSE Quintana.    Head WDL  Ears WDL  Nose WDL  Mouth WDL  Neck WDL  Breast/Chest WDL  Shoulder Blades WDL  Spine WDL  (R) Arm/Elbow/Hand Redness and Blanching, scabs  (L) Arm/Elbow/Hand Redness, Blanching, and Bruising, scabs  Abdomen WDL  Groin WDL  Scrotum/Coccyx/Buttocks Redness and slow to raúl  (R) Leg - dry/flaky  (L) Leg - dry/flaky, swelling  (R) Heel/Foot/Toe Redness, Blanching, and Boggy  (L) Heel/Foot/Toe Redness, Blanching, Boggy, and Swelling        Devices In Places Nasal Cannula, LLE aircast        Interventions In Place NC W/Ear Foams, Heel Mepilex, and Elbow Mepilex     Possible Skin Injury No     Pictures Uploaded Into Epic N/A  Wound Consult Placed Yes  RN Wound Prevention Protocol Ordered No

## 2025-01-19 NOTE — WOUND TEAM
"[facility]  Inpatient Services  Wound and Skin Care Brief Evaluation    Admission Date: 1/16/2025     Last order of IP CONSULT TO WOUND CARE was found on 1/17/2025 from Hospital Encounter on 1/16/2025     HPI, PMH, SH: Reviewed    Chief Complaint   Patient presents with    GLF     Pt BIBA from home for a GLF last night, (-) HS, (-) LOC, (+) thinners. Pt was seen here last night after fall for \"weakness\" and did not get any imaging done.  Woke up today and has had difficulty ambulating due to left ankle pain and c/o right sided rib pain. A&Ox4.   Given 1g Tylenol PTA.      Diagnosis: Pneumonia due to infectious organism [J18.9]    Unit where seen by Wound Team: S614/01     Wound consult placed regarding L lateral ankle. Chart and images reviewed. This clinician in to assess patient. Patient pleasant and agreeable.  L lateral ankle with blanchable erythema, offloading adhesive foam in place.    No pressure injuries or advanced wound care needs identified. Wound consult completed. Wound team signing off, re-consult if patient has further advanced wound care needs.         PREVENTATIVE INTERVENTIONS:    Q shift Luis E - performed per nursing policy  Q shift pressure point assessments - performed per nursing policy    Surface/Positioning  Standard/trauma mattress - Currently in Place    Offloading/Redistribution  Heel offloading dressing (Silicone dressing) - Currently in Place  "

## 2025-01-19 NOTE — DISCHARGE SUMMARY
"Discharge Summary    CHIEF COMPLAINT ON ADMISSION  Chief Complaint   Patient presents with    GLF     Pt BIBA from home for a GLF last night, (-) HS, (-) LOC, (+) thinners. Pt was seen here last night after fall for \"weakness\" and did not get any imaging done.  Woke up today and has had difficulty ambulating due to left ankle pain and c/o right sided rib pain. A&Ox4.   Given 1g Tylenol PTA.        Reason for Admission  EMS     Admission Date  1/16/2025    CODE STATUS  Full Code    HPI & HOSPITAL COURSE  Nohelia Dickerson is a 79 y.o. 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  She was originally seen at the emergency department last night after a syncopal episode, with complaints of nausea vomiting diarrhea, chills and fever.  She was given some IV fluids and discharged home.  COVID-19/influenza/RSV PCR negative.    Today she came back with complaints of ongoing left ankle pain. Patient states that she has been having left upper quadrant abdominal pain nausea and vomiting intermittently in the last 6 months.  Today she denies any nausea vomiting or diarrhea and actually feels hungry.  She states that chronic cough has not changed   Patient was evaluated with a CTA of the chest which was negative for PE .There are right middle and bilateral lower lobe infiltrates.  X-ray of the left ankle negative for fracture.  Patient desaturated to 82% and was placed on 3 L nasal cannula.  She noted to have some expiratory wheezes bilaterally.  She was started on Unasyn and doxycycline for pneumonia.  Unasyn was transitioned to oral Augmentin at time of discharge.  Patient's hemoglobin remained stable after admission with no obvious signs of bleeding/bruising.  Initial drop was possibly dilutional.  Iron " studies showed anemia of chronic disease.  Okay to resume apixaban  Her GI symptoms have resolved by the time of admission.    Patient is medically stable to discharge to skilled nursing facility.  Follow-up with primary care as outpatient.    Therefore, she is discharged in fair and stable condition to skilled nursing facility.    The patient met 2-midnight criteria for an inpatient stay at the time of discharge.    Discharge Date  1/19/2025      FOLLOW UP ITEMS POST DISCHARGE  None    DISCHARGE DIAGNOSES  Principal Problem:    Pneumonia due to infectious organism (POA: Yes)  Active Problems:    Primary hypertension (POA: Yes)    Acute on chronic respiratory failure with hypoxia (HCC) (POA: Yes)      Overview: Use 1 & 1/2 L oxygen at night    Peripheral arterial disease (HCC) (POA: Yes)    Generalized weakness (POA: Yes)    History of arterial embolism (POA: Yes)    Hyperglycemia (POA: Yes)    Anemia (POA: Yes)    Chronic obstructive pulmonary disease with acute lower respiratory infection (HCC) (POA: Yes)  Resolved Problems:    * No resolved hospital problems. *      FOLLOW UP  Future Appointments   Date Time Provider Department Center   1/31/2025 10:00 AM [facility]  [address] [place] [id number]  [phone]    Schedule an appointment as soon as possible for a visit        MEDICATIONS ON DISCHARGE     Medication List        START taking these medications        Instructions   amoxicillin-clavulanate 875-125 MG Tabs  Commonly known as: Augmentin   Take 1 Tablet by mouth every 12 hours for 5 days.  Dose: 1 Tablet     doxycycline monohydrate 100 MG tablet  Commonly known as: Adoxa   Take 1 Tablet by mouth every 12 hours for 5 days.  Dose: 100 mg     predniSONE 20 MG Tabs  Commonly known as: Deltasone   Take 2 Tablets by mouth every day for 2 days.  Dose: 40 mg            CONTINUE taking these medications        Instructions   amLODIPine  5 MG Tabs  Commonly known as: Norvasc   TAKE 1 TABLET BY MOUTH EVERY DAY  Dose: 5 mg     atorvastatin 80 MG tablet  Commonly known as: Lipitor   TAKE 1 TABLET BY MOUTH EVERY DAY. N THE EVENING. PT TO MAKE APPT AND GET LABS PRIOR TO MORE REFILLS.  Dose: 80 mg     Eliquis 5 MG Tabs  Generic drug: apixaban   TAKE 1 TABLET BY MOUTH TWICE A DAY  Dose: 5 mg     losartan 100 MG Tabs  Commonly known as: Cozaar   Take 1 Tablet by mouth every day.  Dose: 100 mg     ondansetron 4 MG Tbdp  Commonly known as: Zofran ODT   Take 1 Tablet by mouth every 8 hours as needed for Nausea/Vomiting.  Dose: 4 mg     Trelegy Ellipta 200-62.5-25 MCG/ACT inhaler  Generic drug: fluticasone-umeclidinium-vilanterol   INHALE 1 PUFF BY MOUTH EVERY DAY  Dose: 1 Puff              Allergies  Allergies   Allergen Reactions    Morphine Unspecified     Hallucinations, nightmares, and altered mentations    Lactaid [Lactase] Diarrhea     Upset stomach       DIET  Orders Placed This Encounter   Procedures    Diet Order Diet: Consistent CHO (Diabetic)     Standing Status:   Standing     Number of Occurrences:   1     Order Specific Question:   Diet:     Answer:   Consistent CHO (Diabetic) [4]       ACTIVITY  As tolerated.  Weight bearing as tolerated    CONSULTATIONS  None    PROCEDURES  None    LABORATORY  Lab Results   Component Value Date    SODIUM 141 01/19/2025    POTASSIUM 4.0 01/19/2025    CHLORIDE 107 01/19/2025    CO2 23 01/19/2025    GLUCOSE 91 01/19/2025    BUN 23 (H) 01/19/2025    CREATININE 0.67 01/19/2025        Lab Results   Component Value Date    WBC 8.9 01/19/2025    HEMOGLOBIN 11.5 (L) 01/19/2025    HEMATOCRIT 34.4 (L) 01/19/2025    PLATELETCT 219 01/19/2025        I discussed medications and side effects with the patient.  I discussed prognosis and importance of medical compliance with the patient.  I counseled the patient about diet, exercise, weight loss, smoking cessation, and life style modifications.  All questions and concerns have  been addressed.  Total time of the discharge process was 38 minutes.

## 2025-01-19 NOTE — PROGRESS NOTES
4 Eyes Skin Assessment Completed by JOSE Juan and JOSE Benítez.    Head WDL  Ears WDL  Nose WDL  Mouth WDL  Neck WDL  Breast/Chest WDL  Shoulder Blades WDL  Spine WDL  (R) Arm/Elbow/Hand Redness  (L) Arm/Elbow/Hand Redness, Blanching, and Bruising  Abdomen WDL  Groin WDL  Scrotum/Coccyx/Buttocks WDL  (R) Leg WDL  (L) Leg Redness, Blanching, Bruising, and Swelling  (R) Heel/Foot/Toe Redness and Blanching  (L) Heel/Foot/Toe Redness and Blanching    Devices In Places SCD's    Interventions In Place Gray Ear Foams, NC W/Ear Foams, Heel Mepilex, TAP System, Pillows, Barrier Cream, Dri-Vlad Pads, and Heels Loaded W/Pillows    Possible Skin Injury No    Pictures Uploaded Into Epic N/A  Wound Consult Placed N/A  RN Wound Prevention Protocol Ordered No

## 2025-01-19 NOTE — CARE PLAN
The patient is Stable - Low risk of patient condition declining or worsening    Shift Goals  Clinical Goals: safety, Pain control <3/10  Patient Goals: rest  Family Goals: kulwant    Progress made toward(s) clinical / shift goals:  Patient reports mild pain and scheduled tylenol given with good relief. Patient up to commode with 1-2 assist using fww, tolerated well. Free from falls/injuries throughout this shift. Call ligh in reach, treaded slipper socks on, bed alarm on, and bed to lowest position.     Patient is not progressing towards the following goals: N/A

## 2025-01-19 NOTE — WOUND TEAM
"[facility]  Inpatient Services  Wound and Skin Care Brief Evaluation    Admission Date: 1/16/2025     Last order of IP CONSULT TO WOUND CARE was found on 1/19/2025 from Hospital Encounter on 1/16/2025     HPI, PMH, SH: Reviewed    Chief Complaint   Patient presents with    GLF     Pt BIBA from home for a GLF last night, (-) HS, (-) LOC, (+) thinners. Pt was seen here last night after fall for \"weakness\" and did not get any imaging done.  Woke up today and has had difficulty ambulating due to left ankle pain and c/o right sided rib pain. A&Ox4.   Given 1g Tylenol PTA.      Diagnosis: Pneumonia due to infectious organism [J18.9]    Unit where seen by Wound Team: S614/01     Wound consult placed regarding Sacrum and bilateral heels . Chart and images reviewed. This discussed with bedside RN Yessica. This clinician in to assess patient. Patient pleasant and agreeable. Skin . Non-selectively debrided with Moist warm washcloth.                         No pressure injuries or advanced wound care needs identified. Wound consult completed. No further follow up unless indicated and consulted.          PREVENTATIVE INTERVENTIONS:    Q shift Luis E - performed per nursing policy  Q shift pressure point assessments - performed per nursing policy    Surface/Positioning  Low Airloss - Currently in Place  "

## 2025-01-19 NOTE — DISCHARGE PLANNING
HTH/SCP TCN chart review completed. No new TCN needs identified. Please see prior TCN note from 1/17/25 for most recent discharge planning considerations if indicated. Current discharge considerations are noted to be for SNF.  Per chart review, plan for discharge to [facility] today at 1 PM via [facility].  Per review, noted current 6 click scores 17 ADL's and 15 mobility and per kardex mobility documented at 2 feet X 2 HHA.  TCN will continue to follow and collaborate with discharge planning team as additional post acute needs arise. Thank you.    Completed:  Voalte received from PT notifying of current recommendations for post acute placement  Choice obtained: SNF #1 [facility]-accepted, [facility]2 [facility]- accepted.  HH ([facility], #2 [facility]) and DME ([facility]).    Patient aware of CM protocol of sending SNF referrals and will choose from accepting.    Pt aware of [facility] blanket referral policy  SCP with [facility]. Anticipate post acute placement at discharge.

## 2025-01-31 ENCOUNTER — APPOINTMENT (OUTPATIENT)
Dept: INTERNAL MEDICINE | Facility: OTHER | Age: 80
End: 2025-01-31
Payer: MEDICARE

## 2025-02-11 ENCOUNTER — HOME HEALTH ADMISSION (OUTPATIENT)
Dept: HOME HEALTH SERVICES | Facility: HOME HEALTHCARE | Age: 80
End: 2025-02-11
Payer: MEDICARE

## 2025-02-14 ENCOUNTER — TELEPHONE (OUTPATIENT)
Dept: HOME HEALTH SERVICES | Facility: HOME HEALTHCARE | Age: 80
End: 2025-02-14
Payer: MEDICARE

## 2025-02-14 NOTE — TELEPHONE ENCOUNTER
This Home Health referral has been forwarded to [facility].  Patient was contacted by nursing supervisor to discuss transfer of care.      [facility] has accepted

## 2025-02-25 ENCOUNTER — APPOINTMENT (OUTPATIENT)
Dept: MEDICAL GROUP | Facility: CLINIC | Age: 80
End: 2025-02-25
Payer: MEDICARE

## 2025-02-27 ENCOUNTER — OFFICE VISIT (OUTPATIENT)
Dept: MEDICAL GROUP | Facility: CLINIC | Age: 80
End: 2025-02-27
Attending: EMERGENCY MEDICINE
Payer: MEDICARE

## 2025-02-27 VITALS
DIASTOLIC BLOOD PRESSURE: 91 MMHG | RESPIRATION RATE: 24 BRPM | WEIGHT: 186 LBS | HEIGHT: 72 IN | SYSTOLIC BLOOD PRESSURE: 148 MMHG | HEART RATE: 114 BPM | BODY MASS INDEX: 25.19 KG/M2 | TEMPERATURE: 97.2 F | OXYGEN SATURATION: 87 %

## 2025-02-27 DIAGNOSIS — Z23 NEEDS FLU SHOT: ICD-10-CM

## 2025-02-27 DIAGNOSIS — Z87.891 FORMER SMOKER: ICD-10-CM

## 2025-02-27 DIAGNOSIS — J44.0 CHRONIC OBSTRUCTIVE PULMONARY DISEASE WITH ACUTE LOWER RESPIRATORY INFECTION (HCC): ICD-10-CM

## 2025-02-27 DIAGNOSIS — J18.9 PNEUMONIA OF BOTH LOWER LOBES DUE TO INFECTIOUS ORGANISM: ICD-10-CM

## 2025-02-27 DIAGNOSIS — D68.69 SECONDARY HYPERCOAGULABILITY DISORDER (HCC): ICD-10-CM

## 2025-02-27 DIAGNOSIS — E89.0 HISTORY OF PARTIAL THYROIDECTOMY: ICD-10-CM

## 2025-02-27 DIAGNOSIS — Z86.73 HISTORY OF STROKE: Chronic | ICD-10-CM

## 2025-02-27 DIAGNOSIS — I63.9 CARDIOEMBOLIC STROKE (HCC): ICD-10-CM

## 2025-02-27 RX ORDER — AMLODIPINE BESYLATE 5 MG/1
5 TABLET ORAL DAILY
Qty: 100 TABLET | Refills: 3 | Status: SHIPPED | OUTPATIENT
Start: 2025-02-27

## 2025-02-27 ASSESSMENT — FIBROSIS 4 INDEX: FIB4 SCORE: 2.42
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
## 2025-04-03 NOTE — TELEPHONE ENCOUNTER
Received request via: Pharmacy    Was the patient seen in the last year in this department? Yes    Does the patient have an active prescription (recently filled or refills available) for medication(s) requested? No    Pharm[facility]    Does the patient have senior living Plus and need 100-day supply? (This applies to ALL medications) Yes, quantity updated to 100 days

## 2025-04-07 RX ORDER — AMLODIPINE BESYLATE 5 MG/1
5 TABLET ORAL DAILY
Qty: 100 TABLET | Refills: 3 | Status: SHIPPED | OUTPATIENT
Start: 2025-04-07
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
## 2025-04-17 ENCOUNTER — GYNECOLOGY VISIT (OUTPATIENT)
Dept: GYNECOLOGY | Facility: CLINIC | Age: 80
End: 2025-04-17
Payer: MEDICARE

## 2025-04-17 VITALS
DIASTOLIC BLOOD PRESSURE: 82 MMHG | SYSTOLIC BLOOD PRESSURE: 138 MMHG | HEART RATE: 80 BPM | WEIGHT: 182 LBS | BODY MASS INDEX: 24.68 KG/M2

## 2025-04-17 DIAGNOSIS — N32.81 OAB (OVERACTIVE BLADDER): ICD-10-CM

## 2025-04-17 DIAGNOSIS — N39.41 URGE URINARY INCONTINENCE: ICD-10-CM

## 2025-04-17 PROCEDURE — 3075F SYST BP GE 130 - 139MM HG: CPT | Performed by: STUDENT IN AN ORGANIZED HEALTH CARE EDUCATION/TRAINING PROGRAM

## 2025-04-17 PROCEDURE — 3079F DIAST BP 80-89 MM HG: CPT | Performed by: STUDENT IN AN ORGANIZED HEALTH CARE EDUCATION/TRAINING PROGRAM

## 2025-04-17 PROCEDURE — 99213 OFFICE O/P EST LOW 20 MIN: CPT | Performed by: STUDENT IN AN ORGANIZED HEALTH CARE EDUCATION/TRAINING PROGRAM

## 2025-04-17 RX ORDER — ESTRADIOL 0.1 MG/G
CREAM VAGINAL
Qty: 1 EACH | Refills: 6 | Status: SHIPPED | OUTPATIENT
Start: 2025-04-17

## 2025-04-17 RX ORDER — MIRABEGRON 50 MG/1
50 TABLET, FILM COATED, EXTENDED RELEASE ORAL DAILY
Qty: 30 TABLET | Refills: 3 | Status: SHIPPED | OUTPATIENT
Start: 2025-04-17 | End: 2025-05-17

## 2025-04-17 ASSESSMENT — FIBROSIS 4 INDEX: FIB4 SCORE: 2.45

## 2025-04-18 RX ORDER — OXYBUTYNIN CHLORIDE 15 MG/1
TABLET, EXTENDED RELEASE ORAL
Refills: 0 | OUTPATIENT
Start: 2025-04-18

## 2025-04-28 NOTE — TELEPHONE ENCOUNTER
Received request via: Pharmacy    Was the patient seen in the last year in this department? Yes    Does the patient have an active prescription (recently filled or refills available) for medication(s) requested? No    Pharmacy Name: [facility]    Does the patient have alf Plus and need 100-day supply? (This applies to ALL medications) Yes, quantity updated to 100 days

## 2025-04-29 ENCOUNTER — APPOINTMENT (OUTPATIENT)
Dept: RADIOLOGY | Facility: MEDICAL CENTER | Age: 80
End: 2025-04-29
Payer: MEDICARE

## 2025-04-30 ENCOUNTER — TELEPHONE (OUTPATIENT)
Dept: MEDICAL GROUP | Facility: CLINIC | Age: 80
End: 2025-04-30
Payer: MEDICARE

## 2025-04-30 NOTE — TELEPHONE ENCOUNTER
Received request via: Pharmacy    Was the patient seen in the last year in this department? Yes    Does the patient have an active prescription (recently filled or refills available) for medication(s) requested? No    Pharmacy Name: [facility]     Does the patient have intermediate Plus and need 100-day supply? (This applies to ALL medications) Yes, quantity updated to 100 days

## 2025-05-13 ENCOUNTER — APPOINTMENT (OUTPATIENT)
Dept: RADIOLOGY | Facility: MEDICAL CENTER | Age: 80
DRG: 871 | End: 2025-05-13
Attending: EMERGENCY MEDICINE
Payer: MEDICARE

## 2025-05-13 ENCOUNTER — HOSPITAL ENCOUNTER (INPATIENT)
Facility: MEDICAL CENTER | Age: 80
LOS: 3 days | DRG: 871 | End: 2025-05-16
Attending: EMERGENCY MEDICINE | Admitting: STUDENT IN AN ORGANIZED HEALTH CARE EDUCATION/TRAINING PROGRAM
Payer: MEDICARE

## 2025-05-13 ENCOUNTER — APPOINTMENT (OUTPATIENT)
Dept: RADIOLOGY | Facility: MEDICAL CENTER | Age: 80
End: 2025-05-13
Payer: MEDICARE

## 2025-05-13 DIAGNOSIS — I48.3 TYPICAL ATRIAL FLUTTER (HCC): ICD-10-CM

## 2025-05-13 DIAGNOSIS — M62.81 MUSCLE WEAKNESS (GENERALIZED): Primary | ICD-10-CM

## 2025-05-13 DIAGNOSIS — J44.1 CHRONIC OBSTRUCTIVE PULMONARY DISEASE WITH ACUTE EXACERBATION (HCC): ICD-10-CM

## 2025-05-13 DIAGNOSIS — J96.21 ACUTE ON CHRONIC RESPIRATORY FAILURE WITH HYPOXIA (HCC): ICD-10-CM

## 2025-05-13 DIAGNOSIS — R09.02 HYPOXIA: ICD-10-CM

## 2025-05-13 DIAGNOSIS — J18.9 PNEUMONIA DUE TO INFECTIOUS ORGANISM, UNSPECIFIED LATERALITY, UNSPECIFIED PART OF LUNG: ICD-10-CM

## 2025-05-13 PROBLEM — A41.9 SEPSIS DUE TO PNEUMONIA (HCC): Status: ACTIVE | Noted: 2025-01-16

## 2025-05-13 LAB
ALBUMIN SERPL BCP-MCNC: 3.6 G/DL (ref 3.2–4.9)
ALBUMIN/GLOB SERPL: 1.2 G/DL
ALP SERPL-CCNC: 76 U/L (ref 30–99)
ALT SERPL-CCNC: 16 U/L (ref 2–50)
ANION GAP SERPL CALC-SCNC: 12 MMOL/L (ref 7–16)
ANISOCYTOSIS BLD QL SMEAR: ABNORMAL
APPEARANCE UR: ABNORMAL
AST SERPL-CCNC: 25 U/L (ref 12–45)
BACTERIA #/AREA URNS HPF: ABNORMAL /HPF
BASOPHILS # BLD AUTO: 0 % (ref 0–1.8)
BASOPHILS # BLD: 0 K/UL (ref 0–0.12)
BILIRUB SERPL-MCNC: 2.1 MG/DL (ref 0.1–1.5)
BILIRUB UR QL STRIP.AUTO: NEGATIVE
BUN SERPL-MCNC: 26 MG/DL (ref 8–22)
BURR CELLS BLD QL SMEAR: NORMAL
CALCIUM ALBUM COR SERPL-MCNC: 8.9 MG/DL (ref 8.5–10.5)
CALCIUM SERPL-MCNC: 8.6 MG/DL (ref 8.5–10.5)
CASTS URNS QL MICRO: ABNORMAL /LPF (ref 0–2)
CHLORIDE SERPL-SCNC: 100 MMOL/L (ref 96–112)
CO2 SERPL-SCNC: 23 MMOL/L (ref 20–33)
COLOR UR: YELLOW
CREAT SERPL-MCNC: 1.05 MG/DL (ref 0.5–1.4)
EKG IMPRESSION: NORMAL
EOSINOPHIL # BLD AUTO: 0 K/UL (ref 0–0.51)
EOSINOPHIL NFR BLD: 0 % (ref 0–6.9)
EPITHELIAL CELLS 1715: ABNORMAL /HPF (ref 0–5)
ERYTHROCYTE [DISTWIDTH] IN BLOOD BY AUTOMATED COUNT: 44.4 FL (ref 35.9–50)
FLUAV RNA SPEC QL NAA+PROBE: NEGATIVE
FLUBV RNA SPEC QL NAA+PROBE: NEGATIVE
GFR SERPLBLD CREATININE-BSD FMLA CKD-EPI: 54 ML/MIN/1.73 M 2
GLOBULIN SER CALC-MCNC: 3.1 G/DL (ref 1.9–3.5)
GLUCOSE SERPL-MCNC: 163 MG/DL (ref 65–99)
GLUCOSE UR STRIP.AUTO-MCNC: NEGATIVE MG/DL
HCT VFR BLD AUTO: 42.1 % (ref 37–47)
HGB BLD-MCNC: 14 G/DL (ref 12–16)
INR PPP: 1.47 (ref 0.87–1.13)
KETONES UR STRIP.AUTO-MCNC: NEGATIVE MG/DL
LACTATE SERPL-SCNC: 2.3 MMOL/L (ref 0.5–2)
LACTATE SERPL-SCNC: 2.6 MMOL/L (ref 0.5–2)
LACTATE SERPL-SCNC: 2.8 MMOL/L (ref 0.5–2)
LEUKOCYTE ESTERASE UR QL STRIP.AUTO: ABNORMAL
LYMPHOCYTES # BLD AUTO: 1.13 K/UL (ref 1–4.8)
LYMPHOCYTES NFR BLD: 10.4 % (ref 22–41)
MANUAL DIFF BLD: ABNORMAL
MCH RBC QN AUTO: 28 PG (ref 27–33)
MCHC RBC AUTO-ENTMCNC: 33.3 G/DL (ref 32.2–35.5)
MCV RBC AUTO: 84.2 FL (ref 81.4–97.8)
METAMYELOCYTES NFR BLD MANUAL: 4.3 %
MICRO URNS: ABNORMAL
MICROCYTES BLD QL SMEAR: ABNORMAL
MONOCYTES # BLD AUTO: 0 K/UL (ref 0–0.85)
MONOCYTES NFR BLD AUTO: 0 % (ref 0–13.4)
MORPHOLOGY BLD-IMP: NORMAL
MYELOCYTES NFR BLD MANUAL: 1.7 %
NEUTROPHILS # BLD AUTO: 9.11 K/UL (ref 1.82–7.42)
NEUTROPHILS NFR BLD: 72.4 % (ref 44–72)
NEUTS BAND NFR BLD MANUAL: 11.2 % (ref 0–10)
NITRITE UR QL STRIP.AUTO: NEGATIVE
NRBC # BLD AUTO: 0 K/UL
NRBC BLD-RTO: 0 /100 WBC (ref 0–0.2)
NT-PROBNP SERPL IA-MCNC: 2267 PG/ML (ref 0–125)
PH UR STRIP.AUTO: 5.5 [PH] (ref 5–8)
PLATELET # BLD AUTO: 170 K/UL (ref 164–446)
PLATELET BLD QL SMEAR: NORMAL
PMV BLD AUTO: 10.1 FL (ref 9–12.9)
POIKILOCYTOSIS BLD QL SMEAR: NORMAL
POTASSIUM SERPL-SCNC: 3.7 MMOL/L (ref 3.6–5.5)
PROCALCITONIN SERPL-MCNC: 18.1 NG/ML
PROT SERPL-MCNC: 6.7 G/DL (ref 6–8.2)
PROT UR QL STRIP: 30 MG/DL
PROTHROMBIN TIME: 17.9 SEC (ref 12–14.6)
RBC # BLD AUTO: 5 M/UL (ref 4.2–5.4)
RBC # URNS HPF: ABNORMAL /HPF (ref 0–2)
RBC BLD AUTO: PRESENT
RBC UR QL AUTO: ABNORMAL
RSV RNA SPEC QL NAA+PROBE: NEGATIVE
SARS-COV-2 RNA RESP QL NAA+PROBE: NOTDETECTED
SODIUM SERPL-SCNC: 135 MMOL/L (ref 135–145)
SP GR UR STRIP.AUTO: 1.04
TROPONIN T SERPL-MCNC: 22 NG/L (ref 6–19)
UROBILINOGEN UR STRIP.AUTO-MCNC: 1 EU/DL
WBC # BLD AUTO: 10.9 K/UL (ref 4.8–10.8)
WBC #/AREA URNS HPF: ABNORMAL /HPF

## 2025-05-13 PROCEDURE — 700111 HCHG RX REV CODE 636 W/ 250 OVERRIDE (IP): Mod: TB | Performed by: EMERGENCY MEDICINE

## 2025-05-13 PROCEDURE — 0241U HCHG SARS-COV-2 COVID-19 NFCT DS RESP RNA 4 TRGT ED POC: CPT

## 2025-05-13 PROCEDURE — 99291 CRITICAL CARE FIRST HOUR: CPT | Performed by: STUDENT IN AN ORGANIZED HEALTH CARE EDUCATION/TRAINING PROGRAM

## 2025-05-13 PROCEDURE — 700105 HCHG RX REV CODE 258: Performed by: STUDENT IN AN ORGANIZED HEALTH CARE EDUCATION/TRAINING PROGRAM

## 2025-05-13 PROCEDURE — 700102 HCHG RX REV CODE 250 W/ 637 OVERRIDE(OP): Performed by: EMERGENCY MEDICINE

## 2025-05-13 PROCEDURE — 81001 URINALYSIS AUTO W/SCOPE: CPT

## 2025-05-13 PROCEDURE — A9270 NON-COVERED ITEM OR SERVICE: HCPCS | Performed by: EMERGENCY MEDICINE

## 2025-05-13 PROCEDURE — 700102 HCHG RX REV CODE 250 W/ 637 OVERRIDE(OP): Performed by: STUDENT IN AN ORGANIZED HEALTH CARE EDUCATION/TRAINING PROGRAM

## 2025-05-13 PROCEDURE — 87086 URINE CULTURE/COLONY COUNT: CPT

## 2025-05-13 PROCEDURE — 96375 TX/PRO/DX INJ NEW DRUG ADDON: CPT

## 2025-05-13 PROCEDURE — 700101 HCHG RX REV CODE 250: Performed by: EMERGENCY MEDICINE

## 2025-05-13 PROCEDURE — 770022 HCHG ROOM/CARE - ICU (200)

## 2025-05-13 PROCEDURE — 99291 CRITICAL CARE FIRST HOUR: CPT

## 2025-05-13 PROCEDURE — 71045 X-RAY EXAM CHEST 1 VIEW: CPT

## 2025-05-13 PROCEDURE — 36415 COLL VENOUS BLD VENIPUNCTURE: CPT

## 2025-05-13 PROCEDURE — 80053 COMPREHEN METABOLIC PANEL: CPT

## 2025-05-13 PROCEDURE — 94640 AIRWAY INHALATION TREATMENT: CPT

## 2025-05-13 PROCEDURE — 85610 PROTHROMBIN TIME: CPT

## 2025-05-13 PROCEDURE — 700117 HCHG RX CONTRAST REV CODE 255: Performed by: EMERGENCY MEDICINE

## 2025-05-13 PROCEDURE — 94669 MECHANICAL CHEST WALL OSCILL: CPT

## 2025-05-13 PROCEDURE — 96365 THER/PROPH/DIAG IV INF INIT: CPT

## 2025-05-13 PROCEDURE — 87040 BLOOD CULTURE FOR BACTERIA: CPT

## 2025-05-13 PROCEDURE — 700105 HCHG RX REV CODE 258: Performed by: EMERGENCY MEDICINE

## 2025-05-13 PROCEDURE — 700101 HCHG RX REV CODE 250: Performed by: STUDENT IN AN ORGANIZED HEALTH CARE EDUCATION/TRAINING PROGRAM

## 2025-05-13 PROCEDURE — 84484 ASSAY OF TROPONIN QUANT: CPT

## 2025-05-13 PROCEDURE — 93005 ELECTROCARDIOGRAM TRACING: CPT | Mod: TC | Performed by: EMERGENCY MEDICINE

## 2025-05-13 PROCEDURE — 84145 PROCALCITONIN (PCT): CPT

## 2025-05-13 PROCEDURE — 85007 BL SMEAR W/DIFF WBC COUNT: CPT

## 2025-05-13 PROCEDURE — 85027 COMPLETE CBC AUTOMATED: CPT

## 2025-05-13 PROCEDURE — 700111 HCHG RX REV CODE 636 W/ 250 OVERRIDE (IP): Performed by: STUDENT IN AN ORGANIZED HEALTH CARE EDUCATION/TRAINING PROGRAM

## 2025-05-13 PROCEDURE — 83880 ASSAY OF NATRIURETIC PEPTIDE: CPT

## 2025-05-13 PROCEDURE — 71275 CT ANGIOGRAPHY CHEST: CPT

## 2025-05-13 PROCEDURE — 83605 ASSAY OF LACTIC ACID: CPT | Mod: 91

## 2025-05-13 PROCEDURE — A9270 NON-COVERED ITEM OR SERVICE: HCPCS | Performed by: STUDENT IN AN ORGANIZED HEALTH CARE EDUCATION/TRAINING PROGRAM

## 2025-05-13 RX ORDER — IPRATROPIUM BROMIDE AND ALBUTEROL SULFATE 2.5; .5 MG/3ML; MG/3ML
3 SOLUTION RESPIRATORY (INHALATION) ONCE
Status: COMPLETED | OUTPATIENT
Start: 2025-05-13 | End: 2025-05-13

## 2025-05-13 RX ORDER — ATORVASTATIN CALCIUM 80 MG/1
80 TABLET, FILM COATED ORAL DAILY
Status: DISCONTINUED | OUTPATIENT
Start: 2025-05-14 | End: 2025-05-16 | Stop reason: HOSPADM

## 2025-05-13 RX ORDER — AMOXICILLIN 250 MG
2 CAPSULE ORAL EVERY EVENING
Status: DISCONTINUED | OUTPATIENT
Start: 2025-05-13 | End: 2025-05-16 | Stop reason: HOSPADM

## 2025-05-13 RX ORDER — AZITHROMYCIN 500 MG/5ML
500 INJECTION, POWDER, LYOPHILIZED, FOR SOLUTION INTRAVENOUS EVERY 24 HOURS
Status: DISCONTINUED | OUTPATIENT
Start: 2025-05-14 | End: 2025-05-14

## 2025-05-13 RX ORDER — THIAMINE HYDROCHLORIDE 100 MG/ML
200 INJECTION, SOLUTION INTRAMUSCULAR; INTRAVENOUS DAILY
Status: COMPLETED | OUTPATIENT
Start: 2025-05-13 | End: 2025-05-15

## 2025-05-13 RX ORDER — METHYLPREDNISOLONE SODIUM SUCCINATE 40 MG/ML
40 INJECTION, POWDER, LYOPHILIZED, FOR SOLUTION INTRAMUSCULAR; INTRAVENOUS DAILY
Status: DISCONTINUED | OUTPATIENT
Start: 2025-05-14 | End: 2025-05-16 | Stop reason: HOSPADM

## 2025-05-13 RX ORDER — ALBUTEROL SULFATE 5 MG/ML
2.5 SOLUTION RESPIRATORY (INHALATION)
Status: DISCONTINUED | OUTPATIENT
Start: 2025-05-13 | End: 2025-05-14

## 2025-05-13 RX ORDER — ENOXAPARIN SODIUM 100 MG/ML
40 INJECTION SUBCUTANEOUS DAILY
Status: DISCONTINUED | OUTPATIENT
Start: 2025-05-13 | End: 2025-05-13

## 2025-05-13 RX ORDER — SODIUM CHLORIDE, SODIUM LACTATE, POTASSIUM CHLORIDE, AND CALCIUM CHLORIDE .6; .31; .03; .02 G/100ML; G/100ML; G/100ML; G/100ML
1000 INJECTION, SOLUTION INTRAVENOUS
Status: DISCONTINUED | OUTPATIENT
Start: 2025-05-13 | End: 2025-05-15

## 2025-05-13 RX ORDER — ONDANSETRON 4 MG/1
4 TABLET, ORALLY DISINTEGRATING ORAL EVERY 4 HOURS PRN
Status: DISCONTINUED | OUTPATIENT
Start: 2025-05-13 | End: 2025-05-16 | Stop reason: HOSPADM

## 2025-05-13 RX ORDER — ACETAMINOPHEN 325 MG/1
650 TABLET ORAL EVERY 6 HOURS PRN
Status: DISCONTINUED | OUTPATIENT
Start: 2025-05-13 | End: 2025-05-16 | Stop reason: HOSPADM

## 2025-05-13 RX ORDER — IPRATROPIUM BROMIDE AND ALBUTEROL SULFATE 2.5; .5 MG/3ML; MG/3ML
3 SOLUTION RESPIRATORY (INHALATION)
Status: DISCONTINUED | OUTPATIENT
Start: 2025-05-13 | End: 2025-05-14

## 2025-05-13 RX ORDER — POLYETHYLENE GLYCOL 3350 17 G/17G
1 POWDER, FOR SOLUTION ORAL
Status: DISCONTINUED | OUTPATIENT
Start: 2025-05-13 | End: 2025-05-16 | Stop reason: HOSPADM

## 2025-05-13 RX ORDER — AZITHROMYCIN 250 MG/1
500 TABLET, FILM COATED ORAL ONCE
Status: COMPLETED | OUTPATIENT
Start: 2025-05-13 | End: 2025-05-13

## 2025-05-13 RX ORDER — SODIUM CHLORIDE, SODIUM LACTATE, POTASSIUM CHLORIDE, CALCIUM CHLORIDE 600; 310; 30; 20 MG/100ML; MG/100ML; MG/100ML; MG/100ML
1000 INJECTION, SOLUTION INTRAVENOUS ONCE
Status: COMPLETED | OUTPATIENT
Start: 2025-05-13 | End: 2025-05-13

## 2025-05-13 RX ORDER — ONDANSETRON 2 MG/ML
4 INJECTION INTRAMUSCULAR; INTRAVENOUS EVERY 4 HOURS PRN
Status: DISCONTINUED | OUTPATIENT
Start: 2025-05-13 | End: 2025-05-16 | Stop reason: HOSPADM

## 2025-05-13 RX ORDER — METHYLPREDNISOLONE SODIUM SUCCINATE 40 MG/ML
40 INJECTION, POWDER, LYOPHILIZED, FOR SOLUTION INTRAMUSCULAR; INTRAVENOUS ONCE
Status: COMPLETED | OUTPATIENT
Start: 2025-05-13 | End: 2025-05-13

## 2025-05-13 RX ADMIN — IPRATROPIUM BROMIDE AND ALBUTEROL SULFATE 3 ML: .5; 2.5 SOLUTION RESPIRATORY (INHALATION) at 18:57

## 2025-05-13 RX ADMIN — APIXABAN 5 MG: 5 TABLET, FILM COATED ORAL at 17:43

## 2025-05-13 RX ADMIN — IOHEXOL 60 ML: 350 INJECTION, SOLUTION INTRAVENOUS at 14:45

## 2025-05-13 RX ADMIN — IPRATROPIUM BROMIDE AND ALBUTEROL SULFATE 3 ML: .5; 2.5 SOLUTION RESPIRATORY (INHALATION) at 22:31

## 2025-05-13 RX ADMIN — SODIUM CHLORIDE, POTASSIUM CHLORIDE, SODIUM LACTATE AND CALCIUM CHLORIDE 1000 ML: 600; 310; 30; 20 INJECTION, SOLUTION INTRAVENOUS at 11:46

## 2025-05-13 RX ADMIN — AZITHROMYCIN DIHYDRATE 500 MG: 250 TABLET ORAL at 12:20

## 2025-05-13 RX ADMIN — AMPICILLIN AND SULBACTAM 3 G: 1; 2 INJECTION, POWDER, FOR SOLUTION INTRAMUSCULAR; INTRAVENOUS at 12:22

## 2025-05-13 RX ADMIN — CEFEPIME 2 G: 2 INJECTION, POWDER, FOR SOLUTION INTRAVENOUS at 16:15

## 2025-05-13 RX ADMIN — METHYLPREDNISOLONE SODIUM SUCCINATE 40 MG: 40 INJECTION, POWDER, LYOPHILIZED, FOR SOLUTION INTRAMUSCULAR; INTRAVENOUS at 12:18

## 2025-05-13 RX ADMIN — IPRATROPIUM BROMIDE AND ALBUTEROL SULFATE 3 ML: .5; 2.5 SOLUTION RESPIRATORY (INHALATION) at 12:16

## 2025-05-13 RX ADMIN — CEFEPIME 2 G: 2 INJECTION, POWDER, FOR SOLUTION INTRAVENOUS at 21:31

## 2025-05-13 RX ADMIN — THIAMINE HYDROCHLORIDE 200 MG: 100 INJECTION, SOLUTION INTRAMUSCULAR; INTRAVENOUS at 16:15

## 2025-05-13 ASSESSMENT — LIFESTYLE VARIABLES
TOTAL SCORE: 0
AVERAGE NUMBER OF DAYS PER WEEK YOU HAVE A DRINK CONTAINING ALCOHOL: 0
TOTAL SCORE: 0
EVER HAD A DRINK FIRST THING IN THE MORNING TO STEADY YOUR NERVES TO GET RID OF A HANGOVER: NO
ALCOHOL_USE: NO
HOW MANY TIMES IN THE PAST YEAR HAVE YOU HAD 5 OR MORE DRINKS IN A DAY: 0
HAVE PEOPLE ANNOYED YOU BY CRITICIZING YOUR DRINKING: NO
ON A TYPICAL DAY WHEN YOU DRINK ALCOHOL HOW MANY DRINKS DO YOU HAVE: 0
DOES PATIENT WANT TO STOP DRINKING: NO
CONSUMPTION TOTAL: NEGATIVE
EVER FELT BAD OR GUILTY ABOUT YOUR DRINKING: NO
HAVE YOU EVER FELT YOU SHOULD CUT DOWN ON YOUR DRINKING: NO
TOTAL SCORE: 0

## 2025-05-13 ASSESSMENT — COGNITIVE AND FUNCTIONAL STATUS - GENERAL
SUGGESTED CMS G CODE MODIFIER MOBILITY: CL
SUGGESTED CMS G CODE MODIFIER DAILY ACTIVITY: CK
DRESSING REGULAR UPPER BODY CLOTHING: A LITTLE
TOILETING: A LITTLE
HELP NEEDED FOR BATHING: A LITTLE
WALKING IN HOSPITAL ROOM: A LOT
STANDING UP FROM CHAIR USING ARMS: A LOT
DAILY ACTIVITIY SCORE: 19
MOBILITY SCORE: 14
TURNING FROM BACK TO SIDE WHILE IN FLAT BAD: A LITTLE
PERSONAL GROOMING: A LITTLE
CLIMB 3 TO 5 STEPS WITH RAILING: A LOT
MOVING FROM LYING ON BACK TO SITTING ON SIDE OF FLAT BED: A LITTLE
MOVING TO AND FROM BED TO CHAIR: A LOT
DRESSING REGULAR LOWER BODY CLOTHING: A LITTLE

## 2025-05-13 ASSESSMENT — ENCOUNTER SYMPTOMS
SHORTNESS OF BREATH: 1
FOCAL WEAKNESS: 0
SPUTUM PRODUCTION: 1
FEVER: 0
EYES NEGATIVE: 1
COUGH: 1
ABDOMINAL PAIN: 0
CHILLS: 0
VOMITING: 0
PALPITATIONS: 0
HEADACHES: 0
MUSCULOSKELETAL NEGATIVE: 1
NAUSEA: 0
WHEEZING: 1
SORE THROAT: 0

## 2025-05-13 ASSESSMENT — PAIN DESCRIPTION - PAIN TYPE
TYPE: ACUTE PAIN

## 2025-05-13 ASSESSMENT — SOCIAL DETERMINANTS OF HEALTH (SDOH)
WITHIN THE LAST YEAR, HAVE TO BEEN RAPED OR FORCED TO HAVE ANY KIND OF SEXUAL ACTIVITY BY YOUR PARTNER OR EX-PARTNER?: NO
WITHIN THE PAST 12 MONTHS, THE FOOD YOU BOUGHT JUST DIDN'T LAST AND YOU DIDN'T HAVE MONEY TO GET MORE: NEVER TRUE
WITHIN THE LAST YEAR, HAVE YOU BEEN AFRAID OF YOUR PARTNER OR EX-PARTNER?: NO
IN THE PAST 12 MONTHS, HAS THE ELECTRIC, GAS, OIL, OR WATER COMPANY THREATENED TO SHUT OFF SERVICE IN YOUR HOME?: NO
WITHIN THE LAST YEAR, HAVE YOU BEEN KICKED, HIT, SLAPPED, OR OTHERWISE PHYSICALLY HURT BY YOUR PARTNER OR EX-PARTNER?: NO
WITHIN THE PAST 12 MONTHS, YOU WORRIED THAT YOUR FOOD WOULD RUN OUT BEFORE YOU GOT THE MONEY TO BUY MORE: NEVER TRUE
WITHIN THE LAST YEAR, HAVE YOU BEEN HUMILIATED OR EMOTIONALLY ABUSED IN OTHER WAYS BY YOUR PARTNER OR EX-PARTNER?: NO

## 2025-05-13 ASSESSMENT — FIBROSIS 4 INDEX
FIB4 SCORE: 2.45
FIB4 SCORE: 2.9

## 2025-05-13 NOTE — ED PROVIDER NOTES
ED PHYSICIAN NOTE    CHIEF COMPLAINT  Chief Complaint   Patient presents with    Shortness of Breath     Onset this am. Recent bacterial pneumonia infection. Hx of COPD. 88% RA on 3L/min NC this am at home. + weakness, + lethargy, + diarrhea       EXTERNAL RECORDS REVIEWED  Inpatient note patient was admitted in January with pneumonia.    HPI/ROS      Nohelia Dickerson is a 80 y.o. female who presents with shortness of breath.  Felt fine over the weekend and yesterday.  Today woke up feeling weak tired had some diarrhea started coughing feeling breathless.  She has a baseline oxygen dependence of 4 L because of COPD.  Had a mopped assist leg swelling.  Denies orthopnea or PND.  Has not noted fevers but has felt cold.    PAST MEDICAL HISTORY  Past Medical History:   Diagnosis Date    Asthma     Blood clotting disorder (HCC)     Cancer (HCC)     thyroid    COPD     Healthcare maintenance 2018    Hernia of unspecified site of abdominal cavity without mention of obstruction or gangrene     Hypertension     Inguinal hernia     Kidney stones     TIA (transient ischemic attack)     Tobacco dependence 2018    Tobacco use     Viral URI with cough 2019       SOCIAL HISTORY  Social History     Tobacco Use    Smoking status: Former     Current packs/day: 0.00     Average packs/day: 1 pack/day for 57.0 years (57.0 ttl pk-yrs)     Types: Cigarettes     Start date:      Quit date:      Years since quittin.3    Smokeless tobacco: Never    Tobacco comments:     [id number]   Vaping Use    Vaping status: Never Used   Substance Use Topics    Alcohol use: No    Drug use: No       CURRENT MEDICATIONS  Home Medications       Reviewed by Caitlin Hernandes R.N. (Registered Nurse) on 25 at 1117  Med List Status: Partial     Medication Last Dose Status   amLODIPine (NORVASC) 5 MG Tab  Active   apixaban (ELIQUIS) 5mg Tab  Active   atorvastatin (LIPITOR) 80 MG tablet  Active   estradiol (ESTRACE  "VAGINAL) 0.1 MG/GM vaginal cream  Active   losartan (COZAAR) 100 MG Tab  Active   Mirabegron ER 50 MG TABLET SR 24 HR  Active   TRELEGY ELLIPTA 200-62.5-25 MCG/ACT inhaler  Active                  Audit from Redirected Encounters    **Home medications have not yet been reviewed for this encounter**         ALLERGIES  Allergies   Allergen Reactions    Morphine Unspecified     Hallucinations, nightmares, and altered mentations    Lactaid [Lactase] Diarrhea     Upset stomach       PHYSICAL EXAM  VITAL SIGNS: /68   Pulse (!) 124   Temp 35.9 °C (96.7 °F) (Temporal)   Resp (!) 32   Ht 1.829 m (6' 0.01\")   Wt 82.6 kg (182 lb 1.6 oz)   SpO2 (!) 82%   BMI 24.69 kg/m²    Constitutional: Awake and alert  HENT: Normal inspection  Eyes: Normal inspection  Neck: Grossly normal range of motion.  Cardiovascular: Normal heart rate, Normal rhythm.  Symmetric peripheral pulses.   Thorax & Lungs: No respiratory distress, No wheezing, No rales, No rhonchi, No chest tenderness.   Abdomen: Bowel sounds normal, soft, non-distended, nontender, no mass  Skin: No rash.  Back: No tenderness, No CVA tenderness.   Extremities: No clubbing, cyanosis, edema, no Homans or cords.  Neurologic: Grossly normal   Psychiatric: Normal for situation     DIAGNOSTIC STUDIES / PROCEDURES  LABS/EKG  Results for orders placed or performed during the hospital encounter of 25   EKG    Collection Time: 25 11:22 AM   Result Value Ref Range    Report       [facility] Emergency Dept.    Test Date:  2025  Pt Name:    CARIDAD PARK            Department: ER  MRN:        [id number]                      Room:        15  Gender:     Female                       Technician: [id number]  :        1945                   Requested By:ER TRIAGE PROTOCOL  Order #:    [id number]                    Reading MD:    Measurements  Intervals                                Axis  Rate:       113                          P:          " 72  AR:         152                          QRS:        -2  QRSD:       87                           T:          38  QT:         361  QTc:        495    Interpretive Statements  Sinus tachycardia  Borderline prolonged QT interval  Compared to ECG 01/16/2025 21:10:42  Sinus rhythm no longer present  ST (T wave) deviation no longer present     CBC with Differential    Collection Time: 05/13/25 11:31 AM   Result Value Ref Range    WBC 10.9 (H) 4.8 - 10.8 K/uL    RBC 5.00 4.20 - 5.40 M/uL    Hemoglobin 14.0 12.0 - 16.0 g/dL    Hematocrit 42.1 37.0 - 47.0 %    MCV 84.2 81.4 - 97.8 fL    MCH 28.0 27.0 - 33.0 pg    MCHC 33.3 32.2 - 35.5 g/dL    RDW 44.4 35.9 - 50.0 fL    Platelet Count 170 164 - 446 K/uL    MPV 10.1 9.0 - 12.9 fL    Neutrophils-Polys 72.40 (H) 44.00 - 72.00 %    Lymphocytes 10.40 (L) 22.00 - 41.00 %    Monocytes 0.00 0.00 - 13.40 %    Eosinophils 0.00 0.00 - 6.90 %    Basophils 0.00 0.00 - 1.80 %    Nucleated RBC 0.00 0.00 - 0.20 /100 WBC    Neutrophils (Absolute) 9.11 (H) 1.82 - 7.42 K/uL    Lymphs (Absolute) 1.13 1.00 - 4.80 K/uL    Monos (Absolute) 0.00 0.00 - 0.85 K/uL    Eos (Absolute) 0.00 0.00 - 0.51 K/uL    Baso (Absolute) 0.00 0.00 - 0.12 K/uL    NRBC (Absolute) 0.00 K/uL    Anisocytosis 1+     Microcytosis 1+    Comp Metabolic Panel    Collection Time: 05/13/25 11:31 AM   Result Value Ref Range    Sodium 135 135 - 145 mmol/L    Potassium 3.7 3.6 - 5.5 mmol/L    Chloride 100 96 - 112 mmol/L    Co2 23 20 - 33 mmol/L    Anion Gap 12.0 7.0 - 16.0    Glucose 163 (H) 65 - 99 mg/dL    Bun 26 (H) 8 - 22 mg/dL    Creatinine 1.05 0.50 - 1.40 mg/dL    Calcium 8.6 8.5 - 10.5 mg/dL    Correct Calcium 8.9 8.5 - 10.5 mg/dL    AST(SGOT) 25 12 - 45 U/L    ALT(SGPT) 16 2 - 50 U/L    Alkaline Phosphatase 76 30 - 99 U/L    Total Bilirubin 2.1 (H) 0.1 - 1.5 mg/dL    Albumin 3.6 3.2 - 4.9 g/dL    Total Protein 6.7 6.0 - 8.2 g/dL    Globulin 3.1 1.9 - 3.5 g/dL    A-G Ratio 1.2 g/dL   proBrain Natriuretic Peptide, NT     Collection Time: 05/13/25 11:31 AM   Result Value Ref Range    NT-proBNP 2267 (H) 0 - 125 pg/mL   Troponin    Collection Time: 05/13/25 11:31 AM   Result Value Ref Range    Troponin T 22 (H) 6 - 19 ng/L   Lactic Acid    Collection Time: 05/13/25 11:31 AM   Result Value Ref Range    Lactic Acid 2.8 (H) 0.5 - 2.0 mmol/L   Blood Culture - Draw one from central line and one from peripheral site    Collection Time: 05/13/25 11:31 AM    Specimen: Line; Blood   Result Value Ref Range    Significant Indicator NEG     Source BLD     Site Peripheral     Culture Result       No Growth  Note: Blood cultures are incubated for 5 days and  are monitored continuously.Positive blood cultures  are called to the RN and reported as soon as  they are identified.     ESTIMATED GFR    Collection Time: 05/13/25 11:31 AM   Result Value Ref Range    GFR (CKD-EPI) 54 (A) >60 mL/min/1.73 m 2   DIFFERENTIAL MANUAL    Collection Time: 05/13/25 11:31 AM   Result Value Ref Range    Bands-Stabs 11.20 (H) 0.00 - 10.00 %    Metamyelocytes 4.30 %    Myelocytes 1.70 %    Manual Diff Status PERFORMED    PERIPHERAL SMEAR REVIEW    Collection Time: 05/13/25 11:31 AM   Result Value Ref Range    Peripheral Smear Review see below    PLATELET ESTIMATE    Collection Time: 05/13/25 11:31 AM   Result Value Ref Range    Plt Estimation Normal    MORPHOLOGY    Collection Time: 05/13/25 11:31 AM   Result Value Ref Range    RBC Morphology Present     Poikilocytosis 1+     Echinocytes 1+    Blood Culture - Draw one from central line and one from peripheral site    Collection Time: 05/13/25 11:52 AM    Specimen: Peripheral; Blood   Result Value Ref Range    Significant Indicator NEG     Source BLD     Site PERIPHERAL     Culture Result       No Growth  Note: Blood cultures are incubated for 5 days and  are monitored continuously.Positive blood cultures  are called to the RN and reported as soon as  they are identified.     POC CoV-2, FLU A/B, RSV by PCR     Collection Time: 05/13/25 12:08 PM   Result Value Ref Range    POC Influenza A RNA, PCR Negative Negative    POC Influenza B RNA, PCR Negative Negative    POC RSV, by PCR Negative Negative    POC SARS-CoV-2, PCR NotDetected NotDetected   Lactic Acid    Collection Time: 05/13/25  1:23 PM   Result Value Ref Range    Lactic Acid 2.8 (H) 0.5 - 2.0 mmol/L      I have independently interpreted this EKG as documented above    Rhythm strip interpretation-sinus tachycardia    RADIOLOGY  CT-CTA CHEST PULMONARY ARTERY W/ RECONS   Final Result      1.  No evidence of pulmonary embolism.   2.  Multifocal pneumonia, greatest in the left lower lobe. Recommend follow-up imaging to document resolution.   3.  Small left pleural effusion   4.  Emphysematous changes.   5.  Fluid in the mid and distal esophagus, which can predispose to aspiration.   6.  Enlarged right hilar lymph node, likely reactive.   7.  Questionable 1.9 cm left thyroid nodule. Recommend nonemergent ultrasound evaluation   8.  Ascending aortic ectasia measuring 4.0 cm.   9.  Tiny, nonobstructing left renal stone versus parenchymal opacification.               DX-CHEST-PORTABLE (1 VIEW)   Final Result      1.  Small left pleural effusion with adjacent airspace disease.   2.  Interstitial infiltrates and/or edema, greater on the left.            COURSE & MEDICAL DECISION MAKING    INITIAL ASSESSMENT, COURSE AND PLAN  Case narrative: Patient presents with weakness and acute on chronic hypoxic respiratory failure.  History of COPD, but moving good air has diffuse rhonchi.  Has a history of pneumonia.  Sepsis protocol was initiated.  Ordered antibiotic per protocol.  Give 1 L fluid bolus pending labs.    Trial DuoNeb and given Solu-Medrol.  No major change.  Has persistent tachypnea.  Started on high flow oxygen.    Chest x-ray pleural effusion infiltrates or edema.  Patient does not appear volume overloaded on examination.  She remained persistently tachycardic.   Ordered CT pulmonary angiogram.    Appears improved.    CT pulmonary angiogram with multifocal pneumonia.  Has bandemia.  Patient is critically ill.  Consult intensivist.    Discussed case with Dr. Keenan.  He will admit patient to the ICU.        Interventions  Medications   iohexol (OMNIPAQUE) 350 mg/mL (IV) (has no administration in time range)   cefepime (Maxipime) 2 g in  mL IVPB (has no administration in time range)   azithromycin (ZITHROMAX) 500 mg in D5W 250 mL IVPB premix (has no administration in time range)   ipratropium-albuterol (DUONEB) nebulizer solution (has no administration in time range)   albuterol (Proventil) 2.5mg/0.5ml nebulizer solution 2.5 mg (has no administration in time range)   methylPREDNISolone (SOLU-MEDROL) 40 MG injection 40 mg (has no administration in time range)   LR (Bolus) infusion 1,000 mL (has no administration in time range)   acetaminophen (Tylenol) tablet 650 mg (has no administration in time range)   senna-docusate (Pericolace Or Senokot S) 8.6-50 MG per tablet 2 Tablet (has no administration in time range)     And   polyethylene glycol/lytes (Miralax) Packet 1 Packet (has no administration in time range)   ondansetron (Zofran) syringe/vial injection 4 mg (has no administration in time range)     Or   ondansetron (Zofran ODT) dispertab 4 mg (has no administration in time range)   apixaban (Eliquis) tablet 5 mg (has no administration in time range)   atorvastatin (Lipitor) tablet 80 mg (has no administration in time range)   fluticasone-umeclidinium-vilanterol (Trelegy Ellipta) 200-62.5-25 MCG/ACT inhaler 1 Puff (has no administration in time range)   LR infusion (bolus) (0 mL Intravenous Stopped 5/13/25 1322)   ipratropium-albuterol (DUONEB) nebulizer solution (3 mL Nebulization Given 5/13/25 1216)   ampicillin/sulbactam (Unasyn) 3 g in  mL IVPB (0 g Intravenous Stopped 5/13/25 1306)   azithromycin (Zithromax) tablet 500 mg (500 mg Oral Given 5/13/25 2771)    methylPREDNISolone (SOLU-MEDROL) 40 MG injection 40 mg (40 mg Intravenous Given 5/13/25 1218)       Measures  Sepsis: Infection was suspected 11:30 AM (Time). Sepsis pathway was initiated. Less than 30cc/kg because of concern for volume overload 1000 mL of crystalloid was ordered. Antibiotics were given per protocol.        DISPOSITION AND DISCUSSIONS  I have discussed management of the patient with the following physicians and PAPITO's:  as noted above    FINAL IMPRESSION  1.  Acute on chronic hypoxic respiratory failure  2.  Multifocal pneumonia    CRITICAL CARE  The very real possibilty of a deterioration of this patient's condition required the highest level of my preparedness for sudden, emergent intervention.  I provided critical care services, which included medication orders, frequent reevaluations of the patient's condition and response to treatment, ordering and reviewing test results, and discussing the case with various consultants.  The critical care time associated with the care of the patient was 35 minutes. Review chart for interventions. This time is exclusive of any other billable procedures.     This dictation was created using voice recognition software. The accuracy of the dictation is limited to the abilities of the software. I expect there may be some errors of grammar and possibly content. The nursing notes were reviewed and certain aspects of this information were incorporated into this note.    Electronically signed by: Serge Gunderson M.D., 5/13/2025

## 2025-05-13 NOTE — ASSESSMENT & PLAN NOTE
This is Sepsis Present on admission - improving  Source is Pulmonary  S/p sepsis protocol and crystalloid resuscitation  Continue IV antibiotics as appropriate for source of sepsis, follow-up on final cultures  S/p phenylephrine gtt (discontinued 5/14)  Monitor urine output, vital signs

## 2025-05-13 NOTE — H&P
Critical Care History & Physical    Date of consult: 05/13/25    Referring Physician  Juan Wagoner M.D.    Reason for Consultation  Chief Complaint   Patient presents with    Shortness of Breath     Onset this am. Recent bacterial pneumonia infection. Hx of COPD. 88% RA on 3L/min NC this am at home. + weakness, + lethargy, + diarrhea       History of Presenting Illness  80 y.o. 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  She was admitted in January for 3 days for fall and ankle pain without fracture.  She also was treated for COPD exacerbation at that time.  She was sent to SNF and recently discharged from there.    She presents to the ED today complaining of weakness and shortness of breath.  In the ED she was found to have acute on chronic respiratory failure and was escalated to HFNC at 40L/100% FiO2 with RR in 30s.  She was given nebs, steroids and antibiotics.  CT PE shows LLL consolidation and small effusion with mild infiltrates elsewhere as well as emphysematous changes.    Code Status  Full Code    Review of Systems  Review of Systems   Constitutional:  Positive for malaise/fatigue. Negative for chills and fever.   HENT:  Negative for congestion and sore throat.    Eyes: Negative.    Respiratory:  Positive for cough, sputum production, shortness of breath and wheezing.    Cardiovascular:  Negative for chest pain and palpitations.   Gastrointestinal:  Negative for abdominal pain, nausea and vomiting.   Genitourinary: Negative.    Musculoskeletal: Negative.    Skin: Negative.    Neurological:  Negative for focal weakness and headaches.   All other systems reviewed and are negative.      Past Medical History   has a past medical history of Asthma, Blood clotting disorder (HCC), Cancer (HCC) (1978), COPD, Healthcare maintenance (5/22/2018), Hernia of unspecified site of abdominal  cavity without mention of obstruction or gangrene (2011), Hypertension, Inguinal hernia, Kidney stones, TIA (transient ischemic attack), Tobacco dependence (5/22/2018), Tobacco use, and Viral URI with cough (4/18/2019).    Surgical History   has a past surgical history that includes hernia repair; thyroidectomy; other (01/01/1983); inguinal hernia repair (03/28/2011); and cataract extraction with iol (Bilateral, 08/2022).    Family History  Reviewed and not pertinent    Social History   reports that she quit smoking about 6 years ago. Her smoking use included cigarettes. She started smoking about 63 years ago. She has a 57 pack-year smoking history. She has never used smokeless tobacco. She reports that she does not drink alcohol and does not use drugs.    Medications  Home Medications       Reviewed by Jose Alejandro Samuel (Pharmacy Tech) on 05/13/25 at 1316  Med List Status: Complete     Medication Last Dose Status   amLODIPine (NORVASC) 5 MG Tab 5/12/2025 Active   apixaban (ELIQUIS) 5mg Tab 5/12/2025 Active   atorvastatin (LIPITOR) 80 MG tablet 5/12/2025 Active   estradiol (ESTRACE VAGINAL) 0.1 MG/GM vaginal cream 5/8/2025 Active   losartan (COZAAR) 100 MG Tab 5/12/2025 Active   Mirabegron ER 50 MG TABLET SR 24 HR 5/12/2025 Active   TRELEGY ELLIPTA 200-62.5-25 MCG/ACT inhaler 5/12/2025 Active                  Audit from Redirected Encounters    **Home medications have not yet been reviewed for this encounter**         Allergies  Allergies   Allergen Reactions    Morphine Unspecified     Hallucinations, nightmares, and altered mentations    Lactaid [Lactase] Diarrhea     Upset stomach         Vital Signs last 24 hours  Temp:  [35.9 °C (96.7 °F)] 35.9 °C (96.7 °F)  Pulse:  [116-124] 122  Resp:  [20-32] 22  BP: (125-139)/(61-76) 129/76  SpO2:  [82 %-95 %] 91 %      Physical Exam   Physical Exam  Vitals and nursing note reviewed. Exam conducted with a chaperone present.   Constitutional:       General: She is  sleeping. She is not in acute distress.     Appearance: Normal appearance. She is ill-appearing.      Comments: Arouses easily to voice  A&Ox3   HENT:      Head: Normocephalic.      Mouth/Throat:      Mouth: Mucous membranes are moist.   Eyes:      Extraocular Movements: Extraocular movements intact.   Cardiovascular:      Rate and Rhythm: Regular rhythm. Tachycardia present.      Heart sounds: Normal heart sounds.   Pulmonary:      Effort: Tachypnea, prolonged expiration and respiratory distress present.      Breath sounds: Wheezing and rhonchi present.   Abdominal:      General: There is no distension.      Palpations: Abdomen is soft.      Tenderness: There is no abdominal tenderness. There is no guarding or rebound.   Musculoskeletal:         General: Normal range of motion.      Cervical back: Normal range of motion and neck supple.   Skin:     General: Skin is warm and dry.      Capillary Refill: Capillary refill takes less than 2 seconds.   Neurological:      General: No focal deficit present.      Mental Status: She is oriented to person, place, and time and easily aroused. Mental status is at baseline.           Fluids    Intake/Output Summary (Last 24 hours) at 5/13/2025 1545  Last data filed at 5/13/2025 1322  Gross per 24 hour   Intake 1100 ml   Output --   Net 1100 ml         Laboratory  Recent Results (from the past 48 hours)   CBC with Differential    Collection Time: 05/13/25 11:31 AM   Result Value Ref Range    WBC 10.9 (H) 4.8 - 10.8 K/uL    RBC 5.00 4.20 - 5.40 M/uL    Hemoglobin 14.0 12.0 - 16.0 g/dL    Hematocrit 42.1 37.0 - 47.0 %    MCV 84.2 81.4 - 97.8 fL    MCH 28.0 27.0 - 33.0 pg    MCHC 33.3 32.2 - 35.5 g/dL    RDW 44.4 35.9 - 50.0 fL    Platelet Count 170 164 - 446 K/uL    MPV 10.1 9.0 - 12.9 fL    Neutrophils-Polys 72.40 (H) 44.00 - 72.00 %    Lymphocytes 10.40 (L) 22.00 - 41.00 %    Monocytes 0.00 0.00 - 13.40 %    Eosinophils 0.00 0.00 - 6.90 %    Basophils 0.00 0.00 - 1.80 %     Nucleated RBC 0.00 0.00 - 0.20 /100 WBC    Neutrophils (Absolute) 9.11 (H) 1.82 - 7.42 K/uL    Lymphs (Absolute) 1.13 1.00 - 4.80 K/uL    Monos (Absolute) 0.00 0.00 - 0.85 K/uL    Eos (Absolute) 0.00 0.00 - 0.51 K/uL    Baso (Absolute) 0.00 0.00 - 0.12 K/uL    NRBC (Absolute) 0.00 K/uL    Anisocytosis 1+     Microcytosis 1+    Comp Metabolic Panel    Collection Time: 05/13/25 11:31 AM   Result Value Ref Range    Sodium 135 135 - 145 mmol/L    Potassium 3.7 3.6 - 5.5 mmol/L    Chloride 100 96 - 112 mmol/L    Co2 23 20 - 33 mmol/L    Anion Gap 12.0 7.0 - 16.0    Glucose 163 (H) 65 - 99 mg/dL    Bun 26 (H) 8 - 22 mg/dL    Creatinine 1.05 0.50 - 1.40 mg/dL    Calcium 8.6 8.5 - 10.5 mg/dL    Correct Calcium 8.9 8.5 - 10.5 mg/dL    AST(SGOT) 25 12 - 45 U/L    ALT(SGPT) 16 2 - 50 U/L    Alkaline Phosphatase 76 30 - 99 U/L    Total Bilirubin 2.1 (H) 0.1 - 1.5 mg/dL    Albumin 3.6 3.2 - 4.9 g/dL    Total Protein 6.7 6.0 - 8.2 g/dL    Globulin 3.1 1.9 - 3.5 g/dL    A-G Ratio 1.2 g/dL   proBrain Natriuretic Peptide, NT    Collection Time: 05/13/25 11:31 AM   Result Value Ref Range    NT-proBNP 2267 (H) 0 - 125 pg/mL   Troponin    Collection Time: 05/13/25 11:31 AM   Result Value Ref Range    Troponin T 22 (H) 6 - 19 ng/L   Lactic Acid    Collection Time: 05/13/25 11:31 AM   Result Value Ref Range    Lactic Acid 2.8 (H) 0.5 - 2.0 mmol/L   Blood Culture - Draw one from central line and one from peripheral site    Collection Time: 05/13/25 11:31 AM    Specimen: Line; Blood   Result Value Ref Range    Significant Indicator NEG     Source BLD     Site Peripheral     Culture Result       No Growth  Note: Blood cultures are incubated for 5 days and  are monitored continuously.Positive blood cultures  are called to the RN and reported as soon as  they are identified.     ESTIMATED GFR    Collection Time: 05/13/25 11:31 AM   Result Value Ref Range    GFR (CKD-EPI) 54 (A) >60 mL/min/1.73 m 2   DIFFERENTIAL MANUAL    Collection Time:  05/13/25 11:31 AM   Result Value Ref Range    Bands-Stabs 11.20 (H) 0.00 - 10.00 %    Metamyelocytes 4.30 %    Myelocytes 1.70 %    Manual Diff Status PERFORMED    PERIPHERAL SMEAR REVIEW    Collection Time: 05/13/25 11:31 AM   Result Value Ref Range    Peripheral Smear Review see below    PLATELET ESTIMATE    Collection Time: 05/13/25 11:31 AM   Result Value Ref Range    Plt Estimation Normal    MORPHOLOGY    Collection Time: 05/13/25 11:31 AM   Result Value Ref Range    RBC Morphology Present     Poikilocytosis 1+     Echinocytes 1+    PROCALCITONIN    Collection Time: 05/13/25 11:31 AM   Result Value Ref Range    Procalcitonin 18.10 (HH) <0.25 ng/mL   Blood Culture - Draw one from central line and one from peripheral site    Collection Time: 05/13/25 11:52 AM    Specimen: Peripheral; Blood   Result Value Ref Range    Significant Indicator NEG     Source BLD     Site PERIPHERAL     Culture Result       No Growth  Note: Blood cultures are incubated for 5 days and  are monitored continuously.Positive blood cultures  are called to the RN and reported as soon as  they are identified.     POC CoV-2, FLU A/B, RSV by PCR    Collection Time: 05/13/25 12:08 PM   Result Value Ref Range    POC Influenza A RNA, PCR Negative Negative    POC Influenza B RNA, PCR Negative Negative    POC RSV, by PCR Negative Negative    POC SARS-CoV-2, PCR NotDetected NotDetected   Lactic Acid    Collection Time: 05/13/25  1:23 PM   Result Value Ref Range    Lactic Acid 2.8 (H) 0.5 - 2.0 mmol/L   Urinalysis    Collection Time: 05/13/25  2:56 PM    Specimen: Urine   Result Value Ref Range    Micro Urine Req Microscopic    EKG    Collection Time: 05/13/25  3:07 PM   Result Value Ref Range    Report       [facility]    Test Date:  2025-05-13  Pt Name:    CARIDAD PARK            Department: ER  MRN:        [id number]                      Room:        15  Gender:     Female                       Technician:  [id number]  :        [id number]                   Requested By:ER TRIAGE PROTOCOL  Order #:    [id number]                    Reading MD: DIMITRIS REYNAGA MD    Measurements  Intervals                                Axis  Rate:       113                          P:          72  ID:         152                          QRS:        -2  QRSD:       87                           T:          38  QT:         361  QTc:        495    Interpretive Statements  Sinus tachycardia  Borderline prolonged QT interval  Compared to ECG 2025 21:10:42  Sinus rhythm no longer present  ST (T wave) deviation no longer present  Electronically Signed On 2025 15:07:13 PDT by DIMITRIS REYNAGA MD           Imaging  CT-CTA CHEST PULMONARY ARTERY W/ RECONS   Final Result      1.  No evidence of pulmonary embolism.   2.  Multifocal pneumonia, greatest in the left lower lobe. Recommend follow-up imaging to document resolution.   3.  Small left pleural effusion   4.  Emphysematous changes.   5.  Fluid in the mid and distal esophagus, which can predispose to aspiration.   6.  Enlarged right hilar lymph node, likely reactive.   7.  Questionable 1.9 cm left thyroid nodule. Recommend nonemergent ultrasound evaluation   8.  Ascending aortic ectasia measuring 4.0 cm.   9.  Tiny, nonobstructing left renal stone versus parenchymal opacification.               DX-CHEST-PORTABLE (1 VIEW)   Final Result      1.  Small left pleural effusion with adjacent airspace disease.   2.  Interstitial infiltrates and/or edema, greater on the left.            Assessment/Plan  * Acute on chronic respiratory failure with hypoxia (HCC)- (present on admission)  Assessment & Plan  Likely due to pneumonia / COPD exacerbation  Covid/RSV/influenza are negative  Check sputum culture  Continue home trelegy ellipta  Scheduled nebs  Methylpred    Cefepime given recent healthcare exposure  Azithromycin  I am titrating High Flow Nasal Cannula based on the patient's SPo2 and work  "of breathing.     Sepsis due to pneumonia (HCC)  Assessment & Plan  This is Sepsis Present on admission  SIRS criteria identified on my evaluation include: Tachycardia, with heart rate greater than 90 BPM and Tachypnea, with respirations greater than 20 per minute  Clinical indicators of end organ dysfunction include Lactic Acid greater than 2 and Acute Respiratory Failure - (mechanical ventilation or BiPap or PaO2/FiO2 ratio < 300)  Source is Pulmonary  Sepsis protocol initiated  Crystalloid Fluid Administration: Resuscitation volume of 1000 mL (with second 1000mL bolus ordered). Reason that resuscitation volume of less than 30ml/kg was ordered blood pressure responded to lesser volume  IV antibiotics as appropriate for source of sepsis  Reassessment: I have reassessed the patient's hemodynamic status    Sputum and blood cultures  Cefepime & Azithromycin  Trend lactate    Chronic obstructive pulmonary disease with acute exacerbation (HCC)- (present on admission)  Assessment & Plan  See \"Acute on chronic respiratory failure with hypoxia\" above  COPD coordinator consulted  Steroids/nebs/home inhaler/antibiotics/sputum cx    History of stroke- (present on admission)  Assessment & Plan  Apixaban  Statin    Dyslipidemia- (present on admission)  Assessment & Plan  Continue home statin    Cardioembolic stroke (HCC)- (present on admission)  Assessment & Plan  Continue home apixaban    Primary hypertension- (present on admission)  Assessment & Plan  Hold home meds in setting of COPD exacerbation and sepsis  Restart when clinically appropriate        DVT prophylaxis: Lovenox  PUD prophylaxis: NA  Glycemic control: NA  Nutrition: Regular diet  Lines: NA  Shan: SONI     Discussed patient condition and risk of morbidity and/or mortality with RN, RT, Pharmacy, , Code status disscussed, Charge nurse / hot rounds, and Patient.      The patient remains critically ill.  I am titrating High Flow Nasal Cannula based on the " patient's SPo2 and work of breathing. Critical care time = 58 minutes in directly providing and coordinating critical care and extensive data review.  No time overlap and excludes procedures.

## 2025-05-13 NOTE — DISCHARGE PLANNING
TCN following. HTH/SCP chart review completed. Note pt currently in ED 2' to complaints of shortness of breath. Patient noted to have history of COPD. Patient is followed by [facility] primary care and is currently scheduled for an appointment with primary care Established Patient with Resident Rashad Carreon M.D. Friday May 16 12:45 PM. Per review, patient with history of SNF stay most recently noted to [facility] following acute hospitalization 1/26-1/19/25 in the setting of pneumonia due to infectious organism. Patient was noted to discharge home with Nikole Guerrier  2/13/25. At this time patient noted to have hospital admission orders. Please reach out to TCN via [facility] if post acute transitional care needs are warranted for dc planning.

## 2025-05-13 NOTE — PROGRESS NOTES
Pt arrived to T[facility].    Belongings:  Cell phone  yellow colored ring  Slippers  Sweatshirt  Shirt  Pants  Underwear  White colored earrings  Orange metal bracelet    4 Eyes Skin Assessment Completed by Amaya, JOSE and JOSE Jean.    Head WDL  Ears WDL  Nose WDL  Mouth WDL  Neck WDL  Breast/Chest WDL  Shoulder Blades WDL  Spine WDL  (R) Arm/Elbow/Hand WDL  (L) Arm/Elbow/Hand WDL  Abdomen WDL  Groin Redness and Rash  Scrotum/Coccyx/Buttocks Redness and Blanching  (R) Leg WDL  (L) Leg WDL  (R) Heel/Foot/Toe Redness and Blanching cracked heel  (L) Heel/Foot/Toe Redness ankle, heel cracked      Devices In Places ECG, Blood Pressure Cuff, Pulse Ox, SCD's, and HFNC, purewick      Interventions In Place Gray Ear Foams, Sacral Mepilex, TAP System, Pillows, Q2 Turns, Low Air Loss Mattress, and Heels Loaded W/Pillows    Possible Skin Injury No    Pictures Uploaded Into Epic Yes  Wound Consult Placed N/A  RN Wound Prevention Protocol Ordered Yes

## 2025-05-13 NOTE — ASSESSMENT & PLAN NOTE
Controlled today given recent shock  Continue to hold amlodipine and losartan and resume when appropriate

## 2025-05-13 NOTE — ASSESSMENT & PLAN NOTE
Due to pneumonia/COPD exacerbation  S/p HFNC (discontinued 5/14)  RT/O2 protocol, titrating oxygen therapy to goal SPO2 greater than 88%  Continue to correct underlying causes  Encourage incentive spirometry, mobilization, PEP   yes

## 2025-05-13 NOTE — ASSESSMENT & PLAN NOTE
Systemic steroids  Scheduled and as needed bronchodilators  RT protocol  Outpatient pulmonary rehab and follow-up

## 2025-05-13 NOTE — ED TRIAGE NOTES
"Chief Complaint   Patient presents with    Shortness of Breath     Onset this am. Recent bacterial pneumonia infection. Hx of COPD. 88% RA on 3L/min NC this am at home. + weakness, + lethargy, + diarrhea     BIB EMS. Pt was given Duoneb x 2 with little relief. ETCO2 20. . 118/73, . Pt placed to 15L/min NRB. 20 GA IV Rt FA.     Pt alert, oriented, speaking full sentences. Audible rhonchi. Resp 32 x/ min. Pt from home. [facility].    /68   Pulse (!) 124   Resp (!) 32   Ht 1.829 m (6' 0.01\")   Wt 82.6 kg (182 lb 1.6 oz)   SpO2 (!) 82%   BMI 24.69 kg/m²         "

## 2025-05-13 NOTE — ED NOTES
Med Rec complete per patient   Allergies reviewed  Antibiotics in the past 30 days:no  Anticoagulant in past 14 days:yes  Anticoagulant:Eliquis 5 mg Last dose:05/12/25  Pharmacy patient utilizes:[facility] on [address]

## 2025-05-14 ENCOUNTER — APPOINTMENT (OUTPATIENT)
Dept: CARDIOLOGY | Facility: MEDICAL CENTER | Age: 80
DRG: 871 | End: 2025-05-14
Attending: INTERNAL MEDICINE
Payer: MEDICARE

## 2025-05-14 PROBLEM — E87.8 ELECTROLYTE ABNORMALITY: Status: ACTIVE | Noted: 2025-05-14

## 2025-05-14 PROBLEM — E04.1 THYROID NODULE: Status: ACTIVE | Noted: 2025-05-14

## 2025-05-14 PROBLEM — I48.3 TYPICAL ATRIAL FLUTTER (HCC): Status: ACTIVE | Noted: 2025-05-14

## 2025-05-14 LAB
ALBUMIN SERPL BCP-MCNC: 3.1 G/DL (ref 3.2–4.9)
ALBUMIN/GLOB SERPL: 1 G/DL
ALP SERPL-CCNC: 66 U/L (ref 30–99)
ALT SERPL-CCNC: 16 U/L (ref 2–50)
ANION GAP SERPL CALC-SCNC: 9 MMOL/L (ref 7–16)
AST SERPL-CCNC: 20 U/L (ref 12–45)
BILIRUB SERPL-MCNC: 1.3 MG/DL (ref 0.1–1.5)
BUN SERPL-MCNC: 29 MG/DL (ref 8–22)
CALCIUM ALBUM COR SERPL-MCNC: 9.1 MG/DL (ref 8.5–10.5)
CALCIUM SERPL-MCNC: 8.4 MG/DL (ref 8.5–10.5)
CHLORIDE SERPL-SCNC: 104 MMOL/L (ref 96–112)
CO2 SERPL-SCNC: 23 MMOL/L (ref 20–33)
CREAT SERPL-MCNC: 0.93 MG/DL (ref 0.5–1.4)
EKG IMPRESSION: NORMAL
ERYTHROCYTE [DISTWIDTH] IN BLOOD BY AUTOMATED COUNT: 45.6 FL (ref 35.9–50)
GFR SERPLBLD CREATININE-BSD FMLA CKD-EPI: 62 ML/MIN/1.73 M 2
GLOBULIN SER CALC-MCNC: 3 G/DL (ref 1.9–3.5)
GLUCOSE SERPL-MCNC: 132 MG/DL (ref 65–99)
HCT VFR BLD AUTO: 37.6 % (ref 37–47)
HGB BLD-MCNC: 12.1 G/DL (ref 12–16)
LACTATE SERPL-SCNC: 1.8 MMOL/L (ref 0.5–2)
LV EJECT FRACT  99904: 62
LV EJECT FRACT MOD 2C 99903: 60.29
LV EJECT FRACT MOD 4C 99902: 62.67
LV EJECT FRACT MOD BP 99901: 61.49
MAGNESIUM SERPL-MCNC: 1.9 MG/DL (ref 1.5–2.5)
MCH RBC QN AUTO: 27.8 PG (ref 27–33)
MCHC RBC AUTO-ENTMCNC: 32.2 G/DL (ref 32.2–35.5)
MCV RBC AUTO: 86.2 FL (ref 81.4–97.8)
PHOSPHATE SERPL-MCNC: 2.8 MG/DL (ref 2.5–4.5)
PLATELET # BLD AUTO: 147 K/UL (ref 164–446)
PMV BLD AUTO: 10.2 FL (ref 9–12.9)
POTASSIUM SERPL-SCNC: 3.8 MMOL/L (ref 3.6–5.5)
PROT SERPL-MCNC: 6.1 G/DL (ref 6–8.2)
RBC # BLD AUTO: 4.36 M/UL (ref 4.2–5.4)
SODIUM SERPL-SCNC: 136 MMOL/L (ref 135–145)
WBC # BLD AUTO: 13 K/UL (ref 4.8–10.8)

## 2025-05-14 PROCEDURE — A9270 NON-COVERED ITEM OR SERVICE: HCPCS | Performed by: INTERNAL MEDICINE

## 2025-05-14 PROCEDURE — 700101 HCHG RX REV CODE 250: Performed by: STUDENT IN AN ORGANIZED HEALTH CARE EDUCATION/TRAINING PROGRAM

## 2025-05-14 PROCEDURE — 93010 ELECTROCARDIOGRAM REPORT: CPT | Performed by: INTERNAL MEDICINE

## 2025-05-14 PROCEDURE — 84100 ASSAY OF PHOSPHORUS: CPT

## 2025-05-14 PROCEDURE — 99152 MOD SED SAME PHYS/QHP 5/>YRS: CPT | Performed by: INTERNAL MEDICINE

## 2025-05-14 PROCEDURE — 770022 HCHG ROOM/CARE - ICU (200)

## 2025-05-14 PROCEDURE — A9270 NON-COVERED ITEM OR SERVICE: HCPCS | Performed by: STUDENT IN AN ORGANIZED HEALTH CARE EDUCATION/TRAINING PROGRAM

## 2025-05-14 PROCEDURE — 94640 AIRWAY INHALATION TREATMENT: CPT

## 2025-05-14 PROCEDURE — 83605 ASSAY OF LACTIC ACID: CPT

## 2025-05-14 PROCEDURE — 93005 ELECTROCARDIOGRAM TRACING: CPT | Mod: TC | Performed by: EMERGENCY MEDICINE

## 2025-05-14 PROCEDURE — 94669 MECHANICAL CHEST WALL OSCILL: CPT

## 2025-05-14 PROCEDURE — 700102 HCHG RX REV CODE 250 W/ 637 OVERRIDE(OP): Performed by: INTERNAL MEDICINE

## 2025-05-14 PROCEDURE — 700105 HCHG RX REV CODE 258: Performed by: STUDENT IN AN ORGANIZED HEALTH CARE EDUCATION/TRAINING PROGRAM

## 2025-05-14 PROCEDURE — 83735 ASSAY OF MAGNESIUM: CPT

## 2025-05-14 PROCEDURE — 93306 TTE W/DOPPLER COMPLETE: CPT

## 2025-05-14 PROCEDURE — 99291 CRITICAL CARE FIRST HOUR: CPT | Mod: 25 | Performed by: INTERNAL MEDICINE

## 2025-05-14 PROCEDURE — 85027 COMPLETE CBC AUTOMATED: CPT

## 2025-05-14 PROCEDURE — 5A2204Z RESTORATION OF CARDIAC RHYTHM, SINGLE: ICD-10-PCS | Performed by: INTERNAL MEDICINE

## 2025-05-14 PROCEDURE — 80053 COMPREHEN METABOLIC PANEL: CPT

## 2025-05-14 PROCEDURE — 700111 HCHG RX REV CODE 636 W/ 250 OVERRIDE (IP)

## 2025-05-14 PROCEDURE — 700102 HCHG RX REV CODE 250 W/ 637 OVERRIDE(OP): Performed by: STUDENT IN AN ORGANIZED HEALTH CARE EDUCATION/TRAINING PROGRAM

## 2025-05-14 PROCEDURE — 92950 HEART/LUNG RESUSCITATION CPR: CPT | Performed by: INTERNAL MEDICINE

## 2025-05-14 PROCEDURE — 94664 DEMO&/EVAL PT USE INHALER: CPT

## 2025-05-14 PROCEDURE — 700111 HCHG RX REV CODE 636 W/ 250 OVERRIDE (IP): Mod: JZ | Performed by: INTERNAL MEDICINE

## 2025-05-14 PROCEDURE — 700105 HCHG RX REV CODE 258: Performed by: INTERNAL MEDICINE

## 2025-05-14 PROCEDURE — 93306 TTE W/DOPPLER COMPLETE: CPT | Mod: 26 | Performed by: INTERNAL MEDICINE

## 2025-05-14 PROCEDURE — 700111 HCHG RX REV CODE 636 W/ 250 OVERRIDE (IP): Mod: JZ | Performed by: STUDENT IN AN ORGANIZED HEALTH CARE EDUCATION/TRAINING PROGRAM

## 2025-05-14 RX ORDER — IPRATROPIUM BROMIDE AND ALBUTEROL SULFATE 2.5; .5 MG/3ML; MG/3ML
3 SOLUTION RESPIRATORY (INHALATION)
Status: DISCONTINUED | OUTPATIENT
Start: 2025-05-14 | End: 2025-05-15

## 2025-05-14 RX ORDER — ADENOSINE 3 MG/ML
6 INJECTION, SOLUTION INTRAVENOUS ONCE
Status: COMPLETED | OUTPATIENT
Start: 2025-05-14 | End: 2025-05-14

## 2025-05-14 RX ORDER — DEXTROSE MONOHYDRATE 50 MG/ML
INJECTION, SOLUTION INTRAVENOUS CONTINUOUS
Status: DISCONTINUED | OUTPATIENT
Start: 2025-05-14 | End: 2025-05-15

## 2025-05-14 RX ORDER — AZITHROMYCIN 250 MG/1
500 TABLET, FILM COATED ORAL DAILY
Status: COMPLETED | OUTPATIENT
Start: 2025-05-15 | End: 2025-05-15

## 2025-05-14 RX ORDER — MAGNESIUM SULFATE HEPTAHYDRATE 40 MG/ML
2 INJECTION, SOLUTION INTRAVENOUS ONCE
Status: COMPLETED | OUTPATIENT
Start: 2025-05-14 | End: 2025-05-14

## 2025-05-14 RX ORDER — ALBUTEROL SULFATE 5 MG/ML
2.5 SOLUTION RESPIRATORY (INHALATION)
Status: DISCONTINUED | OUTPATIENT
Start: 2025-05-14 | End: 2025-05-16 | Stop reason: HOSPADM

## 2025-05-14 RX ORDER — MIDAZOLAM HYDROCHLORIDE 1 MG/ML
1-5 INJECTION INTRAMUSCULAR; INTRAVENOUS ONCE
Status: COMPLETED | OUTPATIENT
Start: 2025-05-14 | End: 2025-05-14

## 2025-05-14 RX ORDER — POTASSIUM CHLORIDE 1500 MG/1
40 TABLET, EXTENDED RELEASE ORAL ONCE
Status: COMPLETED | OUTPATIENT
Start: 2025-05-14 | End: 2025-05-14

## 2025-05-14 RX ADMIN — AMIODARONE HYDROCHLORIDE 150 MG: 1.5 INJECTION, SOLUTION INTRAVENOUS at 07:23

## 2025-05-14 RX ADMIN — ATORVASTATIN CALCIUM 80 MG: 80 TABLET, FILM COATED ORAL at 05:54

## 2025-05-14 RX ADMIN — DEXTROSE MONOHYDRATE: 50 INJECTION, SOLUTION INTRAVENOUS at 07:20

## 2025-05-14 RX ADMIN — AZITHROMYCIN 500 MG: 500 INJECTION, POWDER, LYOPHILIZED, FOR SOLUTION INTRAVENOUS at 05:55

## 2025-05-14 RX ADMIN — CEFEPIME 2 G: 2 INJECTION, POWDER, FOR SOLUTION INTRAVENOUS at 05:54

## 2025-05-14 RX ADMIN — AMIODARONE HYDROCHLORIDE 0.5 MG/MIN: 1.8 INJECTION, SOLUTION INTRAVENOUS at 13:34

## 2025-05-14 RX ADMIN — PHENYLEPHRINE HYDROCHLORIDE 0.25 MCG/KG/MIN: 100 INJECTION INTRAVENOUS at 08:36

## 2025-05-14 RX ADMIN — FENTANYL CITRATE 50 MCG: 50 INJECTION, SOLUTION INTRAMUSCULAR; INTRAVENOUS at 08:03

## 2025-05-14 RX ADMIN — THIAMINE HYDROCHLORIDE 200 MG: 100 INJECTION, SOLUTION INTRAMUSCULAR; INTRAVENOUS at 05:54

## 2025-05-14 RX ADMIN — APIXABAN 5 MG: 5 TABLET, FILM COATED ORAL at 18:09

## 2025-05-14 RX ADMIN — MAGNESIUM SULFATE HEPTAHYDRATE 2 G: 2 INJECTION, SOLUTION INTRAVENOUS at 07:33

## 2025-05-14 RX ADMIN — AMIODARONE HYDROCHLORIDE 0.5 MG/MIN: 1.8 INJECTION, SOLUTION INTRAVENOUS at 23:59

## 2025-05-14 RX ADMIN — ADENOSINE 6 MG: 3 INJECTION INTRAVENOUS at 07:13

## 2025-05-14 RX ADMIN — DEXTROSE MONOHYDRATE: 50 INJECTION, SOLUTION INTRAVENOUS at 22:49

## 2025-05-14 RX ADMIN — IPRATROPIUM BROMIDE AND ALBUTEROL SULFATE 3 ML: .5; 2.5 SOLUTION RESPIRATORY (INHALATION) at 07:05

## 2025-05-14 RX ADMIN — IPRATROPIUM BROMIDE AND ALBUTEROL SULFATE 3 ML: .5; 2.5 SOLUTION RESPIRATORY (INHALATION) at 14:37

## 2025-05-14 RX ADMIN — CEFEPIME 2 G: 2 INJECTION, POWDER, FOR SOLUTION INTRAVENOUS at 21:31

## 2025-05-14 RX ADMIN — POTASSIUM CHLORIDE 40 MEQ: 1500 TABLET, EXTENDED RELEASE ORAL at 07:41

## 2025-05-14 RX ADMIN — MIDAZOLAM HYDROCHLORIDE 3 MG: 1 INJECTION, SOLUTION INTRAMUSCULAR; INTRAVENOUS at 08:03

## 2025-05-14 RX ADMIN — APIXABAN 5 MG: 5 TABLET, FILM COATED ORAL at 05:55

## 2025-05-14 RX ADMIN — CEFEPIME 2 G: 2 INJECTION, POWDER, FOR SOLUTION INTRAVENOUS at 13:38

## 2025-05-14 RX ADMIN — IPRATROPIUM BROMIDE AND ALBUTEROL SULFATE 3 ML: .5; 2.5 SOLUTION RESPIRATORY (INHALATION) at 09:51

## 2025-05-14 RX ADMIN — AMIODARONE HYDROCHLORIDE 1 MG/MIN: 1.8 INJECTION, SOLUTION INTRAVENOUS at 07:40

## 2025-05-14 RX ADMIN — IPRATROPIUM BROMIDE AND ALBUTEROL SULFATE 3 ML: .5; 2.5 SOLUTION RESPIRATORY (INHALATION) at 18:18

## 2025-05-14 RX ADMIN — PHENYLEPHRINE HYDROCHLORIDE 1.75 MCG/KG/MIN: 100 INJECTION INTRAVENOUS at 14:47

## 2025-05-14 RX ADMIN — FLUTICASONE FUROATE, UMECLIDINIUM BROMIDE AND VILANTEROL TRIFENATATE 1 PUFF: 200; 62.5; 25 POWDER RESPIRATORY (INHALATION) at 05:55

## 2025-05-14 RX ADMIN — IPRATROPIUM BROMIDE AND ALBUTEROL SULFATE 3 ML: .5; 2.5 SOLUTION RESPIRATORY (INHALATION) at 03:26

## 2025-05-14 RX ADMIN — METHYLPREDNISOLONE SODIUM SUCCINATE 40 MG: 40 INJECTION, POWDER, FOR SOLUTION INTRAMUSCULAR; INTRAVENOUS at 05:54

## 2025-05-14 ASSESSMENT — ENCOUNTER SYMPTOMS
BRUISES/BLEEDS EASILY: 0
BLURRED VISION: 0
PALPITATIONS: 0
SENSORY CHANGE: 0
NAUSEA: 0
ABDOMINAL PAIN: 0
HEADACHES: 0
DIZZINESS: 0
COUGH: 1
SORE THROAT: 0
BACK PAIN: 0
FEVER: 0
MYALGIAS: 0
SPUTUM PRODUCTION: 1
SPEECH CHANGE: 0
NERVOUS/ANXIOUS: 0
VOMITING: 0
SHORTNESS OF BREATH: 1
DEPRESSION: 0
CHILLS: 1

## 2025-05-14 ASSESSMENT — PAIN DESCRIPTION - PAIN TYPE
TYPE: ACUTE PAIN

## 2025-05-14 NOTE — PROGRESS NOTES
Procedural Note:     0700: MD Gonda notified of -160, /65; patient asymptomatic  0709: Patient placed on pads, , /57  0713 Time out  0713: Adenosine administered  /58,   0714: Procedure complete  Amio bolus and continuous infusion ordered

## 2025-05-14 NOTE — CARE PLAN
Problem: Humidified High Flow Nasal Cannula  Goal: Maintain adequate oxygenation dependent on patient condition  Description: Target End Date:  resolve prior to discharge or when underlying condition is resolved/stabilized1.  Implement humidified high flow oxygen therapy2.  Titrate high flow oxygen to maintain appropriate SpO2  5/14/2025 1643 by Kenton Marks, RRT  Outcome: Progressing  5/14/2025 1643 by Kenton Marks, RRT  Outcome: Progressing     Problem: Bronchoconstriction  Goal: Improve in air movement and diminished wheezing  Description: Target End Date:  2 to 3 days1.  Implement inhaled treatments2.  Evaluate and manage medication effects  Outcome: Progressing

## 2025-05-14 NOTE — CARE PLAN
The patient is Watcher - Medium risk of patient condition declining or worsening    Shift Goals  Clinical Goals: Wean O2, Hemodynamic stability, Mobilize, Pulmonary hygiene  Patient Goals: Rest  Family Goals: Updates, patient comfort and safety    Progress made toward(s) clinical / shift goals:        Problem: Knowledge Deficit - Standard  Goal: Patient and family/care givers will demonstrate understanding of plan of care, disease process/condition, diagnostic tests and medications  Outcome: Progressing     Problem: Knowledge Deficit - COPD  Goal: Patient/significant other demonstrates understanding of disease process, utilization of the Action Plan, medications and discharge instruction  Outcome: Progressing     Problem: Self Care  Goal: Patient will have the ability to perform ADLs independently or with assistance (bathe, groom, dress, toilet and feed)  Outcome: Progressing     Problem: Hemodynamics  Goal: Patient's hemodynamics, fluid balance and neurologic status will be stable or improve  Outcome: Progressing     Problem: Respiratory  Goal: Patient will achieve/maintain optimum respiratory ventilation and gas exchange  Outcome: Progressing     Problem: Fall Risk  Goal: Patient will remain free from falls  Outcome: Progressing       Patient is not progressing towards the following goals:

## 2025-05-14 NOTE — ASSESSMENT & PLAN NOTE
Presumed new onset with RVR - back in sinus rhythm 5/15  S/p adenosine to uncover flutter waves 5/14, s/p electrical cardioversion 5/14  S/p amiodarone bolus 5/14, transition from drip to p.o. today  Optimize electrolytes  Continuous telemetry monitoring  Continue anticoagulant

## 2025-05-14 NOTE — PROCEDURES
Conscious Sedation Procedure Note    Indication: cardioversion    Consent: I have discussed with the patient and/or the patient representative the indication, alternatives, and the possible risks and/or complications of the planned procedure and the anesthesia methods. The patient and/or patient representative appear to understand and agree to proceed.    Physician Involvement: The attending physician was present and supervising this procedure.    Pre-Sedation Documentation and Exam: Vital signs have been reviewed (see flow sheet for vitals).  I have reviewed the patient's history and review of systems.    Airway Assessment: normal    Prior History of Anesthesia Complications: none    ASA Classification: Class 4 - A patient with an incapacitating systemic disease that is a constant threat to life    Sedation/ Anesthesia Plan: intravenous sedation    Medications Used: midazolam (Versed) intravenously and fentanyl intravenously    Monitoring and Safety: The patient was placed on a cardiac monitor and vital signs, pulse oximetry and level of consciousness were continuously evaluated throughout the procedure. The patient was closely monitored until recovery from the medications was complete and the patient had returned to baseline status. Respiratory therapy was on standby at all times during the procedure.    Post-Sedation Vital Signs: Vital signs were reviewed and were stable after the procedure (see flow sheet for vitals)            Post-Sedation Exam: Lungs: unchanged BB rhonchi and Cardiovascular: NSR with frequent ectopy, no murmur           Complications: none    Sedation Time: 0800 to 0816. 16 Total minutes    CPT: 12130

## 2025-05-14 NOTE — PROCEDURES
Procedure Note    Date: 5/14/2025  Time: 0805    Procedure: Emergent electrical cardioversion, procedural sedation    Indication: New onset atrial flutter with RVR and associated hypotension  Consent: Informed consent obtained from patient or designated decision maker after explaining the benefits/risks of the procedure including but not limited to pain, loss of airway protection, hypoxemia, burn, arrythmia, or death. Patient or surrogate expressed understanding and agreement.    Procedure: After obtaining consent, a time-out was performed. Pads were placed on patient's chest in the anterior-posterior location. All back-up airway, suction, and code cart supplies ready at bedside. Patient provided conscious sedation with medications reaching a moderate sedation level. At that point, in synchronized fashion, 120J of electricity delivered with successful cardioversion to NSR with frequent ectopy. Patient left in care of RN and RT without complications.    Medications: amiodarone 150 mg IV bolus followed by gtt, phenylephrine gtt, versed 3 mg, fentanyl 50 mcg  Complications: none    Jeremy Gonda, MD  Critical Care Medicine

## 2025-05-14 NOTE — THERAPY
Physical Therapy Contact Note    Patient Name: Nohelia Dickerson  Age:  80 y.o., Sex:  female  Medical Record #: [id number]  Today's Date: 5/14/2025    PT consult received. Evaluation deferred due to hypotension and mild tachycardia s/p cardioversion this AM. Will attempt PT evaluation as able/appropriate.     Caitlin Singh, PT, DPT    [facility] [id number]

## 2025-05-14 NOTE — PROGRESS NOTES
"Critical Care Progress Note    Date of admission  5/13/2025    Chief Complaint  80 y.o. female admitted 5/13/2025 with weakness, SOB    Hospital Course  \"80 y.o. 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  She was admitted in January for 3 days for fall and ankle pain without fracture.  She also was treated for COPD exacerbation at that time.  She was sent to SNF and recently discharged from there.     She presents to the ED today complaining of weakness and shortness of breath.  In the ED she was found to have acute on chronic respiratory failure and was escalated to HFNC at 40L/100% FiO2 with RR in 30s.  She was given nebs, steroids and antibiotics.  CT PE shows LLL consolidation and small effusion with mild infiltrates elsewhere as well as emphysematous changes.\" - Dr. Wagoner 5/13    Interval Problem Update  Reviewed last 24 hour events:   - remains on HFNC   - AF, increased WBC   - AAOx4   - Aflutter with RVR this morning --> amio bolus + gtt, attempted electrical cardioversion   - plts down to 147   - low K, Mag   - ok kidney function   - maribell diet, last BM 5/13   - external urinary cath with good UOP   - resolved LA   - eliquis   - abx + steroids    Review of Systems  Review of Systems   Constitutional:  Positive for chills and malaise/fatigue. Negative for fever.   HENT:  Negative for congestion and sore throat.    Eyes:  Negative for blurred vision.   Respiratory:  Positive for cough, sputum production and shortness of breath.    Cardiovascular:  Negative for chest pain, palpitations and leg swelling.   Gastrointestinal:  Negative for abdominal pain, nausea and vomiting.   Genitourinary:  Negative for dysuria.   Musculoskeletal:  Negative for back pain and myalgias.   Skin:  Negative for rash.   Neurological:  Negative for dizziness, sensory change, speech change and headaches. "   Endo/Heme/Allergies:  Does not bruise/bleed easily.   Psychiatric/Behavioral:  Negative for depression. The patient is not nervous/anxious.    All other systems reviewed and are negative.       Vital Signs for last 24 hours   Temp:  [35.9 °C (96.7 °F)-36.8 °C (98.2 °F)] 36.8 °C (98.2 °F)  Pulse:  [] 101  Resp:  [20-65] 23  BP: (101-160)/(57-95) 137/61  SpO2:  [82 %-95 %] 93 %    Respiratory Information for the last 24 hours   HFNC 40 lpm, 85% (baseline 4 lpm n/c)    Physical Exam   Physical Exam  Vitals and nursing note reviewed.   Constitutional:       General: She is awake. She is in acute distress.      Appearance: She is overweight. She is ill-appearing.      Interventions: Nasal cannula in place.   HENT:      Head: Normocephalic.      Nose: Nose normal. No congestion.      Comments: High flow nasal cannula in place     Mouth/Throat:      Mouth: Mucous membranes are moist.      Pharynx: Oropharynx is clear.   Eyes:      General: No scleral icterus.     Conjunctiva/sclera: Conjunctivae normal.      Pupils: Pupils are equal, round, and reactive to light.   Neck:      Vascular: No JVD.      Trachea: No tracheal deviation.   Cardiovascular:      Rate and Rhythm: Tachycardia present. Rhythm irregularly irregular. Frequent Extrasystoles are present.     Chest Wall: PMI is displaced.      Pulses: Decreased pulses.           Radial pulses are 1+ on the right side and 1+ on the left side.      Heart sounds: Normal heart sounds. No murmur heard.     Comments: Heart rate 150s to 160s that initially appeared to be SVT but post adenosine, found to be a flutter with RVR  Pulmonary:      Effort: Tachypnea and respiratory distress present. No accessory muscle usage.      Breath sounds: No stridor. Examination of the right-lower field reveals rhonchi. Examination of the left-lower field reveals rhonchi. Rhonchi present. No wheezing or rales.      Comments: Able to speak in full sentences, tolerating high flow nasal  cannula  Abdominal:      General: Bowel sounds are normal. There is no distension.      Palpations: Abdomen is soft.      Tenderness: There is no abdominal tenderness. There is no guarding or rebound.   Genitourinary:     Comments: External female catheter in place  Musculoskeletal:         General: No tenderness.      Cervical back: Neck supple. No tenderness.      Right lower leg: No edema.      Left lower leg: No edema.   Skin:     General: Skin is warm and dry.      Capillary Refill: Capillary refill takes 2 to 3 seconds.      Findings: No rash.   Neurological:      General: No focal deficit present.      Mental Status: She is alert and oriented to person, place, and time.      Cranial Nerves: No cranial nerve deficit.      Sensory: No sensory deficit.   Psychiatric:         Mood and Affect: Mood normal.         Behavior: Behavior normal. Behavior is cooperative.         Thought Content: Thought content normal.         Medications  Current Medications[1]    Fluids    Intake/Output Summary (Last 24 hours) at 5/14/2025 0647  Last data filed at 5/13/2025 1322  Gross per 24 hour   Intake 1100 ml   Output --   Net 1100 ml       Laboratory          Recent Labs     05/13/25 1131 05/14/25  0330   SODIUM 135 136   POTASSIUM 3.7 3.8   CHLORIDE 100 104   CO2 23 23   BUN 26* 29*   CREATININE 1.05 0.93   MAGNESIUM  --  1.9   PHOSPHORUS  --  2.8   CALCIUM 8.6 8.4*     Recent Labs     05/13/25 1131 05/14/25  0330   ALTSGPT 16 16   ASTSGOT 25 20   ALKPHOSPHAT 76 66   TBILIRUBIN 2.1* 1.3   GLUCOSE 163* 132*     Recent Labs     05/13/25 1131 05/14/25  0330   WBC 10.9* 13.0*   NEUTSPOLYS 72.40*  --    LYMPHOCYTES 10.40*  --    MONOCYTES 0.00  --    EOSINOPHILS 0.00  --    BASOPHILS 0.00  --    ASTSGOT 25 20   ALTSGPT 16 16   ALKPHOSPHAT 76 66   TBILIRUBIN 2.1* 1.3     Recent Labs     05/13/25 1131 05/14/25  0330   RBC 5.00 4.36   HEMOGLOBIN 14.0 12.1   HEMATOCRIT 42.1 37.6   PLATELETCT 170 147*   PROTHROMBTM 17.9*  --    INR  1.47*  --        Imaging  X-Ray:  I have personally reviewed the images and compared with prior images. and My impression is: Left lower lobe pneumonia with left effusion, underlying emphysema  EKG:  I have personally reviewed the images and compared with prior images.  CT:    Reviewed    Assessment/Plan  * Acute on chronic respiratory failure with hypoxia (HCC)- (present on admission)  Assessment & Plan  Likely due to pneumonia/COPD exacerbation  I am actively titrating high flow nasal cannula to keep SpO2 greater than 88%  RT/O2 protocol  Continue to correct underlying causes    Typical atrial flutter (HCC)  Assessment & Plan  Presumed new onset with RVR  S/p adenosine to uncover flutter waves 5/14, s/p electrical cardioversion 5/14  S/p amiodarone bolus, continue drip  Optimize electrolytes  Continuous telemetry monitoring  Continue anticoagulant  Echocardiography    Chronic obstructive pulmonary disease with acute exacerbation (HCC)- (present on admission)  Assessment & Plan  Systemic steroids  Scheduled and as needed bronchodilators  RT protocol  Outpatient pulmonary rehab and follow-up    Sepsis due to pneumonia (HCC)  Assessment & Plan  This is Sepsis Present on admission  Source is Pulmonary  S/p sepsis protocol and crystalloid resuscitation  Continue IV antibiotics as appropriate for source of sepsis, follow-up on cultures  Begin phenylephrine infusion today if needed to keep MAP greater than 65  Monitor urine output, vital signs, lactic acid closely for adequacy of resuscitation    Dyslipidemia- (present on admission)  Assessment & Plan  Continue home statin    Cardioembolic stroke (HCC)- (present on admission)  Assessment & Plan  Continue home apixaban, statin    Primary hypertension- (present on admission)  Assessment & Plan  Hold amlodipine and losartan in the setting of hypotension    Thyroid nodule  Assessment & Plan  Will need outpatient follow-up, history of thyroid cancer    Electrolyte  abnormality  Assessment & Plan  Replete low potassium and magnesium levels and monitor closely    ACP (advance care planning)- (present on admission)  Assessment & Plan  Full code         VTE:  NOAC  Ulcer: Not Indicated  Lines: None    I have performed a physical exam and reviewed and updated ROS and Plan today (5/14/2025). In review of yesterday's note (5/13/2025), there are no changes except as documented above.     Patient remains critically ill today requiring a active titration of her high flow nasal cannula as well as antiarrhythmics for unstable tachyarrhythmia. High risk of deterioration and worsening vital organ dysfunction and death without the above critical care interventions.    Discussed patient condition and risk of morbidity and/or mortality with RN, RT, Pharmacy, , Charge nurse / hot rounds, and Patient. Critical care time = 48 minutes in directly providing and coordinating critical care and extensive data review.  No time overlap and excludes procedures.    Please note that this dictation was created using voice recognition software. I have made every reasonable attempt to correct obvious errors, but there may be errors of grammar and possibly content that I did not discover before finalizing the note.         [1]   Current Facility-Administered Medications   Medication Dose Route Frequency Provider Last Rate Last Admin    iohexol (OMNIPAQUE) 350 mg/mL (IV)  60 mL Intravenous Once Serge Gunderson M.D.        cefepime (Maxipime) 2 g in  mL IVPB  2 g Intravenous Q8HRS Juan Wagoner M.D. 200 mL/hr at 05/14/25 0554 2 g at 05/14/25 0554    azithromycin (ZITHROMAX) 500 mg in D5W 250 mL IVPB premix  500 mg Intravenous Q24HRS Juan Wagoner M.D. 250 mL/hr at 05/14/25 0555 500 mg at 05/14/25 0555    ipratropium-albuterol (DUONEB) nebulizer solution  3 mL Nebulization Q4HRS (RT) Juan Wagoner M.D.   3 mL at 05/14/25 0326    albuterol (Proventil) 2.5mg/0.5ml nebulizer solution 2.5 mg   2.5 mg Nebulization Q2HRS PRN (RT) Juan Wagoner M.D.        methylPREDNISolone (SOLU-MEDROL) 40 MG injection 40 mg  40 mg Intravenous DAILY Juan Wagoner M.D.   40 mg at 05/14/25 0554    LR (Bolus) infusion 1,000 mL  1,000 mL Intravenous Once PRN Juan Wagoner M.D.        acetaminophen (Tylenol) tablet 650 mg  650 mg Oral Q6HRS PRN Juan Wagoner M.D.        senna-docusate (Pericolace Or Senokot S) 8.6-50 MG per tablet 2 Tablet  2 Tablet Oral Q EVENING Juan Wagoner M.D.        And    polyethylene glycol/lytes (Miralax) Packet 1 Packet  1 Packet Oral QDAY PRN Juan Wagoner M.D.        ondansetron (Zofran) syringe/vial injection 4 mg  4 mg Intravenous Q4HRS PRN Juan Wagoner M.D.        Or    ondansetron (Zofran ODT) dispertab 4 mg  4 mg Oral Q4HRS PRN Juan Wagoner M.D.        apixaban (Eliquis) tablet 5 mg  5 mg Oral BID Juan Wagoner M.D.   5 mg at 05/14/25 0555    atorvastatin (Lipitor) tablet 80 mg  80 mg Oral DAILY Juan Wagoner M.D.   80 mg at 05/14/25 0554    fluticasone-umeclidinium-vilanterol (Trelegy Ellipta) 200-62.5-25 MCG/ACT inhaler 1 Puff  1 Puff Inhalation DAILY Juan Wagoner M.D.   1 Puff at 05/14/25 0555    MD Alert...ICU Electrolyte Replacement per Pharmacy   Other PHARMACY TO DOSE Juan Wagoner M.D.        thiamine (B-1) injection 200 mg  200 mg Intravenous DAILY Juan Wagoner M.D.   200 mg at 05/14/25 0554

## 2025-05-14 NOTE — RESPIRATORY CARE
COPD EDUCATION by COPD CLINICAL EDUCATOR  5/14/2025  at  2:58 PM by Arlen Gotti, RRT     Patient interviewed by education team.  Patient declined or is unable to participate in the full program.  Therefore, a short intervention has been conducted.  A comprehensive packet including information about COPD, types of treatments to manage their disease and safe home Oxygen usage was provided and reviewed with patient at the bedside.  We reviewed proper use of spacer, requested a nebulizer and a Pulmonary Referral.     COPD Assessment  COPD Clinical Specialists ONLY  COPD Education Initiated: Yes--Short Intervention  Is this a COPD exacerbation patient?: Yes (Pulmomary to see on transfer)  DME Company: [employer]  DME Equipment Type: O2 1.5 lpm  Physician Name: SORIN BardalesR Erin call placed to change appointment  Pulmonologist Name: none prior requested referral from PCP  Referrals Initiated: Yes  Smoking Cessation: No (quit 2019)  Home Health Care: Yes (TBD at this time)  Mobile Urgent Care Services: Yes (gave information to consider)  $ Demo/Eval of SVN's, MDI's and Aerosols: Yes (review care cleaning flutter spacer requested nebulizer from PCP via message)    PFT  (OP) Pulmonary Function Testing: Yes (last PFT 2019: FEV1-51, FEV1/FVC Ratio-62 DLCO 57 mixed)  Interdisciplinary Rounds: Attendance at Rounds (30 Min)    Meds to Beds  [facility] provides bedside medication delivery for all eligible patients at discharge and you have been automatically enrolled in the Meds to Beds Program. Would you like to opt out of this program for any reason?: No - Stay Opted In     MY COPD ACTION PLAN     It is recommended that patients and physicians/healthcare providers complete this action plan together. This plan should be discussed at each physician visit and updated as needed.    The green, yellow and red zones show groups of symptoms of COPD. This list of symptoms is not comprehensive, and you may experience other  "symptoms. In the \"Actions\" column, your healthcare provider has recommended actions for you to take based on your symptoms.    Patient Name: Nohelia Dickerson   YOB: 1945   Last Updated on: 5/14/2025  2:58 PM   Green Zone:  I am doing well today Actions     Usual activitiy and exercise level   Take daily medications     Usual amounts of cough and phlegm/mucus   Use oxygen as prescribed     Sleep well at night   Continue regular exercise/diet plan     Appetite is good   At all times avoid cigarette smoke, inhaled irritants     Daily Medications (these medications are taken every day):   Fluticasone and Umeclidinium and Vilanterol (Trelogy) 1 Puff Once daily     Additional Information:  Rinse mouth after taking          Yellow Zone:  I am having a bad day or a COPD flare Actions     More breathless than usual   Continue daily medications     I have less energy for my daily activities   Use quick relief inhaler as ordered     Increased or thicker phlegm/mucus   Use oxygen as prescribed     Using quick relief inhaler/nebulizer more often   Get plenty of rest     Swelling of ankles more than usual   Use pursed lip breathing     More coughing than usual   At all times avoid cigarette smoke, inhaled irritants     I feel like I have a \"chest cold\"     Poor sleep and my symptoms woke me up     My appetite is not good     My medicine is not helping      Call provider immediately if symptoms don’t improve     Continue daily medications, add rescue medications:   Albuterol 2 Puffs PRN       Medications to be used during a flare up, (as Discussed with Provider):           Additional Information:  Use the spacer with your rescue inhaler  We are requesting a nebulizer and medications thru your PCP     Red Zone:  I need urgent medical care Actions     Severe shortness of breath even at rest   Call 911 or seek medical care immediately     Not able to do any activity because of breathing      Fever or shaking " chills      Feeling confused or very drowsy       Chest pains      Coughing up blood

## 2025-05-14 NOTE — HOSPITAL COURSE
"\"80 y.o. 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  She was admitted in January for 3 days for fall and ankle pain without fracture.  She also was treated for COPD exacerbation at that time.  She was sent to SNF and recently discharged from there.     She presents to the ED today complaining of weakness and shortness of breath.  In the ED she was found to have acute on chronic respiratory failure and was escalated to HFNC at 40L/100% FiO2 with RR in 30s.  She was given nebs, steroids and antibiotics.  CT PE shows LLL consolidation and small effusion with mild infiltrates elsewhere as well as emphysematous changes.\" - Dr. Wagoner 5/13 5/14 - HFNC, charo gtt, Aflutter with RVR s/p electrical cardioversion, amio gtt  "

## 2025-05-14 NOTE — CARE PLAN
The patient is Watcher - Medium risk of patient condition declining or worsening    Shift Goals  Clinical Goals: hemodynamic stability, Rate/Rhythm control, wean O2  Patient Goals: rest  Family Goals: none present    Patient experienced asymptomatic SVT in the 160's at beginning of shift --> Adenosine, Amio gtt, and cardiovert. Joel gtt started for hypotension. Patient now in sinus rhythm and weaning Joel gtt.    Progress made toward(s) clinical / shift goals:    Problem: Knowledge Deficit - Standard  Goal: Patient and family/care givers will demonstrate understanding of plan of care, disease process/condition, diagnostic tests and medications  Outcome: Progressing     Problem: Knowledge Deficit - COPD  Goal: Patient/significant other demonstrates understanding of disease process, utilization of the Action Plan, medications and discharge instruction  Outcome: Progressing     Problem: Risk for Infection - COPD  Goal: Patient will remain free from signs and symptoms of infection  Outcome: Progressing     Problem: Nutrition - Advanced  Goal: Patient will display progressive weight gain toward goal have adequate food and fluid intake  Outcome: Progressing     Problem: Ineffective Airway Clearance  Goal: Patient will maintain patent airway with clear/clearing breath sounds  Outcome: Progressing     Problem: Impaired Gas Exchange  Goal: Patient will demonstrate improved ventilation and adequate oxygenation and participate in treatment regimen within the level of ability/situation.  Outcome: Progressing     Problem: Risk for Aspiration  Goal: Patient's risk for aspiration will be absent or decrease  Outcome: Progressing     Problem: Self Care  Goal: Patient will have the ability to perform ADLs independently or with assistance (bathe, groom, dress, toilet and feed)  Outcome: Progressing     Problem: Hemodynamics  Goal: Patient's hemodynamics, fluid balance and neurologic status will be stable or improve  Outcome:  Progressing     Problem: Fluid Volume  Goal: Fluid volume balance will be maintained  Outcome: Progressing     Problem: Urinary - Renal Perfusion  Goal: Ability to achieve and maintain adequate renal perfusion and functioning will improve  Outcome: Progressing     Problem: Respiratory  Goal: Patient will achieve/maintain optimum respiratory ventilation and gas exchange  Outcome: Progressing     Problem: Physical Regulation  Goal: Diagnostic test results will improve  Outcome: Progressing  Goal: Signs and symptoms of infection will decrease  Outcome: Progressing     Problem: Fall Risk  Goal: Patient will remain free from falls  Outcome: Progressing       Patient is not progressing towards the following goals:

## 2025-05-15 LAB
ANION GAP SERPL CALC-SCNC: 6 MMOL/L (ref 7–16)
BACTERIA UR CULT: NORMAL
BUN SERPL-MCNC: 23 MG/DL (ref 8–22)
CALCIUM SERPL-MCNC: 8.6 MG/DL (ref 8.5–10.5)
CHLORIDE SERPL-SCNC: 109 MMOL/L (ref 96–112)
CO2 SERPL-SCNC: 21 MMOL/L (ref 20–33)
CREAT SERPL-MCNC: 0.75 MG/DL (ref 0.5–1.4)
EKG IMPRESSION: NORMAL
ERYTHROCYTE [DISTWIDTH] IN BLOOD BY AUTOMATED COUNT: 46.7 FL (ref 35.9–50)
GFR SERPLBLD CREATININE-BSD FMLA CKD-EPI: 81 ML/MIN/1.73 M 2
GLUCOSE SERPL-MCNC: 119 MG/DL (ref 65–99)
HCT VFR BLD AUTO: 35.1 % (ref 37–47)
HGB BLD-MCNC: 11.1 G/DL (ref 12–16)
MAGNESIUM SERPL-MCNC: 2.4 MG/DL (ref 1.5–2.5)
MCH RBC QN AUTO: 27.8 PG (ref 27–33)
MCHC RBC AUTO-ENTMCNC: 31.6 G/DL (ref 32.2–35.5)
MCV RBC AUTO: 88 FL (ref 81.4–97.8)
PHOSPHATE SERPL-MCNC: 2.5 MG/DL (ref 2.5–4.5)
PLATELET # BLD AUTO: 143 K/UL (ref 164–446)
PMV BLD AUTO: 10.9 FL (ref 9–12.9)
POTASSIUM SERPL-SCNC: 4.1 MMOL/L (ref 3.6–5.5)
PROCALCITONIN SERPL-MCNC: 10.7 NG/ML
RBC # BLD AUTO: 3.99 M/UL (ref 4.2–5.4)
SIGNIFICANT IND 70042: NORMAL
SITE SITE: NORMAL
SODIUM SERPL-SCNC: 136 MMOL/L (ref 135–145)
SOURCE SOURCE: NORMAL
WBC # BLD AUTO: 10.6 K/UL (ref 4.8–10.8)

## 2025-05-15 PROCEDURE — 97535 SELF CARE MNGMENT TRAINING: CPT

## 2025-05-15 PROCEDURE — 700102 HCHG RX REV CODE 250 W/ 637 OVERRIDE(OP): Performed by: INTERNAL MEDICINE

## 2025-05-15 PROCEDURE — 99223 1ST HOSP IP/OBS HIGH 75: CPT | Performed by: HOSPITALIST

## 2025-05-15 PROCEDURE — 93005 ELECTROCARDIOGRAM TRACING: CPT | Mod: TC | Performed by: INTERNAL MEDICINE

## 2025-05-15 PROCEDURE — 85027 COMPLETE CBC AUTOMATED: CPT

## 2025-05-15 PROCEDURE — 97166 OT EVAL MOD COMPLEX 45 MIN: CPT

## 2025-05-15 PROCEDURE — 770020 HCHG ROOM/CARE - TELE (206)

## 2025-05-15 PROCEDURE — 700101 HCHG RX REV CODE 250: Performed by: INTERNAL MEDICINE

## 2025-05-15 PROCEDURE — 93010 ELECTROCARDIOGRAM REPORT: CPT | Performed by: INTERNAL MEDICINE

## 2025-05-15 PROCEDURE — 84145 PROCALCITONIN (PCT): CPT

## 2025-05-15 PROCEDURE — 700102 HCHG RX REV CODE 250 W/ 637 OVERRIDE(OP): Performed by: STUDENT IN AN ORGANIZED HEALTH CARE EDUCATION/TRAINING PROGRAM

## 2025-05-15 PROCEDURE — A9270 NON-COVERED ITEM OR SERVICE: HCPCS | Performed by: INTERNAL MEDICINE

## 2025-05-15 PROCEDURE — 99233 SBSQ HOSP IP/OBS HIGH 50: CPT | Performed by: INTERNAL MEDICINE

## 2025-05-15 PROCEDURE — 83735 ASSAY OF MAGNESIUM: CPT

## 2025-05-15 PROCEDURE — 94640 AIRWAY INHALATION TREATMENT: CPT

## 2025-05-15 PROCEDURE — 84100 ASSAY OF PHOSPHORUS: CPT

## 2025-05-15 PROCEDURE — 700111 HCHG RX REV CODE 636 W/ 250 OVERRIDE (IP): Performed by: STUDENT IN AN ORGANIZED HEALTH CARE EDUCATION/TRAINING PROGRAM

## 2025-05-15 PROCEDURE — A9270 NON-COVERED ITEM OR SERVICE: HCPCS | Performed by: STUDENT IN AN ORGANIZED HEALTH CARE EDUCATION/TRAINING PROGRAM

## 2025-05-15 PROCEDURE — 80048 BASIC METABOLIC PNL TOTAL CA: CPT

## 2025-05-15 PROCEDURE — 700105 HCHG RX REV CODE 258: Performed by: STUDENT IN AN ORGANIZED HEALTH CARE EDUCATION/TRAINING PROGRAM

## 2025-05-15 PROCEDURE — 97163 PT EVAL HIGH COMPLEX 45 MIN: CPT

## 2025-05-15 RX ORDER — AMIODARONE HYDROCHLORIDE 200 MG/1
400 TABLET ORAL TWICE DAILY
Status: DISCONTINUED | OUTPATIENT
Start: 2025-05-15 | End: 2025-05-16 | Stop reason: HOSPADM

## 2025-05-15 RX ADMIN — THIAMINE HYDROCHLORIDE 200 MG: 100 INJECTION, SOLUTION INTRAMUSCULAR; INTRAVENOUS at 06:00

## 2025-05-15 RX ADMIN — METHYLPREDNISOLONE SODIUM SUCCINATE 40 MG: 40 INJECTION, POWDER, FOR SOLUTION INTRAMUSCULAR; INTRAVENOUS at 06:00

## 2025-05-15 RX ADMIN — APIXABAN 5 MG: 5 TABLET, FILM COATED ORAL at 17:23

## 2025-05-15 RX ADMIN — CEFEPIME 2 G: 2 INJECTION, POWDER, FOR SOLUTION INTRAVENOUS at 06:00

## 2025-05-15 RX ADMIN — IPRATROPIUM BROMIDE AND ALBUTEROL SULFATE 3 ML: .5; 2.5 SOLUTION RESPIRATORY (INHALATION) at 18:49

## 2025-05-15 RX ADMIN — AMIODARONE HYDROCHLORIDE 400 MG: 200 TABLET ORAL at 17:23

## 2025-05-15 RX ADMIN — FLUTICASONE FUROATE, UMECLIDINIUM BROMIDE AND VILANTEROL TRIFENATATE 1 PUFF: 200; 62.5; 25 POWDER RESPIRATORY (INHALATION) at 06:00

## 2025-05-15 RX ADMIN — AMOXICILLIN AND CLAVULANATE POTASSIUM 1 TABLET: 875; 125 TABLET, FILM COATED ORAL at 17:23

## 2025-05-15 RX ADMIN — ATORVASTATIN CALCIUM 80 MG: 80 TABLET, FILM COATED ORAL at 06:00

## 2025-05-15 RX ADMIN — AMIODARONE HYDROCHLORIDE 400 MG: 200 TABLET ORAL at 09:07

## 2025-05-15 RX ADMIN — AZITHROMYCIN DIHYDRATE 500 MG: 250 TABLET ORAL at 06:00

## 2025-05-15 RX ADMIN — APIXABAN 5 MG: 5 TABLET, FILM COATED ORAL at 06:00

## 2025-05-15 RX ADMIN — IPRATROPIUM BROMIDE AND ALBUTEROL SULFATE 3 ML: .5; 2.5 SOLUTION RESPIRATORY (INHALATION) at 07:14

## 2025-05-15 ASSESSMENT — GAIT ASSESSMENTS
DEVIATION: INCREASED BASE OF SUPPORT;BRADYKINETIC
DISTANCE (FEET): 2
ASSISTIVE DEVICE: FRONT WHEEL WALKER
GAIT LEVEL OF ASSIST: MODERATE ASSIST

## 2025-05-15 ASSESSMENT — PAIN DESCRIPTION - PAIN TYPE
TYPE: ACUTE PAIN

## 2025-05-15 ASSESSMENT — COGNITIVE AND FUNCTIONAL STATUS - GENERAL
DAILY ACTIVITIY SCORE: 19
SUGGESTED CMS G CODE MODIFIER MOBILITY: CL
MOBILITY SCORE: 13
PERSONAL GROOMING: A LITTLE
DRESSING REGULAR UPPER BODY CLOTHING: A LITTLE
STANDING UP FROM CHAIR USING ARMS: A LOT
MOVING TO AND FROM BED TO CHAIR: A LOT
DRESSING REGULAR LOWER BODY CLOTHING: A LITTLE
TURNING FROM BACK TO SIDE WHILE IN FLAT BAD: A LITTLE
HELP NEEDED FOR BATHING: A LITTLE
MOVING FROM LYING ON BACK TO SITTING ON SIDE OF FLAT BED: A LITTLE
CLIMB 3 TO 5 STEPS WITH RAILING: TOTAL
TOILETING: A LITTLE
WALKING IN HOSPITAL ROOM: A LOT
SUGGESTED CMS G CODE MODIFIER DAILY ACTIVITY: CK

## 2025-05-15 ASSESSMENT — ENCOUNTER SYMPTOMS
VOMITING: 0
BACK PAIN: 0
INSOMNIA: 0
PALPITATIONS: 0
COUGH: 1
COUGH: 0
FOCAL WEAKNESS: 0
NAUSEA: 0
HEADACHES: 0
DIZZINESS: 0
DOUBLE VISION: 0
FEVER: 0
SHORTNESS OF BREATH: 0
WHEEZING: 0
SPUTUM PRODUCTION: 1
SINUS PAIN: 0
NERVOUS/ANXIOUS: 0
DIARRHEA: 0
ABDOMINAL PAIN: 0
CHILLS: 0
LOSS OF CONSCIOUSNESS: 0

## 2025-05-15 ASSESSMENT — ACTIVITIES OF DAILY LIVING (ADL): TOILETING: INDEPENDENT

## 2025-05-15 NOTE — THERAPY
Occupational Therapy   Initial Evaluation     Patient Name: Nohelia Dickerson  Age:  80 y.o., Sex:  female  Medical Record #: [id number]  Today's Date: 5/15/2025     Precautions: Fall Risk  Comments: labile O2 high O2 oxy mask    Assessment  Patient is 80 y.o. female admitted for worsening SOB. 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 Pt reports she had progressed to completing her ADL's I'ly after her recent SNF stay, and family assists with IADL's.   This admission pt is dx w/ acute on chronic respiratory failure w/hypoxia, typical atrial flutter, COPD exacerbation, sepsis d/t PNA and electrolyte abnormality.   Pt presents w/limited activity tolerance, decreased balance, strength and labile respiratory status. All impacting her ADL's. OT will follow in this setting and currently recommend post acute placement.     Plan    Occupational Therapy Initial Treatment Plan   Treatment Interventions: Self Care / Activities of Daily Living, Adaptive Equipment, Community Re-Integration, Manual Therapy Techniques, Neuro Re-Education / Balance, Therapeutic Exercises, Therapeutic Activity, Family / Caregiver Training  Treatment Frequency: 4 Times per Week  Duration: Until Therapy Goals Met    DC Equipment Recommendations: Unable to determine at this time  Discharge Recommendations: Recommend post-acute placement for additional occupational therapy services prior to discharge home     Subjective    Agreeable to therapy      Objective       05/15/25 0984   Prior Living Situation   Prior Services Intermittent Physical Support for ADL Per Family;Skilled Home Health Services   Housing / Facility 1 Story House   Steps Into Home 1   Steps In Home 0   Bathroom Set up Bathtub / Shower Combination   Equipment Owned Front-Wheel Walker;4-Wheel Walker;Raised Toilet Seat With Arms   Lives  with - Patient's Self Care Capacity Related Adult   Comments Pt lives w/granddaughter her SO and their child and reports her Dtr checks in daily   Prior Level of ADL Function   Self Feeding Independent   Grooming / Hygiene Independent   Bathing Independent   Dressing Independent   Toileting Independent   Prior Level of IADL Function   Medication Management Independent   Laundry Requires Assist   Kitchen Mobility Requires Assist   Finances Requires Assist   Home Management Requires Assist   Shopping Requires Assist   Prior Level Of Mobility Independent With Device in Community   Precautions   Precautions Fall Risk   Comments labile O2 high O2 oxy mask   Pain 0 - 10 Group   Therapist Pain Assessment 0;Nurse Notified   Cognition    Cognition / Consciousness WDL   Level of Consciousness Alert   Comments very pleasant and motivated   Active ROM Upper Body   Active ROM Upper Body  WDL   Strength Upper Body   Upper Body Strength  X   Gross Strength Generalized Weakness, Equal Bilaterally.    Sensation Upper Body   Upper Extremity Sensation  WDL   Coordination Upper Body   Coordination WDL   Balance Assessment   Sitting Balance (Static) Fair   Sitting Balance (Dynamic) Fair   Standing Balance (Static) Fair -   Standing Balance (Dynamic) Poor +   Weight Shift Sitting Fair   Weight Shift Standing Fair   Comments w/fww   Bed Mobility    Supine to Sit Minimal Assist   ADL Assessment   Grooming Minimal Assist;Seated   Upper Body Dressing Minimal Assist   Lower Body Dressing Moderate Assist   Toileting Maximal Assist   Comments increased assist for jin care d/t decreased balance and strength in standing; compelted txf to BSC and seated grooming   How much help from another person does the patient currently need...   Putting on and taking off regular lower body clothing? 3   Bathing (including washing, rinsing, and drying)? 3   Toileting, which includes using a toilet, bedpan, or urinal? 3   Putting on and taking off regular  upper body clothing? 3   Taking care of personal grooming such as brushing teeth? 3   Eating meals? 4   6 Clicks Daily Activity Score 19   Functional Mobility   Sit to Stand Moderate Assist   Bed, Chair, Wheelchair Transfer Moderate Assist   Toilet Transfers Moderate Assist   Mobility EOB>BSC   Activity Tolerance   Comments labile OT fatigued w/quickly w/activity   Patient / Family Goals   Patient / Family Goal #1 to get stronger to go home   Short Term Goals   Short Term Goal # 1 pt will complete grooming standing at sink w/spv   Short Term Goal # 2 pt will complete toilet txf and hygiene w/spv   Short Term Goal # 3 pt will complete LB Dressing w/SBA   Education Group   Education Provided Role of Occupational Therapist;Activities of Daily Living;Transfers   Role of Occupational Therapist Patient Response Patient;Acceptance;Explanation;Demonstration   Transfers Patient Response Patient;Acceptance;Explanation;Demonstration   ADL Patient Response Patient;Acceptance;Explanation;Demonstration   Occupational Therapy Initial Treatment Plan    Treatment Interventions Self Care / Activities of Daily Living;Adaptive Equipment;Community Re-Integration;Manual Therapy Techniques;Neuro Re-Education / Balance;Therapeutic Exercises;Therapeutic Activity;Family / Caregiver Training   Treatment Frequency 4 Times per Week   Duration Until Therapy Goals Met   Problem List   Problem List Decreased Active Daily Living Skills;Decreased Upper Extremity Strength Right;Decreased Upper Extremity Strength Left;Decreased Functional Mobility;Decreased Activity Tolerance;Impaired Postural Control / Balance   Anticipated Discharge Equipment and Recommendations   DC Equipment Recommendations Unable to determine at this time   Discharge Recommendations Recommend post-acute placement for additional occupational therapy services prior to discharge home   Interdisciplinary Plan of Care Collaboration   IDT Collaboration with  Nursing;Physical Therapist    Patient Position at End of Therapy Seated;Other (Comments)  (up w/PT)   Collaboration Comments RN aware of OT eval and pts efforts   Session Information   Date / Session Number  5/15 #1 (1/4, 5/21)     Patient seen for team overlap with Physical Therapist for the following reason(s):  Due to the medical complexity, the skill of both practitioners is needed to monitor vitals, patient status, and adjust the intervention to fit the patient's needs and goals. Occupational Therapist facilitated UB/ADL assessment while Physical Therapist simultaneously treated pt according to POC.

## 2025-05-15 NOTE — CARE PLAN
The patient is Watcher - Medium risk of patient condition declining or worsening    Shift Goals  Clinical Goals: Hemodynamic stability, HR and rhythm control, Wean O2, Pulmonary hygiene  Patient Goals: Rest  Family Goals: Updates    Progress made toward(s) clinical / shift goals:        Problem: Knowledge Deficit - Standard  Goal: Patient and family/care givers will demonstrate understanding of plan of care, disease process/condition, diagnostic tests and medications  Outcome: Progressing     Problem: Knowledge Deficit - COPD  Goal: Patient/significant other demonstrates understanding of disease process, utilization of the Action Plan, medications and discharge instruction  Outcome: Progressing     Problem: Hemodynamics  Goal: Patient's hemodynamics, fluid balance and neurologic status will be stable or improve  Outcome: Progressing       Patient is not progressing towards the following goals:

## 2025-05-15 NOTE — ASSESSMENT & PLAN NOTE
S/p electrical cardioversion  PMHx of cardioembolic CVA  Cont home apixaban  Cont IV amiodarone load  TTE showing preserved LVEF, inditerminate diastolic function, dialated LA

## 2025-05-15 NOTE — CARE PLAN
Problem: Bronchoconstriction  Goal: Improve in air movement and diminished wheezing  Description: Target End Date:  2 to 3 days1.  Implement inhaled treatments2.  Evaluate and manage medication effects  Outcome: Progressing   Duoneb qid  Flutter qid

## 2025-05-15 NOTE — PROGRESS NOTES
"Critical Care Progress Note    Date of admission  5/13/2025    Chief Complaint  80 y.o. female admitted 5/13/2025 with weakness, SOB    Hospital Course  \"80 y.o. 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  She was admitted in January for 3 days for fall and ankle pain without fracture.  She also was treated for COPD exacerbation at that time.  She was sent to SNF and recently discharged from there.     She presents to the ED today complaining of weakness and shortness of breath.  In the ED she was found to have acute on chronic respiratory failure and was escalated to HFNC at 40L/100% FiO2 with RR in 30s.  She was given nebs, steroids and antibiotics.  CT PE shows LLL consolidation and small effusion with mild infiltrates elsewhere as well as emphysematous changes.\" - Dr. Wagoner 5/13 5/14 - HFNC, charo gtt, Aflutter with RVR s/p electrical cardioversion, amio gtt    Interval Problem Update  Reviewed last 24 hour events:   - weaned off HFNC   - weaned off phenylephrine gtt   - echo with EF 62%, nl RV function   - AAOx4, feeling better   - NSR, -130   - amio gtt   - AF, improving WBC   - maribell diet   - good UOP   - Hgb down to 11, plts down to 143   - apixiban   - day 3 cefepime, completed azithro --> de-escalate to augmentin   - steroids   - improving kidney function   - PCT improving    Review of Systems  Review of Systems   Constitutional:  Positive for malaise/fatigue. Negative for chills.   HENT:  Negative for sinus pain.    Eyes:  Negative for double vision.   Respiratory:  Positive for cough and sputum production. Negative for shortness of breath and wheezing.    Cardiovascular:  Negative for chest pain and leg swelling.   Gastrointestinal:  Negative for abdominal pain and nausea.   Genitourinary:  Negative for dysuria.   Musculoskeletal:  Negative for back pain. "   Neurological:  Negative for dizziness and focal weakness.   Psychiatric/Behavioral:  The patient is not nervous/anxious and does not have insomnia.    All other systems reviewed and are negative.       Vital Signs for last 24 hours   Temp:  [36.1 °C (97 °F)-36.6 °C (97.8 °F)] 36.2 °C (97.2 °F)  Pulse:  [] 72  Resp:  [14-56] 24  BP: ()/(48-86) 117/66  SpO2:  [85 %-98 %] 95 %    Respiratory Information for the last 24 hours   10 lpm FM (baseline 4 lpm n/c)    Physical Exam   Physical Exam  Vitals and nursing note reviewed.   Constitutional:       General: She is awake.      Appearance: She is overweight.      Interventions: Face mask in place.   HENT:      Head: Normocephalic.      Nose: Nose normal.      Mouth/Throat:      Mouth: Mucous membranes are moist.   Eyes:      Conjunctiva/sclera: Conjunctivae normal.      Pupils: Pupils are equal, round, and reactive to light.   Neck:      Vascular: No JVD.      Trachea: No tracheal deviation.   Cardiovascular:      Rate and Rhythm: Normal rate and regular rhythm. Occasional Extrasystoles are present.     Chest Wall: PMI is displaced.      Pulses: No decreased pulses.      Heart sounds: Normal heart sounds. No murmur heard.  Pulmonary:      Effort: No tachypnea or respiratory distress.      Breath sounds: No stridor. Examination of the right-lower field reveals rhonchi. Examination of the left-lower field reveals rhonchi. Rhonchi present. No wheezing or rales.      Comments: Speaking in full sentences without difficulty  Abdominal:      General: Bowel sounds are normal. There is no distension.      Palpations: Abdomen is soft.      Tenderness: There is no abdominal tenderness. There is no guarding or rebound.   Genitourinary:     Comments: External female catheter in place  Musculoskeletal:         General: No tenderness.      Right lower leg: No edema.      Left lower leg: No edema.   Skin:     General: Skin is warm and dry.      Capillary Refill: Capillary  refill takes less than 2 seconds.      Findings: No rash.   Neurological:      General: No focal deficit present.      Mental Status: She is alert and oriented to person, place, and time.      Cranial Nerves: No cranial nerve deficit.      Sensory: No sensory deficit.   Psychiatric:         Mood and Affect: Mood normal.         Behavior: Behavior normal. Behavior is cooperative.         Thought Content: Thought content normal.         Medications  Current Medications[1]    Fluids    Intake/Output Summary (Last 24 hours) at 5/15/2025 0657  Last data filed at 5/15/2025 0600  Gross per 24 hour   Intake 2255.5 ml   Output 1150 ml   Net 1105.5 ml       Laboratory          Recent Labs     05/13/25 1131 05/14/25  0330   SODIUM 135 136   POTASSIUM 3.7 3.8   CHLORIDE 100 104   CO2 23 23   BUN 26* 29*   CREATININE 1.05 0.93   MAGNESIUM  --  1.9   PHOSPHORUS  --  2.8   CALCIUM 8.6 8.4*     Recent Labs     05/13/25 1131 05/14/25  0330   ALTSGPT 16 16   ASTSGOT 25 20   ALKPHOSPHAT 76 66   TBILIRUBIN 2.1* 1.3   GLUCOSE 163* 132*     Recent Labs     05/13/25 1131 05/14/25 0330 05/15/25  0429   WBC 10.9* 13.0* 10.6   NEUTSPOLYS 72.40*  --   --    LYMPHOCYTES 10.40*  --   --    MONOCYTES 0.00  --   --    EOSINOPHILS 0.00  --   --    BASOPHILS 0.00  --   --    ASTSGOT 25 20  --    ALTSGPT 16 16  --    ALKPHOSPHAT 76 66  --    TBILIRUBIN 2.1* 1.3  --      Recent Labs     05/13/25 1131 05/14/25  0330 05/15/25  0429   RBC 5.00 4.36 3.99*   HEMOGLOBIN 14.0 12.1 11.1*   HEMATOCRIT 42.1 37.6 35.1*   PLATELETCT 170 147* 143*   PROTHROMBTM 17.9*  --   --    INR 1.47*  --   --        Imaging  Echo:   Reviewed    Assessment/Plan  * Acute on chronic respiratory failure with hypoxia (HCC)- (present on admission)  Assessment & Plan  Due to pneumonia/COPD exacerbation  S/p HFNC (discontinued 5/14)  RT/O2 protocol, titrating oxygen therapy to goal SPO2 greater than 88%  Continue to correct underlying causes  Encourage incentive spirometry,  mobilization, PEP    Typical atrial flutter (HCC)  Assessment & Plan  Presumed new onset with RVR - back in sinus rhythm 5/15  S/p adenosine to uncover flutter waves 5/14, s/p electrical cardioversion 5/14  S/p amiodarone bolus 5/14, transition from drip to p.o. today  Optimize electrolytes  Continuous telemetry monitoring  Continue anticoagulant    Chronic obstructive pulmonary disease with acute exacerbation (HCC)- (present on admission)  Assessment & Plan  Systemic steroids  Scheduled and as needed bronchodilators  RT protocol  Outpatient pulmonary rehab and follow-up    Sepsis due to pneumonia (HCC)  Assessment & Plan  This is Sepsis Present on admission - improving  Source is Pulmonary  S/p sepsis protocol and crystalloid resuscitation  Continue IV antibiotics as appropriate for source of sepsis, follow-up on final cultures  S/p phenylephrine gtt (discontinued 5/14)  Monitor urine output, vital signs    Dyslipidemia- (present on admission)  Assessment & Plan  Continue home statin    Cardioembolic stroke (HCC)- (present on admission)  Assessment & Plan  Continue home apixaban, statin    Primary hypertension- (present on admission)  Assessment & Plan  Controlled today given recent shock  Continue to hold amlodipine and losartan and resume when appropriate    Thyroid nodule  Assessment & Plan  Will need outpatient follow-up, history of thyroid cancer    Electrolyte abnormality  Assessment & Plan  Repleted    ACP (advance care planning)- (present on admission)  Assessment & Plan  Full code         VTE:  NOAC  Ulcer: Not Indicated  Lines: None    I have performed a physical exam and reviewed and updated ROS and Plan today (5/15/2025). In review of yesterday's note (5/14/2025), there are no changes except as documented above.     Discussed patient condition and risk of morbidity and/or mortality with Hospitalist, RN, RT, Pharmacy, , Charge nurse / hot rounds, and Patient. Critical care services will sign  off at this time.  Please call with any questions or concerns.    Please note that this dictation was created using voice recognition software. I have made every reasonable attempt to correct obvious errors, but there may be errors of grammar and possibly content that I did not discover before finalizing the note.         [1]   Current Facility-Administered Medications   Medication Dose Route Frequency Provider Last Rate Last Admin    amiodarone (Nexterone) 360 mg/200 mL infusion  0.5 mg/min Intravenous Continuous Jeremy M Gonda, M.D. 16.7 mL/hr at 05/14/25 2359 0.5 mg/min at 05/14/25 2359    dextrose 5% infusion   Intravenous Continuous Jeremy M Gonda, M.D. 30 mL/hr at 05/14/25 2249 New Bag at 05/14/25 2249    phenylephrine 40 mg/250 mL NS premix  0-5 mcg/kg/min (Ideal) Intravenous Continuous Jeremy M Gonda, M.D.   Stopped at 05/14/25 1805    Respiratory Therapy Consult   Nebulization Continuous RT Jeremy M Gonda, M.D.        ipratropium-albuterol (DUONEB) nebulizer solution  3 mL Nebulization 4X/DAY (RT) Jeremy M Gonda, M.D.        albuterol (Proventil) 2.5mg/0.5ml nebulizer solution 2.5 mg  2.5 mg Nebulization Q2HRS PRN (RT) Jeremy M Gonda, M.D.        cefepime (Maxipime) 2 g in  mL IVPB  2 g Intravenous Q8HRS Juan Wagoner M.D.   Stopped at 05/15/25 0630    methylPREDNISolone (SOLU-MEDROL) 40 MG injection 40 mg  40 mg Intravenous DAILY Juan Wagoner M.D.   40 mg at 05/15/25 0600    LR (Bolus) infusion 1,000 mL  1,000 mL Intravenous Once PRN Juan Wagoner M.D.        acetaminophen (Tylenol) tablet 650 mg  650 mg Oral Q6HRS PRN Juan Wagoner M.D.        senna-docusate (Pericolace Or Senokot S) 8.6-50 MG per tablet 2 Tablet  2 Tablet Oral Q EVENING Juan Wagoner M.D.        And    polyethylene glycol/lytes (Miralax) Packet 1 Packet  1 Packet Oral QDAY PRN Juan Wagoner M.D.        ondansetron (Zofran) syringe/vial injection 4 mg  4 mg Intravenous Q4HRS PRN Juan Wagoner M.D.        Or    ondansetron  (Brett MANNING) dispertab 4 mg  4 mg Oral Q4HRS PRN Juan Wagoner M.D.        apixaban (Eliquis) tablet 5 mg  5 mg Oral BID Juan Wagoner M.D.   5 mg at 05/15/25 0600    atorvastatin (Lipitor) tablet 80 mg  80 mg Oral DAILY Juan Wagoner M.D.   80 mg at 05/15/25 0600    fluticasone-umeclidinium-vilanterol (Trelegy Ellipta) 200-62.5-25 MCG/ACT inhaler 1 Puff  1 Puff Inhalation DAILY Juan Wagoner M.D.   1 Puff at 05/15/25 0600    MD Alert...ICU Electrolyte Replacement per Pharmacy   Other PHARMACY TO DOSE Juan Wagoner M.D.

## 2025-05-15 NOTE — CONSULTS
"Hospital Medicine Consultation    Date of Service  5/15/2025    Referring Physician  J Gonda    Consulting Physician  Zachery Lebron D.O.    Reason for Consultation  Acute on chronic hypoxic resp failure    History of Presenting Illness  80 y.o. female who presented 5/13/2025 with a history of COPD (last PFT from 2019, FEV1/FVC 62, FEV1 49%, DLCO 53) , HTn, tobacco use, chronic hypoxic respiratory failure, hypothyroid following thyroidectomy for thyroid cancer, HTN, PAD, on apixaban for Hx of embolic CVA.  Presented with SOB and COPD exacerbation on 5/13.  Admitted to ICU on HFNC.  CT PE study showing LLL consolidation but no PE.  Hospital stay was complicated by an episode of SVT/AFlttr on 5/14 for which she was given adenosine and when that failed was sedated and electrically cardioverted.  She was then started on an amiodarone load    On my examination pt states she's less SOB.  No c/o pain.  Did get up with therapy today but required \"a lot of help\".    Review of Systems  Review of Systems   Constitutional:  Negative for chills and fever.   Respiratory:  Negative for cough and shortness of breath.    Cardiovascular:  Negative for chest pain, palpitations and leg swelling.   Gastrointestinal:  Negative for abdominal pain, diarrhea, nausea and vomiting.   Genitourinary:  Negative for dysuria and urgency.   Musculoskeletal:  Negative for back pain.   Skin:  Negative for rash.   Neurological:  Negative for dizziness, loss of consciousness and headaches.       Past Medical History   has a past medical history of Asthma, Blood clotting disorder (HCC), Cancer (HCC) (1978), COPD, Healthcare maintenance (5/22/2018), Hernia of unspecified site of abdominal cavity without mention of obstruction or gangrene (2011), Hypertension, Inguinal hernia, Kidney stones, TIA (transient ischemic attack), Tobacco dependence (5/22/2018), Tobacco use, and Viral URI with cough (4/18/2019).    Surgical History   has a past surgical " history that includes hernia repair; thyroidectomy; other (01/01/1983); inguinal hernia repair (03/28/2011); and cataract extraction with iol (Bilateral, 08/2022).    Family History  family history includes Cancer in her mother; Diabetes in her brother; Heart Attack in her father; Heart Disease in her father; No Known Problems in her daughter; Other in her daughter; Stroke in her father.    Social History   reports that she quit smoking about 6 years ago. Her smoking use included cigarettes. She started smoking about 63 years ago. She has a 57 pack-year smoking history. She has never used smokeless tobacco. She reports that she does not drink alcohol and does not use drugs.    Medications  Prior to Admission Medications   Prescriptions Last Dose Informant Patient Reported? Taking?   Mirabegron ER 50 MG TABLET SR 24 HR 5/12/2025 Patient No Yes   Sig: Take 50 mg by mouth every day for 30 days.   TRELEGY ELLIPTA 200-62.5-25 MCG/ACT inhaler 5/12/2025 Patient No Yes   Sig: INHALE 1 PUFF BY MOUTH EVERY DAY   amLODIPine (NORVASC) 5 MG Tab 5/12/2025 Patient No Yes   Sig: Take 1 Tablet by mouth every day.   apixaban (ELIQUIS) 5mg Tab 5/12/2025 Evening Patient No Yes   Sig: Take 1 Tablet by mouth 2 times a day.   atorvastatin (LIPITOR) 80 MG tablet 5/12/2025 Patient No Yes   Sig: TAKE 1 TABLET BY MOUTH EVERY DAY. N THE EVENING. PT TO MAKE APPT AND GET LABS PRIOR TO MORE REFILLS.   estradiol (ESTRACE VAGINAL) 0.1 MG/GM vaginal cream 5/8/2025 Patient No No   Sig: Apply 1g cream inside vagina twice per week   losartan (COZAAR) 100 MG Tab 5/12/2025 Patient No Yes   Sig: Take 1 Tablet by mouth every day.      Facility-Administered Medications: None       Allergies  Allergies[1]    Physical Exam  Temp:  [36.1 °C (97 °F)-36.4 °C (97.6 °F)] 36.3 °C (97.3 °F)  Pulse:  [] 67  Resp:  [22-58] 24  BP: (103-145)/(57-92) 145/78  SpO2:  [85 %-97 %] 97 %    Physical Exam  Constitutional:       General: She is not in acute distress.      Appearance: Normal appearance. She is well-developed. She is not diaphoretic.   HENT:      Head: Normocephalic and atraumatic.   Neck:      Vascular: No JVD.   Cardiovascular:      Rate and Rhythm: Normal rate and regular rhythm.      Heart sounds: No murmur heard.  Pulmonary:      Effort: Pulmonary effort is normal. No respiratory distress.      Breath sounds: No stridor. No wheezing or rales.   Abdominal:      Palpations: Abdomen is soft.      Tenderness: There is no abdominal tenderness. There is no guarding or rebound.   Musculoskeletal:      Right lower leg: Edema present.      Left lower leg: Edema present.   Skin:     General: Skin is warm and dry.      Findings: No rash.   Neurological:      Mental Status: She is oriented to person, place, and time.   Psychiatric:         Mood and Affect: Mood normal.         Behavior: Behavior normal.         Thought Content: Thought content normal.         Fluids  Date 05/15/25 0700 - 05/16/25 0659   Shift 7843-4571 5421-3992 9369-7475 24 Hour Total   INTAKE   I.V. 103.4   103.4   Shift Total 103.4   103.4   OUTPUT   Urine 600   600   Shift Total 600   600   Weight (kg) 83.4 83.4 83.4 83.4       Laboratory  Recent Labs     05/13/25  1131 05/14/25  0330 05/15/25  0429   WBC 10.9* 13.0* 10.6   RBC 5.00 4.36 3.99*   HEMOGLOBIN 14.0 12.1 11.1*   HEMATOCRIT 42.1 37.6 35.1*   MCV 84.2 86.2 88.0   MCH 28.0 27.8 27.8   MCHC 33.3 32.2 31.6*   RDW 44.4 45.6 46.7   PLATELETCT 170 147* 143*   MPV 10.1 10.2 10.9     Recent Labs     05/13/25  1131 05/14/25  0330 05/15/25  0429   SODIUM 135 136 136   POTASSIUM 3.7 3.8 4.1   CHLORIDE 100 104 109   CO2 23 23 21   GLUCOSE 163* 132* 119*   BUN 26* 29* 23*   CREATININE 1.05 0.93 0.75   CALCIUM 8.6 8.4* 8.6     Recent Labs     05/13/25  1131   INR 1.47*                 Imaging  EC-ECHOCARDIOGRAM COMPLETE W/O CONT   Final Result      CT-CTA CHEST PULMONARY ARTERY W/ RECONS   Final Result      1.  No evidence of pulmonary embolism.   2.   "Multifocal pneumonia, greatest in the left lower lobe. Recommend follow-up imaging to document resolution.   3.  Small left pleural effusion   4.  Emphysematous changes.   5.  Fluid in the mid and distal esophagus, which can predispose to aspiration.   6.  Enlarged right hilar lymph node, likely reactive.   7.  Questionable 1.9 cm left thyroid nodule. Recommend nonemergent ultrasound evaluation   8.  Ascending aortic ectasia measuring 4.0 cm.   9.  Tiny, nonobstructing left renal stone versus parenchymal opacification.               DX-CHEST-PORTABLE (1 VIEW)   Final Result      1.  Small left pleural effusion with adjacent airspace disease.   2.  Interstitial infiltrates and/or edema, greater on the left.          Assessment/Plan  * Acute on chronic respiratory failure with hypoxia (HCC)- (present on admission)  Assessment & Plan  At home is usually on RA though uses O2 \"when I need it\"  AECOPD exacerbation due to pneumonia  Now off of HFNC  IV solumedrol  Trelegy  Scheduled bronchodialator therapy  O2/RT protocols  mobilize    Typical atrial flutter (HCC)  Assessment & Plan  S/p electrical cardioversion  PMHx of cardioembolic CVA  Cont home apixaban  Cont IV amiodarone load  TTE showing preserved LVEF, inditerminate diastolic function, dialated LA    Chronic obstructive pulmonary disease with acute exacerbation (HCC)- (present on admission)  Assessment & Plan  last PFT from 2019, FEV1/FVC 62, FEV1 49%, DLCO 53    Sepsis due to pneumonia (HCC)  Assessment & Plan  Cultures neg  Plan to complete 5 days of Abx's on 6/17    ACP (advance care planning)- (present on admission)  Assessment & Plan  Per DW my partner on admission is FULL CODE  Re-address as needed    Dyslipidemia- (present on admission)  Assessment & Plan  statin    Cardioembolic stroke (HCC)- (present on admission)  Assessment & Plan  On apixaban    Primary hypertension- (present on admission)  Assessment & Plan  As outpt is on amlodipine 5mg: currently " holding  Monitor pressures and resume as appropriate               [1]   Allergies  Allergen Reactions    Morphine Unspecified     Hallucinations, nightmares, and altered mentations    Lactaid [Lactase] Diarrhea     Upset stomach

## 2025-05-15 NOTE — THERAPY
"Physical Therapy   Initial Evaluation     Patient Name: Nohelia Dickerson  Age:  80 y.o., Sex:  female  Medical Record #: [id number]  Today's Date: 5/15/2025     Precautions  Precautions: Fall Risk  Comments: labile O2 high O2 oxy mask    Assessment  80 y.o. 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  She was admitted in January for 3 days for fall and ankle pain without   fracture.  She also was treated for COPD exacerbation at that time.  She was sent to SNF and recently discharged from there.   She presents to the ED complaining of weakness and shortness of breath.  In the ED she was found to have acute on chron  ic respiratory failure and was escalated to HFNC at 40L/100% FiO2 with RR in 30s.  CT PE shows LLL consolidation and small effusion with mild infiltrates elsewhere as well as emphysematous changes.     Patient presents to PT eval with impaired strength and endurance. She was able to get to EOB with CGA and take steps to the commode and then to the chair with ModA and use of FWW. She is not functioning at her baseline and would benefit from placement for further therapy. Recommend PM&R referral. Will continue to follow.     Plan    Physical Therapy Initial Treatment Plan   Treatment Plan : Bed Mobility, Equipment, Gait Training, Neuro Re-Education / Balance, Self Care / Home Evaluation, Stair Training, Therapeutic Exercise, Therapeutic Activities, Family / Caregiver Training  Treatment Frequency: 4 Times per Week  Duration: Until Therapy Goals Met    DC Equipment Recommendations: Unable to determine at this time  Discharge Recommendations: Recommend post-acute placement for additional physical therapy services prior to discharge home       Subjective    \"I'm ready to get out of this bed\"     Objective       05/15/25 1000   Precautions   Precautions Fall Risk   Vitals "   Pulse (!) 125   Blood Pressure  130/66   Respiration (!) 58   Pulse Oximetry 88 %   O2 (LPM) 8   O2 Delivery Device Oxymask   Vitals Comments tachy throughout   Pain 0 - 10 Group   Pain Rating Scale (NPRS) 0   Therapist Pain Assessment Post Activity Pain Same as Prior to Activity;0   Prior Living Situation   Prior Services Intermittent Physical Support for ADL Per Family;Skilled Home Health Services   Housing / Facility 1 Story House   Steps Into Home 1   Steps In Home 0   Bathroom Set up Bathtub / Shower Combination;Shower Chair   Equipment Owned Front-Wheel Walker;4-Wheel Walker;Raised Toilet Seat With Arms;Tub / Shower Seat   Comments patient lives with her granddaughter and her SO and their 1 yo son. Her daughter also checks in on her daily   Prior Level of Functional Mobility   Bed Mobility Independent   Transfer Status Independent   Ambulation Independent   Ambulation Distance household   Assistive Devices Used Front-Wheel Walker   Stairs Required Assist   Comments Patient uses her FWW and is homeboudn with receiving HHPT/OT since her DC from [facility] in February   History of Falls   History of Falls Yes   Date of Last Fall   (fell back in Jan 2025)   Cognition    Cognition / Consciousness WDL   Level of Consciousness Alert   Comments pleasant and cooperative   Active ROM Upper Body   Active ROM Upper Body  WDL   Strength Upper Body   Upper Body Strength  WDL   Sensation Upper Body   Upper Extremity Sensation  WDL   Active ROM Lower Body    Active ROM Lower Body  WDL   Strength Lower Body   Lower Body Strength  X   Gross Strength Generalized Weakness, Equal Bilaterally   Comments B LE grossly 3/5   Sensation Lower Body   Lower Extremity Sensation   WDL   Vision   Vision Comments wears glasses for reading   Other Treatments   Other Treatments Provided educated about self-pacing and assisted with jin-care   Balance Assessment   Sitting Balance (Static) Fair   Sitting Balance (Dynamic) Fair   Standing Balance  (Static) Fair -   Standing Balance (Dynamic) Poor +   Weight Shift Sitting Fair   Weight Shift Standing Fair   Comments w/FWW   Bed Mobility    Supine to Sit Minimal Assist   Scooting Contact Guard Assist   Gait Analysis   Gait Level Of Assist Moderate Assist   Assistive Device Front Wheel Walker   Distance (Feet) 2   # of Times Distance was Traveled 2   Deviation Increased Base Of Support;Bradykinetic   Comments 1 overt posterior LOB with transfer   Functional Mobility   Sit to Stand Moderate Assist   Bed, Chair, Wheelchair Transfer Moderate Assist   Toilet Transfers Moderate Assist   Transfer Method Stand Step   Mobility EOB>commode>chair   ICU Target Mobility Level   ICU Mobility - Targeted Level Level 3B   6 Clicks Assessment - How much HELP from from another person do you currently need... (If the patient hasn't done an activity recently, how much help from another person do you think he/she would need if he/she tried?)   Turning from your back to your side while in a flat bed without using bedrails? 3   Moving from lying on your back to sitting on the side of a flat bed without using bedrails? 3   Moving to and from a bed to a chair (including a wheelchair)? 2   Standing up from a chair using your arms (e.g., wheelchair, or bedside chair)? 2   Walking in hospital room? 2   Climbing 3-5 steps with a railing? 1   6 clicks Mobility Score 13   Activity Tolerance   Sitting in Chair post session   Sitting Edge of Bed 10 min   Standing 1 min x2   Patient / Family Goals    Patient / Family Goal #1 to go home   Short Term Goals    Short Term Goal # 1 in 6 visits patient will demo all bed mobility indep from flat surface for safe DC   Short Term Goal # 2 in 6 visits patient will demo all functional transfers with Sup and LRAD for safe DC   Short Term Goal # 3 in 6 visits patient will ambualte 200' with Sup and LRAD for safe DC   Short Term Goal # 4 in 6 visits patient will navigate 1 stair with Sup and LRAD for safe DC    Education Group   Education Provided Role of Physical Therapist;Gait Training;Use of Assistive Device   Role of Physical Therapist Patient Response Patient;Acceptance;Explanation;Demonstration;Verbal Demonstration;Action Demonstration   Gait Training Patient Response Patient;Acceptance;Explanation;Demonstration;Verbal Demonstration;Action Demonstration   Use of Assistive Device Patient Response Patient;Acceptance;Explanation;Demonstration;Verbal Demonstration;Action Demonstration   Physical Therapy Initial Treatment Plan    Treatment Plan  Bed Mobility;Equipment;Gait Training;Neuro Re-Education / Balance;Self Care / Home Evaluation;Stair Training;Therapeutic Exercise;Therapeutic Activities;Family / Caregiver Training   Treatment Frequency 4 Times per Week   Duration Until Therapy Goals Met   Problem List    Problems Pain;Impaired Bed Mobility;Impaired Transfers;Impaired Ambulation;Functional Strength Deficit;Impaired Balance;Impaired Coordination;Decreased Activity Tolerance   Anticipated Discharge Equipment and Recommendations   DC Equipment Recommendations Unable to determine at this time   Discharge Recommendations Recommend post-acute placement for additional physical therapy services prior to discharge home     Mariel Webb, PT, DPT, GCS

## 2025-05-15 NOTE — CARE PLAN
Problem: Humidified High Flow Nasal Cannula  Goal: Maintain adequate oxygenation dependent on patient condition  Description: Target End Date:  resolve prior to discharge or when underlying condition is resolved/stabilized1.  Implement humidified high flow oxygen therapy2.  Titrate high flow oxygen to maintain appropriate SpO2  Outcome: Met     Problem: Bronchoconstriction  Goal: Improve in air movement and diminished wheezing  Description: Target End Date:  2 to 3 days1.  Implement inhaled treatments2.  Evaluate and manage medication effects  Outcome: Met

## 2025-05-15 NOTE — CARE PLAN
The patient is Stable - Low risk of patient condition declining or worsening    Shift Goals  Clinical Goals: hemodynamic stability, HR and rhythm control, wean O2  Patient Goals: rest  Family Goals: none at bedside    Progress made toward(s) clinical / shift goals:    Problem: Knowledge Deficit - Standard  Goal: Patient and family/care givers will demonstrate understanding of plan of care, disease process/condition, diagnostic tests and medications  Outcome: Progressing     Problem: Knowledge Deficit - COPD  Goal: Patient/significant other demonstrates understanding of disease process, utilization of the Action Plan, medications and discharge instruction  Outcome: Progressing     Problem: Risk for Infection - COPD  Goal: Patient will remain free from signs and symptoms of infection  Outcome: Progressing     Problem: Nutrition - Advanced  Goal: Patient will display progressive weight gain toward goal have adequate food and fluid intake  Outcome: Progressing     Problem: Ineffective Airway Clearance  Goal: Patient will maintain patent airway with clear/clearing breath sounds  Outcome: Progressing     Problem: Impaired Gas Exchange  Goal: Patient will demonstrate improved ventilation and adequate oxygenation and participate in treatment regimen within the level of ability/situation.  Outcome: Progressing     Problem: Risk for Aspiration  Goal: Patient's risk for aspiration will be absent or decrease  Outcome: Progressing     Problem: Self Care  Goal: Patient will have the ability to perform ADLs independently or with assistance (bathe, groom, dress, toilet and feed)  Outcome: Progressing     Problem: Hemodynamics  Goal: Patient's hemodynamics, fluid balance and neurologic status will be stable or improve  Outcome: Progressing     Problem: Fluid Volume  Goal: Fluid volume balance will be maintained  Outcome: Progressing     Problem: Urinary - Renal Perfusion  Goal: Ability to achieve and maintain adequate renal  perfusion and functioning will improve  Outcome: Progressing     Problem: Respiratory  Goal: Patient will achieve/maintain optimum respiratory ventilation and gas exchange  Outcome: Progressing     Problem: Physical Regulation  Goal: Diagnostic test results will improve  Outcome: Progressing  Goal: Signs and symptoms of infection will decrease  Outcome: Progressing     Problem: Fall Risk  Goal: Patient will remain free from falls  Outcome: Progressing       Patient is not progressing towards the following goals:

## 2025-05-15 NOTE — ASSESSMENT & PLAN NOTE
"At home is usually on RA though uses O2 \"when I need it\"  AECOPD exacerbation due to pneumonia  Now off of HFNC  IV solumedrol  Trelegy  Scheduled bronchodialator therapy  O2/RT protocols  mobilize  "

## 2025-05-16 ENCOUNTER — APPOINTMENT (OUTPATIENT)
Dept: MEDICAL GROUP | Facility: CLINIC | Age: 80
End: 2025-05-16
Payer: MEDICARE

## 2025-05-16 ENCOUNTER — HOME HEALTH ADMISSION (OUTPATIENT)
Dept: HOME HEALTH SERVICES | Facility: HOME HEALTHCARE | Age: 80
End: 2025-05-16
Payer: MEDICARE

## 2025-05-16 VITALS
RESPIRATION RATE: 17 BRPM | HEIGHT: 72 IN | BODY MASS INDEX: 25.21 KG/M2 | WEIGHT: 186.1 LBS | HEART RATE: 84 BPM | DIASTOLIC BLOOD PRESSURE: 81 MMHG | TEMPERATURE: 97.2 F | SYSTOLIC BLOOD PRESSURE: 147 MMHG | OXYGEN SATURATION: 90 %

## 2025-05-16 PROBLEM — I48.3 TYPICAL ATRIAL FLUTTER (HCC): Status: RESOLVED | Noted: 2025-05-14 | Resolved: 2025-05-16

## 2025-05-16 PROBLEM — E87.8 ELECTROLYTE ABNORMALITY: Status: RESOLVED | Noted: 2025-05-14 | Resolved: 2025-05-16

## 2025-05-16 PROBLEM — J18.9 SEPSIS DUE TO PNEUMONIA (HCC): Status: RESOLVED | Noted: 2025-01-16 | Resolved: 2025-05-16

## 2025-05-16 PROBLEM — J96.21 ACUTE ON CHRONIC RESPIRATORY FAILURE WITH HYPOXIA (HCC): Status: RESOLVED | Noted: 2018-08-07 | Resolved: 2025-05-16

## 2025-05-16 PROBLEM — A41.9 SEPSIS DUE TO PNEUMONIA (HCC): Status: RESOLVED | Noted: 2025-01-16 | Resolved: 2025-05-16

## 2025-05-16 LAB
ANION GAP SERPL CALC-SCNC: 12 MMOL/L (ref 7–16)
BUN SERPL-MCNC: 25 MG/DL (ref 8–22)
CALCIUM SERPL-MCNC: 8.7 MG/DL (ref 8.5–10.5)
CHLORIDE SERPL-SCNC: 109 MMOL/L (ref 96–112)
CO2 SERPL-SCNC: 17 MMOL/L (ref 20–33)
CREAT SERPL-MCNC: 0.83 MG/DL (ref 0.5–1.4)
ERYTHROCYTE [DISTWIDTH] IN BLOOD BY AUTOMATED COUNT: 46 FL (ref 35.9–50)
GFR SERPLBLD CREATININE-BSD FMLA CKD-EPI: 72 ML/MIN/1.73 M 2
GLUCOSE SERPL-MCNC: 107 MG/DL (ref 65–99)
HCT VFR BLD AUTO: 35.7 % (ref 37–47)
HGB BLD-MCNC: 11.6 G/DL (ref 12–16)
MAGNESIUM SERPL-MCNC: 2 MG/DL (ref 1.5–2.5)
MCH RBC QN AUTO: 28.2 PG (ref 27–33)
MCHC RBC AUTO-ENTMCNC: 32.5 G/DL (ref 32.2–35.5)
MCV RBC AUTO: 86.7 FL (ref 81.4–97.8)
PHOSPHATE SERPL-MCNC: 2.6 MG/DL (ref 2.5–4.5)
PLATELET # BLD AUTO: 160 K/UL (ref 164–446)
PMV BLD AUTO: 10.8 FL (ref 9–12.9)
POTASSIUM SERPL-SCNC: 4.2 MMOL/L (ref 3.6–5.5)
RBC # BLD AUTO: 4.12 M/UL (ref 4.2–5.4)
SODIUM SERPL-SCNC: 138 MMOL/L (ref 135–145)
WBC # BLD AUTO: 8 K/UL (ref 4.8–10.8)

## 2025-05-16 PROCEDURE — 99239 HOSP IP/OBS DSCHRG MGMT >30: CPT | Performed by: INTERNAL MEDICINE

## 2025-05-16 PROCEDURE — 97535 SELF CARE MNGMENT TRAINING: CPT

## 2025-05-16 PROCEDURE — A9270 NON-COVERED ITEM OR SERVICE: HCPCS | Performed by: STUDENT IN AN ORGANIZED HEALTH CARE EDUCATION/TRAINING PROGRAM

## 2025-05-16 PROCEDURE — A9270 NON-COVERED ITEM OR SERVICE: HCPCS | Performed by: INTERNAL MEDICINE

## 2025-05-16 PROCEDURE — 700102 HCHG RX REV CODE 250 W/ 637 OVERRIDE(OP): Performed by: INTERNAL MEDICINE

## 2025-05-16 PROCEDURE — 84100 ASSAY OF PHOSPHORUS: CPT

## 2025-05-16 PROCEDURE — 80048 BASIC METABOLIC PNL TOTAL CA: CPT

## 2025-05-16 PROCEDURE — 85027 COMPLETE CBC AUTOMATED: CPT

## 2025-05-16 PROCEDURE — 83735 ASSAY OF MAGNESIUM: CPT

## 2025-05-16 PROCEDURE — 700102 HCHG RX REV CODE 250 W/ 637 OVERRIDE(OP): Performed by: STUDENT IN AN ORGANIZED HEALTH CARE EDUCATION/TRAINING PROGRAM

## 2025-05-16 PROCEDURE — 700111 HCHG RX REV CODE 636 W/ 250 OVERRIDE (IP): Mod: JZ,TB | Performed by: STUDENT IN AN ORGANIZED HEALTH CARE EDUCATION/TRAINING PROGRAM

## 2025-05-16 RX ORDER — ALBUTEROL SULFATE 5 MG/ML
2.5 SOLUTION RESPIRATORY (INHALATION) EVERY 4 HOURS PRN
Qty: 20 ML | Refills: 0 | Status: SHIPPED | OUTPATIENT
Start: 2025-05-16 | End: 2025-05-23

## 2025-05-16 RX ORDER — PREDNISONE 20 MG/1
40 TABLET ORAL DAILY
Qty: 2 TABLET | Refills: 0 | Status: SHIPPED | OUTPATIENT
Start: 2025-05-17 | End: 2025-05-19

## 2025-05-16 RX ORDER — AMIODARONE HYDROCHLORIDE 400 MG/1
400 TABLET ORAL 2 TIMES DAILY
Qty: 28 TABLET | Refills: 0 | Status: SHIPPED | OUTPATIENT
Start: 2025-05-16 | End: 2025-05-30

## 2025-05-16 RX ADMIN — FLUTICASONE FUROATE, UMECLIDINIUM BROMIDE AND VILANTEROL TRIFENATATE 1 PUFF: 200; 62.5; 25 POWDER RESPIRATORY (INHALATION) at 05:14

## 2025-05-16 RX ADMIN — AMIODARONE HYDROCHLORIDE 400 MG: 200 TABLET ORAL at 05:07

## 2025-05-16 RX ADMIN — METHYLPREDNISOLONE SODIUM SUCCINATE 40 MG: 40 INJECTION, POWDER, FOR SOLUTION INTRAMUSCULAR; INTRAVENOUS at 05:07

## 2025-05-16 RX ADMIN — ATORVASTATIN CALCIUM 80 MG: 80 TABLET, FILM COATED ORAL at 05:07

## 2025-05-16 RX ADMIN — AMOXICILLIN AND CLAVULANATE POTASSIUM 1 TABLET: 875; 125 TABLET, FILM COATED ORAL at 05:07

## 2025-05-16 RX ADMIN — APIXABAN 5 MG: 5 TABLET, FILM COATED ORAL at 05:07

## 2025-05-16 ASSESSMENT — COGNITIVE AND FUNCTIONAL STATUS - GENERAL
TOILETING: A LITTLE
SUGGESTED CMS G CODE MODIFIER DAILY ACTIVITY: CJ
DAILY ACTIVITIY SCORE: 21
DRESSING REGULAR LOWER BODY CLOTHING: A LITTLE
HELP NEEDED FOR BATHING: A LITTLE

## 2025-05-16 ASSESSMENT — FIBROSIS 4 INDEX: FIB4 SCORE: 2.47

## 2025-05-16 ASSESSMENT — PAIN DESCRIPTION - PAIN TYPE: TYPE: ACUTE PAIN

## 2025-05-16 NOTE — THERAPY
"Occupational Therapy  Daily Treatment     Patient Name: Nohelia Dickerson  Age:  80 y.o., Sex:  female  Medical Record #: [id number]  Today's Date: 5/16/2025     Precautions  Precautions: (P) Fall Risk  Comments: labile O2 high O2 oxy mask    Assessment    Pt greeted and seen for OT treatment session. Expressing eagerness to return home. Pt with significant progress toward OT goals. Completed functional mobility with no more than CGA and LB dressing with min A. Educated on use of sock aide and reacher to assist with LB ADLs. Reports learning to use these AE tools during prior rehab stay. Continues to be limited by impaired strength, balance, activity tolerance, functional mobility and pain which are currently affecting patients ability to complete ADL/IADLs at baseline. Will continue to follow.    Plan    Treatment Plan Status: (P) Continue Current Treatment Plan  Type of Treatment: Self Care / Activities of Daily Living, Adaptive Equipment, Community Re-Integration, Manual Therapy Techniques, Neuro Re-Education / Balance, Therapeutic Exercises, Therapeutic Activity, Family / Caregiver Training  Treatment Frequency: 4 Times per Week  Treatment Duration: Until Therapy Goals Met    DC Equipment Recommendations: (P) Sock Aide, Reacher  Discharge Recommendations: (P) Other - (Could benefit from post-acute placement prior to DC Home however only agreeable if accepted to [facility]. Otherwise could go home with  OT and support from family PRN.)    Subjective    \"Are they going to let me go home.\"  \"I started dating someone and he makes me feel like I'm 17 again.\"     Objective     05/16/25 1115   Vitals   Pulse Oximetry 90 %   O2 (LPM) 4   O2 Delivery Device Silicone Nasal Cannula   Vitals Comments VSS throughout with no c/o SOB, light headedness or dizziness   Pain 0 - 10 Group   Therapist Pain Assessment During Activity;Post Activity Pain Same as Prior to Activity;Nurse Notified  (No c/o pain)   Balance   Sitting Balance " (Static) Fair   Sitting Balance (Dynamic) Fair   Standing Balance (Static) Fair   Standing Balance (Dynamic) Fair-   Weight Shift Sitting Fair   Weight Shift Standing Fair   Skilled Intervention Verbal Cuing   Comments FWW in standing   Bed Mobility    Supine to Sit Standby Assist   Sit to Supine Standby Assist   Scooting Supervised   Skilled Intervention Verbal Cuing   Comments HOB flat with use of rail   Activities of Daily Living   Eating Supervision   Grooming Standby Assist;Standing  (oral hygiene)   Lower Body Dressing Minimal Assist  (socks)   Toileting   (Declined need; toilet height too low (has BSC over toilet at home))   Skilled Intervention Compensatory Strategies;Verbal Cuing   Functional Mobility   Sit to Stand Contact Guard Assist   Bed, Chair, Wheelchair Transfer Contact Guard Assist   Toilet Transfers   (Declined d/t low toilet height)   Transfer Method Stand Step   Mobility FWW: bed > sink > around room > bed   Skilled Intervention Verbal Cuing   Activity Tolerance   Comments No c/o fatigue   Patient / Family Goals   Patient / Family Goal #1 to get stronger to go home   Goal #1 Outcome Progressing as expected   Short Term Goals   Short Term Goal # 1 pt will complete grooming standing at sink w/spv   Goal Outcome # 1 Progressing as expected   Short Term Goal # 2 pt will complete toilet txf and hygiene w/spv   Goal Outcome # 2 Other (see comments)  (NT; declined d/t low toilet height)   Short Term Goal # 3 pt will complete LB Dressing w/SBA   Goal Outcome # 3 Progressing as expected   Education Group   Education Provided Role of Occupational Therapist;Activities of Daily Living   Role of Occupational Therapist Patient Response Patient;Acceptance;Explanation;Demonstration   ADL Patient Response Patient;Acceptance;Explanation;Demonstration   Occupational Therapy Treatment Plan    O.T. Treatment Plan Continue Current Treatment Plan   Anticipated Discharge Equipment and Recommendations   DC Equipment  Recommendations Sock Aide;Reacher   Discharge Recommendations Other -  (Could benefit from post-acute placement prior to DC Home however only agreeable if accepted to [facility]. Otherwise could go home with  OT and support from family PRN.)   Interdisciplinary Plan of Care Collaboration   IDT Collaboration with  Nursing   Patient Position at End of Therapy In Bed;Bed Alarm On;Call Light within Reach;Tray Table within Reach;Phone within Reach   Collaboration Comments RN updated   Session Information   Date / Session Number  5/16 #2 (2/4, 5/21)

## 2025-05-16 NOTE — PROGRESS NOTES
4 Eyes Skin Assessment Completed by Kala TRISTAN RN and Faina YU RN.    Head WDL  Ears WDL  Nose WDL  Mouth WDL  Neck WDL  Breast/Chest WDL  Shoulder Blades WDL  Spine WDL  (R) Arm/Elbow/Hand WDL  (L) Arm/Elbow/Hand WDL  Abdomen WDL  Groin Blanching  Scrotum/Coccyx/Buttocks Blanching  (R) Leg WDL  (L) Leg Left toe non-blanchable redness  (R) Heel/Foot/Toe Blanching  (L) Heel/Foot/Toe Blanching          Devices In Places Tele Box and SCD's,Purewick      Interventions In Place Heel Mepilex, Sacral Mepilex, and Heels Loaded W/Pillows    Possible Skin Injury Yes    Pictures Uploaded Into Epic Yes  Wound Consult Placed N/A  RN Wound Prevention Protocol Ordered Yes

## 2025-05-16 NOTE — FACE TO FACE
Face to Face Supporting Documentation - Home Health    The encounter with this patient was in whole or in part the primary reason for home health admission.    Date of encounter:   Patient:                    MRN:                       YOB: 2024  Nohelia Dickerson  [id number]  1945     Home health to see patient for:  Skilled Nursing care for assessment, interventions & education, Home health aide, Physical Therapy evaluation and treatment, and Occupational therapy evaluation and treatment    Skilled need for:  New Onset Medical Diagnosis acute on chronic hypoxic respiratory failure    Skilled nursing interventions to include:  Comment: N/A    Homebound status evidenced by:  Need the aid of supportive devices such as crutches, canes, wheelchairs or walkers or Needs the assistance of another person in order to leave the home. Leaving home requires a considerable and taxing effort. There is a normal inability to leave the home.    Community Physician to provide follow up care: Rashad Carreon M.D.     Optional Interventions? No      I certify the face to face encounter for this home health care referral meets the CMS requirements and the encounter/clinical assessment with the patient was, in whole, or in part, for the medical condition(s) listed above, which is the primary reason for home health care. Based on my clinical findings: the service(s) are medically necessary, support the need for home health care, and the homebound criteria are met.  I certify that this patient has had a face to face encounter by myself.  Cathi Bhardwaj M.D. - NPI: [id number]

## 2025-05-16 NOTE — DISCHARGE SUMMARY
[facility] Internal Medicine Discharge Summary    Attending: Bess Lozano M.d.  Senior Resident: Dr. Bhardwaj  Intern:  Dr. Marshall  Contact Number: [phone]    CHIEF COMPLAINT ON ADMISSION  Chief Complaint   Patient presents with    Shortness of Breath     Onset this am. Recent bacterial pneumonia infection. Hx of COPD. 88% RA on 3L/min NC this am at home. + weakness, + lethargy, + diarrhea       Reason for Admission  Acute on chronic respiratory failure  Atrial flutter with rapid ventricular rate    Admission Date  5/13/2025    CODE STATUS  Full Code    HPI & HOSPITAL COURSE  This is a 80 y.o. female who was initially admitted to the ICU 05/13/2025 for acute on chronic hypoxic respiratory failure secondary to pneumonia and COPD exacerbation, initially requiring HFNC on admission, treated with IV steroids and antibiotics. She developed an episode of atrial fibrillation with rapid ventricular rate and hypotension, requiring cardioversion on 05/14/2025 performed by intensivist, received amiodarone bolus and transitioned to PO amiodarone. She was stabilized and transferred to medical floors 05/16/2025. She will be discharged on antibiotics and oral prednisone. Continue amiodarone, follow-up with cardiology as outpatient. Discharge with home health PT/OT, has support from her family at home.     Therefore, she is discharged in fair and stable condition to home with close outpatient follow-up.    The patient met 2-midnight criteria for an inpatient stay at the time of discharge.    Discharge Date  05/16/2025    Physical Exam on Day of Discharge  Physical Exam  Constitutional:       General: She is not in acute distress.  HENT:      Head: Normocephalic and atraumatic.   Eyes:      Extraocular Movements: Extraocular movements intact.      Conjunctiva/sclera: Conjunctivae normal.   Cardiovascular:      Rate and Rhythm: Normal rate and regular rhythm.   Pulmonary:      Effort: Pulmonary effort is normal. No respiratory distress.    Abdominal:      General: Abdomen is flat.      Palpations: Abdomen is soft.   Musculoskeletal:         General: No swelling.      Right lower leg: No edema.      Left lower leg: No edema.   Skin:     General: Skin is warm and dry.   Neurological:      General: No focal deficit present.      Mental Status: She is alert and oriented to person, place, and time.   Psychiatric:         Mood and Affect: Mood normal.         Behavior: Behavior normal.         FOLLOW UP ITEMS POST DISCHARGE  Started on amiodarone for atrial flutter, follow-up with cardiology.    DISCHARGE DIAGNOSES  Principal Problem (Resolved):    Acute on chronic respiratory failure with hypoxia (HCC) (POA: Yes)      Overview: Use 1 & 1/2 L oxygen at night  Active Problems:    Primary hypertension (POA: Yes)    Cardioembolic stroke (HCC) (POA: Yes)    Dyslipidemia (POA: Yes)    ACP (advance care planning) (POA: Yes)    Chronic obstructive pulmonary disease with acute exacerbation (HCC) (POA: Yes)    Thyroid nodule (POA: Unknown)  Resolved Problems:    Sepsis due to pneumonia (HCC) (POA: Unknown)    Typical atrial flutter (HCC) (POA: Unknown)    Electrolyte abnormality (POA: Unknown)      FOLLOW UP  Future Appointments   Date Time Provider Department Center   5/23/2025  1:30 PM Christian Hassan M.D. [facility]   6/19/2025  2:30 PM Kayden Purvis M.D. GYN [address] [place] [address]     No follow-up provider specified.    MEDICATIONS ON DISCHARGE     Medication List        START taking these medications        Instructions   amiodarone 400 MG tablet  Commonly known as: Pacerone   Doctor's comments: Need cardio f/u for further continuation  Take 1 Tablet by mouth 2 times a day for 14 days.  Dose: 400 mg     amoxicillin-clavulanate 875-125 MG Tabs  Commonly known as: Augmentin   Take 1 Tablet by mouth every 12 hours for 3 doses.  Dose: 1 Tablet     predniSONE 20 MG Tabs  Start taking on: May 17, 2025  Commonly known as: Deltasone   Doctor's comments:    Take 2  Tablets by mouth every day for 2 days.  Dose: 40 mg            CONTINUE taking these medications        Instructions   amLODIPine 5 MG Tabs  Commonly known as: Norvasc   Take 1 Tablet by mouth every day.  Dose: 5 mg     apixaban 5mg Tabs  Commonly known as: Eliquis   Take 1 Tablet by mouth 2 times a day.  Dose: 5 mg     atorvastatin 80 MG tablet  Commonly known as: Lipitor   TAKE 1 TABLET BY MOUTH EVERY DAY. N THE EVENING. PT TO MAKE APPT AND GET LABS PRIOR TO MORE REFILLS.  Dose: 80 mg     estradiol 0.1 MG/GM vaginal cream  Commonly known as: ESTRACE VAGINAL   Apply 1g cream inside vagina twice per week     losartan 100 MG Tabs  Commonly known as: Cozaar   Take 1 Tablet by mouth every day.  Dose: 100 mg     Mirabegron ER 50 MG Tb24   Take 50 mg by mouth every day for 30 days.  Dose: 50 mg     Trelegy Ellipta 200-62.5-25 MCG/ACT inhaler  Generic drug: fluticasone-umeclidinium-vilanterol   INHALE 1 PUFF BY MOUTH EVERY DAY  Dose: 1 Puff              Allergies  Allergies[1]    DIET  Orders Placed This Encounter   Procedures    Diet Order Diet: Regular     Standing Status:   Standing     Number of Occurrences:   1     Diet::   Regular [1]       ACTIVITY  As tolerated.  Weight bearing as tolerated    CONSULTATIONS  None    PROCEDURES  Cardioversion 05/14/2025    LABORATORY  Lab Results   Component Value Date    SODIUM 138 05/16/2025    POTASSIUM 4.2 05/16/2025    CHLORIDE 109 05/16/2025    CO2 17 (L) 05/16/2025    GLUCOSE 107 (H) 05/16/2025    BUN 25 (H) 05/16/2025    CREATININE 0.83 05/16/2025        Lab Results   Component Value Date    WBC 8.0 05/16/2025    HEMOGLOBIN 11.6 (L) 05/16/2025    HEMATOCRIT 35.7 (L) 05/16/2025    PLATELETCT 160 (L) 05/16/2025        Total time of the discharge process exceeds 36 minutes.       [1]   Allergies  Allergen Reactions    Morphine Unspecified     Hallucinations, nightmares, and altered mentations    Lactaid [Lactase] Diarrhea     Upset stomach

## 2025-05-16 NOTE — DISCHARGE PLANNING
"Medically cleared from acute inpatient.   Therapy recommending post-acute placement but patient refusing to discharge to facility. HH order placed.   PCP is Rashad Carreon MD with [facility].   Insured by [employer].   2nd IMM delivered to and signed by patient today, 5/16/2025, at 1340. Patient verbalized understanding of her rights as a Medicare patient, including her right to appeal.   She has \"great support\" from multiple family members, including granddaughter who she lives with and her daughter who lives locally in [place]. Patient states she is \"absolutely safe\" to discharge home with family and HH.   Has home concentrator and POC supplied by [employer]. Daughter to bring POC to bedside and will drive patient home .  HH order placed by provider. Patient reported she doesn't remember what her servicing HH agency was but said \"let's just go with [facility]\". Choice signed by patient for HH: 1. [facility].   Patient owns all necessary DME at home: FWW, 4WW, WC, Shower Chair, Raised Toilet Seat.     -[facility] notified of patient's DC plan/choice. Daughter will drive patient home. Anticipating no further needs from case management prior to discharge.    Case Management Discharge Planning    Admission Date: 5/13/2025  GMLOS: 4.9  ALOS: 3    6-Clicks ADL Score: 21  6-Clicks Mobility Score: 13  PT and/or OT Eval ordered: Yes  Post-acute Referrals Ordered: Yes  Post-acute Choice Obtained: Yes  Has referral(s) been sent to post-acute provider:  Yes    Anticipated Discharge Dispo: Discharge Disposition: D/T to home under A care in anticipation of covered skilled care (06)  Discharge Address: [address] [place] [address]  Discharge Contact Phone Number: [phone]    DME Needed: No    Action(s) Taken: Updated Provider/Nurse on Discharge Plan, Patient Conference, DC Assessment Complete (See below), Choice obtained, and Referral(s) sent    Escalations Completed: None    Medically Clear: Yes    Next " Steps: No further needs from CM anticipated prior to discharge.     Barriers to Discharge: None    Is the patient up for discharge tomorrow: Today, 5/16/2025, via private transportation from daughter.     Care Transition Team Assessment    Information Source  Orientation Level: Oriented X4  Information Given By: Patient  Informant's Name: Nohelia  Who is responsible for making decisions for patient? : Patient    Readmission Evaluation  Is this a readmission?: No    Elopement Risk  Legal Hold: No  Ambulatory or Self Mobile in Wheelchair: Yes  Disoriented: No  Psychiatric Symptoms: None  History of Wandering: No  Elopement this Admit: No  Vocalizing Wanting to Leave: No  Displays Behaviors, Body Language Wanting to Leave: No-Not at Risk for Elopement  Elopement Risk: Not at Risk for Elopement    Interdisciplinary Discharge Planning  Does Admitting Nurse Feel This Could be a Complex Discharge?: No  Primary Care Physician: Rashad Carreon MD in [facility].  Lives with - Patient's Self Care Capacity: Related Adult  Patient or legal guardian wants to designate a caregiver: No  Support Systems: (Patient-Rptd) (P) Family Member(s), Friends / Neighbors  Housing / Facility: [address]  Prior Services: Intermittent Physical Support for ADL Per Family, Skilled Home Health Services  Patient Prefers to be Discharged to:: (Patient-Rptd) (P) Home  Durable Medical Equipment: (Patient-Rptd) (P) Home Oxygen, Walker, Other - Specify    Discharge Preparedness  What is your plan after discharge?: Home health care  What are your discharge supports?: Child  Prior Functional Level: Ambulatory, Needs Assist with Activities of Daily Living, Independent with Medication Management, Uses Walker, Uses Wheelchair  Difficulity with ADLs: Other  Difficulty with ADLs Comment: General assistance from family.  Difficulity with IADLs: Other  Difficulity with IADL Comments: General assistance from family.    Functional Assesment  Prior Functional Level:  Ambulatory, Needs Assist with Activities of Daily Living, Independent with Medication Management, Uses Walker, Uses Wheelchair    Finances  Financial Barriers to Discharge: No  Prescription Coverage: Yes    Vision / Hearing Impairment  Vision Impairment : Yes  Right Eye Vision: Impaired, Wears Glasses  Left Eye Vision: Impaired, Wears Glasses  Hearing Impairment : No    Values / Beliefs / Concerns  Values / Beliefs Concerns : No    Advance Directive  Advance Directive?: None  Advance Directive offered?: AD Booklet refused    Domestic Abuse  Have you ever been the victim of abuse or violence?: No  Possible Abuse/Neglect Reported to:: Not Applicable    Psychological Assessment  History of Substance Abuse: None  History of Psychiatric Problems: No  Non-compliant with Treatment: No  Newly Diagnosed Illness: No    Discharge Risks or Barriers  Discharge risks or barriers?: Complex medical needs  Patient risk factors: Complex medical needs, Vulnerable adult    Anticipated Discharge Information  Discharge Disposition: D/T to home under A care in anticipation of covered skilled care (06)  Discharge Address: [address] [place], [place] [address] [address]  Discharge Contact Phone Number: [phone]

## 2025-05-16 NOTE — PROGRESS NOTES
Telemetry Report:        Per Telestrip from Monitor Room     Rhythm: SB-SR 59-70    Ectopy: PVC, PAC    ID: .19  QRS: .08  Qt: .37

## 2025-05-16 NOTE — CARE PLAN
The patient is Watcher - Medium risk of patient condition declining or worsening    Shift Goals  Clinical Goals: Improve activity tolerance  Patient Goals: Work with PT/OT  Family Goals: none at bedside    Progress made toward(s) clinical / shift goals:    Problem: Knowledge Deficit - Standard  Goal: Patient and family/care givers will demonstrate understanding of plan of care, disease process/condition, diagnostic tests and medications  Outcome: Progressing     Problem: Knowledge Deficit - COPD  Goal: Patient/significant other demonstrates understanding of disease process, utilization of the Action Plan, medications and discharge instruction  Outcome: Progressing     Problem: Risk for Infection - COPD  Goal: Patient will remain free from signs and symptoms of infection  Outcome: Progressing     Problem: Nutrition - Advanced  Goal: Patient will display progressive weight gain toward goal have adequate food and fluid intake  Outcome: Progressing     Problem: Ineffective Airway Clearance  Goal: Patient will maintain patent airway with clear/clearing breath sounds  Outcome: Progressing     Problem: Impaired Gas Exchange  Goal: Patient will demonstrate improved ventilation and adequate oxygenation and participate in treatment regimen within the level of ability/situation.  Outcome: Progressing     Problem: Risk for Aspiration  Goal: Patient's risk for aspiration will be absent or decrease  Outcome: Progressing     Problem: Self Care  Goal: Patient will have the ability to perform ADLs independently or with assistance (bathe, groom, dress, toilet and feed)  Outcome: Progressing     Problem: Hemodynamics  Goal: Patient's hemodynamics, fluid balance and neurologic status will be stable or improve  Outcome: Progressing     Problem: Fluid Volume  Goal: Fluid volume balance will be maintained  Outcome: Progressing     Problem: Urinary - Renal Perfusion  Goal: Ability to achieve and maintain adequate renal perfusion and  functioning will improve  Outcome: Progressing     Problem: Respiratory  Goal: Patient will achieve/maintain optimum respiratory ventilation and gas exchange  Outcome: Progressing     Problem: Physical Regulation  Goal: Diagnostic test results will improve  Outcome: Progressing  Goal: Signs and symptoms of infection will decrease  Outcome: Progressing     Problem: Fall Risk  Goal: Patient will remain free from falls  Outcome: Progressing

## 2025-05-16 NOTE — CARE PLAN
The patient is Stable - Low risk of patient condition declining or worsening    Shift Goals  Clinical Goals: monitor o2, PT/OT eval  Patient Goals: comfort, rest  Family Goals: DORY    Progress made toward(s) clinical / shift goals:    Problem: Knowledge Deficit - Standard  Goal: Patient and family/care givers will demonstrate understanding of plan of care, disease process/condition, diagnostic tests and medications  Description: Target End Date:  1-3 days or as soon as patient condition allowsDocument in Patient Education1.  Patient and family/caregiver oriented to unit, equipment, visitation policy and means for communicating concern2.  Complete/review Learning Assessment3.  Assess knowledge level of disease process/condition, treatment plan, diagnostic tests and medications4.  Explain disease process/condition, treatment plan, diagnostic tests and medications  5/16/2025 1507 by Tanna Freeman R.N.  Outcome: Progressing  5/16/2025 1506 by Tanna Freeman R.N.  Outcome: Progressing     Problem: Knowledge Deficit - COPD  Goal: Patient/significant other demonstrates understanding of disease process, utilization of the Action Plan, medications and discharge instruction  Description: Target End Date:  1-3 days or as soon as patient condition allowsDocument in Patient Education1.  Discuss the importance of medical follow-up care, periodic chest x-rays, sputum cultures2.  Review worsening signs and symptoms of COPD flare and exacerbation and its management3.  Discuss respiratory medications, side effects, adverse reactions4.  Demonstrate technique for using a metered-dose inhaler (MDI) and utilization of a spacer5.  Review the COPD Action Plan6.  Instruct and reinforce the rationale for breathing exercises, coughing effectively, and general conditioning exercises7.  Stress importance of oral care and dental hygiene8.  Discuss the importance of avoiding people with active respiratory infections and need for routine  influenza and pneumococcal vaccinations9.  Discuss factors that may trigger condition and encourage patient/significant other to explore ways to control these factors in and around the home and work [id number]. Review the harmful effects of smoking and advise cessation of [id number]. Provide information about activity limitations and alternating activities with rest periods to prevent [id number]. Instruct asthmatic patient of use of peak flow meter, as [id number]. Review oxygen requirements and dosage, safe use of oxygen, and refer to the supplier as [id number]. Educate patient/family/caregiver on the use of Nasal Intermittent Positive Pressure Ventilation (NIPPV) and possible adverse reactions  5/16/2025 1507 by Tanna Freeman, R.N.  Outcome: Progressing  5/16/2025 1506 by Tanna Freeman, R.N.  Outcome: Progressing       Patient is not progressing towards the following goals:

## 2025-05-16 NOTE — DISCHARGE PLANNING
ATTN: Case Management  RE: Referral for Home Health    As of 5/16/25, we have accepted the Home Health referral for the patient listed above.    [facility]  will contact the patient within 48 hours. If you have any questions or concerns regarding the patient’s transition to Home Health, please do not hesitate to contact us at [phone].      We look forward to collaborating with you,  [facility] Team

## 2025-05-16 NOTE — DISCHARGE PLANNING
"TCN following. HTH/SCP chart review completed. Noted PT and OT with recommendations for post acute placement on 5/15. CM has sent blanket SNF referrals and pt has multiple accepting facilities per review. TCN has provided SCP auth for dc to SNF if remains indicated as noted pt had dc order in place as well.    Update: noted pt dc'ing home with anticipated HH at this time; per review and updated OT note \"Could benefit from post-acute placement prior to DC Home however only agreeable if accepted to [facility]. Otherwise could go home with HH OT and support from family PRN\". Noted dc summary form MD in place with plans for dc to home with HH (referral sent and FTF in place per review)  "

## 2025-05-16 NOTE — PROGRESS NOTES
Pt admitted from TICU to [facility]. Oriented pt to room/unit. Educated on call light use. VSS on 4L oxymask which is patient's baseline. No complaints of pain at the time of assessment. Purewick in place. Cont. Pulse ox in use. Safety precautions in place, bed alarm in use, pt is a high fall risk. Education provided. Patient able to make needs known.

## 2025-05-17 RX ORDER — PREDNISONE 20 MG/1
40 TABLET ORAL ONCE
Status: DISCONTINUED | OUTPATIENT
Start: 2025-05-17 | End: 2025-05-17

## 2025-05-17 NOTE — PROGRESS NOTES
This RN alerted by discharge keily that portable oxygen tank that was brought in would not last the entire ride home. This RN contacted ER  to see what could be done. ER  let this RN know that they could call pt's oxygen company, but due to the time, it may be hours and pt would need to come back to room. This RN called back discharge lonavine RN, then placed on speaker to speak to pt. This RN explained that pt would need to either come back to the room or tank would need to be brought home and refilled. Pt decided to discharge from discharge ekily with the oxygen that was in personal portable oxygen tank.

## 2025-05-17 NOTE — PROGRESS NOTES
Nohelia Kalpanaregulo Dickerson has been provided discharge instructions, to include follow up care, home medications, and activity/diet reviewed. Copy of discharge instructions in patient chart, signed and reviewed. Patient verbalizes the understanding of the discharge instructions. Arm band removed. Patient did not have home meds during admit. Pt remains on DCL O2 concentrator while awaiting meds/ride. Family has home O2 tank for transportation home. Pt's oxygen tank is low and educated on importance of supplemental oxygen for their drive home, pt adamant they want to go home, escalated to bedside RN.   Questions and concerns addressed prior to leaving the discharge lounge. Transported via car, accompanied by family. Patient discharge to home.

## 2025-05-17 NOTE — DISCHARGE INSTRUCTIONS
FOLLOW UP ITEMS POST DISCHARGE  Started on amiodarone for atrial flutter, follow-up with cardiology

## 2025-05-18 ENCOUNTER — TELEPHONE (OUTPATIENT)
Dept: HOME HEALTH SERVICES | Facility: HOME HEALTHCARE | Age: 80
End: 2025-05-18
Payer: MEDICARE

## 2025-05-18 LAB
BACTERIA BLD CULT: NORMAL
BACTERIA BLD CULT: NORMAL
SIGNIFICANT IND 70042: NORMAL
SIGNIFICANT IND 70042: NORMAL
SITE SITE: NORMAL
SITE SITE: NORMAL
SOURCE SOURCE: NORMAL
SOURCE SOURCE: NORMAL

## 2025-05-18 NOTE — TELEPHONE ENCOUNTER
Called patient's mobile but when patient answered she was unable to hear me and then I was unable to hear her. Second attempt made with same results. Called and left voicemail for daughter Roseanne. Later start of care requested for 5/19 - No Contact

## 2025-05-19 ENCOUNTER — TELEPHONE (OUTPATIENT)
Dept: HOME HEALTH SERVICES | Facility: HOME HEALTHCARE | Age: 80
End: 2025-05-19
Payer: MEDICARE

## 2025-05-19 ENCOUNTER — TELEPHONE (OUTPATIENT)
Dept: MEDICAL GROUP | Facility: PHYSICIAN GROUP | Age: 80
End: 2025-05-19
Payer: MEDICARE

## 2025-05-19 NOTE — TELEPHONE ENCOUNTER
This TCS called and spoke with patient regarding their Home Health referral.  I communicated that we do have their referral and are currently waiting for an open time slot to get their admission visit scheduled.  I asked if they were okay waiting to be called back within the next few days or in the event that a spot opens up sooner, or if they wanted their referral sent to a different Home Health agency.  They voiced their understanding of the delay and asked that their referral stay with [facility] at this time.  I requested a later start of care date for 5/21 per their request.

## 2025-05-19 NOTE — TELEPHONE ENCOUNTER
Adherence STARS Outreach for [employer]    This member was identified as non-adherent for [employer] Medicare STAR ratings. Every member that is non-adherent counts against the SCP's STAR rating, which directly impacts the Medicare payments made to [employer], thus impacting [employer]'s ability to provide affordable benefits for our members.     05/19/25    Pt was identified on report for STAR medication adherence regarding medications: atorvastatin    Left VM    Identified barriers: TBD    Follow up plan: 1-2 days      Duy Palacios, PharmD

## 2025-05-19 NOTE — TELEPHONE ENCOUNTER
"Home Transitional Care Management (TCM) Initial Contact Within 48 Hours of Discharge From Inpatient Setting:    This RN verfied the patient has [facility] ([facility]) and [facility] ([facility]).    This RN contacted patient Nohelia via [facility].   This RN introduced self and provided a brief discription of [facility] TCM program and explained what services Home TCM provides.  This RN asked if they would like to utilize Home TCM for optimal care.   Nohelia verbally consented to proceed with Home TCM services.     Discharge date from inpatient settin2025  Business date Home TCM began: 2025 Home TCM 30 day service period ends: 2025    Now that you are home, how are you feelings: The new heart medicine Amiodarone is making me dizzy.\"  Do you have a follow up appointment scheduled with your PCP and or specialists: no  Appointment date(s): none  Are you able to get to your appointment(s): ?  Assistance needed scheduling follow up appointments or establishing with PCP: yes  Has [facility] contacted you: yes SOC scheduled: TBD      Did you receive any new prescriptions: yes  Where you able to get your new prescriptions: yes  Medication reconciliation completed. Medications reviewed with Nohelia. Nohelia has received instruction on special precautions for all high risk medications and has been instructed on how and when to report problems that may occur.  Medication questions answered: yes    Do you have and barriers to health care such as transportation, food, paying for medications, lack of durable medical equipment or lack of caregivers: Nohelia needs an order for a nebulizer wants portable oxygen.  Who is helping you at home: family    Do you have questions or concerns regarding your reason for admission to the hospital: no  Do you have any questions regarding your discharge instructions: no  Education provided regarding signs and symptoms for hypoxia  and when to contact Home TCM before " next appointment with questions or concerns.    TCM home care contact information provided.    Current or previous attempts completed within 2 business days of discharge: 1

## 2025-05-20 ENCOUNTER — TELEPHONE (OUTPATIENT)
Dept: MEDICAL GROUP | Facility: PHYSICIAN GROUP | Age: 80
End: 2025-05-20
Payer: MEDICARE

## 2025-05-20 ENCOUNTER — TELEPHONE (OUTPATIENT)
Dept: HOME HEALTH SERVICES | Facility: HOME HEALTHCARE | Age: 80
End: 2025-05-20
Payer: MEDICARE

## 2025-05-20 DIAGNOSIS — I63.9 CARDIOEMBOLIC STROKE (HCC): Primary | ICD-10-CM

## 2025-05-20 PROCEDURE — RXMED WILLOW AMBULATORY MEDICATION CHARGE

## 2025-05-20 RX ORDER — ATORVASTATIN CALCIUM 80 MG/1
80 TABLET, FILM COATED ORAL
Qty: 100 TABLET | Refills: 3 | Status: SHIPPED | OUTPATIENT
Start: 2025-05-20

## 2025-05-21 ENCOUNTER — HOME CARE VISIT (OUTPATIENT)
Dept: HOME HEALTH SERVICES | Facility: HOME HEALTHCARE | Age: 80
End: 2025-05-21
Payer: MEDICARE

## 2025-05-21 ENCOUNTER — PHARMACY VISIT (OUTPATIENT)
Dept: PHARMACY | Facility: MEDICAL CENTER | Age: 80
End: 2025-05-21
Payer: COMMERCIAL

## 2025-05-21 VITALS
RESPIRATION RATE: 18 BRPM | OXYGEN SATURATION: 93 % | DIASTOLIC BLOOD PRESSURE: 78 MMHG | HEART RATE: 74 BPM | SYSTOLIC BLOOD PRESSURE: 122 MMHG | HEIGHT: 72 IN | BODY MASS INDEX: 25.24 KG/M2

## 2025-05-21 PROCEDURE — 665999 HH PPS REVENUE DEBIT

## 2025-05-21 PROCEDURE — G0299 HHS/HOSPICE OF RN EA 15 MIN: HCPCS

## 2025-05-21 PROCEDURE — 665005 NO-PAY RAP - HOME HEALTH

## 2025-05-21 PROCEDURE — 665001 SOC-HOME HEALTH

## 2025-05-21 PROCEDURE — 665998 HH PPS REVENUE CREDIT

## 2025-05-21 ASSESSMENT — ENCOUNTER SYMPTOMS
SHORTNESS OF BREATH: 1
COUGH: 1
DIARRHEA: 1
SEVERE DYSPNEA: 1
DENIES PAIN: 1

## 2025-05-21 NOTE — CASE COMMUNICATION
Primary dx/Skilled need: Acute  SOC to [facility]    SN frequency: 1-2w9    FULL CODE    Zip code: [address]    Disciplines ordered:SN, PT, OT, HHA    Insurance & authorization:SCP

## 2025-05-21 NOTE — TELEPHONE ENCOUNTER
"[facility]    Pt has seen a [facility] provider in past 12 months and in need of prescription refills per protocol.  A 100 day supply is provided whenever possible to improve adherence rates.     Lab Results   Component Value Date/Time    HBA1C 6.1 (H) 04/25/2023 10:55 AM      No results found for: \"MICROALBCALC\", \"MALBCRT\", \"MALBEXCR\", \"LYFNFG47\", \"MICROALBUR\", \"MICRALB\", \"UMICROALBUM\", \"MICROALBTIM\"   Lab Results   Component Value Date/Time    ALKPHOSPHAT 66 05/14/2025 03:30 AM    ASTSGOT 20 05/14/2025 03:30 AM    ALTSGPT 16 05/14/2025 03:30 AM    TBILIRUBIN 1.3 05/14/2025 03:30 AM      Lab Results   Component Value Date/Time    SODIUM 138 05/16/2025 03:02 AM    POTASSIUM 4.2 05/16/2025 03:02 AM    CHLORIDE 109 05/16/2025 03:02 AM    CO2 17 (L) 05/16/2025 03:02 AM    GLUCOSE 107 (H) 05/16/2025 03:02 AM    BUN 25 (H) 05/16/2025 03:02 AM    CREATININE 0.83 05/16/2025 03:02 AM        Meds refilled:  Atorvastatin     Duy Palacios, PharmD     CC  Dr. Carreon      "

## 2025-05-21 NOTE — CASE COMMUNICATION
Medications available for review; one major noted.    Drug-Food: atorvastatin    The oral bioavailability and pharmacologic effects of statins may be increased by grapefruit juice. Although relatively uncommon, skeletal muscle toxicity characterized by myalgias and rhabdomyolysis may result. The predictability of this interaction is difficult to determine due to the variance of study methods, doses of statins used, and the amounts and t iming of grapefruit juice that was administered.    Details   Major   atorvastatin (LIPITOR) 80 MG tablet

## 2025-05-21 NOTE — CASE COMMUNICATION
"FYI- performed SOC on Nohelia today; she is not taking her amiodarone as per patient \"it makes her dizzy\". She is very concerned about falling, is unsteady and frequents the restroom. She reports having diarrhea the last few days but has resolved as of today.     I advised the patient the importance of taking all medications as prescribed. Patient verbalized understanding but still does not want to take the medication (as of today). La  does taken was on 5/19 but wants to discuss with Frieda DORAN, (APRN)tomorrow.   "

## 2025-05-21 NOTE — PROGRESS NOTES
[facility] Transitional Care Management Home Visit      Nohelia Dickerson  80 y.o. female  MRN [id number]  PCP Rashad Carreon M.D.  Location of visit: Patient home    History of Present Illness:    Hospital Course: Nohelia is a 79 yo female hospitalized 5/13/2025-5/16/2025 admitted to ICU for acute on chronic hypoxic respiratory failure secondary to pneumonia and COPD exacerbation. Required HFNC, IV steroids and antibiotics. Admission complicated by atrial flutter with RVR and hypotension requiring cardioversion and started on amiodarone. Discharged on oral prednisone, short course of antibiotics and amiodarone with outpatient cardiology follow-up.     Today: Nohelia is seen today in her home for an initial transitional care management visit. Doing well. Laying in bed as she doesn't want to stray too far from the bathroom. Diarrhea has resolved since completion of the antibiotics. Taking Activia and probiotics which has also helped. Stopped taking amiodarone 2 days ago due to dizziness and making her unsteady on her feet/fall risk. Dizziness has resolved since she has stopped taking amiodarone. On O2 2L NC continuous, SpO2 93%. Denies pain. Using a FWW with ambulation. [employer] started yesterday. Daughter assists with outpatient appointments.       Assessment and Plan:    Primary Problem #1: Hospital follow up, acute on chronic hypoxic respiratory failure secondary to pneumonia and COPD exacerbation    Plan: Completed prednisone and antibiotic - recent hx of diarrhea but this has resolved since completion of antibiotic, also taking Activia and probiotic - continuous O2 4L NC , SpO2 93% - using the incentive spirometer - needs a nebulizer (order placed) - does not have a pulmonologist, will need this outpatient follow up as well - [employer] continues - f/u with Frieda KIRAN with TCM 5/30/2025    Primary Problem #2: Recent atrial flutter with RVR and hypotension    Plan: Required  cardioversion while hospitalized - discharged on Amiodarone however patient stopped taking this 2 days ago as it is making her dizzy and placing her at increased risk to fall - dizziness has resolved off amiodarone - outbound call to Cardiology (SALINA Castellanos) discussed with Ethyl RN who will discuss with Dr. Grant, needs outpatient appt so will follow this - [facility] continues - f/u with Frieda KIRAN with TCM 5/30/2025      Other major issues not addressed during today's visit: COPD, HTN, dyslipidemia, prior cardioembolic stroke    Pertinent Physical Exam Findings:  /70 (BP Location: Left arm, Patient Position: Sitting, BP Cuff Size: Adult)   Pulse 94   Temp 36.1 °C (97 °F) (Temporal)   Resp 18   SpO2 93%     Review of Systems   Constitutional:  Negative for chills and fever.   Respiratory:  Positive for cough. Negative for shortness of breath.    Cardiovascular:  Negative for chest pain, palpitations and leg swelling.   Gastrointestinal:  Negative for abdominal pain, constipation, diarrhea, nausea and vomiting.   Genitourinary:  Positive for frequency. Negative for dysuria.   Neurological:  Negative for dizziness.       Physical Exam  Constitutional:       Appearance: Normal appearance.      Interventions: Nasal cannula in place.   Cardiovascular:      Rate and Rhythm: Normal rate and regular rhythm.      Pulses: Normal pulses.   Pulmonary:      Effort: Pulmonary effort is normal.      Breath sounds: Normal breath sounds.   Abdominal:      General: Bowel sounds are normal.      Palpations: Abdomen is soft.   Musculoskeletal:      Right lower leg: No edema.      Left lower leg: No edema.   Skin:     General: Skin is warm and dry.   Neurological:      Mental Status: She is alert and oriented to person, place, and time.   Psychiatric:         Mood and Affect: Mood normal.         Behavior: Behavior normal.       Current Medications:  Current Medications[1]    Medication Allergies:  Morphine and  Lactaid [lactase]      Thank you for allowing me the opportunity to participate in the care of Nohelia Dickerson    This visit was conducted within 7 days from hospital discharge. This is a high medical complexity visit, which in addition to above, included, if applicable, medication reconciliation and management, obtaining and reviewing discharge information, reviewing the need for diagnostic tests/treatments and/or follow-up on pending diagnostic tests/treatments, medical education, establishing or re-establishing referrals with community providers and services and assistance with scheduling follow-up visits with providers and services.    RAFFI Hale  [facility] Transitional Care Management  [address] [place] [address]  P. [phone]  F. [phone]  Available on Voalte         [1]   Current Outpatient Medications:     atorvastatin (LIPITOR) 80 MG tablet, Take 1 Tablet by mouth every day. IN THE EVENING., Disp: 100 Tablet, Rfl: 3    Home Care Oxygen, Inhale 4 L/min continuous., Disp: , Rfl:     amiodarone (PACERONE) 400 MG tablet, Take 1 Tablet by mouth 2 times a day for 14 days., Disp: 28 Tablet, Rfl: 0    albuterol (PROVENTIL) 2.5mg/0.5ml Nebu Soln, Take 0.5 mL by nebulization every four hours as needed for Shortness of Breath., Disp: 20 mL, Rfl: 0    apixaban (ELIQUIS) 5mg Tab, Take 1 Tablet by mouth 2 times a day., Disp: 180 Tablet, Rfl: 3    estradiol (ESTRACE VAGINAL) 0.1 MG/GM vaginal cream, Apply 1g cream inside vagina twice per week, Disp: 1 Each, Rfl: 6    amLODIPine (NORVASC) 5 MG Tab, Take 1 Tablet by mouth every day., Disp: 100 Tablet, Rfl: 3    losartan (COZAAR) 100 MG Tab, Take 1 Tablet by mouth every day., Disp: 100 Tablet, Rfl: 0    TRELEGY ELLIPTA 200-62.5-25 MCG/ACT inhaler, INHALE 1 PUFF BY MOUTH EVERY DAY, Disp: 60 Each, Rfl: 3

## 2025-05-22 ENCOUNTER — TELEPHONE (OUTPATIENT)
Dept: CARDIOLOGY | Facility: MEDICAL CENTER | Age: 80
End: 2025-05-22
Payer: MEDICARE

## 2025-05-22 ENCOUNTER — DOCUMENTATION (OUTPATIENT)
Dept: MEDICAL GROUP | Facility: PHYSICIAN GROUP | Age: 80
End: 2025-05-22
Payer: MEDICARE

## 2025-05-22 ENCOUNTER — OFF SITE VISIT (OUTPATIENT)
Dept: PALLIATIVE MEDICINE | Facility: HOSPICE | Age: 80
End: 2025-05-22
Payer: MEDICARE

## 2025-05-22 VITALS
SYSTOLIC BLOOD PRESSURE: 110 MMHG | TEMPERATURE: 97 F | DIASTOLIC BLOOD PRESSURE: 70 MMHG | HEART RATE: 94 BPM | RESPIRATION RATE: 18 BRPM | OXYGEN SATURATION: 93 %

## 2025-05-22 DIAGNOSIS — J44.1 CHRONIC OBSTRUCTIVE PULMONARY DISEASE WITH ACUTE EXACERBATION (HCC): Primary | ICD-10-CM

## 2025-05-22 DIAGNOSIS — I48.92 ATRIAL FLUTTER WITH RAPID VENTRICULAR RESPONSE (HCC): ICD-10-CM

## 2025-05-22 DIAGNOSIS — J96.21 ACUTE ON CHRONIC RESPIRATORY FAILURE WITH HYPOXIA (HCC): ICD-10-CM

## 2025-05-22 PROCEDURE — 665998 HH PPS REVENUE CREDIT

## 2025-05-22 PROCEDURE — 665999 HH PPS REVENUE DEBIT

## 2025-05-22 ASSESSMENT — ENCOUNTER SYMPTOMS
NAUSEA: 0
CHILLS: 0
FEVER: 0
VOMITING: 0
PALPITATIONS: 0
DIZZINESS: 0
DIARRHEA: 0
COUGH: 1
CONSTIPATION: 0
ABDOMINAL PAIN: 0
SHORTNESS OF BREATH: 0

## 2025-05-22 ASSESSMENT — PAIN SCALES - GENERAL: PAINLEVEL_OUTOF10: NO PAIN

## 2025-05-22 NOTE — CASE COMMUNICATION
Primary dx/Skilled need: COPD with recent exacerbation, Weakness, Fall Risk, Med Mgt, O2, Hx of stroke            SOC to [facility]            SN frequency: 1-2w9           FULL CODE                 Zip code: [address]                   Disciplines ordered:SN, PT, OT, HHA                   Insurance & authorization:SCP

## 2025-05-22 NOTE — PROGRESS NOTES
Medication chart review for [facility] services    Received referral from [facility].   Medications reviewed  compared with discharge summary if available.  Discharge summary date, if applicable:   5/16    Current medication list per [facility]     Medication list one, patient is currently taking    Current Outpatient Medications:     Coenzyme Q10, 1 Each, Oral, DAILY    Collagen-Vitamin C (COLLAGEN PLUS VITAMIN C PO), 1 Tablet, Oral, DAILY    atorvastatin, 80 mg, Oral, QDAY    Home Care Oxygen, 4 L/min, Inhalation, Continuous    amiodarone, 400 mg, Oral, BID (Patient not taking: Reported on 5/21/2025)    albuterol, 2.5 mg, Nebulization, Q4HRS PRN    apixaban, 5 mg, Oral, BID    estradiol, Apply 1g cream inside vagina twice per week    amLODIPine, 5 mg, Oral, DAILY    losartan, 100 mg, Oral, DAILY    Trelegy Ellipta, 1 Puff, Inhalation, QDAY      Medication list two, drugs that the patient has been prescribed or recommended to take by their healthcare provider on discharge summary    Medication List          START taking these medications         Instructions   amiodarone 400 MG tablet  Commonly known as: Pacerone    Doctor's comments: Need cardio f/u for further continuation  Take 1 Tablet by mouth 2 times a day for 14 days.  Dose: 400 mg      amoxicillin-clavulanate 875-125 MG Tabs  Commonly known as: Augmentin    Take 1 Tablet by mouth every 12 hours for 3 doses.  Dose: 1 Tablet      predniSONE 20 MG Tabs  Start taking on: May 17, 2025  Commonly known as: Deltasone    Doctor's comments:    Take 2 Tablets by mouth every day for 2 days.  Dose: 40 mg                CONTINUE taking these medications         Instructions   amLODIPine 5 MG Tabs  Commonly known as: Norvasc    Take 1 Tablet by mouth every day.  Dose: 5 mg      apixaban 5mg Tabs  Commonly known as: Eliquis    Take 1 Tablet by mouth 2 times a day.  Dose: 5 mg      atorvastatin 80 MG tablet  Commonly known as: Lipitor    TAKE 1 TABLET BY MOUTH  EVERY DAY. N THE EVENING. PT TO MAKE APPT AND GET LABS PRIOR TO MORE REFILLS.  Dose: 80 mg      estradiol 0.1 MG/GM vaginal cream  Commonly known as: ESTRACE VAGINAL    Apply 1g cream inside vagina twice per week      losartan 100 MG Tabs  Commonly known as: Cozaar    Take 1 Tablet by mouth every day.  Dose: 100 mg      Mirabegron ER 50 MG Tb24    Take 50 mg by mouth every day for 30 days.  Dose: 50 mg      Trelegy Ellipta 200-62.5-25 MCG/ACT inhaler  Generic drug: fluticasone-umeclidinium-vilanterol    INHALE 1 PUFF BY MOUTH EVERY DAY  Dose: 1 Puff                 Allergies[1]    Labs     Lab Results   Component Value Date/Time    SODIUM 138 05/16/2025 03:02 AM    POTASSIUM 4.2 05/16/2025 03:02 AM    CHLORIDE 109 05/16/2025 03:02 AM    CO2 17 (L) 05/16/2025 03:02 AM    GLUCOSE 107 (H) 05/16/2025 03:02 AM    BUN 25 (H) 05/16/2025 03:02 AM    CREATININE 0.83 05/16/2025 03:02 AM     Lab Results   Component Value Date/Time    ALKPHOSPHAT 66 05/14/2025 03:30 AM    ASTSGOT 20 05/14/2025 03:30 AM    ALTSGPT 16 05/14/2025 03:30 AM    TBILIRUBIN 1.3 05/14/2025 03:30 AM    INR 1.47 (H) 05/13/2025 11:31 AM    ALBUMIN 3.1 (L) 05/14/2025 03:30 AM        Assessment for clinically significant drug interactions, drug omissions/additions, duplicative therapies.            CC   Rashad Carreon M.D.  [address]  [place] [address]  Fax: [phone]    [facility]  Phone [phone] fax [phone]    This note was created using voice recognition software (Dragon). The accuracy of the dictation is limited by the abilities of the software. I have reviewed the note prior to signing, however some errors in grammar and context are still possible. If you have any questions related to this note please do not hesitate to contact our office.       No follow-ups on file.       [1]   Allergies  Allergen Reactions    Morphine Unspecified     Hallucinations, nightmares, and altered mentations    Lactaid  [Lactase] Diarrhea     Upset stomach

## 2025-05-22 NOTE — TELEPHONE ENCOUNTER
To ANI: ~Just FYI. I spoke to MAGNO Trammell she reports that pt had a reaction to amiodarone and stopped taking it 2 days ago. Pt complaints of dizziness, lightheadedness, and feeling unsteady on her feet. She denies palpitation, CP and SOB. Pt reports that she feels better not taking the amiodarone. MAGNO Trammell also states that she listened to pt and pt sounded regular via stethoscope. She will also inform HH so they can keep track of her symptoms. Thank you.     HR: 94  BP: 110/70

## 2025-05-23 ENCOUNTER — TELEPHONE (OUTPATIENT)
Dept: HOME HEALTH SERVICES | Facility: HOME HEALTHCARE | Age: 80
End: 2025-05-23

## 2025-05-23 ENCOUNTER — APPOINTMENT (OUTPATIENT)
Dept: MEDICAL GROUP | Facility: CLINIC | Age: 80
End: 2025-05-23
Payer: MEDICARE

## 2025-05-23 PROCEDURE — 665998 HH PPS REVENUE CREDIT

## 2025-05-23 PROCEDURE — 665999 HH PPS REVENUE DEBIT

## 2025-05-23 RX ORDER — ALBUTEROL SULFATE 0.83 MG/ML
2.5 SOLUTION RESPIRATORY (INHALATION) EVERY 4 HOURS PRN
Qty: 1 EACH | Refills: 90 | Status: SHIPPED | OUTPATIENT
Start: 2025-05-23

## 2025-05-23 SDOH — ECONOMIC STABILITY: HOUSING INSECURITY: EVIDENCE OF SMOKING MATERIAL: 0

## 2025-05-23 ASSESSMENT — ENCOUNTER SYMPTOMS
DYSPNEA ON EXERTION: 1
FATIGUES EASILY: 1
VOMITING: DENIES
STOOL FREQUENCY: MORE THAN TWICE DAILY
CONTUSION: 1
COUGH CHARACTERISTICS: NON-PRODUCTIVE
FATIGUE: 1
NAUSEA: DENIES
DYSPNEA ACTIVITY LEVEL: AT REST

## 2025-05-23 ASSESSMENT — ACTIVITIES OF DAILY LIVING (ADL)
TOILETING: 1
OASIS_M1830: 03
AMBULATION ASSISTANCE: MODERATE ASSIST
TELEPHONE USE ASSESSED: 1
TOILETING: MODERATE ASSIST
USING THE TELPHONE: INDEPENDENT
AMBULATION ASSISTANCE: 1

## 2025-05-23 NOTE — TELEPHONE ENCOUNTER
I do not see any cardiology notes since March 2020 patient was last seen by Dr. Pereira.  If VIANEY Solano wants us to see the patient, she will need a new referral and an appointment with cardiology next available.  We can then decide whether she needs any antiarrhythmic medication.  Thank you.

## 2025-05-24 PROCEDURE — 665998 HH PPS REVENUE CREDIT

## 2025-05-24 PROCEDURE — 665999 HH PPS REVENUE DEBIT

## 2025-05-25 PROCEDURE — 665999 HH PPS REVENUE DEBIT

## 2025-05-25 PROCEDURE — 665998 HH PPS REVENUE CREDIT

## 2025-05-26 PROCEDURE — 665999 HH PPS REVENUE DEBIT

## 2025-05-26 PROCEDURE — 665998 HH PPS REVENUE CREDIT

## 2025-05-27 ENCOUNTER — HOME CARE VISIT (OUTPATIENT)
Dept: HOME HEALTH SERVICES | Facility: HOME HEALTHCARE | Age: 80
End: 2025-05-27
Payer: MEDICARE

## 2025-05-27 VITALS
OXYGEN SATURATION: 91 % | RESPIRATION RATE: 18 BRPM | DIASTOLIC BLOOD PRESSURE: 60 MMHG | TEMPERATURE: 97.8 F | SYSTOLIC BLOOD PRESSURE: 118 MMHG | HEART RATE: 95 BPM

## 2025-05-27 VITALS
DIASTOLIC BLOOD PRESSURE: 62 MMHG | HEART RATE: 81 BPM | RESPIRATION RATE: 18 BRPM | SYSTOLIC BLOOD PRESSURE: 122 MMHG | OXYGEN SATURATION: 93 %

## 2025-05-27 DIAGNOSIS — J42 CHRONIC BRONCHITIS, UNSPECIFIED CHRONIC BRONCHITIS TYPE (HCC): ICD-10-CM

## 2025-05-27 DIAGNOSIS — I10 HYPERTENSION, UNSPECIFIED TYPE: ICD-10-CM

## 2025-05-27 DIAGNOSIS — J96.11 CHRONIC RESPIRATORY FAILURE WITH HYPOXIA (HCC): ICD-10-CM

## 2025-05-27 PROCEDURE — G0156 HHCP-SVS OF AIDE,EA 15 MIN: HCPCS

## 2025-05-27 PROCEDURE — 665999 HH PPS REVENUE DEBIT

## 2025-05-27 PROCEDURE — G0151 HHCP-SERV OF PT,EA 15 MIN: HCPCS

## 2025-05-27 PROCEDURE — 665998 HH PPS REVENUE CREDIT

## 2025-05-27 SDOH — ECONOMIC STABILITY: HOUSING INSECURITY
HOME SAFETY: AGEMENT OF O2 TUBING, CHANGING OF TUBE MONTHLY. PATIENT ABLE TO SPEAKBACK VERBAL UNDERSTANDING.  RECOMMENDED A LONGER TUBING ON O2 TO BE ABLE TO WEAR AT ALL TIMES WITH MOBILIITY IN HOME.  PATIENT REPORTS PREFERRED IN SUPPOSED TO COME TODAY AND SHE PL

## 2025-05-27 SDOH — ECONOMIC STABILITY: HOUSING INSECURITY
HOME SAFETY: ANS TO GET LONGER TUBING.     PT RECOMMENDED GRAB BARS IN SHOWER.  PATIENT ABLE TO SPEAKBACK VERBAL UNDERSTANDING

## 2025-05-27 SDOH — ECONOMIC STABILITY: HOUSING INSECURITY
HOME SAFETY: PT TRACED TUBING TO CONCENTRATOR AND DID NOT NOTE ANY LEAKS OR PROBLEMS. CONCENTRATOR SET PER MD ORDER. NOTED NO OPEN FLAMES. PT EDUCATED IN O2 SAFETY WITH USE OF O2 VIA NASAL CANULA AT ALL TIMES, KEEPING O2 PER MD ORDERS, NO OPEN FLAMES/SMOKING, MAN

## 2025-05-27 ASSESSMENT — ACTIVITIES OF DAILY LIVING (ADL)
AMBULATION_DISTANCE/DURATION_TOLERATED: 40 FEET
AMBULATION ASSISTANCE ON FLAT SURFACES: 1

## 2025-05-27 ASSESSMENT — ENCOUNTER SYMPTOMS
DENIES PAIN: 1
MENTAL STATUS CHANGE: 0
MUSCLE WEAKNESS: 1

## 2025-05-27 NOTE — CASE COMMUNICATION
PT completed evaluation on 5/27/24.  Frequency 1 week 4 effective 5/27/25. Please assist with authorization as needed.

## 2025-05-27 NOTE — Clinical Note
Thank you!    ----- Message -----  From: Patricia Mckinney, PT  Sent: 5/27/2025  11:15 AM PDT  To: Mi Carl PT; Pam Cook R.N.; Yaneli*      PT completed evaluation on 5/27/24.  Frequency 1 week 4 effective 5/27/25. Please assist with authorization as needed.

## 2025-05-27 NOTE — TELEPHONE ENCOUNTER
Received request via: Patient    Was the patient seen in the last year in this department? Yes    Does the patient have an active prescription (recently filled or refills available) for medication(s) requested? No    Pharmacy Name: [facility] #[id number] - [place] - [address]     Does the patient have alf Plus and need 100-day supply? (This applies to ALL medications) Yes, quantity updated to 100 days

## 2025-05-27 NOTE — CASE COMMUNICATION
Patient's resting heartrate at 95 bpm is outside of normal parameters. Patient denies any dizziness, light headedness, chest pain.  All other vitals are within HH parameters.

## 2025-05-28 ENCOUNTER — HOME CARE VISIT (OUTPATIENT)
Dept: HOME HEALTH SERVICES | Facility: HOME HEALTHCARE | Age: 80
End: 2025-05-28
Payer: MEDICARE

## 2025-05-28 PROCEDURE — 665998 HH PPS REVENUE CREDIT

## 2025-05-28 PROCEDURE — 665999 HH PPS REVENUE DEBIT

## 2025-05-28 ASSESSMENT — ENCOUNTER SYMPTOMS
PERSON REPORTING PAIN: PATIENT
DENIES PAIN: 1

## 2025-05-28 NOTE — CASE COMMUNICATION
Nohelia Carr was a missed visit for today for SN because she did not want any additional visits and was expecting the HHA & therapy.     Thanks,     MERYL Pickering

## 2025-05-29 ENCOUNTER — HOME CARE VISIT (OUTPATIENT)
Dept: HOME HEALTH SERVICES | Facility: HOME HEALTHCARE | Age: 80
End: 2025-05-29
Payer: MEDICARE

## 2025-05-29 PROCEDURE — 665999 HH PPS REVENUE DEBIT

## 2025-05-29 PROCEDURE — 665998 HH PPS REVENUE CREDIT

## 2025-05-29 PROCEDURE — G0299 HHS/HOSPICE OF RN EA 15 MIN: HCPCS

## 2025-05-29 NOTE — CASE COMMUNICATION
fyi, patient called PT on 5/28/25 and reported that her pharmacy had not yet got orders from physician for her medications. PT originally called and spoke to SALINA Monteiro with Dr Carreon on 5/27/25 regarding situation.  PT again called SALINA Monteiro with Dr Carreon who stated that she sent message to Dr Carreon but Dr Carreon and another MD in office were out of office.  PT asked Thania who was covering provider while physicians out of off ice and please contact them.  PT stated to Thania that patient was out of Losartan and needed to especially get it filled as soon as possible along with other medications.  Thania reported that she would work on it.  PT called Ted RN supervisor, and reported issue.  Ted reports he will follow up and assist.

## 2025-05-29 NOTE — PROGRESS NOTES
[facility] Transitional Care Management Home Visit      Nohelia Dickerson  80 y.o. female  MRN [id number]  PCP Rashad Carreon M.D.  Location of visit: Patient home    History of Present Illness:    Hospital Course: Nohelia is a 79 yo female hospitalized 5/13/2025-5/16/2025 admitted to ICU for acute on chronic hypoxic respiratory failure secondary to pneumonia and COPD exacerbation. Required HFNC, IV steroids and antibiotics. Admission complicated by atrial flutter with RVR and hypotension requiring cardioversion and started on amiodarone. Discharged on oral prednisone, short course of antibiotics and amiodarone with outpatient cardiology follow-up.      Today: Nohelia is seen today in her home for a follow up transitional care management visit. States she is doing so much better. Received a call from Cardio before my arrival and she has an appointment on 6/10/2025. On O2 $L NC continuous, SpO2 93%. Denies pain. Using a FWW with ambulation. [employer] started continues. Daughter assists with outpatient appointments.      Assessment and Plan:    Primary Problem #1: Acute on chronic hypoxic respiratory failure secondary to pneumonia and COPD exacerbation     Plan: Completed prednisone and antibiotic - continuous O2 4L NC , SpO2 93% - using the incentive spirometer, [employer], nebulizer - does not have a pulmonologist, will need this outpatient follow up as well - [employer] [facility] continues        Primary Problem #2: Recent atrial flutter with RVR and hypotension     Plan: Required cardioversion while hospitalized - discharged on Amiodarone however patient has stopped taking and dizziness has resolved - Cardiology f/u appt 6/10/2024 - [facility] continues        Other major issues not addressed during today's visit: COPD, HTN, dyslipidemia, prior cardioembolic stroke    Pertinent Physical Exam Findings:  /60 (BP Location: Left arm, Patient Position: Sitting, BP Cuff Size: Adult)   Pulse 89    Temp 36.3 °C (97.4 °F) (Temporal)   Resp 18   SpO2 93%     Review of Systems   Constitutional:  Negative for chills and fever.   Respiratory:  Positive for cough and shortness of breath.    Cardiovascular:  Negative for chest pain, palpitations and leg swelling.   Gastrointestinal:  Negative for abdominal pain, constipation and diarrhea.   Genitourinary:  Negative for dysuria.   Neurological:  Positive for dizziness. Negative for headaches.       Physical Exam  Constitutional:       Appearance: Normal appearance.      Interventions: Nasal cannula in place.   Cardiovascular:      Rate and Rhythm: Normal rate and regular rhythm.   Pulmonary:      Effort: Pulmonary effort is normal.      Breath sounds: Examination of the right-lower field reveals decreased breath sounds. Examination of the left-lower field reveals decreased breath sounds. Decreased breath sounds present.   Abdominal:      General: Bowel sounds are normal.      Palpations: Abdomen is soft.   Musculoskeletal:      Right lower leg: No edema.      Left lower leg: No edema.   Skin:     General: Skin is warm and dry.   Neurological:      Mental Status: She is alert and oriented to person, place, and time.   Psychiatric:         Mood and Affect: Mood normal.         Behavior: Behavior normal.       Current Medications:  Current Medications[1]    Medication Allergies:  Morphine and Lactaid [lactase]      Thank you for allowing me the opportunity to participate in the care of Nohelia Dickerson    This is a moderate medical complexity visit, which in addition to above, included, if applicable, medication reconciliation and management, obtaining and reviewing discharge information, reviewing the need for diagnostic tests/treatments and/or follow-up on pending diagnostic tests/treatments, medical education, establishing or re-establishing referrals with community providers and services and assistance with scheduling follow-up visits with providers and  services.      RAFFI Hale  [facility] Transitional Care Management  [address] [place] [address]  P. [phone]  F. [phone]  Available on Voalte         [1]   Current Outpatient Medications:     albuterol (PROVENTIL) 2.5mg/3ml Nebu Soln solution for nebulization, Take 3 mL by nebulization every four hours as needed for Shortness of Breath., Disp: 1 Each, Rfl: 90    multivitamin Tab, Take 1 Tablet by mouth every day., Disp: , Rfl:     Coenzyme Q10 200 MG Tab, Take 1 Each by mouth every day. Indications: Heart Rhythm Disorder, High Blood Pressure (Patient not taking: Reported on 5/22/2025), Disp: , Rfl:     Collagen-Vitamin C (COLLAGEN PLUS VITAMIN C PO), Take 1 Tablet by mouth every day., Disp: , Rfl:     atorvastatin (LIPITOR) 80 MG tablet, Take 1 Tablet by mouth every day. IN THE EVENING., Disp: 100 Tablet, Rfl: 3    Home Care Oxygen, Inhale 4 L/min continuous. Indications: COPD exacerbation, Disp: , Rfl:     amiodarone (PACERONE) 400 MG tablet, Take 1 Tablet by mouth 2 times a day for 14 days. (Patient not taking: Reported on 5/21/2025), Disp: 28 Tablet, Rfl: 0    apixaban (ELIQUIS) 5mg Tab, Take 1 Tablet by mouth 2 times a day., Disp: 180 Tablet, Rfl: 3    estradiol (ESTRACE VAGINAL) 0.1 MG/GM vaginal cream, Apply 1g cream inside vagina twice per week, Disp: 1 Each, Rfl: 6    amLODIPine (NORVASC) 5 MG Tab, Take 1 Tablet by mouth every day., Disp: 100 Tablet, Rfl: 3    losartan (COZAAR) 100 MG Tab, Take 1 Tablet by mouth every day., Disp: 100 Tablet, Rfl: 0    TRELEGY ELLIPTA 200-62.5-25 MCG/ACT inhaler, INHALE 1 PUFF BY MOUTH EVERY DAY, Disp: 60 Each, Rfl: 3

## 2025-05-30 ENCOUNTER — TELEPHONE (OUTPATIENT)
Dept: HEALTH INFORMATION MANAGEMENT | Facility: OTHER | Age: 80
End: 2025-05-30
Payer: MEDICARE

## 2025-05-30 ENCOUNTER — TELEPHONE (OUTPATIENT)
Dept: HOME HEALTH SERVICES | Facility: HOME HEALTHCARE | Age: 80
End: 2025-05-30
Payer: MEDICARE

## 2025-05-30 ENCOUNTER — OFF SITE VISIT (OUTPATIENT)
Dept: PALLIATIVE MEDICINE | Facility: HOSPICE | Age: 80
End: 2025-05-30
Payer: MEDICARE

## 2025-05-30 ENCOUNTER — TELEPHONE (OUTPATIENT)
Dept: HOME HEALTH SERVICES | Facility: HOME HEALTHCARE | Age: 80
End: 2025-05-30

## 2025-05-30 VITALS
SYSTOLIC BLOOD PRESSURE: 100 MMHG | OXYGEN SATURATION: 93 % | DIASTOLIC BLOOD PRESSURE: 60 MMHG | TEMPERATURE: 97.4 F | RESPIRATION RATE: 18 BRPM | HEART RATE: 89 BPM

## 2025-05-30 VITALS
OXYGEN SATURATION: 93 % | DIASTOLIC BLOOD PRESSURE: 62 MMHG | HEART RATE: 81 BPM | TEMPERATURE: 97.4 F | SYSTOLIC BLOOD PRESSURE: 120 MMHG

## 2025-05-30 DIAGNOSIS — J96.21 ACUTE ON CHRONIC RESPIRATORY FAILURE WITH HYPOXIA (HCC): Primary | ICD-10-CM

## 2025-05-30 DIAGNOSIS — Z86.79 HISTORY OF ATRIAL FLUTTER: ICD-10-CM

## 2025-05-30 DIAGNOSIS — J44.1 CHRONIC OBSTRUCTIVE PULMONARY DISEASE WITH ACUTE EXACERBATION (HCC): ICD-10-CM

## 2025-05-30 PROCEDURE — 665998 HH PPS REVENUE CREDIT

## 2025-05-30 PROCEDURE — 665999 HH PPS REVENUE DEBIT

## 2025-05-30 SDOH — ECONOMIC STABILITY: HOUSING INSECURITY: EVIDENCE OF SMOKING MATERIAL: 0

## 2025-05-30 ASSESSMENT — ENCOUNTER SYMPTOMS
COUGH CHARACTERISTICS: WEAK
COUGH CHARACTERISTICS: NON-PRODUCTIVE
CONSTIPATION: 0
FEVER: 0
BOWEL PATTERN NORMAL: 1
DIARRHEA: 0
CHILLS: 0
PALPITATIONS: 0
DIZZINESS: 1
DENIES PAIN: 1
LIMITED RANGE OF MOTION: 1
COUGH: 1
DYSPNEA ON EXERTION: 1
DRY SKIN: 1
FATIGUES EASILY: 1
MUSCLE WEAKNESS: 1
COUGH: 1
SHORTNESS OF BREATH: 1
HEADACHES: 0
VOMITING: DENIES
STOOL FREQUENCY: DAILY
NAUSEA: DENIES
FATIGUE: 1
LAST BOWEL MOVEMENT: 67354
ABDOMINAL PAIN: 0

## 2025-05-30 ASSESSMENT — PATIENT HEALTH QUESTIONNAIRE - PHQ9: CLINICAL INTERPRETATION OF PHQ2 SCORE: 0

## 2025-05-30 ASSESSMENT — PAIN SCALES - GENERAL: PAINLEVEL_OUTOF10: NO PAIN

## 2025-05-30 NOTE — TELEPHONE ENCOUNTER
[facility] pharmacy called to request refill for Lorsartan 100 mg tablet. Nohelia called and notify that [facility] pharmacy on [address] will refill.

## 2025-05-30 NOTE — TELEPHONE ENCOUNTER
"Nohelia called stating that [employer] needs more information to complete the order for \"portable oxygen.\" Message relayed to Frieda KIRAN.  "

## 2025-06-02 ENCOUNTER — HOME CARE VISIT (OUTPATIENT)
Dept: HOME HEALTH SERVICES | Facility: HOME HEALTHCARE | Age: 80
End: 2025-06-02
Payer: MEDICARE

## 2025-06-02 ENCOUNTER — TELEPHONE (OUTPATIENT)
Dept: HOME HEALTH SERVICES | Facility: HOME HEALTHCARE | Age: 80
End: 2025-06-02
Payer: MEDICARE

## 2025-06-02 RX ORDER — ESTRADIOL 0.1 MG/G
CREAM VAGINAL
Qty: 1 EACH | Refills: 6 | Status: SHIPPED | OUTPATIENT
Start: 2025-06-02

## 2025-06-02 RX ORDER — ALBUTEROL SULFATE 0.83 MG/ML
2.5 SOLUTION RESPIRATORY (INHALATION) EVERY 4 HOURS PRN
Qty: 1 EACH | Refills: 90 | Status: SHIPPED | OUTPATIENT
Start: 2025-06-02

## 2025-06-02 RX ORDER — LOSARTAN POTASSIUM 100 MG/1
100 TABLET ORAL DAILY
Qty: 100 TABLET | Refills: 0 | Status: SHIPPED | OUTPATIENT
Start: 2025-06-02

## 2025-06-02 RX ORDER — FLUTICASONE FUROATE, UMECLIDINIUM BROMIDE AND VILANTEROL TRIFENATATE 200; 62.5; 25 UG/1; UG/1; UG/1
1 POWDER RESPIRATORY (INHALATION)
Qty: 60 EACH | Refills: 3 | Status: SHIPPED | OUTPATIENT
Start: 2025-06-02

## 2025-06-02 RX ORDER — AMLODIPINE BESYLATE 5 MG/1
5 TABLET ORAL DAILY
Qty: 100 TABLET | Refills: 3 | Status: SHIPPED | OUTPATIENT
Start: 2025-06-02

## 2025-06-03 ENCOUNTER — HOME CARE VISIT (OUTPATIENT)
Dept: HOME HEALTH SERVICES | Facility: HOME HEALTHCARE | Age: 80
End: 2025-06-03
Payer: MEDICARE

## 2025-06-03 VITALS
TEMPERATURE: 97.6 F | OXYGEN SATURATION: 93 % | HEART RATE: 83 BPM | DIASTOLIC BLOOD PRESSURE: 78 MMHG | SYSTOLIC BLOOD PRESSURE: 130 MMHG | RESPIRATION RATE: 18 BRPM

## 2025-06-03 PROCEDURE — G0156 HHCP-SVS OF AIDE,EA 15 MIN: HCPCS

## 2025-06-03 PROCEDURE — G0493 RN CARE EA 15 MIN HH/HOSPICE: HCPCS

## 2025-06-03 SDOH — ECONOMIC STABILITY: HOUSING INSECURITY: EVIDENCE OF SMOKING MATERIAL: 0

## 2025-06-03 ASSESSMENT — ACTIVITIES OF DAILY LIVING (ADL)
AMBULATION ASSISTANCE: STAND BY ASSIST
CURRENT_FUNCTION: STAND BY ASSIST

## 2025-06-03 ASSESSMENT — ENCOUNTER SYMPTOMS
PAIN LOCATION - PAIN FREQUENCY: CONSTANT
PAIN LOCATION - EXACERBATING FACTORS: MOVEMENT
PAIN LOCATION - RELIEVING FACTORS: MEDICATION AND REST
SUBJECTIVE PAIN PROGRESSION: UNCHANGED
FATIGUE: 1
STOOL FREQUENCY: DAILY
PAIN LOCATION - PAIN DURATION: CHRONIC
BOWEL PATTERN NORMAL: 1
LIMITED RANGE OF MOTION: 1
LOWEST PAIN SEVERITY IN PAST 24 HOURS: 3/10
PAIN SEVERITY GOAL: 3/10
PAIN LOCATION: LOWER BACK
PAIN: 1
LAST BOWEL MOVEMENT: 67359
MUSCLE WEAKNESS: 1
PAIN LOCATION - PAIN SEVERITY: 3/10
FATIGUES EASILY: 1
FORGETFULNESS: 1
HIGHEST PAIN SEVERITY IN PAST 24 HOURS: 6/10

## 2025-06-04 ENCOUNTER — HOME CARE VISIT (OUTPATIENT)
Dept: HOME HEALTH SERVICES | Facility: HOME HEALTHCARE | Age: 80
End: 2025-06-04
Payer: MEDICARE

## 2025-06-04 NOTE — CASE COMMUNICATION
Patient was discussed in IDT today due to recent admission. Discussed progress toward goals of care with the treatment team. The treatment team currently involves the following disciplines: Home health aide, nursing, OT and PT. Plan is to discharge from home health services before end of certification period and when goals are met.

## 2025-06-05 ENCOUNTER — HOME CARE VISIT (OUTPATIENT)
Dept: HOME HEALTH SERVICES | Facility: HOME HEALTHCARE | Age: 80
End: 2025-06-05
Payer: MEDICARE

## 2025-06-05 PROCEDURE — G0299 HHS/HOSPICE OF RN EA 15 MIN: HCPCS

## 2025-06-06 VITALS
TEMPERATURE: 97.9 F | OXYGEN SATURATION: 95 % | DIASTOLIC BLOOD PRESSURE: 72 MMHG | RESPIRATION RATE: 16 BRPM | HEART RATE: 78 BPM | SYSTOLIC BLOOD PRESSURE: 120 MMHG

## 2025-06-06 SDOH — ECONOMIC STABILITY: HOUSING INSECURITY: EVIDENCE OF SMOKING MATERIAL: 0

## 2025-06-06 ASSESSMENT — PATIENT HEALTH QUESTIONNAIRE - PHQ9: CLINICAL INTERPRETATION OF PHQ2 SCORE: 0

## 2025-06-06 ASSESSMENT — ENCOUNTER SYMPTOMS
VOMITING: 1 WEEK AGO
LAST BOWEL MOVEMENT: 67361
BOWEL PATTERN NORMAL: 1
NAUSEA: OCCASIONALLY
STOOL FREQUENCY: DAILY
LIMITED RANGE OF MOTION: 1
SKIN LESIONS: 1
MUSCLE WEAKNESS: 1
DENIES PAIN: 1

## 2025-06-09 ENCOUNTER — HOME CARE VISIT (OUTPATIENT)
Dept: HOME HEALTH SERVICES | Facility: HOME HEALTHCARE | Age: 80
End: 2025-06-09
Payer: MEDICARE

## 2025-06-09 VITALS
SYSTOLIC BLOOD PRESSURE: 110 MMHG | TEMPERATURE: 97.4 F | DIASTOLIC BLOOD PRESSURE: 64 MMHG | OXYGEN SATURATION: 95 % | HEART RATE: 86 BPM | RESPIRATION RATE: 18 BRPM

## 2025-06-09 PROCEDURE — G0151 HHCP-SERV OF PT,EA 15 MIN: HCPCS

## 2025-06-09 ASSESSMENT — ENCOUNTER SYMPTOMS
LOWEST PAIN SEVERITY IN PAST 24 HOURS: 0/10
PAIN SEVERITY GOAL: 0/10
PAIN: 1
HIGHEST PAIN SEVERITY IN PAST 24 HOURS: 0/10

## 2025-06-10 ENCOUNTER — APPOINTMENT (OUTPATIENT)
Facility: MEDICAL CENTER | Age: 80
End: 2025-06-10
Payer: MEDICARE

## 2025-06-10 ENCOUNTER — HOME CARE VISIT (OUTPATIENT)
Dept: HOME HEALTH SERVICES | Facility: HOME HEALTHCARE | Age: 80
End: 2025-06-10
Payer: MEDICARE

## 2025-06-10 VITALS
SYSTOLIC BLOOD PRESSURE: 135 MMHG | OXYGEN SATURATION: 95 % | HEART RATE: 79 BPM | TEMPERATURE: 97 F | DIASTOLIC BLOOD PRESSURE: 75 MMHG | RESPIRATION RATE: 18 BRPM

## 2025-06-10 PROCEDURE — G0156 HHCP-SVS OF AIDE,EA 15 MIN: HCPCS

## 2025-06-10 ASSESSMENT — ENCOUNTER SYMPTOMS
PAIN LOCATION - PAIN FREQUENCY: INFREQUENT
PAIN LOCATION: LBP
DENIES PAIN: 1
SUBJECTIVE PAIN PROGRESSION: RESOLVED
PAIN LOCATION - PAIN QUALITY: 0
PERSON REPORTING PAIN: PATIENT
PAIN LOCATION - PAIN SEVERITY: 0/10
PAIN: 0/10

## 2025-06-11 ENCOUNTER — HOME CARE VISIT (OUTPATIENT)
Dept: HOME HEALTH SERVICES | Facility: HOME HEALTHCARE | Age: 80
End: 2025-06-11
Payer: MEDICARE

## 2025-06-11 PROCEDURE — G0152 HHCP-SERV OF OT,EA 15 MIN: HCPCS

## 2025-06-12 ENCOUNTER — HOME CARE VISIT (OUTPATIENT)
Dept: HOME HEALTH SERVICES | Facility: HOME HEALTHCARE | Age: 80
End: 2025-06-12
Payer: MEDICARE

## 2025-06-12 ENCOUNTER — TELEPHONE (OUTPATIENT)
Dept: HOME HEALTH SERVICES | Facility: HOME HEALTHCARE | Age: 80
End: 2025-06-12
Payer: MEDICARE

## 2025-06-12 PROCEDURE — G0299 HHS/HOSPICE OF RN EA 15 MIN: HCPCS

## 2025-06-12 ASSESSMENT — ENCOUNTER SYMPTOMS
PAIN LOCATION: BACK
PAIN: 1

## 2025-06-12 NOTE — TELEPHONE ENCOUNTER
"Nohelia called to report that [employer] has not delivered her \"small portable oxygen.\" Nohelia said that she is not able to manage the \"large portable oxygen on wheels.\" Nohelia called [employer] and has not received a call back.\" This RN called [employer] and then received a call back from Nusrat with [employer] who said that [facility] is waiting for authorization from [facility]. Nusrat said that no further documentation is needed for the DME order. This RN stressed the importance of DME being delivered by 6/18/2025 prior to Nohelia's doctors appointment on 6/19/2025. Nusrat said that [employer] would deliver either the conserving portable oxygen or the portable oxygen concentrator, which ever is available first, by 6/19/2025. This RN updated Nohelia.   "

## 2025-06-13 VITALS
DIASTOLIC BLOOD PRESSURE: 78 MMHG | RESPIRATION RATE: 18 BRPM | SYSTOLIC BLOOD PRESSURE: 122 MMHG | OXYGEN SATURATION: 95 % | HEART RATE: 74 BPM | TEMPERATURE: 98.1 F

## 2025-06-13 SDOH — ECONOMIC STABILITY: HOUSING INSECURITY: EVIDENCE OF SMOKING MATERIAL: 0

## 2025-06-13 ASSESSMENT — ENCOUNTER SYMPTOMS
NAUSEA: DENIES
STOOL FREQUENCY: DAILY
PAIN SEVERITY GOAL: 0/10
BOWEL PATTERN NORMAL: 1
LOWEST PAIN SEVERITY IN PAST 24 HOURS: 0/10
MUSCLE WEAKNESS: 1
HIGHEST PAIN SEVERITY IN PAST 24 HOURS: 6/10
VOMITING: DENIES
PAIN LOCATION - PAIN QUALITY: SPASMS
PAIN LOCATION - RELIEVING FACTORS: TYLENOL
SUBJECTIVE PAIN PROGRESSION: RESOLVED
FATIGUES EASILY: 1
PAIN LOCATION - PAIN SEVERITY: 6/10
LAST BOWEL MOVEMENT: 67368

## 2025-06-13 ASSESSMENT — PATIENT HEALTH QUESTIONNAIRE - PHQ9: CLINICAL INTERPRETATION OF PHQ2 SCORE: 0

## 2025-06-15 VITALS
HEART RATE: 82 BPM | SYSTOLIC BLOOD PRESSURE: 124 MMHG | DIASTOLIC BLOOD PRESSURE: 64 MMHG | OXYGEN SATURATION: 97 % | TEMPERATURE: 97.5 F | RESPIRATION RATE: 18 BRPM

## 2025-06-15 SDOH — ECONOMIC STABILITY: HOUSING INSECURITY: EVIDENCE OF SMOKING MATERIAL: 0

## 2025-06-15 ASSESSMENT — ACTIVITIES OF DAILY LIVING (ADL)
GROOMING_CURRENT_FUNCTION: INDEPENDENT
ORAL_CARE_ASSESSED: 1
GROOMING ASSESSED: 1
LAUNDRY ASSESSED: 1
TOILETING: 1
CURRENT_FUNCTION: SUPERVISION
AMBULATION ASSISTANCE: SUPERVISION
FEEDING: INDEPENDENT
FEEDING ASSESSED: 1
AMBULATION ASSISTANCE: 1
DRESSING_LB_CURRENT_FUNCTION: INDEPENDENT
LAUNDRY: DEPENDENT
TRANSPORTATION ASSESSED: 1
MODE OF TRANSPORTATION: FAMILY ASSIST
PREPARING MEALS: NEEDS ASSISTANCE
TOILETING: INDEPENDENT
BATHING ASSESSED: 1
TRANSPORTATION: DEPENDENT
TOILETING EQUIPMENT USED: OVER TOILET COMMODE
DRESSING_UB_CURRENT_FUNCTION: INDEPENDENT
PHYSICAL TRANSFERS ASSESSED: 1
ORAL_CARE_CURRENT_FUNCTION: INDEPENDENT
BATHING_CURRENT_FUNCTION: SUPERVISION

## 2025-06-15 ASSESSMENT — ENCOUNTER SYMPTOMS
PAIN LOCATION - PAIN DURATION: CHRONIC
LOWEST PAIN SEVERITY IN PAST 24 HOURS: 2/10
PAIN SEVERITY GOAL: 0/10
PAIN LOCATION - PAIN FREQUENCY: FREQUENT
PAIN LOCATION - RELIEVING FACTORS: REST
HIGHEST PAIN SEVERITY IN PAST 24 HOURS: 5/10
SUBJECTIVE PAIN PROGRESSION: UNCHANGED
PAIN LOCATION - PAIN SEVERITY: 3/10
PAIN LOCATION - EXACERBATING FACTORS: ACTIVITY
PAIN: 1

## 2025-06-16 ENCOUNTER — HOME CARE VISIT (OUTPATIENT)
Dept: HOME HEALTH SERVICES | Facility: HOME HEALTHCARE | Age: 80
End: 2025-06-16
Payer: MEDICARE

## 2025-06-16 VITALS
TEMPERATURE: 98 F | DIASTOLIC BLOOD PRESSURE: 66 MMHG | SYSTOLIC BLOOD PRESSURE: 106 MMHG | OXYGEN SATURATION: 96 % | HEART RATE: 76 BPM | RESPIRATION RATE: 18 BRPM

## 2025-06-16 PROCEDURE — G0151 HHCP-SERV OF PT,EA 15 MIN: HCPCS

## 2025-06-16 ASSESSMENT — ENCOUNTER SYMPTOMS: DENIES PAIN: 1

## 2025-06-17 ENCOUNTER — HOME CARE VISIT (OUTPATIENT)
Dept: HOME HEALTH SERVICES | Facility: HOME HEALTHCARE | Age: 80
End: 2025-06-17
Payer: MEDICARE

## 2025-06-17 VITALS
TEMPERATURE: 97 F | SYSTOLIC BLOOD PRESSURE: 142 MMHG | OXYGEN SATURATION: 96 % | HEART RATE: 69 BPM | RESPIRATION RATE: 18 BRPM | DIASTOLIC BLOOD PRESSURE: 80 MMHG

## 2025-06-17 PROCEDURE — G0156 HHCP-SVS OF AIDE,EA 15 MIN: HCPCS

## 2025-06-17 ASSESSMENT — ENCOUNTER SYMPTOMS
DENIES PAIN: 1
PERSON REPORTING PAIN: PATIENT
PAIN: 0/10 PAIN

## 2025-06-18 ENCOUNTER — TELEPHONE (OUTPATIENT)
Dept: HOME HEALTH SERVICES | Facility: HOME HEALTHCARE | Age: 80
End: 2025-06-18

## 2025-06-18 ENCOUNTER — OFF SITE VISIT (OUTPATIENT)
Dept: PALLIATIVE MEDICINE | Facility: HOSPICE | Age: 80
End: 2025-06-18
Payer: MEDICARE

## 2025-06-18 ENCOUNTER — HOME CARE VISIT (OUTPATIENT)
Dept: HOME HEALTH SERVICES | Facility: HOME HEALTHCARE | Age: 80
End: 2025-06-18
Payer: MEDICARE

## 2025-06-18 VITALS
RESPIRATION RATE: 17 BRPM | HEART RATE: 88 BPM | OXYGEN SATURATION: 96 % | SYSTOLIC BLOOD PRESSURE: 110 MMHG | DIASTOLIC BLOOD PRESSURE: 70 MMHG | TEMPERATURE: 97.9 F

## 2025-06-18 DIAGNOSIS — J96.21 ACUTE ON CHRONIC RESPIRATORY FAILURE WITH HYPOXIA (HCC): Primary | ICD-10-CM

## 2025-06-18 DIAGNOSIS — Z86.79 HISTORY OF ATRIAL FLUTTER: ICD-10-CM

## 2025-06-18 PROCEDURE — 99349 HOME/RES VST EST MOD MDM 40: CPT | Performed by: NURSE PRACTITIONER

## 2025-06-18 PROCEDURE — G0299 HHS/HOSPICE OF RN EA 15 MIN: HCPCS

## 2025-06-18 SDOH — ECONOMIC STABILITY: HOUSING INSECURITY: EVIDENCE OF SMOKING MATERIAL: 0

## 2025-06-18 SDOH — ECONOMIC STABILITY: FOOD INSECURITY: MEALS PER DAY: 3

## 2025-06-18 ASSESSMENT — ENCOUNTER SYMPTOMS
STOOL FREQUENCY: DAILY
CHILLS: 0
PALPITATIONS: 0
DENIES PAIN: 1
SHORTNESS OF BREATH: 0
ABDOMINAL PAIN: 0
VOMITING: DENIES
BOWEL PATTERN NORMAL: 1
LAST BOWEL MOVEMENT: 67374
FEVER: 0
SKIN LESIONS: 1
NAUSEA: DENIES
APPETITE LEVEL: GOOD
DENIES PAIN: 1
CHANGE IN APPETITE: UNCHANGED

## 2025-06-18 ASSESSMENT — ACTIVITIES OF DAILY LIVING (ADL)
HOME_HEALTH_OASIS: 01
OASIS_M1830: 02
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
## 2025-06-18 NOTE — TELEPHONE ENCOUNTER
Nohelia is discharged from Home Transitional Care Management on 6/18/2025.Dr. VARINDER Carreon to continue [facility] care.

## 2025-06-18 NOTE — PROCEDURES
Procedure: Bladder chemodenervation with onabotulintoxinA    Nohelia Dickerson is a  80 y.o. with OAB/UUI.  She previously responded to Botox 100 units but had less than 6-month response.  She was previously counseled on continuing 100 units versus increasing to 200 units, and understands there is a high risk of retention with 200 units, although this is transient.  She would like to move forward with 200 unit dosing.  She was thoroughly counseled on the procedure and consented for cystourethroscopy and bladder chemodenervation with onabotulinum toxin A.  She is aware the risks include infection, bladder perforation(are), reaction to medication (rare), transient urinary retention.  Symptomatic improvement tends to occur at 1 week after injection, and last between 6 and 12 months.  All questions were answered    45 minutes prior to the procedure, her bladder was backfilled with 60 mL of a 2% lidocaine solution, and the urethra instilled with lidojet.     She was given bactrim for antibiotic prophylaxis    Cystoscopy w/Botox    Date/Time: 6/18/2025 1:53 PM    Performed by: Kayden Purvis M.D.  Authorized by: Kayden Purvis M.D.    Procedure discussed: discussed risks, benefits and alternatives    Chaperone present: yes    Timeout: timeout called immediately prior to procedure    Prep: patient was prepped and draped in usual sterile fashion    Prep type: Betadine    Anesthesia: local anesthesia      Procedure Details     Cystoscope type: flexible    Cystoscopy route: transurethral      Cystoscopy location: native bladder      Irrigation used: saline      Position: dorsal lithotomy    Urethra     Urethra: normal      Vagina     Vagina: normal      Bladder     Bladder: normal      Botox Injection Details     Changes since previous cystoscopy: no changes since previous cystoscopy      Indication: overactive bladder      Volume per injection comment: 2.0 mL    Total number of injections: 5    Total number of injections  comment: 200 units    Post-Procedure Details     Appearance of urine after procedure: clear    Outcome: patient tolerated procedure well with no complications      Disposition: discharged home in satisfactory condition        Kayden Purvis MD

## 2025-06-19 ENCOUNTER — OFFICE VISIT (OUTPATIENT)
Dept: GYNECOLOGY | Facility: CLINIC | Age: 80
End: 2025-06-19
Attending: STUDENT IN AN ORGANIZED HEALTH CARE EDUCATION/TRAINING PROGRAM
Payer: MEDICARE

## 2025-06-19 DIAGNOSIS — N32.81 OAB (OVERACTIVE BLADDER): Primary | ICD-10-CM

## 2025-06-19 DIAGNOSIS — N39.41 URGE URINARY INCONTINENCE: ICD-10-CM

## 2025-06-19 LAB
APPEARANCE UR: NORMAL
BILIRUB UR STRIP-MCNC: NORMAL MG/DL
COLOR UR AUTO: NORMAL
GLUCOSE UR STRIP.AUTO-MCNC: NEGATIVE MG/DL
KETONES UR STRIP.AUTO-MCNC: 15 MG/DL
LEUKOCYTE ESTERASE UR QL STRIP.AUTO: NEGATIVE
NITRITE UR QL STRIP.AUTO: NEGATIVE
PH UR STRIP.AUTO: 5.5 [PH] (ref 5–8)
PROT UR QL STRIP: NORMAL MG/DL
RBC UR QL AUTO: NEGATIVE
SP GR UR STRIP.AUTO: 1.02
UROBILINOGEN UR STRIP-MCNC: 1 MG/DL

## 2025-06-19 PROCEDURE — 52287 CYSTOSCOPY CHEMODENERVATION: CPT | Performed by: STUDENT IN AN ORGANIZED HEALTH CARE EDUCATION/TRAINING PROGRAM

## 2025-06-19 PROCEDURE — 81002 URINALYSIS NONAUTO W/O SCOPE: CPT | Performed by: STUDENT IN AN ORGANIZED HEALTH CARE EDUCATION/TRAINING PROGRAM

## 2025-06-19 RX ORDER — SULFAMETHOXAZOLE AND TRIMETHOPRIM 800; 160 MG/1; MG/1
1 TABLET ORAL ONCE
Status: COMPLETED | OUTPATIENT
Start: 2025-06-19 | End: 2025-06-19

## 2025-06-19 RX ADMIN — SULFAMETHOXAZOLE AND TRIMETHOPRIM 1 TABLET: 800; 160 TABLET ORAL at 16:05

## 2025-06-20 DIAGNOSIS — I10 PRIMARY HYPERTENSION: Primary | ICD-10-CM

## 2025-06-20 DIAGNOSIS — J44.1 CHRONIC OBSTRUCTIVE PULMONARY DISEASE WITH ACUTE EXACERBATION (HCC): ICD-10-CM

## 2025-07-07 NOTE — TELEPHONE ENCOUNTER
Received request via: Pharmacy    Was the patient seen in the last year in this department? Yes    Does the patient have an active prescription (recently filled or refills available) for medication(s) requested? No    Pharmacy Name: [id number] - [place], [place] - [address] [[id number]]     Does the patient have long-term Plus and need 100-day supply? (This applies to ALL medications) Yes, quantity updated to 100 days

## 2025-07-10 ENCOUNTER — PATIENT OUTREACH (OUTPATIENT)
Dept: HEALTH INFORMATION MANAGEMENT | Facility: OTHER | Age: 80
End: 2025-07-10
Payer: MEDICARE

## 2025-07-10 DIAGNOSIS — J44.1 CHRONIC OBSTRUCTIVE PULMONARY DISEASE WITH ACUTE EXACERBATION (HCC): Primary | ICD-10-CM

## 2025-07-10 DIAGNOSIS — E78.49 OTHER HYPERLIPIDEMIA: ICD-10-CM

## 2025-07-10 DIAGNOSIS — I10 PRIMARY HYPERTENSION: ICD-10-CM

## 2025-07-10 NOTE — PROGRESS NOTES
I called this morning in order to touch base with the patient after the close of home health services and to explain PCM services and benefits. No answer. Message left with contact information and request for call back.

## 2025-07-21 ENCOUNTER — TELEPHONE (OUTPATIENT)
Dept: CARDIOLOGY | Facility: MEDICAL CENTER | Age: 80
End: 2025-07-21
Payer: MEDICARE

## 2025-07-21 NOTE — TELEPHONE ENCOUNTER
Spoke to patient in regards to records for NP appointment with [facility].     Patient has seen a cardiologist before?  Yes   If yes, where?: Doesn't remember     Any recent cardiac testing outside of [facility]?  No       Were any records requested?  No         Patient and daughter are not able to make it to the appointment on 07.31.2025. They have rescheduled with TW on 08.26.2025.

## 2025-07-31 ENCOUNTER — APPOINTMENT (OUTPATIENT)
Dept: CARDIOLOGY | Facility: MEDICAL CENTER | Age: 80
End: 2025-07-31
Attending: INTERNAL MEDICINE
Payer: MEDICARE

## 2025-08-04 ENCOUNTER — APPOINTMENT (OUTPATIENT)
Dept: MEDICAL GROUP | Facility: CLINIC | Age: 80
End: 2025-08-04
Payer: MEDICARE

## 2025-08-04 ASSESSMENT — FIBROSIS 4 INDEX: FIB4 SCORE: 2.47

## 2025-08-05 ENCOUNTER — HOSPITAL ENCOUNTER (OUTPATIENT)
Dept: RADIOLOGY | Facility: MEDICAL CENTER | Age: 80
End: 2025-08-05
Payer: MEDICARE

## 2025-08-05 ENCOUNTER — TELEPHONE (OUTPATIENT)
Dept: CARDIOLOGY | Facility: MEDICAL CENTER | Age: 80
End: 2025-08-05
Payer: MEDICARE

## 2025-08-05 VITALS — HEIGHT: 72 IN | BODY MASS INDEX: 24.38 KG/M2 | WEIGHT: 180 LBS

## 2025-08-05 DIAGNOSIS — Z12.31 ENCOUNTER FOR SCREENING MAMMOGRAM FOR MALIGNANT NEOPLASM OF BREAST: ICD-10-CM

## 2025-08-05 PROCEDURE — 77067 SCR MAMMO BI INCL CAD: CPT

## 2025-08-05 ASSESSMENT — FIBROSIS 4 INDEX: FIB4 SCORE: 2.47

## 2025-08-26 ENCOUNTER — OFFICE VISIT (OUTPATIENT)
Dept: CARDIOLOGY | Facility: MEDICAL CENTER | Age: 80
End: 2025-08-26
Attending: INTERNAL MEDICINE
Payer: MEDICARE

## 2025-08-26 VITALS
OXYGEN SATURATION: 95 % | SYSTOLIC BLOOD PRESSURE: 128 MMHG | BODY MASS INDEX: 24.52 KG/M2 | WEIGHT: 181 LBS | HEIGHT: 72 IN | HEART RATE: 90 BPM | RESPIRATION RATE: 16 BRPM | DIASTOLIC BLOOD PRESSURE: 82 MMHG

## 2025-08-26 DIAGNOSIS — I63.9 CEREBROVASCULAR ACCIDENT (CVA), UNSPECIFIED MECHANISM (HCC): ICD-10-CM

## 2025-08-26 DIAGNOSIS — I48.0 PAF (PAROXYSMAL ATRIAL FIBRILLATION) (HCC): Primary | ICD-10-CM

## 2025-08-26 DIAGNOSIS — Z79.01 ON CONTINUOUS ORAL ANTICOAGULATION: ICD-10-CM

## 2025-08-26 DIAGNOSIS — E78.2 MIXED HYPERLIPIDEMIA: ICD-10-CM

## 2025-08-26 DIAGNOSIS — I10 ESSENTIAL HYPERTENSION: ICD-10-CM

## 2025-08-26 PROCEDURE — 99212 OFFICE O/P EST SF 10 MIN: CPT | Performed by: INTERNAL MEDICINE

## 2025-08-26 ASSESSMENT — LIFESTYLE VARIABLES
AUDIT-C TOTAL SCORE: 0
HOW OFTEN DO YOU HAVE SIX OR MORE DRINKS ON ONE OCCASION: NEVER
HOW OFTEN DO YOU HAVE A DRINK CONTAINING ALCOHOL: NEVER
SKIP TO QUESTIONS 9-10: 1
HOW MANY STANDARD DRINKS CONTAINING ALCOHOL DO YOU HAVE ON A TYPICAL DAY: PATIENT DOES NOT DRINK

## 2025-08-26 ASSESSMENT — FIBROSIS 4 INDEX: FIB4 SCORE: 2.47

## 2025-08-27 ENCOUNTER — TELEPHONE (OUTPATIENT)
Dept: MEDICAL GROUP | Facility: CLINIC | Age: 80
End: 2025-08-27
Payer: MEDICARE

## 2025-08-27 DIAGNOSIS — I10 HYPERTENSION, UNSPECIFIED TYPE: ICD-10-CM

## 2025-08-27 DIAGNOSIS — I63.9 CARDIOEMBOLIC STROKE (HCC): ICD-10-CM

## 2025-08-27 PROCEDURE — RXMED WILLOW AMBULATORY MEDICATION CHARGE

## 2025-08-27 RX ORDER — ATORVASTATIN CALCIUM 80 MG/1
80 TABLET, FILM COATED ORAL
Qty: 100 TABLET | Refills: 3 | Status: SHIPPED | OUTPATIENT
Start: 2025-08-27

## 2025-08-27 RX ORDER — LOSARTAN POTASSIUM 100 MG/1
100 TABLET ORAL DAILY
Qty: 100 TABLET | Refills: 3 | Status: SHIPPED | OUTPATIENT
Start: 2025-08-27

## 2025-08-28 ENCOUNTER — PHARMACY VISIT (OUTPATIENT)
Dept: PHARMACY | Facility: MEDICAL CENTER | Age: 80
End: 2025-08-28
Payer: COMMERCIAL